# Patient Record
Sex: FEMALE | Race: WHITE | NOT HISPANIC OR LATINO | Employment: FULL TIME | URBAN - METROPOLITAN AREA
[De-identification: names, ages, dates, MRNs, and addresses within clinical notes are randomized per-mention and may not be internally consistent; named-entity substitution may affect disease eponyms.]

---

## 2017-01-02 ENCOUNTER — PATIENT MESSAGE (OUTPATIENT)
Dept: OBSTETRICS AND GYNECOLOGY | Facility: CLINIC | Age: 22
End: 2017-01-02

## 2017-01-06 ENCOUNTER — PATIENT MESSAGE (OUTPATIENT)
Dept: OBSTETRICS AND GYNECOLOGY | Facility: CLINIC | Age: 22
End: 2017-01-06

## 2017-01-07 ENCOUNTER — PATIENT MESSAGE (OUTPATIENT)
Dept: OBSTETRICS AND GYNECOLOGY | Facility: CLINIC | Age: 22
End: 2017-01-07

## 2017-01-13 ENCOUNTER — LAB VISIT (OUTPATIENT)
Dept: LAB | Facility: HOSPITAL | Age: 22
End: 2017-01-13
Payer: COMMERCIAL

## 2017-01-13 ENCOUNTER — OFFICE VISIT (OUTPATIENT)
Dept: OBSTETRICS AND GYNECOLOGY | Facility: CLINIC | Age: 22
End: 2017-01-13
Payer: COMMERCIAL

## 2017-01-13 VITALS
BODY MASS INDEX: 36.4 KG/M2 | SYSTOLIC BLOOD PRESSURE: 114 MMHG | WEIGHT: 240.19 LBS | DIASTOLIC BLOOD PRESSURE: 78 MMHG | HEIGHT: 68 IN

## 2017-01-13 DIAGNOSIS — Z11.3 SCREENING FOR STD (SEXUALLY TRANSMITTED DISEASE): ICD-10-CM

## 2017-01-13 DIAGNOSIS — B35.4 TINEA CORPORIS: ICD-10-CM

## 2017-01-13 DIAGNOSIS — Z11.3 SCREENING FOR STD (SEXUALLY TRANSMITTED DISEASE): Primary | ICD-10-CM

## 2017-01-13 DIAGNOSIS — L85.3 DRY SKIN: ICD-10-CM

## 2017-01-13 PROCEDURE — 87340 HEPATITIS B SURFACE AG IA: CPT

## 2017-01-13 PROCEDURE — 99214 OFFICE O/P EST MOD 30 MIN: CPT | Mod: S$GLB,,, | Performed by: NURSE PRACTITIONER

## 2017-01-13 PROCEDURE — 1159F MED LIST DOCD IN RCRD: CPT | Mod: S$GLB,,, | Performed by: NURSE PRACTITIONER

## 2017-01-13 PROCEDURE — 86703 HIV-1/HIV-2 1 RESULT ANTBDY: CPT

## 2017-01-13 PROCEDURE — 87480 CANDIDA DNA DIR PROBE: CPT

## 2017-01-13 PROCEDURE — 99999 PR PBB SHADOW E&M-EST. PATIENT-LVL III: CPT | Mod: PBBFAC,,, | Performed by: NURSE PRACTITIONER

## 2017-01-13 PROCEDURE — 87591 N.GONORRHOEAE DNA AMP PROB: CPT

## 2017-01-13 PROCEDURE — 86592 SYPHILIS TEST NON-TREP QUAL: CPT

## 2017-01-13 RX ORDER — PRENATAL VIT 91/IRON/FOLIC/DHA 28-975-200
COMBINATION PACKAGE (EA) ORAL 2 TIMES DAILY
Qty: 15 G | COMMUNITY
Start: 2017-01-13 | End: 2017-06-15

## 2017-01-13 NOTE — PROGRESS NOTES
"Chief Complaint: STD testing      HPI:   Pt is a 21 y.o. here today desiring STD testing. She has had recent changes in partners and protection not used 100% of the time. Has concerns of possible STDs and reports +for chlamydia and took treatment with partner about 3 weeks ago and treated for syphillis since he was positive. She is concerned bc she feels they didn't wait the 7 days before intercourse and are having intercourse without condoms. She hasn't started her COCs and LMP within 7 days, concerns about needing plan B from last night but reports this was anal intercourse. Denies vaginal discharge/pain/f/c/n/v. Partner with no reported symptoms.  Right circular itching on right upper arm, dry itchy legs bilaterally, no rashes  ROS   Systemic: Not feeling tired (fatigue).  No fever chills   Gastrointestinal: No nausea, vomiting, no abdominal pain.  No diarrhea.  Genitourinary: No dysuria. No Pelvic Pain  Skin: No rash.  Visit Vitals    /78    Ht 5' 8" (1.727 m)    Wt 109 kg (240 lb 3.1 oz)    LMP 01/04/2017 (Exact Date)    BMI 36.52 kg/m2       Physical Exam   Vital Signs:° Normal.  General Appearance:° Well developed. ° Well nourished.  Right upper extremity circular erythematous edge with scaly lesion and central clearing.  Lymph Nodes: no Inguinal LAD  Urinary System:° Normal. Urethra: ° Meatus showed no abnormalities. ° No mass on the urethra.  Genitalia: External: ° Vulva was normal. ° Genitalia exhibited no lesion.                    Vagina: ° Mucosa was normal. ° A vaginal discharge was not observed  Pelvic: Cervix: ° No cervical discharge. ° Showed no lesion. ° Not tender, no CMT         Uterus: ° Position was normal. ° Not tender.  Neurological:° No disorientation was observed.  Psychiatric:Affect: ° Normal.  Skin:° No skin lesions. Or rashes on palms/soles of feet, BLE with dry skin and excoriation from itching  Perineum:° Normal. ° Did not have a mass.° No perineal lesions/rashes/erythema "     Plan:  1. STD screening--ordered STD Panel (HIV/RPR/GC/Hep B/affirm). Will call with results. Recommended Condom use 100% to prevent STDs    2.Tinea Corporis--terbinafine cream as ordered    3. Dry skin-- Lubriderm cream    4. Contraception--discussed starting COCs and Plan B    RTC prn and for annual

## 2017-01-13 NOTE — PATIENT INSTRUCTIONS
Things to Remember about Feminine Hygiene and Safety   1. Wipe from front to back after using the bathroom   2. If possible, urinate before and definitely after sexual intercourse or masturbation   3. I recommend bathing with liquid soap vs a bar soap. Non-scented antibacterial soap: Dial or Neutrogena soap   4. Shower or rinse off before sitting in a bathtub full of dirty water   5. Do not douche of any kind   6. It is recommended that you take in plenty of fluids. Eight glasses of 8 ounces of water is recommended daily (UNLESS MEDICAL PROBLEMS INDICATE OTHERWISE)   7. It is recommended to change tampons and pads every 2-3 hours or as saturated   8. Use condoms to protect yourself against Sexually Transmitted Infections   9. Wear your seat belt

## 2017-01-14 LAB
CANDIDA RRNA VAG QL PROBE: NEGATIVE
G VAGINALIS RRNA GENITAL QL PROBE: NEGATIVE
RPR SER QL: NORMAL
T VAGINALIS RRNA GENITAL QL PROBE: NEGATIVE

## 2017-01-16 LAB
HBV SURFACE AG SERPL QL IA: NEGATIVE
HIV 1+2 AB+HIV1 P24 AG SERPL QL IA: NEGATIVE

## 2017-01-17 ENCOUNTER — TELEPHONE (OUTPATIENT)
Dept: OBSTETRICS AND GYNECOLOGY | Facility: CLINIC | Age: 22
End: 2017-01-17

## 2017-01-17 ENCOUNTER — PATIENT MESSAGE (OUTPATIENT)
Dept: OBSTETRICS AND GYNECOLOGY | Facility: CLINIC | Age: 22
End: 2017-01-17

## 2017-01-17 LAB
C TRACH DNA SPEC QL NAA+PROBE: NEGATIVE
N GONORRHOEA DNA SPEC QL NAA+PROBE: NEGATIVE

## 2017-01-19 ENCOUNTER — PATIENT MESSAGE (OUTPATIENT)
Dept: OBSTETRICS AND GYNECOLOGY | Facility: CLINIC | Age: 22
End: 2017-01-19

## 2017-01-19 NOTE — TELEPHONE ENCOUNTER
Spoke with pt and informed her that her lab work was negative. Pt expressed verbal understanding. MAE

## 2017-01-20 ENCOUNTER — TELEPHONE (OUTPATIENT)
Dept: OBSTETRICS AND GYNECOLOGY | Facility: CLINIC | Age: 22
End: 2017-01-20

## 2017-01-20 DIAGNOSIS — B37.31 VAGINAL YEAST INFECTION: Primary | ICD-10-CM

## 2017-01-20 RX ORDER — FLUCONAZOLE 150 MG/1
TABLET ORAL
Qty: 2 TABLET | Refills: 0 | Status: SHIPPED | OUTPATIENT
Start: 2017-01-20 | End: 2017-02-22

## 2017-01-20 NOTE — TELEPHONE ENCOUNTER
Discussed symptoms with pt of thick clumpy white discharge with vaginal itching and irritation.   Ordered diflucan and external monistat cream. Most likely r/t recent antibiotics  If not better will need apt

## 2017-01-24 ENCOUNTER — TELEPHONE (OUTPATIENT)
Dept: OBSTETRICS AND GYNECOLOGY | Facility: CLINIC | Age: 22
End: 2017-01-24

## 2017-01-24 NOTE — TELEPHONE ENCOUNTER
Pt called and explained to me that she took the diflucan,  but she is still having severe external and internal itching and irritation. Pt is also experiencing painful intercourse and having abdominal pain. Pt wants to be seen by you but wants to call back to schedule Monday or Tuesday. She would like to know if a vaginal cream can be called in to help with the external itching. MAE

## 2017-02-03 ENCOUNTER — TELEPHONE (OUTPATIENT)
Dept: OBSTETRICS AND GYNECOLOGY | Facility: CLINIC | Age: 22
End: 2017-02-03

## 2017-02-03 NOTE — TELEPHONE ENCOUNTER
Pt states that her PCP said that she needs a lower dose of birth control because the current BC she is taking may not work well with the Lamotrigine. The pt's PCP told her that she needed to follow up with obgyn and explained to her that her mood may be effected with the current combination of meds. She would like to speak with you. [phone]

## 2017-02-06 ENCOUNTER — TELEPHONE (OUTPATIENT)
Dept: OBSTETRICS AND GYNECOLOGY | Facility: CLINIC | Age: 22
End: 2017-02-06

## 2017-02-06 NOTE — TELEPHONE ENCOUNTER
----- Message from Linda Coon MD sent at 2/6/2017  2:27 PM CST -----  Bhaskar Mckeon,   This patient has apparently been told she might need to switch birth controls, but that's way too long of a discussion to have over the phone.   Can you schedule an appointment for her to come in to talk about her options?  Thank you!    ----- Message -----     From: Brigida Escobar MA     Sent: 2/6/2017  10:31 AM       To: Linda Coon MD    Pt states that her PCP said that she needs a lower dose of birth control because the current BC she is taking may not work well with the Lamotrigine. The pt's PCP told her that she needed to follow up with obgyn and explained to her that her mood may be effected with the current combination of meds. She would like to speak with you. [phone]

## 2017-02-22 ENCOUNTER — OFFICE VISIT (OUTPATIENT)
Dept: OBSTETRICS AND GYNECOLOGY | Facility: CLINIC | Age: 22
End: 2017-02-22
Payer: COMMERCIAL

## 2017-02-22 VITALS
SYSTOLIC BLOOD PRESSURE: 118 MMHG | BODY MASS INDEX: 37.1 KG/M2 | DIASTOLIC BLOOD PRESSURE: 74 MMHG | WEIGHT: 244.81 LBS | HEIGHT: 68 IN

## 2017-02-22 DIAGNOSIS — N94.10 DYSPAREUNIA IN FEMALE: Primary | ICD-10-CM

## 2017-02-22 PROCEDURE — 87480 CANDIDA DNA DIR PROBE: CPT

## 2017-02-22 PROCEDURE — 1160F RVW MEDS BY RX/DR IN RCRD: CPT | Mod: S$GLB,,, | Performed by: OBSTETRICS & GYNECOLOGY

## 2017-02-22 PROCEDURE — 99213 OFFICE O/P EST LOW 20 MIN: CPT | Mod: S$GLB,,, | Performed by: OBSTETRICS & GYNECOLOGY

## 2017-02-22 PROCEDURE — 99999 PR PBB SHADOW E&M-EST. PATIENT-LVL II: CPT | Mod: PBBFAC,,, | Performed by: OBSTETRICS & GYNECOLOGY

## 2017-02-22 PROCEDURE — 87591 N.GONORRHOEAE DNA AMP PROB: CPT
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
## 2017-02-23 LAB
CANDIDA RRNA VAG QL PROBE: NEGATIVE
G VAGINALIS RRNA GENITAL QL PROBE: NEGATIVE
T VAGINALIS RRNA GENITAL QL PROBE: NEGATIVE

## 2017-02-24 ENCOUNTER — PATIENT MESSAGE (OUTPATIENT)
Dept: OBSTETRICS AND GYNECOLOGY | Facility: CLINIC | Age: 22
End: 2017-02-24

## 2017-02-24 LAB
C TRACH DNA SPEC QL NAA+PROBE: NEGATIVE
N GONORRHOEA DNA SPEC QL NAA+PROBE: NEGATIVE

## 2017-03-07 ENCOUNTER — PATIENT MESSAGE (OUTPATIENT)
Dept: OBSTETRICS AND GYNECOLOGY | Facility: CLINIC | Age: 22
End: 2017-03-07

## 2017-03-07 RX ORDER — FLUCONAZOLE 150 MG/1
150 TABLET ORAL ONCE
Qty: 2 TABLET | Refills: 1 | Status: SHIPPED | OUTPATIENT
Start: 2017-03-07 | End: 2017-03-07

## 2017-04-20 ENCOUNTER — TELEPHONE (OUTPATIENT)
Dept: OBSTETRICS AND GYNECOLOGY | Facility: CLINIC | Age: 22
End: 2017-04-20

## 2017-04-20 RX ORDER — FLUCONAZOLE 150 MG/1
150 TABLET ORAL ONCE
Qty: 2 TABLET | Refills: 1 | Status: SHIPPED | OUTPATIENT
Start: 2017-04-20 | End: 2017-04-20

## 2017-04-20 NOTE — TELEPHONE ENCOUNTER
----- Message from Brigida Escobar MA sent at 4/20/2017  8:51 AM CDT -----  Pt would like to know if she could get an Rx for a yeast infection called in. She states that she is experiencing itching and discharge.

## 2017-05-19 ENCOUNTER — TELEPHONE (OUTPATIENT)
Dept: OBSTETRICS AND GYNECOLOGY | Facility: CLINIC | Age: 22
End: 2017-05-19

## 2017-05-19 NOTE — TELEPHONE ENCOUNTER
Pt called because she is still having a yeast infection. I explained to the pt that she had a refill of Diflucan left so she could take that over the weekend and be seen as early as Monday if she would like. The pt would like to know what she could do to prevent them in the future since she is now getting them every month. Please advise. Thank You.

## 2017-05-22 ENCOUNTER — PATIENT MESSAGE (OUTPATIENT)
Dept: OBSTETRICS AND GYNECOLOGY | Facility: CLINIC | Age: 22
End: 2017-05-22

## 2017-06-15 ENCOUNTER — OFFICE VISIT (OUTPATIENT)
Dept: OBSTETRICS AND GYNECOLOGY | Facility: CLINIC | Age: 22
End: 2017-06-15
Payer: COMMERCIAL

## 2017-06-15 VITALS
HEIGHT: 68 IN | BODY MASS INDEX: 38.52 KG/M2 | SYSTOLIC BLOOD PRESSURE: 110 MMHG | WEIGHT: 254.19 LBS | DIASTOLIC BLOOD PRESSURE: 70 MMHG

## 2017-06-15 DIAGNOSIS — N94.10 DYSPAREUNIA, FEMALE: Primary | ICD-10-CM

## 2017-06-15 DIAGNOSIS — N89.8 VAGINAL DISCHARGE: ICD-10-CM

## 2017-06-15 PROCEDURE — 99213 OFFICE O/P EST LOW 20 MIN: CPT | Mod: S$GLB,,, | Performed by: NURSE PRACTITIONER

## 2017-06-15 PROCEDURE — 87480 CANDIDA DNA DIR PROBE: CPT

## 2017-06-15 PROCEDURE — 99999 PR PBB SHADOW E&M-EST. PATIENT-LVL III: CPT | Mod: PBBFAC,,, | Performed by: NURSE PRACTITIONER

## 2017-06-15 RX ORDER — AMOXICILLIN AND CLAVULANATE POTASSIUM 875; 125 MG/1; MG/1
TABLET, FILM COATED ORAL
COMMUNITY
Start: 2017-06-12 | End: 2017-10-11

## 2017-06-15 RX ORDER — LITHIUM CARBONATE 300 MG/1
CAPSULE ORAL
COMMUNITY
Start: 2017-05-10 | End: 2017-10-11

## 2017-06-15 RX ORDER — LAMOTRIGINE 200 MG/1
TABLET ORAL
COMMUNITY
Start: 2017-06-05 | End: 2017-10-11

## 2017-06-15 NOTE — PROGRESS NOTES
"Chief Complaint   Patient presents with    Vaginal Pain      during sex      (Shaggy pt)    Misael Garcia is a 22 y.o. female  presents with complaint of burning sensation with intercourse for 1 week.  She denies discharge or itching.  Denies odor.   Patient's last menstrual period was 2017.  Feels like she gets yeast infection symptoms after intercourse.  Declines GC/CT screening.    Past Medical History:   Diagnosis Date    Chlamydia 2016     Past Surgical History:   Procedure Laterality Date    NO PAST SURGERIES      as of 19     Social History   Substance Use Topics    Smoking status: Never Smoker    Smokeless tobacco: Not on file    Alcohol use No     Family History   Problem Relation Age of Onset    Breast cancer Paternal Grandmother     Cancer Paternal Grandmother     Colon cancer Neg Hx     Ovarian cancer Neg Hx      OB History    Para Term  AB Living   0 0 0 0 0 0   SAB TAB Ectopic Multiple Live Births   0 0 0 0                ROS:  GENERAL: No fever, chills, fatigue or weight loss.  VULVAR: No pain, no lesions and no itching.  VAGINAL: Mild burning during intercourse; no itching, no abnormal bleeding and no lesions.  ABDOMEN: No abdominal pain. Denies nausea. Denies vomiting. No diarrhea. No constipation  BREAST: Denies pain. No lumps. No discharge.  URINARY: No incontinence, no nocturia, no frequency and no dysuria.  CARDIOVASCULAR: No chest pain. No shortness of breath. No leg cramps.  NEUROLOGICAL: No headaches. No vision changes.    Vitals:    06/15/17 0908   BP: 110/70   Weight: 115.3 kg (254 lb 3.1 oz)   Height: 5' 8" (1.727 m)   PainSc: 0-No pain     Body mass index is 38.65 kg/m².    PHYSICAL EXAM:   External genitalia and urethra within normal limits.   Vagina without lesions; small amount creamy pink-tinged discharge.    Cervix without lesions, discharge, or cervical motion tenderness.   Uterus normal in size and nontender. No adnexal " masses or tenderness palpated.      ASSESSMENT and PLAN:  Dyspareunia, female  -     Vaginosis Screen by DNA Probe    Vaginal discharge  -     Vaginosis Screen by DNA Probe    * Will call with results when finalized.    Patient was counseled today on vaginitis prevention, including to:  1.  Avoid feminine products such as deodorant soaps, body wash, bubble bath, douches, scented toilet paper, deodorant tampons or pads, feminine wipes, chronic pad use, etc.  2.  Avoid other vulvovaginal irritants such as long hot baths, humidity, tight, synthetic clothing, chlorine and sitting around in wet bathing suits  3.  Wear cotton underwear.  4.  Shower immediately after exercise and change clothes  5.  Use polyurethane condoms without spermicide if sexually active and symptoms are triggered by intercourse    To help maintain a healthy vaginal pH:  For vaginal discomfort or feminine odor, she may use OTC Rephresh gel.  It is a vaginal gel used to neutralize and maintain a healthy vaginal pH.  Patients who get Yeast or BV often use this to help reduce risk of vaginal issues.  Maintaining a healthy pH helps beneficial bacteria to thrive and inhibits overgrowths of yeast and pathogenic bacteria.  It can be found at any drugstore on the feminine product aisle, and can be used as frequently as every 3 days.          FOLLOW UP: PRN lack of improvement.

## 2017-06-16 ENCOUNTER — TELEPHONE (OUTPATIENT)
Dept: OBSTETRICS AND GYNECOLOGY | Facility: CLINIC | Age: 22
End: 2017-06-16

## 2017-06-16 NOTE — PROGRESS NOTES
"Call patient and tell them.....    "Your vaginal swab from the office came back negative.  This was a test for a yeast infection or a bacterial infection.  Your swab was normal.  Please call the office if you have any other questions.    (I recommend she use OTC Rephresh gel.  It is a vaginal gel used to neutralize and maintain a healthy vaginal pH.  Patients who get Yeast or BV often use this to help reduce risk of vaginal issues.  It can be found at any drugstore on the feminine product ais[employer]percast, and can be used as frequently as every 3 days.)"

## 2017-06-16 NOTE — TELEPHONE ENCOUNTER
"----- Message from Karla Zabala NP sent at 6/16/2017 10:09 AM CDT -----  Call patient and tell them.....    "Your vaginal swab from the office came back negative.  This was a test for a yeast infection or a bacterial infection.  Your swab was normal.  Please call the office if you have any other questions.    (I recommend she use OTC Rephresh gel.  It is a vaginal gel used to neutralize and maintain a healthy vaginal pH.  Patients who get Yeast or BV often use this to help reduce risk of vaginal issues.  It can be found at any drugstore on the feminine product aisStribe, and can be used as frequently as every 3 days.)  "

## 2017-06-16 NOTE — TELEPHONE ENCOUNTER
Left message for pt explaining to her that she can review her results via my chart and call the office if she has any questions. MAE

## 2017-09-19 DIAGNOSIS — Z30.011 ENCOUNTER FOR INITIAL PRESCRIPTION OF CONTRACEPTIVE PILLS: ICD-10-CM

## 2017-09-19 RX ORDER — NORGESTIMATE AND ETHINYL ESTRADIOL 7DAYSX3 LO
1 KIT ORAL DAILY
Qty: 28 TABLET | Refills: 3 | Status: SHIPPED | OUTPATIENT
Start: 2017-09-19 | End: 2017-10-09 | Stop reason: SDUPTHER

## 2017-10-09 ENCOUNTER — PATIENT MESSAGE (OUTPATIENT)
Dept: OBSTETRICS AND GYNECOLOGY | Facility: CLINIC | Age: 22
End: 2017-10-09

## 2017-10-09 DIAGNOSIS — Z30.011 ENCOUNTER FOR INITIAL PRESCRIPTION OF CONTRACEPTIVE PILLS: ICD-10-CM

## 2017-10-09 RX ORDER — NORGESTIMATE AND ETHINYL ESTRADIOL 7DAYSX3 LO
1 KIT ORAL DAILY
Qty: 28 TABLET | Refills: 3 | Status: SHIPPED | OUTPATIENT
Start: 2017-10-09 | End: 2018-02-14 | Stop reason: SDUPTHER

## 2017-10-09 RX ORDER — FLUCONAZOLE 150 MG/1
150 TABLET ORAL ONCE
Qty: 2 TABLET | Refills: 1 | Status: SHIPPED | OUTPATIENT
Start: 2017-10-09 | End: 2017-10-09

## 2017-10-11 ENCOUNTER — OFFICE VISIT (OUTPATIENT)
Dept: OBSTETRICS AND GYNECOLOGY | Facility: CLINIC | Age: 22
End: 2017-10-11
Payer: COMMERCIAL

## 2017-10-11 VITALS
SYSTOLIC BLOOD PRESSURE: 110 MMHG | DIASTOLIC BLOOD PRESSURE: 80 MMHG | BODY MASS INDEX: 40.21 KG/M2 | HEIGHT: 68 IN | WEIGHT: 265.31 LBS

## 2017-10-11 DIAGNOSIS — R30.0 DYSURIA: ICD-10-CM

## 2017-10-11 DIAGNOSIS — Z11.3 SCREEN FOR STD (SEXUALLY TRANSMITTED DISEASE): Primary | ICD-10-CM

## 2017-10-11 DIAGNOSIS — B37.31 VAGINAL YEAST INFECTION: ICD-10-CM

## 2017-10-11 LAB
BILIRUB SERPL-MCNC: NORMAL MG/DL
BLOOD URINE, POC: NORMAL
C TRACH DNA SPEC QL NAA+PROBE: NOT DETECTED
COLOR, POC UA: YELLOW
GLUCOSE UR QL STRIP: NORMAL
KETONES UR QL STRIP: NORMAL
LEUKOCYTE ESTERASE URINE, POC: NORMAL
N GONORRHOEA DNA SPEC QL NAA+PROBE: NOT DETECTED
NITRITE, POC UA: NORMAL
PH, POC UA: 5
PROTEIN, POC: NORMAL
SPECIFIC GRAVITY, POC UA: 1.01
UROBILINOGEN, POC UA: NORMAL

## 2017-10-11 PROCEDURE — 99999 PR PBB SHADOW E&M-EST. PATIENT-LVL III: CPT | Mod: PBBFAC,,, | Performed by: NURSE PRACTITIONER

## 2017-10-11 PROCEDURE — 81002 URINALYSIS NONAUTO W/O SCOPE: CPT | Mod: S$GLB,,, | Performed by: NURSE PRACTITIONER

## 2017-10-11 PROCEDURE — 87660 TRICHOMONAS VAGIN DIR PROBE: CPT

## 2017-10-11 PROCEDURE — 99214 OFFICE O/P EST MOD 30 MIN: CPT | Mod: 25,S$GLB,, | Performed by: NURSE PRACTITIONER

## 2017-10-11 PROCEDURE — 87480 CANDIDA DNA DIR PROBE: CPT

## 2017-10-11 PROCEDURE — 87591 N.GONORRHOEAE DNA AMP PROB: CPT

## 2017-10-11 RX ORDER — RISPERIDONE 1 MG/1
1 TABLET ORAL NIGHTLY
Refills: 2 | COMMUNITY
Start: 2017-09-17 | End: 2018-03-02

## 2017-10-11 RX ORDER — FLUCONAZOLE 150 MG/1
TABLET ORAL
Qty: 2 TABLET | Refills: 0 | Status: SHIPPED | OUTPATIENT
Start: 2017-10-11 | End: 2017-12-06 | Stop reason: SDUPTHER

## 2017-10-11 NOTE — PATIENT INSTRUCTIONS
Vaginal Infection: Yeast (Candidiasis)  Yeast infection occurs when yeast in the vagina increase and attacks the vaginal tissues. Yeast is a type of fungus. These infections are often caused by a type of yeast called Candida albicans. Factors that may make infection more likely include recent antibiotic use, douching, or increased sex. Yeast infections are more common in women who have diabetes, or are obese or pregnant, or have a weak immune system.    Symptoms of yeast infection  · Clumpy or thin, white discharge, which may look like cottage cheese  · No odor or minimal odor  · Severe vaginal itching or burning  · Burning with urination  · Swelling, redness of vulva  · Pain during sex    Treating yeast infection  Yeast infection is treated with a vaginal antifungal cream. In some cases, antifungal pills are prescribed instead.

## 2017-10-11 NOTE — LETTER
October 11, 2017      [facility]  [address], [address]  [facility] [address]  Phone: [phone]  Fax: [phone]       Patient: Misael Garcia   YOB: 1995  Date of Visit: 10/11/2017    To Whom It May Concern:    Dora Garcia  was at [facility] on 10/11/2017.  restrictions. If you have any questions or concerns, or if I can be of further assistance, please do not hesitate to contact me.    Sincerely,        ARACELI Dowell

## 2017-10-11 NOTE — PROGRESS NOTES
"Chief complaint: Vaginal Irritation/Itching/burning     HPI: Vaginal Irritation/itching/burning for the last 4-5 days. She has felt fatigued and tired for the last few days with thick white small odorous discharge. She has intermittent dysuria but not with every urination and not every day. She did take a Diflucan Sunday without full resolution of symptoms. She reports no changes in sexual partners and partner did take medication previously for Chlamydia and pts KIRBY in Feb was neg. Denies changes in soaps, detergents, douching. No recent use of antibiotics. She has not used anything OTC and denies pelvic pain, fever, chills, n/v/lesions or cuts.      ROS   Systemic: Not feeling tired (fatigue).  No fever chills   Gastrointestinal: No nausea, vomiting, no abdominal pain.  No diarrhea.  Genitourinary: No dysuria. No Pelvic Pain  Skin: No rash.    /80   Ht 5' 8" (1.727 m)   Wt 120.4 kg (265 lb 5.2 oz)   LMP 09/30/2017 (Approximate)   BMI 40.34 kg/m²     Physical Exam   Vital Signs:° Normal.  General Appearance:° Well developed. ° Well nourished.  Lymph Nodes: no Inguinal LAD  Urinary System:° Normal. Urethra: ° Meatus showed no abnormalities. ° No mass on the urethra.  Genitalia: External: ° Vulva was normal. ° Genitalia exhibited no lesion.                    Vagina: ° Mucosa was mildly erythematous. ° A vaginal discharge was observed thick white/yellow  Pelvic: Cervix: ° No cervical discharge. ° Showed no lesion. ° Not tender, no CMT         Uterus: ° Position was normal. ° Not tender.  Neurological:° No disorientation was observed.  Psychiatric:Affect: ° Normal.  Skin:° No skin lesions.   Perineum:° Normal. ° Did not have a mass.° No perineal lesions/rashes/erythema     Assessment/Plan:  1. Yeast infection- affirm ordered and will do add diflucan since some help and only had one pill Prevention measures discussed/educated. However will also repeat GC screening. UA neg    2. STD screening--GC    RTC prn and " as scheduled for annual exam. Work letter given

## 2017-10-12 ENCOUNTER — TELEPHONE (OUTPATIENT)
Dept: OBSTETRICS AND GYNECOLOGY | Facility: CLINIC | Age: 22
End: 2017-10-12

## 2017-10-12 NOTE — TELEPHONE ENCOUNTER
Talked with patient today and informed of neg results although she reports feeling better with diflucan. Will call with urine cx results

## 2017-10-12 NOTE — TELEPHONE ENCOUNTER
Pt would like to discuss her results with Sofia. Also pt said she started taking the rx and still isn't feeling well today. She would like to know if she can get a drs note for missing work yesterday and today.

## 2017-12-06 ENCOUNTER — PATIENT MESSAGE (OUTPATIENT)
Dept: OBSTETRICS AND GYNECOLOGY | Facility: CLINIC | Age: 22
End: 2017-12-06

## 2017-12-06 DIAGNOSIS — B37.31 VAGINAL YEAST INFECTION: ICD-10-CM

## 2017-12-06 DIAGNOSIS — Z11.3 SCREEN FOR STD (SEXUALLY TRANSMITTED DISEASE): ICD-10-CM

## 2017-12-06 RX ORDER — FLUCONAZOLE 150 MG/1
TABLET ORAL
Qty: 2 TABLET | Refills: 0 | Status: SHIPPED | OUTPATIENT
Start: 2017-12-06 | End: 2018-03-02

## 2018-02-05 DIAGNOSIS — Z11.3 SCREEN FOR STD (SEXUALLY TRANSMITTED DISEASE): ICD-10-CM

## 2018-02-05 DIAGNOSIS — Z30.011 ENCOUNTER FOR INITIAL PRESCRIPTION OF CONTRACEPTIVE PILLS: ICD-10-CM

## 2018-02-05 DIAGNOSIS — B37.31 VAGINAL YEAST INFECTION: ICD-10-CM

## 2018-02-05 RX ORDER — NORGESTIMATE AND ETHINYL ESTRADIOL 7DAYSX3 LO
1 KIT ORAL DAILY
Qty: 28 TABLET | Refills: 3 | Status: CANCELLED | OUTPATIENT
Start: 2018-02-05 | End: 2019-02-05

## 2018-02-05 RX ORDER — FLUCONAZOLE 150 MG/1
TABLET ORAL
Qty: 2 TABLET | Refills: 0 | Status: CANCELLED | OUTPATIENT
Start: 2018-02-05

## 2018-02-06 ENCOUNTER — OFFICE VISIT (OUTPATIENT)
Dept: URGENT CARE | Facility: CLINIC | Age: 23
End: 2018-02-06
Payer: COMMERCIAL

## 2018-02-06 VITALS
BODY MASS INDEX: 37.89 KG/M2 | TEMPERATURE: 97 F | SYSTOLIC BLOOD PRESSURE: 112 MMHG | WEIGHT: 250 LBS | HEART RATE: 68 BPM | HEIGHT: 68 IN | OXYGEN SATURATION: 98 % | DIASTOLIC BLOOD PRESSURE: 80 MMHG

## 2018-02-06 DIAGNOSIS — B37.31 VAGINAL YEAST INFECTION: ICD-10-CM

## 2018-02-06 DIAGNOSIS — B37.31 YEAST VAGINITIS: Primary | ICD-10-CM

## 2018-02-06 DIAGNOSIS — Z11.3 SCREEN FOR STD (SEXUALLY TRANSMITTED DISEASE): ICD-10-CM

## 2018-02-06 DIAGNOSIS — Z30.011 ENCOUNTER FOR INITIAL PRESCRIPTION OF CONTRACEPTIVE PILLS: ICD-10-CM

## 2018-02-06 PROCEDURE — 3008F BODY MASS INDEX DOCD: CPT | Mod: S$GLB,,, | Performed by: PHYSICIAN ASSISTANT

## 2018-02-06 PROCEDURE — 99214 OFFICE O/P EST MOD 30 MIN: CPT | Mod: SA,S$GLB,, | Performed by: PHYSICIAN ASSISTANT

## 2018-02-06 RX ORDER — FLUCONAZOLE 150 MG/1
TABLET ORAL
Qty: 2 TABLET | Refills: 0 | OUTPATIENT
Start: 2018-02-06

## 2018-02-06 RX ORDER — NORGESTIMATE AND ETHINYL ESTRADIOL 7DAYSX3 LO
1 KIT ORAL DAILY
Qty: 28 TABLET | Refills: 3 | OUTPATIENT
Start: 2018-02-06 | End: 2019-02-06

## 2018-02-06 RX ORDER — FLUOXETINE 10 MG/1
10 CAPSULE ORAL DAILY
COMMUNITY
End: 2018-03-02

## 2018-02-06 RX ORDER — FLUCONAZOLE 200 MG/1
200 TABLET ORAL ONCE
Qty: 2 TABLET | Refills: 0 | Status: SHIPPED | OUTPATIENT
Start: 2018-02-06 | End: 2018-02-06

## 2018-02-06 NOTE — LETTER
February 6, 2018      [facility]  [address]  [facility] [address]  Phone: [phone]  Fax: [phone]       Patient: Misael Garcia   YOB: 1995  Date of Visit: 02/06/2018    To Whom It May Concern:    Dora Garcia  was at [facility] on 02/06/2018. She may return to work/school on 02/08/2018 with no restrictions. If you have any questions or concerns, or if I can be of further assistance, please do not hesitate to contact me.    Sincerely,        Marsha Vaughn PA-C

## 2018-02-07 ENCOUNTER — TELEPHONE (OUTPATIENT)
Dept: OBSTETRICS AND GYNECOLOGY | Facility: CLINIC | Age: 23
End: 2018-02-07

## 2018-02-07 NOTE — TELEPHONE ENCOUNTER
Pt states she has been having yeast infections every couple months for the past 2 years.  She went to urgent care because her Diflucan request was denied.  No testing was done but gave her Diflucan.  Still having symptoms.  Reassured her that since she just took the pill last night it may take up to 3 days to start feeling relief.  Offered appt but recommended she wait a day or so to see if medication will work.  Scheduled with Sofia tomorrow.

## 2018-02-07 NOTE — PATIENT INSTRUCTIONS
- Rest.    - Drink plenty of fluids.    - Tylenol or Ibuprofen as directed as needed for fever/pain.    - Follow up with your PCP or specialty clinic as directed in the next 1-2 weeks if not improved or as needed.  You can call [phone] to schedule an appointment with the appropriate provider.    - Go to the ED if your symptoms worsen.     Candida Vaginal Infection    You have a Candida vaginal infection. This is also known as a yeast infection. It is most often caused by a type of yeast (fungus) called Candida. Candida are normally found in the vagina. But if they increase in number, this can lead to infection and cause symptoms.  Symptoms of a yeast infection can include:  · Clumpy or thin, white discharge, which may look like cottage cheese  · Itching or burning  · Burning with urination  Certain factors can make a yeast infection more likely. These can include:  · Taking certain medicines, such as antibiotics or birth control pills  · Pregnancy  · Diabetes  · Weakened immune system  A yeast infection is most often treated with antifungal medicine. This may be given as a vaginal cream or pills you take by mouth. Treatment may last for about 1 to 7 days. Women with severe or recurrent infections may need longer courses of treatment.  Home care  · If youre prescribed medicine, be sure to use it as directed. Finish all of the medicine, even if your symptoms go away. Note: Dont try to treat yourself using over-the-counter products without talking to your provider first. He or she will let you know if this is a good option for you.  · Ask your provider what steps you can take to help reduce your risk of having a yeast infection in the future.  Follow-up care  Follow up with your healthcare provider, or as directed.  When to seek medical advice  Call your healthcare provider right away if:  · You have a fever of 100.4ºF (38ºC) or higher, or as directed by your provider.  · Your symptoms worsen, or they dont go  away within a few days of starting treatment.  · You have new pain in the lower belly or pelvic region.  · You have side effects that bother you or a reaction to the cream or pills youre prescribed.  · You or any partners you have sex with have new symptoms, such as a rash, joint pain, or sores.  Date Last Reviewed: 7/30/2015  © 3225-5868 [employer]. [address] [place] [address], [place] [address]. All rights reserved. This information is not intended as a substitute for professional medical care. Always follow your healthcare professional's instructions.

## 2018-02-07 NOTE — PROGRESS NOTES
"Subjective:       Patient ID: Misael Garcia is a 22 y.o. female.    Vitals:  height is 5' 8" (1.727 m) and weight is 113.4 kg (250 lb). Her temperature is 97.2 °F (36.2 °C). Her blood pressure is 112/80 and her pulse is 68. Her oxygen saturation is 98%.     Chief Complaint: Vaginitis (Pt. has had recurrent yeast infections after being treated for chlamydia)    Pt. Has had recurrent yeast infections since she was treated for chlamydia. Pt. Called OB/GYN and they said she would need to be seen to receive more diflucan.  Symptoms are itching, burning, and slight vaginal discharge.        Vaginal Itching   The patient's primary symptoms include genital itching. The patient's pertinent negatives include no genital odor, missed menses or vaginal discharge. This is a recurrent problem. The current episode started in the past 7 days. The problem occurs constantly. The problem has been gradually worsening. The pain is severe. The problem affects both sides. She is not pregnant. Pertinent negatives include no abdominal pain, back pain, chills, fever, hematuria, nausea, urgency or vomiting. The symptoms are aggravated by urinating. She has tried nothing for the symptoms. The treatment provided no relief. She is sexually active. It is unknown whether or not her partner has an STD. She uses oral contraceptives for contraception. Her menstrual history has been regular. Her past medical history is significant for vaginosis.     Review of Systems   Constitution: Negative for chills and fever.   Skin: Positive for itching.   Musculoskeletal: Negative for back pain.   Gastrointestinal: Negative for abdominal pain, nausea and vomiting.   Genitourinary: Negative for genital sores, hematuria, missed menses, non-menstrual bleeding, urgency and vaginal discharge.        Positive for vaginal itching and irritation       Objective:      Physical Exam   Constitutional: She is oriented to person, place, and time. She appears " well-developed and well-nourished.   HENT:   Head: Normocephalic and atraumatic.   Eyes: Conjunctivae are normal.   Neck: Normal range of motion. Neck supple. No thyromegaly present.   Cardiovascular: Normal rate and regular rhythm.  Exam reveals no gallop and no friction rub.    No murmur heard.  Pulmonary/Chest: Effort normal and breath sounds normal. She has no wheezes. She has no rales.   Genitourinary:   Genitourinary Comments: Deferred exam.   Musculoskeletal: Normal range of motion.   Lymphadenopathy:     She has no cervical adenopathy.   Neurological: She is alert and oriented to person, place, and time.   Skin: Skin is warm and dry. No rash noted. No erythema.   Psychiatric: She has a normal mood and affect. Her behavior is normal. Judgment and thought content normal.   Nursing note and vitals reviewed.      Assessment:       1. Yeast vaginitis        Plan:         Yeast vaginitis  -     fluconazole (DIFLUCAN) 200 MG Tab; Take 1 tablet (200 mg total) by mouth once. May repeat one time in 3 days in not improved.  Dispense: 2 tablet; Refill: 0      Misael was seen today for vaginitis.    Diagnoses and all orders for this visit:    Yeast vaginitis  -     fluconazole (DIFLUCAN) 200 MG Tab; Take 1 tablet (200 mg total) by mouth once. May repeat one time in 3 days in not improved.      Patient Instructions   - Rest.    - Drink plenty of fluids.    - Tylenol or Ibuprofen as directed as needed for fever/pain.    - Follow up with your PCP or specialty clinic as directed in the next 1-2 weeks if not improved or as needed.  You can call [phone] to schedule an appointment with the appropriate provider.    - Go to the ED if your symptoms worsen.     Candida Vaginal Infection    You have a Candida vaginal infection. This is also known as a yeast infection. It is most often caused by a type of yeast (fungus) called Candida. Candida are normally found in the vagina. But if they increase in number, this can lead to  infection and cause symptoms.  Symptoms of a yeast infection can include:  · Clumpy or thin, white discharge, which may look like cottage cheese  · Itching or burning  · Burning with urination  Certain factors can make a yeast infection more likely. These can include:  · Taking certain medicines, such as antibiotics or birth control pills  · Pregnancy  · Diabetes  · Weakened immune system  A yeast infection is most often treated with antifungal medicine. This may be given as a vaginal cream or pills you take by mouth. Treatment may last for about 1 to 7 days. Women with severe or recurrent infections may need longer courses of treatment.  Home care  · If youre prescribed medicine, be sure to use it as directed. Finish all of the medicine, even if your symptoms go away. Note: Dont try to treat yourself using over-the-counter products without talking to your provider first. He or she will let you know if this is a good option for you.  · Ask your provider what steps you can take to help reduce your risk of having a yeast infection in the future.  Follow-up care  Follow up with your healthcare provider, or as directed.  When to seek medical advice  Call your healthcare provider right away if:  · You have a fever of 100.4ºF (38ºC) or higher, or as directed by your provider.  · Your symptoms worsen, or they dont go away within a few days of starting treatment.  · You have new pain in the lower belly or pelvic region.  · You have side effects that bother you or a reaction to the cream or pills youre prescribed.  · You or any partners you have sex with have new symptoms, such as a rash, joint pain, or sores.  Date Last Reviewed: 7/30/2015 © 2000-2017 [employer]. [address] [place] [id number]. All rights reserved. This information is not intended as a substitute for professional medical care. Always follow your healthcare professional's instructions.

## 2018-02-07 NOTE — TELEPHONE ENCOUNTER
Shaggy ariza has been dealing with yeast infections for the past 2 years back to back. Pt isn't sure what is going on. Pt went to urgent care and has taken the medication but isn't feeling any better.    [phone]

## 2018-02-08 ENCOUNTER — OFFICE VISIT (OUTPATIENT)
Dept: OBSTETRICS AND GYNECOLOGY | Facility: CLINIC | Age: 23
End: 2018-02-08
Payer: COMMERCIAL

## 2018-02-08 VITALS
WEIGHT: 278 LBS | BODY MASS INDEX: 42.13 KG/M2 | DIASTOLIC BLOOD PRESSURE: 70 MMHG | SYSTOLIC BLOOD PRESSURE: 100 MMHG | HEIGHT: 68 IN

## 2018-02-08 DIAGNOSIS — N76.0 ACUTE VAGINITIS: ICD-10-CM

## 2018-02-08 DIAGNOSIS — N76.0 CONTACT VAGINITIS: Primary | ICD-10-CM

## 2018-02-08 DIAGNOSIS — N89.8 VAGINAL IRRITATION: ICD-10-CM

## 2018-02-08 DIAGNOSIS — N89.8 VAGINAL DISCHARGE: ICD-10-CM

## 2018-02-08 DIAGNOSIS — B35.6 TINEA CRURIS: ICD-10-CM

## 2018-02-08 DIAGNOSIS — L29.9 ITCHING: ICD-10-CM

## 2018-02-08 LAB
BILIRUB SERPL-MCNC: NORMAL MG/DL
BLOOD URINE, POC: NORMAL
COLOR, POC UA: YELLOW
GLUCOSE UR QL STRIP: NORMAL
KETONES UR QL STRIP: NORMAL
LEUKOCYTE ESTERASE URINE, POC: NORMAL
NITRITE, POC UA: NORMAL
PH, POC UA: NORMAL
PROTEIN, POC: NORMAL
SPECIFIC GRAVITY, POC UA: NORMAL
UROBILINOGEN, POC UA: NORMAL

## 2018-02-08 PROCEDURE — 81002 URINALYSIS NONAUTO W/O SCOPE: CPT | Mod: S$GLB,,, | Performed by: NURSE PRACTITIONER

## 2018-02-08 PROCEDURE — 99213 OFFICE O/P EST LOW 20 MIN: CPT | Mod: SA | Performed by: NURSE PRACTITIONER

## 2018-02-08 PROCEDURE — 99999 PR PBB SHADOW E&M-EST. PATIENT-LVL III: CPT | Mod: PBBFAC,,, | Performed by: NURSE PRACTITIONER

## 2018-02-08 PROCEDURE — 87798 DETECT AGENT NOS DNA AMP: CPT | Mod: 91

## 2018-02-08 PROCEDURE — 87480 CANDIDA DNA DIR PROBE: CPT

## 2018-02-08 PROCEDURE — 87798 DETECT AGENT NOS DNA AMP: CPT

## 2018-02-08 PROCEDURE — 99214 OFFICE O/P EST MOD 30 MIN: CPT | Mod: SA,25,S$GLB, | Performed by: NURSE PRACTITIONER

## 2018-02-08 PROCEDURE — 3008F BODY MASS INDEX DOCD: CPT | Mod: S$GLB,,, | Performed by: NURSE PRACTITIONER

## 2018-02-08 RX ORDER — RISPERIDONE 2 MG/1
TABLET ORAL
COMMUNITY
Start: 2018-02-01 | End: 2018-04-29

## 2018-02-08 RX ORDER — FLUOXETINE HYDROCHLORIDE 40 MG/1
CAPSULE ORAL
COMMUNITY
Start: 2018-02-01 | End: 2018-04-29

## 2018-02-08 RX ORDER — FLUOXETINE 20 MG/1
20 TABLET ORAL EVERY MORNING
Refills: 2 | COMMUNITY
Start: 2017-11-04 | End: 2018-03-02

## 2018-02-08 RX ORDER — LORAZEPAM 0.5 MG/1
0.5 TABLET ORAL 2 TIMES DAILY
Refills: 2 | COMMUNITY
Start: 2018-01-26 | End: 2018-03-02

## 2018-02-08 RX ORDER — BUTENAFINE HYDROCHLORIDE 10 MG/G
CREAM TOPICAL 2 TIMES DAILY
Qty: 12 G | Refills: 0 | Status: SHIPPED | OUTPATIENT
Start: 2018-02-08 | End: 2018-02-18

## 2018-02-08 NOTE — PATIENT INSTRUCTIONS
Self-Help Tips for Vulvar Skin Care     Prevention of recurrent vulvar and vaginal irritation often begins with the use of hypoallergenic products, plus avoidance of exposures that irritate sensitive skin.     Clothing and Laundry  · Wear all-white cotton underwear.   · Do not wear pantyhose (wear thigh high or knee high hose instead).   · Wear loose-fitting pants or skirts.   · Remove wet bathing suits and exercise clothing promptly.   · Use hypoallergenic, dermatologically approved detergent such as Tide Free, or All Free and Clear.  · Double-rinse underwear and any other clothing that comes into contact with the vulva.   · Do not use fabric softener on undergarments.  Hygiene  · Use soft, white, unscented toilet paper.   · Use lukewarm or cool sitz baths to relieve burning and irritation.   · Avoid getting shampoo on the vulvar area.   · Do not use bubble bath, feminine hygiene products, or any perfumed creams or soaps.   · Wash the vulva with cool to lukewarm water only.  · Rinse the vulva with water after urination.  · Urinate before the bladder is full.   · Prevent constipation by adding fiber to your diet (if necessary, use a psyllium product such as Metamucil) and drinking at least 8 glasses of water daily.  · Use unscented menstrual pads and tampons. Do not wear panty liners daily.  Sexual intercourse  · Use a lubricant that is water soluble, e.g., Astroglide or KY and unscented.  · Ask your physician for a prescription for a topical anesthestic, e.g., Lidocaine gel 5%. (This may sting for the first 3-5 minutes after application.)  · Apply ice or a frozen blue gel pack (lunch box size) wrapped in one layer of a hand towel to relieve burning after intercourse.   · Urinate (to prevent infection) and rinse vulva with cool water after sexual intercourse.  · Do not use contraceptive creams or spermicides.  Physical Activities  · Avoid exercises that put direct pressure on the vulva such as bicycle riding and  horseback riding.  · Limit intense exercises that create a lot of friction in the vulvar area (try lower intensity exercises such as walking).  · Use a frozen gel pack wrapped in a towel to relieve symptoms after exercise.   · Enroll in an exercise class such as yoga to learn stretching and relaxation exercises.  · Don't swim in highly chlorinated pools.   · Avoid the use of hot tubs.

## 2018-02-08 NOTE — PROGRESS NOTES
"Chief complaint: Vaginal Irritation/Itching    HPI: Vaginal Irritation/itching for the last 4 days. She reports no changes in sexual partners, soaps, detergents, douching. No recent use of antibiotics. She has not used anything OTC and denies pelvic pain, fever, chills, n/v/lesions or cuts.      From review of chart the patient has had these recurrent symptoms over the last 1.5 years. She has more outer skin irritation/discomfort but has had consistent negative affirms. She will intermittently have discharge as well but not always. After long discussion she reports putting different creams by her vagina and groin during the week. Such as coca butter/vasaline/other OTC lotions and lubricants    ROS   Systemic: Not feeling tired (fatigue).  No fever chills   Gastrointestinal: No nausea, vomiting, no abdominal pain.  No diarrhea.  Genitourinary: No dysuria. No Pelvic Pain  Skin: No rash.    /70   Ht 5' 8" (1.727 m)   Wt 126.1 kg (278 lb)   LMP  (LMP Unknown)   BMI 42.27 kg/m²     Physical Exam   Vital Signs:° Normal.  General Appearance:° Well developed. ° Well nourished.  Lymph Nodes: No Inguinal LAD  Urinary System:° Normal. Urethra: ° Meatus showed no abnormalities. ° No mass on the urethra.  Genitalia: External: ° Vulva was normal. ° Genitalia exhibited no lesion.                    Vagina: ° Mucosa was normal. ° A vaginal discharge was observed blood consistent with menses  Pelvic: Cervix: ° + bloody cervical discharge. ° Showed no lesion. ° Not tender, no CMT         Uterus: ° Position was normal. ° Not tender.  Inguinal: hyperpigmented area and within bilateral groin small areas of dry scaly skin, ? tinea  Neurological:° No disorientation was observed.  Psychiatric:Affect: ° Normal.  Skin:° No skin lesions.   Perineum:° Normal. ° Did not have a mass.° No perineal lesions/rashes/erythema     Assessment/Plan:  1. Vaginal irritation-- updated affirm and ordered mycoplasma/ureaplasm. However from " discussion that pt didn't inform prior she has been putting multiple creams/lotions/vasaline by the vagina/groin. We re-discussed dryness and possibly negative swabs and having more contract irritation. Will call with swab results. Discussed medication for possible tinea cruris on groin.     RTC prn and as scheduled for annual exam.

## 2018-02-09 DIAGNOSIS — Z30.011 ENCOUNTER FOR INITIAL PRESCRIPTION OF CONTRACEPTIVE PILLS: ICD-10-CM

## 2018-02-09 LAB
CANDIDA RRNA VAG QL PROBE: NEGATIVE
G VAGINALIS RRNA GENITAL QL PROBE: POSITIVE
T VAGINALIS RRNA GENITAL QL PROBE: NEGATIVE

## 2018-02-09 RX ORDER — METRONIDAZOLE 500 MG/1
500 TABLET ORAL 2 TIMES DAILY
Qty: 14 TABLET | Refills: 0 | Status: SHIPPED | OUTPATIENT
Start: 2018-02-09 | End: 2018-02-16

## 2018-02-09 RX ORDER — NORGESTIMATE AND ETHINYL ESTRADIOL 7DAYSX3 LO
1 KIT ORAL DAILY
Qty: 28 TABLET | Refills: 3 | OUTPATIENT
Start: 2018-02-09 | End: 2019-02-09

## 2018-02-11 DIAGNOSIS — Z30.011 ENCOUNTER FOR INITIAL PRESCRIPTION OF CONTRACEPTIVE PILLS: ICD-10-CM

## 2018-02-12 DIAGNOSIS — Z30.011 ENCOUNTER FOR INITIAL PRESCRIPTION OF CONTRACEPTIVE PILLS: ICD-10-CM

## 2018-02-12 LAB
MYCOPLASMA GENITALIUM RESULT: NEGATIVE
SPECIMEN SOURCE: NORMAL
SPECIMEN SOURCE: NORMAL
U PARVUM DNA SPEC QL NAA+PROBE: NEGATIVE
U UREALYTICUM DNA SPEC QL NAA+PROBE: NEGATIVE

## 2018-02-12 RX ORDER — NORGESTIMATE AND ETHINYL ESTRADIOL 7DAYSX3 LO
1 KIT ORAL DAILY
Qty: 28 TABLET | Refills: 3 | OUTPATIENT
Start: 2018-02-12 | End: 2019-02-12

## 2018-02-13 DIAGNOSIS — Z30.011 ENCOUNTER FOR INITIAL PRESCRIPTION OF CONTRACEPTIVE PILLS: ICD-10-CM

## 2018-02-13 RX ORDER — NORGESTIMATE AND ETHINYL ESTRADIOL 7DAYSX3 LO
1 KIT ORAL DAILY
Qty: 28 TABLET | Refills: 3 | Status: CANCELLED | OUTPATIENT
Start: 2018-02-13

## 2018-02-14 ENCOUNTER — PATIENT MESSAGE (OUTPATIENT)
Dept: OBSTETRICS AND GYNECOLOGY | Facility: CLINIC | Age: 23
End: 2018-02-14

## 2018-02-14 DIAGNOSIS — Z30.011 ENCOUNTER FOR INITIAL PRESCRIPTION OF CONTRACEPTIVE PILLS: ICD-10-CM

## 2018-02-14 RX ORDER — NORGESTIMATE AND ETHINYL ESTRADIOL 7DAYSX3 LO
1 KIT ORAL DAILY
Qty: 28 TABLET | Refills: 0 | Status: SHIPPED | OUTPATIENT
Start: 2018-02-14 | End: 2018-03-02 | Stop reason: SDUPTHER

## 2018-03-01 ENCOUNTER — TELEPHONE (OUTPATIENT)
Dept: OBSTETRICS AND GYNECOLOGY | Facility: CLINIC | Age: 23
End: 2018-03-01

## 2018-03-01 NOTE — TELEPHONE ENCOUNTER
Pt states she has a rash under her breasts.  States its discolored and itches.  Scheduled with Dr. sharma tomorrow.

## 2018-03-02 ENCOUNTER — OFFICE VISIT (OUTPATIENT)
Dept: OBSTETRICS AND GYNECOLOGY | Facility: CLINIC | Age: 23
End: 2018-03-02
Payer: COMMERCIAL

## 2018-03-02 VITALS
SYSTOLIC BLOOD PRESSURE: 128 MMHG | BODY MASS INDEX: 42.57 KG/M2 | DIASTOLIC BLOOD PRESSURE: 76 MMHG | WEIGHT: 280.88 LBS | HEIGHT: 68 IN

## 2018-03-02 DIAGNOSIS — R21 RASH: Primary | ICD-10-CM

## 2018-03-02 DIAGNOSIS — Z30.41 ENCOUNTER FOR SURVEILLANCE OF CONTRACEPTIVE PILLS: ICD-10-CM

## 2018-03-02 PROCEDURE — 99213 OFFICE O/P EST LOW 20 MIN: CPT | Mod: S$GLB,,, | Performed by: OBSTETRICS & GYNECOLOGY

## 2018-03-02 PROCEDURE — 99999 PR PBB SHADOW E&M-EST. PATIENT-LVL III: CPT | Mod: PBBFAC,,, | Performed by: OBSTETRICS & GYNECOLOGY

## 2018-03-02 RX ORDER — NYSTATIN 100000 U/G
CREAM TOPICAL 2 TIMES DAILY
Qty: 30 G | Refills: 2 | Status: SHIPPED | OUTPATIENT
Start: 2018-03-02 | End: 2018-03-15 | Stop reason: SDUPTHER

## 2018-03-02 RX ORDER — NORGESTIMATE AND ETHINYL ESTRADIOL 7DAYSX3 LO
1 KIT ORAL DAILY
Qty: 28 TABLET | Refills: 0 | Status: SHIPPED | OUTPATIENT
Start: 2018-03-02 | End: 2018-03-15 | Stop reason: SDUPTHER

## 2018-03-02 RX ORDER — METHYLPHENIDATE HYDROCHLORIDE 20 MG/1
TABLET ORAL
Refills: 0 | COMMUNITY
Start: 2018-01-06 | End: 2018-03-02

## 2018-03-02 RX ORDER — BUTENAFINE HYDROCHLORIDE 10 MG/G
CREAM TOPICAL
Refills: 0 | COMMUNITY
Start: 2018-02-08 | End: 2018-07-30

## 2018-03-02 NOTE — PROGRESS NOTES
"  Chief Complaint: Rash Underneath Breasts, Galactorrhea, Weight Gain     HPI:      Misael Garcia is a 23 y.o.  who presents complaining of rash underneath her breasts for approximately the past year. Reports that the rash is pruritic but has not seemed to spread since she first noticed it. She has been treating it with vasoline. She is also concerned about milky white nipple discharge which occurred a few months ago, was bilateral, and happened with mild nipple stimulation. She also reports 50 lb weight gain over the past year which she believes is due to her OCPs. States that she has been exercising regularly but eats constantly. Patient has regular monthly menses. Patient's last menstrual period was 2018 (approximate).       ROS:     GENERAL: Denies unintentional weight gain or weight loss. Feeling well overall.   ABDOMEN: Denies abdominal pain, constipation, diarrhea, nausea, change in appetite.   BREAST: See HPI  GYN: Denies abnormal bleeding or discharge.  MUSCULOSKEL: Denies pain in back or extremities.    Physical Exam:      PHYSICAL EXAM:  Visit Vitals    /82    Ht 5' 5" (1.651 m)    Wt 87.9 kg (193 lb 12.6 oz)    BMI 32.25 kg/m2     Body mass index is 32.25 kg/(m^2).     APPEARANCE: Well nourished, well developed, in no acute distress.  BREASTS: normal in size and symmetry, normal contour with no evidence of flattening or dimpling, skin normal, nipples everted without rashes or discharge, palpation negative for masses or nodules, no palpable axillary lymphadenopathy, rash underneath bilateral breasts c/w yeast.   ABDOMEN: Soft.  No tenderness or masses.      Assessment/Plan:     Rash  -     nystatin (MYCOSTATIN) cream; Apply topically 2 (two) times daily.  Dispense: 30 g; Refill: 2    Encounter for surveillance of contraceptive pills  -     norgestimate-ethinyl estradiol (TRI-LO-SPRINTEC) 0.18/0.215/0.25 mg-25 mcg tablet; Take 1 tablet by mouth once daily. Pt needs apt " with Dr. Coon  Dispense: 28 tablet; Refill: 0        -      Patient instructed that she needs an annual exam done before more refills on OCPs can be sent in.    Weight gain and Galactorrhea likely from Risperdal. Patient counseled to make an appointment with her psychiatrist to discuss.

## 2018-03-15 DIAGNOSIS — R21 RASH: ICD-10-CM

## 2018-03-15 DIAGNOSIS — Z30.41 ENCOUNTER FOR SURVEILLANCE OF CONTRACEPTIVE PILLS: ICD-10-CM

## 2018-03-16 RX ORDER — NYSTATIN 100000 U/G
CREAM TOPICAL 2 TIMES DAILY
Qty: 30 G | Refills: 2 | Status: SHIPPED | OUTPATIENT
Start: 2018-03-16 | End: 2018-04-29

## 2018-03-16 RX ORDER — NORGESTIMATE AND ETHINYL ESTRADIOL 7DAYSX3 LO
1 KIT ORAL DAILY
Qty: 28 TABLET | Refills: 0 | Status: SHIPPED | OUTPATIENT
Start: 2018-03-16 | End: 2018-04-06 | Stop reason: SDUPTHER

## 2018-04-06 ENCOUNTER — OFFICE VISIT (OUTPATIENT)
Dept: OBSTETRICS AND GYNECOLOGY | Facility: CLINIC | Age: 23
End: 2018-04-06
Payer: COMMERCIAL

## 2018-04-06 VITALS — WEIGHT: 280.13 LBS | HEIGHT: 68 IN | BODY MASS INDEX: 42.45 KG/M2

## 2018-04-06 DIAGNOSIS — Z30.41 ENCOUNTER FOR SURVEILLANCE OF CONTRACEPTIVE PILLS: ICD-10-CM

## 2018-04-06 DIAGNOSIS — Z30.09 ENCOUNTER FOR OTHER GENERAL COUNSELING AND ADVICE ON CONTRACEPTION: ICD-10-CM

## 2018-04-06 DIAGNOSIS — N89.8 VAGINAL ODOR: ICD-10-CM

## 2018-04-06 DIAGNOSIS — Z12.4 ENCOUNTER FOR SCREENING FOR CERVICAL CANCER: Primary | ICD-10-CM

## 2018-04-06 DIAGNOSIS — R21 RASH: ICD-10-CM

## 2018-04-06 LAB
CANDIDA RRNA VAG QL PROBE: POSITIVE
G VAGINALIS RRNA GENITAL QL PROBE: NEGATIVE
T VAGINALIS RRNA GENITAL QL PROBE: NEGATIVE

## 2018-04-06 PROCEDURE — 99395 PREV VISIT EST AGE 18-39: CPT | Mod: S$GLB,,, | Performed by: OBSTETRICS & GYNECOLOGY

## 2018-04-06 PROCEDURE — 87510 GARDNER VAG DNA DIR PROBE: CPT

## 2018-04-06 PROCEDURE — 87480 CANDIDA DNA DIR PROBE: CPT

## 2018-04-06 PROCEDURE — 99999 PR PBB SHADOW E&M-EST. PATIENT-LVL III: CPT | Mod: PBBFAC,,, | Performed by: OBSTETRICS & GYNECOLOGY

## 2018-04-06 PROCEDURE — 88175 CYTOPATH C/V AUTO FLUID REDO: CPT

## 2018-04-06 RX ORDER — NORGESTIMATE AND ETHINYL ESTRADIOL 7DAYSX3 LO
1 KIT ORAL DAILY
Qty: 84 TABLET | Refills: 3 | Status: SHIPPED | OUTPATIENT
Start: 2018-04-06 | End: 2018-12-13

## 2018-04-06 RX ORDER — CARIPRAZINE 4.5 MG/1
CAPSULE, GELATIN COATED ORAL
COMMUNITY
Start: 2018-04-05 | End: 2018-04-29

## 2018-04-06 RX ORDER — CARIPRAZINE 3 MG/1
CAPSULE, GELATIN COATED ORAL
COMMUNITY
Start: 2018-03-16 | End: 2018-07-30

## 2018-04-06 RX ORDER — CLONAZEPAM 0.5 MG/1
TABLET ORAL
COMMUNITY
Start: 2018-04-05 | End: 2018-08-01

## 2018-04-06 NOTE — PROGRESS NOTES
"  Chief Complaint: Well Woman Exam, Abdominal Rash, Vaginal Odor     HPI:      Misael Garcia is a 23 y.o.  who presents for annual exam. She is currently complaining of abdominal rash present for the past few days. She also reports vaginal odor around the time of menses. Denies abnormal discharge.    Patient addressed weight gain concerns with her psychiatrist who switched her antipsychotics. Has not had any further weight gain since the switch. Under breast rash resolved with nystatin cream.    Ms. Garcia is currently sexually active with a single male partner. She declines STD screening today. Patient has regular monthly menses. Patient's last menstrual period was 2018. She is currently using oral contraceptives (estrogen/progesterone) for contraception.    Ms. Garcia confirms that she wears her seatbelt when riding in the car and does text while driving.     OB History      Para Term  AB Living    0 0 0 0 0 0    SAB TAB Ectopic Multiple Live Births    0 0 0 0          Obstetric Comments    Menarche ~ 18          ROS:     GENERAL: Denies unintentional weight gain or weight loss. Feeling well overall.   SKIN: Reports pruritic rash on abdomen and inner thighs.  HEENT: Denies headaches, or vision changes.   CARDIOVASCULAR: Denies palpitations or chest pain.   RESPIRATORY: Denies shortness of breath or dyspnea on exertion.  BREASTS: Denies pain, lumps, or nipple discharge.   ABDOMEN: Denies abdominal pain, constipation, diarrhea, nausea, vomiting, or change in appetite.   URINARY: Denies frequency, dysuria, hematuria.  NEUROLOGIC: Denies syncope or weakness.   PSYCHIATRIC: Denies depression, anxiety or mood swings.      Physical Exam:      PHYSICAL EXAM:  Ht 5' 8" (1.727 m)   Wt 127 kg (280 lb 1.5 oz)   LMP 2018   BMI 42.59 kg/m²   Body mass index is 42.59 kg/m².     APPEARANCE: Well nourished, well developed, in no acute distress.  PSYCH: Appropriate mood and " affect.  SKIN: No acne or hirsutism, excoriations on abdomen and friction bumps on inner thighs  NECK: Neck symmetric without masses or thyromegaly  NODES: No inguinal, axillary, or supraclavicular lymph node enlargement  CHEST: Normal respiratory effort.  ABDOMEN: Soft.  No tenderness or masses.   BREASTS: Symmetrical, no skin changes or visible lesions.  No palpable masses or nipple discharge bilaterally.  PELVIC: Normal external genitalia without lesions.  Normal hair distribution.  Adequate perineal body, normal urethral meatus.  Vagina moist and well rugated without lesions or discharge.  Cervix pink, without lesions, discharge or tenderness.  No significant cystocele or rectocele.  Bimanual exam shows uterus to be normal size, regular, mobile and nontender.  Adnexa without masses or tenderness.    EXTREMITIES: No edema.    Assessment/Plan:     Encounter for screening for cervical cancer   -     Liquid-based pap smear, screening    Vaginal odor  -     Vaginosis Screen by DNA Probe    Rash         -     Neosporin recommended         -     Recommended corn starch to inner thighs prior to exercise to decrease friction.    Encounter for surveillance of contraceptive pills  -     norgestimate-ethinyl estradiol (TRI-LO-SPRINTEC) 0.18/0.215/0.25 mg-25 mcg tablet; Take 1 tablet by mouth once daily. Pt needs apt with Dr. Coon  Dispense: 84 tablet; Refill: 3      Counseling:     Patient was counseled today on current ASCCP pap guidelines, the recommendation for yearly pelvic exams, healthy diet and exercise routines, breast self awareness. Safe driving habits discussed. She is to see her PCP for other health maintenance.       Use of the [employer] Patient Portal discussed and encouraged during today's visit.

## 2018-04-09 ENCOUNTER — PATIENT MESSAGE (OUTPATIENT)
Dept: OBSTETRICS AND GYNECOLOGY | Facility: CLINIC | Age: 23
End: 2018-04-09

## 2018-04-09 RX ORDER — FLUCONAZOLE 150 MG/1
150 TABLET ORAL ONCE
Qty: 2 TABLET | Refills: 1 | Status: SHIPPED | OUTPATIENT
Start: 2018-04-09 | End: 2018-04-09

## 2018-04-29 ENCOUNTER — HOSPITAL ENCOUNTER (EMERGENCY)
Facility: HOSPITAL | Age: 23
Discharge: HOME | End: 2018-04-29
Attending: EMERGENCY MEDICINE
Payer: COMMERCIAL

## 2018-04-29 VITALS
SYSTOLIC BLOOD PRESSURE: 133 MMHG | HEART RATE: 82 BPM | RESPIRATION RATE: 18 BRPM | WEIGHT: 280 LBS | HEIGHT: 68 IN | BODY MASS INDEX: 42.44 KG/M2 | OXYGEN SATURATION: 98 % | TEMPERATURE: 99 F | DIASTOLIC BLOOD PRESSURE: 84 MMHG

## 2018-04-29 DIAGNOSIS — R21 RASH: ICD-10-CM

## 2018-04-29 DIAGNOSIS — N12 PYELONEPHRITIS: Primary | ICD-10-CM

## 2018-04-29 PROBLEM — L84 CORNS AND CALLUS: Status: ACTIVE | Noted: 2018-04-29

## 2018-04-29 PROBLEM — M77.40 METATARSALGIA: Status: ACTIVE | Noted: 2018-04-29

## 2018-04-29 PROBLEM — M20.10 HALLUX VALGUS WITH BUNIONS: Status: ACTIVE | Noted: 2018-04-29

## 2018-04-29 PROBLEM — M21.629 TAILOR'S BUNION: Status: ACTIVE | Noted: 2018-04-29

## 2018-04-29 PROBLEM — M79.609 PAIN IN LIMB: Status: ACTIVE | Noted: 2018-04-29

## 2018-04-29 PROBLEM — M21.619 HALLUX VALGUS WITH BUNIONS: Status: ACTIVE | Noted: 2018-04-29

## 2018-04-29 PROBLEM — M20.40 HAMMER TOE: Status: ACTIVE | Noted: 2018-04-29

## 2018-04-29 LAB
ALBUMIN SERPL BCP-MCNC: 3.3 G/DL
ALP SERPL-CCNC: 111 U/L
ALT SERPL W/O P-5'-P-CCNC: 12 U/L
ANION GAP SERPL CALC-SCNC: 9 MMOL/L
AST SERPL-CCNC: 11 U/L
B-HCG UR QL: NEGATIVE
BACTERIA #/AREA URNS HPF: ABNORMAL /HPF
BASOPHILS # BLD AUTO: 0.03 K/UL
BASOPHILS NFR BLD: 0.2 %
BILIRUB SERPL-MCNC: 0.2 MG/DL
BILIRUB UR QL STRIP: NEGATIVE
BUN SERPL-MCNC: 8 MG/DL
CALCIUM SERPL-MCNC: 9.4 MG/DL
CHLORIDE SERPL-SCNC: 103 MMOL/L
CLARITY UR: ABNORMAL
CO2 SERPL-SCNC: 26 MMOL/L
COLOR UR: YELLOW
CREAT SERPL-MCNC: 0.8 MG/DL
CTP QC/QA: YES
DIFFERENTIAL METHOD: ABNORMAL
EOSINOPHIL # BLD AUTO: 0.2 K/UL
EOSINOPHIL NFR BLD: 1.1 %
ERYTHROCYTE [DISTWIDTH] IN BLOOD BY AUTOMATED COUNT: 16.5 %
EST. GFR  (AFRICAN AMERICAN): >60 ML/MIN/1.73 M^2
EST. GFR  (NON AFRICAN AMERICAN): >60 ML/MIN/1.73 M^2
GLUCOSE SERPL-MCNC: 90 MG/DL
GLUCOSE UR QL STRIP: NEGATIVE
HCT VFR BLD AUTO: 36.9 %
HGB BLD-MCNC: 12 G/DL
HGB UR QL STRIP: NEGATIVE
HYALINE CASTS #/AREA URNS LPF: 0 /LPF
KETONES UR QL STRIP: NEGATIVE
LEUKOCYTE ESTERASE UR QL STRIP: ABNORMAL
LIPASE SERPL-CCNC: 8 U/L
LYMPHOCYTES # BLD AUTO: 2.9 K/UL
LYMPHOCYTES NFR BLD: 18.6 %
MCH RBC QN AUTO: 24.7 PG
MCHC RBC AUTO-ENTMCNC: 32.5 G/DL
MCV RBC AUTO: 76 FL
MICROSCOPIC COMMENT: ABNORMAL
MONOCYTES # BLD AUTO: 0.9 K/UL
MONOCYTES NFR BLD: 5.9 %
NEUTROPHILS # BLD AUTO: 11.3 K/UL
NEUTROPHILS NFR BLD: 73.9 %
NITRITE UR QL STRIP: NEGATIVE
PH UR STRIP: 6 [PH] (ref 5–8)
PLATELET # BLD AUTO: 555 K/UL
PMV BLD AUTO: 9.1 FL
POTASSIUM SERPL-SCNC: 3.9 MMOL/L
PROT SERPL-MCNC: 7.8 G/DL
PROT UR QL STRIP: ABNORMAL
RBC # BLD AUTO: 4.85 M/UL
RBC #/AREA URNS HPF: 1 /HPF (ref 0–4)
SODIUM SERPL-SCNC: 138 MMOL/L
SP GR UR STRIP: 1.01 (ref 1–1.03)
SQUAMOUS #/AREA URNS HPF: 3 /HPF
URN SPEC COLLECT METH UR: ABNORMAL
UROBILINOGEN UR STRIP-ACNC: NEGATIVE EU/DL
WBC # BLD AUTO: 15.34 K/UL
WBC #/AREA URNS HPF: 8 /HPF (ref 0–5)

## 2018-04-29 PROCEDURE — 96361 HYDRATE IV INFUSION ADD-ON: CPT

## 2018-04-29 PROCEDURE — 87040 BLOOD CULTURE FOR BACTERIA: CPT

## 2018-04-29 PROCEDURE — 96375 TX/PRO/DX INJ NEW DRUG ADDON: CPT

## 2018-04-29 PROCEDURE — 96365 THER/PROPH/DIAG IV INF INIT: CPT

## 2018-04-29 PROCEDURE — 85025 COMPLETE CBC W/AUTO DIFF WBC: CPT

## 2018-04-29 PROCEDURE — 25000003 PHARM REV CODE 250: Performed by: NURSE PRACTITIONER

## 2018-04-29 PROCEDURE — 63600175 PHARM REV CODE 636 W HCPCS: Performed by: NURSE PRACTITIONER

## 2018-04-29 PROCEDURE — 99284 EMERGENCY DEPT VISIT MOD MDM: CPT | Mod: 25

## 2018-04-29 PROCEDURE — 83690 ASSAY OF LIPASE: CPT

## 2018-04-29 PROCEDURE — 87086 URINE CULTURE/COLONY COUNT: CPT

## 2018-04-29 PROCEDURE — 81025 URINE PREGNANCY TEST: CPT | Performed by: NURSE PRACTITIONER

## 2018-04-29 PROCEDURE — 81000 URINALYSIS NONAUTO W/SCOPE: CPT

## 2018-04-29 PROCEDURE — 80053 COMPREHEN METABOLIC PANEL: CPT

## 2018-04-29 RX ORDER — CLOTRIMAZOLE 1 %
CREAM (GRAM) TOPICAL 2 TIMES DAILY
Qty: 15 G | Refills: 0 | Status: SHIPPED | OUTPATIENT
Start: 2018-04-29 | End: 2018-07-30

## 2018-04-29 RX ORDER — KETOROLAC TROMETHAMINE 30 MG/ML
15 INJECTION, SOLUTION INTRAMUSCULAR; INTRAVENOUS
Status: COMPLETED | OUTPATIENT
Start: 2018-04-29 | End: 2018-04-29

## 2018-04-29 RX ORDER — SULFAMETHOXAZOLE AND TRIMETHOPRIM 800; 160 MG/1; MG/1
1 TABLET ORAL 2 TIMES DAILY
Qty: 28 TABLET | Refills: 0 | Status: SHIPPED | OUTPATIENT
Start: 2018-04-29 | End: 2018-05-13

## 2018-04-29 RX ORDER — IBUPROFEN 400 MG/1
400 TABLET ORAL EVERY 6 HOURS PRN
Qty: 20 TABLET | Refills: 0 | Status: SHIPPED | OUTPATIENT
Start: 2018-04-29 | End: 2018-07-30

## 2018-04-29 RX ORDER — HYDROCORTISONE 1 %
CREAM (GRAM) TOPICAL 2 TIMES DAILY
Qty: 30 G | Refills: 0 | Status: SHIPPED | OUTPATIENT
Start: 2018-04-29 | End: 2020-05-05

## 2018-04-29 RX ADMIN — KETOROLAC TROMETHAMINE 15 MG: 30 INJECTION, SOLUTION INTRAMUSCULAR at 06:04

## 2018-04-29 RX ADMIN — CEFTRIAXONE 1 G: 1 INJECTION, SOLUTION INTRAVENOUS at 08:04

## 2018-04-29 RX ADMIN — SODIUM CHLORIDE 1000 ML: 0.9 INJECTION, SOLUTION INTRAVENOUS at 06:04

## 2018-04-29 NOTE — ED TRIAGE NOTES
Pt has had LUQ abdominal pain that's radiating to her left flank area.  Pt denies any N/V/D or any other associated factors.

## 2018-04-29 NOTE — ED PROVIDER NOTES
"Encounter Date: 4/29/2018    SCRIBE #1 NOTE: I, Justine Simon, am scribing for, and in the presence of,  Arlene Strange NP. I have scribed the following portions of the note - Other sections scribed: HPI and ROS.       History     Chief Complaint   Patient presents with    Flank Pain     "My left side has been hurting me really bad". Denies trouble urinating, injury or fall     CC: Flank Pain    HPI: The pt is a 23 y.o. F who presents to the ED c/o acute onset constant L sided flank pain radiating to L back that started this AM. Pt states that there is associated nausea. Pt rates current pain as 8-9/10. LMP was 3 wks ago and LBM was this AM and normal. Pt reports taking 2x advil this AM without relief. She mentions drinking 3 L of lemonade yesterday. No alleviating factors reported. She otherwise denies recent hx of trauma or physical exertion as well as dysuria, frequency, and other associated symptoms.    Pmhx includes chlamydia and bipolar affective disorder.      The history is provided by the patient. No  was used.     Review of patient's allergies indicates:  No Known Allergies  Past Medical History:   Diagnosis Date    Bipolar affective     History of chlamydia 12/2016    Sleep disorder      Past Surgical History:   Procedure Laterality Date    NO PAST SURGERIES      as of 1-13-17     Family History   Problem Relation Age of Onset    Breast cancer Paternal Grandmother     No Known Problems Father     No Known Problems Mother     No Known Problems Sister     Diabetes Maternal Grandmother     No Known Problems Maternal Grandfather     No Known Problems Paternal Grandfather     Colon cancer Neg Hx     Ovarian cancer Neg Hx      Social History   Substance Use Topics    Smoking status: Never Smoker    Smokeless tobacco: Never Used    Alcohol use No     Review of Systems   Constitutional: Negative for chills, diaphoresis and fever.   HENT: Negative for ear pain and sore throat.  "   Eyes: Negative for visual disturbance.   Respiratory: Negative for cough and shortness of breath.    Cardiovascular: Negative for chest pain.   Gastrointestinal: Positive for nausea. Negative for abdominal pain, diarrhea and vomiting.   Genitourinary: Positive for flank pain (L). Negative for difficulty urinating, dysuria and frequency.   Musculoskeletal: Positive for back pain (L).   Skin: Negative for rash.   Neurological: Negative for headaches.       Physical Exam     Initial Vitals [04/29/18 1703]   BP Pulse Resp Temp SpO2   (!) 143/93 93 18 98.7 °F (37.1 °C) 98 %      MAP       109.67         Physical Exam    Constitutional: She appears well-developed and well-nourished. She is not diaphoretic. She is Obese . No distress.   HENT:   Head: Normocephalic and atraumatic.   Right Ear: External ear normal.   Left Ear: External ear normal.   Eyes: EOM are normal. Pupils are equal, round, and reactive to light.   Neck: Normal range of motion.   Cardiovascular: Normal rate, regular rhythm and normal heart sounds.   Pulmonary/Chest: Breath sounds normal. No respiratory distress.   Abdominal: Soft. Bowel sounds are normal. She exhibits no distension and no mass. There is no hepatosplenomegaly. There is tenderness in the left upper quadrant. There is CVA tenderness (left). There is no rigidity, no rebound, no guarding, no tenderness at McBurney's point and negative Babcock's sign.   Musculoskeletal: Normal range of motion.   Neurological: She is alert and oriented to person, place, and time.   Skin: Skin is warm and dry.   Psychiatric: She has a normal mood and affect. Her behavior is normal.         ED Course   Procedures  Labs Reviewed   CBC W/ AUTO DIFFERENTIAL - Abnormal; Notable for the following:        Result Value    WBC 15.34 (*)     Hematocrit 36.9 (*)     MCV 76 (*)     MCH 24.7 (*)     RDW 16.5 (*)     Platelets 555 (*)     MPV 9.1 (*)     Gran # (ANC) 11.3 (*)     Gran% 73.9 (*)     All other components  within normal limits   COMPREHENSIVE METABOLIC PANEL - Abnormal; Notable for the following:     Albumin 3.3 (*)     All other components within normal limits   URINALYSIS - Abnormal; Notable for the following:     Appearance, UA Hazy (*)     Protein, UA 2+ (*)     Leukocytes, UA Trace (*)     All other components within normal limits   URINALYSIS MICROSCOPIC - Abnormal; Notable for the following:     WBC, UA 8 (*)     All other components within normal limits   CULTURE, URINE   CULTURE, BLOOD   CULTURE, BLOOD   LIPASE   POCT URINE PREGNANCY             Medical Decision Making:   ED Management:  This is an evaluation of a 23 y.o. female that presents to the Emergency Department for Abdominal Pain. Physical Exam shows a non-toxic, afebrile, and well appearing female. On examination the abdomen is flat without distention.  There is no surface trauma, scars, incisions.  Bowel sounds are present in all 4 quadrants.  There is tenderness with palpation of the left upper quadrant.  There is left-sided CVA tenderness. No guarding or rigidity to palpation.  No tenderness, masses, pulsation in epigastric area.  No organomegaly.  Negative Babcock sign.  No periumbilical tenderness.  No rebound in the lower quadrants.  Nontender over McBurney's point.  No suprapubic tenderness or distention.  Good femoral pulses bilaterally.      Vital Signs Are Reassuring.  RESULTS:   UPT negative.  WBC 15.34.  The chemistry was negative for hypo-or hyper natremia, kalemia, chloridemia, or other electrolyte abnormalities; BUN and creatinine were within normal limits indicating normal kidney function, ALT and AST were within normal limits indicating normal liver function, there was no transaminitis.  Lipase within normal limits.  Urinalysis hazy appearance, 2+ protein, trace leukocytes.  Microscopic UA with 8 white blood cells, occasional bacteria.  CT abdomen pelvis with mild left-sided hydroureter nephrosis.  Hepatomegaly.  Left adnexal cystic  lesion.    My overall impression is pyeloureteronephritis. I considered, but at this time, do not suspect urosepsis, nephrolithiasis, appendicitis, pancreatitis, mesenteric ischemia, obstruction, cholecystitis, peptic ulcer disease, diverticulitis, colitis, volvulus, hernia.    The patient was given IV fluids, Toradol, and 1 g IV Rocephin.  She is well appearing.  No episodes of emesis in the ED.  She is tolerating oral intake.  Vital signs stable. She is afebrile.  Blood and urine cultures are pending.  I believe she is stable for discharge. Will discharge home on Bactrim for pyelonephritis.  ED return precautions given.    ED Course: IVFs, rocephin, toradol. D/C Meds: bactrim, ibuprofen. Additional D/C Information: Abdominal Pain Precautions, increase oral fluid intake. The diagnosis, treatment plan, instructions for follow-up and reevaluation with her PCP as well as ED return precautions were discussed and understanding was verbalized. All questions or concerns have been addressed.     This case was discussed with and the patient has been examined by Dr. Parish who is in agreement with my assessment and plan.             Scribe Attestation:   Scribe #1: I performed the above scribed service and the documentation accurately describes the services I performed. I attest to the accuracy of the note.    Attending Attestation:     Physician Attestation Statement for NP/PA:   I have conducted a face to face encounter with this patient in addition to the NP/PA, due to NP/PA Request    Other NP/PA Attestation Additions:    History of Present Illness: 24 yo with flank pain onset after becoming thirsty and drinking many liters of lemonade yesterday. Also reports some nausea without vomiting.    Physical Exam: Aaox3, calm, nad, no resp distress. Non toxic. Well kempt. Overweight. Tolerating po. Mild nonspecific rash (chronic many weeks) to L medial thigh c/w nonspecific contact dermatitis.    Medical Decision Making: Labs, CT  performed. Ivf, iv abx. I spoke w pt extensively. Pyelonephritis. Pt tolerating po. We discussed home care and worrisome signs that should prompt need to return to the er should they occur.        Physician Attestation for Scribe:  Physician Attestation Statement for Scribe #1: I, Arlene Strange NP, reviewed documentation, as scribed by Justine Simon in my presence, and it is both accurate and complete.                    Clinical Impression:   The primary encounter diagnosis was Pyelonephritis. A diagnosis of Rash was also pertinent to this visit.    Disposition:   Disposition: Discharged  Condition: Stable                        Arlene Strange NP  04/29/18 6722       Lesly Parish MD  05/11/18 2339

## 2018-04-30 NOTE — DISCHARGE INSTRUCTIONS
You have been prescribed Bactrim, an antibiotic.  Take this medication twice a day for the full 14 days until you run out of this medication.  Take all of this medication, even if you begin to feel better.    You have been prescribed ibuprofen for pain. This is an Non-Steroidal Anti-Inflammatory (NSAID) Medication. Please do not take any additional NSAIDs while you are taking this medication including (Advil, Aleve, Motrin, Ibuprofen, Mobic\meloxicam, Naprosyn, etc.). Please stop taking this medication if you experience: weakness, itching, yellow skin or eyes, joint pains, vomiting blood, blood or black stools, unusual weight gain, or swelling in your arms, legs, hands, or feet.     Please return to the Emergency Department for any new or worsening symptoms including: abdominal pain, difficulty urinating, fever, chest pain, shortness of breath, loss of consciousness, dizziness, weakness, or any other concerns.     Please follow up with your Primary Care Provider within in the week. If you do not have one, you may contact the one listed on your discharge paperwork or you may also call the [facility] Appointment Desk at [phone] to schedule an appointment with one.     Please take all medication as prescribed.

## 2018-05-01 LAB — BACTERIA UR CULT: NORMAL

## 2018-05-03 ENCOUNTER — OFFICE VISIT (OUTPATIENT)
Dept: INTERNAL MEDICINE | Facility: CLINIC | Age: 23
End: 2018-05-03
Payer: COMMERCIAL

## 2018-05-03 VITALS
BODY MASS INDEX: 43.4 KG/M2 | HEART RATE: 87 BPM | SYSTOLIC BLOOD PRESSURE: 128 MMHG | TEMPERATURE: 99 F | HEIGHT: 68 IN | RESPIRATION RATE: 18 BRPM | DIASTOLIC BLOOD PRESSURE: 84 MMHG | WEIGHT: 286.38 LBS

## 2018-05-03 DIAGNOSIS — F31.9 BIPOLAR 1 DISORDER: ICD-10-CM

## 2018-05-03 DIAGNOSIS — E01.0 THYROMEGALY: ICD-10-CM

## 2018-05-03 DIAGNOSIS — Z00.00 ANNUAL PHYSICAL EXAM: Primary | ICD-10-CM

## 2018-05-03 DIAGNOSIS — E66.01 OBESITY, CLASS III, BMI 40-49.9 (MORBID OBESITY): ICD-10-CM

## 2018-05-03 PROBLEM — E66.813 OBESITY, CLASS III, BMI 40-49.9 (MORBID OBESITY): Status: ACTIVE | Noted: 2018-05-03

## 2018-05-03 PROCEDURE — 99999 PR PBB SHADOW E&M-EST. PATIENT-LVL III: CPT | Mod: PBBFAC,,, | Performed by: INTERNAL MEDICINE

## 2018-05-03 PROCEDURE — 99385 PREV VISIT NEW AGE 18-39: CPT | Mod: S$GLB,,, | Performed by: INTERNAL MEDICINE

## 2018-05-03 NOTE — PROGRESS NOTES
Subjective:       Patient ID: Misael Garcia is a 23 y.o. female.    Chief Complaint: Establish Care; Annual Exam (requesting weight loss medication); and Urinary Tract Infection (on antibiotics on Sunday)    HPI   23 y.o. Female here for annual exam.     Cholesterol: (needs)  Vaccines: Influenza (declined); Tetanus (2015)   Gyn exam: 4/18    Exercise: no  Diet: regular  LMP: 4/7/18    Past Medical History:  No date: Anxiety  No date: Bipolar affective  12/2016: History of chlamydia  No date: Insomnia  No date: Morbid obesity with BMI of 40.0-44.9, adult  No date: Sleep disorder  Past Surgical History:  No date: NO PAST SURGERIES      Comment: as of 1-13-17  Social History    Marital status: Single              Spouse name:                       Years of education:                 Number of children:               Occupational History  Occupation          Employer            Comment                                                    Social History Main Topics    Smoking status: Never Smoker                                                                Smokeless tobacco: Never Used                        Alcohol use: No              Drug use: Yes                Types: Marijuana    Sexual activity: Yes               Partners with: Male       Birth control/protection: Condom, OCP       Comment: Single:      Review of patient's allergies indicates:  No Known Allergies  Ms. Garcia had no medications administered during this visit.'  Review of Systems   Constitutional: Negative for activity change, appetite change, chills, diaphoresis, fatigue, fever and unexpected weight change.   HENT: Negative for congestion, mouth sores, postnasal drip, rhinorrhea, sinus pressure, sneezing, sore throat, trouble swallowing and voice change.    Eyes: Negative for pain, discharge and visual disturbance.   Respiratory: Negative for cough, shortness of breath and wheezing.    Cardiovascular: Negative for chest pain,  palpitations and leg swelling.   Gastrointestinal: Negative for abdominal pain, blood in stool, constipation, diarrhea, nausea and vomiting.   Endocrine: Negative for cold intolerance and heat intolerance.   Genitourinary: Negative for difficulty urinating, dysuria, frequency, hematuria and urgency.   Musculoskeletal: Negative for arthralgias and myalgias.   Skin: Negative for rash and wound.   Allergic/Immunologic: Negative for environmental allergies and food allergies.   Neurological: Negative for dizziness, tremors, seizures, syncope, weakness, light-headedness and headaches.   Hematological: Negative for adenopathy. Does not bruise/bleed easily.   Psychiatric/Behavioral: Positive for sleep disturbance. Negative for confusion, self-injury and suicidal ideas. The patient is nervous/anxious.        Objective:      Physical Exam   Constitutional: She is oriented to person, place, and time. She appears well-developed and well-nourished. No distress.   HENT:   Head: Normocephalic and atraumatic.   Right Ear: External ear normal.   Left Ear: External ear normal.   Nose: Nose normal.   Mouth/Throat: Oropharynx is clear and moist. No oropharyngeal exudate.   Eyes: Conjunctivae and EOM are normal. Pupils are equal, round, and reactive to light. Right eye exhibits no discharge. Left eye exhibits no discharge. No scleral icterus.   Neck: Neck supple. No JVD present.   Cardiovascular: Normal rate, regular rhythm, normal heart sounds and intact distal pulses.    Pulmonary/Chest: Effort normal and breath sounds normal. No respiratory distress. She has no wheezes. She has no rales.   Abdominal: Soft. Bowel sounds are normal. There is no tenderness.   Musculoskeletal: She exhibits no edema.   Lymphadenopathy:     She has no cervical adenopathy.   Neurological: She is alert and oriented to person, place, and time. No cranial nerve deficit. Coordination normal.   Skin: Skin is warm and dry. No rash noted. She is not diaphoretic.  No pallor.       Assessment:       1. Annual physical exam    2. Obesity, Class III, BMI 40-49.9 (morbid obesity)    3. Bipolar 1 disorder        Plan:    1. Complete blood work, UA       Vaccines: Influenza (declined); Tetanus (2015)        Gyn exam: 4/18   2. Morbid Obesity- pt advised on proper diet/exercise for weight loss       Referral to bariatric medicine    3. Bipolar d/o- stable, CPT       Followed by Psyc    4. F/u in 1 yr

## 2018-05-04 ENCOUNTER — LAB VISIT (OUTPATIENT)
Dept: LAB | Facility: HOSPITAL | Age: 23
End: 2018-05-04
Attending: INTERNAL MEDICINE
Payer: COMMERCIAL

## 2018-05-04 DIAGNOSIS — E66.01 OBESITY, CLASS III, BMI 40-49.9 (MORBID OBESITY): ICD-10-CM

## 2018-05-04 DIAGNOSIS — Z00.00 ANNUAL PHYSICAL EXAM: ICD-10-CM

## 2018-05-04 LAB
BACTERIA BLD CULT: NORMAL
BACTERIA BLD CULT: NORMAL
CHOLEST SERPL-MCNC: 190 MG/DL
CHOLEST/HDLC SERPL: 2.7 {RATIO}
ESTIMATED AVG GLUCOSE: 117 MG/DL
HBA1C MFR BLD HPLC: 5.7 %
HDLC SERPL-MCNC: 70 MG/DL
HDLC SERPL: 36.8 %
LDLC SERPL CALC-MCNC: 103.6 MG/DL
NONHDLC SERPL-MCNC: 120 MG/DL
TRIGL SERPL-MCNC: 82 MG/DL
TSH SERPL DL<=0.005 MIU/L-ACNC: 0.59 UIU/ML

## 2018-05-04 PROCEDURE — 36415 COLL VENOUS BLD VENIPUNCTURE: CPT | Mod: PO

## 2018-05-04 PROCEDURE — 84443 ASSAY THYROID STIM HORMONE: CPT

## 2018-05-04 PROCEDURE — 80061 LIPID PANEL: CPT

## 2018-05-04 PROCEDURE — 83036 HEMOGLOBIN GLYCOSYLATED A1C: CPT

## 2018-05-07 ENCOUNTER — HOSPITAL ENCOUNTER (OUTPATIENT)
Dept: RADIOLOGY | Facility: HOSPITAL | Age: 23
Discharge: HOME | End: 2018-05-07
Attending: INTERNAL MEDICINE
Payer: COMMERCIAL

## 2018-05-07 DIAGNOSIS — E01.0 THYROMEGALY: ICD-10-CM

## 2018-05-07 PROCEDURE — 76536 US EXAM OF HEAD AND NECK: CPT | Mod: TC

## 2018-05-07 PROCEDURE — 76536 US EXAM OF HEAD AND NECK: CPT | Mod: 26,,, | Performed by: RADIOLOGY

## 2018-05-14 ENCOUNTER — OFFICE VISIT (OUTPATIENT)
Dept: URGENT CARE | Facility: CLINIC | Age: 23
End: 2018-05-14
Payer: COMMERCIAL

## 2018-05-14 VITALS
SYSTOLIC BLOOD PRESSURE: 132 MMHG | WEIGHT: 285 LBS | TEMPERATURE: 100 F | BODY MASS INDEX: 43.19 KG/M2 | OXYGEN SATURATION: 98 % | DIASTOLIC BLOOD PRESSURE: 96 MMHG | HEIGHT: 68 IN | RESPIRATION RATE: 18 BRPM | HEART RATE: 100 BPM

## 2018-05-14 DIAGNOSIS — J02.9 SORE THROAT: ICD-10-CM

## 2018-05-14 DIAGNOSIS — J32.9 RHINOSINUSITIS: Primary | ICD-10-CM

## 2018-05-14 LAB
CTP QC/QA: YES
S PYO RRNA THROAT QL PROBE: NEGATIVE

## 2018-05-14 PROCEDURE — 87880 STREP A ASSAY W/OPTIC: CPT | Mod: QW,S$GLB,, | Performed by: PHYSICIAN ASSISTANT

## 2018-05-14 PROCEDURE — 3008F BODY MASS INDEX DOCD: CPT | Mod: CPTII,S$GLB,, | Performed by: PHYSICIAN ASSISTANT

## 2018-05-14 PROCEDURE — 99214 OFFICE O/P EST MOD 30 MIN: CPT | Mod: SA,S$GLB,, | Performed by: PHYSICIAN ASSISTANT

## 2018-05-14 RX ORDER — DEXMETHYLPHENIDATE HYDROCHLORIDE 30 MG/1
CAPSULE, EXTENDED RELEASE ORAL
COMMUNITY
End: 2018-07-30

## 2018-05-14 RX ORDER — DEXMETHYLPHENIDATE HYDROCHLORIDE 25 MG/1
CAPSULE, EXTENDED RELEASE ORAL
COMMUNITY
End: 2018-07-30

## 2018-05-14 RX ORDER — DEXMETHYLPHENIDATE HYDROCHLORIDE 20 MG/1
CAPSULE, EXTENDED RELEASE ORAL
COMMUNITY
End: 2018-07-30

## 2018-05-14 RX ORDER — FLUOXETINE 10 MG/1
CAPSULE ORAL
COMMUNITY
End: 2018-07-30

## 2018-05-14 RX ORDER — FLUTICASONE PROPIONATE 50 MCG
1 SPRAY, SUSPENSION (ML) NASAL DAILY
Qty: 15 G | Refills: 0 | Status: SHIPPED | OUTPATIENT
Start: 2018-05-14 | End: 2018-05-21

## 2018-05-14 RX ORDER — TRAZODONE HYDROCHLORIDE 100 MG/1
TABLET ORAL
COMMUNITY
Start: 2018-04-22 | End: 2018-07-30

## 2018-05-14 RX ORDER — UREA 200 MG/G
CREAM TOPICAL
COMMUNITY
End: 2018-07-30

## 2018-05-14 RX ORDER — FLUOXETINE HYDROCHLORIDE 20 MG/1
CAPSULE ORAL
COMMUNITY
End: 2018-07-30

## 2018-05-14 RX ORDER — DEXMETHYLPHENIDATE HYDROCHLORIDE 15 MG/1
CAPSULE, EXTENDED RELEASE ORAL
COMMUNITY
End: 2018-07-30

## 2018-05-14 RX ORDER — CETIRIZINE HYDROCHLORIDE 10 MG/1
10 TABLET ORAL DAILY
Qty: 30 TABLET | Refills: 2 | Status: SHIPPED | OUTPATIENT
Start: 2018-05-14 | End: 2018-07-30

## 2018-05-14 RX ORDER — DEXMETHYLPHENIDATE HYDROCHLORIDE 10 MG/1
TABLET ORAL
COMMUNITY
End: 2018-07-30

## 2018-05-14 NOTE — PROGRESS NOTES
"Subjective:       Patient ID: Misael Garcia is a 23 y.o. female.    Vitals:  height is 5' 8" (1.727 m) and weight is 129.3 kg (285 lb). Her oral temperature is 99.5 °F (37.5 °C). Her blood pressure is 132/96 (abnormal) and her pulse is 100. Her respiration is 18 and oxygen saturation is 98%.     Chief Complaint: Sore Throat    This is a 23 y.o. female with Past Medical History:  No date: ADHD (attention deficit hyperactivity disorder)  No date: Anxiety  No date: Bipolar affective  12/2016: History of chlamydia  No date: Insomnia  No date: Morbid obesity with BMI of 40.0-44.9, adult  No date: Sleep disorder   Past Surgical History:  No date: NO PAST SURGERIES      Comment: as of 1-13-17  who presents today with a chief complaint of sore throat.  Patient states her mother was diagnosed with a strep throat last week and is now in the hospital.  Patient states her sore throat started 2-3 days ago.  She has been taking her mothers Amoxicillin 875mg since yesterday.        Sore Throat    This is a new problem. The current episode started in the past 7 days (Friday). The problem has been gradually worsening. Neither (All Over) side of throat is experiencing more pain than the other. The pain is at a severity of 6/10. The pain is moderate. Associated symptoms include congestion, ear pain and headaches. Pertinent negatives include no abdominal pain, coughing, hoarse voice or shortness of breath. She has had exposure to strep. Treatments tried: Amoxicillin, Tylenol. The treatment provided mild relief.     Review of Systems   Constitution: Negative for chills, fever and malaise/fatigue.   HENT: Positive for congestion, ear pain and sore throat. Negative for hoarse voice.    Eyes: Negative for discharge and redness.   Cardiovascular: Negative for chest pain, dyspnea on exertion and leg swelling.   Respiratory: Negative for cough, shortness of breath, sputum production and wheezing.    Musculoskeletal: Negative for " myalgias.   Gastrointestinal: Negative for abdominal pain and nausea.   Neurological: Positive for headaches.       Objective:      Physical Exam   Constitutional: She is oriented to person, place, and time. She appears well-developed and well-nourished.   HENT:   Head: Normocephalic and atraumatic.   Eyes: Conjunctivae are normal.   Neck: Normal range of motion. Neck supple. No thyromegaly present.   Cardiovascular: Normal rate and regular rhythm.  Exam reveals no gallop and no friction rub.    No murmur heard.  Pulmonary/Chest: Effort normal and breath sounds normal. She has no wheezes. She has no rales.   Musculoskeletal: Normal range of motion.   Lymphadenopathy:     She has no cervical adenopathy.   Neurological: She is alert and oriented to person, place, and time.   Skin: Skin is warm and dry. No rash noted. No erythema.   Psychiatric: She has a normal mood and affect. Her behavior is normal. Judgment and thought content normal.   Nursing note and vitals reviewed.      Assessment:       1. Rhinosinusitis    2. Sore throat        Plan:         Rhinosinusitis  -     cetirizine (ZYRTEC) 10 MG tablet; Take 1 tablet (10 mg total) by mouth once daily.  Dispense: 30 tablet; Refill: 2  -     fluticasone (FLONASE) 50 mcg/actuation nasal spray; 1 spray (50 mcg total) by Each Nare route once daily.  Dispense: 15 g; Refill: 0    Sore throat  -     POCT rapid strep A  -     CULTURE, STREP A,  THROAT      Misael was seen today for sore throat.    Diagnoses and all orders for this visit:    Rhinosinusitis  -     cetirizine (ZYRTEC) 10 MG tablet; Take 1 tablet (10 mg total) by mouth once daily.  -     fluticasone (FLONASE) 50 mcg/actuation nasal spray; 1 spray (50 mcg total) by Each Nare route once daily.    Sore throat  -     POCT rapid strep A  -     CULTURE, STREP A,  THROAT      Patient Instructions     - Rest.    - Drink plenty of fluids.    - Tylenol or Ibuprofen as directed as needed for fever/pain.    - Take  over-the-counter claritin, zyrtec, allegra, or xyzal as directed.  - Use over the counter Flonase as directed for sinus congestion and postnasal drip.  - use nasal saline prior to Flonase.  - Use Ocean Spray Nasal Saline 1-3 puffs each nostril every 2-3 hours then blow out onto tissue. This is to irrigate the nasal passage way to clear the sinus openings. Use until sinus problem resolved.   - Antibiotics are not needed at this time.  - Usual course of cold symptom is 10-14 days, but longer if patient is a smoker.   - Use salt water gargle for sore throat.   - Follow up with your PCP or specialty clinic as directed in the next 1-2 weeks if not improved or as needed.  You can call [phone] to schedule an appointment with the appropriate provider.    - Go to the ED if your symptoms worsen.    Sinusitis (No Antibiotics)    The sinuses are air-filled spaces within the bones of the face. They connect to the inside of the nose. Sinusitis is an inflammation of the tissue lining the sinus cavity. Sinus inflammation can occur during a cold. It can also be due to allergies to pollens and other particles in the air. It can cause symptoms such as sinus congestion, headache, sore throat, facial swelling and fullness. It may also cause a low-grade fever. No infection is present, and no antibiotic treatment is needed.  Home care  · Drink plenty of water, hot tea, and other liquids. This may help thin mucus. It also may promote sinus drainage.  · Heat may help soothe painful areas of the face. Use a towel soaked in hot water. Or,  the shower and direct the hot spray onto your face. Using a vaporizer along with a menthol rub at night may also help.   · An expectorant containing guaifenesin may help thin the mucus and promote drainage from the sinuses.  · Over-the-counter decongestants may be used unless a similar medicine was prescribed. Nasal sprays work the fastest. Use one that contains phenylephrine or  oxymetazoline. First blow the nose gently. Then use the spray. Do not use these medicines more often than directed on the label or symptoms may get worse. You may also use tablets containing pseudoephedrine. Avoid products that combine ingredients, because side effects may be increased. Read labels. You can also ask the pharmacist for help. (NOTE: Persons with high blood pressure should not use decongestants. They can raise blood pressure.)  · Over-the-counter antihistamines may help if allergies contributed to your sinusitis.    · Use acetaminophen or ibuprofen to control pain, unless another pain medicine was prescribed. (If you have chronic liver or kidney disease or ever had a stomach ulcer, talk with your doctor before using these medicines. Aspirin should never be used in anyone under 18 years of age who is ill with a fever. It may cause severe liver damage.)  · Use nasal rinses or irrigation as instructed by your health care provider.  · Don't smoke. This can worsen symptoms.  Follow-up care  Follow up with your healthcare provider or our staff if you are not improving within the next week.  When to seek medical advice  Call your healthcare provider if any of these occur:  · Green or yellow discharge from the nose or into the throat  · Facial pain or headache becoming more severe  · Stiff neck  · Unusual drowsiness or confusion  · Swelling of the forehead or eyelids  · Vision problems, including blurred or double vision  · Fever of 100.4ºF (38ºC) or higher, or as directed by your healthcare provider  · Seizure  · Breathing problems  · Symptoms not resolving within 10 days  Date Last Reviewed: 4/13/2015  © 2164-2592 [employer]. [address] [place] [address], [place] [address]. All rights reserved. This information is not intended as a substitute for professional medical care. Always follow your healthcare professional's instructions.        Self-Care for Sore Throats    Sore throats happen for many  reasons, such as colds, allergies, and infections caused by viruses or bacteria. In any case, your throat becomes red and sore. Your goal for self-care is to reduce your discomfort while giving your throat a chance to heal.  Moisten and soothe your throat  Tips include the following:  · Try a sip of water first thing after waking up.  · Keep your throat moist by drinking 6 or more glasses of clear liquids every day.  · Run a cool-air humidifier in your room overnight.  · Avoid cigarette smoke.   · Suck on throat lozenges, cough drops, hard candy, ice chips, or frozen fruit-juice bars. Use the sugar-free versions if your diet or medical condition requires them.  Gargle to ease irritation  Gargling every hour or 2 can ease irritation. Try gargling with 1 of these solutions:  · 1/4 teaspoon of salt in 1/2 cup of warm water  · An over-the-counter anesthetic gargle  Use medicine for more relief  Over-the-counter medicine can reduce sore throat symptoms. Ask your pharmacist if you have questions about which medicine to use:  · Ease pain with anesthetic sprays. Aspirin or an aspirin substitute also helps. Remember, never give aspirin to anyone 18 or younger, or if you are already taking blood thinners.   · For sore throats caused by allergies, try antihistamines to block the allergic reaction.  · Remember: unless a sore throat is caused by a bacterial infection, antibiotics wont help you.  Prevent future sore throats  Prevention tips include the following:  · Stop smoking or reduce contact with secondhand smoke. Smoke irritates the tender throat lining.  · Limit contact with pets and with allergy-causing substances, such as pollen and mold.  · When youre around someone with a sore throat or cold, wash your hands often to keep viruses or bacteria from spreading.  · Dont strain your vocal cords.  Call your healthcare provider  Contact your healthcare provider if you have:  · A temperature over 101°F (38.3°C)  · White  spots on the throat  · Great difficulty swallowing  · Trouble breathing  · A skin rash  · Recent exposure to someone else with strep bacteria  · Severe hoarseness and swollen glands in the neck or jaw   Date Last Reviewed: 8/1/2016  © 6666-4804 [employer]. [address] [place] [address], [place] [address]. All rights reserved. This information is not intended as a substitute for professional medical care. Always follow your healthcare professional's instructions.

## 2018-05-14 NOTE — PATIENT INSTRUCTIONS
- Rest.    - Drink plenty of fluids.    - Tylenol or Ibuprofen as directed as needed for fever/pain.    - Take over-the-counter claritin, zyrtec, allegra, or xyzal as directed.  - Use over the counter Flonase as directed for sinus congestion and postnasal drip.  - use nasal saline prior to Flonase.  - Use Ocean Spray Nasal Saline 1-3 puffs each nostril every 2-3 hours then blow out onto tissue. This is to irrigate the nasal passage way to clear the sinus openings. Use until sinus problem resolved.   - Antibiotics are not needed at this time.  - Usual course of cold symptom is 10-14 days, but longer if patient is a smoker.   - Use salt water gargle for sore throat.   - Follow up with your PCP or specialty clinic as directed in the next 1-2 weeks if not improved or as needed.  You can call [phone] to schedule an appointment with the appropriate provider.    - Go to the ED if your symptoms worsen.    Sinusitis (No Antibiotics)    The sinuses are air-filled spaces within the bones of the face. They connect to the inside of the nose. Sinusitis is an inflammation of the tissue lining the sinus cavity. Sinus inflammation can occur during a cold. It can also be due to allergies to pollens and other particles in the air. It can cause symptoms such as sinus congestion, headache, sore throat, facial swelling and fullness. It may also cause a low-grade fever. No infection is present, and no antibiotic treatment is needed.  Home care  · Drink plenty of water, hot tea, and other liquids. This may help thin mucus. It also may promote sinus drainage.  · Heat may help soothe painful areas of the face. Use a towel soaked in hot water. Or,  the shower and direct the hot spray onto your face. Using a vaporizer along with a menthol rub at night may also help.   · An expectorant containing guaifenesin may help thin the mucus and promote drainage from the sinuses.  · Over-the-counter decongestants may be used unless a  similar medicine was prescribed. Nasal sprays work the fastest. Use one that contains phenylephrine or oxymetazoline. First blow the nose gently. Then use the spray. Do not use these medicines more often than directed on the label or symptoms may get worse. You may also use tablets containing pseudoephedrine. Avoid products that combine ingredients, because side effects may be increased. Read labels. You can also ask the pharmacist for help. (NOTE: Persons with high blood pressure should not use decongestants. They can raise blood pressure.)  · Over-the-counter antihistamines may help if allergies contributed to your sinusitis.    · Use acetaminophen or ibuprofen to control pain, unless another pain medicine was prescribed. (If you have chronic liver or kidney disease or ever had a stomach ulcer, talk with your doctor before using these medicines. Aspirin should never be used in anyone under 18 years of age who is ill with a fever. It may cause severe liver damage.)  · Use nasal rinses or irrigation as instructed by your health care provider.  · Don't smoke. This can worsen symptoms.  Follow-up care  Follow up with your healthcare provider or our staff if you are not improving within the next week.  When to seek medical advice  Call your healthcare provider if any of these occur:  · Green or yellow discharge from the nose or into the throat  · Facial pain or headache becoming more severe  · Stiff neck  · Unusual drowsiness or confusion  · Swelling of the forehead or eyelids  · Vision problems, including blurred or double vision  · Fever of 100.4ºF (38ºC) or higher, or as directed by your healthcare provider  · Seizure  · Breathing problems  · Symptoms not resolving within 10 days  Date Last Reviewed: 4/13/2015  © 1990-5161 [employer]. [address] [place] [address]. All rights reserved. This information is not intended as a substitute for professional medical care. Always follow your  healthcare professional's instructions.        Self-Care for Sore Throats    Sore throats happen for many reasons, such as colds, allergies, and infections caused by viruses or bacteria. In any case, your throat becomes red and sore. Your goal for self-care is to reduce your discomfort while giving your throat a chance to heal.  Moisten and soothe your throat  Tips include the following:  · Try a sip of water first thing after waking up.  · Keep your throat moist by drinking 6 or more glasses of clear liquids every day.  · Run a cool-air humidifier in your room overnight.  · Avoid cigarette smoke.   · Suck on throat lozenges, cough drops, hard candy, ice chips, or frozen fruit-juice bars. Use the sugar-free versions if your diet or medical condition requires them.  Gargle to ease irritation  Gargling every hour or 2 can ease irritation. Try gargling with 1 of these solutions:  · 1/4 teaspoon of salt in 1/2 cup of warm water  · An over-the-counter anesthetic gargle  Use medicine for more relief  Over-the-counter medicine can reduce sore throat symptoms. Ask your pharmacist if you have questions about which medicine to use:  · Ease pain with anesthetic sprays. Aspirin or an aspirin substitute also helps. Remember, never give aspirin to anyone 18 or younger, or if you are already taking blood thinners.   · For sore throats caused by allergies, try antihistamines to block the allergic reaction.  · Remember: unless a sore throat is caused by a bacterial infection, antibiotics wont help you.  Prevent future sore throats  Prevention tips include the following:  · Stop smoking or reduce contact with secondhand smoke. Smoke irritates the tender throat lining.  · Limit contact with pets and with allergy-causing substances, such as pollen and mold.  · When youre around someone with a sore throat or cold, wash your hands often to keep viruses or bacteria from spreading.  · Dont strain your vocal cords.  Call your healthcare  provider  Contact your healthcare provider if you have:  · A temperature over 101°F (38.3°C)  · White spots on the throat  · Great difficulty swallowing  · Trouble breathing  · A skin rash  · Recent exposure to someone else with strep bacteria  · Severe hoarseness and swollen glands in the neck or jaw   Date Last Reviewed: 8/1/2016  © 5361-0432 [employer]. [address] [place] [address]. All rights reserved. This information is not intended as a substitute for professional medical care. Always follow your healthcare professional's instructions.

## 2018-05-17 LAB — S PYO THROAT QL CULT: NEGATIVE

## 2018-05-21 ENCOUNTER — TELEPHONE (OUTPATIENT)
Dept: URGENT CARE | Facility: CLINIC | Age: 23
End: 2018-05-21

## 2018-05-21 NOTE — TELEPHONE ENCOUNTER
----- Message from Luther Preciado MD sent at 5/17/2018  9:34 PM CDT -----  These results are normal. Please notify the patient.

## 2018-05-24 ENCOUNTER — TELEPHONE (OUTPATIENT)
Dept: URGENT CARE | Facility: CLINIC | Age: 23
End: 2018-05-24

## 2018-07-30 ENCOUNTER — TELEPHONE (OUTPATIENT)
Dept: OBSTETRICS AND GYNECOLOGY | Facility: CLINIC | Age: 23
End: 2018-07-30

## 2018-07-30 ENCOUNTER — LAB VISIT (OUTPATIENT)
Dept: LAB | Facility: OTHER | Age: 23
End: 2018-07-30
Payer: COMMERCIAL

## 2018-07-30 ENCOUNTER — OFFICE VISIT (OUTPATIENT)
Dept: OBSTETRICS AND GYNECOLOGY | Facility: CLINIC | Age: 23
End: 2018-07-30
Payer: COMMERCIAL

## 2018-07-30 VITALS
SYSTOLIC BLOOD PRESSURE: 118 MMHG | HEIGHT: 68 IN | BODY MASS INDEX: 44.25 KG/M2 | WEIGHT: 292 LBS | DIASTOLIC BLOOD PRESSURE: 72 MMHG

## 2018-07-30 DIAGNOSIS — R63.2 EXCESSIVE HUNGER: ICD-10-CM

## 2018-07-30 DIAGNOSIS — R63.1 EXCESSIVE THIRST: ICD-10-CM

## 2018-07-30 DIAGNOSIS — R35.89 EXCESSIVE URINE PRODUCTION: ICD-10-CM

## 2018-07-30 DIAGNOSIS — E66.01 OBESITY, CLASS III, BMI 40-49.9 (MORBID OBESITY): ICD-10-CM

## 2018-07-30 DIAGNOSIS — N76.0 VAGINITIS AND VULVOVAGINITIS: Primary | ICD-10-CM

## 2018-07-30 LAB
ESTIMATED AVG GLUCOSE: 114 MG/DL
HBA1C MFR BLD HPLC: 5.6 %
TSH SERPL DL<=0.005 MIU/L-ACNC: 0.67 UIU/ML

## 2018-07-30 PROCEDURE — 99214 OFFICE O/P EST MOD 30 MIN: CPT | Mod: S$GLB,,, | Performed by: NURSE PRACTITIONER

## 2018-07-30 PROCEDURE — 36415 COLL VENOUS BLD VENIPUNCTURE: CPT

## 2018-07-30 PROCEDURE — 84443 ASSAY THYROID STIM HORMONE: CPT

## 2018-07-30 PROCEDURE — 87660 TRICHOMONAS VAGIN DIR PROBE: CPT

## 2018-07-30 PROCEDURE — 99999 PR PBB SHADOW E&M-EST. PATIENT-LVL III: CPT | Mod: PBBFAC,,, | Performed by: NURSE PRACTITIONER

## 2018-07-30 PROCEDURE — 3008F BODY MASS INDEX DOCD: CPT | Mod: CPTII,S$GLB,, | Performed by: NURSE PRACTITIONER

## 2018-07-30 PROCEDURE — 83036 HEMOGLOBIN GLYCOSYLATED A1C: CPT

## 2018-07-30 RX ORDER — NYSTATIN 100000 U/G
CREAM TOPICAL 2 TIMES DAILY
Qty: 30 G | Refills: 0 | Status: SHIPPED | OUTPATIENT
Start: 2018-07-30 | End: 2018-08-29 | Stop reason: SDUPTHER

## 2018-07-30 RX ORDER — CLOTRIMAZOLE AND BETAMETHASONE DIPROPIONATE 10; .64 MG/G; MG/G
CREAM TOPICAL 2 TIMES DAILY
Qty: 15 G | Refills: 0 | Status: SHIPPED | OUTPATIENT
Start: 2018-07-30 | End: 2018-08-06

## 2018-07-30 NOTE — TELEPHONE ENCOUNTER
Shaggy pt - pt thinks she has a yeast infection and would like to see if she can come in for an appt today.

## 2018-07-30 NOTE — LETTER
July 30, 2018               [facility]  Obstetrics and Gynecology  [address], [address]  [facility] [address]  Phone: [phone]  Fax: [phone]   July 30, 2018     Patient: Misael Garcia   YOB: 1995   Date of Visit: 7/30/2018       To Whom it May Concern:    Misael Garcia was seen in my clinic on 7/30/2018. Please excuse her from missing work due to this appointment.  If you have any questions or concerns, please don't hesitate to call.    Sincerely,         PIA Beltre

## 2018-07-31 ENCOUNTER — TELEPHONE (OUTPATIENT)
Dept: OBSTETRICS AND GYNECOLOGY | Facility: CLINIC | Age: 23
End: 2018-07-31

## 2018-07-31 NOTE — PROGRESS NOTES
Chief Complaint: Problem:     Chief Complaint   Patient presents with    Vulvar Itch     itching and irritation/ odor        (Dr. Coon patient)    Last Pap:  2018 Normal,  HPV not done      HPI:      Misael Garcia is a 23 y.o.  who presents today for c/o vulvovaginal irritation.  Also c/o rash under her breasts and wanted to have It looked at.  She was prescribed a cream in past few months which helped resolved the rash beneath breast.  She feels like she constantly has yeast infections as well.  States she always feels hungry and not satisfied even after she eats a meal; reports excessive thirst, and increased frequency of urination (mostly during day).  LMP: No LMP recorded (lmp unknown).    Ms. Garcia is currently sexually active with a single male partner.   She declines STD screening today with her examination.      Past Medical History:   Diagnosis Date    ADHD (attention deficit hyperactivity disorder)     Anxiety     Bipolar affective     History of chlamydia 2016    Insomnia     Morbid obesity with BMI of 40.0-44.9, adult     Sleep disorder      Past Surgical History:   Procedure Laterality Date    NO PAST SURGERIES      as of 19     Social History     Social History    Marital status: Single     Spouse name: N/A    Number of children: N/A    Years of education: N/A     Occupational History           Social History Main Topics    Smoking status: Never Smoker    Smokeless tobacco: Never Used    Alcohol use No    Drug use: Yes     Types: Marijuana    Sexual activity: Yes     Partners: Male     Birth control/ protection: Condom, OCP      Comment: Single:       Other Topics Concern    Not on file     Social History Narrative    No narrative on file     Family History   Problem Relation Age of Onset    Breast cancer Paternal Grandmother     No Known Problems Father     No Known Problems Mother     No Known Problems Sister     Diabetes Maternal  "Grandmother     No Known Problems Maternal Grandfather     No Known Problems Paternal Grandfather     Colon cancer Neg Hx     Ovarian cancer Neg Hx      OB History      Para Term  AB Living    0 0 0 0 0 0    SAB TAB Ectopic Multiple Live Births    0 0 0 0          Obstetric Comments    Menarche ~ 18          ROS:     GENERAL: Denies unintentional weight gain or weight loss. Feeling well overall. Reports excessive hunger & thirst.  SKIN: REPOTS rash beneath breasts ; denies lesions.   HEENT: Denies headaches, or vision changes.   CARDIOVASCULAR: Denies palpitations or chest pain.   RESPIRATORY: Denies shortness of breath or dyspnea on exertion.  BREASTS: Denies pain, lumps, or nipple discharge.   ABDOMEN: Denies abdominal pain, constipation, diarrhea, nausea, vomiting, change in appetite.  URINARY: Denies dysuria, hematuria. Reports increased frequency of urination.  NEUROLOGIC: Denies syncope or weakness.   PSYCHIATRIC: Denies depression, anxiety or mood swings.  VULVAR: No pain, no lesions and REPORTS itching.  VAGINAL: REPORTS itching, no abnormal bleeding and no lesions.    Physical Exam:      PHYSICAL EXAM:  /72   Ht 5' 8" (1.727 m)   Wt 132.5 kg (292 lb)   LMP  (LMP Unknown) Comment: this month  BMI 44.40 kg/m²   Body mass index is 44.4 kg/m².     APPEARANCE: Well nourished, well developed, in no acute distress.   PSYCH: Appropriate mood and affect.  SKIN: No acne or hirsutism.  NECK: Neck symmetric without masses or thyromegaly  NODES: No inguinal, axillary, or supraclavicular lymph node enlargement  CHEST: Normal respiratory effort.  ABDOMEN: Soft.  No tenderness or masses.    BREASTS: Symmetrical, no skin changes; mildly erythematous rash noted beneath both breasts, c/w Candida.  No palpable masses or nipple discharge bilaterally.  PELVIC: Normal external genitalia without lesions.  Normal hair distribution.  Adequate perineal body, normal urethral meatus.  Vagina moist and well " rugated without lesions; small amount light red bloody discharge (c/w menses starting today).  Cervix pink, without lesions, discharge or tenderness.  No significant cystocele or rectocele.  Bimanual exam shows uterus to be normal size, regular, mobile and nontender.  Adnexa without masses or tenderness.    EXTREMITIES: No edema.    Assessment/Plan:     Vaginitis and vulvovaginitis  -     Vaginosis Screen by DNA Probe    Excessive hunger  -     Hemoglobin A1c; Future  -     TSH; Future; Expected date: 07/30/2018    Excessive urine production  -     Hemoglobin A1c; Future  -     TSH; Future; Expected date: 07/30/2018    Excessive thirst  -     Hemoglobin A1c; Future  -     TSH; Future; Expected date: 07/30/2018    Obesity, Class III, BMI 40-49.9 (morbid obesity)  -     Hemoglobin A1c; Future  -     TSH; Future; Expected date: 07/30/2018    Other orders  -     nystatin (MYCOSTATIN) cream; Apply topically 2 (two) times daily. (beneath breasts)  Dispense: 30 g; Refill: 0  -     clotrimazole-betamethasone 1-0.05% (LOTRISONE) cream; Apply topically 2 (two) times daily. (to external genitalia as needed) for 7 days  Dispense: 15 g; Refill: 0      Counseling:     Patient was counseled today on current ASCCP pap guidelines, the recommendation for yearly pelvic exams, healthy diet and exercise routines, annual mammograms and breast self awareness. She is to see her PCP for other health maintenance.     Follow-up if symptoms worsen or fail to improve.

## 2018-07-31 NOTE — PROGRESS NOTES
"Subjective:       Patient ID: Misael Garcia is a 23 y.o. female.    Chief Complaint: Consult    CC: Weight      Pt seen today with Grandmother present.     Current attempts at weight loss: New pt to me, referred by Michael Goldman,   [address] [facility]  [place] [address] , with Patient Active Problem List:     Corns and callus     Hallux valgus with bunions     Hammer toe     Metatarsalgia     Pain in limb     Tailor's bunion     Obesity, Class III, BMI 40-49.9 (morbid obesity)     Bipolar 1 disorder     States she over eats says she "cannot stop eating"". She does eat fast food, sugary drinks. Does not exercise. Stopped her bipolar meds- trazodone, Viibryd and clonazepam. Also stopped OCPs. She has gone back on the OCPs 2-3 weeks ago. Has not been to psychiatrist in past year. Has gained 50 lbs in the past 1.5 years.     Previous diet attempts: Denies.      History of medication for loss: Denies.   checked today. She was on ADD meds in past. Say adderall and ritalin caused her to stop eating and she felt bad because she concentrated.     Heaviest weight:291#    Lightest weight: 220#    Goal weight: 240#      Last eye exam:    4 months. No glaucoma per pt.                                   Provider:    Typical eating patterns:  Works at [employer]. Going to school in January. Lives with her mom. Not much cooking at home. Her grandma does cook. Does not eat vegetables.   Breakfast: Smoothie aida with double fruit. Sausage biscuit and coke. Eggs and grits and biscuits.     Lunch: Double stack burger and coke and fries. Lunchable. Turkey and ham sandwich with ayers and chips. .     Dinner: Mac and cheese, chicken nuggets, spaghetti. Potatoes and corn.     Snacks: brownies, snack cakes, cookies, chips.     Beverages: Coke- 3-4 a day, OJ, lemonade, chocolate milk,  Water. Denies ETOH    Willingness to change: 8/10  EKG:    BMR: 2086      Review of Systems   Constitutional: Negative for chills " "and fever.   Respiratory: Positive for shortness of breath.         + snores   Cardiovascular: Negative for chest pain and leg swelling.   Gastrointestinal: Positive for constipation. Negative for diarrhea.        Denies GERD   Genitourinary: Positive for frequency. Negative for difficulty urinating and menstrual problem.        On Ocps.    Musculoskeletal: Positive for back pain. Negative for arthralgias.   Neurological: Negative for dizziness and light-headedness.   Psychiatric/Behavioral: Negative for decreased concentration. The patient is nervous/anxious.         Staes anxiety is "moderate"       Objective:     /80   Pulse 84   Ht 5' 8" (1.727 m)   Wt 132.4 kg (291 lb 14.2 oz)   LMP 07/30/2018 Comment: this month  BMI 44.38 kg/m²     Physical Exam   Constitutional: She is oriented to person, place, and time. She appears well-developed. No distress.   Morbidly obese     HENT:   Head: Normocephalic and atraumatic.   Eyes: EOM are normal. Pupils are equal, round, and reactive to light. No scleral icterus.   Neck: Normal range of motion. Neck supple. No thyromegaly present.   Cardiovascular: Normal rate and normal heart sounds.  Exam reveals no gallop and no friction rub.    No murmur heard.  Pulmonary/Chest: Effort normal and breath sounds normal. No respiratory distress. She has no wheezes.   Abdominal: Soft. Bowel sounds are normal. She exhibits no distension. There is no tenderness.   Musculoskeletal: Normal range of motion. She exhibits no edema.   Neurological: She is alert and oriented to person, place, and time. No cranial nerve deficit.   Skin: Skin is warm and dry. No erythema.   Psychiatric: She has a normal mood and affect. Her behavior is normal. Judgment normal.   Vitals reviewed.      Assessment:       1. Class 3 severe obesity due to excess calories with serious comorbidity and body mass index (BMI) of 40.0 to 44.9 in adult    2. Bipolar 1 disorder    3. Prediabetes        Plan:         " 1. Class 3 severe obesity due to excess calories with serious comorbidity and body mass index (BMI) of 40.0 to 44.9 in adult  Exercise 30 min 3 days a week. Gradually increase.     Patient counseled in strategies for long term weight loss and maintenance: Keeping a food diary, exercise for 1 hour a day and eating breakfast everyday.        Protein and fiber in every meals.     No soda, sweet tea, juices or lemonade. All drinks should be 5 calories or less.         Nutrition materials provided today.  7565-9883 myrtle menu and meal ideas given.     2. Bipolar 1 disorder  Contact your Psychiatrist or PCP about getting back on your psych meds.   Avoid stimulant anorectics      3. Prediabetes  Discussed decreasing the risks of diabetes with changes in diet, routine exercise and losing weight.      Start Trulicity once a week. [email or web address].[email or web address] for coupon.     Decrease portions and fried foods as soon as you start Trulicity. Some nausea in the first 2 weeks is not unusual.     If you get pain across the upper abdomen and around to your back, please call the office.

## 2018-07-31 NOTE — TELEPHONE ENCOUNTER
"----- Message from Karla Zabala NP sent at 7/31/2018 10:30 AM CDT -----  Call patient and tell her.....    "Your vaginal swab from the office came back negative.  This was a test for a yeast infection or a bacterial infection.  Your swab was normal.  Please call the office if you have any other questions.    (If needed for vaginal discharge and/or feminine odor, she may use OTC Rephresh gel.  It is a vaginal gel used to neutralize and maintain a healthy vaginal pH.  Patients who get Yeast or BV often use this to help reduce risk of vaginal issues.  It can be found at any drugstore on the feminine product aisle, and can be used as frequently as every 3 days.)  "

## 2018-07-31 NOTE — PROGRESS NOTES
Konrad Douglas,   Your Hemoglobin A1c came back almost the same as what it was 2 months ago.  Your thyroid is within normal range.  I see that you have an appointment with Dr. Nickerson , so she will be able to view these labs.  If you have any other questions, send us a message.  ~ PIA Beltre

## 2018-08-01 ENCOUNTER — PATIENT MESSAGE (OUTPATIENT)
Dept: OBSTETRICS AND GYNECOLOGY | Facility: CLINIC | Age: 23
End: 2018-08-01

## 2018-08-01 ENCOUNTER — INITIAL CONSULT (OUTPATIENT)
Dept: BARIATRICS | Facility: CLINIC | Age: 23
End: 2018-08-01
Payer: COMMERCIAL

## 2018-08-01 VITALS
SYSTOLIC BLOOD PRESSURE: 110 MMHG | BODY MASS INDEX: 44.23 KG/M2 | DIASTOLIC BLOOD PRESSURE: 80 MMHG | WEIGHT: 291.88 LBS | HEIGHT: 68 IN | HEART RATE: 84 BPM

## 2018-08-01 DIAGNOSIS — E66.01 CLASS 3 SEVERE OBESITY DUE TO EXCESS CALORIES WITH SERIOUS COMORBIDITY AND BODY MASS INDEX (BMI) OF 40.0 TO 44.9 IN ADULT: Primary | ICD-10-CM

## 2018-08-01 DIAGNOSIS — R73.03 PREDIABETES: ICD-10-CM

## 2018-08-01 DIAGNOSIS — F31.9 BIPOLAR 1 DISORDER: ICD-10-CM

## 2018-08-01 PROCEDURE — 99244 OFF/OP CNSLTJ NEW/EST MOD 40: CPT | Mod: S$GLB,,, | Performed by: INTERNAL MEDICINE

## 2018-08-01 PROCEDURE — 99999 PR PBB SHADOW E&M-EST. PATIENT-LVL III: CPT | Mod: PBBFAC,,, | Performed by: INTERNAL MEDICINE

## 2018-08-01 NOTE — LETTER
August 1, 2018      Michael Goldman, DO  [address] [facility] [place] [address]           [facility] - Bariatric Surgery  [address]  [facility] [address]  Phone: [phone]  Fax: [phone]          Patient: Misael Garcia   MR Number: [id number]   YOB: 1995   Date of Visit: 8/1/2018       Dear Dr. Michael Goldman:    Thank you for referring Misael Garcia to me for evaluation. Attached you will find relevant portions of my assessment and plan of care.    If you have questions, please do not hesitate to call me. I look forward to following Misael Garcia along with you.    Sincerely,    Blanca Pineda MD    Enclosure  CC:  No Recipients    If you would like to receive this communication electronically, please contact [email or web address] or [phone] to request more information on [facility] [employer] access.    For providers and/or their staff who would like to refer a patient to [facility], please contact us through our one-stop-shop provider referral line, [facility], at [phone].    If you feel you have received this communication in error or would no longer like to receive these types of communications, please e-mail [email or web address]

## 2018-08-01 NOTE — PATIENT INSTRUCTIONS
Start Trulicity once a week. Www.[email or web address] for coupon.     Decrease portions and fried foods as soon as you start Trulicity. Some nausea in the first 2 weeks is not unusual.     If you get pain across the upper abdomen and around to your back, please call the office.       Contact your Psychiatrist or PCP about getting back on your psych meds.     Exercise 30 min 3 days a week. Gradually increase.     Patient counseled in strategies for long term weight loss and maintenance: Keeping a food diary, exercise for 1 hour a day and eating breakfast everyday.        Protein and fiber in every meals.     No soda, sweet tea, juices or lemonade. All drinks should be 5 calories or less.           Fruits and Vegetables       Include 1-2 servings of fruit daily.      1 serving of fruit includes ½ cup unsweetened applesauce, ½ medium banana, tennis ball size piece of fruit, 17 grapes, 1 cup melon, 1 cup strawberries, ¼ cup dried fruit     Include 2-3 servings of vegetables daily. 1 serving is 1 cup raw or ½ cup cooked.     Non-starchy vegetables include artichoke, asparagus, baby corn, bamboo shoots, beans: green/Italian/wax, bean sprouts, beets, broccoli, [place] sprouts, cabbage, carrots, cauliflower, celery, cucumber, eggplant, green onions or scallions, greens, jicama, leeks, mushrooms, okra, onions, pea pods, peppers, radishes, spinach, summer squash, tomatoes and salsa, turnips, vegetable juice cocktail, water chestnuts, zucchini        1200- 1500 calorie Sample meal plan   80-120g protein per day.   Protein drinks and bars: 0-4 grams sugar   Drink lots of water throughout the day and exercise!   MENU # 1   Breakfast: 2 eggs, 1 turkey sausage brie, 1 apple   Snack: P3 protein pack  Lunch: 2 roll-ups (sliced turkey, low-fat sliced cheese, mustard), 12 baby carrots dipped in 1 Tbsp natural peanut butter   Mid-Day Snack: ¼ cup unsalted almonds, ½ cup fruit   Dinner: 1 chicken thigh simmered in tomato sauce + 2 Tbsp  mozzarella cheese, ½ cup black beans, 1/2 cup steamed carrots   Evening Snack: 1/2 cup grapes with 4 cubes low-fat cheddar cheese   ___________________________________________________   MENU # 2   Breakfast: 200 calorie protein drink   Mid-morning snack : ¼ cup unsalted nuts, medium banana   Lunch: 3oz tuna or chicken salad made with 2 tbsp light ayers, over salad with tomatoes and cucumbers.   Snack: low-fat cheese stick   Dinner: 3oz hamburger brie, slice of low-fat cheese, 1 cup yellow squash and zucchini sauteed in nonstick cookspray  Snack: 6oz light yogurt   _______________________________________________________   Menu #3   Breakfast: 6oz plain Greek yogurt + fruit (½ banana OR ½ cup fruit - pineapple, blueberries, strawberries, peach), may add Splenda to mike.   Lunch: Grilled chicken breast w/ slice low-fat pepper gus cheese, 1/2 cup grilled/baked asparagus and small salad with Salad Spritzer.   Mid-Day snack: 200 calorie protein drink   Dinner: 4oz Grilled fish, ½ cup white beans, ½ cup cooked spinach   Evening Snack: fudgsicle no-sugar-added   Menu # 4   Breakfast: 1 scoop vanilla protein powder + 4oz skim milk + 4oz coffee   Snack: Pure protein bar   Lunch: 2 Lettuce tacos: 3oz seasoned ground turkey wrapped in a Nic lettuce leaves with 1 Tbsp shredded cheese and dollop low-fat sour cream   Snack: ½ cup cottage cheese, ½ cup pineapple chunks   Dinner: Shrimp omelet: 2 eggs, ½ cup shrimp, green onions, and shredded cheese   ______________________________________________________   Menu #5   Breakfast: 1 cup low-fat cottage cheese, ½ cup peaches (no sugar added)   Snack: 1 apple, 4 cubes cheese   Lunch: 3oz baked pork chop, 1 cup okra and tomato stew   Snack: 1/2 cup black beans + salsa + dollop light sour cream   Dinner: Caprese chicken salad: 3 oz chicken breast, 1oz fresh mozzarella, sliced tomato, 1 Tbsp olive oil, basil   Snack: sugar-free popsicle    _______________________________________________________  Menu #6   Breakfast: ½ cup part-skim ricotta cheese, 2 Tbsp sugar-free strawberry preserves, sprinkle of slivered almonds   Snack: 1 orange + string cheese  Lunch: 3 oz canned chicken, 1oz shredded cheddar cheese, ½ cup black beans, 2 Tbsp salsa   Snack: 200 calorie Protein drink   Dinner: 4 oz grilled salmon steak, over mixed green salad with 2 tbsp light dressing   _______________________________________________________  Menu #7  Breakfast: crust-less quiche (bake 1 egg mixed w/ low fat cheese, broccoli and low sodium ham in a muffin cup until cooked inside)  Snack: 1 light yogurt  Lunch: protein shake (1 scoop powder + 8 oz skim milk, blended w/ ice)  Snack: small apple w/ 2 tbsp PB2 (powdered peanut butter)  Dinner: 2 Turkey meatballs w/ low sugar marinara sauce, 1/2 cup spiralized zucchini (sauteed on stove top w/ nonstick cookspray)      Meal Ideas for Regular Bariatric Diet  *Recipes and products available at www.bariatriceating.com      Breakfast: (15-20g protein)    - Egg white omelet: 2 egg whites or ½ cup Egg Beaters. (Optional proteins: cheese, shrimp, black beans, chicken, sliced turkey) (Optional veggies: tomatoes, salsa, spinach, mushrooms, onions, green peppers, or small slice avocado)     - Egg and sausage: 1 egg or ¼ cup Egg Beaters (any variety), with 1 brie or 2 links of Turkey sausage or Veggie breakfast sausage (Stephen Farms or Boca)    - Crust-less breakfast quiche: To make a glass pie dish, mix 4oz part skim Ricotta, 1 cup skim milk, and 2 eggs as your base. Add protein: shredded cheese, sliced lean ham or turkey, turkey lala/sausage. Add veggies: tomato, onion, green onion, mushroom, green pepper, spinach, etc.    - Yogurt parfait: Mix 1 - 6oz container Dannon Light N Fit vanilla yogurt, with ¼ cup Kashi Go Lean cereal    - Cottage cheese and fruit: ½ cup part-skim cottage cheese or ricotta cheese topped with fresh fruit or  sugar free preserves     - Eloina Magdaleno's Vanilla Egg custard* (add 2 Tbsp instant coffee granules to make Cappuccino Custard*)    - Hi-Protein café latte (skim milk, decaf coffee, 1 scoop protein powder). Optional to add Sugar free syrup or extract flavoring.    Lunch: (20-30g protein)    - ½ cup Black bean soup (Homemade or Progresso), with ¼ cup shredded low-fat cheese. Top with chopped tomato or fresh salsa.     - Lean deli turkey breast and low-fat sliced cheese, mustard or light ayers to moisten, rolled up together, or wrapped in a Nic lettuce leaf    - Chicken salad made from dinner leftovers, moisten with low-fat salad dressing or light ayers. Also try leftover salmon, shrimp, tuna or boiled eggs. Serve ½ cup over dark green salad    - Fat-free canned refried beans, topped with ¼ cup shredded low-fat cheese. Top with chopped tomato or fresh salsa.     - Greek salad: Top mixed greens with 1-2oz grilled chicken, tomatoes, red onions, 2-3 kalamata olives, and sprinkle lightly with feta cheese. Spritz with Balsamic vinegar to taste.     - Crust-less lunch quiche: To make a glass pie dish, mix 4oz part skim Ricotta, 1 cup skim milk, and 2 eggs as your base. Add protein: shredded cheese, sliced lean ham or turkey, shrimp, chicken. Add veggies: tomato, onion, green onion, mushroom, green pepper, spinach, artichoke, broccoli, etc.    - Pizza bake: tomato sauce, low-fat shredded mozzarella and turkey pepperoni or Kenyan lala. Add any veggies.    - Cucumber crab bites: Spread ¼ cup crab dip (lump crabmeat + light cream cheese and green onions) over sliced cucumber.     - Chicken with light spinach and artichoke dip*: Puree in : 6oz cooked and drained spinach, 2 cloves garlic, 1 can cannelloni beans, ½ cup chopped green onions, 1 can drained artichoke hearts (not marinated in oil), lemon juice and basil. Mix in 2oz chopped up chicken.    Supper: (20-30g protein)    - Serve grilled fish over dark green  salad tossed with low-fat dressing, served with grilled asparagus morgan     - Rotisserie chicken salad: served with sliced strawberries, walnuts, fat-free feta cheese crumbles and 1 tbsp Diallos Own Light Raspberry [place] Vinaigrette    - Shrimp cocktail: Dip cold boiled shrimp in homemade low-sugar cocktail sauce (1/2 cup Demetria One Carb ketchup, 2 tbsp horseradish, 1/4 tsp hot sauce, 1 tsp Worcestershire sauce, 1 tbsp freshly-squeezed lemon juice). Serve with dark green salad, walnuts, and crumbled blue cheese drizzled with olive oil and Balsamic vinegar    - Tuna Melt: Spread tuna salad onto 2 thick slices of tomato. Top with low-fat cheese and broil until cheese is melted. May also be made with chicken salad of shrimp salad. [place] with different types of cheeses.    - Homemade low-fat Chili using extra lean ground beef or ground turkey. Top with shredded cheese and salsa as desired. May add dollop fat-free sour cream if desired    - Dinner Omelet with shrimp or chicken and onion, green peppers and chives.    - No noodle lasagna: Use sliced zucchini or eggplant in place of noodles.  Layer with part skim ricotta cheese and low sugar meat sauce (use very lean ground beef or ground turkey).    - Mexican chicken bake: Bake chunks of chicken breast or thigh with taco seasoning, Pace brand enchilada sauce, green onions and low-fat cheese. Serve with ¼ cup black beans or fat free refried beans topped with chopped tomatoes or salsa.    - Keke frozen meatballs, simmered in Classico Marinara sauce. Different flavors of salsa or spaghetti sauce create different dishes! Sprinkle with parmesan cheese. Serve with grilled or steamed veggies, or a dark green salad.    - Simmer boneless skinless chicken thigh chunks in Classico Marinara sauce or roasted salsa until tender with chopped onion, bell pepper, garlic, mushrooms, spinach, etc.     - Hamburger, without the bun, dressed the way you like. Served with grilled or  steamed veggies.    - Eggplant parmesan: Bake slices of eggplant at 350 degrees for 15 minutes. Layer tomato sauce, sliced eggplant and low-fat mozzarella cheese in a baking dish and cover with foil. Bake 30-40 more minutes or until bubbly. Uncover and bake at 400 degrees for about 15 more minutes, or until top is slightly crisp.    - Fish tacos: grilled/baked white fish, wrapped in Nic lettuce leaf, topped with salsa, shredded low-fat cheese, and light coleslaw.    Snacks: (100-200 calories; >5g protein)    - 1 low-fat cheese stick with 8 cherry tomatoes or 1 serving fresh fruit  - 4 thin slices fat-free turkey breast and 1 slice low-fat cheese  - 4 thin slices fat-free honey ham with wedge of melon  - 1/4 cup unsalted nuts with ½ cup fruit  - 6-oz container Dannon Light n Fit vanilla yogurt, topped with 1oz unsalted nuts         - apple, celery or baby carrots spread with 2 Tbsp natural peanut butter or almond butter   - apple slices with 1 oz slice low-fat cheese  - celery, cucumber, bell pepper or baby carrots dipped in ¼ cup hummus bean spread or light spinach and artichoke dip (*recipe in lunch section)  - 100 calorie bag microwave light popcorn with 3 tbsp grated parmesan cheese  - Eren Links Beef Steak - 14g protein! (similar to beef jerky)  - 2 wedges Laughing Cow - Light Herb & Garlic Cheese with sliced cucumber or green bell pepper  - 1/2 cup low-fat cottage cheese with ¼ cup fruit or ¼ cup salsa  - RTD Protein drinks: Atkins, Low Carb Slim Fast, EAS light, Muscle Milk Light, etc.  - Homemade Protein drinks: GNC Soy95, Isopure, Nectar, UNJURY, Whey Gourmet, etc. Mix 1 scoop powder with 8oz skim/1% milk or light soymilk.  - Protein bars: Atkins, EAS, Pure Protein, Think Thin, Detour, etc. Must have 0-4 grams sugar - Read the label.    Takeout Options: No more than twice/week  Deli - Salads (no pasta or rice), meats, cheeses. Roasted chicken. Lox (salmon)    Mexican - Platters which don't include  tortillas, chips, or rice. Go easy on the beans. Example: Fajitas without the tortillas. Ask the  not to bring chips to the table if they are too tempting.    Greek - Meat or fish and vegetable, but no bread or rice. Including hummus, baba ganoush, etc, is OK. Most sit-down Greek restaurants can provide you with cucumber slices for dipping instead of jesica bread.    Fast Food (Avoid as much as possible) - Salads (no croutons and limit salad dressing to 2 tbsp), grilled chicken sandwich without the bun and ask for no ayers. Gabys low fat chili or Taco Bell pintos and cheese.    BBQ - The meats are fine if you ask for sauces on the side, but most of the traditional side dishes are loaded with carbs. Davide slaw, baked beans and BBQ sauce are typically made with sugar.    Chinese - Nothing deep-fried, no rice or noodles. Many Chinese sauces have starch and sugar in them, so you'll have to use your judgement. If you find that these sauces trigger cravings, or cause Dumping, you can ask for the sauce to be made without sugar or just use soy sauce.

## 2018-08-09 ENCOUNTER — TELEPHONE (OUTPATIENT)
Dept: OBSTETRICS AND GYNECOLOGY | Facility: CLINIC | Age: 23
End: 2018-08-09

## 2018-08-09 NOTE — TELEPHONE ENCOUNTER
Shaggy pt, has been on her period for 11 days. Pt says the bleeding is now light but it is not stopping.

## 2018-08-09 NOTE — TELEPHONE ENCOUNTER
LMP 7/30.  She started taking Trulicity on 8/3.  She is still on her period.  She is on OCP but doesn't take it correctly.  She takes it every couple days because she forgets to take them daily.  She took a UPT before her period and it was negative.  Recommended she take it at the same time daily and come in to discuss other birth control options.  She is not interested in Depo or an IUD.  Advised there is an arm implant, patch and vaginal ring she could use for contraception.  Scheduled 8/20 with Dr. Coon.

## 2018-08-22 ENCOUNTER — PATIENT MESSAGE (OUTPATIENT)
Dept: OBSTETRICS AND GYNECOLOGY | Facility: CLINIC | Age: 23
End: 2018-08-22

## 2018-08-22 RX ORDER — FLUCONAZOLE 100 MG/1
150 TABLET ORAL DAILY
Qty: 3 TABLET | Refills: 0 | Status: SHIPPED | OUTPATIENT
Start: 2018-08-22 | End: 2018-08-25

## 2018-08-29 RX ORDER — NYSTATIN 100000 U/G
CREAM TOPICAL 2 TIMES DAILY
Qty: 30 G | Refills: 0 | Status: SHIPPED | OUTPATIENT
Start: 2018-08-29 | End: 2018-12-13

## 2018-09-28 ENCOUNTER — TELEPHONE (OUTPATIENT)
Dept: OBSTETRICS AND GYNECOLOGY | Facility: CLINIC | Age: 23
End: 2018-09-28

## 2018-09-28 RX ORDER — FLUCONAZOLE 150 MG/1
150 TABLET ORAL ONCE
Qty: 2 TABLET | Refills: 0 | Status: SHIPPED | OUTPATIENT
Start: 2018-09-28 | End: 2018-09-28

## 2018-09-28 NOTE — TELEPHONE ENCOUNTER
Dr Coon pt calling, pt has a yeast infection and wants meds sent in. Pt #[phone] Pharm [facility] # [phone]

## 2018-09-28 NOTE — TELEPHONE ENCOUNTER
Pt thinks she has a yeast infection but isn't sure.  C/o irritation and white discharge.  Intermittent itching and burning.  She would like medication to get her through the weekend but scheduled with Dr. Coon Monday.

## 2018-10-01 ENCOUNTER — HOSPITAL ENCOUNTER (EMERGENCY)
Facility: HOSPITAL | Age: 23
Discharge: HOME | End: 2018-10-01
Attending: EMERGENCY MEDICINE
Payer: COMMERCIAL

## 2018-10-01 VITALS
WEIGHT: 280 LBS | HEART RATE: 94 BPM | SYSTOLIC BLOOD PRESSURE: 134 MMHG | DIASTOLIC BLOOD PRESSURE: 86 MMHG | BODY MASS INDEX: 42.44 KG/M2 | OXYGEN SATURATION: 99 % | RESPIRATION RATE: 18 BRPM | HEIGHT: 68 IN | TEMPERATURE: 99 F

## 2018-10-01 DIAGNOSIS — J32.9 SINUSITIS, UNSPECIFIED CHRONICITY, UNSPECIFIED LOCATION: Primary | ICD-10-CM

## 2018-10-01 PROCEDURE — 99283 EMERGENCY DEPT VISIT LOW MDM: CPT

## 2018-10-01 PROCEDURE — 63600175 PHARM REV CODE 636 W HCPCS: Performed by: EMERGENCY MEDICINE

## 2018-10-01 RX ORDER — PREDNISONE 10 MG/1
10 TABLET ORAL DAILY
Qty: 4 TABLET | Refills: 0 | Status: SHIPPED | OUTPATIENT
Start: 2018-10-02 | End: 2018-10-06

## 2018-10-01 RX ORDER — PREDNISONE 20 MG/1
20 TABLET ORAL
Status: COMPLETED | OUTPATIENT
Start: 2018-10-01 | End: 2018-10-01

## 2018-10-01 RX ADMIN — PREDNISONE 20 MG: 20 TABLET ORAL at 11:10

## 2018-10-02 NOTE — ED TRIAGE NOTES
Pt states she was diagnosed with a sinus infection yesterday at Urgent Care and feels worse today. C/O sore throat and sinus congestion.

## 2018-10-02 NOTE — ED PROVIDER NOTES
Encounter Date: 10/1/2018  This is a SORT/MSE of a 23 y.o. female presenting to the ED with c/o nasal congestion, bilateral ear pain, and head ache x 3 days. Patient has been taking zyrtec, promethazine, and nasal spray with no relief in symptoms. She went to urgent care yesterday for her symptoms. Care will be transferred to an alternate provider when patient is roomed for a full evaluation and final disposition. Patient is aware that he/she is awaiting a room in the emergency department, where another provider will review results, evaluate and treat as needed. JANET Strange DNP  SCRIBE #1 NOTE: I, Pee Chinchilla, am scribing for, and in the presence of,  Naman Hong MD. I have scribed the following portions of the note - Other sections scribed: HPI, ROS, and PE.       History     Chief Complaint   Patient presents with    Nasal Congestion     recently dx at  with sinus infection yesterday; reports symptoms have not been relieved; has been taking promethazine and an unknown nasal spray for symptom control     CC: Congestion    HPI: This is a 23 y.o. female no pertinent PMHx who presents with congestion, productive cough, sore throat and chills since Sat 2 days ago. Patient presented to urgent care Sun and was dx'ed with sinus infection. She received promethazine and nasal spray for treatment. She is also taking Zyrtec. Patient presents today for new onset headache and otalgia. Denies sick contact. No urinary symptoms.      The history is provided by the patient. No  was used.     Review of patient's allergies indicates:  No Known Allergies  Past Medical History:   Diagnosis Date    ADHD (attention deficit hyperactivity disorder)     Anxiety     Bipolar affective     History of chlamydia 12/2016    Insomnia     Morbid obesity with BMI of 40.0-44.9, adult     Sleep disorder      Past Surgical History:   Procedure Laterality Date    NO PAST SURGERIES      as of 1-13-17     Family History    Problem Relation Age of Onset    Breast cancer Paternal Grandmother     No Known Problems Father     No Known Problems Mother     No Known Problems Sister     Diabetes Maternal Grandmother     No Known Problems Maternal Grandfather     No Known Problems Paternal Grandfather     Colon cancer Neg Hx     Ovarian cancer Neg Hx      Social History     Tobacco Use    Smoking status: Never Smoker    Smokeless tobacco: Never Used   Substance Use Topics    Alcohol use: No    Drug use: Yes     Types: Marijuana     Review of Systems   Constitutional: Positive for chills. Negative for diaphoresis and fever.   HENT: Positive for congestion, ear pain and sore throat. Negative for rhinorrhea.    Eyes: Negative for visual disturbance.   Respiratory: Positive for cough (productive). Negative for shortness of breath.    Cardiovascular: Negative for chest pain.   Gastrointestinal: Negative for abdominal pain, constipation, diarrhea, nausea and vomiting.   Genitourinary: Negative for dysuria.   Neurological: Positive for headaches.   All other systems reviewed and are negative.      Physical Exam     Initial Vitals [10/01/18 2217]   BP Pulse Resp Temp SpO2   131/83 (!) 114 18 98.7 °F (37.1 °C) 98 %      MAP       --         Physical Exam    Nursing note and vitals reviewed.  Constitutional: She is not diaphoretic. No distress.   HENT:   Head: Normocephalic and atraumatic.   Mouth/Throat: Oropharynx is clear and moist.   Eyes: EOM are normal. Pupils are equal, round, and reactive to light. No scleral icterus.   Neck: Normal range of motion. Neck supple. No JVD present.   Cardiovascular: Normal rate and regular rhythm.   Pulmonary/Chest: Breath sounds normal. No stridor. No respiratory distress.   Abdominal: Soft. Bowel sounds are normal. She exhibits no distension. There is no tenderness.   Musculoskeletal: Normal range of motion. She exhibits no edema or tenderness.   Neurological: She is alert and oriented to person,  place, and time. She has normal strength. No cranial nerve deficit or sensory deficit.   Skin: Skin is warm and dry.   Psychiatric: She has a normal mood and affect.         ED Course   Procedures  Labs Reviewed   POCT URINE PREGNANCY          Imaging Results    None                     Scribe Attestation:   Scribe #1: I performed the above scribed service and the documentation accurately describes the services I performed. I attest to the accuracy of the note.    Attending Attestation:           Physician Attestation for Scribe:  Physician Attestation Statement for Scribe #1: I, Naman Hong MD, reviewed documentation, as scribed by Pee Chinchilla in my presence, and it is both accurate and complete.                 ED Course as of Oct 01 2308   Mon Oct 01, 2018   2307 Mild sinusitis, pressure, rx prednisone, f/u with PCP, no abx needed  [PP]      ED Course User Index  [PP] Naman Hong MD     Clinical Impression:   The encounter diagnosis was Sinusitis, unspecified chronicity, unspecified location.                             Naman Hong MD  10/01/18 [id number]

## 2018-12-13 ENCOUNTER — OFFICE VISIT (OUTPATIENT)
Dept: OBSTETRICS AND GYNECOLOGY | Facility: CLINIC | Age: 23
End: 2018-12-13
Payer: COMMERCIAL

## 2018-12-13 ENCOUNTER — PATIENT MESSAGE (OUTPATIENT)
Dept: OBSTETRICS AND GYNECOLOGY | Facility: CLINIC | Age: 23
End: 2018-12-13

## 2018-12-13 ENCOUNTER — TELEPHONE (OUTPATIENT)
Dept: OBSTETRICS AND GYNECOLOGY | Facility: CLINIC | Age: 23
End: 2018-12-13

## 2018-12-13 VITALS
HEIGHT: 68 IN | DIASTOLIC BLOOD PRESSURE: 70 MMHG | WEIGHT: 293 LBS | SYSTOLIC BLOOD PRESSURE: 120 MMHG | BODY MASS INDEX: 44.41 KG/M2

## 2018-12-13 DIAGNOSIS — N76.0 VAGINITIS AND VULVOVAGINITIS: ICD-10-CM

## 2018-12-13 DIAGNOSIS — N89.8 VAGINAL DISCHARGE: Primary | ICD-10-CM

## 2018-12-13 LAB
CANDIDA RRNA VAG QL PROBE: POSITIVE
G VAGINALIS RRNA GENITAL QL PROBE: POSITIVE
T VAGINALIS RRNA GENITAL QL PROBE: NEGATIVE

## 2018-12-13 PROCEDURE — 99214 OFFICE O/P EST MOD 30 MIN: CPT | Mod: S$GLB,,, | Performed by: NURSE PRACTITIONER

## 2018-12-13 PROCEDURE — 3008F BODY MASS INDEX DOCD: CPT | Mod: CPTII,S$GLB,, | Performed by: NURSE PRACTITIONER

## 2018-12-13 PROCEDURE — 99999 PR PBB SHADOW E&M-EST. PATIENT-LVL III: CPT | Mod: PBBFAC,,, | Performed by: NURSE PRACTITIONER

## 2018-12-13 PROCEDURE — 87660 TRICHOMONAS VAGIN DIR PROBE: CPT

## 2018-12-13 RX ORDER — FLUCONAZOLE 150 MG/1
TABLET ORAL
Qty: 3 TABLET | Refills: 0 | Status: SHIPPED | OUTPATIENT
Start: 2018-12-13 | End: 2020-05-05 | Stop reason: ALTCHOICE

## 2018-12-13 RX ORDER — NYSTATIN AND TRIAMCINOLONE ACETONIDE 100000; 1 [USP'U]/G; MG/G
OINTMENT TOPICAL 2 TIMES DAILY
Qty: 15 G | Refills: 0 | Status: SHIPPED | OUTPATIENT
Start: 2018-12-13 | End: 2020-05-05

## 2018-12-14 RX ORDER — METRONIDAZOLE 500 MG/1
500 TABLET ORAL 2 TIMES DAILY
Qty: 14 TABLET | Refills: 0 | Status: SHIPPED | OUTPATIENT
Start: 2018-12-14 | End: 2018-12-21

## 2018-12-14 NOTE — PROGRESS NOTES
Konrad Douglas,  Your vaginal swab was (+) for Bacterial Vaginosis, which is similar to a yeast infection but instead of yeast, it's an overgrowth of vaginal bacteria.   It was also (+) for Candida (Yeast Infection).    I sent in a prescription for an antibiotic to treat the BV.  You cannot drink alcohol while taking medication or for 3 days after completion of medication.    Continue the prescriptions I sent in the day of your visit as well.    If your symptoms persist 1 week after completion of medication, or for any other questions or concerns, please call our office.    Sincerely,   PIA Beltre

## 2018-12-20 NOTE — PROGRESS NOTES
"Chief Complaint   Patient presents with    Vaginal Discharge      Dr Veliz last pap  neg        (Dr. Veliznaicaren patient)    Last PAP:  2018 Normal,  HPV n/a      Misael Garcia is a 23 y.o. female  presents with complaint of vulvovaginal itching for past 2 days.  Reports that she had some light spotting for a day or so, which stopped.  She is still having itching & irritation.  Reports seeing discharge that appears c/w Candida.      Alleviating factors: none.     No new sexual partners.  She declines STD screening today.    Patient's last menstrual period was 2018..    Past Medical History:   Diagnosis Date    ADHD (attention deficit hyperactivity disorder)     Anxiety     Bipolar affective     History of chlamydia 2016    Insomnia     Morbid obesity with BMI of 40.0-44.9, adult     Sleep disorder      Past Surgical History:   Procedure Laterality Date    NO PAST SURGERIES      as of 17     OB History    Para Term  AB Living   0 0 0 0 0 0   SAB TAB Ectopic Multiple Live Births   0 0 0 0           Obstetric Comments   Menarche ~ 18         ROS:  GENERAL:  No fever, chills, fatigability or weight loss.  REPRODUCTIVE:  See HPI.  ABDOMEN:  No abdominal pain. Denies nausea. Denies vomiting. No diarrhea. No     constipation.  BREAST:  Denies pain. No lumps. No discharge.  URINARY:  No incontinence, no nocturia, no frequency and no dysuria.  CARDIOVASCULAR:  No chest pain. No shortness of breath. No leg cramps.  NEUROLOGICAL:  No headaches. No vision changes.      Vitals:    18 1136   BP: 120/70   Weight: 135.2 kg (297 lb 15.2 oz)   Height: 5' 8" (1.727 m)   PainSc: 0-No pain     Body mass index is 45.3 kg/m².      PHYSICAL EXAM:   VULVA: normal appearing vulva with no masses, tenderness or lesions.   VAGINA: normal appearing vagina with normal color; + small/mod amount clumpy white thick discharge - Affirm collected.; no lesions.   CERVIX: normal " appearing cervix without discharge or lesions; no CMT.   UTERUS: uterus is normal size, shape, consistency and non-tender; mobile.   ADNEXA: normal adnexa in size, non-tender and no masses.      ASSESSMENT and PLAN:  Vaginal discharge  -     Vaginosis Screen by DNA Probe  -     fluconazole (DIFLUCAN) 150 MG Tab; Take one pill my mouth today (Day 1), one on Day 4, and one on Day 7.  Dispense: 3 tablet; Refill: 0    Vaginitis and vulvovaginitis  -     nystatin-triamcinolone (MYCOLOG) ointment; Apply topically 2 (two) times daily. X 7-10 days  Dispense: 15 g; Refill: 0  -     fluconazole (DIFLUCAN) 150 MG Tab; Take one pill my mouth today (Day 1), one on Day 4, and one on Day 7.  Dispense: 3 tablet; Refill: 0        * Patient aware that she will be notified once her finalized results have been reviewed by her Provider, either via her [facility] patient portal, or via telephone call.    * Use of the [facility] Patient Portal discussed and encouraged during today's visit.       FOLLOW UP:  PRN lack of improvement

## 2019-01-09 ENCOUNTER — PATIENT MESSAGE (OUTPATIENT)
Dept: INTERNAL MEDICINE | Facility: CLINIC | Age: 24
End: 2019-01-09

## 2019-01-16 ENCOUNTER — PATIENT MESSAGE (OUTPATIENT)
Dept: BARIATRICS | Facility: CLINIC | Age: 24
End: 2019-01-16

## 2019-01-18 ENCOUNTER — OFFICE VISIT (OUTPATIENT)
Dept: BARIATRICS | Facility: CLINIC | Age: 24
End: 2019-01-18
Payer: COMMERCIAL

## 2019-01-18 VITALS
HEART RATE: 94 BPM | BODY MASS INDEX: 44.41 KG/M2 | WEIGHT: 293 LBS | DIASTOLIC BLOOD PRESSURE: 66 MMHG | SYSTOLIC BLOOD PRESSURE: 112 MMHG | HEIGHT: 68 IN

## 2019-01-18 DIAGNOSIS — E66.01 CLASS 3 SEVERE OBESITY DUE TO EXCESS CALORIES WITHOUT SERIOUS COMORBIDITY WITH BODY MASS INDEX (BMI) OF 45.0 TO 49.9 IN ADULT: Primary | ICD-10-CM

## 2019-01-18 PROCEDURE — 99999 PR PBB SHADOW E&M-EST. PATIENT-LVL III: ICD-10-PCS | Mod: PBBFAC,,, | Performed by: INTERNAL MEDICINE

## 2019-01-18 PROCEDURE — 99214 PR OFFICE/OUTPT VISIT, EST, LEVL IV, 30-39 MIN: ICD-10-PCS | Mod: S$GLB,,, | Performed by: INTERNAL MEDICINE

## 2019-01-18 PROCEDURE — 3008F BODY MASS INDEX DOCD: CPT | Mod: CPTII,S$GLB,, | Performed by: INTERNAL MEDICINE

## 2019-01-18 PROCEDURE — 99999 PR PBB SHADOW E&M-EST. PATIENT-LVL III: CPT | Mod: PBBFAC,,, | Performed by: INTERNAL MEDICINE

## 2019-01-18 PROCEDURE — 99214 OFFICE O/P EST MOD 30 MIN: CPT | Mod: S$GLB,,, | Performed by: INTERNAL MEDICINE

## 2019-01-18 PROCEDURE — 3008F PR BODY MASS INDEX (BMI) DOCUMENTED: ICD-10-PCS | Mod: CPTII,S$GLB,, | Performed by: INTERNAL MEDICINE

## 2019-01-18 RX ORDER — METHYLPHENIDATE HYDROCHLORIDE 27 MG/1
27 TABLET ORAL EVERY MORNING
COMMUNITY
End: 2020-10-19

## 2019-01-18 RX ORDER — TRAZODONE HYDROCHLORIDE 50 MG/1
50 TABLET ORAL
COMMUNITY
End: 2020-10-19

## 2019-01-18 NOTE — PATIENT INSTRUCTIONS
Resume Trulicity once a week.     Decrease portions as soon as you start Trulicity. Some nausea in the first 2 weeks is not unusual.     If you get pain across the upper abdomen and around to your back, please call the office.      Exercise 30 min 3 days a week. Gradually increase.     Patient counseled in strategies for long term weight loss and maintenance: Keeping a food diary, exercise for 1 hour a day and eating breakfast everyday.      [facility] (code [id number])      Protein and fiber in every meals.     No soda, sweet tea, juices or lemonade. All drinks should be 5 calories or less.     3 meals a day made up of the following:  Unlimited green vegetables, tomatoes, mushrooms, spaghetti squash, cauliflower, meat, poultry, seafood, eggs and hard cheeses.   Milk and plain yogurt  Dressings, seasonings, condiments, etc should have less than 2 g sugars.   Beans (1-1.5 cups) or nuts (1/4 cup) can have 1 x a day.   1-2 servings of citrus fruits, berries, pineapple or melon a day (1/2 cup)  Avoid fried foods    No grains, rice, pasta, potatoes, bread, corn, peas, oatmeal, grits, tortillas, crackers, chips    No soda, sweet tea, juices or lemonade    [email or web address] for recipes. Moderate carb intake      1200- 1500 calorie Sample meal plan   80-120g protein per day.   Protein drinks and bars: 0-4 grams sugar   Drink lots of water throughout the day and exercise!   MENU # 1   Breakfast: 2 eggs, 1 turkey sausage brie, 1 apple   Snack: P3 protein pack  Lunch: 2 roll-ups (sliced turkey, low-fat sliced cheese, mustard), 12 baby carrots dipped in 1 Tbsp natural peanut butter   Mid-Day Snack: ¼ cup unsalted almonds, ½ cup fruit   Dinner: 1 chicken thigh simmered in tomato sauce + 2 Tbsp mozzarella cheese, ½ cup black beans, 1/2 cup steamed carrots   Evening Snack: 1/2 cup grapes with 4 cubes low-fat cheddar cheese   ___________________________________________________   MENU # 2   Breakfast: 200 calorie protein drink    Mid-morning snack : ¼ cup unsalted nuts, medium banana   Lunch: 3oz tuna or chicken salad made with 2 tbsp light ayers, over salad with tomatoes and cucumbers.   Snack: low-fat cheese stick   Dinner: 3oz hamburger brie, slice of low-fat cheese, 1 cup yellow squash and zucchini sauteed in nonstick cookspray  Snack: 6oz light yogurt   _______________________________________________________   Menu #3   Breakfast: 6oz plain Greek yogurt + fruit (½ banana OR ½ cup fruit - pineapple, blueberries, strawberries, peach), may add Splenda to mike.   Lunch: Grilled chicken breast w/ slice low-fat pepper gus cheese, 1/2 cup grilled/baked asparagus and small salad with Salad Spritzer.   Mid-Day snack: 200 calorie protein drink   Dinner: 4oz Grilled fish, ½ cup white beans, ½ cup cooked spinach   Evening Snack: fudgsicle no-sugar-added   Menu # 4   Breakfast: 1 scoop vanilla protein powder + 4oz skim milk + 4oz coffee   Snack: Pure protein bar   Lunch: 2 Lettuce tacos: 3oz seasoned ground turkey wrapped in a Nic lettuce leaves with 1 Tbsp shredded cheese and dollop low-fat sour cream   Snack: ½ cup cottage cheese, ½ cup pineapple chunks   Dinner: Shrimp omelet: 2 eggs, ½ cup shrimp, green onions, and shredded cheese   ______________________________________________________   Menu #5   Breakfast: 1 cup low-fat cottage cheese, ½ cup peaches (no sugar added)   Snack: 1 apple, 4 cubes cheese   Lunch: 3oz baked pork chop, 1 cup okra and tomato stew   Snack: 1/2 cup black beans + salsa + dollop light sour cream   Dinner: Caprese chicken salad: 3 oz chicken breast, 1oz fresh mozzarella, sliced tomato, 1 Tbsp olive oil, basil   Snack: sugar-free popsicle   _______________________________________________________  Menu #6   Breakfast: ½ cup part-skim ricotta cheese, 2 Tbsp sugar-free strawberry preserves, sprinkle of slivered almonds   Snack: 1 orange + string cheese  Lunch: 3 oz canned chicken, 1oz shredded cheddar cheese, ½ cup  black beans, 2 Tbsp salsa   Snack: 200 calorie Protein drink   Dinner: 4 oz grilled salmon steak, over mixed green salad with 2 tbsp light dressing   _______________________________________________________  Menu #7  Breakfast: crust-less quiche (bake 1 egg mixed w/ low fat cheese, broccoli and low sodium ham in a muffin cup until cooked inside)  Snack: 1 light yogurt  Lunch: protein shake (1 scoop powder + 8 oz skim milk, blended w/ ice)  Snack: small apple w/ 2 tbsp PB2 (powdered peanut butter)  Dinner: 2 Turkey meatballs w/ low sugar marinara sauce, 1/2 cup spiralized zucchini (sauteed on stove top w/ nonstick cookspray)        Fruits and Vegetables       Include 1-2 servings of fruit daily.      1 serving of fruit includes ½ cup unsweetened applesauce, ½ medium banana, tennis ball size piece of fruit, 17 grapes, 1 cup melon, 1 cup strawberries, ¼ cup dried fruit     Include 2-3 servings of vegetables daily. 1 serving is 1 cup raw or ½ cup cooked.     Non-starchy vegetables include artichoke, asparagus, baby corn, bamboo shoots, beans: green/Italian/wax, bean sprouts, beets, broccoli, Sperry sprouts, cabbage, carrots, cauliflower, celery, cucumber, eggplant, green onions or scallions, greens, jicama, leeks, mushrooms, okra, onions, pea pods, peppers, radishes, spinach, summer squash, tomatoes and salsa, turnips, vegetable juice cocktail, water chestnuts, zucchini        Bariatric Diet Grocery List      High in Protein:   ? Canned tuna or chicken (packed in water)  ? Lean ground turkey breast or ground round  ? Turkey or chicken (no skin)  ? Lean pork or beef   ? Scrambled, poached, or boiled eggs  ? Baked or broiled fish and seafood (not fried!)  ? Beans and lentils  ? Low fat deli meats: turkey, chicken, ham, roast beef  ? 1% or Skim Milk, Lactaid, or Soymilk  ? Low-fat cheese, cottage cheese, mozzarella string cheese, ricotta  ? Light yogurt, FF/SF frozen yogurt, custard, SF pudding  ? Protein drinks and  protein bars with 0-4 grams sugar     Fruits, Vegetables and Snacks   - Green beans, broccoli, cauliflower, spinach, asparagus, carrots, lima beans, yellow squash, zucchini, etc.  - Apple, pineapple, peach, grapes, banana, watermelon, oranges, etc.  - Fruit canned in its own juice or in water (not in syrup)  - Raw veggies  - Lettuce: dark greens like spinach and Nic  - Unsalted Nuts  - Light or Air-popped Popcorn  - Eren Links beef jerky     Fluids:   Skim/1% milk, Lactaid, Soymilk  Sugar-free beverages  (decaf and non-carbonated)  Decaf coffee & decaf tea   Water         Meal Ideas for Regular Bariatric Diet  *Recipes and products available at [email or web address].com      Breakfast: (15-20g protein)    - Egg white omelet: 2 egg whites or ½ cup Egg Beaters. (Optional proteins: cheese, shrimp, black beans, chicken, sliced turkey) (Optional veggies: tomatoes, salsa, spinach, mushrooms, onions, green peppers, or small slice avocado)     - Egg and sausage: 1 egg or ¼ cup Egg Beaters (any variety), with 1 brie or 2 links of Turkey sausage or Veggie breakfast sausage (C[employer]mos or [employer])    - Crust-less breakfast quiche: To make a glass pie dish, mix 4oz part skim Ricotta, 1 cup skim milk, and 2 eggs as your base. Add protein: shredded cheese, sliced lean ham or turkey, turkey lala/sausage. Add veggies: tomato, onion, green onion, mushroom, green pepper, spinach, etc.    - Yogurt parfait: Mix 1 - 6oz container Dannon Light N Fit vanilla yogurt, with ¼ cup Kashi Go Lean cereal    - Cottage cheese and fruit: ½ cup part-skim cottage cheese or ricotta cheese topped with fresh fruit or sugar free preserves     - Eloina Magdaleno's Vanilla Egg custard* (add 2 Tbsp instant coffee granules to make Cappuccino Custard*)    - Hi-Protein café latte (skim milk, decaf coffee, 1 scoop protein powder). Optional to add Sugar free syrup or extract flavoring.    Lunch: (20-30g protein)    - ½ cup Black bean soup (Homemade or  Progresso), with ¼ cup shredded low-fat cheese. Top with chopped tomato or fresh salsa.     - Lean deli turkey breast and low-fat sliced cheese, mustard or light ayers to moisten, rolled up together, or wrapped in a Nic lettuce leaf    - Chicken salad made from dinner leftovers, moisten with low-fat salad dressing or light ayers. Also try leftover salmon, shrimp, tuna or boiled eggs. Serve ½ cup over dark green salad    - Fat-free canned refried beans, topped with ¼ cup shredded low-fat cheese. Top with chopped tomato or fresh salsa.     - Greek salad: Top mixed greens with 1-2oz grilled chicken, tomatoes, red onions, 2-3 kalamata olives, and sprinkle lightly with feta cheese. Spritz with Balsamic vinegar to taste.     - Crust-less lunch quiche: To make a glass pie dish, mix 4oz part skim Ricotta, 1 cup skim milk, and 2 eggs as your base. Add protein: shredded cheese, sliced lean ham or turkey, shrimp, chicken. Add veggies: tomato, onion, green onion, mushroom, green pepper, spinach, artichoke, broccoli, etc.    - Pizza bake: tomato sauce, low-fat shredded mozzarella and turkey pepperoni or Buffalo lala. Add any veggies.    - Cucumber crab bites: Spread ¼ cup crab dip (lump crabmeat + light cream cheese and green onions) over sliced cucumber.     - Chicken with light spinach and artichoke dip*: Puree in : 6oz cooked and drained spinach, 2 cloves garlic, 1 can cannelloni beans, ½ cup chopped green onions, 1 can drained artichoke hearts (not marinated in oil), lemon juice and basil. Mix in 2oz chopped up chicken.    Supper: (20-30g protein)    - Serve grilled fish over dark green salad tossed with low-fat dressing, served with grilled asparagus morgan     - Rotisserie chicken salad: served with sliced strawberries, walnuts, fat-free feta cheese crumbles and 1 tbsp Diallos Own Light Raspberry Lamar Vinaigrette    - Shrimp cocktail: Dip cold boiled shrimp in homemade low-sugar cocktail sauce (1/2 cup  Demetria One Carb ketchup, 2 tbsp horseradish, 1/4 tsp hot sauce, 1 tsp Worcestershire sauce, 1 tbsp freshly-squeezed lemon juice). Serve with dark green salad, walnuts, and crumbled blue cheese drizzled with olive oil and Balsamic vinegar    - Tuna Melt: Spread tuna salad onto 2 thick slices of tomato. Top with low-fat cheese and broil until cheese is melted. May also be made with chicken salad of shrimp salad. Ernest with different types of cheeses.    - Homemade low-fat Chili using extra lean ground beef or ground turkey. Top with shredded cheese and salsa as desired. May add dollop fat-free sour cream if desired    - Dinner Omelet with shrimp or chicken and onion, green peppers and chives.    - No noodle lasagna: Use sliced zucchini or eggplant in place of noodles.  Layer with part skim ricotta cheese and low sugar meat sauce (use very lean ground beef or ground turkey).    - Mexican chicken bake: Bake chunks of chicken breast or thigh with taco seasoning, Pace brand enchilada sauce, green onions and low-fat cheese. Serve with ¼ cup black beans or fat free refried beans topped with chopped tomatoes or salsa.    - Keke frozen meatballs, simmered in Classico Marinara sauce. Different flavors of salsa or spaghetti sauce create different dishes! Sprinkle with parmesan cheese. Serve with grilled or steamed veggies, or a dark green salad.    - Simmer boneless skinless chicken thigh chunks in Classico Marinara sauce or roasted salsa until tender with chopped onion, bell pepper, garlic, mushrooms, spinach, etc.     - Hamburger, without the bun, dressed the way you like. Served with grilled or steamed veggies.    - Eggplant parmesan: Bake slices of eggplant at 350 degrees for 15 minutes. Layer tomato sauce, sliced eggplant and low-fat mozzarella cheese in a baking dish and cover with foil. Bake 30-40 more minutes or until bubbly. Uncover and bake at 400 degrees for about 15 more minutes, or until top is slightly  crisp.    - Fish tacos: grilled/baked white fish, wrapped in Nic lettuce leaf, topped with salsa, shredded low-fat cheese, and light coleslaw.    Snacks: (100-200 calories; >5g protein)    - 1 low-fat cheese stick with 8 cherry tomatoes or 1 serving fresh fruit  - 4 thin slices fat-free turkey breast and 1 slice low-fat cheese  - 4 thin slices fat-free honey ham with wedge of melon  - 1/4 cup unsalted nuts with ½ cup fruit  - 6-oz container Dannon Light n Fit vanilla yogurt, topped with 1oz unsalted nuts         - apple, celery or baby carrots spread with 2 Tbsp natural peanut butter or almond butter   - apple slices with 1 oz slice low-fat cheese  - celery, cucumber, bell pepper or baby carrots dipped in ¼ cup hummus bean spread or light spinach and artichoke dip (*recipe in lunch section)  - 100 calorie bag microwave light popcorn with 3 tbsp grated parmesan cheese  - Eren Links Beef Steak - 14g protein! (similar to beef jerky)  - 2 wedges Laughing Cow - Light Herb & Garlic Cheese with sliced cucumber or green bell pepper  - 1/2 cup low-fat cottage cheese with ¼ cup fruit or ¼ cup salsa  - RTD Protein drinks: Atkins, Low Carb Slim Fast, EAS light, Muscle Milk Light, etc.  - Homemade Protein drinks: GNC Soy95, Isopure, Nectar, UNJURY, Whey Gourmet, etc. Mix 1 scoop powder with 8oz skim/1% milk or light soymilk.  - Protein bars: Atkins, EAS, Pure Protein, Think Thin, Detour, etc. Must have 0-4 grams sugar - Read the label.    Takeout Options: No more than twice/week  Deli - Salads (no pasta or rice), meats, cheeses. Roasted chicken. Lox (salmon)    Mexican - Platters which don't include tortillas, chips, or rice. Go easy on the beans. Example: Fajitas without the tortillas. Ask the  not to bring chips to the table if they are too tempting.    Greek - Meat or fish and vegetable, but no bread or rice. Including hummus, baba ganoush, etc, is OK. Most sit-down Greek restaurants can provide you with cucumber  slices for dipping instead of jesica bread.    Fast Food (Avoid as much as possible) - Salads (no croutons and limit salad dressing to 2 tbsp), grilled chicken sandwich without the bun and ask for no ayers. Gabys low fat chili or Taco Bell pintos and cheese.    BBQ - The meats are fine if you ask for sauces on the side, but most of the traditional side dishes are loaded with carbs. Davide slaw, baked beans and BBQ sauce are typically made with sugar.    Chinese - Nothing deep-fried, no rice or noodles. Many Chinese sauces have starch and sugar in them, so you'll have to use your judgement. If you find that these sauces trigger cravings, or cause Dumping, you can ask for the sauce to be made without sugar or just use soy sauce.

## 2019-01-18 NOTE — PROGRESS NOTES
"Subjective:       Patient ID: Misael Garcia is a 23 y.o. female.    Chief Complaint: Follow-up    Pt here today for follow-up. Has gained 8 lbs since she was seen in August. She says she did not like taking an injection, so she stopped taking it. She did not make diet changes. Says she tried for 2 days. She did feel she was eating less.Says she does not like vegetables. She loves fast foods. Eats it almost daily. Pt has h/o non-compliance with medications in other [place] as well. Cannot afford $10 a month for gym membership. So will take a walk a couple of times a week.       Pt sent following message 2 days ago: "I tried taking the Trulicity shots around July-August. Somewhere in between that. I stopped taking it because it didn't work out for me. I have a really slow metabolism and it's getting really hard doing day-to-day things. My back hurts and I'm constantly eating. It's like I don't know when to stop. I went back to my doctor and she prescribed me TRAZODONE  50 MG because I really don't sleep well.... also for my ADD/ADHD CONCERTA 27 MG  & VRAYLAR 4.5 MG... today is my second day taking it and it probably hasn't hit my system yet but I'm still finding myself eating and it's hard not to stop. I went to the Obgyn and they weighed me at 309 pounds. I feel so embarrassed and ashamed. I've never been this big and it's just really taking a toll on me."              Review of Systems   Constitutional: Negative for chills and fever.   Respiratory: Positive for shortness of breath.         + snores   Cardiovascular: Negative for chest pain and leg swelling.   Gastrointestinal: Positive for constipation. Negative for diarrhea.        Denies GERD   Genitourinary: Positive for frequency. Negative for difficulty urinating and menstrual problem.        On Ocps.    Musculoskeletal: Positive for back pain. Negative for arthralgias.   Neurological: Negative for dizziness and light-headedness. " "  Psychiatric/Behavioral: Negative for decreased concentration. The patient is nervous/anxious.         Staes anxiety is "moderate"       Objective:     /66   Pulse 94   Ht 5' 8" (1.727 m)   Wt 136 kg (299 lb 13.2 oz)   BMI 45.59 kg/m²     Physical Exam   Constitutional: She is oriented to person, place, and time. She appears well-developed. No distress.   Morbidly obese     HENT:   Head: Normocephalic and atraumatic.   Eyes: EOM are normal. Pupils are equal, round, and reactive to light. No scleral icterus.   Neck: Normal range of motion. Neck supple.   Cardiovascular: Normal rate.   Pulmonary/Chest: Effort normal.   Musculoskeletal: Normal range of motion. She exhibits no edema.   Neurological: She is alert and oriented to person, place, and time. No cranial nerve deficit.   Skin: Skin is warm and dry. No erythema.   Psychiatric: She has a normal mood and affect. Her behavior is normal. Judgment normal.   Vitals reviewed.      Assessment:       1. Class 3 severe obesity due to excess calories without serious comorbidity with body mass index (BMI) of 45.0 to 49.9 in adult        Plan:         1. Class 3 severe obesity due to excess calories with serious comorbidity and body mass index (BMI) of 40.0 to 44.9 in adult  Exercise 30 min 3 days a week. Gradually increase.     Patient counseled in strategies for long term weight loss and maintenance: Keeping a food diary, exercise for 1 hour a day and eating breakfast everyday.        Protein and fiber in every meals.     No soda, sweet tea, juices or lemonade. All drinks should be 5 calories or less.         Nutrition materials provided today.  [id number] myrtle menu and meal ideas, top box info and CalAmp Celest[employer] (code [id number]) info given.     Lengthy discussion today about the risks associated with her current habits and long term health risks and even early death. Discussed pt that she will have to make some changes if she expects any type of sustainedweight loss " and improvement in health.     25 min face to face time.

## 2019-01-21 ENCOUNTER — TELEPHONE (OUTPATIENT)
Dept: BARIATRICS | Facility: CLINIC | Age: 24
End: 2019-01-21

## 2019-01-21 NOTE — TELEPHONE ENCOUNTER
Please let pt know that insurance is not covering the  trulicity anymore. I do not have any other meds to offer her. She should continue to work on diet changes and with her psych dr. If she she can get to where she is really working with the eating habits and her psych meds and mood are stable for at least several months, she could consider surgery.   Does not need follow up with me at this time.

## 2019-01-21 NOTE — TELEPHONE ENCOUNTER
Called to let patient know I am currently working on her PA for her trulicity told patient I will call her soon as I hear from her insurance

## 2019-01-21 NOTE — TELEPHONE ENCOUNTER
Spoke with Ms Garcia and told her how her insurance does not want to cover her Trulicity unless she was DM type II. Ms Garcia stated she understand and she is working hard to maintain her weight. I told her great she will not need a follow up because Dr Murray stated she paige snot have anything else to offer. She understands.

## 2019-02-06 ENCOUNTER — OFFICE VISIT (OUTPATIENT)
Dept: URGENT CARE | Facility: CLINIC | Age: 24
End: 2019-02-06
Payer: COMMERCIAL

## 2019-02-06 VITALS
BODY MASS INDEX: 43.95 KG/M2 | RESPIRATION RATE: 18 BRPM | WEIGHT: 290 LBS | TEMPERATURE: 99 F | HEART RATE: 110 BPM | DIASTOLIC BLOOD PRESSURE: 81 MMHG | HEIGHT: 68 IN | OXYGEN SATURATION: 100 % | SYSTOLIC BLOOD PRESSURE: 147 MMHG

## 2019-02-06 DIAGNOSIS — R21 RASH: ICD-10-CM

## 2019-02-06 DIAGNOSIS — J06.9 UPPER RESPIRATORY TRACT INFECTION, UNSPECIFIED TYPE: Primary | ICD-10-CM

## 2019-02-06 LAB
CTP QC/QA: YES
CTP QC/QA: YES
FLUAV AG NPH QL: NEGATIVE
FLUBV AG NPH QL: NEGATIVE
S PYO RRNA THROAT QL PROBE: NEGATIVE

## 2019-02-06 PROCEDURE — 87880 POCT RAPID STREP A: ICD-10-PCS | Mod: QW,S$GLB,, | Performed by: FAMILY MEDICINE

## 2019-02-06 PROCEDURE — 87880 STREP A ASSAY W/OPTIC: CPT | Mod: QW,S$GLB,, | Performed by: FAMILY MEDICINE

## 2019-02-06 PROCEDURE — 99214 OFFICE O/P EST MOD 30 MIN: CPT | Mod: S$GLB,,, | Performed by: FAMILY MEDICINE

## 2019-02-06 PROCEDURE — 99214 PR OFFICE/OUTPT VISIT, EST, LEVL IV, 30-39 MIN: ICD-10-PCS | Mod: S$GLB,,, | Performed by: FAMILY MEDICINE

## 2019-02-06 PROCEDURE — 3008F BODY MASS INDEX DOCD: CPT | Mod: CPTII,S$GLB,, | Performed by: FAMILY MEDICINE

## 2019-02-06 PROCEDURE — 87804 POCT INFLUENZA A/B: ICD-10-PCS | Mod: 59,QW,S$GLB, | Performed by: FAMILY MEDICINE

## 2019-02-06 PROCEDURE — 87804 INFLUENZA ASSAY W/OPTIC: CPT | Mod: QW,S$GLB,, | Performed by: FAMILY MEDICINE

## 2019-02-06 PROCEDURE — 3008F PR BODY MASS INDEX (BMI) DOCUMENTED: ICD-10-PCS | Mod: CPTII,S$GLB,, | Performed by: FAMILY MEDICINE

## 2019-02-07 NOTE — PATIENT INSTRUCTIONS
Viral Upper Respiratory Illness (Adult)  You have a viral upper respiratory illness (URI), which is another term for the common cold. This illness is contagious during the first few days. It is spread through the air by coughing and sneezing. It may also be spread by direct contact (touching the sick person and then touching your own eyes, nose, or mouth). Frequent handwashing will decrease risk of spread. Most viral illnesses go away within 7 to 10 days with rest and simple home remedies. Sometimes the illness may last for several weeks. Antibiotics will not kill a virus, and they are generally not prescribed for this condition.    Home care  · If symptoms are severe, rest at home for the first 2 to 3 days. When you resume activity, don't let yourself get too tired.  · Avoid being exposed to cigarette smoke (yours or others).  · You may use acetaminophen or ibuprofen to control pain and fever, unless another medicine was prescribed. (Note: If you have chronic liver or kidney disease, have ever had a stomach ulcer or gastrointestinal bleeding, or are taking blood-thinning medicines, talk with your healthcare provider before using these medicines.) Aspirin should never be given to anyone under 18 years of age who is ill with a viral infection or fever. It may cause severe liver or brain damage.  · Your appetite may be poor, so a light diet is fine. Avoid dehydration by drinking 6 to 8 glasses of fluids per day (water, soft drinks, juices, tea, or soup). Extra fluids will help loosen secretions in the nose and lungs.  · Over-the-counter cold medicines will not shorten the length of time youre sick, but they may be helpful for the following symptoms: cough, sore throat, and nasal and sinus congestion. (Note: Do not use decongestants if you have high blood pressure.)  Follow-up care  Follow up with your healthcare provider, or as advised.  When to seek medical advice  Call your healthcare provider right away if any  of these occur:  · Cough with lots of colored sputum (mucus)  · Severe headache; face, neck, or ear pain  · Difficulty swallowing due to throat pain  · Fever of 100.4°F (38°C)  Call 911, or get immediate medical care  Call emergency services right away if any of these occur:  · Chest pain, shortness of breath, wheezing, or difficulty breathing  · Coughing up blood  · Inability to swallow due to throat pain  Date Last Reviewed: 9/13/2015  © 8340-3539 [employer]. [address] [place] [address]. All rights reserved. This information is not intended as a substitute for professional medical care. Always follow your healthcare professional's instructions.

## 2019-02-07 NOTE — PROGRESS NOTES
"Subjective:       Patient ID: Misael Garcia is a 23 y.o. female.    Vitals:  height is 5' 8" (1.727 m) and weight is 131.5 kg (290 lb). Her oral temperature is 99 °F (37.2 °C). Her blood pressure is 147/81 (abnormal) and her pulse is 110. Her respiration is 18 and oxygen saturation is 100%.     Chief Complaint: URI and Rash    This is a 23 y.o. female who presents today with a chief complaint of URI and rash under breast. She went to [facility] yesterday. Flu test was negative. Dx. URI. Rx. Promethazine-DM, Tessalon Perles and Xyzal. She took these at 5:00pm and again this morning and 3:00pm. She does not believe they are helping. Appointment with PCP 2/13/19. Back pain across shoulders for 4-5 months.      URI    This is a recurrent problem. The current episode started yesterday. The problem has been unchanged. There has been no fever. Associated symptoms include congestion, coughing, ear pain, neck pain, a plugged ear sensation, a rash, rhinorrhea, sinus pain and sneezing. Pertinent negatives include no chest pain, nausea, sore throat or vomiting. She has tried antihistamine, increased fluids, sleep and acetaminophen for the symptoms. The treatment provided mild relief.   Rash   This is a recurrent problem. The current episode started 1 to 4 weeks ago. The problem has been gradually worsening since onset. The affected locations include the chest. The rash is characterized by itchiness and scaling. She was exposed to nothing. Associated symptoms include congestion, coughing and rhinorrhea. Pertinent negatives include no fatigue, sore throat or vomiting. Past treatments include anti-itch cream. The treatment provided no relief. There is no history of eczema.   Back Pain   This is a new problem. The current episode started more than 1 month ago. The problem occurs intermittently. The problem has been gradually worsening since onset. The quality of the pain is described as aching. The pain does not " radiate. The pain is at a severity of 6/10. The pain is moderate. The pain is worse during the day. The symptoms are aggravated by sitting and coughing. Stiffness is present in the morning. Pertinent negatives include no chest pain. She has tried NSAIDs for the symptoms. The treatment provided no relief.       Constitution: Positive for sweating. Negative for chills, fatigue and generalized weakness.   HENT: Positive for ear pain, congestion and sinus pain. Negative for sore throat.    Neck: Positive for neck pain.   Cardiovascular: Negative for chest pain.   Respiratory: Positive for cough and sputum production.    Gastrointestinal: Negative for nausea and vomiting.   Musculoskeletal: Positive for back pain.   Skin: Positive for rash.   Allergic/Immunologic: Positive for sneezing. Negative for flu shot.   Neurological: Negative for altered mental status.   Psychiatric/Behavioral: Negative for altered mental status.       Objective:      Physical Exam   Constitutional: She appears well-developed and well-nourished.   HENT:   Head: Normocephalic and atraumatic.   Nose: Mucosal edema and rhinorrhea present.   Mouth/Throat: Posterior oropharyngeal erythema present.   Eyes: EOM are normal. Pupils are equal, round, and reactive to light.   Neck: Normal range of motion. Neck supple.   Cardiovascular: Normal rate, regular rhythm and normal heart sounds.   Pulmonary/Chest: Effort normal and breath sounds normal.   Abdominal: Soft.   Lymphadenopathy:     She has no cervical adenopathy.   Nursing note and vitals reviewed.      Results for orders placed or performed in visit on 02/06/19   POCT Influenza A/B   Result Value Ref Range    Rapid Influenza A Ag Negative Negative    Rapid Influenza B Ag Negative Negative     Acceptable Yes    POCT rapid strep A   Result Value Ref Range    Rapid Strep A Screen Negative Negative     Acceptable Yes      Assessment:       1. Upper respiratory tract  infection, unspecified type        Plan:         Upper respiratory tract infection, unspecified type  -     POCT Influenza A/B  -     POCT rapid strep A    advised to continue with medications as previously prescribed. To see her PCP next week where her other issues will be addressed as per her wishes.

## 2019-02-10 PROCEDURE — 99284 EMERGENCY DEPT VISIT MOD MDM: CPT

## 2019-02-11 ENCOUNTER — HOSPITAL ENCOUNTER (EMERGENCY)
Facility: HOSPITAL | Age: 24
Discharge: HOME | End: 2019-02-11
Attending: EMERGENCY MEDICINE
Payer: COMMERCIAL

## 2019-02-11 VITALS
OXYGEN SATURATION: 100 % | HEART RATE: 86 BPM | TEMPERATURE: 98 F | HEIGHT: 68 IN | RESPIRATION RATE: 16 BRPM | SYSTOLIC BLOOD PRESSURE: 128 MMHG | DIASTOLIC BLOOD PRESSURE: 74 MMHG | WEIGHT: 290 LBS | BODY MASS INDEX: 43.95 KG/M2

## 2019-02-11 DIAGNOSIS — R05.9 COUGH: Primary | ICD-10-CM

## 2019-02-11 DIAGNOSIS — B34.9 VIRAL SYNDROME: ICD-10-CM

## 2019-02-11 LAB
B-HCG UR QL: NEGATIVE
CTP QC/QA: YES

## 2019-02-11 PROCEDURE — 25000242 PHARM REV CODE 250 ALT 637 W/ HCPCS: Performed by: PHYSICIAN ASSISTANT

## 2019-02-11 PROCEDURE — 94640 AIRWAY INHALATION TREATMENT: CPT

## 2019-02-11 PROCEDURE — 25000003 PHARM REV CODE 250: Performed by: PHYSICIAN ASSISTANT

## 2019-02-11 PROCEDURE — 81025 URINE PREGNANCY TEST: CPT | Performed by: PHYSICIAN ASSISTANT

## 2019-02-11 RX ORDER — PROMETHAZINE HYDROCHLORIDE AND CODEINE PHOSPHATE 6.25; 1 MG/5ML; MG/5ML
5 SOLUTION ORAL NIGHTLY PRN
Qty: 60 ML | Refills: 0 | Status: SHIPPED | OUTPATIENT
Start: 2019-02-11 | End: 2019-02-21

## 2019-02-11 RX ORDER — BENZONATATE 100 MG/1
200 CAPSULE ORAL ONCE
Status: COMPLETED | OUTPATIENT
Start: 2019-02-11 | End: 2019-02-11

## 2019-02-11 RX ORDER — IPRATROPIUM BROMIDE AND ALBUTEROL SULFATE 2.5; .5 MG/3ML; MG/3ML
3 SOLUTION RESPIRATORY (INHALATION)
Status: COMPLETED | OUTPATIENT
Start: 2019-02-11 | End: 2019-02-11

## 2019-02-11 RX ORDER — BENZONATATE 100 MG/1
200 CAPSULE ORAL 3 TIMES DAILY PRN
Qty: 30 CAPSULE | Refills: 0 | Status: SHIPPED | OUTPATIENT
Start: 2019-02-11 | End: 2019-02-21

## 2019-02-11 RX ADMIN — IPRATROPIUM BROMIDE AND ALBUTEROL SULFATE 3 ML: .5; 3 SOLUTION RESPIRATORY (INHALATION) at 01:02

## 2019-02-11 RX ADMIN — BENZONATATE 200 MG: 100 CAPSULE ORAL at 01:02

## 2019-02-11 NOTE — DISCHARGE INSTRUCTIONS
Drink lots of fluids, stay well hydrated. Tylenol/Ibuprofen as needed for discomfort; go back and forth between these two medications as needed for temp greater than 100.4F. Tessalon for cough. Promethazine for cough in the evenings. Be aware, promethazine in sedating. Follow-up with primary care provider for reevaluation, further recommendations. Return to this ED if unable to treat fever, if symptoms persist or worsen despite treatment, if you begin with shortness of breath or difficulty breathing, if any other problems occur.

## 2019-02-11 NOTE — ED TRIAGE NOTES
Patient arrived to ED with c/o nonproductive cough, congestion, and headache x 1 week.  Reports that she was seen by 2 urgent cares and dx with upper respiratory infection and reports that symptoms have not gotten better.

## 2019-02-11 NOTE — ED PROVIDER NOTES
Encounter Date: 2/10/2019       History     Chief Complaint   Patient presents with    Cough     cough with other URI symptoms; reports she was evaluated at two different [facility] and discharged with dx of URI last Tuesday; reports symptoms have not been relieved     23-year-old female with past medical history prediabetes, with 6-7 day history of nonproductive cough, nasal congestion, myalgia.  Seen and evaluated by 2 separate urgent cares.  She was given Tessalon and promethazine codeine.  She admits to mild relief.  No fever chills. No shortness of breath or dyspnea on exertion.  No chest pain. No rash. No neck pain or stiffness. Patient presents complaining of persistent cough.  No alleviating or exacerbating factors.  No radiation of symptoms.  Symptoms are acute, constant, severity 5/10.          Review of patient's allergies indicates:  No Known Allergies  Past Medical History:   Diagnosis Date    ADHD (attention deficit hyperactivity disorder)     Anxiety     Bipolar affective     History of chlamydia 12/2016    Insomnia     Morbid obesity with BMI of 40.0-44.9, adult     Sleep disorder      Past Surgical History:   Procedure Laterality Date    NO PAST SURGERIES      as of 1-13-17     Family History   Problem Relation Age of Onset    Breast cancer Paternal Grandmother     No Known Problems Father     No Known Problems Mother     No Known Problems Sister     Diabetes Maternal Grandmother     No Known Problems Maternal Grandfather     No Known Problems Paternal Grandfather     Colon cancer Neg Hx     Ovarian cancer Neg Hx      Social History     Tobacco Use    Smoking status: Never Smoker    Smokeless tobacco: Never Used   Substance Use Topics    Alcohol use: No    Drug use: Yes     Types: Marijuana     Comment: stopped 3 wks ago     Review of Systems   Constitutional: Negative for chills and fever.   HENT: Positive for congestion. Negative for ear discharge, ear pain, rhinorrhea, sore throat  and trouble swallowing.    Eyes: Negative.  Negative for discharge and redness.   Respiratory: Positive for cough (Nonproductive). Negative for shortness of breath and wheezing.    Cardiovascular: Negative for chest pain, palpitations and leg swelling.   Gastrointestinal: Negative for abdominal pain, constipation, diarrhea, nausea and vomiting.   Endocrine: Negative.    Genitourinary: Negative for dysuria.   Musculoskeletal: Negative for back pain, neck pain and neck stiffness.   Skin: Negative for rash.   Neurological: Negative for weakness and headaches.   Hematological: Does not bruise/bleed easily.   Psychiatric/Behavioral: Negative.    All other systems reviewed and are negative.      Physical Exam     Initial Vitals [02/10/19 2248]   BP Pulse Resp Temp SpO2   133/83 107 16 98 °F (36.7 °C) 98 %      MAP       --         Physical Exam    Nursing note and vitals reviewed.  Constitutional: She appears well-developed and well-nourished. She is not diaphoretic. No distress.   Overall well-appearing, nontoxic. Resting comfortably on exam table. Speaking in full sentences without pause or difficulty.   HENT:   Head: Normocephalic and atraumatic.   Eyes: Conjunctivae and EOM are normal. Pupils are equal, round, and reactive to light.   Neck: Normal range of motion. Neck supple. No tracheal deviation present.   Cardiovascular: Intact distal pulses.   Pulmonary/Chest: Breath sounds normal. No stridor. No respiratory distress. She has no wheezes. She has no rhonchi. She exhibits no tenderness.   No hypoxia on room air.  No tachypnea.  No accessory muscle use.   Abdominal: Soft. Bowel sounds are normal. She exhibits no distension. There is no tenderness.   Musculoskeletal: Normal range of motion. She exhibits no tenderness.   Lymphadenopathy:     She has no cervical adenopathy.   Neurological: She is alert and oriented to person, place, and time.   Skin: Skin is warm and dry. Capillary refill takes less than 2 seconds.    Psychiatric: She has a normal mood and affect. Her behavior is normal. Judgment and thought content normal.         ED Course   Procedures  Labs Reviewed   POCT URINE PREGNANCY          Imaging Results          X-Ray Chest PA And Lateral (Final result)  Result time 02/11/19 01:36:27    Final result by Tiffanie Montez MD (02/11/19 01:36:27)                 Impression:      No acute abnormality.      Electronically signed by: Tiffanie Montez  Date:    02/11/2019  Time:    01:36             Narrative:    EXAMINATION:  XR CHEST PA AND LATERAL    CLINICAL HISTORY:  Cough    TECHNIQUE:  PA and lateral views of the chest were performed.    COMPARISON:  None    FINDINGS:  The lungs are clear, with normal appearance of pulmonary vasculature and no pleural effusion or pneumothorax.    The cardiac silhouette is normal in size. The hilar and mediastinal contours are unremarkable.    Bones are intact.                                 Medical Decision Making:   Differential Diagnosis:   Viral illness, pneumonia, bronchitis, pharyngitis  ED Management:  Likely viral illness.  Symptomatic care.  Vitals reassuring.  No shortness of breath. PERC negative. Symptomatic care.                      Clinical Impression:   The primary encounter diagnosis was Cough. A diagnosis of Viral syndrome was also pertinent to this visit.      Disposition:   Disposition: Discharged  Condition: Stable                        Peter Fritz PA-C  02/11/19 0622

## 2019-02-13 ENCOUNTER — LAB VISIT (OUTPATIENT)
Dept: LAB | Facility: HOSPITAL | Age: 24
End: 2019-02-13
Attending: INTERNAL MEDICINE
Payer: COMMERCIAL

## 2019-02-13 ENCOUNTER — OFFICE VISIT (OUTPATIENT)
Dept: INTERNAL MEDICINE | Facility: CLINIC | Age: 24
End: 2019-02-13
Payer: COMMERCIAL

## 2019-02-13 VITALS
HEIGHT: 69 IN | TEMPERATURE: 99 F | WEIGHT: 293 LBS | BODY MASS INDEX: 43.4 KG/M2 | RESPIRATION RATE: 18 BRPM | HEART RATE: 81 BPM | DIASTOLIC BLOOD PRESSURE: 80 MMHG | SYSTOLIC BLOOD PRESSURE: 118 MMHG

## 2019-02-13 DIAGNOSIS — B97.89 VIRAL SINUSITIS: Primary | ICD-10-CM

## 2019-02-13 DIAGNOSIS — R73.03 PREDIABETES: ICD-10-CM

## 2019-02-13 DIAGNOSIS — J32.9 VIRAL SINUSITIS: Primary | ICD-10-CM

## 2019-02-13 DIAGNOSIS — J20.9 ACUTE BRONCHITIS, UNSPECIFIED ORGANISM: ICD-10-CM

## 2019-02-13 DIAGNOSIS — R51.9 SINUS HEADACHE: ICD-10-CM

## 2019-02-13 LAB
ESTIMATED AVG GLUCOSE: 114 MG/DL
HBA1C MFR BLD HPLC: 5.6 %

## 2019-02-13 PROCEDURE — 36415 COLL VENOUS BLD VENIPUNCTURE: CPT | Mod: PO

## 2019-02-13 PROCEDURE — 96372 THER/PROPH/DIAG INJ SC/IM: CPT | Mod: S$GLB,,, | Performed by: INTERNAL MEDICINE

## 2019-02-13 PROCEDURE — 99999 PR PBB SHADOW E&M-EST. PATIENT-LVL III: ICD-10-PCS | Mod: PBBFAC,,, | Performed by: INTERNAL MEDICINE

## 2019-02-13 PROCEDURE — 99214 PR OFFICE/OUTPT VISIT, EST, LEVL IV, 30-39 MIN: ICD-10-PCS | Mod: 25,S$GLB,, | Performed by: INTERNAL MEDICINE

## 2019-02-13 PROCEDURE — 99214 OFFICE O/P EST MOD 30 MIN: CPT | Mod: 25,S$GLB,, | Performed by: INTERNAL MEDICINE

## 2019-02-13 PROCEDURE — 96372 PR INJECTION,THERAP/PROPH/DIAG2ST, IM OR SUBCUT: ICD-10-PCS | Mod: S$GLB,,, | Performed by: INTERNAL MEDICINE

## 2019-02-13 PROCEDURE — 3008F PR BODY MASS INDEX (BMI) DOCUMENTED: ICD-10-PCS | Mod: CPTII,S$GLB,, | Performed by: INTERNAL MEDICINE

## 2019-02-13 PROCEDURE — 3008F BODY MASS INDEX DOCD: CPT | Mod: CPTII,S$GLB,, | Performed by: INTERNAL MEDICINE

## 2019-02-13 PROCEDURE — 99999 PR PBB SHADOW E&M-EST. PATIENT-LVL III: CPT | Mod: PBBFAC,,, | Performed by: INTERNAL MEDICINE

## 2019-02-13 PROCEDURE — 83036 HEMOGLOBIN GLYCOSYLATED A1C: CPT

## 2019-02-13 RX ORDER — LEVOCETIRIZINE DIHYDROCHLORIDE 5 MG/1
5 TABLET, FILM COATED ORAL NIGHTLY
Qty: 30 TABLET | Refills: 11 | Status: SHIPPED | OUTPATIENT
Start: 2019-02-13 | End: 2020-05-05

## 2019-02-13 RX ORDER — FLUTICASONE PROPIONATE 50 MCG
2 SPRAY, SUSPENSION (ML) NASAL DAILY
Qty: 16 G | Refills: 12 | Status: SHIPPED | OUTPATIENT
Start: 2019-02-13 | End: 2019-03-15

## 2019-02-13 RX ORDER — TRIAMCINOLONE ACETONIDE 40 MG/ML
40 INJECTION, SUSPENSION INTRA-ARTICULAR; INTRAMUSCULAR
Status: COMPLETED | OUTPATIENT
Start: 2019-02-13 | End: 2019-02-13

## 2019-02-13 RX ADMIN — TRIAMCINOLONE ACETONIDE 40 MG: 40 INJECTION, SUSPENSION INTRA-ARTICULAR; INTRAMUSCULAR at 08:02

## 2019-02-13 NOTE — PROGRESS NOTES
Subjective:       Patient ID: Misael Garcia is a 23 y.o. female.    Chief Complaint: Follow-up ([facility] ER UC visit x 2); Cough (creamy/bloody mucus); and Sore Throat    HPI   Pt c/o 9 days of slowly improving sinus/chest congestion, mostly dry cough, post nasal drip, fevers, fatigue, sinus headaches. Pt has already been seen at  where she had a negative screen for Influenza. Pt also seen in the ER and had a normal CXR.   Review of Systems   Constitutional: Positive for fatigue and fever. Negative for activity change, appetite change, chills, diaphoresis and unexpected weight change.   HENT: Positive for congestion, postnasal drip, rhinorrhea, sinus pressure and sinus pain. Negative for sneezing, sore throat, trouble swallowing and voice change.    Respiratory: Positive for cough. Negative for shortness of breath and wheezing.    Cardiovascular: Negative for chest pain, palpitations and leg swelling.   Gastrointestinal: Negative for abdominal pain, blood in stool, constipation, diarrhea, nausea and vomiting.   Genitourinary: Negative for dysuria.   Musculoskeletal: Negative for arthralgias and myalgias.   Skin: Negative for rash and wound.   Allergic/Immunologic: Negative for environmental allergies and food allergies.   Neurological: Positive for headaches.   Hematological: Negative for adenopathy. Does not bruise/bleed easily.       Objective:      Physical Exam   Constitutional: She is oriented to person, place, and time. She appears well-developed and well-nourished. No distress.   HENT:   Head: Normocephalic and atraumatic.   Right Ear: External ear normal.   Left Ear: External ear normal.   Nose: Mucosal edema and rhinorrhea present.   Mouth/Throat: Oropharynx is clear and moist. No oropharyngeal exudate.   Eyes: Conjunctivae and EOM are normal. Pupils are equal, round, and reactive to light. Right eye exhibits no discharge. Left eye exhibits no discharge. No scleral icterus.   Neck: Neck supple. No  JVD present.   Cardiovascular: Normal rate, regular rhythm, normal heart sounds and intact distal pulses.   Pulmonary/Chest: Effort normal and breath sounds normal. No respiratory distress. She has no wheezes. She has no rales.   Musculoskeletal: She exhibits no edema.   Lymphadenopathy:     She has no cervical adenopathy.   Neurological: She is alert and oriented to person, place, and time.   Skin: Skin is warm and dry. No rash noted. She is not diaphoretic. No pallor.       Assessment:       1. Viral sinusitis    2. Acute bronchitis, unspecified organism    3. Sinus headache        Plan:    1. Rx Xyzal/Flonase, Kenalog 40 mg IM   2. May use tessalon perles or promethazine with codeine   3. NSAIDs PRN

## 2019-02-18 ENCOUNTER — PATIENT MESSAGE (OUTPATIENT)
Dept: INTERNAL MEDICINE | Facility: CLINIC | Age: 24
End: 2019-02-18

## 2019-02-19 RX ORDER — AZITHROMYCIN 250 MG/1
TABLET, FILM COATED ORAL
Qty: 6 TABLET | Refills: 0 | Status: SHIPPED | OUTPATIENT
Start: 2019-02-19 | End: 2019-02-24

## 2019-02-19 RX ORDER — METHYLPREDNISOLONE 4 MG/1
TABLET ORAL
Qty: 1 PACKAGE | Refills: 0 | Status: SHIPPED | OUTPATIENT
Start: 2019-02-19 | End: 2020-05-05 | Stop reason: ALTCHOICE

## 2019-03-04 ENCOUNTER — NURSE TRIAGE (OUTPATIENT)
Dept: ADMINISTRATIVE | Facility: CLINIC | Age: 24
End: 2019-03-04

## 2019-03-05 NOTE — TELEPHONE ENCOUNTER
Patient called to report the following:     -vaginal bleeding for 2 weeks  -brown discharge, itching, spotting, adb pain   -denies fever, abd pain, back pain   -advised to f/u within 24 hours and when to call back     Reason for Disposition   MODERATE-SEVERE itching (i.e., interferes with school, work, or sleep)    Protocols used: ST VAGINAL SYMPTOMS-A-AH

## 2019-03-06 ENCOUNTER — TELEPHONE (OUTPATIENT)
Dept: INTERNAL MEDICINE | Facility: CLINIC | Age: 24
End: 2019-03-06

## 2019-03-06 NOTE — TELEPHONE ENCOUNTER
Spoke to patient vaginal bleeding has completely stopped. Instructed to go to OBGYN if this starts again or ER. Refused to schedule to be seen today because of work. Will schedule if bleeding occurs again.

## 2019-04-12 ENCOUNTER — PATIENT MESSAGE (OUTPATIENT)
Dept: OBSTETRICS AND GYNECOLOGY | Facility: CLINIC | Age: 24
End: 2019-04-12

## 2019-04-12 RX ORDER — FLUCONAZOLE 150 MG/1
150 TABLET ORAL DAILY
Qty: 2 TABLET | Refills: 0 | Status: SHIPPED | OUTPATIENT
Start: 2019-04-12 | End: 2019-04-14

## 2019-04-12 RX ORDER — NYSTATIN AND TRIAMCINOLONE ACETONIDE 100000; 1 [USP'U]/G; MG/G
CREAM TOPICAL
Qty: 30 G | Refills: 0 | Status: SHIPPED | OUTPATIENT
Start: 2019-04-12 | End: 2020-04-11

## 2019-04-12 NOTE — TELEPHONE ENCOUNTER
Misael Garcia Brooke C., MD 1 hour ago (10:27 AM)         Good morning Dr. Coon today I cancelled my appt because I have work today. I forgot to take off by my area down there is very itchy and I was wondering if you could please send in some itchy cream or dyfucant... I wanted to make an appointment when I'm off. But I haven't gotten the chance to do so.      Last annual 4/2018. Seen in December 2018 for vaginitis. Also states in an additional message that she does not have money for an appt. Had appt today, but cancelled.     Mycolog and Diflucan pended

## 2019-04-30 ENCOUNTER — PATIENT MESSAGE (OUTPATIENT)
Dept: INTERNAL MEDICINE | Facility: CLINIC | Age: 24
End: 2019-04-30

## 2019-05-03 ENCOUNTER — PATIENT MESSAGE (OUTPATIENT)
Dept: INTERNAL MEDICINE | Facility: CLINIC | Age: 24
End: 2019-05-03

## 2019-05-03 NOTE — TELEPHONE ENCOUNTER
You may try the Kayden pill as it can help you lose weight. It has to be used with proper diet(low sugars/calories) and cardiac exercising(5-6x/wk for 45 minutes daily)

## 2019-05-16 ENCOUNTER — TELEPHONE (OUTPATIENT)
Dept: INTERNAL MEDICINE | Facility: CLINIC | Age: 24
End: 2019-05-16

## 2019-05-16 DIAGNOSIS — Z00.00 ANNUAL PHYSICAL EXAM: Primary | ICD-10-CM

## 2019-06-11 ENCOUNTER — TELEPHONE (OUTPATIENT)
Dept: OBSTETRICS AND GYNECOLOGY | Facility: CLINIC | Age: 24
End: 2019-06-11

## 2019-06-11 RX ORDER — FLUCONAZOLE 150 MG/1
150 TABLET ORAL ONCE
Qty: 2 TABLET | Refills: 0 | Status: SHIPPED | OUTPATIENT
Start: 2019-06-11 | End: 2019-06-11

## 2019-06-11 NOTE — TELEPHONE ENCOUNTER
Pt thinks she has a yeast infection. C/o itching and discharge.  Requesting a rx.  Recommended an appt if no improvement after taking medication.     Diflucan pended

## 2019-06-11 NOTE — TELEPHONE ENCOUNTER
Shaggy pt - pt is experiencing vaginal itching and would like to see if she can come in for an appt.

## 2019-11-05 ENCOUNTER — HOSPITAL ENCOUNTER (EMERGENCY)
Facility: HOSPITAL | Age: 24
Discharge: HOME | End: 2019-11-05
Attending: EMERGENCY MEDICINE
Payer: COMMERCIAL

## 2019-11-05 VITALS
RESPIRATION RATE: 16 BRPM | WEIGHT: 293 LBS | HEART RATE: 83 BPM | DIASTOLIC BLOOD PRESSURE: 76 MMHG | BODY MASS INDEX: 44.41 KG/M2 | TEMPERATURE: 99 F | OXYGEN SATURATION: 97 % | HEIGHT: 68 IN | SYSTOLIC BLOOD PRESSURE: 133 MMHG

## 2019-11-05 DIAGNOSIS — R11.2 NON-INTRACTABLE VOMITING WITH NAUSEA, UNSPECIFIED VOMITING TYPE: Primary | ICD-10-CM

## 2019-11-05 LAB
B-HCG UR QL: NEGATIVE
BACTERIA #/AREA URNS HPF: ABNORMAL /HPF
BILIRUB UR QL STRIP: NEGATIVE
CLARITY UR: CLEAR
COLOR UR: ABNORMAL
CTP QC/QA: YES
GLUCOSE UR QL STRIP: NEGATIVE
HGB UR QL STRIP: ABNORMAL
KETONES UR QL STRIP: NEGATIVE
LEUKOCYTE ESTERASE UR QL STRIP: NEGATIVE
MICROSCOPIC COMMENT: ABNORMAL
NITRITE UR QL STRIP: NEGATIVE
OTHER ELEMENTS URNS MICRO: ABNORMAL
PH UR STRIP: 6 [PH] (ref 5–8)
PROT UR QL STRIP: NEGATIVE
RBC #/AREA URNS HPF: 1 /HPF (ref 0–4)
SP GR UR STRIP: 1 (ref 1–1.03)
SQUAMOUS #/AREA URNS HPF: 1 /HPF
URN SPEC COLLECT METH UR: ABNORMAL
UROBILINOGEN UR STRIP-ACNC: NEGATIVE EU/DL
WBC #/AREA URNS HPF: 1 /HPF (ref 0–5)

## 2019-11-05 PROCEDURE — 81000 URINALYSIS NONAUTO W/SCOPE: CPT

## 2019-11-05 PROCEDURE — 25000003 PHARM REV CODE 250: Performed by: PHYSICIAN ASSISTANT

## 2019-11-05 PROCEDURE — 99284 EMERGENCY DEPT VISIT MOD MDM: CPT | Mod: 25

## 2019-11-05 PROCEDURE — 81025 URINE PREGNANCY TEST: CPT | Performed by: PHYSICIAN ASSISTANT

## 2019-11-05 RX ORDER — ONDANSETRON 4 MG/1
4 TABLET, ORALLY DISINTEGRATING ORAL
Status: COMPLETED | OUTPATIENT
Start: 2019-11-05 | End: 2019-11-05

## 2019-11-05 RX ORDER — FAMOTIDINE 20 MG/1
20 TABLET, FILM COATED ORAL 2 TIMES DAILY
Qty: 20 TABLET | Refills: 0 | Status: SHIPPED | OUTPATIENT
Start: 2019-11-05 | End: 2020-05-05

## 2019-11-05 RX ORDER — ONDANSETRON 4 MG/1
4 TABLET, ORALLY DISINTEGRATING ORAL EVERY 6 HOURS PRN
Qty: 20 TABLET | Refills: 0 | Status: SHIPPED | OUTPATIENT
Start: 2019-11-05 | End: 2020-05-05

## 2019-11-05 RX ADMIN — ONDANSETRON 4 MG: 4 TABLET, ORALLY DISINTEGRATING ORAL at 04:11

## 2019-11-05 NOTE — DISCHARGE INSTRUCTIONS
Drink lots of fluids, stay well hydrated. Pepcid twice daily. Zofran as needed for nausea. BRAT diet; progress as tolerated. If you begin with excessive diarrhea, you can use over the counter Imodium--use as written on packaging.    Follow-up with your primary care provider for reevaluation, further recommendations. Please return to this ED if you begin with fever unresponsive to Tylenol or ibuprofen, if unable to tolerate food or liquids, if any other problems occur.

## 2019-11-05 NOTE — ED PROVIDER NOTES
Encounter Date: 11/5/2019       History     Chief Complaint   Patient presents with    Vomiting     Patient reports one episode of vomiting an hr prior to arrivial. Patient reports her boyfriend was sick yesterday. patient also reports being around a sick infant on sunday.  Paitent denies abdominal pain.      23yo F with pmh ADHD, anxiety, bipolar affective disorder, insomnia, morbid obesity, with chief complaint nausea with vomiting, epigastric pain which began acutely 1hr pta. Pt states woke from sleep with nausea. No urinary complaints. No flank pain. Currently menstruating. No fever. No trauma. No recent illness.    Pt cared for an infant 2 days ago; infant with vomiting and diarrhea. Pt's BF with n/v/d yesterday. Symptoms acute, constant, severity 5/10. Pain exacerbated when lying recumbent. No CP, SOB, or RODRIGUEZ. Nonsmoker. No DM. No HTN or HLD. No exertional character to epigastric pain.         Review of patient's allergies indicates:  No Known Allergies  Past Medical History:   Diagnosis Date    ADHD (attention deficit hyperactivity disorder)     Anxiety     Bipolar affective     History of chlamydia 12/2016    Insomnia     Morbid obesity with BMI of 40.0-44.9, adult     Sleep disorder      Past Surgical History:   Procedure Laterality Date    NO PAST SURGERIES      as of 1-13-17     Family History   Problem Relation Age of Onset    Breast cancer Paternal Grandmother     No Known Problems Father     No Known Problems Mother     No Known Problems Sister     Diabetes Maternal Grandmother     No Known Problems Maternal Grandfather     No Known Problems Paternal Grandfather     Colon cancer Neg Hx     Ovarian cancer Neg Hx      Social History     Tobacco Use    Smoking status: Never Smoker    Smokeless tobacco: Never Used   Substance Use Topics    Alcohol use: No    Drug use: Yes     Types: Marijuana     Comment: stopped 3 wks ago     Review of Systems   Constitutional: Negative for appetite  change, chills and fever.   HENT: Negative for congestion, rhinorrhea and sore throat.    Eyes: Negative.    Respiratory: Negative for cough, chest tightness and shortness of breath.    Cardiovascular: Negative for chest pain, palpitations and leg swelling.   Gastrointestinal: Positive for abdominal pain, nausea and vomiting. Negative for constipation and diarrhea.   Endocrine: Negative.    Genitourinary: Positive for vaginal bleeding. Negative for difficulty urinating, dysuria, flank pain, frequency, hematuria and menstrual problem.   Musculoskeletal: Negative for back pain, neck pain and neck stiffness.   Skin: Negative for rash.   Neurological: Negative for dizziness, weakness, light-headedness and headaches.   Hematological: Does not bruise/bleed easily.   Psychiatric/Behavioral: Negative.    All other systems reviewed and are negative.      Physical Exam     Initial Vitals [11/05/19 0347]   BP Pulse Resp Temp SpO2   133/76 83 16 98.5 °F (36.9 °C) 97 %      MAP       --         Physical Exam    Nursing note and vitals reviewed.  Constitutional: She appears well-developed and well-nourished. She is not diaphoretic. No distress.   Well-appearing and nontoxic.  Resting comfortably on exam table.   HENT:   Head: Normocephalic and atraumatic.   Eyes: Conjunctivae and EOM are normal. Pupils are equal, round, and reactive to light.   Neck: Normal range of motion. Neck supple. No tracheal deviation present.   Cardiovascular: Intact distal pulses.   Pulmonary/Chest: No stridor. No respiratory distress.   Abdominal:   Abdomen overall soft, normal bowel sounds ×4.  Mild ttp epigastric region.  No rebound or guarding.  No palpable mass or distention.  No flank or CVA tenderness.  Negative Babcock sign.  No pain over McBurney's point.   Lymphadenopathy:     She has no cervical adenopathy.   Neurological: She is alert and oriented to person, place, and time.   Skin: Skin is warm and dry. Capillary refill takes less than 2  seconds.   Psychiatric: She has a normal mood and affect. Her behavior is normal. Judgment and thought content normal.         ED Course   Procedures  Labs Reviewed   URINALYSIS, REFLEX TO URINE CULTURE - Abnormal; Notable for the following components:       Result Value    Occult Blood UA 3+ (*)     All other components within normal limits    Narrative:     Preferred Collection Type->Urine, Clean Catch   URINALYSIS MICROSCOPIC - Abnormal; Notable for the following components:    Other (U/A) Occasional (*)     All other components within normal limits    Narrative:     Preferred Collection Type->Urine, Clean Catch   POCT URINE PREGNANCY          Imaging Results    None          Medical Decision Making:   Differential Diagnosis:   Anxiety, GERD, gastritis, viral gastroenteritis, pancreatitis  ED Management:  No cardiac hx. No CP, SOB, or RODRIGUEZ. Pain worse when lying recumbent. TTP epigastric region. Sick contacts. Likely viral in origin. Tolerating PO without antiemetic.     PERC negative. No history of thrombophilia.  No recent surgery.  No major trauma. No recent immobility.  No active cancer.  No exogenous estrogen.  Patient is not pregnant.  This is not following the immediate postpartum interval if patient has been pregnant.  Patient is less than 50 years old.  No history of percutaneous indwelling catheter. No previous DVT/PE.     Given hx, suspect viral gastroenteritis.                       Clinical Impression:       ICD-10-CM ICD-9-CM   1. Non-intractable vomiting with nausea, unspecified vomiting type R11.2 787.01         Disposition:   Disposition: Discharged  Condition: Stable                        Peter Fritz PA-C  11/05/19 0618

## 2020-03-12 ENCOUNTER — PATIENT OUTREACH (OUTPATIENT)
Dept: ADMINISTRATIVE | Facility: OTHER | Age: 25
End: 2020-03-12

## 2020-04-08 ENCOUNTER — TELEPHONE (OUTPATIENT)
Dept: INTERNAL MEDICINE | Facility: CLINIC | Age: 25
End: 2020-04-08

## 2020-04-08 NOTE — TELEPHONE ENCOUNTER
----- Message from Sailaja Hook sent at 4/8/2020 11:58 AM CDT -----  Contact: self  Type:  Needs Medical Advice    Who Called: patient need to speak with nurse   Patient states  Feel fatigue and diabetes run in family would like to be tested.  Symptoms (please be specific):    How long has patient had these symptoms:   Pharmacy name and phone  Would the patient rather a call back or a response via [facility]?call  Best Call Back Number: [phone]  Additional Information:

## 2020-04-08 NOTE — TELEPHONE ENCOUNTER
Lmvm: does not meet the CDC criteria for Covid 19 testing, Can go to the [facility]    Shoe she have cough, fever . 100.4 and  SOB , please call back to schedule a virtual visit

## 2020-04-26 ENCOUNTER — PATIENT MESSAGE (OUTPATIENT)
Dept: OBSTETRICS AND GYNECOLOGY | Facility: CLINIC | Age: 25
End: 2020-04-26

## 2020-04-28 RX ORDER — FLUCONAZOLE 150 MG/1
150 TABLET ORAL DAILY
Qty: 1 TABLET | Refills: 0 | Status: SHIPPED | OUTPATIENT
Start: 2020-04-28 | End: 2020-04-29

## 2020-05-05 ENCOUNTER — OFFICE VISIT (OUTPATIENT)
Dept: INTERNAL MEDICINE | Facility: CLINIC | Age: 25
End: 2020-05-05
Payer: COMMERCIAL

## 2020-05-05 VITALS
DIASTOLIC BLOOD PRESSURE: 84 MMHG | TEMPERATURE: 98 F | SYSTOLIC BLOOD PRESSURE: 120 MMHG | BODY MASS INDEX: 44.41 KG/M2 | WEIGHT: 293 LBS | HEIGHT: 68 IN | HEART RATE: 90 BPM | RESPIRATION RATE: 16 BRPM

## 2020-05-05 DIAGNOSIS — L30.9 DERMATITIS: Primary | ICD-10-CM

## 2020-05-05 DIAGNOSIS — E66.01 OBESITY, CLASS III, BMI 40-49.9 (MORBID OBESITY): ICD-10-CM

## 2020-05-05 DIAGNOSIS — F31.9 BIPOLAR 1 DISORDER: ICD-10-CM

## 2020-05-05 PROCEDURE — 3008F BODY MASS INDEX DOCD: CPT | Mod: CPTII,S$GLB,, | Performed by: INTERNAL MEDICINE

## 2020-05-05 PROCEDURE — 3008F PR BODY MASS INDEX (BMI) DOCUMENTED: ICD-10-PCS | Mod: CPTII,S$GLB,, | Performed by: INTERNAL MEDICINE

## 2020-05-05 PROCEDURE — 99214 OFFICE O/P EST MOD 30 MIN: CPT | Mod: S$GLB,,, | Performed by: INTERNAL MEDICINE

## 2020-05-05 PROCEDURE — 99214 PR OFFICE/OUTPT VISIT, EST, LEVL IV, 30-39 MIN: ICD-10-PCS | Mod: S$GLB,,, | Performed by: INTERNAL MEDICINE

## 2020-05-05 PROCEDURE — 99999 PR PBB SHADOW E&M-EST. PATIENT-LVL III: CPT | Mod: PBBFAC,,, | Performed by: INTERNAL MEDICINE

## 2020-05-05 PROCEDURE — 99999 PR PBB SHADOW E&M-EST. PATIENT-LVL III: ICD-10-PCS | Mod: PBBFAC,,, | Performed by: INTERNAL MEDICINE

## 2020-05-05 RX ORDER — TRIAMCINOLONE ACETONIDE 1 MG/G
CREAM TOPICAL 2 TIMES DAILY
Qty: 45 G | Refills: 0 | Status: SHIPPED | OUTPATIENT
Start: 2020-05-05 | End: 2020-07-02

## 2020-05-05 NOTE — PROGRESS NOTES
Subjective:       Patient ID: Misael Garcia is a 25 y.o. female.    Chief Complaint: Rash (hands) and Weight Gain    HPI   Pt with Morbid Obesity,Bipolar d/o is here for evaluation of 1 month of intermittent rashes on her dorsal hands described as itching in nature. She does admit to moderate hand washing daily. No relief with OTC meds.   Review of Systems   Constitutional: Negative for activity change and unexpected weight change.   HENT: Negative for hearing loss, rhinorrhea and trouble swallowing.    Eyes: Negative for discharge and visual disturbance.   Respiratory: Negative for chest tightness and wheezing.    Cardiovascular: Negative for chest pain and palpitations.   Gastrointestinal: Negative for blood in stool, constipation, diarrhea and vomiting.   Endocrine: Negative for polydipsia and polyuria.   Genitourinary: Negative for difficulty urinating, dysuria, hematuria and menstrual problem.   Musculoskeletal: Negative for arthralgias, joint swelling and neck pain.   Skin: Positive for rash.   Neurological: Negative for weakness and headaches.   Psychiatric/Behavioral: Negative for confusion and dysphoric mood.       Objective:      Physical Exam   Constitutional: She is oriented to person, place, and time. She appears well-developed and well-nourished. No distress.   HENT:   Head: Normocephalic and atraumatic.   Nose: Nose normal.   Eyes: Pupils are equal, round, and reactive to light. Conjunctivae and EOM are normal. Right eye exhibits no discharge. Left eye exhibits no discharge. No scleral icterus.   Neck: Neck supple. No JVD present.   Cardiovascular: Normal rate, regular rhythm, normal heart sounds and intact distal pulses.   Pulmonary/Chest: Effort normal and breath sounds normal. No respiratory distress. She has no wheezes. She has no rales.   Musculoskeletal: She exhibits no edema.   Lymphadenopathy:     She has no cervical adenopathy.   Neurological: She is alert and oriented to person,  place, and time.   Skin: Skin is warm and dry. Rash noted. She is not diaphoretic. No pallor.            Assessment:       1. Dermatitis    2. Obesity, Class III, BMI 40-49.9 (morbid obesity)    3. Bipolar 1 disorder        Plan:    1. Rx Triamcinolone cream 0.1 % BID PRN    2. Referral back to bariatric medicine       Pt was advised on proper diet/exercise for weight loss   3. Stable

## 2020-05-13 ENCOUNTER — PATIENT MESSAGE (OUTPATIENT)
Dept: INTERNAL MEDICINE | Facility: CLINIC | Age: 25
End: 2020-05-13

## 2020-05-13 ENCOUNTER — LAB VISIT (OUTPATIENT)
Dept: LAB | Facility: HOSPITAL | Age: 25
End: 2020-05-13
Attending: INTERNAL MEDICINE
Payer: COMMERCIAL

## 2020-05-13 DIAGNOSIS — Z00.00 ANNUAL PHYSICAL EXAM: ICD-10-CM

## 2020-05-13 LAB
ALBUMIN SERPL BCP-MCNC: 3.8 G/DL (ref 3.5–5.2)
ALP SERPL-CCNC: 110 U/L (ref 55–135)
ALT SERPL W/O P-5'-P-CCNC: 23 U/L (ref 10–44)
ANION GAP SERPL CALC-SCNC: 8 MMOL/L (ref 8–16)
AST SERPL-CCNC: 21 U/L (ref 10–40)
BASOPHILS # BLD AUTO: 0.03 K/UL (ref 0–0.2)
BASOPHILS NFR BLD: 0.3 % (ref 0–1.9)
BILIRUB SERPL-MCNC: 0.3 MG/DL (ref 0.1–1)
BUN SERPL-MCNC: 11 MG/DL (ref 6–20)
CALCIUM SERPL-MCNC: 9.3 MG/DL (ref 8.7–10.5)
CHLORIDE SERPL-SCNC: 103 MMOL/L (ref 95–110)
CHOLEST SERPL-MCNC: 145 MG/DL (ref 120–199)
CHOLEST/HDLC SERPL: 3 {RATIO} (ref 2–5)
CO2 SERPL-SCNC: 27 MMOL/L (ref 23–29)
CREAT SERPL-MCNC: 0.7 MG/DL (ref 0.5–1.4)
DIFFERENTIAL METHOD: ABNORMAL
EOSINOPHIL # BLD AUTO: 0.2 K/UL (ref 0–0.5)
EOSINOPHIL NFR BLD: 2.3 % (ref 0–8)
ERYTHROCYTE [DISTWIDTH] IN BLOOD BY AUTOMATED COUNT: 15.1 % (ref 11.5–14.5)
EST. GFR  (AFRICAN AMERICAN): >60 ML/MIN/1.73 M^2
EST. GFR  (NON AFRICAN AMERICAN): >60 ML/MIN/1.73 M^2
GLUCOSE SERPL-MCNC: 82 MG/DL (ref 70–110)
HCT VFR BLD AUTO: 39.8 % (ref 37–48.5)
HDLC SERPL-MCNC: 49 MG/DL (ref 40–75)
HDLC SERPL: 33.8 % (ref 20–50)
HGB BLD-MCNC: 11.8 G/DL (ref 12–16)
IMM GRANULOCYTES # BLD AUTO: 0.02 K/UL (ref 0–0.04)
IMM GRANULOCYTES NFR BLD AUTO: 0.2 % (ref 0–0.5)
LDLC SERPL CALC-MCNC: 85.6 MG/DL (ref 63–159)
LYMPHOCYTES # BLD AUTO: 3 K/UL (ref 1–4.8)
LYMPHOCYTES NFR BLD: 29.5 % (ref 18–48)
MCH RBC QN AUTO: 25.5 PG (ref 27–31)
MCHC RBC AUTO-ENTMCNC: 29.6 G/DL (ref 32–36)
MCV RBC AUTO: 86 FL (ref 82–98)
MONOCYTES # BLD AUTO: 0.7 K/UL (ref 0.3–1)
MONOCYTES NFR BLD: 7.1 % (ref 4–15)
NEUTROPHILS # BLD AUTO: 6.2 K/UL (ref 1.8–7.7)
NEUTROPHILS NFR BLD: 60.6 % (ref 38–73)
NONHDLC SERPL-MCNC: 96 MG/DL
NRBC BLD-RTO: 0 /100 WBC
PLATELET # BLD AUTO: 530 K/UL (ref 150–350)
PMV BLD AUTO: 9.8 FL (ref 9.2–12.9)
POTASSIUM SERPL-SCNC: 3.9 MMOL/L (ref 3.5–5.1)
PROT SERPL-MCNC: 7.2 G/DL (ref 6–8.4)
RBC # BLD AUTO: 4.62 M/UL (ref 4–5.4)
SODIUM SERPL-SCNC: 138 MMOL/L (ref 136–145)
TRIGL SERPL-MCNC: 52 MG/DL (ref 30–150)
TSH SERPL DL<=0.005 MIU/L-ACNC: 1.09 UIU/ML (ref 0.4–4)
WBC # BLD AUTO: 10.26 K/UL (ref 3.9–12.7)

## 2020-05-13 PROCEDURE — 85025 COMPLETE CBC W/AUTO DIFF WBC: CPT

## 2020-05-13 PROCEDURE — 84443 ASSAY THYROID STIM HORMONE: CPT

## 2020-05-13 PROCEDURE — 80061 LIPID PANEL: CPT

## 2020-05-13 PROCEDURE — 36415 COLL VENOUS BLD VENIPUNCTURE: CPT | Mod: PO

## 2020-05-13 PROCEDURE — 80053 COMPREHEN METABOLIC PANEL: CPT

## 2020-06-01 ENCOUNTER — PATIENT OUTREACH (OUTPATIENT)
Dept: ADMINISTRATIVE | Facility: OTHER | Age: 25
End: 2020-06-01

## 2020-06-02 ENCOUNTER — TELEPHONE (OUTPATIENT)
Dept: BARIATRICS | Facility: CLINIC | Age: 25
End: 2020-06-02

## 2020-06-02 NOTE — TELEPHONE ENCOUNTER
Called pt. In regards to appointment, scheduled Tanya 3 for 1 pm. . Will not be in clinic. Lvm. Requested a called back to reschedule on a Tuesday or Thursday, maybe with a different provider? If interested.

## 2020-07-15 ENCOUNTER — PATIENT MESSAGE (OUTPATIENT)
Dept: INTERNAL MEDICINE | Facility: CLINIC | Age: 25
End: 2020-07-15

## 2020-07-15 DIAGNOSIS — L91.8 SKIN TAG: Primary | ICD-10-CM

## 2020-10-19 ENCOUNTER — OFFICE VISIT (OUTPATIENT)
Dept: INTERNAL MEDICINE | Facility: CLINIC | Age: 25
End: 2020-10-19
Payer: COMMERCIAL

## 2020-10-19 VITALS
WEIGHT: 293 LBS | RESPIRATION RATE: 16 BRPM | TEMPERATURE: 97 F | OXYGEN SATURATION: 97 % | HEART RATE: 93 BPM | HEIGHT: 68 IN | DIASTOLIC BLOOD PRESSURE: 60 MMHG | BODY MASS INDEX: 44.41 KG/M2 | SYSTOLIC BLOOD PRESSURE: 124 MMHG

## 2020-10-19 DIAGNOSIS — K62.9 ANAL LESION: Primary | ICD-10-CM

## 2020-10-19 PROCEDURE — 99999 PR PBB SHADOW E&M-EST. PATIENT-LVL III: ICD-10-PCS | Mod: PBBFAC,,, | Performed by: INTERNAL MEDICINE

## 2020-10-19 PROCEDURE — 3008F PR BODY MASS INDEX (BMI) DOCUMENTED: ICD-10-PCS | Mod: CPTII,S$GLB,, | Performed by: INTERNAL MEDICINE

## 2020-10-19 PROCEDURE — 99213 OFFICE O/P EST LOW 20 MIN: CPT | Mod: S$GLB,,, | Performed by: INTERNAL MEDICINE

## 2020-10-19 PROCEDURE — 99213 PR OFFICE/OUTPT VISIT, EST, LEVL III, 20-29 MIN: ICD-10-PCS | Mod: S$GLB,,, | Performed by: INTERNAL MEDICINE

## 2020-10-19 PROCEDURE — 3008F BODY MASS INDEX DOCD: CPT | Mod: CPTII,S$GLB,, | Performed by: INTERNAL MEDICINE

## 2020-10-19 PROCEDURE — 99999 PR PBB SHADOW E&M-EST. PATIENT-LVL III: CPT | Mod: PBBFAC,,, | Performed by: INTERNAL MEDICINE

## 2020-10-19 RX ORDER — PAROXETINE 30 MG/1
TABLET, FILM COATED ORAL
COMMUNITY
Start: 2020-09-03 | End: 2021-03-16

## 2020-10-19 RX ORDER — CARIPRAZINE 3 MG/1
1 CAPSULE, GELATIN COATED ORAL NIGHTLY
COMMUNITY
Start: 2020-09-10 | End: 2021-10-14

## 2020-10-19 NOTE — PROGRESS NOTES
Subjective:       Patient ID: Misael Garcia is a 25 y.o. female.    Chief Complaint: Blister (buttocks)    HPI   Pt here for evaluation of an anal sore which has been present for the last year. It can bleed at time and can be painful with BM's. Pt was seen by Derm last Friday and had a Cx done to r/o herpes which is still pending. No pain currently, fevers/chills.   Review of Systems   Constitutional: Negative for activity change, appetite change, chills, diaphoresis, fatigue, fever and unexpected weight change.   HENT: Negative for postnasal drip, rhinorrhea, sinus pressure/congestion, sneezing, sore throat, trouble swallowing and voice change.    Respiratory: Negative for cough, shortness of breath and wheezing.    Cardiovascular: Negative for chest pain, palpitations and leg swelling.   Gastrointestinal: Positive for anal bleeding. Negative for abdominal pain, blood in stool, constipation, diarrhea, nausea and vomiting.   Genitourinary: Negative for dysuria.   Musculoskeletal: Negative for arthralgias and myalgias.   Integumentary:  Negative for rash and wound.   Allergic/Immunologic: Negative for environmental allergies and food allergies.   Hematological: Negative for adenopathy. Does not bruise/bleed easily.         Objective:      Physical Exam  Constitutional:       General: She is not in acute distress.     Appearance: She is well-developed. She is not diaphoretic.   HENT:      Head: Normocephalic and atraumatic.      Right Ear: External ear normal.      Left Ear: External ear normal.      Nose: Nose normal.      Mouth/Throat:      Pharynx: No oropharyngeal exudate.   Eyes:      General: No scleral icterus.        Right eye: No discharge.         Left eye: No discharge.      Conjunctiva/sclera: Conjunctivae normal.      Pupils: Pupils are equal, round, and reactive to light.   Neck:      Musculoskeletal: Neck supple.      Vascular: No JVD.   Cardiovascular:      Rate and Rhythm: Normal rate and  regular rhythm.      Heart sounds: Normal heart sounds.   Pulmonary:      Effort: Pulmonary effort is normal. No respiratory distress.      Breath sounds: Normal breath sounds. No wheezing or rales.   Genitourinary:      Lymphadenopathy:      Cervical: No cervical adenopathy.   Skin:     General: Skin is warm and dry.      Coloration: Skin is not pale.      Findings: No rash.   Neurological:      Mental Status: She is alert and oriented to person, place, and time.         Assessment:       1. Anal lesion        Plan:    1. Looks like an anal sore, referral to Colorectal surgery for further evaluation as it has been present for the last year and will not heal.

## 2020-10-20 ENCOUNTER — PATIENT OUTREACH (OUTPATIENT)
Dept: ADMINISTRATIVE | Facility: OTHER | Age: 25
End: 2020-10-20

## 2020-10-21 ENCOUNTER — OFFICE VISIT (OUTPATIENT)
Dept: SURGERY | Facility: CLINIC | Age: 25
End: 2020-10-21
Payer: COMMERCIAL

## 2020-10-21 VITALS
HEIGHT: 68 IN | BODY MASS INDEX: 44.41 KG/M2 | SYSTOLIC BLOOD PRESSURE: 137 MMHG | HEART RATE: 88 BPM | WEIGHT: 293 LBS | DIASTOLIC BLOOD PRESSURE: 86 MMHG

## 2020-10-21 DIAGNOSIS — K62.9 ANAL LESION: ICD-10-CM

## 2020-10-21 PROCEDURE — 46600 PR DIAG2STIC A2SCOPY: ICD-10-PCS | Mod: S$GLB,,, | Performed by: NURSE PRACTITIONER

## 2020-10-21 PROCEDURE — 46600 DIAGNOSTIC ANOSCOPY SPX: CPT | Mod: S$GLB,,, | Performed by: NURSE PRACTITIONER

## 2020-10-21 PROCEDURE — 3008F PR BODY MASS INDEX (BMI) DOCUMENTED: ICD-10-PCS | Mod: CPTII,S$GLB,, | Performed by: NURSE PRACTITIONER

## 2020-10-21 PROCEDURE — 99999 PR PBB SHADOW E&M-EST. PATIENT-LVL III: CPT | Mod: PBBFAC,,, | Performed by: NURSE PRACTITIONER

## 2020-10-21 PROCEDURE — 99999 PR PBB SHADOW E&M-EST. PATIENT-LVL III: ICD-10-PCS | Mod: PBBFAC,,, | Performed by: NURSE PRACTITIONER

## 2020-10-21 PROCEDURE — 3008F BODY MASS INDEX DOCD: CPT | Mod: CPTII,S$GLB,, | Performed by: NURSE PRACTITIONER

## 2020-10-21 PROCEDURE — 99204 OFFICE O/P NEW MOD 45 MIN: CPT | Mod: 25,S$GLB,, | Performed by: NURSE PRACTITIONER

## 2020-10-21 PROCEDURE — 99204 PR OFFICE/OUTPT VISIT, NEW, LEVL IV, 45-59 MIN: ICD-10-PCS | Mod: 25,S$GLB,, | Performed by: NURSE PRACTITIONER

## 2020-10-21 RX ORDER — HYDROCORTISONE 25 MG/G
CREAM TOPICAL 2 TIMES DAILY
Qty: 28 G | Refills: 2 | Status: SHIPPED | OUTPATIENT
Start: 2020-10-21 | End: 2021-10-14

## 2020-10-21 NOTE — LETTER
October 21, 2020      Michael Goldman, DO  [address]  [place] [address]           [facility] [address]  [address]  [facility] [address]  Phone: [phone]          Patient: Misael Garcia   MR Number: [id number]   YOB: 1995   Date of Visit: 10/21/2020       Dear Dr. Michael Goldman:    Thank you for referring Misael Garcia to me for evaluation. Attached you will find relevant portions of my assessment and plan of care.    If you have questions, please do not hesitate to call me. I look forward to following iMsael Garcia along with you.    Sincerely,    Suki Menendez, NP    Enclosure  CC:  No Recipients    If you would like to receive this communication electronically, please contact [email or web address] or [phone] to request more information on [facility] access.    For providers and/or their staff who would like to refer a patient to [facility], please contact us through our one-stop-shop provider referral line, [facility] , at [phone].    If you feel you have received this communication in error or would no longer like to receive these types of communications, please e-mail [email or web address]

## 2020-10-21 NOTE — PROGRESS NOTES
CRS Office Visit History and Physical    Referring Md:   Michael Goldman,   [address]  [place] [address]    SUBJECTIVE:     Chief Complaint: sore on buttocks    History of Present Illness:  The patient is new patient to this practice.   Course is as follows:  Patient is a 25 y.o. female presents with anal sore. Seen by Derm cx for herpes, no results as of yet. Seen by pcp, referred to CRS.   Symptoms have been present for 1 year. Just started bleeding in past 4 months.   Has tried hydrocortisone cream for past 5 days with moderate improvement.  Associated bleeding: yes with wiping or cleaning  Previous anorectal procedures: no  denies straining/prolonged time on toilet with bowel movements.  is not currently taking fiber supplement or stool softener  Blood thinners: No    Last Colonoscopy: none  Family history of colorectal cancer or IBD: none.    Review of patient's allergies indicates:  No Known Allergies    Past Medical History:   Diagnosis Date    ADHD (attention deficit hyperactivity disorder)     Anxiety     Bipolar affective     History of chlamydia 12/2016    Insomnia     Morbid obesity with BMI of 40.0-44.9, adult     Sleep disorder      Past Surgical History:   Procedure Laterality Date    NO PAST SURGERIES      as of 1-13-17     Family History   Problem Relation Age of Onset    Breast cancer Paternal Grandmother     No Known Problems Father     No Known Problems Mother     No Known Problems Sister     Diabetes Maternal Grandmother     No Known Problems Maternal Grandfather     No Known Problems Paternal Grandfather     Colon cancer Neg Hx     Ovarian cancer Neg Hx      Social History     Tobacco Use    Smoking status: Never Smoker    Smokeless tobacco: Never Used   Substance Use Topics    Alcohol use: No     Frequency: Never     Drinks per session: Patient refused     Binge frequency: Never    Drug use: Yes     Types: Marijuana     Comment: stopped 3 wks ago  "       Review of Systems:  Review of Systems   Constitutional: Negative for chills, fever and weight loss.   Respiratory: Negative for cough, shortness of breath and wheezing.    Cardiovascular: Negative for chest pain, palpitations and leg swelling.   Gastrointestinal: Negative for abdominal pain, blood in stool, constipation, diarrhea and melena.   Genitourinary: Negative for dysuria, flank pain and hematuria.   Musculoskeletal: Negative for falls, joint pain and myalgias.   Skin: Negative for itching and rash.        Sore near anus   Psychiatric/Behavioral: The patient is not nervous/anxious.    All other systems reviewed and are negative.      OBJECTIVE:     Vital Signs (Most Recent)  Blood Pressure 137/86 (BP Location: Left arm, Patient Position: Sitting, BP Method: X-Large (Automatic))   Pulse 88   Height 5' 8" (1.727 m)   Weight (Abnormal) 138 kg (304 lb 3.8 oz)   Body Mass Index 46.26 kg/m²     Physical Exam:  General:    Black or   White female in no distress   Neuro: Alert and oriented to person, place, and time.  Moves all extremities.     HEENT: No icterus.  Trachea midline  Respiratory: Respirations are even and unlabored, no cough or audible wheezing  Abdomen: soft, round  Skin: Warm dry and intact, No visible rashes, no jaundice    Labs reviewed today:  Lab Results   Component Value Date    WBC 10.26 05/13/2020    HGB 11.8 (L) 05/13/2020    HCT 39.8 05/13/2020     (H) 05/13/2020    CHOL 145 05/13/2020    TRIG 52 05/13/2020    HDL 49 05/13/2020    ALT 23 05/13/2020    AST 21 05/13/2020     05/13/2020    K 3.9 05/13/2020     05/13/2020    CREATININE 0.7 05/13/2020    BUN 11 05/13/2020    CO2 27 05/13/2020    TSH 1.086 05/13/2020    HGBA1C 5.6 02/13/2019       Imaging reviewed today:  4/29/18 CT abdomen pelvis  - Mild left-sided hydroureteronephrosis to the level of the mid left ureter without radiodense ureteral calculus or extrinsically mechanical cause seen.  " This could be related to a non radiodense ureteral calculus, with left-sided pyeloureteronephritis not excluded.     - Hepatomegaly.  - Left adnexal 4.2 cm cystic lesion which could represent an ovarian cyst.  Further evaluation versus short-term follow-up with elective pelvic ultrasound can be obtained as warranted.    Endoscopy reviewed today:  none    Anorectal Exam:    Anal Skin: small ulceration just above anus. clean base, mascerated edges, minimally tender to palpation.     Digital Rectal Exam:  Resting Tone normal  Squeeze normal  Relaxation with bear down present  Mass none  Rectocele  absent  Tenderness  absent    Anoscopy:  Verbal consent was obtained.   Clear plastic anoscope inserted.    Hemorrhoids  present  Stigmata of bleeding  absent  Stigmata of prolapsed  absent  Distal rectal mucosa normal    ASSESSMENT/PLAN:     Misael was seen today for rectal pain.    Diagnoses and all orders for this visit:    Anal lesion  -     Ambulatory referral/consult to Colorectal Surgery  -     hydrocortisone 2.5 % cream; Apply topically 2 (two) times daily.        The patient was instructed to:  Continue cream 2x/day for 14 days  Do not wear thongs, keep clean and dry  Follow-up in clinic in 4-6 weeks if no improvement.      Suki Menendez, MICHP-C  Colon and Rectal Surgery

## 2020-10-27 ENCOUNTER — PATIENT MESSAGE (OUTPATIENT)
Dept: INTERNAL MEDICINE | Facility: CLINIC | Age: 25
End: 2020-10-27

## 2020-10-27 ENCOUNTER — PATIENT MESSAGE (OUTPATIENT)
Dept: SURGERY | Facility: CLINIC | Age: 25
End: 2020-10-27

## 2020-10-28 ENCOUNTER — PATIENT MESSAGE (OUTPATIENT)
Dept: INTERNAL MEDICINE | Facility: CLINIC | Age: 25
End: 2020-10-28

## 2020-10-29 ENCOUNTER — PATIENT MESSAGE (OUTPATIENT)
Dept: INTERNAL MEDICINE | Facility: CLINIC | Age: 25
End: 2020-10-29

## 2020-11-02 ENCOUNTER — PATIENT MESSAGE (OUTPATIENT)
Dept: INTERNAL MEDICINE | Facility: CLINIC | Age: 25
End: 2020-11-02

## 2020-11-02 ENCOUNTER — TELEPHONE (OUTPATIENT)
Dept: INTERNAL MEDICINE | Facility: CLINIC | Age: 25
End: 2020-11-02

## 2020-11-02 ENCOUNTER — OFFICE VISIT (OUTPATIENT)
Dept: INTERNAL MEDICINE | Facility: CLINIC | Age: 25
End: 2020-11-02
Payer: COMMERCIAL

## 2020-11-02 VITALS
HEART RATE: 88 BPM | HEIGHT: 68 IN | DIASTOLIC BLOOD PRESSURE: 80 MMHG | WEIGHT: 293 LBS | SYSTOLIC BLOOD PRESSURE: 122 MMHG | TEMPERATURE: 97 F | OXYGEN SATURATION: 99 % | RESPIRATION RATE: 15 BRPM | BODY MASS INDEX: 44.41 KG/M2

## 2020-11-02 DIAGNOSIS — J20.9 ACUTE BRONCHITIS, UNSPECIFIED ORGANISM: ICD-10-CM

## 2020-11-02 DIAGNOSIS — J32.9 VIRAL SINUSITIS: Primary | ICD-10-CM

## 2020-11-02 DIAGNOSIS — B97.89 VIRAL SINUSITIS: Primary | ICD-10-CM

## 2020-11-02 PROCEDURE — 3008F BODY MASS INDEX DOCD: CPT | Mod: CPTII,S$GLB,, | Performed by: INTERNAL MEDICINE

## 2020-11-02 PROCEDURE — 3008F PR BODY MASS INDEX (BMI) DOCUMENTED: ICD-10-PCS | Mod: CPTII,S$GLB,, | Performed by: INTERNAL MEDICINE

## 2020-11-02 PROCEDURE — 99214 PR OFFICE/OUTPT VISIT, EST, LEVL IV, 30-39 MIN: ICD-10-PCS | Mod: S$GLB,,, | Performed by: INTERNAL MEDICINE

## 2020-11-02 PROCEDURE — 99999 PR PBB SHADOW E&M-EST. PATIENT-LVL III: ICD-10-PCS | Mod: PBBFAC,,, | Performed by: INTERNAL MEDICINE

## 2020-11-02 PROCEDURE — 99214 OFFICE O/P EST MOD 30 MIN: CPT | Mod: S$GLB,,, | Performed by: INTERNAL MEDICINE

## 2020-11-02 PROCEDURE — 99999 PR PBB SHADOW E&M-EST. PATIENT-LVL III: CPT | Mod: PBBFAC,,, | Performed by: INTERNAL MEDICINE

## 2020-11-02 RX ORDER — FLUTICASONE PROPIONATE 50 MCG
2 SPRAY, SUSPENSION (ML) NASAL DAILY
Qty: 16 G | Refills: 12 | Status: SHIPPED | OUTPATIENT
Start: 2020-11-02 | End: 2020-12-02

## 2020-11-02 RX ORDER — LEVOCETIRIZINE DIHYDROCHLORIDE 5 MG/1
5 TABLET, FILM COATED ORAL NIGHTLY
Qty: 30 TABLET | Refills: 11 | Status: SHIPPED | OUTPATIENT
Start: 2020-11-02 | End: 2021-03-16

## 2020-11-02 RX ORDER — HYDROCORTISONE ACETATE AND PRAMOXINE HYDROCHLORIDE 25; 10 MG/ML; MG/ML
LOTION TOPICAL
COMMUNITY
Start: 2020-10-19 | End: 2021-03-16

## 2020-11-02 NOTE — PROGRESS NOTES
Subjective:       Patient ID: Misael Garcia is a 25 y.o. female.    Chief Complaint: Cough and Nasal Congestion    HPI   Pt c/o 10 days of slowly improving sinus/chest congestion, dry cough, sore throat initially and post nasal drip. No fevers/chills, wheezing/SOB, body aches. Some relief with Dayquil.   Review of Systems   Constitutional: Negative for activity change, appetite change, chills, diaphoresis, fatigue, fever and unexpected weight change.   HENT: Positive for postnasal drip, rhinorrhea, sinus pressure/congestion and sore throat. Negative for sneezing, trouble swallowing and voice change.    Respiratory: Positive for cough. Negative for shortness of breath and wheezing.    Cardiovascular: Negative for chest pain, palpitations and leg swelling.   Gastrointestinal: Negative for abdominal pain, blood in stool, constipation, diarrhea, nausea and vomiting.   Genitourinary: Negative for dysuria.   Musculoskeletal: Negative for arthralgias and myalgias.   Integumentary:  Negative for rash and wound.   Allergic/Immunologic: Negative for environmental allergies and food allergies.   Neurological: Negative for headaches.   Hematological: Negative for adenopathy. Does not bruise/bleed easily.         Objective:      Physical Exam  Constitutional:       General: She is not in acute distress.     Appearance: She is well-developed. She is not diaphoretic.   HENT:      Head: Normocephalic and atraumatic.      Right Ear: External ear normal.      Left Ear: External ear normal.      Nose: Nose normal.      Mouth/Throat:      Pharynx: No oropharyngeal exudate.   Eyes:      General: No scleral icterus.        Right eye: No discharge.         Left eye: No discharge.      Conjunctiva/sclera: Conjunctivae normal.      Pupils: Pupils are equal, round, and reactive to light.   Neck:      Musculoskeletal: Neck supple.      Vascular: No JVD.   Cardiovascular:      Rate and Rhythm: Normal rate and regular rhythm.       Heart sounds: Normal heart sounds.   Pulmonary:      Effort: Pulmonary effort is normal. No respiratory distress.      Breath sounds: Normal breath sounds. No wheezing or rales.   Abdominal:      General: Bowel sounds are normal.      Palpations: Abdomen is soft.   Lymphadenopathy:      Cervical: No cervical adenopathy.   Skin:     General: Skin is warm and dry.      Coloration: Skin is not pale.      Findings: No rash.   Neurological:      Mental Status: She is alert and oriented to person, place, and time.         Assessment:       1. Viral sinusitis    2. Acute bronchitis, unspecified organism        Plan:    1. Rx Xyzal/Flonase qHS   2. Start Mucinex DM PRN

## 2020-11-02 NOTE — TELEPHONE ENCOUNTER
----- Message from Destiney Kearney sent at 11/2/2020 10:34 AM CST -----  Contact: [phone]  Patient states she forgot her doctor's excuse for work. Please advise

## 2021-02-17 ENCOUNTER — PATIENT MESSAGE (OUTPATIENT)
Dept: SURGERY | Facility: CLINIC | Age: 26
End: 2021-02-17

## 2021-03-05 ENCOUNTER — TELEPHONE (OUTPATIENT)
Dept: SURGERY | Facility: CLINIC | Age: 26
End: 2021-03-05

## 2021-03-13 ENCOUNTER — PATIENT OUTREACH (OUTPATIENT)
Dept: ADMINISTRATIVE | Facility: OTHER | Age: 26
End: 2021-03-13

## 2021-03-16 ENCOUNTER — OFFICE VISIT (OUTPATIENT)
Dept: BARIATRICS | Facility: CLINIC | Age: 26
End: 2021-03-16
Payer: COMMERCIAL

## 2021-03-16 VITALS
HEART RATE: 75 BPM | HEIGHT: 68 IN | OXYGEN SATURATION: 98 % | BODY MASS INDEX: 44.41 KG/M2 | WEIGHT: 293 LBS | SYSTOLIC BLOOD PRESSURE: 132 MMHG | DIASTOLIC BLOOD PRESSURE: 85 MMHG

## 2021-03-16 DIAGNOSIS — F31.9 BIPOLAR 1 DISORDER: ICD-10-CM

## 2021-03-16 DIAGNOSIS — R73.03 PREDIABETES: ICD-10-CM

## 2021-03-16 DIAGNOSIS — E66.01 CLASS 3 SEVERE OBESITY DUE TO EXCESS CALORIES WITH SERIOUS COMORBIDITY AND BODY MASS INDEX (BMI) OF 45.0 TO 49.9 IN ADULT: Primary | ICD-10-CM

## 2021-03-16 PROCEDURE — 1126F PR PAIN SEVERITY QUANTIFIED, NO PAIN PRESENT: ICD-10-PCS | Mod: S$GLB,,, | Performed by: INTERNAL MEDICINE

## 2021-03-16 PROCEDURE — 99214 PR OFFICE/OUTPT VISIT, EST, LEVL IV, 30-39 MIN: ICD-10-PCS | Mod: S$GLB,,, | Performed by: INTERNAL MEDICINE

## 2021-03-16 PROCEDURE — 3008F BODY MASS INDEX DOCD: CPT | Mod: CPTII,S$GLB,, | Performed by: INTERNAL MEDICINE

## 2021-03-16 PROCEDURE — 99214 OFFICE O/P EST MOD 30 MIN: CPT | Mod: S$GLB,,, | Performed by: INTERNAL MEDICINE

## 2021-03-16 PROCEDURE — 1126F AMNT PAIN NOTED NONE PRSNT: CPT | Mod: S$GLB,,, | Performed by: INTERNAL MEDICINE

## 2021-03-16 PROCEDURE — 99999 PR PBB SHADOW E&M-EST. PATIENT-LVL IV: CPT | Mod: PBBFAC,,, | Performed by: INTERNAL MEDICINE

## 2021-03-16 PROCEDURE — 3008F PR BODY MASS INDEX (BMI) DOCUMENTED: ICD-10-PCS | Mod: CPTII,S$GLB,, | Performed by: INTERNAL MEDICINE

## 2021-03-16 PROCEDURE — 99999 PR PBB SHADOW E&M-EST. PATIENT-LVL IV: ICD-10-PCS | Mod: PBBFAC,,, | Performed by: INTERNAL MEDICINE

## 2021-03-16 RX ORDER — FLUOXETINE HYDROCHLORIDE 20 MG/1
20 CAPSULE ORAL DAILY
COMMUNITY
Start: 2021-03-09 | End: 2022-05-03

## 2021-03-16 RX ORDER — METFORMIN HYDROCHLORIDE 500 MG/1
500 TABLET ORAL 2 TIMES DAILY WITH MEALS
Qty: 180 TABLET | Refills: 0 | Status: SHIPPED | OUTPATIENT
Start: 2021-03-16 | End: 2021-06-07

## 2021-03-16 RX ORDER — ADAPALENE AND BENZOYL PEROXIDE GEL, 0.1%/2.5% 1; 25 MG/G; MG/G
1 GEL TOPICAL NIGHTLY
COMMUNITY
Start: 2021-03-05 | End: 2021-10-14

## 2021-03-19 ENCOUNTER — TELEPHONE (OUTPATIENT)
Dept: BARIATRICS | Facility: CLINIC | Age: 26
End: 2021-03-19

## 2021-04-09 ENCOUNTER — LAB VISIT (OUTPATIENT)
Dept: LAB | Facility: HOSPITAL | Age: 26
End: 2021-04-09
Attending: COLON & RECTAL SURGERY
Payer: COMMERCIAL

## 2021-04-09 ENCOUNTER — OFFICE VISIT (OUTPATIENT)
Dept: SURGERY | Facility: CLINIC | Age: 26
End: 2021-04-09
Attending: COLON & RECTAL SURGERY
Payer: COMMERCIAL

## 2021-04-09 ENCOUNTER — PATIENT MESSAGE (OUTPATIENT)
Dept: SURGERY | Facility: CLINIC | Age: 26
End: 2021-04-09

## 2021-04-09 ENCOUNTER — TELEPHONE (OUTPATIENT)
Dept: SURGERY | Facility: CLINIC | Age: 26
End: 2021-04-09

## 2021-04-09 VITALS
HEART RATE: 75 BPM | BODY MASS INDEX: 44.41 KG/M2 | WEIGHT: 293 LBS | HEIGHT: 68 IN | SYSTOLIC BLOOD PRESSURE: 133 MMHG | DIASTOLIC BLOOD PRESSURE: 90 MMHG

## 2021-04-09 DIAGNOSIS — K62.9 ANAL LESION: ICD-10-CM

## 2021-04-09 DIAGNOSIS — K62.9 ANAL LESION: Primary | ICD-10-CM

## 2021-04-09 LAB — HIV 1+2 AB+HIV1 P24 AG SERPL QL IA: NEGATIVE

## 2021-04-09 PROCEDURE — 88305 TISSUE EXAM BY PATHOLOGIST: ICD-10-PCS | Mod: 26,,, | Performed by: STUDENT IN AN ORGANIZED HEALTH CARE EDUCATION/TRAINING PROGRAM

## 2021-04-09 PROCEDURE — 11104 PUNCH BX SKIN SINGLE LESION: CPT | Mod: S$GLB,,, | Performed by: COLON & RECTAL SURGERY

## 2021-04-09 PROCEDURE — 87591 N.GONORRHOEAE DNA AMP PROB: CPT | Performed by: COLON & RECTAL SURGERY

## 2021-04-09 PROCEDURE — 86694 HERPES SIMPLEX NES ANTBDY: CPT | Performed by: COLON & RECTAL SURGERY

## 2021-04-09 PROCEDURE — 88305 TISSUE EXAM BY PATHOLOGIST: CPT | Mod: 26,,, | Performed by: STUDENT IN AN ORGANIZED HEALTH CARE EDUCATION/TRAINING PROGRAM

## 2021-04-09 PROCEDURE — 88305 TISSUE EXAM BY PATHOLOGIST: CPT | Performed by: STUDENT IN AN ORGANIZED HEALTH CARE EDUCATION/TRAINING PROGRAM

## 2021-04-09 PROCEDURE — 99999 PR PBB SHADOW E&M-EST. PATIENT-LVL III: CPT | Mod: PBBFAC,,, | Performed by: COLON & RECTAL SURGERY

## 2021-04-09 PROCEDURE — 36415 COLL VENOUS BLD VENIPUNCTURE: CPT | Performed by: COLON & RECTAL SURGERY

## 2021-04-09 PROCEDURE — 1126F PR PAIN SEVERITY QUANTIFIED, NO PAIN PRESENT: ICD-10-PCS | Mod: S$GLB,,, | Performed by: COLON & RECTAL SURGERY

## 2021-04-09 PROCEDURE — 86592 SYPHILIS TEST NON-TREP QUAL: CPT | Performed by: COLON & RECTAL SURGERY

## 2021-04-09 PROCEDURE — 3008F PR BODY MASS INDEX (BMI) DOCUMENTED: ICD-10-PCS | Mod: CPTII,S$GLB,, | Performed by: COLON & RECTAL SURGERY

## 2021-04-09 PROCEDURE — 1126F AMNT PAIN NOTED NONE PRSNT: CPT | Mod: S$GLB,,, | Performed by: COLON & RECTAL SURGERY

## 2021-04-09 PROCEDURE — 99213 OFFICE O/P EST LOW 20 MIN: CPT | Mod: 25,S$GLB,, | Performed by: COLON & RECTAL SURGERY

## 2021-04-09 PROCEDURE — 3008F BODY MASS INDEX DOCD: CPT | Mod: CPTII,S$GLB,, | Performed by: COLON & RECTAL SURGERY

## 2021-04-09 PROCEDURE — 86703 HIV-1/HIV-2 1 RESULT ANTBDY: CPT | Performed by: COLON & RECTAL SURGERY

## 2021-04-09 PROCEDURE — 99213 PR OFFICE/OUTPT VISIT, EST, LEVL III, 20-29 MIN: ICD-10-PCS | Mod: 25,S$GLB,, | Performed by: COLON & RECTAL SURGERY

## 2021-04-09 PROCEDURE — 11104 PR PUNCH BIOPSY, SKIN, SINGLE LESION: ICD-10-PCS | Mod: S$GLB,,, | Performed by: COLON & RECTAL SURGERY

## 2021-04-09 PROCEDURE — 99999 PR PBB SHADOW E&M-EST. PATIENT-LVL III: ICD-10-PCS | Mod: PBBFAC,,, | Performed by: COLON & RECTAL SURGERY

## 2021-04-10 LAB — RPR SER QL: NORMAL

## 2021-04-12 LAB — HSV AB, IGM BY EIA: 0.42 INDEX

## 2021-04-14 LAB — MAYO MISCELLANEOUS RESULT (REF): NORMAL

## 2021-04-15 ENCOUNTER — PATIENT MESSAGE (OUTPATIENT)
Dept: SURGERY | Facility: CLINIC | Age: 26
End: 2021-04-15

## 2021-04-15 ENCOUNTER — PATIENT MESSAGE (OUTPATIENT)
Dept: RESEARCH | Facility: HOSPITAL | Age: 26
End: 2021-04-15

## 2021-04-20 ENCOUNTER — TELEPHONE (OUTPATIENT)
Dept: SURGERY | Facility: CLINIC | Age: 26
End: 2021-04-20

## 2021-04-20 ENCOUNTER — PATIENT MESSAGE (OUTPATIENT)
Dept: SURGERY | Facility: CLINIC | Age: 26
End: 2021-04-20

## 2021-04-20 ENCOUNTER — OCCUPATIONAL HEALTH (OUTPATIENT)
Dept: URGENT CARE | Facility: CLINIC | Age: 26
End: 2021-04-20

## 2021-04-20 DIAGNOSIS — Z02.1 PRE-EMPLOYMENT EXAMINATION: Primary | ICD-10-CM

## 2021-04-20 PROCEDURE — 86480 TB TEST CELL IMMUN MEASURE: CPT | Mod: S$GLB,,, | Performed by: NURSE PRACTITIONER

## 2021-04-20 PROCEDURE — 86706 HEPATITIS B SURFACE ANTIBODY, QUANTITATIVE: ICD-10-PCS | Mod: S$GLB,,, | Performed by: NURSE PRACTITIONER

## 2021-04-20 PROCEDURE — 90715 TDAP VACCINE 7 YRS/> IM: CPT | Mod: S$GLB,,, | Performed by: NURSE PRACTITIONER

## 2021-04-20 PROCEDURE — 90686 IIV4 VACC NO PRSV 0.5 ML IM: CPT | Mod: S$GLB,,, | Performed by: NURSE PRACTITIONER

## 2021-04-20 PROCEDURE — 86799 MEASLES ANTIBODIES (IGG, IGM): CPT | Mod: S$GLB,,, | Performed by: NURSE PRACTITIONER

## 2021-04-20 PROCEDURE — 86480 QUANTIFERON GOLD TB: ICD-10-PCS | Mod: S$GLB,,, | Performed by: NURSE PRACTITIONER

## 2021-04-20 PROCEDURE — 86787 VARICELLA ZOSTER ANTIBODY, IGG: ICD-10-PCS | Mod: S$GLB,,, | Performed by: NURSE PRACTITIONER

## 2021-04-20 PROCEDURE — 99080 SPECIAL REPORTS OR FORMS: CPT | Mod: S$GLB,,, | Performed by: NURSE PRACTITIONER

## 2021-04-20 PROCEDURE — 90715 TDAP VACCINE GREATER THAN OR EQUAL TO 7YO IM: ICD-10-PCS | Mod: S$GLB,,, | Performed by: NURSE PRACTITIONER

## 2021-04-20 PROCEDURE — 90686 FLU VACCINE (QUAD) GREATER THAN OR EQUAL TO 3YO PRESERVATIVE FREE IM: ICD-10-PCS | Mod: S$GLB,,, | Performed by: NURSE PRACTITIONER

## 2021-04-20 PROCEDURE — 99002 DEVICE HANDLING PHYS/QHP: CPT | Mod: S$GLB,,, | Performed by: NURSE PRACTITIONER

## 2021-04-20 PROCEDURE — 99002 MASK FIT-QUALITATIVE: ICD-10-PCS | Mod: S$GLB,,, | Performed by: NURSE PRACTITIONER

## 2021-04-20 PROCEDURE — 99080 OSHA QUESTIONNAIRE: ICD-10-PCS | Mod: S$GLB,,, | Performed by: NURSE PRACTITIONER

## 2021-04-20 PROCEDURE — 86706 HEP B SURFACE ANTIBODY: CPT | Mod: S$GLB,,, | Performed by: NURSE PRACTITIONER

## 2021-04-20 PROCEDURE — 86799 MEASLES ANTIBODIES (IGG, IGM): ICD-10-PCS | Mod: S$GLB,,, | Performed by: NURSE PRACTITIONER

## 2021-04-20 PROCEDURE — 86787 VARICELLA-ZOSTER ANTIBODY: CPT | Mod: S$GLB,,, | Performed by: NURSE PRACTITIONER

## 2021-04-20 RX ORDER — OLANZAPINE 5 MG/1
5 TABLET ORAL NIGHTLY
COMMUNITY
Start: 2021-04-08 | End: 2021-10-14

## 2021-04-20 RX ORDER — FLUOXETINE HYDROCHLORIDE 40 MG/1
40 CAPSULE ORAL DAILY
COMMUNITY
Start: 2021-04-19 | End: 2021-10-14

## 2021-04-20 RX ORDER — BUSPIRONE HYDROCHLORIDE 10 MG/1
10 TABLET ORAL 2 TIMES DAILY
COMMUNITY
Start: 2021-04-08 | End: 2022-04-04

## 2021-04-22 ENCOUNTER — PATIENT MESSAGE (OUTPATIENT)
Dept: SURGERY | Facility: CLINIC | Age: 26
End: 2021-04-22

## 2021-04-22 LAB
GAMMA INTERFERON BACKGROUND BLD IA-ACNC: 0.05 IU/ML
HBV SURFACE AB SER-ACNC: 365.9 MIU/ML
M TB IFN-G BLD-IMP: NEGATIVE
M TB IFN-G CD4+ BCKGRND COR BLD-ACNC: 0.04 IU/ML
MEV IGG SER IA-ACNC: >300 AU/ML
MITOGEN IGNF BLD-ACNC: 4.39 IU/ML
MUV IGG SER IA-ACNC: <9 AU/ML
QUANTIFERON TB GOLD (INCUBATED): NORMAL
QUANTIFERON TB2 AG VALUE: 0.04 IU/ML
RUBV IGG SERPL IA-ACNC: 1.04 INDEX
SERVICE CMNT-IMP: NORMAL
VZV IGG SER IA-ACNC: <135 INDEX

## 2021-04-23 LAB
FINAL PATHOLOGIC DIAGNOSIS: NORMAL
GROSS: NORMAL
Lab: NORMAL
MICROSCOPIC EXAM: NORMAL

## 2021-05-17 ENCOUNTER — HOSPITAL ENCOUNTER (EMERGENCY)
Facility: HOSPITAL | Age: 26
Discharge: HOME | End: 2021-05-17
Attending: EMERGENCY MEDICINE
Payer: COMMERCIAL

## 2021-05-17 VITALS
DIASTOLIC BLOOD PRESSURE: 99 MMHG | HEART RATE: 67 BPM | SYSTOLIC BLOOD PRESSURE: 152 MMHG | OXYGEN SATURATION: 98 % | HEIGHT: 68 IN | RESPIRATION RATE: 18 BRPM | WEIGHT: 293 LBS | BODY MASS INDEX: 44.41 KG/M2 | TEMPERATURE: 98 F

## 2021-05-17 DIAGNOSIS — F41.9 ANXIETY: Primary | ICD-10-CM

## 2021-05-17 LAB
B-HCG UR QL: NEGATIVE
CTP QC/QA: YES

## 2021-05-17 PROCEDURE — 99282 EMERGENCY DEPT VISIT SF MDM: CPT

## 2021-05-17 PROCEDURE — 81025 URINE PREGNANCY TEST: CPT | Performed by: PHYSICIAN ASSISTANT

## 2021-07-06 ENCOUNTER — PATIENT MESSAGE (OUTPATIENT)
Dept: ADMINISTRATIVE | Facility: HOSPITAL | Age: 26
End: 2021-07-06

## 2021-07-09 ENCOUNTER — PATIENT MESSAGE (OUTPATIENT)
Dept: OBSTETRICS AND GYNECOLOGY | Facility: CLINIC | Age: 26
End: 2021-07-09

## 2021-07-09 ENCOUNTER — PATIENT MESSAGE (OUTPATIENT)
Dept: BARIATRICS | Facility: CLINIC | Age: 26
End: 2021-07-09

## 2021-07-09 DIAGNOSIS — E88.819 INSULIN RESISTANCE: ICD-10-CM

## 2021-07-09 DIAGNOSIS — R73.03 PREDIABETES: Primary | ICD-10-CM

## 2021-07-13 ENCOUNTER — PATIENT MESSAGE (OUTPATIENT)
Dept: BARIATRICS | Facility: CLINIC | Age: 26
End: 2021-07-13

## 2021-07-13 RX ORDER — SEMAGLUTIDE 1.34 MG/ML
0.5 INJECTION, SOLUTION SUBCUTANEOUS
Qty: 1 PEN | Refills: 0 | Status: SHIPPED | OUTPATIENT
Start: 2021-07-13 | End: 2021-10-14 | Stop reason: SDUPTHER

## 2021-07-16 ENCOUNTER — PATIENT MESSAGE (OUTPATIENT)
Dept: BARIATRICS | Facility: CLINIC | Age: 26
End: 2021-07-16

## 2021-07-16 ENCOUNTER — TELEPHONE (OUTPATIENT)
Dept: BARIATRICS | Facility: CLINIC | Age: 26
End: 2021-07-16

## 2021-07-19 ENCOUNTER — HOSPITAL ENCOUNTER (EMERGENCY)
Facility: HOSPITAL | Age: 26
Discharge: HOME | End: 2021-07-19
Attending: EMERGENCY MEDICINE
Payer: COMMERCIAL

## 2021-07-19 VITALS
RESPIRATION RATE: 18 BRPM | TEMPERATURE: 99 F | OXYGEN SATURATION: 99 % | WEIGHT: 293 LBS | DIASTOLIC BLOOD PRESSURE: 88 MMHG | BODY MASS INDEX: 44.41 KG/M2 | SYSTOLIC BLOOD PRESSURE: 129 MMHG | HEART RATE: 75 BPM | HEIGHT: 68 IN
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
## 2021-07-19 DIAGNOSIS — S43.402A SPRAIN OF LEFT SHOULDER, UNSPECIFIED SHOULDER SPRAIN TYPE, INITIAL ENCOUNTER: Primary | ICD-10-CM

## 2021-07-19 LAB
B-HCG UR QL: NEGATIVE
CTP QC/QA: YES

## 2021-07-19 PROCEDURE — 63600175 PHARM REV CODE 636 W HCPCS: Performed by: PHYSICIAN ASSISTANT

## 2021-07-19 PROCEDURE — 99284 EMERGENCY DEPT VISIT MOD MDM: CPT | Mod: 25

## 2021-07-19 PROCEDURE — 96372 THER/PROPH/DIAG INJ SC/IM: CPT

## 2021-07-19 PROCEDURE — 81025 URINE PREGNANCY TEST: CPT | Performed by: PHYSICIAN ASSISTANT

## 2021-07-19 RX ORDER — IBUPROFEN 600 MG/1
600 TABLET ORAL EVERY 6 HOURS PRN
Qty: 20 TABLET | Refills: 0 | Status: SHIPPED | OUTPATIENT
Start: 2021-07-19 | End: 2021-10-14

## 2021-07-19 RX ORDER — CLONAZEPAM 2 MG/1
2 TABLET ORAL 2 TIMES DAILY
COMMUNITY
End: 2022-04-04

## 2021-07-19 RX ORDER — KETOROLAC TROMETHAMINE 30 MG/ML
15 INJECTION, SOLUTION INTRAMUSCULAR; INTRAVENOUS
Status: COMPLETED | OUTPATIENT
Start: 2021-07-19 | End: 2021-07-19

## 2021-07-19 RX ADMIN — KETOROLAC TROMETHAMINE 15 MG: 30 INJECTION, SOLUTION INTRAMUSCULAR; INTRAVENOUS at 07:07

## 2021-07-28 ENCOUNTER — TELEPHONE (OUTPATIENT)
Dept: SPORTS MEDICINE | Facility: CLINIC | Age: 26
End: 2021-07-28

## 2021-10-04 ENCOUNTER — PATIENT MESSAGE (OUTPATIENT)
Dept: ADMINISTRATIVE | Facility: HOSPITAL | Age: 26
End: 2021-10-04

## 2021-10-11 ENCOUNTER — TELEPHONE (OUTPATIENT)
Dept: BARIATRICS | Facility: CLINIC | Age: 26
End: 2021-10-11

## 2021-10-11 ENCOUNTER — OFFICE VISIT (OUTPATIENT)
Dept: OBSTETRICS AND GYNECOLOGY | Facility: CLINIC | Age: 26
End: 2021-10-11
Payer: COMMERCIAL

## 2021-10-11 VITALS — WEIGHT: 293 LBS | HEIGHT: 68 IN | BODY MASS INDEX: 44.41 KG/M2

## 2021-10-11 DIAGNOSIS — Z01.419 WELL WOMAN EXAM WITH ROUTINE GYNECOLOGICAL EXAM: Primary | ICD-10-CM

## 2021-10-11 DIAGNOSIS — Z12.4 ENCOUNTER FOR PAPANICOLAOU SMEAR FOR CERVICAL CANCER SCREENING: ICD-10-CM

## 2021-10-11 DIAGNOSIS — Z11.3 SCREEN FOR STD (SEXUALLY TRANSMITTED DISEASE): ICD-10-CM

## 2021-10-11 PROCEDURE — 1159F MED LIST DOCD IN RCRD: CPT | Mod: CPTII,S$GLB,, | Performed by: OBSTETRICS & GYNECOLOGY

## 2021-10-11 PROCEDURE — 3008F PR BODY MASS INDEX (BMI) DOCUMENTED: ICD-10-PCS | Mod: CPTII,S$GLB,, | Performed by: OBSTETRICS & GYNECOLOGY

## 2021-10-11 PROCEDURE — 3008F BODY MASS INDEX DOCD: CPT | Mod: CPTII,S$GLB,, | Performed by: OBSTETRICS & GYNECOLOGY

## 2021-10-11 PROCEDURE — 99999 PR PBB SHADOW E&M-EST. PATIENT-LVL III: ICD-10-PCS | Mod: PBBFAC,,, | Performed by: OBSTETRICS & GYNECOLOGY

## 2021-10-11 PROCEDURE — 99395 PR PREVENTIVE VISIT,EST,18-39: ICD-10-PCS | Mod: S$GLB,,, | Performed by: OBSTETRICS & GYNECOLOGY

## 2021-10-11 PROCEDURE — 1159F PR MEDICATION LIST DOCUMENTED IN MEDICAL RECORD: ICD-10-PCS | Mod: CPTII,S$GLB,, | Performed by: OBSTETRICS & GYNECOLOGY

## 2021-10-11 PROCEDURE — 87491 CHLMYD TRACH DNA AMP PROBE: CPT | Performed by: OBSTETRICS & GYNECOLOGY

## 2021-10-11 PROCEDURE — 99999 PR PBB SHADOW E&M-EST. PATIENT-LVL III: CPT | Mod: PBBFAC,,, | Performed by: OBSTETRICS & GYNECOLOGY

## 2021-10-11 PROCEDURE — 99395 PREV VISIT EST AGE 18-39: CPT | Mod: S$GLB,,, | Performed by: OBSTETRICS & GYNECOLOGY

## 2021-10-11 PROCEDURE — 87481 CANDIDA DNA AMP PROBE: CPT | Mod: 59 | Performed by: OBSTETRICS & GYNECOLOGY

## 2021-10-11 PROCEDURE — 88175 CYTOPATH C/V AUTO FLUID REDO: CPT | Performed by: OBSTETRICS & GYNECOLOGY

## 2021-10-11 PROCEDURE — 87591 N.GONORRHOEAE DNA AMP PROB: CPT | Performed by: OBSTETRICS & GYNECOLOGY

## 2021-10-12 LAB
C TRACH DNA SPEC QL NAA+PROBE: NOT DETECTED
N GONORRHOEA DNA SPEC QL NAA+PROBE: NOT DETECTED

## 2021-10-13 ENCOUNTER — PATIENT MESSAGE (OUTPATIENT)
Dept: BARIATRICS | Facility: CLINIC | Age: 26
End: 2021-10-13
Payer: COMMERCIAL

## 2021-10-14 ENCOUNTER — OFFICE VISIT (OUTPATIENT)
Dept: BARIATRICS | Facility: CLINIC | Age: 26
End: 2021-10-14
Payer: COMMERCIAL

## 2021-10-14 ENCOUNTER — PATIENT OUTREACH (OUTPATIENT)
Dept: ADMINISTRATIVE | Facility: OTHER | Age: 26
End: 2021-10-14

## 2021-10-14 ENCOUNTER — PATIENT MESSAGE (OUTPATIENT)
Dept: OBSTETRICS AND GYNECOLOGY | Facility: CLINIC | Age: 26
End: 2021-10-14

## 2021-10-14 VITALS
HEART RATE: 84 BPM | WEIGHT: 293 LBS | BODY MASS INDEX: 44.41 KG/M2 | DIASTOLIC BLOOD PRESSURE: 77 MMHG | SYSTOLIC BLOOD PRESSURE: 133 MMHG | HEIGHT: 68 IN | OXYGEN SATURATION: 98 %

## 2021-10-14 DIAGNOSIS — N76.0 BACTERIAL VAGINOSIS: Primary | ICD-10-CM

## 2021-10-14 DIAGNOSIS — B37.31 YEAST VAGINITIS: ICD-10-CM

## 2021-10-14 DIAGNOSIS — E88.819 INSULIN RESISTANCE: ICD-10-CM

## 2021-10-14 DIAGNOSIS — E66.01 CLASS 3 SEVERE OBESITY DUE TO EXCESS CALORIES WITH SERIOUS COMORBIDITY AND BODY MASS INDEX (BMI) OF 45.0 TO 49.9 IN ADULT: Primary | ICD-10-CM

## 2021-10-14 DIAGNOSIS — R73.03 PREDIABETES: ICD-10-CM

## 2021-10-14 DIAGNOSIS — B96.89 BACTERIAL VAGINOSIS: Primary | ICD-10-CM

## 2021-10-14 LAB
BACTERIAL VAGINOSIS DNA: POSITIVE
CANDIDA GLABRATA DNA: NEGATIVE
CANDIDA KRUSEI DNA: NEGATIVE
CANDIDA RRNA VAG QL PROBE: POSITIVE
T VAGINALIS RRNA GENITAL QL PROBE: NEGATIVE

## 2021-10-14 PROCEDURE — 99999 PR PBB SHADOW E&M-EST. PATIENT-LVL IV: CPT | Mod: PBBFAC,,, | Performed by: INTERNAL MEDICINE

## 2021-10-14 PROCEDURE — 1159F MED LIST DOCD IN RCRD: CPT | Mod: CPTII,S$GLB,, | Performed by: INTERNAL MEDICINE

## 2021-10-14 PROCEDURE — 3078F DIAST BP <80 MM HG: CPT | Mod: CPTII,S$GLB,, | Performed by: INTERNAL MEDICINE

## 2021-10-14 PROCEDURE — 99215 OFFICE O/P EST HI 40 MIN: CPT | Mod: S$GLB,,, | Performed by: INTERNAL MEDICINE

## 2021-10-14 PROCEDURE — 1160F RVW MEDS BY RX/DR IN RCRD: CPT | Mod: CPTII,S$GLB,, | Performed by: INTERNAL MEDICINE

## 2021-10-14 PROCEDURE — 1159F PR MEDICATION LIST DOCUMENTED IN MEDICAL RECORD: ICD-10-PCS | Mod: CPTII,S$GLB,, | Performed by: INTERNAL MEDICINE

## 2021-10-14 PROCEDURE — 3075F SYST BP GE 130 - 139MM HG: CPT | Mod: CPTII,S$GLB,, | Performed by: INTERNAL MEDICINE

## 2021-10-14 PROCEDURE — 3078F PR MOST RECENT DIASTOLIC BLOOD PRESSURE < 80 MM HG: ICD-10-PCS | Mod: CPTII,S$GLB,, | Performed by: INTERNAL MEDICINE

## 2021-10-14 PROCEDURE — 3075F PR MOST RECENT SYSTOLIC BLOOD PRESS GE 130-139MM HG: ICD-10-PCS | Mod: CPTII,S$GLB,, | Performed by: INTERNAL MEDICINE

## 2021-10-14 PROCEDURE — 3008F BODY MASS INDEX DOCD: CPT | Mod: CPTII,S$GLB,, | Performed by: INTERNAL MEDICINE

## 2021-10-14 PROCEDURE — 99999 PR PBB SHADOW E&M-EST. PATIENT-LVL IV: ICD-10-PCS | Mod: PBBFAC,,, | Performed by: INTERNAL MEDICINE

## 2021-10-14 PROCEDURE — 99215 PR OFFICE/OUTPT VISIT, EST, LEVL V, 40-54 MIN: ICD-10-PCS | Mod: S$GLB,,, | Performed by: INTERNAL MEDICINE

## 2021-10-14 PROCEDURE — 1160F PR REVIEW ALL MEDS BY PRESCRIBER/CLIN PHARMACIST DOCUMENTED: ICD-10-PCS | Mod: CPTII,S$GLB,, | Performed by: INTERNAL MEDICINE

## 2021-10-14 PROCEDURE — 3008F PR BODY MASS INDEX (BMI) DOCUMENTED: ICD-10-PCS | Mod: CPTII,S$GLB,, | Performed by: INTERNAL MEDICINE

## 2021-10-14 RX ORDER — SEMAGLUTIDE 1.34 MG/ML
0.5 INJECTION, SOLUTION SUBCUTANEOUS
Qty: 3 PEN | Refills: 0 | Status: SHIPPED | OUTPATIENT
Start: 2021-10-14 | End: 2021-12-10 | Stop reason: CLARIF

## 2021-10-14 RX ORDER — SEMAGLUTIDE 1.34 MG/ML
0.5 INJECTION, SOLUTION SUBCUTANEOUS
Qty: 3 PEN | Refills: 0 | Status: SHIPPED | OUTPATIENT
Start: 2021-10-14 | End: 2021-10-14

## 2021-10-15 ENCOUNTER — PATIENT MESSAGE (OUTPATIENT)
Dept: OBSTETRICS AND GYNECOLOGY | Facility: CLINIC | Age: 26
End: 2021-10-15
Payer: COMMERCIAL

## 2021-10-15 RX ORDER — METRONIDAZOLE 500 MG/1
500 TABLET ORAL 2 TIMES DAILY
Qty: 14 TABLET | Refills: 0 | Status: SHIPPED | OUTPATIENT
Start: 2021-10-15 | End: 2021-10-22

## 2021-10-15 RX ORDER — FLUCONAZOLE 150 MG/1
150 TABLET ORAL ONCE
Qty: 2 TABLET | Refills: 0 | Status: SHIPPED | OUTPATIENT
Start: 2021-10-15 | End: 2021-10-15

## 2021-10-18 LAB
FINAL PATHOLOGIC DIAGNOSIS: NORMAL
Lab: NORMAL

## 2021-10-18 RX ORDER — FLUCONAZOLE 150 MG/1
150 TABLET ORAL ONCE
Qty: 1 TABLET | Refills: 0 | Status: SHIPPED | OUTPATIENT
Start: 2021-10-18 | End: 2021-10-18

## 2021-10-19 ENCOUNTER — TELEPHONE (OUTPATIENT)
Dept: BARIATRICS | Facility: CLINIC | Age: 26
End: 2021-10-19

## 2021-10-19 ENCOUNTER — PATIENT MESSAGE (OUTPATIENT)
Dept: OBSTETRICS AND GYNECOLOGY | Facility: CLINIC | Age: 26
End: 2021-10-19
Payer: COMMERCIAL

## 2021-10-20 ENCOUNTER — PATIENT MESSAGE (OUTPATIENT)
Dept: BARIATRICS | Facility: CLINIC | Age: 26
End: 2021-10-20
Payer: COMMERCIAL

## 2021-12-02 ENCOUNTER — OCCUPATIONAL HEALTH (OUTPATIENT)
Dept: URGENT CARE | Facility: CLINIC | Age: 26
End: 2021-12-02

## 2021-12-02 DIAGNOSIS — Z02.1 PRE-EMPLOYMENT EXAMINATION: Primary | ICD-10-CM

## 2021-12-02 PROCEDURE — 80305 OOH NON-DOT DRUG SCREEN: ICD-10-PCS | Mod: S$GLB,,, | Performed by: EMERGENCY MEDICINE

## 2021-12-02 PROCEDURE — 86706 HEPATITIS B SURFACE ANTIBODY, QUANTITATIVE: ICD-10-PCS | Mod: S$GLB,,, | Performed by: EMERGENCY MEDICINE

## 2021-12-02 PROCEDURE — 86706 HEP B SURFACE ANTIBODY: CPT | Mod: S$GLB,,, | Performed by: EMERGENCY MEDICINE

## 2021-12-02 PROCEDURE — 99499 PHYSICAL, BASIC COMPLEXITY: ICD-10-PCS | Mod: S$GLB,,, | Performed by: EMERGENCY MEDICINE

## 2021-12-02 PROCEDURE — 80305 DRUG TEST PRSMV DIR OPT OBS: CPT | Mod: S$GLB,,, | Performed by: EMERGENCY MEDICINE

## 2021-12-02 PROCEDURE — 99499 UNLISTED E&M SERVICE: CPT | Mod: S$GLB,,, | Performed by: EMERGENCY MEDICINE

## 2021-12-02 RX ORDER — BACLOFEN 20 MG/1
TABLET ORAL
COMMUNITY
Start: 2021-07-30 | End: 2021-12-10 | Stop reason: CLARIF

## 2021-12-10 ENCOUNTER — HOSPITAL ENCOUNTER (EMERGENCY)
Facility: HOSPITAL | Age: 26
Discharge: HOME | End: 2021-12-10
Attending: EMERGENCY MEDICINE
Payer: COMMERCIAL

## 2021-12-10 ENCOUNTER — PATIENT MESSAGE (OUTPATIENT)
Dept: OBSTETRICS AND GYNECOLOGY | Facility: CLINIC | Age: 26
End: 2021-12-10
Payer: COMMERCIAL

## 2021-12-10 VITALS
RESPIRATION RATE: 18 BRPM | DIASTOLIC BLOOD PRESSURE: 80 MMHG | SYSTOLIC BLOOD PRESSURE: 134 MMHG | BODY MASS INDEX: 44.41 KG/M2 | TEMPERATURE: 99 F | HEART RATE: 80 BPM | HEIGHT: 68 IN | WEIGHT: 293 LBS | OXYGEN SATURATION: 98 %

## 2021-12-10 DIAGNOSIS — R30.0 DYSURIA: Primary | ICD-10-CM

## 2021-12-10 DIAGNOSIS — N89.8 VAGINAL DISCHARGE: ICD-10-CM

## 2021-12-10 DIAGNOSIS — M25.562 ACUTE PAIN OF LEFT KNEE: ICD-10-CM

## 2021-12-10 LAB
B-HCG UR QL: NEGATIVE
BACTERIA #/AREA URNS HPF: NORMAL /HPF
BACTERIA GENITAL QL WET PREP: ABNORMAL
BILIRUB UR QL STRIP: NEGATIVE
CLARITY UR: CLEAR
CLUE CELLS VAG QL WET PREP: ABNORMAL
COLOR UR: COLORLESS
CTP QC/QA: YES
FILAMENT FUNGI VAG WET PREP-#/AREA: ABNORMAL
GLUCOSE UR QL STRIP: NEGATIVE
HGB UR QL STRIP: NEGATIVE
KETONES UR QL STRIP: NEGATIVE
LEUKOCYTE ESTERASE UR QL STRIP: ABNORMAL
MICROSCOPIC COMMENT: NORMAL
NITRITE UR QL STRIP: NEGATIVE
PH UR STRIP: 6 [PH] (ref 5–8)
PROT UR QL STRIP: NEGATIVE
RBC #/AREA URNS HPF: 2 /HPF (ref 0–4)
SP GR UR STRIP: 1 (ref 1–1.03)
SPECIMEN SOURCE: ABNORMAL
SQUAMOUS #/AREA URNS HPF: 6 /HPF
T VAGINALIS GENITAL QL WET PREP: ABNORMAL
URN SPEC COLLECT METH UR: ABNORMAL
UROBILINOGEN UR STRIP-ACNC: NEGATIVE EU/DL
WBC #/AREA URNS HPF: 3 /HPF (ref 0–5)
WBC #/AREA VAG WET PREP: ABNORMAL
YEAST GENITAL QL WET PREP: ABNORMAL

## 2021-12-10 PROCEDURE — 81025 URINE PREGNANCY TEST: CPT | Performed by: PHYSICIAN ASSISTANT

## 2021-12-10 PROCEDURE — 87210 SMEAR WET MOUNT SALINE/INK: CPT | Performed by: PHYSICIAN ASSISTANT

## 2021-12-10 PROCEDURE — 87086 URINE CULTURE/COLONY COUNT: CPT | Performed by: PHYSICIAN ASSISTANT

## 2021-12-10 PROCEDURE — 25000003 PHARM REV CODE 250: Performed by: PHYSICIAN ASSISTANT

## 2021-12-10 PROCEDURE — 99283 EMERGENCY DEPT VISIT LOW MDM: CPT | Mod: 25

## 2021-12-10 PROCEDURE — 81000 URINALYSIS NONAUTO W/SCOPE: CPT | Performed by: PHYSICIAN ASSISTANT

## 2021-12-10 RX ORDER — FLUCONAZOLE 150 MG/1
150 TABLET ORAL ONCE
Qty: 2 TABLET | Refills: 1 | Status: SHIPPED | OUTPATIENT
Start: 2021-12-10 | End: 2021-12-10

## 2021-12-10 RX ORDER — ACETAMINOPHEN 500 MG
1000 TABLET ORAL
Status: COMPLETED | OUTPATIENT
Start: 2021-12-10 | End: 2021-12-10

## 2021-12-10 RX ADMIN — ACETAMINOPHEN 1000 MG: 500 TABLET ORAL at 10:12

## 2021-12-13 LAB — BACTERIA UR CULT: NORMAL

## 2021-12-28 ENCOUNTER — OFFICE VISIT (OUTPATIENT)
Dept: INTERNAL MEDICINE | Facility: CLINIC | Age: 26
End: 2021-12-28
Payer: COMMERCIAL

## 2021-12-28 VITALS
TEMPERATURE: 98 F | DIASTOLIC BLOOD PRESSURE: 80 MMHG | BODY MASS INDEX: 44.41 KG/M2 | HEIGHT: 68 IN | HEART RATE: 76 BPM | SYSTOLIC BLOOD PRESSURE: 110 MMHG | WEIGHT: 293 LBS | OXYGEN SATURATION: 97 %

## 2021-12-28 DIAGNOSIS — R35.89 POLYURIA: Primary | ICD-10-CM

## 2021-12-28 PROCEDURE — 99999 PR PBB SHADOW E&M-EST. PATIENT-LVL IV: CPT | Mod: PBBFAC,,, | Performed by: STUDENT IN AN ORGANIZED HEALTH CARE EDUCATION/TRAINING PROGRAM

## 2021-12-28 PROCEDURE — 99999 PR PBB SHADOW E&M-EST. PATIENT-LVL IV: ICD-10-PCS | Mod: PBBFAC,,, | Performed by: STUDENT IN AN ORGANIZED HEALTH CARE EDUCATION/TRAINING PROGRAM

## 2021-12-28 PROCEDURE — 99214 PR OFFICE/OUTPT VISIT, EST, LEVL IV, 30-39 MIN: ICD-10-PCS | Mod: S$GLB,,, | Performed by: STUDENT IN AN ORGANIZED HEALTH CARE EDUCATION/TRAINING PROGRAM

## 2021-12-28 PROCEDURE — 99214 OFFICE O/P EST MOD 30 MIN: CPT | Mod: S$GLB,,, | Performed by: STUDENT IN AN ORGANIZED HEALTH CARE EDUCATION/TRAINING PROGRAM

## 2021-12-28 RX ORDER — FLUCONAZOLE 150 MG/1
150 TABLET ORAL ONCE
COMMUNITY
Start: 2021-12-10 | End: 2022-04-04

## 2021-12-28 RX ORDER — TRAZODONE HYDROCHLORIDE 50 MG/1
TABLET ORAL
COMMUNITY
Start: 2021-09-23 | End: 2022-04-04

## 2021-12-28 RX ORDER — IBUPROFEN 800 MG/1
TABLET ORAL
COMMUNITY
Start: 2021-07-30 | End: 2022-04-04

## 2021-12-28 RX ORDER — LURASIDONE HYDROCHLORIDE 40 MG/1
40 TABLET, FILM COATED ORAL NIGHTLY
COMMUNITY
Start: 2021-12-27 | End: 2023-01-04

## 2021-12-28 RX ORDER — CLONAZEPAM 1 MG/1
1 TABLET ORAL 2 TIMES DAILY PRN
COMMUNITY
Start: 2021-12-23 | End: 2023-10-04

## 2021-12-28 RX ORDER — BUSPIRONE HYDROCHLORIDE 15 MG/1
15 TABLET ORAL 2 TIMES DAILY
COMMUNITY
Start: 2021-12-07 | End: 2024-01-30

## 2021-12-29 ENCOUNTER — LAB VISIT (OUTPATIENT)
Dept: LAB | Facility: HOSPITAL | Age: 26
End: 2021-12-29
Attending: STUDENT IN AN ORGANIZED HEALTH CARE EDUCATION/TRAINING PROGRAM
Payer: COMMERCIAL

## 2021-12-29 ENCOUNTER — PATIENT MESSAGE (OUTPATIENT)
Dept: INTERNAL MEDICINE | Facility: CLINIC | Age: 26
End: 2021-12-29
Payer: COMMERCIAL

## 2021-12-29 ENCOUNTER — TELEPHONE (OUTPATIENT)
Dept: INTERNAL MEDICINE | Facility: CLINIC | Age: 26
End: 2021-12-29
Payer: COMMERCIAL

## 2021-12-29 DIAGNOSIS — R35.89 POLYURIA: ICD-10-CM

## 2021-12-29 LAB
ANION GAP SERPL CALC-SCNC: 15 MMOL/L (ref 8–16)
BUN SERPL-MCNC: 12 MG/DL (ref 6–20)
CALCIUM SERPL-MCNC: 9.1 MG/DL (ref 8.7–10.5)
CHLORIDE SERPL-SCNC: 104 MMOL/L (ref 95–110)
CO2 SERPL-SCNC: 20 MMOL/L (ref 23–29)
CREAT SERPL-MCNC: 0.8 MG/DL (ref 0.5–1.4)
EST. GFR  (AFRICAN AMERICAN): >60 ML/MIN/1.73 M^2
EST. GFR  (NON AFRICAN AMERICAN): >60 ML/MIN/1.73 M^2
ESTIMATED AVG GLUCOSE: 114 MG/DL (ref 68–131)
GLUCOSE SERPL-MCNC: 90 MG/DL (ref 70–110)
HBA1C MFR BLD: 5.6 % (ref 4–5.6)
POTASSIUM SERPL-SCNC: 4.4 MMOL/L (ref 3.5–5.1)
SODIUM SERPL-SCNC: 139 MMOL/L (ref 136–145)
T4 SERPL-MCNC: 7.7 UG/DL (ref 4.5–11.5)
TSH SERPL DL<=0.005 MIU/L-ACNC: 0.76 UIU/ML (ref 0.4–4)

## 2021-12-29 PROCEDURE — 36415 COLL VENOUS BLD VENIPUNCTURE: CPT | Performed by: STUDENT IN AN ORGANIZED HEALTH CARE EDUCATION/TRAINING PROGRAM

## 2021-12-29 PROCEDURE — 84443 ASSAY THYROID STIM HORMONE: CPT | Performed by: STUDENT IN AN ORGANIZED HEALTH CARE EDUCATION/TRAINING PROGRAM

## 2021-12-29 PROCEDURE — 80048 BASIC METABOLIC PNL TOTAL CA: CPT | Performed by: STUDENT IN AN ORGANIZED HEALTH CARE EDUCATION/TRAINING PROGRAM

## 2021-12-29 PROCEDURE — 83036 HEMOGLOBIN GLYCOSYLATED A1C: CPT | Performed by: STUDENT IN AN ORGANIZED HEALTH CARE EDUCATION/TRAINING PROGRAM

## 2021-12-29 PROCEDURE — 84436 ASSAY OF TOTAL THYROXINE: CPT | Performed by: STUDENT IN AN ORGANIZED HEALTH CARE EDUCATION/TRAINING PROGRAM

## 2022-01-10 ENCOUNTER — PATIENT MESSAGE (OUTPATIENT)
Dept: BARIATRICS | Facility: CLINIC | Age: 27
End: 2022-01-10
Payer: COMMERCIAL

## 2022-01-10 ENCOUNTER — TELEPHONE (OUTPATIENT)
Dept: BARIATRICS | Facility: CLINIC | Age: 27
End: 2022-01-10
Payer: COMMERCIAL

## 2022-01-10 NOTE — TELEPHONE ENCOUNTER
----- Message from Sarah Muro, Patient Care Assistant sent at 1/10/2022  1:39 PM CST -----  Regarding: call back  Contact: Pt  Pt is requesting a call back in regards to weight loss diet information.        Pt @ [phone]

## 2022-01-24 ENCOUNTER — OFFICE VISIT (OUTPATIENT)
Dept: INTERNAL MEDICINE | Facility: CLINIC | Age: 27
End: 2022-01-24
Payer: COMMERCIAL

## 2022-01-24 VITALS
DIASTOLIC BLOOD PRESSURE: 82 MMHG | HEART RATE: 79 BPM | HEIGHT: 68 IN | OXYGEN SATURATION: 98 % | WEIGHT: 293 LBS | SYSTOLIC BLOOD PRESSURE: 118 MMHG | BODY MASS INDEX: 44.41 KG/M2

## 2022-01-24 DIAGNOSIS — Z87.898 HX OF DIARRHEA: Primary | ICD-10-CM

## 2022-01-24 PROCEDURE — 99214 OFFICE O/P EST MOD 30 MIN: CPT | Mod: PBBFAC | Performed by: INTERNAL MEDICINE

## 2022-01-24 PROCEDURE — 99213 PR OFFICE/OUTPT VISIT, EST, LEVL III, 20-29 MIN: ICD-10-PCS | Mod: S$GLB,,, | Performed by: INTERNAL MEDICINE

## 2022-01-24 PROCEDURE — 99213 OFFICE O/P EST LOW 20 MIN: CPT | Mod: S$GLB,,, | Performed by: INTERNAL MEDICINE

## 2022-01-24 PROCEDURE — 99999 PR PBB SHADOW E&M-EST. PATIENT-LVL IV: CPT | Mod: PBBFAC,,, | Performed by: INTERNAL MEDICINE

## 2022-01-24 PROCEDURE — 99999 PR PBB SHADOW E&M-EST. PATIENT-LVL IV: ICD-10-PCS | Mod: PBBFAC,,, | Performed by: INTERNAL MEDICINE

## 2022-01-24 NOTE — PROGRESS NOTES
"Subjective:       Patient ID: Misael Garcia is a 26 y.o. female.    Chief Complaint: Diarrhea    HPI   27 yo F here for urgent evaluation of diarrhea.   During her menses she gets diarrhea two or three times per day. Lasts the length of her period.   Her periods are heavy initially than light and last 7 days and once per month.   Just started in the last year. Some stomach pains when she first starts her period, cramping. She is on period currently, first day 1/20/22. Continues on same bipolar and anxiety meds.     Review of Systems   Constitutional: Negative for fever.   Respiratory: Negative for shortness of breath.    Cardiovascular: Negative for chest pain.   Musculoskeletal: Negative.    Skin: Negative.        Objective:   /82   Pulse 79   Ht 5' 8" (1.727 m)   Wt (!) 148.1 kg (326 lb 9.8 oz)   LMP 12/21/2021 (Exact Date)   SpO2 98%   BMI 49.66 kg/m²      Physical Exam  Constitutional:       General: She is not in acute distress.     Appearance: She is well-developed and well-nourished. She is not diaphoretic.   HENT:      Head: Normocephalic and atraumatic.   Cardiovascular:      Rate and Rhythm: Normal rate and regular rhythm.   Pulmonary:      Effort: Pulmonary effort is normal. No respiratory distress.      Breath sounds: No wheezing or rales.   Abdominal:      General: Bowel sounds are normal. There is no distension.      Palpations: Abdomen is soft. There is no mass.      Tenderness: There is no abdominal tenderness. There is no guarding.   Skin:     General: Skin is warm and dry.   Neurological:      Mental Status: She is alert and oriented to person, place, and time.   Psychiatric:         Mood and Affect: Mood and affect normal.         Behavior: Behavior normal.         Assessment:       1. Hx of diarrhea        Plan:       Misael was seen today for diarrhea.    Diagnoses and all orders for this visit:    Hx of diarrhea  No red flag symptoms. Appears to be related to menses. Will " monitor and follow up with gynecology.

## 2022-01-24 NOTE — LETTER
January 24, 2022    Misael Peterson Mae Jose  [address]  [address]  [place] [address]             [facility]  [address]  [facility] [address]  Phone: [phone]  Fax: [phone] To Whom It May Concern:    Misael Garcia was seen by me today. Please excuse her from work today due to this visit.       Sincerely,        Kayla Carrasco MD

## 2022-02-21 ENCOUNTER — TELEPHONE (OUTPATIENT)
Dept: BARIATRICS | Facility: CLINIC | Age: 27
End: 2022-02-21
Payer: COMMERCIAL

## 2022-02-21 NOTE — TELEPHONE ENCOUNTER
Attempted to contact the patient. Patient did not answer. Left detailed voicemail and requested a callback.   Patient was advised that the doctor has no other options for her and to return call to be scheduled for surgery consult.

## 2022-03-25 ENCOUNTER — LAB VISIT (OUTPATIENT)
Dept: LAB | Facility: HOSPITAL | Age: 27
End: 2022-03-25
Attending: NURSE PRACTITIONER
Payer: COMMERCIAL

## 2022-03-25 ENCOUNTER — PATIENT MESSAGE (OUTPATIENT)
Dept: INTERNAL MEDICINE | Facility: CLINIC | Age: 27
End: 2022-03-25
Payer: COMMERCIAL

## 2022-03-25 ENCOUNTER — OFFICE VISIT (OUTPATIENT)
Dept: INTERNAL MEDICINE | Facility: CLINIC | Age: 27
End: 2022-03-25
Payer: COMMERCIAL

## 2022-03-25 VITALS
RESPIRATION RATE: 16 BRPM | BODY MASS INDEX: 44.41 KG/M2 | HEART RATE: 65 BPM | HEIGHT: 68 IN | WEIGHT: 293 LBS | TEMPERATURE: 99 F | SYSTOLIC BLOOD PRESSURE: 128 MMHG | OXYGEN SATURATION: 97 % | DIASTOLIC BLOOD PRESSURE: 90 MMHG

## 2022-03-25 DIAGNOSIS — F41.9 ANXIETY AND DEPRESSION: ICD-10-CM

## 2022-03-25 DIAGNOSIS — E66.01 OBESITY, CLASS III, BMI 40-49.9 (MORBID OBESITY): ICD-10-CM

## 2022-03-25 DIAGNOSIS — R73.03 PRE-DIABETES: ICD-10-CM

## 2022-03-25 DIAGNOSIS — R19.7 DIARRHEA, UNSPECIFIED TYPE: ICD-10-CM

## 2022-03-25 DIAGNOSIS — R06.02 SOB (SHORTNESS OF BREATH): ICD-10-CM

## 2022-03-25 DIAGNOSIS — F32.A ANXIETY AND DEPRESSION: ICD-10-CM

## 2022-03-25 DIAGNOSIS — R63.5 WEIGHT GAIN: Primary | ICD-10-CM

## 2022-03-25 DIAGNOSIS — M54.9 UPPER BACK PAIN: ICD-10-CM

## 2022-03-25 DIAGNOSIS — R63.5 WEIGHT GAIN: ICD-10-CM

## 2022-03-25 LAB
ALBUMIN SERPL BCP-MCNC: 3.7 G/DL (ref 3.5–5.2)
ALP SERPL-CCNC: 120 U/L (ref 55–135)
ALT SERPL W/O P-5'-P-CCNC: 27 U/L (ref 10–44)
ANION GAP SERPL CALC-SCNC: 9 MMOL/L (ref 8–16)
AST SERPL-CCNC: 21 U/L (ref 10–40)
BASOPHILS # BLD AUTO: 0.03 K/UL (ref 0–0.2)
BASOPHILS NFR BLD: 0.3 % (ref 0–1.9)
BILIRUB SERPL-MCNC: 0.3 MG/DL (ref 0.1–1)
BUN SERPL-MCNC: 9 MG/DL (ref 6–20)
CALCIUM SERPL-MCNC: 9.6 MG/DL (ref 8.7–10.5)
CHLORIDE SERPL-SCNC: 102 MMOL/L (ref 95–110)
CO2 SERPL-SCNC: 25 MMOL/L (ref 23–29)
CREAT SERPL-MCNC: 0.7 MG/DL (ref 0.5–1.4)
DIFFERENTIAL METHOD: ABNORMAL
EOSINOPHIL # BLD AUTO: 0.3 K/UL (ref 0–0.5)
EOSINOPHIL NFR BLD: 2.2 % (ref 0–8)
ERYTHROCYTE [DISTWIDTH] IN BLOOD BY AUTOMATED COUNT: 14.7 % (ref 11.5–14.5)
EST. GFR  (AFRICAN AMERICAN): >60 ML/MIN/1.73 M^2
EST. GFR  (NON AFRICAN AMERICAN): >60 ML/MIN/1.73 M^2
ESTIMATED AVG GLUCOSE: 117 MG/DL (ref 68–131)
GLUCOSE SERPL-MCNC: 78 MG/DL (ref 70–110)
HBA1C MFR BLD: 5.7 % (ref 4–5.6)
HCT VFR BLD AUTO: 41.3 % (ref 37–48.5)
HGB BLD-MCNC: 12.5 G/DL (ref 12–16)
IMM GRANULOCYTES # BLD AUTO: 0.04 K/UL (ref 0–0.04)
IMM GRANULOCYTES NFR BLD AUTO: 0.4 % (ref 0–0.5)
LYMPHOCYTES # BLD AUTO: 3.4 K/UL (ref 1–4.8)
LYMPHOCYTES NFR BLD: 30.5 % (ref 18–48)
MCH RBC QN AUTO: 25.6 PG (ref 27–31)
MCHC RBC AUTO-ENTMCNC: 30.3 G/DL (ref 32–36)
MCV RBC AUTO: 85 FL (ref 82–98)
MONOCYTES # BLD AUTO: 0.7 K/UL (ref 0.3–1)
MONOCYTES NFR BLD: 6 % (ref 4–15)
NEUTROPHILS # BLD AUTO: 6.8 K/UL (ref 1.8–7.7)
NEUTROPHILS NFR BLD: 60.6 % (ref 38–73)
NRBC BLD-RTO: 0 /100 WBC
PLATELET # BLD AUTO: 565 K/UL (ref 150–450)
PMV BLD AUTO: 9.5 FL (ref 9.2–12.9)
POTASSIUM SERPL-SCNC: 4 MMOL/L (ref 3.5–5.1)
PROT SERPL-MCNC: 7.7 G/DL (ref 6–8.4)
RBC # BLD AUTO: 4.88 M/UL (ref 4–5.4)
SODIUM SERPL-SCNC: 136 MMOL/L (ref 136–145)
T4 FREE SERPL-MCNC: 0.89 NG/DL (ref 0.71–1.51)
TSH SERPL DL<=0.005 MIU/L-ACNC: 0.61 UIU/ML (ref 0.4–4)
WBC # BLD AUTO: 11.28 K/UL (ref 3.9–12.7)

## 2022-03-25 PROCEDURE — 99999 PR PBB SHADOW E&M-EST. PATIENT-LVL V: ICD-10-PCS | Mod: PBBFAC,,, | Performed by: NURSE PRACTITIONER

## 2022-03-25 PROCEDURE — 3074F SYST BP LT 130 MM HG: CPT | Mod: CPTII,S$GLB,, | Performed by: NURSE PRACTITIONER

## 2022-03-25 PROCEDURE — 84439 ASSAY OF FREE THYROXINE: CPT | Performed by: NURSE PRACTITIONER

## 2022-03-25 PROCEDURE — 85025 COMPLETE CBC W/AUTO DIFF WBC: CPT | Performed by: NURSE PRACTITIONER

## 2022-03-25 PROCEDURE — 3008F BODY MASS INDEX DOCD: CPT | Mod: CPTII,S$GLB,, | Performed by: NURSE PRACTITIONER

## 2022-03-25 PROCEDURE — 36415 COLL VENOUS BLD VENIPUNCTURE: CPT | Performed by: NURSE PRACTITIONER

## 2022-03-25 PROCEDURE — 3044F HG A1C LEVEL LT 7.0%: CPT | Mod: CPTII,S$GLB,, | Performed by: NURSE PRACTITIONER

## 2022-03-25 PROCEDURE — 80053 COMPREHEN METABOLIC PANEL: CPT | Performed by: NURSE PRACTITIONER

## 2022-03-25 PROCEDURE — 99214 OFFICE O/P EST MOD 30 MIN: CPT | Mod: S$GLB,,, | Performed by: NURSE PRACTITIONER

## 2022-03-25 PROCEDURE — 1159F PR MEDICATION LIST DOCUMENTED IN MEDICAL RECORD: ICD-10-PCS | Mod: CPTII,S$GLB,, | Performed by: NURSE PRACTITIONER

## 2022-03-25 PROCEDURE — 83036 HEMOGLOBIN GLYCOSYLATED A1C: CPT | Performed by: NURSE PRACTITIONER

## 2022-03-25 PROCEDURE — 3074F PR MOST RECENT SYSTOLIC BLOOD PRESSURE < 130 MM HG: ICD-10-PCS | Mod: CPTII,S$GLB,, | Performed by: NURSE PRACTITIONER

## 2022-03-25 PROCEDURE — 3008F PR BODY MASS INDEX (BMI) DOCUMENTED: ICD-10-PCS | Mod: CPTII,S$GLB,, | Performed by: NURSE PRACTITIONER

## 2022-03-25 PROCEDURE — 1160F PR REVIEW ALL MEDS BY PRESCRIBER/CLIN PHARMACIST DOCUMENTED: ICD-10-PCS | Mod: CPTII,S$GLB,, | Performed by: NURSE PRACTITIONER

## 2022-03-25 PROCEDURE — 1159F MED LIST DOCD IN RCRD: CPT | Mod: CPTII,S$GLB,, | Performed by: NURSE PRACTITIONER

## 2022-03-25 PROCEDURE — 3080F DIAST BP >= 90 MM HG: CPT | Mod: CPTII,S$GLB,, | Performed by: NURSE PRACTITIONER

## 2022-03-25 PROCEDURE — 1160F RVW MEDS BY RX/DR IN RCRD: CPT | Mod: CPTII,S$GLB,, | Performed by: NURSE PRACTITIONER

## 2022-03-25 PROCEDURE — 3080F PR MOST RECENT DIASTOLIC BLOOD PRESSURE >= 90 MM HG: ICD-10-PCS | Mod: CPTII,S$GLB,, | Performed by: NURSE PRACTITIONER

## 2022-03-25 PROCEDURE — 99999 PR PBB SHADOW E&M-EST. PATIENT-LVL V: CPT | Mod: PBBFAC,,, | Performed by: NURSE PRACTITIONER

## 2022-03-25 PROCEDURE — 99214 PR OFFICE/OUTPT VISIT, EST, LEVL IV, 30-39 MIN: ICD-10-PCS | Mod: S$GLB,,, | Performed by: NURSE PRACTITIONER

## 2022-03-25 PROCEDURE — 84443 ASSAY THYROID STIM HORMONE: CPT | Performed by: NURSE PRACTITIONER

## 2022-03-25 PROCEDURE — 3044F PR MOST RECENT HEMOGLOBIN A1C LEVEL <7.0%: ICD-10-PCS | Mod: CPTII,S$GLB,, | Performed by: NURSE PRACTITIONER

## 2022-03-25 RX ORDER — METHYLPHENIDATE HYDROCHLORIDE 18 MG/1
18 TABLET ORAL EVERY MORNING
COMMUNITY
Start: 2022-02-01 | End: 2023-01-04

## 2022-03-25 NOTE — PROGRESS NOTES
[facility] Note    Chief Complaint      Chief Complaint   Patient presents with    Diarrhea    Nausea    Back Pain     History of Present Illness      Misael Garcia is a 27 y.o. female patient of Dr. martinez who is new to me and presents today for concerns of weight gain since 2016. Admits to not eating a healthy diet, very busy, works long hours, eats fast foods.   Currently feeling nauseated, has been having diarrhea, c/o low back pain  Concerns of anxiety/depression-current meds not helping much, thinks this is the cause of her weight gain since 2016. Reports this is when she started taking these meds, would like a referral to an [facility]    Health Maintenance   Topic Date Due    Hepatitis C Screening  Never done    Pap Smear  10/11/2024    TETANUS VACCINE  04/20/2031    Lipid Panel  Completed    HPV Vaccines  Completed       Past Medical History:   Diagnosis Date    ADHD (attention deficit hyperactivity disorder)     Anxiety     Bipolar affective     History of chlamydia 12/2016    Insomnia     Morbid obesity with BMI of 40.0-44.9, adult     Sleep disorder        Past Surgical History:   Procedure Laterality Date    NO PAST SURGERIES      as of 1-13-17       family history includes Breast cancer in her paternal grandmother; Diabetes in her maternal grandmother; No Known Problems in her father, maternal grandfather, mother, paternal grandfather, and sister.    Social History     Tobacco Use    Smoking status: Current Every Day Smoker     Types: Vaping with nicotine    Smokeless tobacco: Never Used    Tobacco comment: boyfriend smoke cigarettes   Substance Use Topics    Alcohol use: No    Drug use: Yes     Types: Marijuana       Review of Systems   Constitutional: Positive for malaise/fatigue. Negative for chills and fever.   Respiratory: Negative for cough and shortness of breath.    Cardiovascular: Negative for chest pain.   Gastrointestinal: Positive  "for abdominal pain, diarrhea and nausea. Negative for vomiting.   Musculoskeletal: Positive for back pain.   Neurological: Negative for dizziness and headaches.        Outpatient Encounter Medications as of 3/25/2022   Medication Sig Note Dispense Refill    busPIRone (BUSPAR) 15 MG tablet Take 15 mg by mouth 2 (two) times daily.       clonazePAM (KLONOPIN) 1 MG tablet Take 1 mg by mouth 2 (two) times daily as needed.       FLUoxetine 20 MG capsule Take 20 mg by mouth once daily. 7/19/2021: Pt not taking      LATUDA 40 mg Tab tablet Take 40 mg by mouth nightly.       methylphenidate HCl 18 MG CR tablet Take 18 mg by mouth every morning.       [DISCONTINUED] ibuprofen (ADVIL,MOTRIN) 800 MG tablet        [DISCONTINUED] lurasidone HCl (LATUDA ORAL) Take by mouth.       sars-cov-2, covid-19, (MODERNA COVID-19) 100 mcg/0.5 ml injection        [DISCONTINUED] busPIRone (BUSPAR) 10 MG tablet Take 10 mg by mouth 2 (two) times daily.       [DISCONTINUED] clonazePAM (KLONOPIN) 2 MG Tab Take 2 mg by mouth 2 (two) times daily.       [DISCONTINUED] fluconazole (DIFLUCAN) 150 MG Tab Take 150 mg by mouth once.       [DISCONTINUED] traZODone (DESYREL) 50 MG tablet         No facility-administered encounter medications on file as of 3/25/2022.        Review of patient's allergies indicates:  No Known Allergies    Physical Exam      Vital Signs  Temp: 98.9 °F (37.2 °C)  Temp src: Oral  Pulse: 65  Resp: 16  SpO2: 97 %  BP: (!) 128/90  BP Location: Left arm  Patient Position: Sitting  Height and Weight  Height: 5' 8" (172.7 cm)  Weight: (!) 150.4 kg (331 lb 10.9 oz)  BSA (Calculated - sq m): 2.69 sq meters  BMI (Calculated): 50.4  Weight in (lb) to have BMI = 25: 164.1    Physical Exam  Vitals and nursing note reviewed.   Constitutional:       General: She is not in acute distress.     Appearance: Normal appearance. She is not ill-appearing.   HENT:      Head: Normocephalic and atraumatic.   Cardiovascular:      Rate and " Rhythm: Normal rate and regular rhythm.      Heart sounds: Normal heart sounds.   Pulmonary:      Effort: Pulmonary effort is normal. No respiratory distress.   Skin:     General: Skin is warm and dry.   Neurological:      Mental Status: She is alert and oriented to person, place, and time.   Psychiatric:         Mood and Affect: Mood normal.         Behavior: Behavior normal.         Thought Content: Thought content normal.         Judgment: Judgment normal.          Laboratory:  CBC:  Lab Results   Component Value Date    WBC 11.28 03/25/2022    RBC 4.88 03/25/2022    HGB 12.5 03/25/2022    HCT 41.3 03/25/2022     (H) 03/25/2022    MCV 85 03/25/2022    MCH 25.6 (L) 03/25/2022    MCHC 30.3 (L) 03/25/2022    MCHC 29.6 (L) 05/13/2020    MCHC 32.5 04/29/2018     CMP:  Lab Results   Component Value Date    GLU 78 03/25/2022    CALCIUM 9.6 03/25/2022    ALBUMIN 3.7 03/25/2022    PROT 7.7 03/25/2022     03/25/2022    K 4.0 03/25/2022    CO2 25 03/25/2022     03/25/2022    BUN 9 03/25/2022    ALKPHOS 120 03/25/2022    ALT 27 03/25/2022    AST 21 03/25/2022    BILITOT 0.3 03/25/2022    BILITOT 0.3 05/13/2020    BILITOT 0.2 04/29/2018     URINALYSIS:  Lab Results   Component Value Date    COLORU Colorless (A) 12/10/2021    SPECGRAV 1.005 12/10/2021    PHUR 6.0 12/10/2021    PROTEINUA Negative 12/10/2021    BACTERIA Rare 12/10/2021    NITRITE Negative 12/10/2021    LEUKOCYTESUR 3+ (A) 12/10/2021    UROBILINOGEN Negative 12/10/2021    HYALINECASTS 0 04/29/2018      LIPIDS:  Lab Results   Component Value Date    TSH 0.612 03/25/2022    TSH 0.755 12/29/2021    TSH 1.086 05/13/2020    HDL 49 05/13/2020    HDL 70 05/04/2018    CHOL 145 05/13/2020    CHOL 190 05/04/2018    TRIG 52 05/13/2020    TRIG 82 05/04/2018    LDLCALC 85.6 05/13/2020    LDLCALC 103.6 05/04/2018    CHOLHDL 33.8 05/13/2020    CHOLHDL 36.8 05/04/2018    NONHDLCHOL 96 05/13/2020    NONHDLCHOL 120 05/04/2018    TOTALCHOLEST 3.0 05/13/2020     TOTALCHOLEST 2.7 05/04/2018     TSH:  Lab Results   Component Value Date    TSH 0.612 03/25/2022    TSH 0.755 12/29/2021    TSH 1.086 05/13/2020     A1C:  Lab Results   Component Value Date    HGBA1C 5.7 (H) 03/25/2022    HGBA1C 5.6 12/29/2021    HGBA1C 5.6 02/13/2019    HGBA1C 5.6 07/30/2018    HGBA1C 5.7 (H) 05/04/2018         Assessment/Plan     Misael Garcia is a 27 y.o.female with:    Weight gain  -     Ambulatory referral/consult to Psychiatry; Future; Expected date: 03/25/2022  -     Ambulatory referral/consult to Bariatric Medicine; Future; Expected date: 04/01/2022  -     Ambulatory referral/consult to Nutrition Services; Future; Expected date: 03/25/2022  -     CBC Auto Differential; Future; Expected date: 03/25/2022  -     Comprehensive Metabolic Panel; Future; Expected date: 03/25/2022  -     Hemoglobin A1C; Future; Expected date: 03/25/2022  -     T4, Free; Future; Expected date: 03/25/2022  -     TSH; Future; Expected date: 03/25/2022    BMI 50.0-59.9, adult  -     Ambulatory referral/consult to Bariatric Medicine; Future; Expected date: 04/01/2022  -     Ambulatory referral/consult to Nutrition Services; Future; Expected date: 03/25/2022  -     CBC Auto Differential; Future; Expected date: 03/25/2022  -     Comprehensive Metabolic Panel; Future; Expected date: 03/25/2022  -     Hemoglobin A1C; Future; Expected date: 03/25/2022  -     T4, Free; Future; Expected date: 03/25/2022  -     TSH; Future; Expected date: 03/25/2022    Obesity, Class III, BMI 40-49.9 (morbid obesity)  -     CBC Auto Differential; Future; Expected date: 03/25/2022  -     Comprehensive Metabolic Panel; Future; Expected date: 03/25/2022  -     Hemoglobin A1C; Future; Expected date: 03/25/2022  -     T4, Free; Future; Expected date: 03/25/2022  -     TSH; Future; Expected date: 03/25/2022    Diarrhea, unspecified type  -     CBC Auto Differential; Future; Expected date: 03/25/2022  -     Comprehensive Metabolic Panel;  Future; Expected date: 03/25/2022  -     Hemoglobin A1C; Future; Expected date: 03/25/2022  -     T4, Free; Future; Expected date: 03/25/2022  -     TSH; Future; Expected date: 03/25/2022    Anxiety and depression  -     Ambulatory referral/consult to Psychiatry; Future; Expected date: 03/25/2022  -     CBC Auto Differential; Future; Expected date: 03/25/2022  -     Comprehensive Metabolic Panel; Future; Expected date: 03/25/2022  -     Hemoglobin A1C; Future; Expected date: 03/25/2022  -     T4, Free; Future; Expected date: 03/25/2022  -     TSH; Future; Expected date: 03/25/2022    SOB (shortness of breath)  -     CBC Auto Differential; Future; Expected date: 03/25/2022  -     Comprehensive Metabolic Panel; Future; Expected date: 03/25/2022  -     Hemoglobin A1C; Future; Expected date: 03/25/2022  -     T4, Free; Future; Expected date: 03/25/2022  -     TSH; Future; Expected date: 03/25/2022    Upper back pain  -     CBC Auto Differential; Future; Expected date: 03/25/2022  -     Comprehensive Metabolic Panel; Future; Expected date: 03/25/2022  -     Hemoglobin A1C; Future; Expected date: 03/25/2022  -     T4, Free; Future; Expected date: 03/25/2022  -     TSH; Future; Expected date: 03/25/2022    Pre-diabetes  -     Ambulatory referral/consult to Psychiatry; Future; Expected date: 03/25/2022  -     Ambulatory referral/consult to Bariatric Medicine; Future; Expected date: 04/01/2022  -     Ambulatory referral/consult to Nutrition Services; Future; Expected date: 03/25/2022  -     CBC Auto Differential; Future; Expected date: 03/25/2022  -     Comprehensive Metabolic Panel; Future; Expected date: 03/25/2022  -     Hemoglobin A1C; Future; Expected date: 03/25/2022  -     T4, Free; Future; Expected date: 03/25/2022  -     TSH; Future; Expected date: 03/25/2022          I spent 30 minutes on the day of this encounter for preparing for, evaluating, treating, and managing this patient.      -Continue current medications  and maintain follow up with specialists.  Return to clinic as needed for any concerns   No follow-ups on file.       SPRING PrettyC  [facility]

## 2022-03-25 NOTE — LETTER
March 25, 2022      [facility]  [address]  [place] [address]  Phone: [phone]  Fax: [phone]       Patient: Misael Garcia   YOB: 1995  Date of Visit: 03/25/2022    To Whom It May Concern:    Dora Garcia  was at [facility] on 03/25/2022. The patient may return to work/school on 3/28/22 or next work day with no restrictions. If you have any questions or concerns, or if I can be of further assistance, please do not hesitate to contact me.    Sincerely,          Maria Victoria Salas, NP

## 2022-03-27 ENCOUNTER — PATIENT MESSAGE (OUTPATIENT)
Dept: INTERNAL MEDICINE | Facility: CLINIC | Age: 27
End: 2022-03-27
Payer: COMMERCIAL

## 2022-03-27 DIAGNOSIS — E66.01 MORBID OBESITY WITH BMI OF 50.0-59.9, ADULT: Primary | ICD-10-CM

## 2022-03-28 NOTE — TELEPHONE ENCOUNTER
Urgent referral to bariatric medicine, is Dr. Pineda the only dr that specializes in weight loss ? Looks like she want's to see someone else

## 2022-04-04 ENCOUNTER — TELEPHONE (OUTPATIENT)
Dept: BARIATRICS | Facility: CLINIC | Age: 27
End: 2022-04-04
Payer: COMMERCIAL

## 2022-04-04 ENCOUNTER — PATIENT MESSAGE (OUTPATIENT)
Dept: BARIATRICS | Facility: CLINIC | Age: 27
End: 2022-04-04
Payer: COMMERCIAL

## 2022-04-04 NOTE — TELEPHONE ENCOUNTER
Called and spoke to pt. Told pt PINA Morley NP, stated she had spoken to pt who requested appointment with Dr. Choudhary, not a surgical appt. Pt agreed. Told pt appt was able to be made with Dr. Choudhary for today at 3:00pm and canceled appt with PINA Morley NP. Pt agreed. Pt given directions to clinic location. Pt verbalized understanding.

## 2022-04-04 NOTE — TELEPHONE ENCOUNTER
Attempted to reach pt' regarding appointment scheduled today with . Left detailed Msg for called back in regards to questions or concerns she may have, on why appointment is canceled.

## 2022-04-04 NOTE — TELEPHONE ENCOUNTER
Contacted the patient. Patient was advised that on 2/21/22 she was informed that there are no additional options for her in regards for medication. Patient stated that she spoke with  and was still interested in possibly taking Metformin. I spoke with  in regards to patient possibly taking the medication. I was informed by  that was not an option.  Patient verbalized understanding.

## 2022-04-05 ENCOUNTER — TELEPHONE (OUTPATIENT)
Dept: BARIATRICS | Facility: CLINIC | Age: 27
End: 2022-04-05
Payer: COMMERCIAL

## 2022-04-05 NOTE — TELEPHONE ENCOUNTER
Pt returned call.  spoke with Ms. Garcia and explained that over the past few years that Dr Pineda had exhausted all the medications that she could prescribe for wt loss and that they were ineffective or that they were not covered per insurance.   Discussed that due to her medical history that she did not have any other wt loss medications to prescribe.  Informed patient that the only other wt loss option within the bariatric clinic would be surgical wt loss.  Patient voiced that she could not afford surgery nor had time through work to pursue surgical options.  I expressed understanding and discussed other options for wt loss outside of the bariatric clinic and the importance of following up with her PCP to treat her prediabetes.  The patient stated that she had scheduled an appointment with a nutritionist and would follow up with her PCP for her prediabetes.  She stated that she felt better after speaking with me and that her concerns were addressed. Follow up message sent to Patient Relations

## 2022-04-05 NOTE — TELEPHONE ENCOUNTER
"Received message from patient advocate that Ms. Garcia had presented with the following concerns: "I am pre-diabetic and Dr. Pineda said I have to have surgery and will not give me an alternative. I cannot afford surgery or the time off. Ms. Garcia said the clinic canceled her appointment today for no reason other then she does not want surgery. Ms. Garcia requests someone from [facility] to discuss her concerns for her future care. Ms. Garcia can be reached at [phone].   I attempted to reach pt.  No answer.  Left  with direct callback number to discuss her concerns.      "

## 2022-05-03 ENCOUNTER — OFFICE VISIT (OUTPATIENT)
Dept: INTERNAL MEDICINE | Facility: CLINIC | Age: 27
End: 2022-05-03
Payer: COMMERCIAL

## 2022-05-03 VITALS
OXYGEN SATURATION: 99 % | WEIGHT: 293 LBS | HEIGHT: 67 IN | TEMPERATURE: 98 F | BODY MASS INDEX: 45.99 KG/M2 | SYSTOLIC BLOOD PRESSURE: 127 MMHG | HEART RATE: 89 BPM | DIASTOLIC BLOOD PRESSURE: 90 MMHG

## 2022-05-03 DIAGNOSIS — R10.11 RIGHT UPPER QUADRANT ABDOMINAL PAIN: Primary | ICD-10-CM

## 2022-05-03 DIAGNOSIS — R10.31 RIGHT LOWER QUADRANT ABDOMINAL PAIN: ICD-10-CM

## 2022-05-03 PROCEDURE — 3074F PR MOST RECENT SYSTOLIC BLOOD PRESSURE < 130 MM HG: ICD-10-PCS | Mod: CPTII,S$GLB,, | Performed by: INTERNAL MEDICINE

## 2022-05-03 PROCEDURE — 3080F DIAST BP >= 90 MM HG: CPT | Mod: CPTII,S$GLB,, | Performed by: INTERNAL MEDICINE

## 2022-05-03 PROCEDURE — 99213 PR OFFICE/OUTPT VISIT, EST, LEVL III, 20-29 MIN: ICD-10-PCS | Mod: S$GLB,,, | Performed by: INTERNAL MEDICINE

## 2022-05-03 PROCEDURE — 3044F HG A1C LEVEL LT 7.0%: CPT | Mod: CPTII,S$GLB,, | Performed by: INTERNAL MEDICINE

## 2022-05-03 PROCEDURE — 99999 PR PBB SHADOW E&M-EST. PATIENT-LVL IV: ICD-10-PCS | Mod: PBBFAC,,, | Performed by: INTERNAL MEDICINE

## 2022-05-03 PROCEDURE — 1160F PR REVIEW ALL MEDS BY PRESCRIBER/CLIN PHARMACIST DOCUMENTED: ICD-10-PCS | Mod: CPTII,S$GLB,, | Performed by: INTERNAL MEDICINE

## 2022-05-03 PROCEDURE — 1159F MED LIST DOCD IN RCRD: CPT | Mod: CPTII,S$GLB,, | Performed by: INTERNAL MEDICINE

## 2022-05-03 PROCEDURE — 3008F PR BODY MASS INDEX (BMI) DOCUMENTED: ICD-10-PCS | Mod: CPTII,S$GLB,, | Performed by: INTERNAL MEDICINE

## 2022-05-03 PROCEDURE — 99999 PR PBB SHADOW E&M-EST. PATIENT-LVL IV: CPT | Mod: PBBFAC,,, | Performed by: INTERNAL MEDICINE

## 2022-05-03 PROCEDURE — 1160F RVW MEDS BY RX/DR IN RCRD: CPT | Mod: CPTII,S$GLB,, | Performed by: INTERNAL MEDICINE

## 2022-05-03 PROCEDURE — 1159F PR MEDICATION LIST DOCUMENTED IN MEDICAL RECORD: ICD-10-PCS | Mod: CPTII,S$GLB,, | Performed by: INTERNAL MEDICINE

## 2022-05-03 PROCEDURE — 3074F SYST BP LT 130 MM HG: CPT | Mod: CPTII,S$GLB,, | Performed by: INTERNAL MEDICINE

## 2022-05-03 PROCEDURE — 3080F PR MOST RECENT DIASTOLIC BLOOD PRESSURE >= 90 MM HG: ICD-10-PCS | Mod: CPTII,S$GLB,, | Performed by: INTERNAL MEDICINE

## 2022-05-03 PROCEDURE — 99213 OFFICE O/P EST LOW 20 MIN: CPT | Mod: S$GLB,,, | Performed by: INTERNAL MEDICINE

## 2022-05-03 PROCEDURE — 3044F PR MOST RECENT HEMOGLOBIN A1C LEVEL <7.0%: ICD-10-PCS | Mod: CPTII,S$GLB,, | Performed by: INTERNAL MEDICINE

## 2022-05-03 PROCEDURE — 3008F BODY MASS INDEX DOCD: CPT | Mod: CPTII,S$GLB,, | Performed by: INTERNAL MEDICINE

## 2022-05-03 NOTE — PROGRESS NOTES
"Subjective:       Patient ID: Misael Garcia is a 27 y.o. female.    Chief Complaint: Flank Pain (Right sided flank pain "on and off" )      Pt and problem are new to me.    Misael Garcia is 27 y.o. female who presents for RUQ pain 7/10 without radiation lasting 30 minutes occurring 3-4x/wk X 2 years.  Increase in frequency of pain to 3-4x/wk over last 3-4 month.  Possibly related to eating fatty meal.  Pt had ? Gallbladder issues as a preteen.  Pt also has 6-7 lb weight gain over last month.  Pt eats fast food 4-5x/wk. Pt drinks 1-2 soda per day.  Pt drinks lots of water.        Review of Systems   Constitutional: Positive for unexpected weight change (6-7 lbs unexpected weight gain over last month). Negative for chills and fever.   Respiratory: Positive for shortness of breath (X 5 months, stable).    Gastrointestinal: Positive for abdominal pain and diarrhea (with menses, chronic, stable). Negative for change in bowel habit, constipation, nausea, vomiting and change in bowel habit.         Objective:      Physical Exam  Vitals reviewed.   Constitutional:       General: She is awake.      Appearance: Normal appearance.   HENT:      Head: Normocephalic and atraumatic.      Mouth/Throat:      Mouth: Mucous membranes are moist.      Pharynx: Oropharynx is clear.   Eyes:      Extraocular Movements: Extraocular movements intact.      Conjunctiva/sclera: Conjunctivae normal.      Pupils: Pupils are equal, round, and reactive to light.   Cardiovascular:      Rate and Rhythm: Normal rate and regular rhythm.      Heart sounds: No murmur heard.    No friction rub. No gallop.   Pulmonary:      Effort: Pulmonary effort is normal.      Breath sounds: Normal breath sounds.   Abdominal:      General: Bowel sounds are normal. There is no distension.      Tenderness: There is abdominal tenderness in the right upper quadrant and right lower quadrant. There is no right CVA tenderness, left CVA tenderness, " guarding or rebound. Negative signs include Babcock's sign.   Musculoskeletal:      Right lower leg: No edema.      Left lower leg: No edema.   Lymphadenopathy:      Cervical: No cervical adenopathy.   Neurological:      Mental Status: She is alert and oriented to person, place, and time.   Psychiatric:         Mood and Affect: Mood normal.         Behavior: Behavior is cooperative.         Assessment:       1. Right upper quadrant abdominal pain    2. Right lower quadrant abdominal pain        Plan:     Pt c/o intermittent RUQ X 2 years increasing in frequency over last 3-4 months.  On exam pt has TTP of RUQ and RLQ without rebound.  Negative Babcock's sign.  Unclear etiology of pt's abdominal pain.  Given relationship to eating, concern for gallstones.  However, RLQ pain not c/w gall stones.  Will get US of abd for further evaluation.  Pt scheduled for f/u with PCP for review of US results and further evaluation.    Misael was seen today for flank pain.    Diagnoses and all orders for this visit:    Right upper quadrant abdominal pain  -     US Abdomen Complete; Future    Right lower quadrant abdominal pain

## 2022-05-04 ENCOUNTER — TELEPHONE (OUTPATIENT)
Dept: BARIATRICS | Facility: CLINIC | Age: 27
End: 2022-05-04
Payer: COMMERCIAL

## 2022-05-04 NOTE — TELEPHONE ENCOUNTER
----- Message from Yaritza Good sent at 5/3/2022  5:49 PM CDT -----  Contact: Patient  Type:  Sooner Appointment Request    Name of Caller:Patient   Would the patient rather a call back or a response via [employer]? Call back  Best Call Back Number:[phone]  Additional Information: Please assist

## 2022-05-04 NOTE — TELEPHONE ENCOUNTER
Informed patient that her current appointment is the soonest appointment with financial. Patient was also informed to keep a look out through the patient portal for a sooner appt.

## 2022-05-05 ENCOUNTER — PATIENT MESSAGE (OUTPATIENT)
Dept: INTERNAL MEDICINE | Facility: CLINIC | Age: 27
End: 2022-05-05
Payer: COMMERCIAL

## 2022-05-05 ENCOUNTER — HOSPITAL ENCOUNTER (OUTPATIENT)
Dept: RADIOLOGY | Facility: HOSPITAL | Age: 27
Discharge: HOME | End: 2022-05-05
Attending: INTERNAL MEDICINE
Payer: COMMERCIAL

## 2022-05-05 DIAGNOSIS — R10.11 RIGHT UPPER QUADRANT ABDOMINAL PAIN: ICD-10-CM

## 2022-05-05 PROCEDURE — 76700 US ABDOMEN COMPLETE: ICD-10-PCS | Mod: 26,,, | Performed by: RADIOLOGY

## 2022-05-05 PROCEDURE — 76700 US EXAM ABDOM COMPLETE: CPT | Mod: TC

## 2022-05-05 PROCEDURE — 76700 US EXAM ABDOM COMPLETE: CPT | Mod: 26,,, | Performed by: RADIOLOGY

## 2022-05-06 ENCOUNTER — TELEPHONE (OUTPATIENT)
Dept: INTERNAL MEDICINE | Facility: CLINIC | Age: 27
End: 2022-05-06
Payer: COMMERCIAL

## 2022-05-06 ENCOUNTER — PATIENT MESSAGE (OUTPATIENT)
Dept: INTERNAL MEDICINE | Facility: CLINIC | Age: 27
End: 2022-05-06
Payer: COMMERCIAL

## 2022-05-06 NOTE — TELEPHONE ENCOUNTER
----- Message from Ricardo Young sent at 5/6/2022 11:39 AM CDT -----  Type:  Patient Requesting Call Back    Who Called:Misael Garcia  Would the patient rather a call back or a response via [employer]? Call back  Best Call Back Number:[phone]  Additional Information:  Patient Requesting Call Back for ultrasound test results.

## 2022-05-06 NOTE — TELEPHONE ENCOUNTER
----- Message from Ricardo Young sent at 5/6/2022  4:01 PM CDT -----  Type:  Patient Requesting Call Back    Who Called:Misael Garcia  Would the patient rather a call back or a response via [facility]? Call back  Best Call Back Number [phone]  Additional Information:   Patient Requesting Call Back regarding ultrasound test results and states this is the third attempt an d has been unsuccessful.

## 2022-05-06 NOTE — TELEPHONE ENCOUNTER
Fatty liver disease seen per ultrasound- work on weight loss and decreasing intake of fatty/fried foods/carbohydrates to improve this  Normal gallbladder  These findings would not be causing your nausea/abdominal pain

## 2022-05-10 ENCOUNTER — OFFICE VISIT (OUTPATIENT)
Dept: INTERNAL MEDICINE | Facility: CLINIC | Age: 27
End: 2022-05-10
Payer: COMMERCIAL

## 2022-05-10 ENCOUNTER — LAB VISIT (OUTPATIENT)
Dept: LAB | Facility: HOSPITAL | Age: 27
End: 2022-05-10
Attending: NURSE PRACTITIONER
Payer: COMMERCIAL

## 2022-05-10 VITALS
OXYGEN SATURATION: 98 % | RESPIRATION RATE: 18 BRPM | HEART RATE: 92 BPM | SYSTOLIC BLOOD PRESSURE: 104 MMHG | WEIGHT: 293 LBS | BODY MASS INDEX: 45.99 KG/M2 | DIASTOLIC BLOOD PRESSURE: 82 MMHG | TEMPERATURE: 98 F | HEIGHT: 67 IN

## 2022-05-10 DIAGNOSIS — R16.0 HEPATOMEGALY: Primary | ICD-10-CM

## 2022-05-10 DIAGNOSIS — E66.01 CLASS 3 SEVERE OBESITY DUE TO EXCESS CALORIES WITH SERIOUS COMORBIDITY AND BODY MASS INDEX (BMI) OF 50.0 TO 59.9 IN ADULT: ICD-10-CM

## 2022-05-10 DIAGNOSIS — K76.0 HEPATIC STEATOSIS: ICD-10-CM

## 2022-05-10 DIAGNOSIS — R16.0 HEPATOMEGALY: ICD-10-CM

## 2022-05-10 DIAGNOSIS — R73.01 IFG (IMPAIRED FASTING GLUCOSE): ICD-10-CM

## 2022-05-10 PROBLEM — E66.813 CLASS 3 SEVERE OBESITY DUE TO EXCESS CALORIES WITH SERIOUS COMORBIDITY AND BODY MASS INDEX (BMI) OF 50.0 TO 59.9 IN ADULT: Status: ACTIVE | Noted: 2018-05-03

## 2022-05-10 LAB
ALBUMIN SERPL BCP-MCNC: 3.7 G/DL (ref 3.5–5.2)
ALP SERPL-CCNC: 112 U/L (ref 55–135)
ALP SERPL-CCNC: 112 U/L (ref 55–135)
ALT SERPL W/O P-5'-P-CCNC: 36 U/L (ref 10–44)
AST SERPL-CCNC: 22 U/L (ref 10–40)
BILIRUB DIRECT SERPL-MCNC: 0.1 MG/DL (ref 0.1–0.3)
BILIRUB SERPL-MCNC: 0.2 MG/DL (ref 0.1–1)
INR PPP: 1 (ref 0.8–1.2)
PROT SERPL-MCNC: 7.6 G/DL (ref 6–8.4)
PROTHROMBIN TIME: 10 SEC (ref 9–12.5)

## 2022-05-10 PROCEDURE — 85610 PROTHROMBIN TIME: CPT | Performed by: NURSE PRACTITIONER

## 2022-05-10 PROCEDURE — 36415 COLL VENOUS BLD VENIPUNCTURE: CPT | Performed by: NURSE PRACTITIONER

## 2022-05-10 PROCEDURE — 3008F PR BODY MASS INDEX (BMI) DOCUMENTED: ICD-10-PCS | Mod: CPTII,S$GLB,, | Performed by: NURSE PRACTITIONER

## 2022-05-10 PROCEDURE — 80074 ACUTE HEPATITIS PANEL: CPT | Performed by: NURSE PRACTITIONER

## 2022-05-10 PROCEDURE — 99999 PR PBB SHADOW E&M-EST. PATIENT-LVL V: ICD-10-PCS | Mod: PBBFAC,,, | Performed by: NURSE PRACTITIONER

## 2022-05-10 PROCEDURE — 3074F SYST BP LT 130 MM HG: CPT | Mod: CPTII,S$GLB,, | Performed by: NURSE PRACTITIONER

## 2022-05-10 PROCEDURE — 1160F PR REVIEW ALL MEDS BY PRESCRIBER/CLIN PHARMACIST DOCUMENTED: ICD-10-PCS | Mod: CPTII,S$GLB,, | Performed by: NURSE PRACTITIONER

## 2022-05-10 PROCEDURE — 3079F PR MOST RECENT DIASTOLIC BLOOD PRESSURE 80-89 MM HG: ICD-10-PCS | Mod: CPTII,S$GLB,, | Performed by: NURSE PRACTITIONER

## 2022-05-10 PROCEDURE — 99213 OFFICE O/P EST LOW 20 MIN: CPT | Mod: S$GLB,,, | Performed by: NURSE PRACTITIONER

## 2022-05-10 PROCEDURE — 3074F PR MOST RECENT SYSTOLIC BLOOD PRESSURE < 130 MM HG: ICD-10-PCS | Mod: CPTII,S$GLB,, | Performed by: NURSE PRACTITIONER

## 2022-05-10 PROCEDURE — 3008F BODY MASS INDEX DOCD: CPT | Mod: CPTII,S$GLB,, | Performed by: NURSE PRACTITIONER

## 2022-05-10 PROCEDURE — 3044F PR MOST RECENT HEMOGLOBIN A1C LEVEL <7.0%: ICD-10-PCS | Mod: CPTII,S$GLB,, | Performed by: NURSE PRACTITIONER

## 2022-05-10 PROCEDURE — 1159F MED LIST DOCD IN RCRD: CPT | Mod: CPTII,S$GLB,, | Performed by: NURSE PRACTITIONER

## 2022-05-10 PROCEDURE — 3079F DIAST BP 80-89 MM HG: CPT | Mod: CPTII,S$GLB,, | Performed by: NURSE PRACTITIONER

## 2022-05-10 PROCEDURE — 1159F PR MEDICATION LIST DOCUMENTED IN MEDICAL RECORD: ICD-10-PCS | Mod: CPTII,S$GLB,, | Performed by: NURSE PRACTITIONER

## 2022-05-10 PROCEDURE — 3044F HG A1C LEVEL LT 7.0%: CPT | Mod: CPTII,S$GLB,, | Performed by: NURSE PRACTITIONER

## 2022-05-10 PROCEDURE — 99999 PR PBB SHADOW E&M-EST. PATIENT-LVL V: CPT | Mod: PBBFAC,,, | Performed by: NURSE PRACTITIONER

## 2022-05-10 PROCEDURE — 1160F RVW MEDS BY RX/DR IN RCRD: CPT | Mod: CPTII,S$GLB,, | Performed by: NURSE PRACTITIONER

## 2022-05-10 PROCEDURE — 99213 PR OFFICE/OUTPT VISIT, EST, LEVL III, 20-29 MIN: ICD-10-PCS | Mod: S$GLB,,, | Performed by: NURSE PRACTITIONER

## 2022-05-10 PROCEDURE — 80076 HEPATIC FUNCTION PANEL: CPT | Performed by: NURSE PRACTITIONER

## 2022-05-10 NOTE — PROGRESS NOTES
"Subjective:       Patient ID: Misael Garcia is a 27 y.o. female.    Chief Complaint: Follow-up (US dx Liver disease; pt state had right side lateral pain earlier today)      Patient is a 27 y.o. female who traditionally follows with Michael Goldman DO presenting today to discuss US results.     Had US done for RUQ pain described as sharp for many years.   Pain comes and goes. Only feels it when she is eating. Denies GERD like symptoms and dysphagia.   Occasionally has diarrhea but only with menses.       No aspirin, aleve, advil, naproxen or tylenol   No herbal supplements   Does not drink alcohol     Review of patient's allergies indicates:  No Known Allergies    Medication List with Changes/Refills   Current Medications    BUSPIRONE (BUSPAR) 15 MG TABLET    Take 15 mg by mouth 2 (two) times daily.    CLONAZEPAM (KLONOPIN) 1 MG TABLET    Take 1 mg by mouth 2 (two) times daily as needed.    LATUDA 40 MG TAB TABLET    Take 40 mg by mouth nightly.    METHYLPHENIDATE HCL 18 MG CR TABLET    Take 18 mg by mouth every morning.    SARS-COV-2, COVID-19, (MODERNA COVID-19) 100 MCG/0.5 ML INJECTION         Medical, social and surgical history has been reviewed with the patient.      Review of Systems   Gastrointestinal: Positive for abdominal pain and diarrhea (only with menses). Negative for constipation, heartburn, nausea and vomiting.       Objective:   /82 (BP Location: Right arm, Patient Position: Sitting, BP Method: Large (Manual))   Pulse 92   Temp 97.7 °F (36.5 °C) (Temporal)   Resp 18   Ht 5' 7" (1.702 m)   Wt (!) 153.8 kg (339 lb 1.1 oz)   LMP 04/20/2022 (Exact Date)   SpO2 98%   BMI 53.11 kg/m²       Physical Exam  Vitals reviewed.   Constitutional:       Appearance: She is obese.   HENT:      Head: Normocephalic and atraumatic.   Eyes:      General: No scleral icterus.  Pulmonary:      Effort: Pulmonary effort is normal.   Abdominal:      General: Abdomen is flat. Bowel sounds are " normal.      Palpations: Abdomen is soft.      Tenderness: There is abdominal tenderness in the right upper quadrant.   Skin:     Coloration: Skin is not jaundiced.   Neurological:      Mental Status: She is alert and oriented to person, place, and time.       Last Labs:  Glucose   Date Value Ref Range Status   03/25/2022 78 70 - 110 mg/dL Final   12/29/2021 90 70 - 110 mg/dL Final     BUN   Date Value Ref Range Status   03/25/2022 9 6 - 20 mg/dL Final   12/29/2021 12 6 - 20 mg/dL Final     Creatinine   Date Value Ref Range Status   03/25/2022 0.7 0.5 - 1.4 mg/dL Final   12/29/2021 0.8 0.5 - 1.4 mg/dL Final     Potassium   Date Value Ref Range Status   03/25/2022 4.0 3.5 - 5.1 mmol/L Final   12/29/2021 4.4 3.5 - 5.1 mmol/L Final     Cholesterol   Date Value Ref Range Status   05/13/2020 145 120 - 199 mg/dL Final     Comment:     The National Cholesterol Education Program (NCEP) has set the  following guidelines (reference ranges) for Cholesterol:  Optimal.....................<200 mg/dL  Borderline High.............200-239 mg/dL  High........................> or = 240 mg/dL     05/04/2018 190 120 - 199 mg/dL Final     Comment:     The National Cholesterol Education Program (NCEP) has set the  following guidelines (reference ranges) for Cholesterol:  Optimal.....................<200 mg/dL  Borderline High.............200-239 mg/dL  High........................> or = 240 mg/dL       Hemoglobin A1C   Date Value Ref Range Status   03/25/2022 5.7 (H) 4.0 - 5.6 % Final     Comment:     ADA Screening Guidelines:  5.7-6.4%  Consistent with prediabetes  >or=6.5%  Consistent with diabetes    High levels of fetal hemoglobin interfere with the HbA1C  assay. Heterozygous hemoglobin variants (HbS, HgC, etc)do  not significantly interfere with this assay.   However, presence of multiple variants may affect accuracy.     12/29/2021 5.6 4.0 - 5.6 % Final     Comment:     ADA Screening Guidelines:  5.7-6.4%  Consistent with  prediabetes  >or=6.5%  Consistent with diabetes    High levels of fetal hemoglobin interfere with the HbA1C  assay. Heterozygous hemoglobin variants (HbS, HgC, etc)do  not significantly interfere with this assay.   However, presence of multiple variants may affect accuracy.       Hemoglobin   Date Value Ref Range Status   03/25/2022 12.5 12.0 - 16.0 g/dL Final   05/13/2020 11.8 (L) 12.0 - 16.0 g/dL Final     Hematocrit   Date Value Ref Range Status   03/25/2022 41.3 37.0 - 48.5 % Final   05/13/2020 39.8 37.0 - 48.5 % Final     I have reviewed the following:     Details / Date    [x]   Labs     []   Micro     []   Pathology     []   Imaging     []   Cardiology Procedures     []   Other      4/29/2018   CT Abdomen Pelvis  Without Contrast     Included lung bases are clear.  Base of the heart is within normal limits.     Liver is enlarged without focal process seen.  Gallbladder, pancreas, spleen, stomach, duodenum and bilateral adrenal glands are within normal limits.  No biliary ductal dilatation.     Kidneys are normal in size, shape and location.  There is mild left-sided hydroureteronephrosis to the level of the mid ureter without radiodense ureteral calculus or definite extrinsic mechanical cause seen.  There is subtle stranding about the left renal hilum and also proximal left ureter.  No radiodense calculus seen within the right collecting system, left kidney or urinary bladder.  No right hydronephrosis or significant perinephric stranding.  Right ureter is within normal limits.  Urinary bladder is suboptimally distended.  Uterus and right adnexal region are within normal limits.  There is a 4.2 cm oval low-attenuation mass at the left adnexal region suggestive of a cyst.  Trace volume nonspecific free fluid within the pelvis, commonly physiologic in this age group.  A few scattered punctate pelvic phleboliths noted.     Small fat containing umbilical hernia.  Appendix is within normal limits.  Terminal ileum  is nondilated.  No evidence of bowel obstruction or inflammation.     No ascites, free air or lymphadenopathy.  Aorta is within normal limits.     Included osseous structures appear intact.    5/6/2022   US Abdomen Complete    Pancreas: The visualized portions of the pancreas appear normal.  Aorta: No aneurysm.     Liver: Slightly enlarged in size measuring 20 cm,   Diffusely increased parenchymal echogenicity consistent with steatosis. No focal lesions.     Gallbladder: No calculi, wall thickening, or pericholecystic fluid. Negative sonographic Babcock's sign.     Biliary system: 2 mm common bile duct.  No intrahepatic ductal dilatation.     Inferior vena cava: Normal in appearance.     Right kidney: 11.5 cm. No hydronephrosis.     Left kidney: 11.8 cm. No hydronephrosis.     Spleen: 10.4 x 3.9 cm.  Normal in size with homogeneous echotexture.     Miscellaneous: No ascites.    Assessment and Plan:     1. Hepatomegaly    - Alkaline Phosphatase; Future  - HEPATITIS PANEL, ACUTE; Future  - Hepatic Function Panel; Future  - PROTIME-INR; Future  - Ambulatory referral/consult to Hepatology; Future    2. Hepatic steatosis    - Hepatic Function Panel; Future  - Ambulatory referral/consult to Hepatology; Future    3. IFG (impaired fasting glucose)  4. Class 3 severe obesity due to excess calories with serious comorbidity and body mass index (BMI) of 50.0 to 59.9 in adult    - Ambulatory referral/consult to Nutrition Services; Future    Discussed with patient strategies to help with dietary changes. Encouraged to speak with counselor/pscy regarding relationship with food.

## 2022-05-11 ENCOUNTER — PATIENT MESSAGE (OUTPATIENT)
Dept: INTERNAL MEDICINE | Facility: CLINIC | Age: 27
End: 2022-05-11
Payer: COMMERCIAL

## 2022-05-13 ENCOUNTER — TELEPHONE (OUTPATIENT)
Dept: INTERNAL MEDICINE | Facility: CLINIC | Age: 27
End: 2022-05-13
Payer: COMMERCIAL

## 2022-05-13 ENCOUNTER — PATIENT MESSAGE (OUTPATIENT)
Dept: INTERNAL MEDICINE | Facility: CLINIC | Age: 27
End: 2022-05-13
Payer: COMMERCIAL

## 2022-05-13 LAB
HAV IGM SERPL QL IA: NEGATIVE
HBV CORE IGM SERPL QL IA: NEGATIVE
HBV SURFACE AG SERPL QL IA: NEGATIVE
HCV AB SERPL QL IA: NEGATIVE

## 2022-06-10 ENCOUNTER — TELEPHONE (OUTPATIENT)
Dept: BARIATRICS | Facility: CLINIC | Age: 27
End: 2022-06-10
Payer: COMMERCIAL

## 2022-06-21 ENCOUNTER — TELEPHONE (OUTPATIENT)
Dept: BARIATRICS | Facility: CLINIC | Age: 27
End: 2022-06-21
Payer: COMMERCIAL

## 2022-06-21 NOTE — TELEPHONE ENCOUNTER
----- Message from Jessenia Skinner sent at 6/21/2022  8:43 AM CDT -----  Regarding: appt  Contact: pt @ [phone]  Pt is calling to schedule her surgery, asking for a call back

## 2022-06-21 NOTE — TELEPHONE ENCOUNTER
Phoned patient and introduced myself as her RN coordinator to help get her through the weight loss pathway and get scheduled for her surgery. Explained the process of her seeing the [facility], getting diet clearance, psych clearance, testing, labs, CXR, EKG, etc.  Reviewed insurance requirements with her.  Scheduled CARLYN tomorrow in person and RD virtual on Thursday.  She verbalized understanding.  She will reach out through the portal or phone with any additional questions.

## 2022-07-03 ENCOUNTER — TELEPHONE (OUTPATIENT)
Dept: HEPATOLOGY | Facility: CLINIC | Age: 27
End: 2022-07-03
Payer: COMMERCIAL

## 2022-07-14 ENCOUNTER — TELEPHONE (OUTPATIENT)
Dept: HEPATOLOGY | Facility: CLINIC | Age: 27
End: 2022-07-14
Payer: COMMERCIAL

## 2022-08-03 ENCOUNTER — PATIENT MESSAGE (OUTPATIENT)
Dept: BARIATRICS | Facility: CLINIC | Age: 27
End: 2022-08-03
Payer: COMMERCIAL

## 2022-08-08 ENCOUNTER — OFFICE VISIT (OUTPATIENT)
Dept: OBSTETRICS AND GYNECOLOGY | Facility: CLINIC | Age: 27
End: 2022-08-08
Payer: COMMERCIAL

## 2022-08-08 VITALS
DIASTOLIC BLOOD PRESSURE: 80 MMHG | WEIGHT: 293 LBS | SYSTOLIC BLOOD PRESSURE: 130 MMHG | BODY MASS INDEX: 45.99 KG/M2 | HEIGHT: 67 IN

## 2022-08-08 DIAGNOSIS — N92.6 MISSED PERIOD: Primary | ICD-10-CM

## 2022-08-08 PROCEDURE — 3044F PR MOST RECENT HEMOGLOBIN A1C LEVEL <7.0%: ICD-10-PCS | Mod: CPTII,S$GLB,, | Performed by: STUDENT IN AN ORGANIZED HEALTH CARE EDUCATION/TRAINING PROGRAM

## 2022-08-08 PROCEDURE — 99213 PR OFFICE/OUTPT VISIT, EST, LEVL III, 20-29 MIN: ICD-10-PCS | Mod: S$GLB,,, | Performed by: STUDENT IN AN ORGANIZED HEALTH CARE EDUCATION/TRAINING PROGRAM

## 2022-08-08 PROCEDURE — 3075F PR MOST RECENT SYSTOLIC BLOOD PRESS GE 130-139MM HG: ICD-10-PCS | Mod: CPTII,S$GLB,, | Performed by: STUDENT IN AN ORGANIZED HEALTH CARE EDUCATION/TRAINING PROGRAM

## 2022-08-08 PROCEDURE — 99213 OFFICE O/P EST LOW 20 MIN: CPT | Mod: S$GLB,,, | Performed by: STUDENT IN AN ORGANIZED HEALTH CARE EDUCATION/TRAINING PROGRAM

## 2022-08-08 PROCEDURE — 3008F PR BODY MASS INDEX (BMI) DOCUMENTED: ICD-10-PCS | Mod: CPTII,S$GLB,, | Performed by: STUDENT IN AN ORGANIZED HEALTH CARE EDUCATION/TRAINING PROGRAM

## 2022-08-08 PROCEDURE — 1160F PR REVIEW ALL MEDS BY PRESCRIBER/CLIN PHARMACIST DOCUMENTED: ICD-10-PCS | Mod: CPTII,S$GLB,, | Performed by: STUDENT IN AN ORGANIZED HEALTH CARE EDUCATION/TRAINING PROGRAM

## 2022-08-08 PROCEDURE — 99999 PR PBB SHADOW E&M-EST. PATIENT-LVL III: ICD-10-PCS | Mod: PBBFAC,,, | Performed by: STUDENT IN AN ORGANIZED HEALTH CARE EDUCATION/TRAINING PROGRAM

## 2022-08-08 PROCEDURE — 1160F RVW MEDS BY RX/DR IN RCRD: CPT | Mod: CPTII,S$GLB,, | Performed by: STUDENT IN AN ORGANIZED HEALTH CARE EDUCATION/TRAINING PROGRAM

## 2022-08-08 PROCEDURE — 1159F PR MEDICATION LIST DOCUMENTED IN MEDICAL RECORD: ICD-10-PCS | Mod: CPTII,S$GLB,, | Performed by: STUDENT IN AN ORGANIZED HEALTH CARE EDUCATION/TRAINING PROGRAM

## 2022-08-08 PROCEDURE — 99999 PR PBB SHADOW E&M-EST. PATIENT-LVL III: CPT | Mod: PBBFAC,,, | Performed by: STUDENT IN AN ORGANIZED HEALTH CARE EDUCATION/TRAINING PROGRAM

## 2022-08-08 PROCEDURE — 3008F BODY MASS INDEX DOCD: CPT | Mod: CPTII,S$GLB,, | Performed by: STUDENT IN AN ORGANIZED HEALTH CARE EDUCATION/TRAINING PROGRAM

## 2022-08-08 PROCEDURE — 3079F DIAST BP 80-89 MM HG: CPT | Mod: CPTII,S$GLB,, | Performed by: STUDENT IN AN ORGANIZED HEALTH CARE EDUCATION/TRAINING PROGRAM

## 2022-08-08 PROCEDURE — 1159F MED LIST DOCD IN RCRD: CPT | Mod: CPTII,S$GLB,, | Performed by: STUDENT IN AN ORGANIZED HEALTH CARE EDUCATION/TRAINING PROGRAM

## 2022-08-08 PROCEDURE — 3044F HG A1C LEVEL LT 7.0%: CPT | Mod: CPTII,S$GLB,, | Performed by: STUDENT IN AN ORGANIZED HEALTH CARE EDUCATION/TRAINING PROGRAM

## 2022-08-08 PROCEDURE — 3079F PR MOST RECENT DIASTOLIC BLOOD PRESSURE 80-89 MM HG: ICD-10-PCS | Mod: CPTII,S$GLB,, | Performed by: STUDENT IN AN ORGANIZED HEALTH CARE EDUCATION/TRAINING PROGRAM

## 2022-08-08 PROCEDURE — 3075F SYST BP GE 130 - 139MM HG: CPT | Mod: CPTII,S$GLB,, | Performed by: STUDENT IN AN ORGANIZED HEALTH CARE EDUCATION/TRAINING PROGRAM

## 2022-08-08 RX ORDER — DROSPIRENONE AND ETHINYL ESTRADIOL 0.02-3(28)
1 KIT ORAL DAILY
Qty: 30 TABLET | Refills: 11 | Status: SHIPPED | OUTPATIENT
Start: 2022-08-08 | End: 2023-02-21 | Stop reason: SDUPTHER

## 2022-08-08 NOTE — PROGRESS NOTES
History & Physical  Gynecology      SUBJECTIVE:     Chief Complaint: Amenorrhea       History of Present Illness:  This is a 27 yr old G0 who presents to clinic for a missed period. She is sexually active with one male partner and does not currently use any form of contraception apart from withdrawal. She states that she does not desires pregnancy at this time. She states that she has taken 2 home pregnancy tests that were both negative. She states that her periods are generally regular occurring every month and lasting 6-7 days. She also complains of facial hair growth and acne. [place] and she last had an annual in the  of .     Review of patient's allergies indicates:  No Known Allergies    Past Medical History:   Diagnosis Date    ADHD (attention deficit hyperactivity disorder)     Anxiety     Bipolar affective     History of chlamydia 2016    Insomnia     Morbid obesity with BMI of 40.0-44.9, adult     Sleep disorder      Past Surgical History:   Procedure Laterality Date    NO PAST SURGERIES      as of 19     OB History        0    Para   0    Term   0       0    AB   0    Living   0       SAB   0    IAB   0    Ectopic   0    Multiple   0    Live Births               Obstetric Comments   Menarche ~ 18/reg  H/o chlamydia   Denies abnl pap           Family History   Problem Relation Age of Onset    Breast cancer Paternal Grandmother     No Known Problems Father     No Known Problems Mother     No Known Problems Sister     Diabetes Maternal Grandmother     No Known Problems Maternal Grandfather     No Known Problems Paternal Grandfather     Colon cancer Neg Hx     Ovarian cancer Neg Hx      Social History     Tobacco Use    Smoking status: Current Every Day Smoker     Types: Vaping with nicotine     Start date:     Smokeless tobacco: Never Used    Tobacco comment: boyfriend smoke cigarettes   Substance Use Topics    Alcohol  use: No    Drug use: Yes     Types: Marijuana       Current Outpatient Medications   Medication Sig    busPIRone (BUSPAR) 15 MG tablet Take 15 mg by mouth 2 (two) times daily.    clonazePAM (KLONOPIN) 1 MG tablet Take 1 mg by mouth 2 (two) times daily as needed.    LATUDA 40 mg Tab tablet Take 40 mg by mouth nightly.    methylphenidate HCl 18 MG CR tablet Take 18 mg by mouth every morning.    drospirenone-ethinyl estradioL (JUAN MANUEL) 3-0.02 mg per tablet Take 1 tablet by mouth once daily.    sars-cov-2, covid-19, (MODERNA COVID-19) 100 mcg/0.5 ml injection      No current facility-administered medications for this visit.       Review of Systems:  Review of Systems   Constitutional: Negative for chills, fatigue and fever.   HENT: Negative for congestion.    Eyes: Negative for visual disturbance.   Respiratory: Negative for cough and shortness of breath.    Cardiovascular: Negative for chest pain and palpitations.   Gastrointestinal: Negative for abdominal distention, abdominal pain, constipation, diarrhea, nausea and vomiting.   Genitourinary: Positive for menstrual problem (Missed period). Negative for difficulty urinating, dysuria, hematuria, vaginal bleeding and vaginal discharge.   Skin: Negative for rash.   Neurological: Negative for dizziness, seizures, light-headedness and headaches.   Hematological: Does not bruise/bleed easily.   Psychiatric/Behavioral: Negative for dysphoric mood. The patient is not nervous/anxious.         OBJECTIVE:     Physical Exam:  Vitals:    08/08/22 1128   BP: 130/80      Physical Exam  Vitals and nursing note reviewed.   Constitutional:       General: She is not in acute distress.     Appearance: She is well-developed. She is obese.   HENT:      Head: Normocephalic and atraumatic.   Eyes:      Pupils: Pupils are equal, round, and reactive to light.   Cardiovascular:      Rate and Rhythm: Normal rate and regular rhythm.   Pulmonary:      Effort: Pulmonary effort is normal. No  respiratory distress.   Abdominal:      General: There is no distension.      Palpations: Abdomen is soft. There is no mass.      Tenderness: There is no abdominal tenderness. There is no guarding.   Musculoskeletal:         General: Normal range of motion.      Cervical back: Normal range of motion and neck supple.   Skin:     General: Skin is warm and dry.      Comments: Skin hair noted   Neurological:      Mental Status: She is alert and oriented to person, place, and time.   Psychiatric:         Behavior: Behavior normal.         Thought Content: Thought content normal.         Judgment: Judgment normal.         ASSESSMENT/PLAN:     1. Missed period  - UPT negative in clinic  - Discussed common etiologies of missed periods  - Pre-diabetic  - Normal TSH  - Patient presentation is somewhat suspicious for PCOS, although she doesn't meet criteria for oligomenorrhea  - Discussed OCPs in the context unprotected intercourse and undesired fertility at this time  - Will start OCPs for contraception and cycle regularity      Fiorella Peña M.D.  OB/GYN  [facility]

## 2022-08-15 ENCOUNTER — TELEPHONE (OUTPATIENT)
Dept: ORTHOPEDICS | Facility: CLINIC | Age: 27
End: 2022-08-15
Payer: COMMERCIAL

## 2022-08-15 DIAGNOSIS — M50.30 DDD (DEGENERATIVE DISC DISEASE), CERVICAL: Primary | ICD-10-CM

## 2022-08-15 DIAGNOSIS — M51.36 DDD (DEGENERATIVE DISC DISEASE), LUMBAR: ICD-10-CM

## 2022-08-17 ENCOUNTER — OFFICE VISIT (OUTPATIENT)
Dept: URGENT CARE | Facility: CLINIC | Age: 27
End: 2022-08-17
Payer: COMMERCIAL

## 2022-08-17 VITALS
HEART RATE: 97 BPM | OXYGEN SATURATION: 97 % | TEMPERATURE: 98 F | WEIGHT: 293 LBS | SYSTOLIC BLOOD PRESSURE: 113 MMHG | DIASTOLIC BLOOD PRESSURE: 73 MMHG | BODY MASS INDEX: 45.99 KG/M2 | HEIGHT: 67 IN | RESPIRATION RATE: 16 BRPM

## 2022-08-17 DIAGNOSIS — R10.13 EPIGASTRIC PAIN: Primary | ICD-10-CM

## 2022-08-17 DIAGNOSIS — R11.11 NON-INTRACTABLE VOMITING WITHOUT NAUSEA, UNSPECIFIED VOMITING TYPE: ICD-10-CM

## 2022-08-17 DIAGNOSIS — K21.9 GASTROESOPHAGEAL REFLUX DISEASE WITHOUT ESOPHAGITIS: ICD-10-CM

## 2022-08-17 LAB
B-HCG UR QL: NEGATIVE
CTP QC/QA: YES

## 2022-08-17 PROCEDURE — 1159F PR MEDICATION LIST DOCUMENTED IN MEDICAL RECORD: ICD-10-PCS | Mod: CPTII,S$GLB,, | Performed by: FAMILY MEDICINE

## 2022-08-17 PROCEDURE — 1160F PR REVIEW ALL MEDS BY PRESCRIBER/CLIN PHARMACIST DOCUMENTED: ICD-10-PCS | Mod: CPTII,S$GLB,, | Performed by: FAMILY MEDICINE

## 2022-08-17 PROCEDURE — 81025 URINE PREGNANCY TEST: CPT | Mod: S$GLB,,, | Performed by: FAMILY MEDICINE

## 2022-08-17 PROCEDURE — 81025 POCT URINE PREGNANCY: ICD-10-PCS | Mod: S$GLB,,, | Performed by: FAMILY MEDICINE

## 2022-08-17 PROCEDURE — 3008F BODY MASS INDEX DOCD: CPT | Mod: CPTII,S$GLB,, | Performed by: FAMILY MEDICINE

## 2022-08-17 PROCEDURE — 99213 OFFICE O/P EST LOW 20 MIN: CPT | Mod: S$GLB,,, | Performed by: FAMILY MEDICINE

## 2022-08-17 PROCEDURE — 1160F RVW MEDS BY RX/DR IN RCRD: CPT | Mod: CPTII,S$GLB,, | Performed by: FAMILY MEDICINE

## 2022-08-17 PROCEDURE — 3044F PR MOST RECENT HEMOGLOBIN A1C LEVEL <7.0%: ICD-10-PCS | Mod: CPTII,S$GLB,, | Performed by: FAMILY MEDICINE

## 2022-08-17 PROCEDURE — 3078F DIAST BP <80 MM HG: CPT | Mod: CPTII,S$GLB,, | Performed by: FAMILY MEDICINE

## 2022-08-17 PROCEDURE — 99213 PR OFFICE/OUTPT VISIT, EST, LEVL III, 20-29 MIN: ICD-10-PCS | Mod: S$GLB,,, | Performed by: FAMILY MEDICINE

## 2022-08-17 PROCEDURE — 3074F PR MOST RECENT SYSTOLIC BLOOD PRESSURE < 130 MM HG: ICD-10-PCS | Mod: CPTII,S$GLB,, | Performed by: FAMILY MEDICINE

## 2022-08-17 PROCEDURE — 3078F PR MOST RECENT DIASTOLIC BLOOD PRESSURE < 80 MM HG: ICD-10-PCS | Mod: CPTII,S$GLB,, | Performed by: FAMILY MEDICINE

## 2022-08-17 PROCEDURE — 1159F MED LIST DOCD IN RCRD: CPT | Mod: CPTII,S$GLB,, | Performed by: FAMILY MEDICINE

## 2022-08-17 PROCEDURE — 3074F SYST BP LT 130 MM HG: CPT | Mod: CPTII,S$GLB,, | Performed by: FAMILY MEDICINE

## 2022-08-17 PROCEDURE — 3008F PR BODY MASS INDEX (BMI) DOCUMENTED: ICD-10-PCS | Mod: CPTII,S$GLB,, | Performed by: FAMILY MEDICINE

## 2022-08-17 PROCEDURE — 3044F HG A1C LEVEL LT 7.0%: CPT | Mod: CPTII,S$GLB,, | Performed by: FAMILY MEDICINE

## 2022-08-17 RX ORDER — OMEPRAZOLE 40 MG/1
40 CAPSULE, DELAYED RELEASE ORAL DAILY
Qty: 30 CAPSULE | Refills: 0 | Status: SHIPPED | OUTPATIENT
Start: 2022-08-17 | End: 2023-01-04

## 2022-08-17 RX ORDER — ONDANSETRON 4 MG/1
4 TABLET, ORALLY DISINTEGRATING ORAL EVERY 8 HOURS PRN
Qty: 30 TABLET | Refills: 0 | Status: SHIPPED | OUTPATIENT
Start: 2022-08-17 | End: 2023-01-04

## 2022-08-17 NOTE — PATIENT INSTRUCTIONS
Your pregnancy test is negative.    Follow up with GI, gastroenterology  Call to schedule an appointment: [phone]      Seek immediate care in the emergency room in the event of severe abdominal pain, chest pain, respiratory distress, fever unresponsive to antipyretic, dehydration, loss of consciousness.

## 2022-08-17 NOTE — PROGRESS NOTES
"Subjective:       Patient ID: Misael Garcia is a 27 y.o. female.    Vitals:  height is 5' 7" (1.702 m) and weight is 159.7 kg (352 lb) (abnormal). Her temperature is 98 °F (36.7 °C). Her blood pressure is 113/73 and her pulse is 97. Her respiration is 16 and oxygen saturation is 97%.     Chief Complaint: Abdominal Pain    Pt presemts abdominal pain and emesis for three days    Abdominal Pain  This is a new problem. The current episode started in the past 7 days. The onset quality is sudden. The problem occurs constantly. The problem has been unchanged. The pain is located in the generalized abdominal region. The quality of the pain is aching, burning and a sensation of fullness. The abdominal pain does not radiate. Associated symptoms include vomiting. Pertinent negatives include no dysuria, fever or nausea. The pain is aggravated by eating. The pain is relieved by movement and vomiting. Treatments tried: Pepcid. The treatment provided mild relief.       Constitution: Negative for activity change, appetite change, chills, fatigue, fever and generalized weakness.   HENT: Negative for congestion, postnasal drip and sinus pressure.    Neck: Negative for neck swelling.   Cardiovascular: Negative for chest pain, leg swelling, palpitations, sob on exertion and passing out.   Eyes: Negative for vision loss.   Respiratory: Negative for chest tightness, cough and shortness of breath.    Gastrointestinal: Positive for abdominal pain and vomiting. Negative for nausea.   Genitourinary: Negative for dysuria.   Skin: Negative for rash.   Neurological: Negative for passing out, altered mental status and numbness.   Psychiatric/Behavioral: Negative for altered mental status, confusion and agitation.       Objective:       Vitals:    08/17/22 1712   BP: 113/73   Pulse: 97   Resp: 16   Temp: 98 °F (36.7 °C)   SpO2: 97%   Weight: (!) 159.7 kg (352 lb)   Height: 5' 7" (1.702 m)     Physical Exam   Constitutional: She is " oriented to person, place, and time. She appears well-developed.   HENT:   Head: Normocephalic and atraumatic.   Ears:   Right Ear: External ear normal.   Left Ear: External ear normal.   Nose: Nose normal.   Mouth/Throat: Mucous membranes are normal.   Eyes: Conjunctivae and lids are normal.   Neck: Trachea normal. Neck supple.   Cardiovascular: Normal rate, regular rhythm and normal heart sounds.   Pulmonary/Chest: Effort normal and breath sounds normal. No respiratory distress.   Abdominal: Normal appearance and bowel sounds are normal. She exhibits no distension, no abdominal bruit, no pulsatile midline mass and no mass. Soft. There is abdominal tenderness (epigastric).   Musculoskeletal: Normal range of motion.         General: Normal range of motion.   Neurological: She is alert and oriented to person, place, and time. She has normal strength.   Skin: Skin is warm, dry, intact, not diaphoretic and not pale.   Psychiatric: Her speech is normal and behavior is normal. Judgment and thought content normal.   Nursing note and vitals reviewed.        Results for orders placed or performed in visit on 08/17/22   POCT urine pregnancy   Result Value Ref Range    POC Preg Test, Ur Negative Negative     Acceptable Yes      Assessment:       1. Epigastric pain    2. Non-intractable vomiting without nausea, unspecified vomiting type    3. Gastroesophageal reflux disease without esophagitis          Plan:         1. Epigastric pain  -     Ambulatory referral/consult to Gastroenterology    2. Non-intractable vomiting without nausea, unspecified vomiting type  -     POCT urine pregnancy  -     ondansetron (ZOFRAN-ODT) 4 MG TbDL; Take 1 tablet (4 mg total) by mouth every 8 (eight) hours as needed (nausea/ vomiting).  Dispense: 30 tablet; Refill: 0    3. Gastroesophageal reflux disease without esophagitis  -     omeprazole (PRILOSEC) 40 MG capsule; Take 1 capsule (40 mg total) by mouth once daily.  Dispense: 30  capsule; Refill: 0  -     Ambulatory referral/consult to Gastroenterology    Patient Instructions   Your pregnancy test is negative.    Follow up with GI, gastroenterology  Call to schedule an appointment: [phone]      Seek immediate care in the emergency room in the event of severe abdominal pain, chest pain, respiratory distress, fever unresponsive to antipyretic, dehydration, loss of consciousness.

## 2022-08-25 ENCOUNTER — TELEPHONE (OUTPATIENT)
Dept: OBSTETRICS AND GYNECOLOGY | Facility: CLINIC | Age: 27
End: 2022-08-25

## 2022-08-25 NOTE — TELEPHONE ENCOUNTER
----- Message from Oxana Gould sent at 8/23/2022 10:18 AM CDT -----  Regarding: Questions/Concerns  Contact: [phone]  Questions/Concerns    Who Called: Misael  Medication: drospirenone-ethinyl estradioL (JUAN MANUEL) 3-0.02 mg per tablet  Pharmacy: [facility]Y #[id number] - [place] - [address]  Questions/Concerns: Heart Burn   Call Back Number:[phone]  Additional Information:  The patient would like to speak to the nurse, regarding new birth control.

## 2022-09-27 ENCOUNTER — PATIENT MESSAGE (OUTPATIENT)
Dept: OBSTETRICS AND GYNECOLOGY | Facility: CLINIC | Age: 27
End: 2022-09-27
Payer: COMMERCIAL

## 2022-09-29 ENCOUNTER — PATIENT MESSAGE (OUTPATIENT)
Dept: BARIATRICS | Facility: CLINIC | Age: 27
End: 2022-09-29
Payer: COMMERCIAL

## 2022-09-30 ENCOUNTER — TELEPHONE (OUTPATIENT)
Dept: BARIATRICS | Facility: CLINIC | Age: 27
End: 2022-09-30

## 2022-09-30 NOTE — TELEPHONE ENCOUNTER
----- Message from Mary Egan sent at 9/30/2022  2:30 PM CDT -----  Contact: [phone]  Patient is returning a missed call from Sole in regards to appt please call and advise @[phone]

## 2022-09-30 NOTE — TELEPHONE ENCOUNTER
----- Message from Tova Arora MA sent at 9/30/2022 10:08 AM CDT -----  Contact: [phone]  Patient is returning a call. Please call and and advise the patient.

## 2022-09-30 NOTE — TELEPHONE ENCOUNTER
Attempted to reach patient in regards to scheduling consult appointment. No answer. Left message.

## 2022-09-30 NOTE — TELEPHONE ENCOUNTER
Returned pt's call in regard to scheduling surg consult. NO ANSWER; left voicemail for pt to call back or reach out via portal msg.

## 2022-10-05 ENCOUNTER — PATIENT MESSAGE (OUTPATIENT)
Dept: BARIATRICS | Facility: CLINIC | Age: 27
End: 2022-10-05

## 2022-10-05 ENCOUNTER — TELEPHONE (OUTPATIENT)
Dept: BARIATRICS | Facility: CLINIC | Age: 27
End: 2022-10-05

## 2022-10-05 ENCOUNTER — TELEPHONE (OUTPATIENT)
Dept: INTERNAL MEDICINE | Facility: CLINIC | Age: 27
End: 2022-10-05

## 2022-10-05 NOTE — TELEPHONE ENCOUNTER
----- Message from Lyudmila Mcclain sent at 10/5/2022  1:17 PM CDT -----  Contact: [phone]  pt wants to speak with the staff about weight loss medication.  the patient can be reached at. [phone]

## 2022-10-05 NOTE — TELEPHONE ENCOUNTER
----- Message from Arline Palmer sent at 10/5/2022  4:08 PM CDT -----  Contact: [phone]  Patient is calling to speak with the doctor about starting weight loss medication. Please contact pt

## 2022-10-06 ENCOUNTER — PATIENT MESSAGE (OUTPATIENT)
Dept: BARIATRICS | Facility: CLINIC | Age: 27
End: 2022-10-06

## 2022-10-07 ENCOUNTER — TELEPHONE (OUTPATIENT)
Dept: INTERNAL MEDICINE | Facility: CLINIC | Age: 27
End: 2022-10-07

## 2022-10-07 NOTE — TELEPHONE ENCOUNTER
----- Message from Shelly Conway sent at 10/7/2022  1:33 PM CDT -----  Contact: [phone] Patient  Patient is returning a phone call.  Who left a message for the patient:Jennifer Stephens MA      Does patient know what this is regarding:  yes about starting ozempic  Would you like a call back, or a response through your [facility] portal?:   Call back  Comments:

## 2022-10-10 ENCOUNTER — PATIENT MESSAGE (OUTPATIENT)
Dept: INTERNAL MEDICINE | Facility: CLINIC | Age: 27
End: 2022-10-10

## 2022-10-10 ENCOUNTER — TELEPHONE (OUTPATIENT)
Dept: INTERNAL MEDICINE | Facility: CLINIC | Age: 27
End: 2022-10-10

## 2022-10-10 ENCOUNTER — PATIENT MESSAGE (OUTPATIENT)
Dept: OBSTETRICS AND GYNECOLOGY | Facility: CLINIC | Age: 27
End: 2022-10-10

## 2022-10-10 NOTE — TELEPHONE ENCOUNTER
----- Message from Kasandra Antonio sent at 10/10/2022 11:35 AM CDT -----  Contact: [phone]  Pt would like a call back pt said she has some questions and needs to talk to someone in the office. Pt said she needs to talk to someone about weight lost and wants to get on Ozempic. Please call pt back. Pt said she put in a message in the portal as well.

## 2022-10-10 NOTE — TELEPHONE ENCOUNTER
----- Message from Kasandra Antonio sent at 10/10/2022 11:35 AM CDT -----  Contact: [phone]  Pt would like a call back pt said she has some questions and needs to talk to someone in the office. Pt said she needs to talk to someone about weight lost and wants to get on Ozempic. Please call pt back. Pt said she put in a message in the portal as well.

## 2022-10-11 ENCOUNTER — TELEPHONE (OUTPATIENT)
Dept: BARIATRICS | Facility: CLINIC | Age: 27
End: 2022-10-11

## 2022-10-11 NOTE — TELEPHONE ENCOUNTER
Patient was informed that due to her side effects from these medication that Dr. Pineda, does not have any additional options for patient.

## 2022-10-11 NOTE — TELEPHONE ENCOUNTER
Returned pt call - she stated that her insurance informed her Ozempic, Metformin and Trulicity was covered and that she did not need to be diabetic to get it.  I informed patient that per our specific policy and [facility] administration that our physicians can not fill those medications for non-diabetic patients and that it was not longer covered as of 9/30/2022.  I told her that perhaps it was covered under general [facility] policies; however, not our specific plans.  She asked her other alternatives.  I asked her if she had followed up as previously discussed with her PCP and nutritionist and she said she was seeing a nutritionist but wanted medication and again asked about Metformin and Trulicity- we discussed her previous use and ineffectiveness of the medication.  She stated that she really did not give it a chance and was not eating right or exercising.  She asked if there was any other alternatives.  I informed pt that there are some sites on the internet that are offering specific cash pay pricing for Ozempic.  She received a call and said that she had to go and hung up the phone.

## 2022-10-11 NOTE — TELEPHONE ENCOUNTER
----- Message from Tete Andrade MA sent at 10/11/2022 10:56 AM CDT -----  Regarding: FW: advise-asking to speak to Tete  Contact: PT @ [phone]    ----- Message -----  From: Edwina Wilson  Sent: 10/11/2022  10:05 AM CDT  To: [facility] Bariatric Surgery Clinical Support  Subject: advise-asking to speak to Tete                 Pt is calling to speak to Tete in the office to discuss rxs that they were speaking about. Pt states that she has reach out to her insurance and has been advised that rxs: ozempic 0.5mg, trulicity 0.5mg, and metformin 500mg are covered by her insurance and she does not have to be diabetic to get the rxs. Please call pt. Thanks.

## 2022-10-11 NOTE — TELEPHONE ENCOUNTER
----- Message from Seanroberto Taylor sent at 10/10/2022  5:40 PM CDT -----  Contact: pt  Type: Requesting to speak with nurse        Who Called: PT  Regarding: pt would like for  to know insurance covers metformin 500mg, trulicity 0.5 mg, and ozempic 0.5 mg  Would the patient rather a call back or a response via [facility]? Call back  Best Call Back Number: [phone]  Additional Information:REFERENCE: [id number]

## 2022-10-11 NOTE — TELEPHONE ENCOUNTER
Phone room sent a team's message that the patient was trying to reach me.  Prior to returning call, spoke with Dr. Pineda and confirmed that all diabetic medications could not be prescribed due to her insurance and that there were not any other alternative medications that she would be able to prescribe for wt loss.  Returned call- pt stated that she already was taken care of- no further assistance was needed

## 2022-10-12 ENCOUNTER — PATIENT MESSAGE (OUTPATIENT)
Dept: OBSTETRICS AND GYNECOLOGY | Facility: CLINIC | Age: 27
End: 2022-10-12

## 2022-10-31 ENCOUNTER — PATIENT MESSAGE (OUTPATIENT)
Dept: OBSTETRICS AND GYNECOLOGY | Facility: CLINIC | Age: 27
End: 2022-10-31

## 2022-11-01 ENCOUNTER — OFFICE VISIT (OUTPATIENT)
Dept: OBSTETRICS AND GYNECOLOGY | Facility: CLINIC | Age: 27
End: 2022-11-01
Payer: COMMERCIAL

## 2022-11-01 VITALS — DIASTOLIC BLOOD PRESSURE: 82 MMHG | BODY MASS INDEX: 54.68 KG/M2 | WEIGHT: 293 LBS | SYSTOLIC BLOOD PRESSURE: 120 MMHG

## 2022-11-01 DIAGNOSIS — N89.8 VAGINAL IRRITATION: Primary | ICD-10-CM

## 2022-11-01 PROCEDURE — 99213 PR OFFICE/OUTPT VISIT, EST, LEVL III, 20-29 MIN: ICD-10-PCS | Mod: S$GLB,,, | Performed by: STUDENT IN AN ORGANIZED HEALTH CARE EDUCATION/TRAINING PROGRAM

## 2022-11-01 PROCEDURE — 3044F HG A1C LEVEL LT 7.0%: CPT | Mod: CPTII,S$GLB,, | Performed by: STUDENT IN AN ORGANIZED HEALTH CARE EDUCATION/TRAINING PROGRAM

## 2022-11-01 PROCEDURE — 99999 PR PBB SHADOW E&M-EST. PATIENT-LVL III: ICD-10-PCS | Mod: PBBFAC,,, | Performed by: STUDENT IN AN ORGANIZED HEALTH CARE EDUCATION/TRAINING PROGRAM

## 2022-11-01 PROCEDURE — 3079F DIAST BP 80-89 MM HG: CPT | Mod: CPTII,S$GLB,, | Performed by: STUDENT IN AN ORGANIZED HEALTH CARE EDUCATION/TRAINING PROGRAM

## 2022-11-01 PROCEDURE — 1159F MED LIST DOCD IN RCRD: CPT | Mod: CPTII,S$GLB,, | Performed by: STUDENT IN AN ORGANIZED HEALTH CARE EDUCATION/TRAINING PROGRAM

## 2022-11-01 PROCEDURE — 3074F PR MOST RECENT SYSTOLIC BLOOD PRESSURE < 130 MM HG: ICD-10-PCS | Mod: CPTII,S$GLB,, | Performed by: STUDENT IN AN ORGANIZED HEALTH CARE EDUCATION/TRAINING PROGRAM

## 2022-11-01 PROCEDURE — 3079F PR MOST RECENT DIASTOLIC BLOOD PRESSURE 80-89 MM HG: ICD-10-PCS | Mod: CPTII,S$GLB,, | Performed by: STUDENT IN AN ORGANIZED HEALTH CARE EDUCATION/TRAINING PROGRAM

## 2022-11-01 PROCEDURE — 87591 N.GONORRHOEAE DNA AMP PROB: CPT | Performed by: STUDENT IN AN ORGANIZED HEALTH CARE EDUCATION/TRAINING PROGRAM

## 2022-11-01 PROCEDURE — 1159F PR MEDICATION LIST DOCUMENTED IN MEDICAL RECORD: ICD-10-PCS | Mod: CPTII,S$GLB,, | Performed by: STUDENT IN AN ORGANIZED HEALTH CARE EDUCATION/TRAINING PROGRAM

## 2022-11-01 PROCEDURE — 3044F PR MOST RECENT HEMOGLOBIN A1C LEVEL <7.0%: ICD-10-PCS | Mod: CPTII,S$GLB,, | Performed by: STUDENT IN AN ORGANIZED HEALTH CARE EDUCATION/TRAINING PROGRAM

## 2022-11-01 PROCEDURE — 87491 CHLMYD TRACH DNA AMP PROBE: CPT | Performed by: STUDENT IN AN ORGANIZED HEALTH CARE EDUCATION/TRAINING PROGRAM

## 2022-11-01 PROCEDURE — 99999 PR PBB SHADOW E&M-EST. PATIENT-LVL III: CPT | Mod: PBBFAC,,, | Performed by: STUDENT IN AN ORGANIZED HEALTH CARE EDUCATION/TRAINING PROGRAM

## 2022-11-01 PROCEDURE — 99213 OFFICE O/P EST LOW 20 MIN: CPT | Mod: S$GLB,,, | Performed by: STUDENT IN AN ORGANIZED HEALTH CARE EDUCATION/TRAINING PROGRAM

## 2022-11-01 PROCEDURE — 81514 NFCT DS BV&VAGINITIS DNA ALG: CPT | Performed by: STUDENT IN AN ORGANIZED HEALTH CARE EDUCATION/TRAINING PROGRAM

## 2022-11-01 PROCEDURE — 1160F PR REVIEW ALL MEDS BY PRESCRIBER/CLIN PHARMACIST DOCUMENTED: ICD-10-PCS | Mod: CPTII,S$GLB,, | Performed by: STUDENT IN AN ORGANIZED HEALTH CARE EDUCATION/TRAINING PROGRAM

## 2022-11-01 PROCEDURE — 1160F RVW MEDS BY RX/DR IN RCRD: CPT | Mod: CPTII,S$GLB,, | Performed by: STUDENT IN AN ORGANIZED HEALTH CARE EDUCATION/TRAINING PROGRAM

## 2022-11-01 PROCEDURE — 3074F SYST BP LT 130 MM HG: CPT | Mod: CPTII,S$GLB,, | Performed by: STUDENT IN AN ORGANIZED HEALTH CARE EDUCATION/TRAINING PROGRAM

## 2022-11-01 RX ORDER — SERTRALINE HYDROCHLORIDE 100 MG/1
100 TABLET, FILM COATED ORAL DAILY
COMMUNITY
End: 2023-04-21 | Stop reason: ALTCHOICE

## 2022-11-01 RX ORDER — METRONIDAZOLE 500 MG/1
500 TABLET ORAL 2 TIMES DAILY
Qty: 14 TABLET | Refills: 0 | Status: SHIPPED | OUTPATIENT
Start: 2022-11-01 | End: 2022-11-08

## 2022-11-01 NOTE — PROGRESS NOTES
History & Physical  Gynecology      SUBJECTIVE:     Chief Complaint: Vaginal Irritation       History of Present Illness:  Misael is a 27 yr old female who presents with complaints of vaginal irritation since the weekend. She states that she and her partner had sex 4 times over the most recent weekend and she she has experienced vaginal irritation since that time. She denies the use of lubricants but states that her partner will have saliva for lubrication. She has a remote history of STI and desires STD testing.     Review of patient's allergies indicates:  No Known Allergies    Past Medical History:   Diagnosis Date    ADHD (attention deficit hyperactivity disorder)     Anxiety     Bipolar affective     History of chlamydia 2016    Insomnia     Morbid obesity with BMI of 40.0-44.9, adult     Sleep disorder      Past Surgical History:   Procedure Laterality Date    NO PAST SURGERIES      as of 19     OB History          0    Para   0    Term   0       0    AB   0    Living   0         SAB   0    IAB   0    Ectopic   0    Multiple   0    Live Births               Obstetric Comments   Menarche ~ 18/reg  H/o chlamydia   Denies abnl pap             Family History   Problem Relation Age of Onset    Breast cancer Paternal Grandmother     No Known Problems Father     No Known Problems Mother     No Known Problems Sister     Diabetes Maternal Grandmother     No Known Problems Maternal Grandfather     No Known Problems Paternal Grandfather     Colon cancer Neg Hx     Ovarian cancer Neg Hx      Social History     Tobacco Use    Smoking status: Every Day     Types: Vaping with nicotine     Start date:     Smokeless tobacco: Never    Tobacco comments:     boyfriend smoke cigarettes   Substance Use Topics    Alcohol use: No    Drug use: Yes     Types: Marijuana       Current Outpatient Medications   Medication Sig    sertraline (ZOLOFT) 100 MG tablet Take 100 mg by mouth once daily.    busPIRone  (BUSPAR) 15 MG tablet Take 15 mg by mouth 2 (two) times daily.    clonazePAM (KLONOPIN) 1 MG tablet Take 1 mg by mouth 2 (two) times daily as needed.    drospirenone-ethinyl estradioL (JUAN MANUEL) 3-0.02 mg per tablet Take 1 tablet by mouth once daily. (Patient not taking: Reported on 11/1/2022)    flu vacc [id number] 6mos up,PF, (FLUARIX QUAD [id number], PF,) 60 mcg (15 mcg x 4)/0.5 mL Syrg Use as directed (Patient not taking: Reported on 11/1/2022)    LATUDA 40 mg Tab tablet Take 40 mg by mouth nightly.    methylphenidate HCl 18 MG CR tablet Take 18 mg by mouth every morning.    metroNIDAZOLE (FLAGYL) 500 MG tablet Take 1 tablet (500 mg total) by mouth 2 (two) times daily. for 7 days    omeprazole (PRILOSEC) 40 MG capsule Take 1 capsule (40 mg total) by mouth once daily.    ondansetron (ZOFRAN-ODT) 4 MG TbDL Take 1 tablet (4 mg total) by mouth every 8 (eight) hours as needed (nausea/ vomiting).    sars-cov-2, covid-19, (MODERNA COVID-19) 100 mcg/0.5 ml injection      No current facility-administered medications for this visit.       Review of Systems:  Review of Systems   Constitutional:  Negative for chills, fatigue and fever.   HENT:  Negative for congestion.    Eyes:  Negative for visual disturbance.   Respiratory:  Negative for cough and shortness of breath.    Cardiovascular:  Negative for chest pain and palpitations.   Gastrointestinal:  Negative for abdominal distention, abdominal pain, constipation, diarrhea, nausea and vomiting.   Genitourinary:  Negative for difficulty urinating, dysuria, hematuria, vaginal bleeding and vaginal discharge.        Vaginal irritation   Skin:  Negative for rash.   Neurological:  Negative for dizziness, seizures, light-headedness and headaches.   Hematological:  Does not bruise/bleed easily.   Psychiatric/Behavioral:  Negative for dysphoric mood. The patient is not nervous/anxious.       OBJECTIVE:     Physical Exam:  Vitals:    11/01/22 1152   BP: 120/82      Physical Exam  Vitals  and nursing note reviewed. Exam conducted with a chaperone present.   Constitutional:       General: She is not in acute distress.     Appearance: She is well-developed.   HENT:      Head: Normocephalic and atraumatic.   Eyes:      Pupils: Pupils are equal, round, and reactive to light.   Cardiovascular:      Rate and Rhythm: Normal rate and regular rhythm.   Pulmonary:      Effort: Pulmonary effort is normal. No respiratory distress.   Abdominal:      General: There is no distension.      Palpations: Abdomen is soft. There is no mass.      Tenderness: There is no abdominal tenderness. There is no guarding.   Genitourinary:     Comments: SSE: Normal external female genitalia, normal urethral meatus, normal vaginal rugae, normal vaginal mucosa, no vaginal blood in canal, amine odor discharge, no adnexal masses palpated on bimanual exam.     Musculoskeletal:         General: Normal range of motion.      Cervical back: Normal range of motion and neck supple.   Skin:     General: Skin is warm and dry.   Neurological:      Mental Status: She is alert and oriented to person, place, and time.   Psychiatric:         Behavior: Behavior normal.         Thought Content: Thought content normal.         Judgment: Judgment normal.     ASSESSMENT/PLAN:     1. Vaginal irritation  - Reassuring clinical exam apart from likely bacterial vaginosis. Rx for Flagyl sent  - Discussed normalcy of vaginal irritation following increased frequency of penetrative intercourse  - Encouraged use of lubricants  - Vaginosis Screen by DNA Probe  - C. trachomatis/N. gonorrhoeae by AMP DNA      Fiorella Peña M.D.  OB/GYN  [facility]

## 2022-11-02 LAB
C TRACH DNA SPEC QL NAA+PROBE: NOT DETECTED
N GONORRHOEA DNA SPEC QL NAA+PROBE: NOT DETECTED

## 2022-11-03 ENCOUNTER — PATIENT MESSAGE (OUTPATIENT)
Dept: OBSTETRICS AND GYNECOLOGY | Facility: CLINIC | Age: 27
End: 2022-11-03

## 2022-11-03 LAB
BACTERIAL VAGINOSIS DNA: POSITIVE
CANDIDA GLABRATA DNA: NEGATIVE
CANDIDA KRUSEI DNA: NEGATIVE
CANDIDA RRNA VAG QL PROBE: NEGATIVE
T VAGINALIS RRNA GENITAL QL PROBE: NEGATIVE

## 2022-11-21 ENCOUNTER — HOSPITAL ENCOUNTER (EMERGENCY)
Facility: HOSPITAL | Age: 27
Discharge: HOME | End: 2022-11-21
Attending: EMERGENCY MEDICINE
Payer: COMMERCIAL

## 2022-11-21 VITALS
BODY MASS INDEX: 54.97 KG/M2 | TEMPERATURE: 98 F | RESPIRATION RATE: 18 BRPM | WEIGHT: 293 LBS | HEART RATE: 89 BPM | DIASTOLIC BLOOD PRESSURE: 88 MMHG | OXYGEN SATURATION: 98 % | SYSTOLIC BLOOD PRESSURE: 147 MMHG

## 2022-11-21 DIAGNOSIS — N92.6 IRREGULAR MENSTRUAL BLEEDING: ICD-10-CM

## 2022-11-21 DIAGNOSIS — N93.8 DUB (DYSFUNCTIONAL UTERINE BLEEDING): Primary | ICD-10-CM

## 2022-11-21 LAB
B-HCG UR QL: NEGATIVE
BASOPHILS # BLD AUTO: 0.05 K/UL (ref 0–0.2)
BASOPHILS NFR BLD: 0.4 % (ref 0–1.9)
CTP QC/QA: YES
DIFFERENTIAL METHOD: ABNORMAL
EOSINOPHIL # BLD AUTO: 0.3 K/UL (ref 0–0.5)
EOSINOPHIL NFR BLD: 2.3 % (ref 0–8)
ERYTHROCYTE [DISTWIDTH] IN BLOOD BY AUTOMATED COUNT: 14.6 % (ref 11.5–14.5)
HCT VFR BLD AUTO: 38.4 % (ref 37–48.5)
HGB BLD-MCNC: 12.1 G/DL (ref 12–16)
IMM GRANULOCYTES # BLD AUTO: 0.04 K/UL (ref 0–0.04)
IMM GRANULOCYTES NFR BLD AUTO: 0.3 % (ref 0–0.5)
LYMPHOCYTES # BLD AUTO: 3.3 K/UL (ref 1–4.8)
LYMPHOCYTES NFR BLD: 26.6 % (ref 18–48)
MCH RBC QN AUTO: 25.7 PG (ref 27–31)
MCHC RBC AUTO-ENTMCNC: 31.5 G/DL (ref 32–36)
MCV RBC AUTO: 82 FL (ref 82–98)
MONOCYTES # BLD AUTO: 0.6 K/UL (ref 0.3–1)
MONOCYTES NFR BLD: 5 % (ref 4–15)
NEUTROPHILS # BLD AUTO: 8.1 K/UL (ref 1.8–7.7)
NEUTROPHILS NFR BLD: 65.4 % (ref 38–73)
NRBC BLD-RTO: 0 /100 WBC
PLATELET # BLD AUTO: 529 K/UL (ref 150–450)
PMV BLD AUTO: 9.1 FL (ref 9.2–12.9)
RBC # BLD AUTO: 4.7 M/UL (ref 4–5.4)
WBC # BLD AUTO: 12.4 K/UL (ref 3.9–12.7)

## 2022-11-21 PROCEDURE — 81025 URINE PREGNANCY TEST: CPT | Performed by: PHYSICIAN ASSISTANT

## 2022-11-21 PROCEDURE — 85025 COMPLETE CBC W/AUTO DIFF WBC: CPT | Performed by: PHYSICIAN ASSISTANT

## 2022-11-21 PROCEDURE — 25000003 PHARM REV CODE 250: Performed by: PHYSICIAN ASSISTANT

## 2022-11-21 PROCEDURE — 99283 EMERGENCY DEPT VISIT LOW MDM: CPT

## 2022-11-21 RX ORDER — NAPROXEN 500 MG/1
500 TABLET ORAL
Status: COMPLETED | OUTPATIENT
Start: 2022-11-21 | End: 2022-11-21

## 2022-11-21 RX ADMIN — NAPROXEN 500 MG: 500 TABLET ORAL at 02:11

## 2022-11-21 NOTE — DISCHARGE INSTRUCTIONS
Please take your birth control daily as you are prescribed.  You are bleeding currently likely due to missing your medication dose.  Follow-up with your OBGYN

## 2022-11-21 NOTE — ED PROVIDER NOTES
"Encounter Date: 11/21/2022    SCRIBE #1 NOTE: I, Debra Rodriguez, am scribing for, and in the presence of,  JOCE Sanford. I have scribed the following portions of the note - Other sections scribed: HPI, ROS, PE.     History     Chief Complaint   Patient presents with    Vaginal Bleeding     Irregular vaginal bleeding, began after intercourse  2 days ago, last menstrual cycle- 11/6, + abd cramping, estimated pad count of 3/day, pt currently on birth control and states non compliant with birth control " takes every now and then"      27 year female presents to the ED for evaluation of irregular menstrual bleeding onset 2 days ago. She has associated symptoms of abdominal cramping. Patient reports her last menstrual cycle was on 11/06. She was recently placed on Michelle birth control but reports non compliance with it. She reports she is using 2-3 pads daily. She denies history of similar symptoms.     The history is provided by the patient.   Review of patient's allergies indicates:  No Known Allergies  Past Medical History:   Diagnosis Date    ADHD (attention deficit hyperactivity disorder)     Anxiety     Bipolar affective     History of chlamydia 12/2016    Insomnia     Morbid obesity with BMI of 40.0-44.9, adult     Sleep disorder      Past Surgical History:   Procedure Laterality Date    NO PAST SURGERIES      as of 1-13-17     Family History   Problem Relation Age of Onset    Breast cancer Paternal Grandmother     No Known Problems Father     No Known Problems Mother     No Known Problems Sister     Diabetes Maternal Grandmother     No Known Problems Maternal Grandfather     No Known Problems Paternal Grandfather     Colon cancer Neg Hx     Ovarian cancer Neg Hx      Social History     Tobacco Use    Smoking status: Every Day     Types: Vaping with nicotine     Start date: 2022    Smokeless tobacco: Never    Tobacco comments:     boyfriend smoke cigarettes   Substance Use Topics    Alcohol use: No    Drug use: " Yes     Types: Marijuana     Review of Systems   Constitutional:  Negative for chills and fever.   Eyes:  Negative for visual disturbance.   Respiratory:  Negative for shortness of breath.    Cardiovascular:  Negative for chest pain.   Gastrointestinal:  Negative for abdominal pain, nausea and vomiting.   Genitourinary:  Positive for vaginal bleeding. Negative for dysuria and flank pain.   Musculoskeletal:  Negative for myalgias.   Skin:  Negative for rash.   Allergic/Immunologic: Negative for immunocompromised state.   Neurological:  Negative for weakness and numbness.   Hematological:  Does not bruise/bleed easily.   Psychiatric/Behavioral:  Negative for confusion.      Physical Exam     Initial Vitals [11/21/22 1222]   BP Pulse Resp Temp SpO2   (!) 147/88 89 18 98 °F (36.7 °C) 98 %      MAP       --         Physical Exam    Vitals reviewed.  Constitutional: She appears well-developed and well-nourished. She is not diaphoretic. No distress.   HENT:   Head: Normocephalic and atraumatic.   Eyes: Conjunctivae and EOM are normal.   Neck: Neck supple.   Pulmonary/Chest: No respiratory distress.   Abdominal: There is abdominal tenderness (mild) in the suprapubic area.   Musculoskeletal:         General: Normal range of motion.      Cervical back: Neck supple.     Neurological: She is alert and oriented to person, place, and time.   Skin: Skin is warm.       ED Course   Procedures  Labs Reviewed   CBC W/ AUTO DIFFERENTIAL - Abnormal; Notable for the following components:       Result Value    MCH 25.7 (*)     MCHC 31.5 (*)     RDW 14.6 (*)     Platelets 529 (*)     MPV 9.1 (*)     Gran # (ANC) 8.1 (*)     All other components within normal limits   POCT URINE PREGNANCY          Imaging Results    None          Medications   naproxen tablet 500 mg (500 mg Oral Given 11/21/22 1408)           APC / Resident Notes:   Patient in the ER promptly upon arrival.  She is afebrile, no acute distress.  Mild tenderness on palpation  to the lower suprapubic region.  No guarding.  No CVA tenderness.    Normal white count of 12.4.  Hemoglobin stable 12.1.    Suspect that the dysfunctional uterine bleeding is due to inappropriate use of her OCP.  Will advise patient to stay compliant and consistent with her OCP.  Advised to follow-up with her OBGYN regarding this irregular bleeding.  Advised to return to the emergency room if she is saturating 1 pad per hour.  Given strict return precautions ED.  Stable for discharge.    Disclaimer: This note has been generated using voice-recognition software. There may be typographical errors that have been missed during proof-reading.       Scribe Attestation:   Scribe #1: I performed the above scribed service and the documentation accurately describes the services I performed. I attest to the accuracy of the note.                   Clinical Impression:   Final diagnoses:  [N93.8] DUB (dysfunctional uterine bleeding) (Primary)  [N92.6] Irregular menstrual bleeding     I, Bonnie Howard personally performed the services described in this documentation. All medical record entries made by the scribe were at my direction and in my presence.  I have reviewed the chart and agree that the record reflects my personal performance and is accurate and complete. Bonnie Howard PA-C  7:18 PM 11/21/2022     ED Disposition Condition    Discharge Stable          ED Prescriptions    None       Follow-up Information    None          Bonnie Howard PA-C  11/21/22 1920

## 2022-11-21 NOTE — FIRST PROVIDER EVALUATION
" Emergency Department TeleTriage Encounter Note      CHIEF COMPLAINT    Chief Complaint   Patient presents with    Vaginal Bleeding     Irregular vaginal bleeding, began after intercourse  2 days ago, last menstrual cycle- 11/6, + abd cramping, estimated pad count of 3/day, pt currently on birth control and states non compliant with birth control " takes every now and then"        VITAL SIGNS   Initial Vitals [11/21/22 1222]   BP Pulse Resp Temp SpO2   (!) 147/88 89 18 98 °F (36.7 °C) 98 %      MAP       --            ALLERGIES    Review of patient's allergies indicates:  No Known Allergies    PROVIDER TRIAGE NOTE  This is a teletriage evaluation of a 27 y.o. female presenting to the ED with c/o inter-cycle vaginal bleeding (LMP nov 6) 3 pads per day. Does not take OCP regularly. Mild pelvic cramping. No fever or dizziness.     PE:. Non-toxic/well-appearing. No respiratory distress, speaks in full sentences without issue. No active emesis nor cough. Normal eye contact and mentation.     Plan: UPT, CBC. Further/augmented workup at discretion of examining provider.     All ED beds are full at present; patient notified of this status.  Patient seen and medically screened by CARLYN via teletriage. Orders initiated at triage to expedite care.  Patient is stable and will be placed in an ED bed when available.  Care will be transferred to an alternate provider when patient has been placed in an Exam Room further exam, additional orders, and disposition.         ORDERS  Labs Reviewed   CBC W/ AUTO DIFFERENTIAL   POCT URINE PREGNANCY       ED Orders (720h ago, onward)      Start Ordered     Status Ordering Provider    11/21/22 1400 11/21/22 1345  naproxen tablet 500 mg  ED 1 Time         Ordered DEVAN MOREAU    11/21/22 1345 11/21/22 1344  CBC Auto Differential  STAT         Ordered DEVAN MOREAU    11/21/22 1345 11/21/22 1344  POCT urine pregnancy  Once         Ordered DEVAN MOREAU              Virtual Visit " Note: The provider triage portion of this emergency department evaluation and documentation was performed via [employer], a HIPAA-compliant telemedicine application, in concert with a tele-presenter in the room. A face to face patient evaluation with one of my colleagues will occur once the patient is placed in an emergency department room.      DISCLAIMER: This note was prepared with [employer] voice recognition transcription software. Garbled syntax, mangled pronouns, and other bizarre constructions may be attributed to that software system.

## 2022-12-08 ENCOUNTER — PATIENT MESSAGE (OUTPATIENT)
Dept: GASTROENTEROLOGY | Facility: CLINIC | Age: 27
End: 2022-12-08

## 2022-12-09 ENCOUNTER — TELEPHONE (OUTPATIENT)
Dept: OBSTETRICS AND GYNECOLOGY | Facility: CLINIC | Age: 27
End: 2022-12-09

## 2022-12-09 ENCOUNTER — PATIENT MESSAGE (OUTPATIENT)
Dept: OBSTETRICS AND GYNECOLOGY | Facility: CLINIC | Age: 27
End: 2022-12-09

## 2022-12-09 ENCOUNTER — HOSPITAL ENCOUNTER (EMERGENCY)
Facility: HOSPITAL | Age: 27
Discharge: HOME | End: 2022-12-09
Attending: EMERGENCY MEDICINE
Payer: COMMERCIAL

## 2022-12-09 VITALS
WEIGHT: 293 LBS | RESPIRATION RATE: 20 BRPM | DIASTOLIC BLOOD PRESSURE: 93 MMHG | HEART RATE: 85 BPM | OXYGEN SATURATION: 99 % | BODY MASS INDEX: 54.03 KG/M2 | SYSTOLIC BLOOD PRESSURE: 144 MMHG | TEMPERATURE: 99 F

## 2022-12-09 DIAGNOSIS — N83.209 OVARIAN CYST: ICD-10-CM

## 2022-12-09 LAB
ALBUMIN SERPL BCP-MCNC: 3.4 G/DL (ref 3.5–5.2)
ALP SERPL-CCNC: 118 U/L (ref 55–135)
ALT SERPL W/O P-5'-P-CCNC: 15 U/L (ref 10–44)
ANION GAP SERPL CALC-SCNC: 11 MMOL/L (ref 8–16)
AST SERPL-CCNC: 12 U/L (ref 10–40)
B-HCG UR QL: NEGATIVE
BASOPHILS # BLD AUTO: 0.03 K/UL (ref 0–0.2)
BASOPHILS NFR BLD: 0.2 % (ref 0–1.9)
BILIRUB SERPL-MCNC: 0.3 MG/DL (ref 0.1–1)
BILIRUB UR QL STRIP: NEGATIVE
BUN SERPL-MCNC: 10 MG/DL (ref 6–20)
CALCIUM SERPL-MCNC: 9.1 MG/DL (ref 8.7–10.5)
CHLORIDE SERPL-SCNC: 103 MMOL/L (ref 95–110)
CLARITY UR: ABNORMAL
CO2 SERPL-SCNC: 22 MMOL/L (ref 23–29)
COLOR UR: YELLOW
CREAT SERPL-MCNC: 0.7 MG/DL (ref 0.5–1.4)
CTP QC/QA: YES
DIFFERENTIAL METHOD: ABNORMAL
EOSINOPHIL # BLD AUTO: 0.2 K/UL (ref 0–0.5)
EOSINOPHIL NFR BLD: 1.8 % (ref 0–8)
ERYTHROCYTE [DISTWIDTH] IN BLOOD BY AUTOMATED COUNT: 14.4 % (ref 11.5–14.5)
EST. GFR  (NO RACE VARIABLE): >60 ML/MIN/1.73 M^2
GLUCOSE SERPL-MCNC: 96 MG/DL (ref 70–110)
GLUCOSE UR QL STRIP: NEGATIVE
HCT VFR BLD AUTO: 36.5 % (ref 37–48.5)
HGB BLD-MCNC: 11.7 G/DL (ref 12–16)
HGB UR QL STRIP: NEGATIVE
IMM GRANULOCYTES # BLD AUTO: 0.03 K/UL (ref 0–0.04)
IMM GRANULOCYTES NFR BLD AUTO: 0.2 % (ref 0–0.5)
KETONES UR QL STRIP: NEGATIVE
LEUKOCYTE ESTERASE UR QL STRIP: NEGATIVE
LIPASE SERPL-CCNC: 9 U/L (ref 4–60)
LYMPHOCYTES # BLD AUTO: 2.6 K/UL (ref 1–4.8)
LYMPHOCYTES NFR BLD: 21.4 % (ref 18–48)
MCH RBC QN AUTO: 25.6 PG (ref 27–31)
MCHC RBC AUTO-ENTMCNC: 32.1 G/DL (ref 32–36)
MCV RBC AUTO: 80 FL (ref 82–98)
MONOCYTES # BLD AUTO: 0.7 K/UL (ref 0.3–1)
MONOCYTES NFR BLD: 5.6 % (ref 4–15)
NEUTROPHILS # BLD AUTO: 8.6 K/UL (ref 1.8–7.7)
NEUTROPHILS NFR BLD: 70.8 % (ref 38–73)
NITRITE UR QL STRIP: NEGATIVE
NRBC BLD-RTO: 0 /100 WBC
PH UR STRIP: 6 [PH] (ref 5–8)
PLATELET # BLD AUTO: 465 K/UL (ref 150–450)
PMV BLD AUTO: 9.1 FL (ref 9.2–12.9)
POTASSIUM SERPL-SCNC: 4.4 MMOL/L (ref 3.5–5.1)
PROT SERPL-MCNC: 6.7 G/DL (ref 6–8.4)
PROT UR QL STRIP: NEGATIVE
RBC # BLD AUTO: 4.57 M/UL (ref 4–5.4)
SODIUM SERPL-SCNC: 136 MMOL/L (ref 136–145)
SP GR UR STRIP: 1.02 (ref 1–1.03)
URN SPEC COLLECT METH UR: ABNORMAL
UROBILINOGEN UR STRIP-ACNC: NEGATIVE EU/DL
WBC # BLD AUTO: 12.18 K/UL (ref 3.9–12.7)

## 2022-12-09 PROCEDURE — 81003 URINALYSIS AUTO W/O SCOPE: CPT | Performed by: PHYSICIAN ASSISTANT

## 2022-12-09 PROCEDURE — 99285 EMERGENCY DEPT VISIT HI MDM: CPT | Mod: 25

## 2022-12-09 PROCEDURE — 63600175 PHARM REV CODE 636 W HCPCS: Performed by: EMERGENCY MEDICINE

## 2022-12-09 PROCEDURE — 96374 THER/PROPH/DIAG INJ IV PUSH: CPT

## 2022-12-09 PROCEDURE — 81025 URINE PREGNANCY TEST: CPT | Performed by: PHYSICIAN ASSISTANT

## 2022-12-09 PROCEDURE — 85025 COMPLETE CBC W/AUTO DIFF WBC: CPT | Performed by: EMERGENCY MEDICINE

## 2022-12-09 PROCEDURE — 83690 ASSAY OF LIPASE: CPT | Performed by: EMERGENCY MEDICINE

## 2022-12-09 PROCEDURE — 80053 COMPREHEN METABOLIC PANEL: CPT | Performed by: EMERGENCY MEDICINE

## 2022-12-09 RX ORDER — KETOROLAC TROMETHAMINE 30 MG/ML
15 INJECTION, SOLUTION INTRAMUSCULAR; INTRAVENOUS
Status: COMPLETED | OUTPATIENT
Start: 2022-12-09 | End: 2022-12-09

## 2022-12-09 RX ADMIN — KETOROLAC TROMETHAMINE 15 MG: 30 INJECTION, SOLUTION INTRAMUSCULAR; INTRAVENOUS at 01:12

## 2022-12-09 NOTE — TELEPHONE ENCOUNTER
----- Message from Rosalie Zamarripa sent at 12/9/2022  1:23 PM CST -----  Regarding: Appt  Contact: [phone]  Patient Misael is calling. Patient would like to see doctor next week. Patient was seen in the ER today and would like to speak with the doctor in ref to her Ovarian cyst. Please call patient at [phone]    Thank You

## 2022-12-09 NOTE — Clinical Note
"Misael"Jose Garcia was seen and treated in our emergency department on 12/9/2022.  She may return to work on 12/10/2022.       If you have any questions or concerns, please don't hesitate to call.      Michele Lee LPN    "

## 2022-12-09 NOTE — DISCHARGE INSTRUCTIONS
You have an ovarian cyst on the right side that is probably causing your pain. You can take Tylenol 1 gram every 8 hours, and ibuprofen 800 mg every 8 hours; this means you can take pain medicine once every 4 hours.  Please follow up with Gynecology for further evaluation if this continues to bother you.

## 2022-12-09 NOTE — ED PROVIDER NOTES
Encounter Date: 12/9/2022       History     Chief Complaint   Patient presents with    Flank Pain     Abd pain to RUQ and RLQ that radiates to right flank x3 days; constant. Denies n/v but reports diarrhea+. 5/10 pain not responding to ibuprofen. Denies dysuria but c/o urinary frequency. No distress.      HPI  27-year-old female presenting with abdominal pain x 3 days in the right side, right lower quadrant  associated with diarrhea  worse with:  Nothing noted  improved with:  Nothing noted  tried ibuprofen at home without improvement  Denies sick contacts  Denies eating any new or spoiled foods  Denies history of abdominal surgeries  Denies f/c/n/v/CP/SOB    Review of patient's allergies indicates:  No Known Allergies  Past Medical History:   Diagnosis Date    ADHD (attention deficit hyperactivity disorder)     Anxiety     Bipolar affective     History of chlamydia 12/2016    Insomnia     Morbid obesity with BMI of 40.0-44.9, adult     Sleep disorder      Past Surgical History:   Procedure Laterality Date    NO PAST SURGERIES      as of 1-13-17     Family History   Problem Relation Age of Onset    Breast cancer Paternal Grandmother     No Known Problems Father     No Known Problems Mother     No Known Problems Sister     Diabetes Maternal Grandmother     No Known Problems Maternal Grandfather     No Known Problems Paternal Grandfather     Colon cancer Neg Hx     Ovarian cancer Neg Hx      Social History     Tobacco Use    Smoking status: Every Day     Types: Vaping with nicotine     Start date: 2022    Smokeless tobacco: Never    Tobacco comments:     boyfriend smoke cigarettes   Substance Use Topics    Alcohol use: No    Drug use: Yes     Types: Marijuana     Review of Systems   Constitutional:  Negative for fever.   HENT:  Negative for sore throat.    Respiratory:  Negative for shortness of breath.    Cardiovascular:  Negative for chest pain.   Gastrointestinal:  Positive for abdominal pain and diarrhea. Negative  for nausea.   Genitourinary:  Positive for dysuria.   Musculoskeletal:  Negative for back pain.   Skin:  Negative for rash.   Neurological:  Negative for weakness.   Hematological:  Does not bruise/bleed easily.     Physical Exam     Initial Vitals [12/09/22 0840]   BP Pulse Resp Temp SpO2   (!) 144/93 85 20 98.7 °F (37.1 °C) 99 %      MAP       --         Physical Exam    Nursing note and vitals reviewed.  Constitutional:   EXAM  General: Awake, alert and oriented. No acute distress.     Head: normocephalic and atraumatic     Eyes: Conjunctivae are clear without exudates or hemorrhage. Sclera is non-icteric. EOM are intact. Eyelids are normal in appearance without swelling or lesions.     Ears: The external ear and ear canal are non-tender and without swelling. The canal is clear without discharge. Hearing intact.     Nose: Nares are patent bilaterally.     Neck: The neck is supple. Trachea is midline. Full ROM.     Cardiac: Regular rate.     Respiratory: No signs of respiratory distress. No audible wheezes.     Abdominal: Non-distended.  Right flank/right lower quadrant pain with palpation.  Non peritoneal.     Extremities: Upper and lower extremities are atraumatic in appearance without tenderness or deformity. No swelling or erythema. Full range of motion.     Skin: Appropriate color for ethnicity.     Neurological: The patient is awake, alert and oriented to person, place, and time with normal speech.     Psychiatric: Appropriate mood and affect.     In light of current/ongoing global covid-19 pandemic, all my encounters w pt were with full ppe including but not limited to gown, gloves, n95, eye protection OR from >6 ft away.           ED Course   Procedures  Labs Reviewed   URINALYSIS, REFLEX TO URINE CULTURE - Abnormal; Notable for the following components:       Result Value    Appearance, UA Hazy (*)     All other components within normal limits    Narrative:     Specimen Source->Urine   CBC W/ AUTO  DIFFERENTIAL - Abnormal; Notable for the following components:    Hemoglobin 11.7 (*)     Hematocrit 36.5 (*)     MCV 80 (*)     MCH 25.6 (*)     Platelets 465 (*)     MPV 9.1 (*)     Gran # (ANC) 8.6 (*)     All other components within normal limits   COMPREHENSIVE METABOLIC PANEL - Abnormal; Notable for the following components:    CO2 22 (*)     Albumin 3.4 (*)     All other components within normal limits   LIPASE   POCT URINE PREGNANCY          Imaging Results              US Transvaginal Non OB (Final result)  Result time 12/09/22 12:42:02      Final result by Babak Finnegan MD (12/09/22 12:42:02)                   Impression:      Simple appearing right ovarian cyst, correlates with finding on recent CT.  No surrounding edema or findings to suggest hemorrhage.  If pain persists in the region, follow-up could be performed in 6-8 weeks to ensure resolution.      Electronically signed by: Babak Finnegan MD  Date:    12/09/2022  Time:    12:42               Narrative:    EXAMINATION:  US TRANSVAGINAL NON OB    CLINICAL HISTORY:  Unspecified ovarian cyst, unspecified side    TECHNIQUE:  Real-time sonography was performed of the pelvis using transvaginal technique.    COMPARISON:  CT 12/09/2022    FINDINGS:  The uterus measures approximately 8.4 x 4.8 x 5.1 cm.  There are a few cervical nabothian cysts.  The endometrial stripe is not thickened measuring 0.6 cm.    The right ovary measures approximately 5.8 x 4.6 x 4.1 cm and contains a simple appearing cyst measuring approximately 4.0 x 3.6 x 4.6 cm.  The left ovary measures approximately 2.6 x 3.6 x 3.5 cm noting several scattered peripheral follicles.  Arterial and venous flow is documented to the ovaries bilaterally.  No significant free fluid in the pelvis.                                       CT Abdomen Pelvis  Without Contrast (Final result)  Result time 12/09/22 11:01:52      Final result by Pelon Moreno MD (12/09/22 11:01:52)                    Impression:      1. No acute intra-abdominal/pelvic CT abnormalities to account for the reported history of right lower quadrant abdominal pain.  2. 4.2 cm low-attenuation right ovarian lesion, likely a cyst.  Given patient's reported history of right lower quadrant abdominal pain, consider further characterization with a dedicated pelvic ultrasound.      Electronically signed by: Pelon Moreno MD  Date:    12/09/2022  Time:    11:01               Narrative:    EXAMINATION:  CT ABDOMEN PELVIS WITHOUT CONTRAST    CLINICAL HISTORY:  RLQ abdominal pain (Age >= 14y);    TECHNIQUE:  5 mm axial images were obtained through the abdomen and pelvis without IV contrast.  Coronal and sagittal reformats were performed.    COMPARISON:  CT 04/29/2018.    FINDINGS:  Visualized heart is normal.  No pericardial effusion.    Visualized lungs are clear.  No pleural effusions.    The liver is normal in size.  Hepatic parenchyma demonstrates homogeneous attenuation without focal lesions.  No intrahepatic bile duct dilatation.    Gallbladder is unremarkable.  Common bile duct is normal in caliber.    Stomach, duodenum, spleen, pancreas and adrenal glands are normal.    Kidneys are normal in size and location.  No contour deforming renal masses.  No nephrolithiasis.  No hydronephrosis or hydroureter.  Bladder is fluid filled and unremarkable.    Uterus appears within normal limits.  There is a 4.2 cm low-attenuation lesion within the expected location of the right ovary.  No pelvic free fluid.    Small bowel is normal in caliber.  Colon is unremarkable.  The appendix is normal.  No obstruction or inflammatory changes.    The abdominal aorta tapers normally without atherosclerotic calcification.    No ascites or intra-abdominal free air.  No abdominal or pelvic lymph node enlargement.  No focal mesenteric masses.    Subcutaneous soft tissues are within normal limits.    No acute fractures or osseous destruction.                                        Medications   ketorolac injection 15 mg (has no administration in time range)     Medical Decision Making:   ED Management:  Abdominal pain x3 days.  UA, U preg negative.  Will obtain labs, CT abdomen pelvis to evaluate for any intra-abdominal pathology.  Very well-appearing.           ED Course as of 12/09/22 1302   Fri Dec 09, 2022   1117 Patient's CT does not reveal evidence of appendicitis.  Does have a 4.2 cm right-sided ovarian finding that is possibly assist.  Given patient's persistent pain, will obtain ultrasound to rule out torsion. [CS]   1301 Patient has an ovarian cyst on the right side, no evidence of torsion.  Will discharge the patient to follow-up with gynecology. [CS]      ED Course User Index  [CS] Carmen Mitchell MD                 Clinical Impression:   Final diagnoses:  [N83.209] Ovarian cyst      ED Disposition Condition    Discharge Stable          ED Prescriptions    None       Follow-up Information       Follow up With Specialties Details Why Contact Info Additional Information    [facility] - OB GYN Obstetrics and Gynecology Schedule an appointment as soon as possible for a visit in 1 week  [address] [facility] [address]  [facility] [address] [phone] Please park in [address] or [address] and use Yocasta walker. Take Medical Office Bldg. elevators.             Carmen Mitchell MD  12/09/22 1302

## 2022-12-09 NOTE — ED NOTES
Pt presents to ed from home for right side flank pain and urinary frequency. Pain is described as cramping and soreness. Pain is 8/10. Pt denies any additional s/s..

## 2022-12-10 ENCOUNTER — HOSPITAL ENCOUNTER (EMERGENCY)
Facility: HOSPITAL | Age: 27
Discharge: HOME | End: 2022-12-10
Attending: EMERGENCY MEDICINE
Payer: COMMERCIAL

## 2022-12-10 VITALS
RESPIRATION RATE: 20 BRPM | WEIGHT: 293 LBS | HEIGHT: 67 IN | SYSTOLIC BLOOD PRESSURE: 165 MMHG | TEMPERATURE: 99 F | DIASTOLIC BLOOD PRESSURE: 94 MMHG | BODY MASS INDEX: 45.99 KG/M2 | HEART RATE: 93 BPM | OXYGEN SATURATION: 98 %

## 2022-12-10 DIAGNOSIS — R10.9 ABDOMINAL PAIN: ICD-10-CM

## 2022-12-10 DIAGNOSIS — N76.0 BV (BACTERIAL VAGINOSIS): ICD-10-CM

## 2022-12-10 DIAGNOSIS — B37.9 YEAST INFECTION: ICD-10-CM

## 2022-12-10 DIAGNOSIS — N83.201 CYST OF RIGHT OVARY: Primary | ICD-10-CM

## 2022-12-10 DIAGNOSIS — B96.89 BV (BACTERIAL VAGINOSIS): ICD-10-CM

## 2022-12-10 LAB
ALBUMIN SERPL BCP-MCNC: 3.2 G/DL (ref 3.5–5.2)
ALP SERPL-CCNC: 117 U/L (ref 55–135)
ALT SERPL W/O P-5'-P-CCNC: 16 U/L (ref 10–44)
ANION GAP SERPL CALC-SCNC: 12 MMOL/L (ref 8–16)
AST SERPL-CCNC: 20 U/L (ref 10–40)
BACTERIA GENITAL QL WET PREP: ABNORMAL
BASOPHILS # BLD AUTO: 0.03 K/UL (ref 0–0.2)
BASOPHILS NFR BLD: 0.3 % (ref 0–1.9)
BILIRUB SERPL-MCNC: 0.3 MG/DL (ref 0.1–1)
BILIRUB UR QL STRIP: NEGATIVE
BUN SERPL-MCNC: 10 MG/DL (ref 6–20)
CALCIUM SERPL-MCNC: 8.9 MG/DL (ref 8.7–10.5)
CHLORIDE SERPL-SCNC: 102 MMOL/L (ref 95–110)
CLARITY UR: CLEAR
CLUE CELLS VAG QL WET PREP: ABNORMAL
CO2 SERPL-SCNC: 23 MMOL/L (ref 23–29)
COLOR UR: YELLOW
CREAT SERPL-MCNC: 0.8 MG/DL (ref 0.5–1.4)
DIFFERENTIAL METHOD: ABNORMAL
EOSINOPHIL # BLD AUTO: 0.2 K/UL (ref 0–0.5)
EOSINOPHIL NFR BLD: 1.9 % (ref 0–8)
ERYTHROCYTE [DISTWIDTH] IN BLOOD BY AUTOMATED COUNT: 14.2 % (ref 11.5–14.5)
EST. GFR  (NO RACE VARIABLE): >60 ML/MIN/1.73 M^2
FILAMENT FUNGI VAG WET PREP-#/AREA: ABNORMAL
GLUCOSE SERPL-MCNC: 123 MG/DL (ref 70–110)
GLUCOSE UR QL STRIP: NEGATIVE
HCT VFR BLD AUTO: 37 % (ref 37–48.5)
HGB BLD-MCNC: 12.2 G/DL (ref 12–16)
HGB UR QL STRIP: NEGATIVE
IMM GRANULOCYTES # BLD AUTO: 0.04 K/UL (ref 0–0.04)
IMM GRANULOCYTES NFR BLD AUTO: 0.4 % (ref 0–0.5)
KETONES UR QL STRIP: NEGATIVE
LEUKOCYTE ESTERASE UR QL STRIP: NEGATIVE
LIPASE SERPL-CCNC: 9 U/L (ref 4–60)
LYMPHOCYTES # BLD AUTO: 2.7 K/UL (ref 1–4.8)
LYMPHOCYTES NFR BLD: 24.8 % (ref 18–48)
MCH RBC QN AUTO: 26.2 PG (ref 27–31)
MCHC RBC AUTO-ENTMCNC: 33 G/DL (ref 32–36)
MCV RBC AUTO: 80 FL (ref 82–98)
MONOCYTES # BLD AUTO: 0.5 K/UL (ref 0.3–1)
MONOCYTES NFR BLD: 4.4 % (ref 4–15)
NEUTROPHILS # BLD AUTO: 7.5 K/UL (ref 1.8–7.7)
NEUTROPHILS NFR BLD: 68.2 % (ref 38–73)
NITRITE UR QL STRIP: NEGATIVE
NRBC BLD-RTO: 0 /100 WBC
PH UR STRIP: 7 [PH] (ref 5–8)
PLATELET # BLD AUTO: 459 K/UL (ref 150–450)
PMV BLD AUTO: 8.9 FL (ref 9.2–12.9)
POTASSIUM SERPL-SCNC: 4.6 MMOL/L (ref 3.5–5.1)
PROT SERPL-MCNC: 7.6 G/DL (ref 6–8.4)
PROT UR QL STRIP: NEGATIVE
RBC # BLD AUTO: 4.65 M/UL (ref 4–5.4)
SODIUM SERPL-SCNC: 137 MMOL/L (ref 136–145)
SP GR UR STRIP: 1.02 (ref 1–1.03)
SPECIMEN SOURCE: ABNORMAL
T VAGINALIS GENITAL QL WET PREP: ABNORMAL
URN SPEC COLLECT METH UR: NORMAL
UROBILINOGEN UR STRIP-ACNC: NEGATIVE EU/DL
WBC # BLD AUTO: 11.02 K/UL (ref 3.9–12.7)
WBC #/AREA VAG WET PREP: ABNORMAL
YEAST GENITAL QL WET PREP: ABNORMAL

## 2022-12-10 PROCEDURE — 87210 SMEAR WET MOUNT SALINE/INK: CPT

## 2022-12-10 PROCEDURE — 83690 ASSAY OF LIPASE: CPT

## 2022-12-10 PROCEDURE — 96361 HYDRATE IV INFUSION ADD-ON: CPT

## 2022-12-10 PROCEDURE — 87491 CHLMYD TRACH DNA AMP PROBE: CPT

## 2022-12-10 PROCEDURE — 99285 EMERGENCY DEPT VISIT HI MDM: CPT | Mod: 25

## 2022-12-10 PROCEDURE — 81003 URINALYSIS AUTO W/O SCOPE: CPT | Performed by: EMERGENCY MEDICINE

## 2022-12-10 PROCEDURE — 80053 COMPREHEN METABOLIC PANEL: CPT

## 2022-12-10 PROCEDURE — 85025 COMPLETE CBC W/AUTO DIFF WBC: CPT

## 2022-12-10 PROCEDURE — 63600175 PHARM REV CODE 636 W HCPCS

## 2022-12-10 PROCEDURE — 87591 N.GONORRHOEAE DNA AMP PROB: CPT

## 2022-12-10 PROCEDURE — 25000003 PHARM REV CODE 250

## 2022-12-10 PROCEDURE — 96374 THER/PROPH/DIAG INJ IV PUSH: CPT

## 2022-12-10 RX ORDER — FLUCONAZOLE 150 MG/1
150 TABLET ORAL
Status: COMPLETED | OUTPATIENT
Start: 2022-12-10 | End: 2022-12-10

## 2022-12-10 RX ORDER — METRONIDAZOLE 500 MG/1
500 TABLET ORAL 2 TIMES DAILY
Qty: 14 TABLET | Refills: 0 | Status: SHIPPED | OUTPATIENT
Start: 2022-12-10 | End: 2022-12-17

## 2022-12-10 RX ORDER — NAPROXEN 500 MG/1
500 TABLET ORAL 2 TIMES DAILY
Qty: 20 TABLET | Refills: 0 | Status: SHIPPED | OUTPATIENT
Start: 2022-12-10 | End: 2023-01-04

## 2022-12-10 RX ORDER — KETOROLAC TROMETHAMINE 30 MG/ML
15 INJECTION, SOLUTION INTRAMUSCULAR; INTRAVENOUS
Status: COMPLETED | OUTPATIENT
Start: 2022-12-10 | End: 2022-12-10

## 2022-12-10 RX ADMIN — KETOROLAC TROMETHAMINE 15 MG: 30 INJECTION, SOLUTION INTRAMUSCULAR; INTRAVENOUS at 11:12

## 2022-12-10 RX ADMIN — FLUCONAZOLE 150 MG: 150 TABLET ORAL at 02:12

## 2022-12-10 RX ADMIN — SODIUM CHLORIDE 1000 ML: 0.9 INJECTION, SOLUTION INTRAVENOUS at 11:12

## 2022-12-10 NOTE — Clinical Note
"Misael"Jose Garcia was seen and treated in our emergency department on 12/10/2022.  She may return to work on 12/11/2022.       If you have any questions or concerns, please don't hesitate to call.      Allie Batista PA-C"

## 2022-12-10 NOTE — DISCHARGE INSTRUCTIONS

## 2022-12-10 NOTE — ED PROVIDER NOTES
"Encounter Date: 12/10/2022    SCRIBE #1 NOTE: I, Verónica Cruz, am scribing for, and in the presence of,  Rylee Batista PA-C. I have scribed the following portions of the note - Other sections scribed: HPI & ROS.     History     Chief Complaint   Patient presents with    Pelvic Pain     Pt reports being seen at ER yesterday and diagnosed with R ovarian cyst, states having increased pain. Denies taking any meds for pain today.      This is a 27 y.o. female who presents to the ED with c/o worsening right pelvic pain since being seen in ED yesterday and Dx with right ovarian cyst. Patient given Tramadol at [facility] yesterday but reports "excruciating" pain has worsened since. Patient reports intermittent, sharp right pelvic pain  described as 8/10 in severity and "like a knife is stabbing into my uterus". Patient reports taking Tylenol and Advil for pain yesterday but denies taking any medicine for Sx today. Reports LMP 12/2/22. Denies current menstrual bleeding. Patient endorses some vaginal discharge and is unsure whether pelvic exam was conducted yesterday. Patient is obese but otherwise denies any PMHx or PSHx to abdomen. No other exacerbating or alleviating factors. Patient denies vaginal bleeding, chills, N/V/D, or other associated symptoms. NKDA.    The history is provided by the patient. No  was used.   Review of patient's allergies indicates:  No Known Allergies  Past Medical History:   Diagnosis Date    ADHD (attention deficit hyperactivity disorder)     Anxiety     Bipolar affective     History of chlamydia 12/2016    Insomnia     Morbid obesity with BMI of 40.0-44.9, adult     Sleep disorder      Past Surgical History:   Procedure Laterality Date    NO PAST SURGERIES      as of 1-13-17     Family History   Problem Relation Age of Onset    Breast cancer Paternal Grandmother     No Known Problems Father     No Known Problems Mother     No Known Problems Sister     Diabetes Maternal " Grandmother     No Known Problems Maternal Grandfather     No Known Problems Paternal Grandfather     Colon cancer Neg Hx     Ovarian cancer Neg Hx      Social History     Tobacco Use    Smoking status: Every Day     Types: Vaping with nicotine     Start date: 2022    Smokeless tobacco: Never    Tobacco comments:     boyfriend smoke cigarettes   Substance Use Topics    Alcohol use: No    Drug use: Yes     Types: Marijuana     Review of Systems   Constitutional:  Negative for chills and fever.   HENT:  Negative for sore throat.    Eyes:  Negative for visual disturbance.   Respiratory:  Negative for cough and shortness of breath.    Cardiovascular:  Negative for chest pain.   Gastrointestinal:  Negative for abdominal pain, diarrhea, nausea and vomiting.   Genitourinary:  Positive for pelvic pain (right) and vaginal discharge. Negative for dysuria and vaginal bleeding.   Musculoskeletal:  Negative for back pain.   Skin:  Negative for rash.   Neurological:  Negative for headaches.     Physical Exam     Initial Vitals   BP Pulse Resp Temp SpO2   12/10/22 1055 12/10/22 1054 12/10/22 1054 12/10/22 1054 12/10/22 1054   (!) 165/94 93 20 99.2 °F (37.3 °C) 98 %      MAP       --                Physical Exam    Nursing note and vitals reviewed.  Constitutional: She appears well-developed and well-nourished.  Non-toxic appearance. She does not appear ill.   HENT:   Head: Normocephalic and atraumatic.   Mouth/Throat: Mucous membranes are normal.   Eyes: Conjunctivae and EOM are normal.   Neck: Neck supple.   Normal range of motion.   Full passive range of motion without pain.     Cardiovascular:  Normal rate and regular rhythm.           Pulses:       Radial pulses are 2+ on the right side and 2+ on the left side.   Pulmonary/Chest: Effort normal and breath sounds normal. No respiratory distress.   Abdominal: Abdomen is soft. Bowel sounds are normal. She exhibits no distension. There is no abdominal tenderness. There is no  rebound and no guarding.   Genitourinary: Cervix exhibits no motion tenderness and no friability. Right adnexum displays no mass and no tenderness. Left adnexum displays no mass and no tenderness.    Vaginal discharge (mild white) present.      No vaginal erythema or bleeding.   No erythema or bleeding in the vagina.    No foreign body in the vagina.     Musculoskeletal:         General: Normal range of motion.      Cervical back: Full passive range of motion without pain, normal range of motion and neck supple. No rigidity.     Neurological: She is alert.   Skin: Skin is warm and dry.   Psychiatric: She has a normal mood and affect.       ED Course   Procedures  Labs Reviewed   VAGINAL SCREEN - Abnormal; Notable for the following components:       Result Value    Clue Cells Rare (*)     Budding Yeast Rare (*)     WBC - Vaginal Screen Rare (*)     Bacteria - Vaginal Screen Occasional (*)     All other components within normal limits    Narrative:     Release to patient->Immediate   CBC W/ AUTO DIFFERENTIAL - Abnormal; Notable for the following components:    MCV 80 (*)     MCH 26.2 (*)     Platelets 459 (*)     MPV 8.9 (*)     All other components within normal limits   COMPREHENSIVE METABOLIC PANEL - Abnormal; Notable for the following components:    Glucose 123 (*)     Albumin 3.2 (*)     All other components within normal limits   C. TRACHOMATIS/N. GONORRHOEAE BY AMP DNA   URINALYSIS, REFLEX TO URINE CULTURE    Narrative:     Specimen Source->Urine   LIPASE   POCT URINE PREGNANCY          Imaging Results              US Transvaginal Non OB (Final result)  Result time 12/10/22 13:33:28      Final result by Kayce Vargas MD (12/10/22 13:33:28)                   Impression:      1. No ultrasound evidence of ovarian torsion.  2. Simple right ovarian cyst that measures up to 3.9 cm.      Electronically signed by: Kayce Vargas  Date:    12/10/2022  Time:    13:33               Narrative:    EXAMINATION:  US  "TRANSVAGINAL NON OB    CLINICAL HISTORY:  Unspecified abdominal pain    TECHNIQUE:  Transabdominal sonography of the pelvis was performed, followed by transvaginal sonography to better evaluate the uterus and ovaries.    COMPARISON:  12/09/2022    FINDINGS:  Uterus:    Size: 7.8 x 4.4 x 5.6-cm    Masses: None    Endometrium: Normal in this pre-menopausal patient, measuring 2-mm.    Right ovary:    Size: 4.1 x 4.8 x 4.4-cm    Appearance: A simple cyst is present that measures up to 3.9 cm, as before.    Vascular flow: Normal.    Left ovary:    Size: 3.3 x 2.0 x 3.2-cm    Appearance: Normal    Vascular Flow: Normal.    Free Fluid:    None.                                       Medications   sodium chloride 0.9% bolus 1,000 mL (0 mLs Intravenous Stopped 12/10/22 1351)   ketorolac injection 15 mg (15 mg Intravenous Given 12/10/22 1129)   fluconazole tablet 150 mg (150 mg Oral Given 12/10/22 1400)     Medical Decision Making:   History:   Old Medical Records: I decided to obtain old medical records.  Clinical Tests:   Lab Tests: Ordered and Reviewed  Radiological Study: Ordered and Reviewed  ED Management:  This is a 27 y.o. female who presents to the ED with c/o worsening right pelvic pain since being seen in ED yesterday and Dx with right ovarian cyst. Patient given Tramadol at [facility] ED yesterday but reports "excruciating" pain has worsened since.On physical exam, patient is well-appearing and in no acute distress.  Nontoxic appearing.  Lungs are clear to auscultation bilaterally.  Abdomen is soft and nontender.  No guarding, rigidity, rebound.  2+ radial pulses bilaterally.  Posterior oropharynx is not erythematous.  No edema or exudate.  Uvula midline.  Bilateral tympanic membrane is normal.  No erythema, bulging, or perforations.   exam chaperoned by nurseMarnie.  No vaginal bleeding.  Mild white vaginal discharge.  No vaginal erythema.  No adnexal tenderness or masses.  No cervical motion tenderness.  Cervix is " not friable.  Vaginal exam revealed rare clue cells, rare budding yeast, occasional bacteria, and rare WBC.  CBC unremarkable.  CMP unremarkable.  Lipase unremarkable.  Doubt pancreatitis.  UA unremarkable.  Doubt cystitis.  Ultrasound revealed no evidence of any torsion.  Simple right ovarian cyst that measures up to 3.9 cm.  Diflucan ordered for yeast infection.  Will discharge patient on naproxen and Flagyl for BV.  Urged prompt follow-up with OBGYN and PCP for further evaluation.    Strict return precautions given. I discussed with the patient/family the diagnosis, treatment plan, indications for return to the emergency department, and for expected follow-up. The patient/family verbalized an understanding. The patient/family is asked if there are any questions or concerns. We discuss the case, until all issues are addressed to the patient/family's satisfaction. Patient/family understands and is agreeable to the plan. Patient is stable and ready for discharge.          Scribe Attestation:   Scribe #1: I performed the above scribed service and the documentation accurately describes the services I performed. I attest to the accuracy of the note.            I, Allie Batista, personally performed the services described in this documentation. All medical record entries made by the scribe were at my direction and in my presence. I have reviewed the chart and agree that the record reflects my personal performance and is accurate and complete.         Clinical Impression:   Final diagnoses:  [R10.9] Abdominal pain  [R10.9] Abdominal pain - ovarian torsion rule out  [N76.0, B96.89] BV (bacterial vaginosis)  [B37.9] Yeast infection  [N83.201] Cyst of right ovary (Primary)      ED Disposition Condition    Discharge Stable          ED Prescriptions       Medication Sig Dispense Start Date End Date Auth. Provider    naproxen (NAPROSYN) 500 MG tablet Take 1 tablet (500 mg total) by mouth 2 (two) times daily. 20 tablet  12/10/2022 -- Allie Batista PA-C    metroNIDAZOLE (FLAGYL) 500 MG tablet Take 1 tablet (500 mg total) by mouth 2 (two) times a day. for 7 days 14 tablet 12/10/2022 12/17/2022 Allie Batista PA-C          Follow-up Information       Follow up With Specialties Details Why Contact Info    Michael Goldman,  Internal Medicine In 2 days for further evaluation [address] [facility] [address]  [phone]      [facility] [place] - Emergency Dept Emergency Medicine In 2 days If symptoms worsen [address] [place], [place] [address]Erick daiana  [facility] [address] [phone]             Allie Batista PA-C  12/10/22 1743

## 2022-12-11 LAB
C TRACH DNA SPEC QL NAA+PROBE: NOT DETECTED
N GONORRHOEA DNA SPEC QL NAA+PROBE: NOT DETECTED

## 2022-12-12 ENCOUNTER — PATIENT MESSAGE (OUTPATIENT)
Dept: OBSTETRICS AND GYNECOLOGY | Facility: CLINIC | Age: 27
End: 2022-12-12

## 2022-12-16 ENCOUNTER — OFFICE VISIT (OUTPATIENT)
Dept: OBSTETRICS AND GYNECOLOGY | Facility: CLINIC | Age: 27
End: 2022-12-16
Payer: COMMERCIAL

## 2022-12-16 VITALS — WEIGHT: 293 LBS | DIASTOLIC BLOOD PRESSURE: 84 MMHG | BODY MASS INDEX: 55.85 KG/M2 | SYSTOLIC BLOOD PRESSURE: 135 MMHG

## 2022-12-16 DIAGNOSIS — N83.202 LEFT OVARIAN CYST: Primary | ICD-10-CM

## 2022-12-16 PROCEDURE — 3008F BODY MASS INDEX DOCD: CPT | Mod: CPTII,S$GLB,, | Performed by: STUDENT IN AN ORGANIZED HEALTH CARE EDUCATION/TRAINING PROGRAM

## 2022-12-16 PROCEDURE — 3044F HG A1C LEVEL LT 7.0%: CPT | Mod: CPTII,S$GLB,, | Performed by: STUDENT IN AN ORGANIZED HEALTH CARE EDUCATION/TRAINING PROGRAM

## 2022-12-16 PROCEDURE — 3075F PR MOST RECENT SYSTOLIC BLOOD PRESS GE 130-139MM HG: ICD-10-PCS | Mod: CPTII,S$GLB,, | Performed by: STUDENT IN AN ORGANIZED HEALTH CARE EDUCATION/TRAINING PROGRAM

## 2022-12-16 PROCEDURE — 3079F DIAST BP 80-89 MM HG: CPT | Mod: CPTII,S$GLB,, | Performed by: STUDENT IN AN ORGANIZED HEALTH CARE EDUCATION/TRAINING PROGRAM

## 2022-12-16 PROCEDURE — 99999 PR PBB SHADOW E&M-EST. PATIENT-LVL III: CPT | Mod: PBBFAC,,, | Performed by: STUDENT IN AN ORGANIZED HEALTH CARE EDUCATION/TRAINING PROGRAM

## 2022-12-16 PROCEDURE — 3044F PR MOST RECENT HEMOGLOBIN A1C LEVEL <7.0%: ICD-10-PCS | Mod: CPTII,S$GLB,, | Performed by: STUDENT IN AN ORGANIZED HEALTH CARE EDUCATION/TRAINING PROGRAM

## 2022-12-16 PROCEDURE — 1160F PR REVIEW ALL MEDS BY PRESCRIBER/CLIN PHARMACIST DOCUMENTED: ICD-10-PCS | Mod: CPTII,S$GLB,, | Performed by: STUDENT IN AN ORGANIZED HEALTH CARE EDUCATION/TRAINING PROGRAM

## 2022-12-16 PROCEDURE — 99214 PR OFFICE/OUTPT VISIT, EST, LEVL IV, 30-39 MIN: ICD-10-PCS | Mod: S$GLB,,, | Performed by: STUDENT IN AN ORGANIZED HEALTH CARE EDUCATION/TRAINING PROGRAM

## 2022-12-16 PROCEDURE — 3075F SYST BP GE 130 - 139MM HG: CPT | Mod: CPTII,S$GLB,, | Performed by: STUDENT IN AN ORGANIZED HEALTH CARE EDUCATION/TRAINING PROGRAM

## 2022-12-16 PROCEDURE — 99999 PR PBB SHADOW E&M-EST. PATIENT-LVL III: ICD-10-PCS | Mod: PBBFAC,,, | Performed by: STUDENT IN AN ORGANIZED HEALTH CARE EDUCATION/TRAINING PROGRAM

## 2022-12-16 PROCEDURE — 1159F PR MEDICATION LIST DOCUMENTED IN MEDICAL RECORD: ICD-10-PCS | Mod: CPTII,S$GLB,, | Performed by: STUDENT IN AN ORGANIZED HEALTH CARE EDUCATION/TRAINING PROGRAM

## 2022-12-16 PROCEDURE — 3008F PR BODY MASS INDEX (BMI) DOCUMENTED: ICD-10-PCS | Mod: CPTII,S$GLB,, | Performed by: STUDENT IN AN ORGANIZED HEALTH CARE EDUCATION/TRAINING PROGRAM

## 2022-12-16 PROCEDURE — 3079F PR MOST RECENT DIASTOLIC BLOOD PRESSURE 80-89 MM HG: ICD-10-PCS | Mod: CPTII,S$GLB,, | Performed by: STUDENT IN AN ORGANIZED HEALTH CARE EDUCATION/TRAINING PROGRAM

## 2022-12-16 PROCEDURE — 99214 OFFICE O/P EST MOD 30 MIN: CPT | Mod: S$GLB,,, | Performed by: STUDENT IN AN ORGANIZED HEALTH CARE EDUCATION/TRAINING PROGRAM

## 2022-12-16 PROCEDURE — 1160F RVW MEDS BY RX/DR IN RCRD: CPT | Mod: CPTII,S$GLB,, | Performed by: STUDENT IN AN ORGANIZED HEALTH CARE EDUCATION/TRAINING PROGRAM

## 2022-12-16 PROCEDURE — 1159F MED LIST DOCD IN RCRD: CPT | Mod: CPTII,S$GLB,, | Performed by: STUDENT IN AN ORGANIZED HEALTH CARE EDUCATION/TRAINING PROGRAM

## 2022-12-16 RX ORDER — CARIPRAZINE 1.5 MG/1
1.5 CAPSULE, GELATIN COATED ORAL NIGHTLY
COMMUNITY
Start: 2022-11-18 | End: 2024-01-30

## 2022-12-16 NOTE — PROGRESS NOTES
History & Physical  Gynecology      SUBJECTIVE:     Chief Complaint: Ovarian Cyst       History of Present Illness:  Misael is a 27 yr old G0 who presents to clinic for follow-up  from a recent ED visit during which she presented with abdominal pain. During her assessment she underwent imaging that demonstrated a small right ovarian cyst and thus she was instructed to follow-up with me. Today she presents with no complaints. She denies abdominal pain and endorses normal level of activity.  Of note, she is not taking the birth control pills prescribed during our last visit.      FINDINGS:  Uterus:     Size: 7.8 x 4.4 x 5.6-cm     Masses: None     Endometrium: Normal in this pre-menopausal patient, measuring 2-mm.     Right ovary:     Size: 4.1 x 4.8 x 4.4-cm     Appearance: A simple cyst is present that measures up to 3.9 cm, as before.     Vascular flow: Normal.     Left ovary:     Size: 3.3 x 2.0 x 3.2-cm     Appearance: Normal     Vascular Flow: Normal.     Free Fluid:     None.     Impression:     1. No ultrasound evidence of ovarian torsion.  2. Simple right ovarian cyst that measures up to 3.9 cm.      Review of patient's allergies indicates:  No Known Allergies    Past Medical History:   Diagnosis Date    ADHD (attention deficit hyperactivity disorder)     Anxiety     Bipolar affective     History of chlamydia 2016    Insomnia     Morbid obesity with BMI of 40.0-44.9, adult     Sleep disorder      Past Surgical History:   Procedure Laterality Date    NO PAST SURGERIES      as of 19     OB History          0    Para   0    Term   0       0    AB   0    Living   0         SAB   0    IAB   0    Ectopic   0    Multiple   0    Live Births               Obstetric Comments   Menarche ~ 18/reg  H/o chlamydia   Denies abnl pap             Family History   Problem Relation Age of Onset    Breast cancer Paternal Grandmother     No Known Problems Father     No Known Problems Mother     No Known  Problems Sister     Diabetes Maternal Grandmother     No Known Problems Maternal Grandfather     No Known Problems Paternal Grandfather     Colon cancer Neg Hx     Ovarian cancer Neg Hx      Social History     Tobacco Use    Smoking status: Every Day     Types: Vaping with nicotine     Start date: 2022    Smokeless tobacco: Never    Tobacco comments:     boyfriend smoke cigarettes   Substance Use Topics    Alcohol use: No    Drug use: Yes     Types: Marijuana       Current Outpatient Medications   Medication Sig    sertraline (ZOLOFT) 100 MG tablet Take 100 mg by mouth once daily.    VRAYLAR 1.5 mg Cap Take 1.5 mg by mouth every evening.    busPIRone (BUSPAR) 15 MG tablet Take 15 mg by mouth 2 (two) times daily.    clonazePAM (KLONOPIN) 1 MG tablet Take 1 mg by mouth 2 (two) times daily as needed.    drospirenone-ethinyl estradioL (JUAN MANUEL) 3-0.02 mg per tablet Take 1 tablet by mouth once daily. (Patient not taking: Reported on 11/1/2022)    flu vacc [id number] 6mos up,PF, (FLUARIX QUAD [id number], PF,) 60 mcg (15 mcg x 4)/0.5 mL Syrg Use as directed (Patient not taking: Reported on 11/1/2022)    LATUDA 40 mg Tab tablet Take 40 mg by mouth nightly.    methylphenidate HCl 18 MG CR tablet Take 18 mg by mouth every morning.    metroNIDAZOLE (FLAGYL) 500 MG tablet Take 1 tablet (500 mg total) by mouth 2 (two) times a day. for 7 days (Patient not taking: Reported on 12/16/2022)    naproxen (NAPROSYN) 500 MG tablet Take 1 tablet (500 mg total) by mouth 2 (two) times daily. (Patient not taking: Reported on 12/16/2022)    omeprazole (PRILOSEC) 40 MG capsule Take 1 capsule (40 mg total) by mouth once daily.    ondansetron (ZOFRAN-ODT) 4 MG TbDL Take 1 tablet (4 mg total) by mouth every 8 (eight) hours as needed (nausea/ vomiting).    sars-cov-2, covid-19, (MODERNA COVID-19) 100 mcg/0.5 ml injection      No current facility-administered medications for this visit.       Review of Systems:  Review of Systems   Constitutional:   Negative for chills, fatigue and fever.   HENT:  Negative for congestion.    Eyes:  Negative for visual disturbance.   Respiratory:  Negative for cough and shortness of breath.    Cardiovascular:  Negative for chest pain and palpitations.   Gastrointestinal:  Negative for abdominal distention, abdominal pain, constipation, diarrhea, nausea and vomiting.   Genitourinary:  Negative for difficulty urinating, dysuria, hematuria, vaginal bleeding and vaginal discharge.   Skin:  Negative for rash.   Neurological:  Negative for dizziness, seizures, light-headedness and headaches.   Hematological:  Does not bruise/bleed easily.   Psychiatric/Behavioral:  Negative for dysphoric mood. The patient is not nervous/anxious.       OBJECTIVE:     Physical Exam:  Vitals:    12/16/22 1314   BP: 135/84      Physical Exam  Vitals and nursing note reviewed. Exam conducted with a chaperone present.   Constitutional:       General: She is not in acute distress.     Appearance: Normal appearance. She is well-developed. She is obese. She is not ill-appearing or toxic-appearing.   HENT:      Head: Normocephalic and atraumatic.   Eyes:      Pupils: Pupils are equal, round, and reactive to light.   Cardiovascular:      Rate and Rhythm: Normal rate and regular rhythm.   Pulmonary:      Effort: Pulmonary effort is normal. No respiratory distress.   Abdominal:      General: There is no distension.      Palpations: Abdomen is soft. There is no mass.      Tenderness: There is no abdominal tenderness. There is no guarding or rebound.   Musculoskeletal:         General: Normal range of motion.      Cervical back: Normal range of motion and neck supple.   Skin:     General: Skin is warm and dry.   Neurological:      Mental Status: She is alert and oriented to person, place, and time.   Psychiatric:         Behavior: Behavior normal.         Thought Content: Thought content normal.         Judgment: Judgment normal.       ASSESSMENT/PLAN:     1. Left  ovarian cyst  - All available imaging reviewed  - Small simple cyst of the right ovary without imaging or clinical symptoms concerning for ovarian torsion  - Discussed ovarian cysts and their management  - No urgent or surgical intervention indicated at this time  - Will repeat imaging in 2-3 months to ensure resolution  - Torsion and other precautions given      Fiorella Peña M.D.  OB/GYN  [facility]

## 2023-01-04 ENCOUNTER — PATIENT MESSAGE (OUTPATIENT)
Dept: FAMILY MEDICINE | Facility: CLINIC | Age: 28
End: 2023-01-04

## 2023-01-04 ENCOUNTER — LAB VISIT (OUTPATIENT)
Dept: LAB | Facility: HOSPITAL | Age: 28
End: 2023-01-04
Attending: FAMILY MEDICINE
Payer: COMMERCIAL

## 2023-01-04 ENCOUNTER — NURSE TRIAGE (OUTPATIENT)
Dept: ADMINISTRATIVE | Facility: CLINIC | Age: 28
End: 2023-01-04

## 2023-01-04 ENCOUNTER — OFFICE VISIT (OUTPATIENT)
Dept: FAMILY MEDICINE | Facility: CLINIC | Age: 28
End: 2023-01-04
Payer: COMMERCIAL

## 2023-01-04 VITALS
SYSTOLIC BLOOD PRESSURE: 128 MMHG | WEIGHT: 293 LBS | BODY MASS INDEX: 45.99 KG/M2 | HEIGHT: 67 IN | HEART RATE: 96 BPM | OXYGEN SATURATION: 98 % | DIASTOLIC BLOOD PRESSURE: 94 MMHG

## 2023-01-04 DIAGNOSIS — R03.0 ELEVATED BLOOD PRESSURE READING IN OFFICE WITHOUT DIAGNOSIS OF HYPERTENSION: ICD-10-CM

## 2023-01-04 DIAGNOSIS — F31.9 BIPOLAR 1 DISORDER: ICD-10-CM

## 2023-01-04 DIAGNOSIS — R63.8 DIFFICULTY MAINTAINING WEIGHT: ICD-10-CM

## 2023-01-04 DIAGNOSIS — Z00.01 ENCOUNTER FOR GENERAL ADULT MEDICAL EXAMINATION WITH ABNORMAL FINDINGS: Primary | ICD-10-CM

## 2023-01-04 DIAGNOSIS — Z00.01 ENCOUNTER FOR GENERAL ADULT MEDICAL EXAMINATION WITH ABNORMAL FINDINGS: ICD-10-CM

## 2023-01-04 DIAGNOSIS — Z87.891 RECENTLY QUIT USING TOBACCO: ICD-10-CM

## 2023-01-04 DIAGNOSIS — E66.01 CLASS 3 SEVERE OBESITY DUE TO EXCESS CALORIES WITH SERIOUS COMORBIDITY AND BODY MASS INDEX (BMI) OF 50.0 TO 59.9 IN ADULT: ICD-10-CM

## 2023-01-04 DIAGNOSIS — R73.03 PREDIABETES: ICD-10-CM

## 2023-01-04 DIAGNOSIS — Z30.41 ORAL CONTRACEPTIVE PILL SURVEILLANCE: ICD-10-CM

## 2023-01-04 DIAGNOSIS — G93.31 POST VIRAL SYNDROME: ICD-10-CM

## 2023-01-04 DIAGNOSIS — Z72.51 UNPROTECTED SEXUAL INTERCOURSE: ICD-10-CM

## 2023-01-04 DIAGNOSIS — E11.69 TYPE 2 DIABETES MELLITUS WITH OTHER SPECIFIED COMPLICATION, WITHOUT LONG-TERM CURRENT USE OF INSULIN: ICD-10-CM

## 2023-01-04 PROBLEM — M21.619 HALLUX VALGUS WITH BUNIONS: Status: RESOLVED | Noted: 2018-04-29 | Resolved: 2023-01-04

## 2023-01-04 PROBLEM — M79.609 PAIN IN LIMB: Status: RESOLVED | Noted: 2018-04-29 | Resolved: 2023-01-04

## 2023-01-04 PROBLEM — M20.40 HAMMER TOE: Status: RESOLVED | Noted: 2018-04-29 | Resolved: 2023-01-04

## 2023-01-04 PROBLEM — M21.629 TAILOR'S BUNION: Status: RESOLVED | Noted: 2018-04-29 | Resolved: 2023-01-04

## 2023-01-04 PROBLEM — M20.10 HALLUX VALGUS WITH BUNIONS: Status: RESOLVED | Noted: 2018-04-29 | Resolved: 2023-01-04

## 2023-01-04 PROBLEM — M77.40 METATARSALGIA: Status: RESOLVED | Noted: 2018-04-29 | Resolved: 2023-01-04

## 2023-01-04 LAB
ALBUMIN SERPL BCP-MCNC: 3.7 G/DL (ref 3.5–5.2)
ALP SERPL-CCNC: 118 U/L (ref 55–135)
ALT SERPL W/O P-5'-P-CCNC: 21 U/L (ref 10–44)
ANION GAP SERPL CALC-SCNC: 12 MMOL/L (ref 8–16)
AST SERPL-CCNC: 17 U/L (ref 10–40)
B-HCG UR QL: NEGATIVE
BASOPHILS # BLD AUTO: 0.06 K/UL (ref 0–0.2)
BASOPHILS NFR BLD: 0.5 % (ref 0–1.9)
BILIRUB SERPL-MCNC: 0.4 MG/DL (ref 0.1–1)
BUN SERPL-MCNC: 13 MG/DL (ref 6–20)
CALCIUM SERPL-MCNC: 9.9 MG/DL (ref 8.7–10.5)
CHLORIDE SERPL-SCNC: 102 MMOL/L (ref 95–110)
CHOLEST SERPL-MCNC: 174 MG/DL (ref 120–199)
CHOLEST/HDLC SERPL: 3.2 {RATIO} (ref 2–5)
CO2 SERPL-SCNC: 25 MMOL/L (ref 23–29)
CREAT SERPL-MCNC: 0.7 MG/DL (ref 0.5–1.4)
CTP QC/QA: YES
DIFFERENTIAL METHOD: ABNORMAL
EOSINOPHIL # BLD AUTO: 0.4 K/UL (ref 0–0.5)
EOSINOPHIL NFR BLD: 3.1 % (ref 0–8)
ERYTHROCYTE [DISTWIDTH] IN BLOOD BY AUTOMATED COUNT: 14.9 % (ref 11.5–14.5)
EST. GFR  (NO RACE VARIABLE): >60 ML/MIN/1.73 M^2
ESTIMATED AVG GLUCOSE: 140 MG/DL (ref 68–131)
GLUCOSE SERPL-MCNC: 99 MG/DL (ref 70–110)
HBA1C MFR BLD: 6.5 % (ref 4–5.6)
HCT VFR BLD AUTO: 42.2 % (ref 37–48.5)
HDLC SERPL-MCNC: 55 MG/DL (ref 40–75)
HDLC SERPL: 31.6 % (ref 20–50)
HGB BLD-MCNC: 12.9 G/DL (ref 12–16)
IMM GRANULOCYTES # BLD AUTO: 0.04 K/UL (ref 0–0.04)
IMM GRANULOCYTES NFR BLD AUTO: 0.3 % (ref 0–0.5)
LDLC SERPL CALC-MCNC: 106.6 MG/DL (ref 63–159)
LYMPHOCYTES # BLD AUTO: 3 K/UL (ref 1–4.8)
LYMPHOCYTES NFR BLD: 25.2 % (ref 18–48)
MCH RBC QN AUTO: 25.7 PG (ref 27–31)
MCHC RBC AUTO-ENTMCNC: 30.6 G/DL (ref 32–36)
MCV RBC AUTO: 84 FL (ref 82–98)
MONOCYTES # BLD AUTO: 0.8 K/UL (ref 0.3–1)
MONOCYTES NFR BLD: 6.2 % (ref 4–15)
NEUTROPHILS # BLD AUTO: 7.8 K/UL (ref 1.8–7.7)
NEUTROPHILS NFR BLD: 64.7 % (ref 38–73)
NONHDLC SERPL-MCNC: 119 MG/DL
NRBC BLD-RTO: 0 /100 WBC
PLATELET # BLD AUTO: 575 K/UL (ref 150–450)
PMV BLD AUTO: 9.5 FL (ref 9.2–12.9)
POTASSIUM SERPL-SCNC: 4.7 MMOL/L (ref 3.5–5.1)
PROT SERPL-MCNC: 8 G/DL (ref 6–8.4)
RBC # BLD AUTO: 5.02 M/UL (ref 4–5.4)
SODIUM SERPL-SCNC: 139 MMOL/L (ref 136–145)
TRIGL SERPL-MCNC: 62 MG/DL (ref 30–150)
TSH SERPL DL<=0.005 MIU/L-ACNC: 0.57 UIU/ML (ref 0.4–4)
WBC # BLD AUTO: 12.05 K/UL (ref 3.9–12.7)

## 2023-01-04 PROCEDURE — 3080F DIAST BP >= 90 MM HG: CPT | Mod: CPTII,S$GLB,, | Performed by: FAMILY MEDICINE

## 2023-01-04 PROCEDURE — 85025 COMPLETE CBC W/AUTO DIFF WBC: CPT | Performed by: FAMILY MEDICINE

## 2023-01-04 PROCEDURE — 36415 COLL VENOUS BLD VENIPUNCTURE: CPT | Mod: PO | Performed by: FAMILY MEDICINE

## 2023-01-04 PROCEDURE — 99999 PR PBB SHADOW E&M-EST. PATIENT-LVL V: ICD-10-PCS | Mod: PBBFAC,,, | Performed by: FAMILY MEDICINE

## 2023-01-04 PROCEDURE — 3080F PR MOST RECENT DIASTOLIC BLOOD PRESSURE >= 90 MM HG: ICD-10-PCS | Mod: CPTII,S$GLB,, | Performed by: FAMILY MEDICINE

## 2023-01-04 PROCEDURE — 83036 HEMOGLOBIN GLYCOSYLATED A1C: CPT | Performed by: FAMILY MEDICINE

## 2023-01-04 PROCEDURE — 1160F PR REVIEW ALL MEDS BY PRESCRIBER/CLIN PHARMACIST DOCUMENTED: ICD-10-PCS | Mod: CPTII,S$GLB,, | Performed by: FAMILY MEDICINE

## 2023-01-04 PROCEDURE — 99395 PREV VISIT EST AGE 18-39: CPT | Mod: S$GLB,,, | Performed by: FAMILY MEDICINE

## 2023-01-04 PROCEDURE — 1160F RVW MEDS BY RX/DR IN RCRD: CPT | Mod: CPTII,S$GLB,, | Performed by: FAMILY MEDICINE

## 2023-01-04 PROCEDURE — 3008F PR BODY MASS INDEX (BMI) DOCUMENTED: ICD-10-PCS | Mod: CPTII,S$GLB,, | Performed by: FAMILY MEDICINE

## 2023-01-04 PROCEDURE — 3074F PR MOST RECENT SYSTOLIC BLOOD PRESSURE < 130 MM HG: ICD-10-PCS | Mod: CPTII,S$GLB,, | Performed by: FAMILY MEDICINE

## 2023-01-04 PROCEDURE — 1159F PR MEDICATION LIST DOCUMENTED IN MEDICAL RECORD: ICD-10-PCS | Mod: CPTII,S$GLB,, | Performed by: FAMILY MEDICINE

## 2023-01-04 PROCEDURE — 99999 PR PBB SHADOW E&M-EST. PATIENT-LVL V: CPT | Mod: PBBFAC,,, | Performed by: FAMILY MEDICINE

## 2023-01-04 PROCEDURE — 3074F SYST BP LT 130 MM HG: CPT | Mod: CPTII,S$GLB,, | Performed by: FAMILY MEDICINE

## 2023-01-04 PROCEDURE — 81025 POCT URINE PREGNANCY: ICD-10-PCS | Mod: S$GLB,,, | Performed by: FAMILY MEDICINE

## 2023-01-04 PROCEDURE — 80053 COMPREHEN METABOLIC PANEL: CPT | Performed by: FAMILY MEDICINE

## 2023-01-04 PROCEDURE — 81025 URINE PREGNANCY TEST: CPT | Mod: S$GLB,,, | Performed by: FAMILY MEDICINE

## 2023-01-04 PROCEDURE — 1159F MED LIST DOCD IN RCRD: CPT | Mod: CPTII,S$GLB,, | Performed by: FAMILY MEDICINE

## 2023-01-04 PROCEDURE — 84443 ASSAY THYROID STIM HORMONE: CPT | Performed by: FAMILY MEDICINE

## 2023-01-04 PROCEDURE — 3008F BODY MASS INDEX DOCD: CPT | Mod: CPTII,S$GLB,, | Performed by: FAMILY MEDICINE

## 2023-01-04 PROCEDURE — 80061 LIPID PANEL: CPT | Performed by: FAMILY MEDICINE

## 2023-01-04 PROCEDURE — 99395 PR PREVENTIVE VISIT,EST,18-39: ICD-10-PCS | Mod: S$GLB,,, | Performed by: FAMILY MEDICINE

## 2023-01-04 RX ORDER — FLUTICASONE PROPIONATE 50 MCG
1 SPRAY, SUSPENSION (ML) NASAL 2 TIMES DAILY PRN
Qty: 16 G | Refills: 0 | Status: SHIPPED | OUTPATIENT
Start: 2023-01-04 | End: 2023-02-06

## 2023-01-04 RX ORDER — SEMAGLUTIDE 1.34 MG/ML
INJECTION, SOLUTION SUBCUTANEOUS
Qty: 1 PEN | Refills: 5 | Status: SHIPPED | OUTPATIENT
Start: 2023-01-04 | End: 2023-01-05 | Stop reason: SDUPTHER

## 2023-01-04 NOTE — PATIENT INSTRUCTIONS
[facility] - please call to schedule initial intake appointment and determine if your insurance will cover services  Watch video on [email or web address]  Call Office [phone]

## 2023-01-04 NOTE — PROGRESS NOTES
Subjective:       Patient ID: Misael Garcia is a 27 y.o. female.    Chief Complaint: Follow-up    Patient Active Problem List   Diagnosis    Corns and callus    Class 3 severe obesity due to excess calories with serious comorbidity and body mass index (BMI) of 50.0 to 59.9 in adult    Bipolar 1 disorder    Prediabetes    Recently quit using tobacco      HPI    New patient today to establish care with multiple concerns.     -Primary concern in interested in weight loss med. Tried Ozempic with good result over 1 year ago, but would like to restart. Prior consult with bariatric surgery and medical weight loss clinic about coverage.    -Wants UPT  12/8 LMP. Last intercourse 1/31/2022.  Taking Michelle regularly in November, but has been irregular with med. Has seen GYN. Has appt in Feb. Not good with daily med and considering IUD. Encouraged LARC discussion at upcoming visit.     - Wants COVID test  Patient reports started with congestion on 12/20/22. Cough, sore throat. Tested on 12/20 and positive home COVID test. Jan 2 with neg test. Patient reports that it feels like lingering symptoms that are overall improving.  Congestion, scratchy throat are current symptoms. No dyspnea.     -Increased anxiety recently with work.   +Weight gain. Diastolic elevated today. No prior history or HTN meds. Treated for anxiety for 9-10 years. Sees psychiatry with Dr. Meg Fowler for med management outside of [facility].    Review of Systems   All other systems reviewed and are negative.       Results for orders placed or performed during the hospital encounter of 12/10/22   Vaginal Screen    Specimen: Vagina   Result Value Ref Range    Trichomonas None None    Clue Cells Rare (A) None    Budding Yeast Rare (A) None    Fungal Hyphae None None    WBC - Vaginal Screen Rare (A) None    Bacteria - Vaginal Screen Occasional (A) None    Wet Prep Source Vagina    C. trachomatis/N. gonorrhoeae by AMP DNA [facility] [place]; Vagina    Specimen:  Genital   Result Value Ref Range    Chlamydia, Amplified DNA Not Detected Not Detected    N gonorrhoeae, amplified DNA Not Detected Not Detected   Urinalysis, Reflex to Urine Culture Urine, Clean Catch    Specimen: Urine   Result Value Ref Range    Specimen UA Urine, Clean Catch     Color, UA Yellow Yellow, Straw, Sofia    Appearance, UA Clear Clear    pH, UA 7.0 5.0 - 8.0    Specific Gravity, UA 1.020 1.005 - 1.030    Protein, UA Negative Negative    Glucose, UA Negative Negative    Ketones, UA Negative Negative    Bilirubin (UA) Negative Negative    Occult Blood UA Negative Negative    Nitrite, UA Negative Negative    Urobilinogen, UA Negative <2.0 EU/dL    Leukocytes, UA Negative Negative   CBC auto differential   Result Value Ref Range    WBC 11.02 3.90 - 12.70 K/uL    RBC 4.65 4.00 - 5.40 M/uL    Hemoglobin 12.2 12.0 - 16.0 g/dL    Hematocrit 37.0 37.0 - 48.5 %    MCV 80 (L) 82 - 98 fL    MCH 26.2 (L) 27.0 - 31.0 pg    MCHC 33.0 32.0 - 36.0 g/dL    RDW 14.2 11.5 - 14.5 %    Platelets 459 (H) 150 - 450 K/uL    MPV 8.9 (L) 9.2 - 12.9 fL    Immature Granulocytes 0.4 0.0 - 0.5 %    Gran # (ANC) 7.5 1.8 - 7.7 K/uL    Immature Grans (Abs) 0.04 0.00 - 0.04 K/uL    Lymph # 2.7 1.0 - 4.8 K/uL    Mono # 0.5 0.3 - 1.0 K/uL    Eos # 0.2 0.0 - 0.5 K/uL    Baso # 0.03 0.00 - 0.20 K/uL    nRBC 0 0 /100 WBC    Gran % 68.2 38.0 - 73.0 %    Lymph % 24.8 18.0 - 48.0 %    Mono % 4.4 4.0 - 15.0 %    Eosinophil % 1.9 0.0 - 8.0 %    Basophil % 0.3 0.0 - 1.9 %    Differential Method Automated    Comprehensive metabolic panel   Result Value Ref Range    Sodium 137 136 - 145 mmol/L    Potassium 4.6 3.5 - 5.1 mmol/L    Chloride 102 95 - 110 mmol/L    CO2 23 23 - 29 mmol/L    Glucose 123 (H) 70 - 110 mg/dL    BUN 10 6 - 20 mg/dL    Creatinine 0.8 0.5 - 1.4 mg/dL    Calcium 8.9 8.7 - 10.5 mg/dL    Total Protein 7.6 6.0 - 8.4 g/dL    Albumin 3.2 (L) 3.5 - 5.2 g/dL    Total Bilirubin 0.3 0.1 - 1.0 mg/dL    Alkaline Phosphatase 117 55 - 135  U/L    AST 20 10 - 40 U/L    ALT 16 10 - 44 U/L    Anion Gap 12 8 - 16 mmol/L    eGFR >60 >60 mL/min/1.73 m^2   Lipase   Result Value Ref Range    Lipase 9 4 - 60 U/L     Objective:     Vitals:    01/04/23 0725   BP: (!) 128/94   Pulse: 96        Physical Exam  Vitals and nursing note reviewed.   Constitutional:       General: She is not in acute distress.     Appearance: Normal appearance. She is obese. She is not ill-appearing, toxic-appearing or diaphoretic.   HENT:      Head: Normocephalic and atraumatic.      Comments: Posterior pharynx normal. No LAD  Eyes:      General: No scleral icterus.     Conjunctiva/sclera: Conjunctivae normal.   Cardiovascular:      Rate and Rhythm: Normal rate.      Heart sounds: Normal heart sounds.   Pulmonary:      Effort: Pulmonary effort is normal. No respiratory distress.   Skin:     Coloration: Skin is not pale.   Neurological:      Mental Status: She is alert. Mental status is at baseline.   Psychiatric:         Attention and Perception: Attention and perception normal.         Mood and Affect: Mood and affect normal.         Speech: Speech normal.         Behavior: Behavior normal.         Cognition and Memory: Cognition and memory normal.         Judgment: Judgment normal.       Assessment:       1. Encounter for general adult medical examination with abnormal findings    2. Prediabetes    3. Class 3 severe obesity due to excess calories with serious comorbidity and body mass index (BMI) of 50.0 to 59.9 in adult    4. Difficulty maintaining weight    5. Elevated blood pressure reading in office without diagnosis of hypertension    6. Oral contraceptive pill surveillance    7. Unprotected sexual intercourse    8. Post viral syndrome    9. Bipolar 1 disorder    10. Recently quit using tobacco        Plan:         Encounter for general adult medical examination with abnormal findings  -     Hemoglobin A1C; Future; Expected date: 01/04/2023  -     Lipid Panel; Future; Expected  date: 01/04/2023  -     TSH; Future; Expected date: 01/04/2023  -     CBC Auto Differential; Future; Expected date: 01/04/2023  -     Comprehensive Metabolic Panel; Future; Expected date: 01/04/2023  - Risk and age appropriate anticipatory guidance.  reviewed and updated. Recommendations discussed with patient as appropriate.     - Overall health goal will be centered on weight mgmt. Nutrition referral. Start Ozempic. Number for bariatric consultation given. Check in in 6 weeks to review results and titration of Ozempic if needed if not established with medical weight loss clinic.     Prediabetes  -     semaglutide (OZEMPIC) 0.25 mg or 0.5 mg(2 mg/1.5 mL) pen injector; Inject 0.25 mg SC weekly x 4 weeks then inject 0.5 mg SC weekly x 4 weeks  Dispense: 1 pen; Refill: 5  -     Ambulatory referral/consult to Bariatric Medicine; Future; Expected date: 01/11/2023  -     Hemoglobin A1C; Future; Expected date: 01/04/2023  -     Lipid Panel; Future; Expected date: 01/04/2023  -     TSH; Future; Expected date: 01/04/2023  -     CBC Auto Differential; Future; Expected date: 01/04/2023  -     Comprehensive Metabolic Panel; Future; Expected date: 01/04/2023  -     Ambulatory referral/consult to Diabetes Education; Future; Expected date: 01/11/2023  -     Ambulatory referral/consult to Nutrition Services; Future; Expected date: 01/11/2023    Class 3 severe obesity due to excess calories with serious comorbidity and body mass index (BMI) of 50.0 to 59.9 in adult  -     semaglutide (OZEMPIC) 0.25 mg or 0.5 mg(2 mg/1.5 mL) pen injector; Inject 0.25 mg SC weekly x 4 weeks then inject 0.5 mg SC weekly x 4 weeks  Dispense: 1 pen; Refill: 5  -     Ambulatory referral/consult to Bariatric Medicine; Future; Expected date: 01/11/2023  -     Lipid Panel; Future; Expected date: 01/04/2023  -     TSH; Future; Expected date: 01/04/2023  -     Ambulatory referral/consult to Diabetes Education; Future; Expected date: 01/11/2023  -      Ambulatory referral/consult to Nutrition Services; Future; Expected date: 01/11/2023    Difficulty maintaining weight  -     semaglutide (OZEMPIC) 0.25 mg or 0.5 mg(2 mg/1.5 mL) pen injector; Inject 0.25 mg SC weekly x 4 weeks then inject 0.5 mg SC weekly x 4 weeks  Dispense: 1 pen; Refill: 5  -     Ambulatory referral/consult to Bariatric Medicine; Future; Expected date: 01/11/2023    Elevated blood pressure reading in office without diagnosis of hypertension  -     CBC Auto Differential; Future; Expected date: 01/04/2023  -     Comprehensive Metabolic Panel; Future; Expected date: 01/04/2023  - Monitor over visits. Will work on weight loss    Oral contraceptive pill surveillance  Unprotected sexual intercourse  -     POCT URINE PREGNANCY  - Reports has refills. Discussed restarting today after neg UPT and retake home UPT in 5 days in case of early neg. Outside window for emergency contraception.   - Discuss with GYN Larc option in upcoming visit.     Post viral syndrome  -     fluticasone propionate (FLONASE) 50 mcg/actuation nasal spray; 1 spray (50 mcg total) by Each Nostril route 2 (two) times daily as needed for Rhinitis.  Dispense: 16 g; Refill: 0  - Reassurance. Monitor clinically. Expect improvement. No utility to retest at this time. Clear to return to work. S/p 10 days from symptoms with improvement and s/p neg COVID test.    Bipolar 1 disorder  - Continue med mgmt with psychiatry    Recently quit using tobacco  - Encourage wonderful progress and effort. Offered resources, decline for now. Emphasized importance of continued abstinence with OCP and weight risk for coagulation.    Patient's questions answered. Plan reviewed with patient at the end of visit. Relevant precautions to chief complaint and reasons to seek medical care or contact the office sooner reviewed with patient.     Follow up in about 6 weeks (around 2/15/2023) for f/u s/p new medication/ titration, Results Review.

## 2023-01-05 ENCOUNTER — LAB VISIT (OUTPATIENT)
Dept: LAB | Facility: HOSPITAL | Age: 28
End: 2023-01-05
Attending: FAMILY MEDICINE
Payer: COMMERCIAL

## 2023-01-05 ENCOUNTER — PATIENT MESSAGE (OUTPATIENT)
Dept: FAMILY MEDICINE | Facility: CLINIC | Age: 28
End: 2023-01-05

## 2023-01-05 DIAGNOSIS — D75.839 THROMBOCYTOSIS: Primary | ICD-10-CM

## 2023-01-05 DIAGNOSIS — E11.69 TYPE 2 DIABETES MELLITUS WITH OTHER SPECIFIED COMPLICATION, WITHOUT LONG-TERM CURRENT USE OF INSULIN: ICD-10-CM

## 2023-01-05 PROBLEM — E11.65 TYPE 2 DIABETES MELLITUS WITH HYPERGLYCEMIA, WITHOUT LONG-TERM CURRENT USE OF INSULIN: Status: ACTIVE | Noted: 2018-08-01

## 2023-01-05 PROCEDURE — 83036 HEMOGLOBIN GLYCOSYLATED A1C: CPT | Performed by: FAMILY MEDICINE

## 2023-01-05 PROCEDURE — 36415 COLL VENOUS BLD VENIPUNCTURE: CPT | Mod: PO | Performed by: FAMILY MEDICINE

## 2023-01-05 PROCEDURE — 82947 ASSAY GLUCOSE BLOOD QUANT: CPT | Performed by: FAMILY MEDICINE

## 2023-01-05 RX ORDER — SEMAGLUTIDE 1.34 MG/ML
INJECTION, SOLUTION SUBCUTANEOUS
Qty: 1 PEN | Refills: 5 | Status: SHIPPED | OUTPATIENT
Start: 2023-01-05 | End: 2023-02-06 | Stop reason: SDUPTHER

## 2023-01-05 NOTE — TELEPHONE ENCOUNTER
Pt called and she said that she saw that her A1C is  abnormal it looks like it was elevated and she would like her PCP to reach out and go over the results. Pt said that she is just getting over covid and will reach out if any other questions or concerns Pt declines any triage at this time.          Reason for Disposition   [1] Caller requesting NON-URGENT health information AND [2] PCP's office is the best resource    Protocols used: Information Only Call - No Triage-A-

## 2023-01-06 ENCOUNTER — TELEPHONE (OUTPATIENT)
Dept: PHARMACY | Facility: CLINIC | Age: 28
End: 2023-01-06

## 2023-01-06 ENCOUNTER — TELEPHONE (OUTPATIENT)
Dept: ADMINISTRATIVE | Facility: OTHER | Age: 28
End: 2023-01-06

## 2023-01-06 ENCOUNTER — PATIENT MESSAGE (OUTPATIENT)
Dept: FAMILY MEDICINE | Facility: CLINIC | Age: 28
End: 2023-01-06

## 2023-01-06 ENCOUNTER — OFFICE VISIT (OUTPATIENT)
Dept: URGENT CARE | Facility: CLINIC | Age: 28
End: 2023-01-06
Payer: COMMERCIAL

## 2023-01-06 VITALS
BODY MASS INDEX: 45.99 KG/M2 | HEIGHT: 67 IN | SYSTOLIC BLOOD PRESSURE: 142 MMHG | TEMPERATURE: 98 F | RESPIRATION RATE: 20 BRPM | WEIGHT: 293 LBS | HEART RATE: 86 BPM | DIASTOLIC BLOOD PRESSURE: 93 MMHG | OXYGEN SATURATION: 96 %

## 2023-01-06 DIAGNOSIS — R05.9 COUGH, UNSPECIFIED TYPE: ICD-10-CM

## 2023-01-06 DIAGNOSIS — J06.9 URI WITH COUGH AND CONGESTION: ICD-10-CM

## 2023-01-06 DIAGNOSIS — Z20.822 CLOSE EXPOSURE TO COVID-19 VIRUS: Primary | ICD-10-CM

## 2023-01-06 DIAGNOSIS — J02.9 SORE THROAT: ICD-10-CM

## 2023-01-06 LAB
CTP QC/QA: YES
ESTIMATED AVG GLUCOSE: 134 MG/DL (ref 68–131)
GLUCOSE SERPL-MCNC: 77 MG/DL (ref 70–110)
HBA1C MFR BLD: 6.3 % (ref 4–5.6)
SARS-COV-2 AG RESP QL IA.RAPID: NEGATIVE

## 2023-01-06 PROCEDURE — 87811 SARS-COV-2 COVID19 W/OPTIC: CPT | Mod: QW,S$GLB,, | Performed by: PHYSICIAN ASSISTANT

## 2023-01-06 PROCEDURE — 99213 PR OFFICE/OUTPT VISIT, EST, LEVL III, 20-29 MIN: ICD-10-PCS | Mod: S$GLB,,, | Performed by: PHYSICIAN ASSISTANT

## 2023-01-06 PROCEDURE — 3008F BODY MASS INDEX DOCD: CPT | Mod: CPTII,S$GLB,, | Performed by: PHYSICIAN ASSISTANT

## 2023-01-06 PROCEDURE — 3008F PR BODY MASS INDEX (BMI) DOCUMENTED: ICD-10-PCS | Mod: CPTII,S$GLB,, | Performed by: PHYSICIAN ASSISTANT

## 2023-01-06 PROCEDURE — 1160F PR REVIEW ALL MEDS BY PRESCRIBER/CLIN PHARMACIST DOCUMENTED: ICD-10-PCS | Mod: CPTII,S$GLB,, | Performed by: PHYSICIAN ASSISTANT

## 2023-01-06 PROCEDURE — 3077F PR MOST RECENT SYSTOLIC BLOOD PRESSURE >= 140 MM HG: ICD-10-PCS | Mod: CPTII,S$GLB,, | Performed by: PHYSICIAN ASSISTANT

## 2023-01-06 PROCEDURE — 87811 SARS CORONAVIRUS 2 ANTIGEN POCT, MANUAL READ: ICD-10-PCS | Mod: QW,S$GLB,, | Performed by: PHYSICIAN ASSISTANT

## 2023-01-06 PROCEDURE — 3044F HG A1C LEVEL LT 7.0%: CPT | Mod: CPTII,S$GLB,, | Performed by: PHYSICIAN ASSISTANT

## 2023-01-06 PROCEDURE — 1159F PR MEDICATION LIST DOCUMENTED IN MEDICAL RECORD: ICD-10-PCS | Mod: CPTII,S$GLB,, | Performed by: PHYSICIAN ASSISTANT

## 2023-01-06 PROCEDURE — 1159F MED LIST DOCD IN RCRD: CPT | Mod: CPTII,S$GLB,, | Performed by: PHYSICIAN ASSISTANT

## 2023-01-06 PROCEDURE — 3077F SYST BP >= 140 MM HG: CPT | Mod: CPTII,S$GLB,, | Performed by: PHYSICIAN ASSISTANT

## 2023-01-06 PROCEDURE — 99213 OFFICE O/P EST LOW 20 MIN: CPT | Mod: S$GLB,,, | Performed by: PHYSICIAN ASSISTANT

## 2023-01-06 PROCEDURE — 3044F PR MOST RECENT HEMOGLOBIN A1C LEVEL <7.0%: ICD-10-PCS | Mod: CPTII,S$GLB,, | Performed by: PHYSICIAN ASSISTANT

## 2023-01-06 PROCEDURE — 1160F RVW MEDS BY RX/DR IN RCRD: CPT | Mod: CPTII,S$GLB,, | Performed by: PHYSICIAN ASSISTANT

## 2023-01-06 PROCEDURE — 3080F PR MOST RECENT DIASTOLIC BLOOD PRESSURE >= 90 MM HG: ICD-10-PCS | Mod: CPTII,S$GLB,, | Performed by: PHYSICIAN ASSISTANT

## 2023-01-06 PROCEDURE — 3080F DIAST BP >= 90 MM HG: CPT | Mod: CPTII,S$GLB,, | Performed by: PHYSICIAN ASSISTANT

## 2023-01-06 RX ORDER — ALBUTEROL SULFATE 90 UG/1
2 AEROSOL, METERED RESPIRATORY (INHALATION) EVERY 6 HOURS PRN
Qty: 6.7 G | Refills: 0 | Status: SHIPPED | OUTPATIENT
Start: 2023-01-06 | End: 2023-02-06

## 2023-01-06 RX ORDER — BENZONATATE 100 MG/1
100 CAPSULE ORAL 3 TIMES DAILY PRN
Qty: 20 CAPSULE | Refills: 0 | Status: SHIPPED | OUTPATIENT
Start: 2023-01-06 | End: 2023-01-16

## 2023-01-06 NOTE — PROGRESS NOTES
"Subjective:       Patient ID: Misael Garcia is a 27 y.o. female.    Vitals:  height is 5' 7" (1.702 m) and weight is 163 kg (359 lb 5.6 oz) (abnormal). Her temperature is 98.3 °F (36.8 °C). Her blood pressure is 142/93 (abnormal) and her pulse is 86. Her respiration is 20 and oxygen saturation is 96%.     Chief Complaint: Sore Throat    Pt complains of cough, sore throat, nasal congestion, and body aches, began yesterday. Pt claims she tested positive for covid at home and would like to get tested.     Patient provider note starts here:  Patient presents with complaints of URI like symptoms since yesterday.  Endorses dry cough, sore throat, nasal congestion, body aches and generalized malaise.  Reports that her mother has been ill with COVID-19 and she took a home COVID test which was positive.  She presents here for confirmation of the positive COVID test.  She does work for [employer].  Denies associated chest pain but does endorse mild shortness of breath with exertion.  Denies fevers.    Sore Throat   This is a new problem. The current episode started yesterday. The problem has been unchanged. There has been no fever. Associated symptoms include congestion, coughing, ear pain and headaches. Pertinent negatives include no abdominal pain, diarrhea, ear discharge, neck pain, shortness of breath or vomiting. She has tried NSAIDs for the symptoms.     Constitution: Positive for fatigue. Negative for fever.   HENT:  Positive for ear pain, congestion and sore throat. Negative for ear discharge.    Neck: Negative for neck pain.   Cardiovascular:  Negative for chest pain, palpitations and sob on exertion.   Respiratory:  Positive for cough. Negative for chest tightness, sputum production, shortness of breath and wheezing.    Gastrointestinal:  Negative for abdominal pain, vomiting and diarrhea.   Musculoskeletal:  Positive for muscle ache.   Skin:  Negative for color change and wound.   Neurological:  Positive " for headaches. Negative for numbness and tingling.     Objective:      Physical Exam   Constitutional: She is oriented to person, place, and time. She appears well-developed. She is cooperative.  Non-toxic appearance. She does not appear ill. No distress. obesity  HENT:   Head: Normocephalic and atraumatic.   Ears:   Right Ear: Hearing, tympanic membrane, external ear and ear canal normal.   Left Ear: Hearing, tympanic membrane, external ear and ear canal normal.   Nose: Congestion present. No mucosal edema, rhinorrhea or nasal deformity. No epistaxis. Right sinus exhibits no maxillary sinus tenderness and no frontal sinus tenderness. Left sinus exhibits no maxillary sinus tenderness and no frontal sinus tenderness.   Mouth/Throat: Uvula is midline, oropharynx is clear and moist and mucous membranes are normal. No trismus in the jaw. Normal dentition. No uvula swelling. No oropharyngeal exudate, posterior oropharyngeal edema or posterior oropharyngeal erythema.   Eyes: Conjunctivae and lids are normal. No scleral icterus.   Neck: Trachea normal and phonation normal. Neck supple. No edema present. No erythema present. No neck rigidity present.   Cardiovascular: Normal rate, regular rhythm, normal heart sounds and normal pulses.   Pulmonary/Chest: Effort normal and breath sounds normal. No respiratory distress. She has no decreased breath sounds. She has no wheezes. She has no rhonchi.   Abdominal: Normal appearance.   Musculoskeletal: Normal range of motion.         General: No deformity. Normal range of motion.   Neurological: She is alert and oriented to person, place, and time. She exhibits normal muscle tone. Coordination normal.   Skin: Skin is warm, dry, intact, not diaphoretic and not pale.   Psychiatric: Her speech is normal and behavior is normal. Judgment and thought content normal.   Nursing note and vitals reviewed.      Assessment:       1. Close exposure to COVID-19 virus    2. Cough, unspecified type     3. Sore throat    4. URI with cough and congestion          Results for orders placed or performed in visit on 01/06/23   SARS Coronavirus 2 Antigen, POCT Manual Read   Result Value Ref Range    SARS Coronavirus 2 Antigen Negative Negative     Acceptable Yes        Plan:         Close exposure to COVID-19 virus    Cough, unspecified type  -     SARS Coronavirus 2 Antigen, POCT Manual Read  -     benzonatate (TESSALON) 100 MG capsule; Take 1 capsule (100 mg total) by mouth 3 (three) times daily as needed for Cough.  Dispense: 20 capsule; Refill: 0  -     albuterol (PROVENTIL HFA) 90 mcg/actuation inhaler; Inhale 2 puffs into the lungs every 6 (six) hours as needed for Shortness of Breath. Rescue  Dispense: 6.7 g; Refill: 0    Sore throat  -     Cancel: POCT Influenza A/B MOLECULAR    URI with cough and congestion           Medical Decision Making:   History:   Old Medical Records: I decided to obtain old medical records.  Differential Diagnosis:   Differential diagnosis includes but is not limited to: viral vs bacterial URI, pharyngitis, otitis, COVID 19, influenza, pneumonia.    Clinical Tests:   Lab Tests: Ordered and Reviewed       <> Summary of Lab: COVID negative  Urgent Care Management:  Patient presents with complaints of URI like symptoms since yesterday.  On exam, she is afebrile and nontoxic-appearing.  Lungs are clear to auscultation bilaterally and vital signs are stable.  Reports home COVID test was positive.  COVID test here is negative.  I encouraged the patient to monitor her symptoms and contact Employee Health for further instructions.  Symptomatic treatment prescribed.  ED precautions discussed.  She verbalized understanding and agreed with plan.       Patient Instructions   Please follow up with your Primary care provider within 2-5 days if your signs and symptoms have not resolved or worsen.  The usual course of cold symptoms are 10-14 days.      If your condition worsens or  "fails to improve we recommend that you receive another evaluation at the emergency room immediately or contact your primary medical clinic to discuss your concerns.      You must understand that you have received an Urgent Care treatment only and that you may be released before all of your medical problems are known or treated.   You, the patient, will arrange for follow up care as instructed.      Tylenol or Ibuprofen can also be used as directed for pain/fever unless you have an allergy to them or medical condition such as stomach ulcers, kidney or liver disease or blood thinners etc for which you should not be taking these type of medications.      Take over the counter cough medication as directed as needed for cough.  You should avoid medications with pseudoephedrine or phenylephrine (any medication with "D") if you have high blood pressure as this can cause an elevation in your blood pressure. Instead consider Corcidin HBP as needed to prevent an elevated blood pressure.      Natural remedies of symptoms (as needed) include humidification, saline nasal sprays, and/or steamy showers.  Increase fluids, warm tea with honey, cough drops as needed.  You may also use salt water gargles for sore throat.     IF you received a steroid shot today - As discussed, this can elevate your blood pressure, elevate your blood sugar, water weight gain, nervous energy, redness to the face and dimpling of the skin at the injection site.      OVER THE COUNTER RECOMMENDATIONS/SUGGESTIONS.     Make sure to stay well hydrated.     Use Nasal Saline to mechanically move any post nasal drip from your eustachian tube or from the back of your throat.     Use warm salt water gargles to ease your throat pain. Warm salt water gargles as needed for sore throat-  1/2 tsp salt to 1 cup warm water, gargle as desired.     Use an antihistamine such as Claritin, Zyrtec or Allegra to dry you out.      Use pseudoephedrine (behind the counter) to " decongest. Pseudoephedrine  30 mg up to 240 mg /day. It can raise your blood pressure and give you palpitations.     Use mucinex (guaifenisin) to break up mucous up to 2400mg/day to loosen any mucous.   The mucinex DM pill has a cough suppressant that can be sedating. It can be used at night to stop the tickle at the back of your throat.  You can use Mucinex D (it has guaifenesin and a high dose of pseudoephedrine) in the mornings to help decongest.        Use Afrin in each nare for no longer than 3 days, as it is addictive. It can also dry out your mucous membranes and cause elevated blood pressure. This is especially useful if you are flying.     Use Flonase 1-2 sprays/nostril per day. It is a local acting steroid nasal spray, if you develop a bloody nose, stop using the medication immediately.     Sometimes Nyquil at night is beneficial to help you get some rest, however it is sedating and it does have an antihistamine, and tylenol.     Honey is a natural cough suppressant that can be used.     Tylenol up to 4,000 mg a day is safe for short periods and can be used for body aches, pain, and fever. However in high doses and prolonged use it can cause liver irritation.     Ibuprofen is a non-steroidal anti-inflammatory that can be used for body aches, pain, and fever.However it can also cause stomach irritation if over used.

## 2023-01-06 NOTE — LETTER
January 6, 2023      [facility]  [address] [address]  Phone: [phone]  Fax: [phone]       Patient: Misael Garcia   YOB: 1995  Date of Visit: 01/06/2023    To Whom It May Concern:    Dora Garcia  was at [facility] on 01/06/2023. She may return to work/school on 1/7/2023 with no restrictions. If symptoms persist, it is advised that you get restested for COVID in 1-2 days. If you have any questions or concerns, or if I can be of further assistance, please do not hesitate to contact me.    Sincerely,    Blanca Moon PA-C

## 2023-01-06 NOTE — PATIENT INSTRUCTIONS
"Please follow up with your Primary care provider within 2-5 days if your signs and symptoms have not resolved or worsen.  The usual course of cold symptoms are 10-14 days.      If your condition worsens or fails to improve we recommend that you receive another evaluation at the emergency room immediately or contact your primary medical clinic to discuss your concerns.      You must understand that you have received an Urgent Care treatment only and that you may be released before all of your medical problems are known or treated.   You, the patient, will arrange for follow up care as instructed.      Tylenol or Ibuprofen can also be used as directed for pain/fever unless you have an allergy to them or medical condition such as stomach ulcers, kidney or liver disease or blood thinners etc for which you should not be taking these type of medications.      Take over the counter cough medication as directed as needed for cough.  You should avoid medications with pseudoephedrine or phenylephrine (any medication with "D") if you have high blood pressure as this can cause an elevation in your blood pressure. Instead consider Corcidin HBP as needed to prevent an elevated blood pressure.      Natural remedies of symptoms (as needed) include humidification, saline nasal sprays, and/or steamy showers.  Increase fluids, warm tea with honey, cough drops as needed.  You may also use salt water gargles for sore throat.     IF you received a steroid shot today - As discussed, this can elevate your blood pressure, elevate your blood sugar, water weight gain, nervous energy, redness to the face and dimpling of the skin at the injection site.      OVER THE COUNTER RECOMMENDATIONS/SUGGESTIONS.     Make sure to stay well hydrated.     Use Nasal Saline to mechanically move any post nasal drip from your eustachian tube or from the back of your throat.     Use warm salt water gargles to ease your throat pain. Warm salt water gargles as " needed for sore throat-  1/2 tsp salt to 1 cup warm water, gargle as desired.     Use an antihistamine such as Claritin, Zyrtec or Allegra to dry you out.      Use pseudoephedrine (behind the counter) to decongest. Pseudoephedrine  30 mg up to 240 mg /day. It can raise your blood pressure and give you palpitations.     Use mucinex (guaifenisin) to break up mucous up to 2400mg/day to loosen any mucous.   The mucinex DM pill has a cough suppressant that can be sedating. It can be used at night to stop the tickle at the back of your throat.  You can use Mucinex D (it has guaifenesin and a high dose of pseudoephedrine) in the mornings to help decongest.        Use Afrin in each nare for no longer than 3 days, as it is addictive. It can also dry out your mucous membranes and cause elevated blood pressure. This is especially useful if you are flying.     Use Flonase 1-2 sprays/nostril per day. It is a local acting steroid nasal spray, if you develop a bloody nose, stop using the medication immediately.     Sometimes Nyquil at night is beneficial to help you get some rest, however it is sedating and it does have an antihistamine, and tylenol.     Honey is a natural cough suppressant that can be used.     Tylenol up to 4,000 mg a day is safe for short periods and can be used for body aches, pain, and fever. However in high doses and prolonged use it can cause liver irritation.     Ibuprofen is a non-steroidal anti-inflammatory that can be used for body aches, pain, and fever.However it can also cause stomach irritation if over used.

## 2023-01-09 ENCOUNTER — PATIENT MESSAGE (OUTPATIENT)
Dept: FAMILY MEDICINE | Facility: CLINIC | Age: 28
End: 2023-01-09

## 2023-01-11 ENCOUNTER — PATIENT MESSAGE (OUTPATIENT)
Dept: FAMILY MEDICINE | Facility: CLINIC | Age: 28
End: 2023-01-11

## 2023-01-16 ENCOUNTER — HOSPITAL ENCOUNTER (EMERGENCY)
Facility: HOSPITAL | Age: 28
Discharge: HOME | End: 2023-01-16
Attending: EMERGENCY MEDICINE
Payer: COMMERCIAL

## 2023-01-16 VITALS
SYSTOLIC BLOOD PRESSURE: 130 MMHG | HEIGHT: 68 IN | TEMPERATURE: 98 F | RESPIRATION RATE: 18 BRPM | BODY MASS INDEX: 44.41 KG/M2 | DIASTOLIC BLOOD PRESSURE: 90 MMHG | WEIGHT: 293 LBS | HEART RATE: 80 BPM | OXYGEN SATURATION: 97 %

## 2023-01-16 DIAGNOSIS — S16.1XXA CERVICAL MYOFASCIAL STRAIN, INITIAL ENCOUNTER: ICD-10-CM

## 2023-01-16 DIAGNOSIS — M54.6 THORACIC BACK PAIN: ICD-10-CM

## 2023-01-16 DIAGNOSIS — M51.36 DDD (DEGENERATIVE DISC DISEASE), LUMBAR: ICD-10-CM

## 2023-01-16 DIAGNOSIS — S29.019A THORACIC MYOFASCIAL STRAIN, INITIAL ENCOUNTER: ICD-10-CM

## 2023-01-16 DIAGNOSIS — M50.30 DDD (DEGENERATIVE DISC DISEASE), CERVICAL: ICD-10-CM
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
## 2023-01-16 LAB
B-HCG UR QL: NEGATIVE
CTP QC/QA: YES

## 2023-01-16 PROCEDURE — 81025 URINE PREGNANCY TEST: CPT | Performed by: EMERGENCY MEDICINE

## 2023-01-16 PROCEDURE — 96372 THER/PROPH/DIAG INJ SC/IM: CPT | Performed by: NURSE PRACTITIONER

## 2023-01-16 PROCEDURE — 63600175 PHARM REV CODE 636 W HCPCS: Performed by: NURSE PRACTITIONER

## 2023-01-16 PROCEDURE — 99284 EMERGENCY DEPT VISIT MOD MDM: CPT | Mod: 25

## 2023-01-16 RX ORDER — TIZANIDINE 2 MG/1
2 TABLET ORAL EVERY 8 HOURS PRN
Qty: 15 TABLET | Refills: 0 | Status: SHIPPED | OUTPATIENT
Start: 2023-01-16 | End: 2023-02-06

## 2023-01-16 RX ORDER — KETOROLAC TROMETHAMINE 30 MG/ML
30 INJECTION, SOLUTION INTRAMUSCULAR; INTRAVENOUS
Status: COMPLETED | OUTPATIENT
Start: 2023-01-16 | End: 2023-01-16

## 2023-01-16 RX ORDER — NAPROXEN 500 MG/1
500 TABLET ORAL EVERY 12 HOURS PRN
Qty: 20 TABLET | Refills: 0 | Status: SHIPPED | OUTPATIENT
Start: 2023-01-16 | End: 2023-03-02 | Stop reason: SDUPTHER

## 2023-01-16 RX ADMIN — KETOROLAC TROMETHAMINE 30 MG: 30 INJECTION, SOLUTION INTRAMUSCULAR; INTRAVENOUS at 01:01

## 2023-01-16 NOTE — Clinical Note
"Misael"Jose Garcia was seen and treated in our emergency department on 1/16/2023.  She may return to work on 01/19/2023.       If you have any questions or concerns, please don't hesitate to call.      Rafat Baron NP"
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
## 2023-01-16 NOTE — DISCHARGE INSTRUCTIONS
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
## 2023-02-01 ENCOUNTER — OFFICE VISIT (OUTPATIENT)
Dept: OBSTETRICS AND GYNECOLOGY | Facility: CLINIC | Age: 28
End: 2023-02-01
Payer: COMMERCIAL

## 2023-02-01 ENCOUNTER — TELEPHONE (OUTPATIENT)
Dept: ADMINISTRATIVE | Facility: OTHER | Age: 28
End: 2023-02-01

## 2023-02-01 VITALS — SYSTOLIC BLOOD PRESSURE: 134 MMHG | DIASTOLIC BLOOD PRESSURE: 86 MMHG | WEIGHT: 293 LBS | BODY MASS INDEX: 54.37 KG/M2

## 2023-02-01 DIAGNOSIS — N83.209 CYST OF OVARY, UNSPECIFIED LATERALITY: Primary | ICD-10-CM

## 2023-02-01 PROCEDURE — 3075F SYST BP GE 130 - 139MM HG: CPT | Mod: CPTII,S$GLB,, | Performed by: STUDENT IN AN ORGANIZED HEALTH CARE EDUCATION/TRAINING PROGRAM

## 2023-02-01 PROCEDURE — 3044F PR MOST RECENT HEMOGLOBIN A1C LEVEL <7.0%: ICD-10-PCS | Mod: CPTII,S$GLB,, | Performed by: STUDENT IN AN ORGANIZED HEALTH CARE EDUCATION/TRAINING PROGRAM

## 2023-02-01 PROCEDURE — 3075F PR MOST RECENT SYSTOLIC BLOOD PRESS GE 130-139MM HG: ICD-10-PCS | Mod: CPTII,S$GLB,, | Performed by: STUDENT IN AN ORGANIZED HEALTH CARE EDUCATION/TRAINING PROGRAM

## 2023-02-01 PROCEDURE — 1160F RVW MEDS BY RX/DR IN RCRD: CPT | Mod: CPTII,S$GLB,, | Performed by: STUDENT IN AN ORGANIZED HEALTH CARE EDUCATION/TRAINING PROGRAM

## 2023-02-01 PROCEDURE — 1160F PR REVIEW ALL MEDS BY PRESCRIBER/CLIN PHARMACIST DOCUMENTED: ICD-10-PCS | Mod: CPTII,S$GLB,, | Performed by: STUDENT IN AN ORGANIZED HEALTH CARE EDUCATION/TRAINING PROGRAM

## 2023-02-01 PROCEDURE — 3079F PR MOST RECENT DIASTOLIC BLOOD PRESSURE 80-89 MM HG: ICD-10-PCS | Mod: CPTII,S$GLB,, | Performed by: STUDENT IN AN ORGANIZED HEALTH CARE EDUCATION/TRAINING PROGRAM

## 2023-02-01 PROCEDURE — 99999 PR PBB SHADOW E&M-EST. PATIENT-LVL III: ICD-10-PCS | Mod: PBBFAC,,, | Performed by: STUDENT IN AN ORGANIZED HEALTH CARE EDUCATION/TRAINING PROGRAM

## 2023-02-01 PROCEDURE — 99999 PR PBB SHADOW E&M-EST. PATIENT-LVL III: CPT | Mod: PBBFAC,,, | Performed by: STUDENT IN AN ORGANIZED HEALTH CARE EDUCATION/TRAINING PROGRAM

## 2023-02-01 PROCEDURE — 3008F BODY MASS INDEX DOCD: CPT | Mod: CPTII,S$GLB,, | Performed by: STUDENT IN AN ORGANIZED HEALTH CARE EDUCATION/TRAINING PROGRAM

## 2023-02-01 PROCEDURE — 1159F PR MEDICATION LIST DOCUMENTED IN MEDICAL RECORD: ICD-10-PCS | Mod: CPTII,S$GLB,, | Performed by: STUDENT IN AN ORGANIZED HEALTH CARE EDUCATION/TRAINING PROGRAM

## 2023-02-01 PROCEDURE — 3044F HG A1C LEVEL LT 7.0%: CPT | Mod: CPTII,S$GLB,, | Performed by: STUDENT IN AN ORGANIZED HEALTH CARE EDUCATION/TRAINING PROGRAM

## 2023-02-01 PROCEDURE — 99213 PR OFFICE/OUTPT VISIT, EST, LEVL III, 20-29 MIN: ICD-10-PCS | Mod: S$GLB,,, | Performed by: STUDENT IN AN ORGANIZED HEALTH CARE EDUCATION/TRAINING PROGRAM

## 2023-02-01 PROCEDURE — 3079F DIAST BP 80-89 MM HG: CPT | Mod: CPTII,S$GLB,, | Performed by: STUDENT IN AN ORGANIZED HEALTH CARE EDUCATION/TRAINING PROGRAM

## 2023-02-01 PROCEDURE — 99213 OFFICE O/P EST LOW 20 MIN: CPT | Mod: S$GLB,,, | Performed by: STUDENT IN AN ORGANIZED HEALTH CARE EDUCATION/TRAINING PROGRAM

## 2023-02-01 PROCEDURE — 3008F PR BODY MASS INDEX (BMI) DOCUMENTED: ICD-10-PCS | Mod: CPTII,S$GLB,, | Performed by: STUDENT IN AN ORGANIZED HEALTH CARE EDUCATION/TRAINING PROGRAM

## 2023-02-01 PROCEDURE — 1159F MED LIST DOCD IN RCRD: CPT | Mod: CPTII,S$GLB,, | Performed by: STUDENT IN AN ORGANIZED HEALTH CARE EDUCATION/TRAINING PROGRAM

## 2023-02-01 RX ORDER — VENLAFAXINE HYDROCHLORIDE 75 MG/1
75 CAPSULE, EXTENDED RELEASE ORAL EVERY MORNING
COMMUNITY
Start: 2023-01-20 | End: 2023-04-21 | Stop reason: ALTCHOICE

## 2023-02-01 RX ORDER — TRIAMCINOLONE ACETONIDE 1 MG/G
OINTMENT TOPICAL 2 TIMES DAILY
Qty: 30 G | Refills: 1 | Status: SHIPPED | OUTPATIENT
Start: 2023-02-01 | End: 2023-10-04

## 2023-02-01 NOTE — PROGRESS NOTES
History & Physical  Gynecology      SUBJECTIVE:     Chief Complaint: F/U Adnexal Cyst       History of Present Illness:  Misael is a 27 yr old G0 who presents to clinic for follow-up on an ovarian cyst that was identified on U/S  on 12/10/22 when she presented to the ED with complaints of abdominal pain. Since that time she denies any additional pain and states that she is asymptomatic. She also complains of a rash of her chest and inner thigh.       FINDINGS:  Uterus:     Size: 7.8 x 4.4 x 5.6-cm     Masses: None     Endometrium: Normal in this pre-menopausal patient, measuring 2-mm.     Right ovary:     Size: 4.1 x 4.8 x 4.4-cm     Appearance: A simple cyst is present that measures up to 3.9 cm, as before.     Vascular flow: Normal.     Left ovary:     Size: 3.3 x 2.0 x 3.2-cm     Appearance: Normal     Vascular Flow: Normal.     Free Fluid:     None.     Impression:     1. No ultrasound evidence of ovarian torsion.  2. Simple right ovarian cyst that measures up to 3.9 cm.      Review of patient's allergies indicates:  No Known Allergies    Past Medical History:   Diagnosis Date    ADHD (attention deficit hyperactivity disorder)     Anxiety     Bipolar affective     History of chlamydia 2016    Insomnia     Morbid obesity with BMI of 40.0-44.9, adult     Sleep disorder     Type 2 diabetes mellitus without complications      Past Surgical History:   Procedure Laterality Date    NO PAST SURGERIES      as of 19     OB History          0    Para   0    Term   0       0    AB   0    Living   0         SAB   0    IAB   0    Ectopic   0    Multiple   0    Live Births               Obstetric Comments   Menarche ~ 18/reg  H/o chlamydia   Denies abnl pap             Family History   Problem Relation Age of Onset    Breast cancer Paternal Grandmother     No Known Problems Father     No Known Problems Mother     No Known Problems Sister     Diabetes Maternal Grandmother     No Known Problems Maternal  Grandfather     No Known Problems Paternal Grandfather     Colon cancer Neg Hx     Ovarian cancer Neg Hx      Social History     Tobacco Use    Smoking status: Former     Types: Vaping with nicotine     Start date:      Quit date: 2022     Years since quittin.0    Smokeless tobacco: Never    Tobacco comments:     boyfriend smoke cigarettes   Substance Use Topics    Alcohol use: No    Drug use: Yes     Types: Marijuana       Current Outpatient Medications   Medication Sig    busPIRone (BUSPAR) 15 MG tablet Take 15 mg by mouth 2 (two) times daily.    clonazePAM (KLONOPIN) 1 MG tablet Take 1 mg by mouth 2 (two) times daily as needed.    drospirenone-ethinyl estradioL (JUAN MANUEL) 3-0.02 mg per tablet Take 1 tablet by mouth once daily.    naproxen (NAPROSYN) 500 MG tablet Take 1 tablet (500 mg total) by mouth every 12 (twelve) hours as needed (Pain).    semaglutide (OZEMPIC) 0.25 mg or 0.5 mg(2 mg/1.5 mL) pen injector Inject 0.25 mg into the skin weekly x 4 weeks then inject 0.5 mg into the skin  weekly x 4 weeks    sertraline (ZOLOFT) 100 MG tablet Take 100 mg by mouth once daily.    venlafaxine (EFFEXOR-XR) 75 MG 24 hr capsule Take 75 mg by mouth every morning.    VRAYLAR 1.5 mg Cap Take 1.5 mg by mouth every evening.    albuterol (PROVENTIL HFA) 90 mcg/actuation inhaler Inhale 2 puffs into the lungs every 6 (six) hours as needed for Shortness of Breath. Rescue (Patient not taking: Reported on 2023)    fluticasone propionate (FLONASE) 50 mcg/actuation nasal spray 1 spray (50 mcg total) by Each Nostril route 2 (two) times daily as needed for Rhinitis. (Patient not taking: Reported on 2023)    tiZANidine (ZANAFLEX) 2 MG tablet Take 1 tablet (2 mg total) by mouth every 8 (eight) hours as needed (Muscle Spasms). (Patient not taking: Reported on 2023)     No current facility-administered medications for this visit.       Review of Systems:  Review of Systems   Constitutional:  Negative for chills,  fatigue and fever.   HENT:  Negative for congestion.    Eyes:  Negative for visual disturbance.   Respiratory:  Negative for cough and shortness of breath.    Cardiovascular:  Negative for chest pain and palpitations.   Gastrointestinal:  Negative for abdominal distention, abdominal pain, constipation, diarrhea, nausea and vomiting.   Genitourinary:  Negative for difficulty urinating, dysuria, hematuria, vaginal bleeding and vaginal discharge.   Skin:  Positive for rash.   Neurological:  Negative for dizziness, seizures, light-headedness and headaches.   Hematological:  Does not bruise/bleed easily.   Psychiatric/Behavioral:  Negative for dysphoric mood. The patient is not nervous/anxious.       OBJECTIVE:     Physical Exam:  Vitals:    02/01/23 1336   BP: 134/86      Physical Exam  Vitals and nursing note reviewed. Exam conducted with a chaperone present.   Constitutional:       General: She is not in acute distress.     Appearance: She is well-developed.   HENT:      Head: Normocephalic and atraumatic.   Eyes:      Pupils: Pupils are equal, round, and reactive to light.   Cardiovascular:      Rate and Rhythm: Normal rate and regular rhythm.   Pulmonary:      Effort: Pulmonary effort is normal. No respiratory distress.   Abdominal:      General: There is no distension.      Palpations: Abdomen is soft. There is no mass.      Tenderness: There is no abdominal tenderness. There is no guarding.   Musculoskeletal:         General: Normal range of motion.      Cervical back: Normal range of motion and neck supple.   Skin:     General: Skin is warm and dry.          Neurological:      Mental Status: She is alert and oriented to person, place, and time.   Psychiatric:         Behavior: Behavior normal.         Thought Content: Thought content normal.         Judgment: Judgment normal.       ASSESSMENT/PLAN:     1. Cyst of ovary, unspecified laterality  - Again, discussed etiologies of ovarian cysts, as well the common  incidence of resolution without intervention  - Patient remains asymptomatic and does not want to pursue additional imaging/surveillance at this time  - Precautions given    2. Rash  - Triamcinolone ointment provided    Fiorella Peña M.D.  OB/GYN  [facility]

## 2023-02-06 ENCOUNTER — LAB VISIT (OUTPATIENT)
Dept: LAB | Facility: HOSPITAL | Age: 28
End: 2023-02-06
Attending: FAMILY MEDICINE
Payer: COMMERCIAL

## 2023-02-06 ENCOUNTER — PATIENT MESSAGE (OUTPATIENT)
Dept: ADMINISTRATIVE | Facility: OTHER | Age: 28
End: 2023-02-06

## 2023-02-06 ENCOUNTER — PATIENT MESSAGE (OUTPATIENT)
Dept: FAMILY MEDICINE | Facility: CLINIC | Age: 28
End: 2023-02-06

## 2023-02-06 ENCOUNTER — OFFICE VISIT (OUTPATIENT)
Dept: DERMATOLOGY | Facility: CLINIC | Age: 28
End: 2023-02-06
Payer: COMMERCIAL

## 2023-02-06 ENCOUNTER — OFFICE VISIT (OUTPATIENT)
Dept: FAMILY MEDICINE | Facility: CLINIC | Age: 28
End: 2023-02-06
Payer: COMMERCIAL

## 2023-02-06 ENCOUNTER — OFFICE VISIT (OUTPATIENT)
Dept: SLEEP MEDICINE | Facility: CLINIC | Age: 28
End: 2023-02-06
Payer: COMMERCIAL

## 2023-02-06 VITALS
SYSTOLIC BLOOD PRESSURE: 128 MMHG | OXYGEN SATURATION: 98 % | DIASTOLIC BLOOD PRESSURE: 90 MMHG | WEIGHT: 293 LBS | HEIGHT: 68 IN | BODY MASS INDEX: 44.41 KG/M2 | HEART RATE: 85 BPM

## 2023-02-06 VITALS
HEART RATE: 103 BPM | HEIGHT: 68 IN | DIASTOLIC BLOOD PRESSURE: 87 MMHG | BODY MASS INDEX: 44.41 KG/M2 | WEIGHT: 293 LBS | SYSTOLIC BLOOD PRESSURE: 134 MMHG

## 2023-02-06 DIAGNOSIS — E11.59 HYPERTENSION ASSOCIATED WITH DIABETES: Primary | ICD-10-CM

## 2023-02-06 DIAGNOSIS — Z87.891 RECENTLY QUIT USING TOBACCO: ICD-10-CM

## 2023-02-06 DIAGNOSIS — L30.9 ECZEMA, UNSPECIFIED TYPE: Primary | ICD-10-CM

## 2023-02-06 DIAGNOSIS — F51.09 OTHER INSOMNIA NOT DUE TO A SUBSTANCE OR KNOWN PHYSIOLOGICAL CONDITION: ICD-10-CM

## 2023-02-06 DIAGNOSIS — D75.839 THROMBOCYTOSIS: ICD-10-CM

## 2023-02-06 DIAGNOSIS — Z30.41 ORAL CONTRACEPTIVE PILL SURVEILLANCE: ICD-10-CM

## 2023-02-06 DIAGNOSIS — I15.2 HYPERTENSION ASSOCIATED WITH DIABETES: Primary | ICD-10-CM

## 2023-02-06 DIAGNOSIS — Z76.89 ENCOUNTER FOR SKIN CARE: ICD-10-CM

## 2023-02-06 DIAGNOSIS — R63.8 DIFFICULTY MAINTAINING WEIGHT: ICD-10-CM

## 2023-02-06 DIAGNOSIS — L70.0 ACNE VULGARIS: ICD-10-CM

## 2023-02-06 DIAGNOSIS — R35.1 NOCTURIA: ICD-10-CM

## 2023-02-06 DIAGNOSIS — L29.9 ITCH: ICD-10-CM

## 2023-02-06 DIAGNOSIS — Z23 IMMUNIZATION DUE: ICD-10-CM

## 2023-02-06 DIAGNOSIS — R06.83 SNORING: Primary | ICD-10-CM

## 2023-02-06 DIAGNOSIS — E66.01 CLASS 3 SEVERE OBESITY DUE TO EXCESS CALORIES WITH SERIOUS COMORBIDITY AND BODY MASS INDEX (BMI) OF 50.0 TO 59.9 IN ADULT: ICD-10-CM

## 2023-02-06 DIAGNOSIS — E11.69 TYPE 2 DIABETES MELLITUS WITH OTHER SPECIFIED COMPLICATION, WITHOUT LONG-TERM CURRENT USE OF INSULIN: ICD-10-CM

## 2023-02-06 PROBLEM — I10 PRIMARY HYPERTENSION: Status: ACTIVE | Noted: 2023-01-04

## 2023-02-06 LAB
BASOPHILS # BLD AUTO: 0.05 K/UL (ref 0–0.2)
BASOPHILS NFR BLD: 0.4 % (ref 0–1.9)
DIFFERENTIAL METHOD: ABNORMAL
EOSINOPHIL # BLD AUTO: 0.3 K/UL (ref 0–0.5)
EOSINOPHIL NFR BLD: 2 % (ref 0–8)
ERYTHROCYTE [DISTWIDTH] IN BLOOD BY AUTOMATED COUNT: 14.7 % (ref 11.5–14.5)
FERRITIN SERPL-MCNC: 78 NG/ML (ref 20–300)
HCT VFR BLD AUTO: 43 % (ref 37–48.5)
HGB BLD-MCNC: 12.8 G/DL (ref 12–16)
IMM GRANULOCYTES # BLD AUTO: 0.05 K/UL (ref 0–0.04)
IMM GRANULOCYTES NFR BLD AUTO: 0.4 % (ref 0–0.5)
IRON SERPL-MCNC: 46 UG/DL (ref 30–160)
LYMPHOCYTES # BLD AUTO: 3.2 K/UL (ref 1–4.8)
LYMPHOCYTES NFR BLD: 25.7 % (ref 18–48)
MCH RBC QN AUTO: 25.6 PG (ref 27–31)
MCHC RBC AUTO-ENTMCNC: 29.8 G/DL (ref 32–36)
MCV RBC AUTO: 86 FL (ref 82–98)
MONOCYTES # BLD AUTO: 0.8 K/UL (ref 0.3–1)
MONOCYTES NFR BLD: 6.1 % (ref 4–15)
NEUTROPHILS # BLD AUTO: 8.2 K/UL (ref 1.8–7.7)
NEUTROPHILS NFR BLD: 65.4 % (ref 38–73)
NRBC BLD-RTO: 0 /100 WBC
PATH REV BLD -IMP: NORMAL
PLATELET # BLD AUTO: 576 K/UL (ref 150–450)
PMV BLD AUTO: 9.5 FL (ref 9.2–12.9)
RBC # BLD AUTO: 5 M/UL (ref 4–5.4)
SATURATED IRON: 12 % (ref 20–50)
TOTAL IRON BINDING CAPACITY: 389 UG/DL (ref 250–450)
TRANSFERRIN SERPL-MCNC: 263 MG/DL (ref 200–375)
WBC # BLD AUTO: 12.53 K/UL (ref 3.9–12.7)

## 2023-02-06 PROCEDURE — 3079F PR MOST RECENT DIASTOLIC BLOOD PRESSURE 80-89 MM HG: ICD-10-PCS | Mod: CPTII,S$GLB,, | Performed by: INTERNAL MEDICINE

## 2023-02-06 PROCEDURE — 3044F HG A1C LEVEL LT 7.0%: CPT | Mod: CPTII,S$GLB,, | Performed by: DERMATOLOGY

## 2023-02-06 PROCEDURE — 3075F SYST BP GE 130 - 139MM HG: CPT | Mod: CPTII,S$GLB,, | Performed by: INTERNAL MEDICINE

## 2023-02-06 PROCEDURE — 99204 OFFICE O/P NEW MOD 45 MIN: CPT | Mod: S$GLB,,, | Performed by: DERMATOLOGY

## 2023-02-06 PROCEDURE — 1159F PR MEDICATION LIST DOCUMENTED IN MEDICAL RECORD: ICD-10-PCS | Mod: CPTII,S$GLB,, | Performed by: INTERNAL MEDICINE

## 2023-02-06 PROCEDURE — 3074F PR MOST RECENT SYSTOLIC BLOOD PRESSURE < 130 MM HG: ICD-10-PCS | Mod: CPTII,S$GLB,, | Performed by: FAMILY MEDICINE

## 2023-02-06 PROCEDURE — 90677 PCV20 VACCINE IM: CPT | Mod: S$GLB,,, | Performed by: FAMILY MEDICINE

## 2023-02-06 PROCEDURE — 99999 PR PBB SHADOW E&M-EST. PATIENT-LVL IV: CPT | Mod: PBBFAC,,, | Performed by: DERMATOLOGY

## 2023-02-06 PROCEDURE — 4010F PR ACE/ARB THEARPY RXD/TAKEN: ICD-10-PCS | Mod: CPTII,S$GLB,, | Performed by: INTERNAL MEDICINE

## 2023-02-06 PROCEDURE — 3044F PR MOST RECENT HEMOGLOBIN A1C LEVEL <7.0%: ICD-10-PCS | Mod: CPTII,S$GLB,, | Performed by: INTERNAL MEDICINE

## 2023-02-06 PROCEDURE — 3008F PR BODY MASS INDEX (BMI) DOCUMENTED: ICD-10-PCS | Mod: CPTII,S$GLB,, | Performed by: INTERNAL MEDICINE

## 2023-02-06 PROCEDURE — 1160F PR REVIEW ALL MEDS BY PRESCRIBER/CLIN PHARMACIST DOCUMENTED: ICD-10-PCS | Mod: CPTII,S$GLB,, | Performed by: FAMILY MEDICINE

## 2023-02-06 PROCEDURE — 90471 PNEUMOCOCCAL CONJUGATE VACCINE 20-VALENT: ICD-10-PCS | Mod: S$GLB,,, | Performed by: FAMILY MEDICINE

## 2023-02-06 PROCEDURE — 36415 COLL VENOUS BLD VENIPUNCTURE: CPT | Mod: PO | Performed by: FAMILY MEDICINE

## 2023-02-06 PROCEDURE — 1159F MED LIST DOCD IN RCRD: CPT | Mod: CPTII,S$GLB,, | Performed by: DERMATOLOGY

## 2023-02-06 PROCEDURE — 99999 PR PBB SHADOW E&M-EST. PATIENT-LVL III: ICD-10-PCS | Mod: PBBFAC,,, | Performed by: INTERNAL MEDICINE

## 2023-02-06 PROCEDURE — 99999 PR PBB SHADOW E&M-EST. PATIENT-LVL IV: CPT | Mod: PBBFAC,,, | Performed by: FAMILY MEDICINE

## 2023-02-06 PROCEDURE — 3008F BODY MASS INDEX DOCD: CPT | Mod: CPTII,S$GLB,, | Performed by: FAMILY MEDICINE

## 2023-02-06 PROCEDURE — 4010F PR ACE/ARB THEARPY RXD/TAKEN: ICD-10-PCS | Mod: CPTII,S$GLB,, | Performed by: DERMATOLOGY

## 2023-02-06 PROCEDURE — 3044F PR MOST RECENT HEMOGLOBIN A1C LEVEL <7.0%: ICD-10-PCS | Mod: CPTII,S$GLB,, | Performed by: FAMILY MEDICINE

## 2023-02-06 PROCEDURE — 99214 PR OFFICE/OUTPT VISIT, EST, LEVL IV, 30-39 MIN: ICD-10-PCS | Mod: 25,S$GLB,, | Performed by: FAMILY MEDICINE

## 2023-02-06 PROCEDURE — 1159F MED LIST DOCD IN RCRD: CPT | Mod: CPTII,S$GLB,, | Performed by: FAMILY MEDICINE

## 2023-02-06 PROCEDURE — 3044F HG A1C LEVEL LT 7.0%: CPT | Mod: CPTII,S$GLB,, | Performed by: INTERNAL MEDICINE

## 2023-02-06 PROCEDURE — 3008F BODY MASS INDEX DOCD: CPT | Mod: CPTII,S$GLB,, | Performed by: INTERNAL MEDICINE

## 2023-02-06 PROCEDURE — 82728 ASSAY OF FERRITIN: CPT | Performed by: FAMILY MEDICINE

## 2023-02-06 PROCEDURE — 3080F PR MOST RECENT DIASTOLIC BLOOD PRESSURE >= 90 MM HG: ICD-10-PCS | Mod: CPTII,S$GLB,, | Performed by: FAMILY MEDICINE

## 2023-02-06 PROCEDURE — 4010F ACE/ARB THERAPY RXD/TAKEN: CPT | Mod: CPTII,S$GLB,, | Performed by: INTERNAL MEDICINE

## 2023-02-06 PROCEDURE — 90471 IMMUNIZATION ADMIN: CPT | Mod: S$GLB,,, | Performed by: FAMILY MEDICINE

## 2023-02-06 PROCEDURE — 4010F ACE/ARB THERAPY RXD/TAKEN: CPT | Mod: CPTII,S$GLB,, | Performed by: DERMATOLOGY

## 2023-02-06 PROCEDURE — 85060 PATHOLOGIST REVIEW: ICD-10-PCS | Mod: ,,, | Performed by: PATHOLOGY

## 2023-02-06 PROCEDURE — 1159F MED LIST DOCD IN RCRD: CPT | Mod: CPTII,S$GLB,, | Performed by: INTERNAL MEDICINE

## 2023-02-06 PROCEDURE — 4010F ACE/ARB THERAPY RXD/TAKEN: CPT | Mod: CPTII,S$GLB,, | Performed by: FAMILY MEDICINE

## 2023-02-06 PROCEDURE — 85060 BLOOD SMEAR INTERPRETATION: CPT | Mod: ,,, | Performed by: PATHOLOGY

## 2023-02-06 PROCEDURE — 99999 PR PBB SHADOW E&M-EST. PATIENT-LVL IV: ICD-10-PCS | Mod: PBBFAC,,, | Performed by: FAMILY MEDICINE

## 2023-02-06 PROCEDURE — 84466 ASSAY OF TRANSFERRIN: CPT | Performed by: FAMILY MEDICINE

## 2023-02-06 PROCEDURE — 3008F PR BODY MASS INDEX (BMI) DOCUMENTED: ICD-10-PCS | Mod: CPTII,S$GLB,, | Performed by: FAMILY MEDICINE

## 2023-02-06 PROCEDURE — 3074F SYST BP LT 130 MM HG: CPT | Mod: CPTII,S$GLB,, | Performed by: FAMILY MEDICINE

## 2023-02-06 PROCEDURE — 1160F RVW MEDS BY RX/DR IN RCRD: CPT | Mod: CPTII,S$GLB,, | Performed by: FAMILY MEDICINE

## 2023-02-06 PROCEDURE — 85025 COMPLETE CBC W/AUTO DIFF WBC: CPT | Performed by: FAMILY MEDICINE

## 2023-02-06 PROCEDURE — 99204 PR OFFICE/OUTPT VISIT, NEW, LEVL IV, 45-59 MIN: ICD-10-PCS | Mod: S$GLB,,, | Performed by: INTERNAL MEDICINE

## 2023-02-06 PROCEDURE — 90677 PNEUMOCOCCAL CONJUGATE VACCINE 20-VALENT: ICD-10-PCS | Mod: S$GLB,,, | Performed by: FAMILY MEDICINE

## 2023-02-06 PROCEDURE — 1159F PR MEDICATION LIST DOCUMENTED IN MEDICAL RECORD: ICD-10-PCS | Mod: CPTII,S$GLB,, | Performed by: FAMILY MEDICINE

## 2023-02-06 PROCEDURE — 99214 OFFICE O/P EST MOD 30 MIN: CPT | Mod: 25,S$GLB,, | Performed by: FAMILY MEDICINE

## 2023-02-06 PROCEDURE — 4010F PR ACE/ARB THEARPY RXD/TAKEN: ICD-10-PCS | Mod: CPTII,S$GLB,, | Performed by: FAMILY MEDICINE

## 2023-02-06 PROCEDURE — 3080F DIAST BP >= 90 MM HG: CPT | Mod: CPTII,S$GLB,, | Performed by: FAMILY MEDICINE

## 2023-02-06 PROCEDURE — 99999 PR PBB SHADOW E&M-EST. PATIENT-LVL IV: ICD-10-PCS | Mod: PBBFAC,,, | Performed by: DERMATOLOGY

## 2023-02-06 PROCEDURE — 3079F DIAST BP 80-89 MM HG: CPT | Mod: CPTII,S$GLB,, | Performed by: INTERNAL MEDICINE

## 2023-02-06 PROCEDURE — 1159F PR MEDICATION LIST DOCUMENTED IN MEDICAL RECORD: ICD-10-PCS | Mod: CPTII,S$GLB,, | Performed by: DERMATOLOGY

## 2023-02-06 PROCEDURE — 3044F HG A1C LEVEL LT 7.0%: CPT | Mod: CPTII,S$GLB,, | Performed by: FAMILY MEDICINE

## 2023-02-06 PROCEDURE — 3075F PR MOST RECENT SYSTOLIC BLOOD PRESS GE 130-139MM HG: ICD-10-PCS | Mod: CPTII,S$GLB,, | Performed by: INTERNAL MEDICINE

## 2023-02-06 PROCEDURE — 99204 PR OFFICE/OUTPT VISIT, NEW, LEVL IV, 45-59 MIN: ICD-10-PCS | Mod: S$GLB,,, | Performed by: DERMATOLOGY

## 2023-02-06 PROCEDURE — 3044F PR MOST RECENT HEMOGLOBIN A1C LEVEL <7.0%: ICD-10-PCS | Mod: CPTII,S$GLB,, | Performed by: DERMATOLOGY

## 2023-02-06 PROCEDURE — 99204 OFFICE O/P NEW MOD 45 MIN: CPT | Mod: S$GLB,,, | Performed by: INTERNAL MEDICINE

## 2023-02-06 PROCEDURE — 99999 PR PBB SHADOW E&M-EST. PATIENT-LVL III: CPT | Mod: PBBFAC,,, | Performed by: INTERNAL MEDICINE

## 2023-02-06 RX ORDER — SEMAGLUTIDE 1.34 MG/ML
INJECTION, SOLUTION SUBCUTANEOUS
Qty: 1 PEN | Refills: 0 | Status: SHIPPED | OUTPATIENT
Start: 2023-02-06 | End: 2023-04-11 | Stop reason: SDUPTHER

## 2023-02-06 RX ORDER — FLUOCINONIDE 0.5 MG/G
CREAM TOPICAL 2 TIMES DAILY
Qty: 60 G | Refills: 0 | Status: SHIPPED | OUTPATIENT
Start: 2023-02-06 | End: 2023-10-04

## 2023-02-06 RX ORDER — CARIPRAZINE 3 MG/1
3 CAPSULE, GELATIN COATED ORAL
COMMUNITY
Start: 2023-02-04 | End: 2024-01-30

## 2023-02-06 RX ORDER — TRETINOIN 0.5 MG/G
CREAM TOPICAL NIGHTLY
Qty: 20 G | Refills: 1 | Status: SHIPPED | OUTPATIENT
Start: 2023-02-06 | End: 2023-10-04

## 2023-02-06 RX ORDER — LISINOPRIL 10 MG/1
10 TABLET ORAL DAILY
Qty: 90 TABLET | Refills: 1 | Status: SHIPPED | OUTPATIENT
Start: 2023-02-06 | End: 2023-03-24 | Stop reason: SDUPTHER

## 2023-02-06 RX ORDER — CLINDAMYCIN PHOSPHATE 10 UG/ML
LOTION TOPICAL EVERY MORNING
Qty: 60 ML | Refills: 1 | Status: SHIPPED | OUTPATIENT
Start: 2023-02-06 | End: 2023-10-04

## 2023-02-06 NOTE — PATIENT INSTRUCTIONS
Long term and slow treatment treatment required for acne discussed today.  Gentle skin care with avoidance of hot water and usage of oil free products discussed.  Avoidance of lotions and make up discussed in addition to all other products.  Oil free sun block if needed.  Try to use clothing and hats to avoid extra products occluding the pores.  Compliance with prescribed regimen discussed.  No picking or squeezing of acne lesions discussed.  Focal coconut oil or jojoba oil as needed for dry areas.  Hold acne topicals if skin is getting too dry.  Resume when ready.    Patient to start using sulfur bar soap for the face or affected area 1-2 x day.  For scalp usage lather from the soap to be applied to the roots of the scalp and allow to sit for 3-5 minutes regularly.  Same instructions for other affected areas.    Good skin care regimen discussed including limiting to one bath or shower per day, using lukewarm water with mild soap and moisturization to skin once to twice daily.  Consider glycerin bar soap or Dove.  Consider organic coconut oil.

## 2023-02-06 NOTE — PROGRESS NOTES
Referred by Self, Aaareferral     NEW PATIENT VISIT    Misael Prattjose guadalupe Garcia  is a pleasant 27 y.o. female  with PMH significant for bipolar type I, HTN, DM, BMI >50, ADHD who presents for trouble falling asleep, staying asleep and snoring.    SLEEP SCHEDULE   Environment    Bed Time 8P-11P   Sleep Latency Usually not long   Arousals 4-5   Nocturia 2-   Back to sleep Not long usually   Wake time 7A-9AM   Naps    Work      .  Past Medical History:   Diagnosis Date    ADHD (attention deficit hyperactivity disorder)     Anxiety     Bipolar affective     History of chlamydia 12/2016    Insomnia     Morbid obesity with BMI of 40.0-44.9, adult     Sleep disorder     Type 2 diabetes mellitus without complications      Patient Active Problem List   Diagnosis    Corns and callus    Class 3 severe obesity due to excess calories with serious comorbidity and body mass index (BMI) of 50.0 to 59.9 in adult    Bipolar 1 disorder    Type 2 diabetes mellitus with hyperglycemia, without long-term current use of insulin    Recently quit using tobacco    Hypertension associated with diabetes    Oral contraceptive pill surveillance    Thrombocytosis       Current Outpatient Medications:     busPIRone (BUSPAR) 15 MG tablet, Take 15 mg by mouth 2 (two) times daily., Disp: , Rfl:     clonazePAM (KLONOPIN) 1 MG tablet, Take 1 mg by mouth 2 (two) times daily as needed., Disp: , Rfl:     drospirenone-ethinyl estradioL (JUAN MANUEL) 3-0.02 mg per tablet, Take 1 tablet by mouth once daily., Disp: 30 tablet, Rfl: 11    lisinopriL 10 MG tablet, Take 1 tablet (10 mg total) by mouth once daily., Disp: 90 tablet, Rfl: 1    naproxen (NAPROSYN) 500 MG tablet, Take 1 tablet (500 mg total) by mouth every 12 (twelve) hours as needed (Pain)., Disp: 20 tablet, Rfl: 0    semaglutide (OZEMPIC) 0.25 mg or 0.5 mg(2 mg/1.5 mL) pen injector, Inject  0.5 mg into the skin  weekly x 4 weeks, for weeks 4-8, Disp: 1 pen, Rfl: 0    semaglutide (OZEMPIC) 1 mg/dose (4 mg/3  "mL), Inject 1 mg into the skin every 7 days. For weeks 9-12, Disp: 1 pen, Rfl: 0    semaglutide (OZEMPIC) 2 mg/dose (8 mg/3 mL) PnIj, Inject 2 mg into the skin every 7 days. For weeks 13-16 and onward, Disp: 1 pen, Rfl: 2    sertraline (ZOLOFT) 100 MG tablet, Take 100 mg by mouth once daily., Disp: , Rfl:     triamcinolone acetonide 0.1% (KENALOG) 0.1 % ointment, Apply topically 2 (two) times daily., Disp: 30 g, Rfl: 1    venlafaxine (EFFEXOR-XR) 75 MG 24 hr capsule, Take 75 mg by mouth every morning., Disp: , Rfl:     VRAYLAR 1.5 mg Cap, Take 1.5 mg by mouth every evening., Disp: , Rfl:     VRAYLAR 3 mg Cap, Take 3 mg by mouth., Disp: , Rfl:        Vitals:    02/06/23 1013   BP: 134/87   BP Location: Right arm   Patient Position: Sitting   BP Method: Medium (Automatic)   Pulse: 103   Weight: (!) 161 kg (354 lb 15.1 oz)   Height: 5' 8" (1.727 m)     Physical Exam:    GEN:   Well-appearing  Psych:  Appropriate affect, demonstrates insight  SKIN:  No rash on the face or bridge of the nose      LABS:   Lab Results   Component Value Date    HGB 12.9 01/04/2023    CO2 25 01/04/2023       RECORDS REVIEWED PREVIOUSLY:    No prior sleep testing.    ASSESSMENT    PROBLEM DESCRIPTION/ Sx on Presentation  STATUS   possible SID   + snoring, + one snoring arousal, + sudden arousals at times, no witnessed apneas    New   Daytime Sx   + some  sleepiness when inactive   ESS 0/24 on intake  New   Insomnia   Trouble falling asleep: trouble falling asleep (feels quite anxious)  Maintenance:         waking frequently  Prior hypnotics:        Current hypnotics:     New   Nocturia   x 2-3 per sleep period, also daytime frequency  New   Other issues:     PLAN     -recommend sleep testing for possible SID  -discussed trial therapy if SID present and the patient is  open to a trial of CPAP therapy    RTC          The patient was given open opportunity to ask questions and/or express concerns about treatment plan.   All questions/concerns " were discussed.     Two patient identifiers used prior to evaluation.

## 2023-02-06 NOTE — PROGRESS NOTES
Subjective:       Patient ID: Misael Garcia is a 27 y.o. female.    Chief Complaint: Follow-up    Patient Active Problem List   Diagnosis    Corns and callus    Class 3 severe obesity due to excess calories with serious comorbidity and body mass index (BMI) of 50.0 to 59.9 in adult    Bipolar 1 disorder    Type 2 diabetes mellitus with hyperglycemia, without long-term current use of insulin    Recently quit using tobacco    Hypertension associated with diabetes    Oral contraceptive pill surveillance    Thrombocytosis      Hypertension  This is a recurrent problem. The problem has been gradually worsening since onset. The problem is resistant. Associated symptoms include anxiety, malaise/fatigue and neck pain. Pertinent negatives include no blurred vision, chest pain, headaches, orthopnea, palpitations, peripheral edema, PND, shortness of breath or sweats. Agents associated with hypertension include anorectics and oral contraceptives. Risk factors for coronary artery disease include diabetes mellitus, dyslipidemia, family history, obesity, sedentary lifestyle and stress. Past treatments include nothing. The current treatment provides mild improvement. Compliance problems include diet, exercise, medication cost, medication side effects and psychosocial issues.      26 yo female with BMI 54 here to follow up lab results and discuss elevated Blood pressures. BP at home with diastolic 90s-100. Asymptomatic.     Medical leave since Jan 20 for mood disorder, she is hoping to return next Monday 02/13/2023. 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 Reports that she doesn't do well with patients yelling and cursing at her. In counseling, appears to have support of supervisors. Plugged in with psychiatry.     Review of Systems   Constitutional:  Positive for malaise/fatigue.   Eyes:  Negative for blurred vision.   Respiratory:  Negative for shortness of breath.    Cardiovascular:  Negative for chest  pain, palpitations, orthopnea and PND.   Musculoskeletal:  Positive for neck pain.   Neurological:  Negative for headaches.        Objective:     Vitals:    02/06/23 0709   BP: (!) 128/90   Pulse:         Physical Exam  Vitals and nursing note reviewed.   Constitutional:       General: She is not in acute distress.     Appearance: Normal appearance. She is obese. She is not ill-appearing, toxic-appearing or diaphoretic.   HENT:      Head: Normocephalic and atraumatic.   Eyes:      General: No scleral icterus.     Conjunctiva/sclera: Conjunctivae normal.   Cardiovascular:      Rate and Rhythm: Normal rate.      Heart sounds: Normal heart sounds.   Pulmonary:      Effort: Pulmonary effort is normal. No respiratory distress.      Breath sounds: Normal breath sounds.   Skin:     Coloration: Skin is not pale.   Neurological:      Mental Status: She is alert. Mental status is at baseline.   Psychiatric:         Attention and Perception: Attention and perception normal.         Mood and Affect: Mood and affect normal.         Speech: Speech normal.         Behavior: Behavior normal.         Cognition and Memory: Cognition and memory normal.         Judgment: Judgment normal.       Assessment:       1. Hypertension associated with diabetes    2. Type 2 diabetes mellitus with other specified complication, without long-term current use of insulin    3. Oral contraceptive pill surveillance    4. Thrombocytosis    5. Recently quit using tobacco    6. Class 3 severe obesity due to excess calories with serious comorbidity and body mass index (BMI) of 50.0 to 59.9 in adult    7. Difficulty maintaining weight    8. Immunization due          Plan:         Primary hypertension  -     Hypertension Digital Medicine (HDMP) Enrollment Order  -     Hypertension Digital Medicine (HDMP): Assign Onboarding Questionnaires  -     lisinopriL 10 MG tablet; Take 1 tablet (10 mg total) by mouth once daily.  Dispense: 90 tablet; Refill: 1  - New Dx  of HTN. Start on Lisinopril. Sign up for digital med.     Type 2 diabetes mellitus with other specified complication, without long-term current use of insulin  -     semaglutide (OZEMPIC) 0.25 mg or 0.5 mg(2 mg/1.5 mL) pen injector; Inject  0.5 mg into the skin  weekly x 4 weeks, for weeks 4-8  Dispense: 1 pen; Refill: 0  - Titrate up to 2mg weekly    Oral contraceptive pill surveillance  - Continue for now. May consider non estrogen contraceptive if unable to control HTN with weight loss and medication    Thrombocytosis  -     Ferritin; Future; Expected date: 02/06/2023  -     IRON AND TIBC; Future; Expected date: 02/06/2023  -     Pathologist Interpretation Differential; Future; Expected date: 02/06/2023  -     CBC Auto Differential; Future; Expected date: 02/06/2023  - Longstanding history, Hematology referral pending.     Recently quit using tobacco  - Continue cessation    Class 3 severe obesity due to excess calories with serious comorbidity and body mass index (BMI) of 50.0 to 59.9 in adult  Difficulty maintaining weight  - Upward titration on Ozempic. Tolerating well. Reports some soft stools.     Immunization due  -     (In Office Administered) Pneumococcal Conjugate Vaccine (20 Valent) (IM)  - Schedule COVID booster at [facility]    Patient's questions answered. Plan reviewed with patient at the end of visit. Relevant precautions to chief complaint and reasons to seek medical care or contact the office sooner reviewed with patient.     Follow up in about 6 weeks (around 3/20/2023) for Hypertension Follow-up, Weight Loss Follow-up.

## 2023-02-06 NOTE — PROGRESS NOTES
Subjective:       Patient ID:  Misael Garcia is a 27 y.o. female who presents for   Chief Complaint   Patient presents with    Acne       Acne      Review of Systems   Constitutional:  Negative for fever.   HENT:  Negative for sore throat.    Respiratory:  Negative for cough.    Skin:  Positive for itching, rash and dry skin.      Objective:    Physical Exam   Constitutional: She appears well-developed and well-nourished.   Eyes: No conjunctival no injection.   Neurological: She is alert and oriented to person, place, and time.   Psychiatric: She has a normal mood and affect.   Skin:                    Diagram Legend     Erythematous scaling macule/papule c/w actinic keratosis       Vascular papule c/w angioma      Pigmented verrucoid papule/plaque c/w seborrheic keratosis      Yellow umbilicated papule c/w sebaceous hyperplasia      Irregularly shaped tan macule c/w lentigo     1-2 mm smooth white papules consistent with Milia      Movable subcutaneous cyst with punctum c/w epidermal inclusion cyst      Subcutaneous movable cyst c/w pilar cyst      Firm pink to brown papule c/w dermatofibroma      Pedunculated fleshy papule(s) c/w skin tag(s)      Evenly pigmented macule c/w junctional nevus     Mildly variegated pigmented, slightly irregular-bordered macule c/w mildly atypical nevus      Flesh colored to evenly pigmented papule c/w intradermal nevus       Pink pearly papule/plaque c/w basal cell carcinoma      Erythematous hyperkeratotic cursted plaque c/w SCC      Surgical scar with no sign of skin cancer recurrence      Open and closed comedones      Inflammatory papules and pustules      Verrucoid papule consistent consistent with wart     Erythematous eczematous patches and plaques     Dystrophic onycholytic nail with subungual debris c/w onychomycosis     Umbilicated papule    Erythematous-base heme-crusted tan verrucoid plaque consistent with inflamed seborrheic keratosis     Erythematous  Silvery Scaling Plaque c/w Psoriasis     See annotation            Assessment / Plan:        Eczema, unspecified type  -     fluocinonide 0.05% (LIDEX) 0.05 % cream; Apply topically 2 (two) times daily. Prn itchy rash.Stop using steroid topical when skin is smooth and non itchy.  Do not treat dark or red coloring.  Dispense: 60 g; Refill: 0  Discussed with patient the etiology and pathogenesis of the disease or skin lesion(s) and possible treatments and aggravators.    Reviewed with patient different treatment options and associated risks.  Proper application of medications and or care for affected area(s) and condition(s) reviewed.  Discussed with patient the risks of topical and injectable steroids, including, but not limited, to atrophy, rosacea, acne, glaucoma, cataracts, adrenal suppression, striae.  Do not touch eyes with medication.    Acne vulgaris  -     tretinoin (RETIN-A) 0.05 % cream; Apply topically every evening. Start with every other night and move up to nightly after 2 weeks if not too dry.  Dispense: 20 g; Refill: 1  -     clindamycin (CLEOCIN T) 1 % lotion; Apply topically every morning.  Dispense: 60 mL; Refill: 1  Long term and slow treatment treatment required for acne discussed today.  Gentle skin care with avoidance of hot water and usage of oil free products discussed.  Avoidance of lotions and make up discussed in addition to all other products.  Oil free sun block if needed.  Try to use clothing and hats to avoid extra products occluding the pores.  Compliance with prescribed regimen discussed.  No picking or squeezing of acne lesions discussed.  Focal coconut oil or jojoba oil as needed for dry areas.  Hold acne topicals if skin is getting too dry.  Resume when ready.  Patient to start using sulfur bar soap for the face or affected area 1-2 x day.  For scalp usage lather from the soap to be applied to the roots of the scalp and allow to sit for 3-5 minutes regularly.  Same instructions  for other affected areas.  Previous [facility] labs and or records and notes reviewed and considered for their impact on our clinical decision making today.  Reviewed with patient different treatment options and associated risks.  Patient to start using sulfur bar soap for the face or affected area 1-2 x day.  For scalp usage lather from the soap to be applied to the roots of the scalp and allow to sit for 3-5 minutes regularly.  Same instructions for other affected areas.    Encounter for skin care  Good skin care regimen discussed including limiting to one bath or shower per day, using lukewarm water with mild soap and moisturization to skin once to twice daily.  Consider glycerin bar soap or Dove.  Consider organic coconut oil.  Discussed with patient the need for lighter make up.  Recommended trying mineral make up, and if not, oil free make up.    Itch  -     fluocinonide 0.05% (LIDEX) 0.05 % cream; Apply topically 2 (two) times daily. Prn itchy rash.Stop using steroid topical when skin is smooth and non itchy.  Do not treat dark or red coloring.  Dispense: 60 g; Refill: 0  Reviewed with patient different treatment options and associated risks.  Proper application of medications and or care for affected area(s) and condition(s) reviewed.        Hyperpigmentation  Discussed with the patient the risk of color scars, erythema, or hyperpigmentation that could take months to resolve.                 Follow up in about 3 months (around 5/6/2023).

## 2023-02-07 ENCOUNTER — PATIENT MESSAGE (OUTPATIENT)
Dept: FAMILY MEDICINE | Facility: CLINIC | Age: 28
End: 2023-02-07

## 2023-02-07 LAB — PATH REV BLD -IMP: NORMAL

## 2023-02-09 ENCOUNTER — TELEPHONE (OUTPATIENT)
Dept: SLEEP MEDICINE | Facility: OTHER | Age: 28
End: 2023-02-09

## 2023-02-09 ENCOUNTER — PATIENT MESSAGE (OUTPATIENT)
Dept: FAMILY MEDICINE | Facility: CLINIC | Age: 28
End: 2023-02-09

## 2023-02-09 ENCOUNTER — IMMUNIZATION (OUTPATIENT)
Dept: INTERNAL MEDICINE | Facility: CLINIC | Age: 28
End: 2023-02-09
Payer: COMMERCIAL

## 2023-02-09 ENCOUNTER — TELEPHONE (OUTPATIENT)
Dept: HEMATOLOGY/ONCOLOGY | Facility: CLINIC | Age: 28
End: 2023-02-09

## 2023-02-09 DIAGNOSIS — Z23 NEED FOR VACCINATION: Primary | ICD-10-CM

## 2023-02-09 PROCEDURE — 91312 COVID-19, MRNA, LNP-S, BIVALENT BOOSTER, PF, 30 MCG/0.3 ML DOSE: CPT | Mod: S$GLB,,, | Performed by: FAMILY MEDICINE

## 2023-02-09 PROCEDURE — 0124A COVID-19, MRNA, LNP-S, BIVALENT BOOSTER, PF, 30 MCG/0.3 ML DOSE: CPT | Mod: PBBFAC | Performed by: FAMILY MEDICINE

## 2023-02-09 PROCEDURE — 91312 COVID-19, MRNA, LNP-S, BIVALENT BOOSTER, PF, 30 MCG/0.3 ML DOSE: ICD-10-PCS | Mod: S$GLB,,, | Performed by: FAMILY MEDICINE

## 2023-02-09 NOTE — TELEPHONE ENCOUNTER
----- Message from Michelle Taylor sent at 2/9/2023  6:03 AM CST -----  Regarding: Pt called to request a work in appt today or this week due to feeling fatigue and lightheaded and pt would like a call back this morning asap  Same Day Appointment Access Request:    Pt called to request a work in appt today or this week due to feeling fatigue and lightheaded and pt would like a call back this morning asap    Pt can be reached at [phone]

## 2023-02-14 ENCOUNTER — LAB VISIT (OUTPATIENT)
Dept: LAB | Facility: HOSPITAL | Age: 28
End: 2023-02-14
Attending: INTERNAL MEDICINE
Payer: COMMERCIAL

## 2023-02-14 ENCOUNTER — PATIENT MESSAGE (OUTPATIENT)
Dept: FAMILY MEDICINE | Facility: CLINIC | Age: 28
End: 2023-02-14

## 2023-02-14 ENCOUNTER — OFFICE VISIT (OUTPATIENT)
Dept: HEMATOLOGY/ONCOLOGY | Facility: CLINIC | Age: 28
End: 2023-02-14
Payer: COMMERCIAL

## 2023-02-14 VITALS
HEART RATE: 105 BPM | BODY MASS INDEX: 44.41 KG/M2 | DIASTOLIC BLOOD PRESSURE: 93 MMHG | RESPIRATION RATE: 18 BRPM | OXYGEN SATURATION: 97 % | SYSTOLIC BLOOD PRESSURE: 137 MMHG | WEIGHT: 293 LBS | HEIGHT: 68 IN

## 2023-02-14 DIAGNOSIS — D75.839 THROMBOCYTOSIS: Primary | ICD-10-CM

## 2023-02-14 DIAGNOSIS — D75.839 THROMBOCYTOSIS: ICD-10-CM

## 2023-02-14 DIAGNOSIS — R79.0 LOW IRON STORES: ICD-10-CM

## 2023-02-14 LAB — CRP SERPL-MCNC: 24.4 MG/L (ref 0–8.2)

## 2023-02-14 PROCEDURE — 85652 RBC SED RATE AUTOMATED: CPT | Performed by: INTERNAL MEDICINE

## 2023-02-14 PROCEDURE — 3008F PR BODY MASS INDEX (BMI) DOCUMENTED: ICD-10-PCS | Mod: CPTII,S$GLB,, | Performed by: INTERNAL MEDICINE

## 2023-02-14 PROCEDURE — 99203 PR OFFICE/OUTPT VISIT, NEW, LEVL III, 30-44 MIN: ICD-10-PCS | Mod: S$GLB,,, | Performed by: INTERNAL MEDICINE

## 2023-02-14 PROCEDURE — 3044F PR MOST RECENT HEMOGLOBIN A1C LEVEL <7.0%: ICD-10-PCS | Mod: CPTII,S$GLB,, | Performed by: INTERNAL MEDICINE

## 2023-02-14 PROCEDURE — 1159F MED LIST DOCD IN RCRD: CPT | Mod: CPTII,S$GLB,, | Performed by: INTERNAL MEDICINE

## 2023-02-14 PROCEDURE — 4010F ACE/ARB THERAPY RXD/TAKEN: CPT | Mod: CPTII,S$GLB,, | Performed by: INTERNAL MEDICINE

## 2023-02-14 PROCEDURE — 86140 C-REACTIVE PROTEIN: CPT | Performed by: INTERNAL MEDICINE

## 2023-02-14 PROCEDURE — 3075F SYST BP GE 130 - 139MM HG: CPT | Mod: CPTII,S$GLB,, | Performed by: INTERNAL MEDICINE

## 2023-02-14 PROCEDURE — 4010F PR ACE/ARB THEARPY RXD/TAKEN: ICD-10-PCS | Mod: CPTII,S$GLB,, | Performed by: INTERNAL MEDICINE

## 2023-02-14 PROCEDURE — 3080F DIAST BP >= 90 MM HG: CPT | Mod: CPTII,S$GLB,, | Performed by: INTERNAL MEDICINE

## 2023-02-14 PROCEDURE — 99999 PR PBB SHADOW E&M-EST. PATIENT-LVL IV: CPT | Mod: PBBFAC,,, | Performed by: INTERNAL MEDICINE

## 2023-02-14 PROCEDURE — 3008F BODY MASS INDEX DOCD: CPT | Mod: CPTII,S$GLB,, | Performed by: INTERNAL MEDICINE

## 2023-02-14 PROCEDURE — 84238 ASSAY NONENDOCRINE RECEPTOR: CPT | Performed by: INTERNAL MEDICINE

## 2023-02-14 PROCEDURE — 1159F PR MEDICATION LIST DOCUMENTED IN MEDICAL RECORD: ICD-10-PCS | Mod: CPTII,S$GLB,, | Performed by: INTERNAL MEDICINE

## 2023-02-14 PROCEDURE — 3075F PR MOST RECENT SYSTOLIC BLOOD PRESS GE 130-139MM HG: ICD-10-PCS | Mod: CPTII,S$GLB,, | Performed by: INTERNAL MEDICINE

## 2023-02-14 PROCEDURE — 3080F PR MOST RECENT DIASTOLIC BLOOD PRESSURE >= 90 MM HG: ICD-10-PCS | Mod: CPTII,S$GLB,, | Performed by: INTERNAL MEDICINE

## 2023-02-14 PROCEDURE — 99999 PR PBB SHADOW E&M-EST. PATIENT-LVL IV: ICD-10-PCS | Mod: PBBFAC,,, | Performed by: INTERNAL MEDICINE

## 2023-02-14 PROCEDURE — 99203 OFFICE O/P NEW LOW 30 MIN: CPT | Mod: S$GLB,,, | Performed by: INTERNAL MEDICINE

## 2023-02-14 PROCEDURE — 3044F HG A1C LEVEL LT 7.0%: CPT | Mod: CPTII,S$GLB,, | Performed by: INTERNAL MEDICINE

## 2023-02-14 PROCEDURE — 81270 JAK2 GENE: CPT | Performed by: INTERNAL MEDICINE

## 2023-02-14 NOTE — PROGRESS NOTES
PATIENT: Misael Garcia  MRN: [id number]  DATE: 2/14/2023    Diagnosis:   1. Low iron stores    2. Thrombocytosis        Chief Complaint: No chief complaint on file.       Subjective:    History of Present Illness: Ms. Garcia is a 27 y.o. female who presents for evaluation and management of thrombocytosis. Review of records shows patient has had thrombocytosis, with platelet counts persistently >500 dating back 2018; lab from 2009 showed platelets high a 387. She also recently had iron labs which showed low saturation but normal ferritin; she has normal hemoglobin. She has not had any history of thrombotic complications. She does report some fatigue and also notes that last weekend she had an episode of nausea/vomiting and has had a few episodes of diarrhea in the past few weeks, otherwise she denies health complaints.       Past medical, surgical, family, and social histories have been reviewed and updated below.    Past Medical History:   Past Medical History:   Diagnosis Date    ADHD (attention deficit hyperactivity disorder)     Anxiety     Bipolar affective     History of chlamydia 12/2016    Insomnia     Morbid obesity with BMI of 40.0-44.9, adult     Sleep disorder     Type 2 diabetes mellitus without complications        Past Surgical History:   Past Surgical History:   Procedure Laterality Date    NO PAST SURGERIES      as of 1-13-17       Family History:   Family History   Problem Relation Age of Onset    Breast cancer Paternal Grandmother     No Known Problems Father     No Known Problems Mother     No Known Problems Sister     Diabetes Maternal Grandmother     No Known Problems Maternal Grandfather     No Known Problems Paternal Grandfather     Colon cancer Neg Hx     Ovarian cancer Neg Hx        Social History:  reports that she quit smoking about 6 weeks ago. Her smoking use included vaping with nicotine. She started smoking about 13 months ago. She has never used smokeless tobacco. She reports  current drug use. Drug: Marijuana. She reports that she does not drink alcohol.    Allergies:  Review of patient's allergies indicates:  No Known Allergies    Medications:  Current Outpatient Medications   Medication Sig Dispense Refill    busPIRone (BUSPAR) 15 MG tablet Take 15 mg by mouth 2 (two) times daily.      clindamycin (CLEOCIN T) 1 % lotion Apply topically every morning. 60 mL 1    clonazePAM (KLONOPIN) 1 MG tablet Take 1 mg by mouth 2 (two) times daily as needed.      drospirenone-ethinyl estradioL (JUAN MANUEL) 3-0.02 mg per tablet Take 1 tablet by mouth once daily. 30 tablet 11    fluocinonide 0.05% (LIDEX) 0.05 % cream Apply topically 2 (two) times daily. As needed itchy rash.Stop using steroid topical when skin is smooth and non itchy.  Do not treat dark or red coloring. 60 g 0    lisinopriL 10 MG tablet Take 1 tablet (10 mg total) by mouth once daily. 90 tablet 1    naproxen (NAPROSYN) 500 MG tablet Take 1 tablet (500 mg total) by mouth every 12 (twelve) hours as needed (Pain). 20 tablet 0    semaglutide (OZEMPIC) 0.25 mg or 0.5 mg(2 mg/1.5 mL) pen injector Inject  0.5 mg into the skin  weekly x 4 weeks, for weeks 4-8 1 pen 0    semaglutide (OZEMPIC) 1 mg/dose (4 mg/3 mL) Inject 1 mg into the skin every 7 days. For weeks 9-12 1 pen 0    semaglutide (OZEMPIC) 2 mg/dose (8 mg/3 mL) PnIj Inject 2 mg into the skin every 7 days. For weeks 13-16 and onward 1 pen 2    sertraline (ZOLOFT) 100 MG tablet Take 100 mg by mouth once daily.      tretinoin (RETIN-A) 0.05 % cream Apply topically every evening. Start with every other night and move up to nightly after 2 weeks if not too dry. 20 g 1    triamcinolone acetonide 0.1% (KENALOG) 0.1 % ointment Apply topically 2 (two) times daily. 30 g 1    venlafaxine (EFFEXOR-XR) 75 MG 24 hr capsule Take 75 mg by mouth every morning.      VRAYLAR 1.5 mg Cap Take 1.5 mg by mouth every evening.      VRAYLAR 3 mg Cap Take 3 mg by mouth.       No current facility-administered  "medications for this visit.       Review of Systems   Gastrointestinal:  Positive for constipation, diarrhea and vomiting.   All other systems reviewed and are negative.    ECOG Performance Status:   ECOG SCORE    0 - Fully active-able to carry on all pre-disease performance without restriction         Objective:      Vitals:   Vitals:    02/14/23 1528   BP: (!) 137/93   BP Location: Right arm   Patient Position: Sitting   BP Method: Medium (Automatic)   Pulse: 105   Resp: 18   SpO2: 97%   Weight: (!) 163.3 kg (360 lb 0.2 oz)   Height: 5' 8" (1.727 m)     BMI: Body mass index is 54.74 kg/m².    Physical Exam  Vitals and nursing note reviewed.   Constitutional:       General: She is not in acute distress.     Appearance: Normal appearance. She is not toxic-appearing.   HENT:      Head: Normocephalic and atraumatic.   Eyes:      General: No scleral icterus.     Conjunctiva/sclera: Conjunctivae normal.   Cardiovascular:      Rate and Rhythm: Normal rate.   Pulmonary:      Effort: Pulmonary effort is normal. No respiratory distress.   Musculoskeletal:         General: No swelling or deformity.   Skin:     Coloration: Skin is not jaundiced.      Findings: No erythema.   Neurological:      General: No focal deficit present.      Mental Status: She is alert and oriented to person, place, and time.      Motor: No weakness.   Psychiatric:         Mood and Affect: Mood normal.         Behavior: Behavior normal.       Laboratory Data:  Labs have been reviewed.    Results    Collected Updated Procedure    02/06/2023 0816 02/06/2023 1814 CBC Auto Differential [[id number]]   (Abnormal)   Blood    Component Value Units   WBC 12.53 K/uL   RBC 5.00 M/uL   Hemoglobin 12.8 g/dL   Hematocrit 43.0 %   MCV 86 fL   MCH 25.6 Low  pg   MCHC 29.8 Low  g/dL   RDW 14.7 High  %   Platelets 576 High  K/uL   MPV 9.5 fL   Immature Granulocytes 0.4 %   Gran # (ANC) 8.2 High  K/uL   Immature Grans (Abs) 0.05 High   K/uL   Lymph # 3.2 K/uL   Mono # " 0.8 K/uL   Eos # 0.3 K/uL   Baso # 0.05 K/uL   nRBC 0 /100 WBC   Gran % 65.4 %   Lymph % 25.7 %   Mono % 6.1 %   Eosinophil % 2.0 %   Basophil % 0.4 %   Differential Method Automated           02/06/2023 0816 02/07/2023 0901 Pathologist Interpretation Differential [[id number]]   Blood    Component Value   Pathologist Review Review completed          02/06/2023 0816 02/06/2023 1955 IRON AND TIBC [[id number]]   (Abnormal)   Blood    Component Value Units   Iron 46 ug/dL   Transferrin 263 mg/dL   TIBC 389 ug/dL   Saturated Iron 12 Low  %          02/06/2023 0816 02/06/2023 2011 Ferritin [[id number]]   Blood    Component Value Units   Ferritin 78 ng/mL        Assessment:       1. Thrombocytosis    2. Low iron stores      Chronic thrombocytosis; asymptomatic.  Mild iron deficiency based on low iron saturation with normal ferritin     Plan:     Check JAK2  Check soluble transferrin receptor  Check inflammatory markers.       Valente Hall MD, FACP  Hematology/Oncology  [facility] - [address], [address]  [place] [address]  Phone: [phone]  Fax: [phone]

## 2023-02-15 LAB — ERYTHROCYTE [SEDIMENTATION RATE] IN BLOOD BY PHOTOMETRIC METHOD: 30 MM/HR (ref 0–36)

## 2023-02-16 ENCOUNTER — TELEPHONE (OUTPATIENT)
Dept: SLEEP MEDICINE | Facility: OTHER | Age: 28
End: 2023-02-16

## 2023-02-16 ENCOUNTER — PATIENT MESSAGE (OUTPATIENT)
Dept: SLEEP MEDICINE | Facility: CLINIC | Age: 28
End: 2023-02-16

## 2023-02-17 ENCOUNTER — PATIENT MESSAGE (OUTPATIENT)
Dept: SLEEP MEDICINE | Facility: OTHER | Age: 28
End: 2023-02-17

## 2023-02-17 ENCOUNTER — PATIENT MESSAGE (OUTPATIENT)
Dept: DERMATOLOGY | Facility: CLINIC | Age: 28
End: 2023-02-17

## 2023-02-17 ENCOUNTER — PATIENT MESSAGE (OUTPATIENT)
Dept: SLEEP MEDICINE | Facility: CLINIC | Age: 28
End: 2023-02-17

## 2023-02-17 ENCOUNTER — TELEPHONE (OUTPATIENT)
Dept: SLEEP MEDICINE | Facility: OTHER | Age: 28
End: 2023-02-17

## 2023-02-17 LAB — STFR SERPL-MCNC: 2.9 MG/L (ref 1.8–4.6)

## 2023-02-20 ENCOUNTER — PATIENT MESSAGE (OUTPATIENT)
Dept: FAMILY MEDICINE | Facility: CLINIC | Age: 28
End: 2023-02-20

## 2023-02-20 ENCOUNTER — LAB VISIT (OUTPATIENT)
Dept: LAB | Facility: HOSPITAL | Age: 28
End: 2023-02-20
Attending: FAMILY MEDICINE
Payer: COMMERCIAL

## 2023-02-20 ENCOUNTER — OFFICE VISIT (OUTPATIENT)
Dept: PRIMARY CARE CLINIC | Facility: CLINIC | Age: 28
End: 2023-02-20
Payer: COMMERCIAL

## 2023-02-20 ENCOUNTER — TELEPHONE (OUTPATIENT)
Dept: FAMILY MEDICINE | Facility: CLINIC | Age: 28
End: 2023-02-20

## 2023-02-20 ENCOUNTER — HOSPITAL ENCOUNTER (OUTPATIENT)
Dept: RADIOLOGY | Facility: HOSPITAL | Age: 28
Discharge: HOME | End: 2023-02-20
Attending: STUDENT IN AN ORGANIZED HEALTH CARE EDUCATION/TRAINING PROGRAM
Payer: COMMERCIAL

## 2023-02-20 ENCOUNTER — PATIENT MESSAGE (OUTPATIENT)
Dept: PRIMARY CARE CLINIC | Facility: CLINIC | Age: 28
End: 2023-02-20

## 2023-02-20 VITALS
TEMPERATURE: 98 F | HEART RATE: 90 BPM | BODY MASS INDEX: 44.41 KG/M2 | OXYGEN SATURATION: 97 % | HEIGHT: 68 IN | WEIGHT: 293 LBS | SYSTOLIC BLOOD PRESSURE: 128 MMHG | DIASTOLIC BLOOD PRESSURE: 84 MMHG

## 2023-02-20 DIAGNOSIS — R10.13 EPIGASTRIC PAIN: ICD-10-CM

## 2023-02-20 DIAGNOSIS — R19.7 DIARRHEA, UNSPECIFIED TYPE: ICD-10-CM

## 2023-02-20 DIAGNOSIS — E11.65 TYPE 2 DIABETES MELLITUS WITH HYPERGLYCEMIA, WITHOUT LONG-TERM CURRENT USE OF INSULIN: ICD-10-CM

## 2023-02-20 DIAGNOSIS — R11.10 VOMITING, UNSPECIFIED VOMITING TYPE, UNSPECIFIED WHETHER NAUSEA PRESENT: ICD-10-CM

## 2023-02-20 DIAGNOSIS — R10.13 EPIGASTRIC PAIN: Primary | ICD-10-CM

## 2023-02-20 LAB
ALBUMIN SERPL BCP-MCNC: 3.6 G/DL (ref 3.5–5.2)
ALP SERPL-CCNC: 122 U/L (ref 55–135)
ALT SERPL W/O P-5'-P-CCNC: 25 U/L (ref 10–44)
ANION GAP SERPL CALC-SCNC: 12 MMOL/L (ref 8–16)
ANION GAP SERPL CALC-SCNC: 12 MMOL/L (ref 8–16)
AST SERPL-CCNC: 17 U/L (ref 10–40)
B-HCG UR QL: NEGATIVE
BILIRUB SERPL-MCNC: 0.2 MG/DL (ref 0.1–1)
BUN SERPL-MCNC: 9 MG/DL (ref 6–20)
BUN SERPL-MCNC: 9 MG/DL (ref 6–20)
CALCIUM SERPL-MCNC: 10 MG/DL (ref 8.7–10.5)
CALCIUM SERPL-MCNC: 10 MG/DL (ref 8.7–10.5)
CHLORIDE SERPL-SCNC: 99 MMOL/L (ref 95–110)
CHLORIDE SERPL-SCNC: 99 MMOL/L (ref 95–110)
CO2 SERPL-SCNC: 26 MMOL/L (ref 23–29)
CO2 SERPL-SCNC: 26 MMOL/L (ref 23–29)
CREAT SERPL-MCNC: 0.7 MG/DL (ref 0.5–1.4)
CREAT SERPL-MCNC: 0.7 MG/DL (ref 0.5–1.4)
CTP QC/QA: YES
EST. GFR  (NO RACE VARIABLE): >60 ML/MIN/1.73 M^2
EST. GFR  (NO RACE VARIABLE): >60 ML/MIN/1.73 M^2
GLUCOSE SERPL-MCNC: 76 MG/DL (ref 70–110)
GLUCOSE SERPL-MCNC: 80 MG/DL (ref 70–110)
GLUCOSE SERPL-MCNC: 80 MG/DL (ref 70–110)
HAV IGM SERPL QL IA: NORMAL
HBV CORE IGM SERPL QL IA: NORMAL
HBV SURFACE AG SERPL QL IA: NORMAL
HCV AB SERPL QL IA: NORMAL
JAK2 P.V617F BLD/T QL: NORMAL
JAK2 V617F MUTATION DETECTION BLD RESULT: NORMAL
LIPASE SERPL-CCNC: 8 U/L (ref 4–60)
POC MOLECULAR INFLUENZA A AGN: NEGATIVE
POC MOLECULAR INFLUENZA B AGN: NEGATIVE
POTASSIUM SERPL-SCNC: 4.2 MMOL/L (ref 3.5–5.1)
POTASSIUM SERPL-SCNC: 4.2 MMOL/L (ref 3.5–5.1)
PROT SERPL-MCNC: 8 G/DL (ref 6–8.4)
SARS-COV-2 RDRP RESP QL NAA+PROBE: NEGATIVE
SODIUM SERPL-SCNC: 137 MMOL/L (ref 136–145)
SODIUM SERPL-SCNC: 137 MMOL/L (ref 136–145)

## 2023-02-20 PROCEDURE — 99999 PR PBB SHADOW E&M-EST. PATIENT-LVL IV: ICD-10-PCS | Mod: PBBFAC,,, | Performed by: STUDENT IN AN ORGANIZED HEALTH CARE EDUCATION/TRAINING PROGRAM

## 2023-02-20 PROCEDURE — 3079F DIAST BP 80-89 MM HG: CPT | Mod: CPTII,S$GLB,, | Performed by: STUDENT IN AN ORGANIZED HEALTH CARE EDUCATION/TRAINING PROGRAM

## 2023-02-20 PROCEDURE — 81025 URINE PREGNANCY TEST: CPT | Mod: S$GLB,,, | Performed by: STUDENT IN AN ORGANIZED HEALTH CARE EDUCATION/TRAINING PROGRAM

## 2023-02-20 PROCEDURE — 81025 POCT URINE PREGNANCY: ICD-10-PCS | Mod: S$GLB,,, | Performed by: STUDENT IN AN ORGANIZED HEALTH CARE EDUCATION/TRAINING PROGRAM

## 2023-02-20 PROCEDURE — 4010F ACE/ARB THERAPY RXD/TAKEN: CPT | Mod: CPTII,S$GLB,, | Performed by: STUDENT IN AN ORGANIZED HEALTH CARE EDUCATION/TRAINING PROGRAM

## 2023-02-20 PROCEDURE — 87635 SARS-COV-2 COVID-19 AMP PRB: CPT | Mod: QW,S$GLB,, | Performed by: STUDENT IN AN ORGANIZED HEALTH CARE EDUCATION/TRAINING PROGRAM

## 2023-02-20 PROCEDURE — 4010F PR ACE/ARB THEARPY RXD/TAKEN: ICD-10-PCS | Mod: CPTII,S$GLB,, | Performed by: STUDENT IN AN ORGANIZED HEALTH CARE EDUCATION/TRAINING PROGRAM

## 2023-02-20 PROCEDURE — 87635: ICD-10-PCS | Mod: QW,S$GLB,, | Performed by: STUDENT IN AN ORGANIZED HEALTH CARE EDUCATION/TRAINING PROGRAM

## 2023-02-20 PROCEDURE — 80074 ACUTE HEPATITIS PANEL: CPT | Performed by: STUDENT IN AN ORGANIZED HEALTH CARE EDUCATION/TRAINING PROGRAM

## 2023-02-20 PROCEDURE — 83690 ASSAY OF LIPASE: CPT | Performed by: STUDENT IN AN ORGANIZED HEALTH CARE EDUCATION/TRAINING PROGRAM

## 2023-02-20 PROCEDURE — 87502 INFLUENZA DNA AMP PROBE: CPT | Mod: QW,S$GLB,, | Performed by: STUDENT IN AN ORGANIZED HEALTH CARE EDUCATION/TRAINING PROGRAM

## 2023-02-20 PROCEDURE — 3074F SYST BP LT 130 MM HG: CPT | Mod: CPTII,S$GLB,, | Performed by: STUDENT IN AN ORGANIZED HEALTH CARE EDUCATION/TRAINING PROGRAM

## 2023-02-20 PROCEDURE — 3008F BODY MASS INDEX DOCD: CPT | Mod: CPTII,S$GLB,, | Performed by: STUDENT IN AN ORGANIZED HEALTH CARE EDUCATION/TRAINING PROGRAM

## 2023-02-20 PROCEDURE — 80053 COMPREHEN METABOLIC PANEL: CPT | Performed by: STUDENT IN AN ORGANIZED HEALTH CARE EDUCATION/TRAINING PROGRAM

## 2023-02-20 PROCEDURE — 99214 PR OFFICE/OUTPT VISIT, EST, LEVL IV, 30-39 MIN: ICD-10-PCS | Mod: S$GLB,,, | Performed by: STUDENT IN AN ORGANIZED HEALTH CARE EDUCATION/TRAINING PROGRAM

## 2023-02-20 PROCEDURE — 74018 XR ABDOMEN AP 1 VIEW: ICD-10-PCS | Mod: 26,,, | Performed by: RADIOLOGY

## 2023-02-20 PROCEDURE — 82962 GLUCOSE BLOOD TEST: CPT | Mod: S$GLB,,, | Performed by: STUDENT IN AN ORGANIZED HEALTH CARE EDUCATION/TRAINING PROGRAM

## 2023-02-20 PROCEDURE — 3044F HG A1C LEVEL LT 7.0%: CPT | Mod: CPTII,S$GLB,, | Performed by: STUDENT IN AN ORGANIZED HEALTH CARE EDUCATION/TRAINING PROGRAM

## 2023-02-20 PROCEDURE — 3044F PR MOST RECENT HEMOGLOBIN A1C LEVEL <7.0%: ICD-10-PCS | Mod: CPTII,S$GLB,, | Performed by: STUDENT IN AN ORGANIZED HEALTH CARE EDUCATION/TRAINING PROGRAM

## 2023-02-20 PROCEDURE — 3008F PR BODY MASS INDEX (BMI) DOCUMENTED: ICD-10-PCS | Mod: CPTII,S$GLB,, | Performed by: STUDENT IN AN ORGANIZED HEALTH CARE EDUCATION/TRAINING PROGRAM

## 2023-02-20 PROCEDURE — 3079F PR MOST RECENT DIASTOLIC BLOOD PRESSURE 80-89 MM HG: ICD-10-PCS | Mod: CPTII,S$GLB,, | Performed by: STUDENT IN AN ORGANIZED HEALTH CARE EDUCATION/TRAINING PROGRAM

## 2023-02-20 PROCEDURE — 74018 RADEX ABDOMEN 1 VIEW: CPT | Mod: TC

## 2023-02-20 PROCEDURE — 36415 COLL VENOUS BLD VENIPUNCTURE: CPT | Mod: PN | Performed by: STUDENT IN AN ORGANIZED HEALTH CARE EDUCATION/TRAINING PROGRAM

## 2023-02-20 PROCEDURE — 87502 POCT INFLUENZA A/B MOLECULAR: ICD-10-PCS | Mod: QW,S$GLB,, | Performed by: STUDENT IN AN ORGANIZED HEALTH CARE EDUCATION/TRAINING PROGRAM

## 2023-02-20 PROCEDURE — 81003 URINALYSIS AUTO W/O SCOPE: CPT | Performed by: STUDENT IN AN ORGANIZED HEALTH CARE EDUCATION/TRAINING PROGRAM

## 2023-02-20 PROCEDURE — 99214 OFFICE O/P EST MOD 30 MIN: CPT | Mod: S$GLB,,, | Performed by: STUDENT IN AN ORGANIZED HEALTH CARE EDUCATION/TRAINING PROGRAM

## 2023-02-20 PROCEDURE — 74018 RADEX ABDOMEN 1 VIEW: CPT | Mod: 26,,, | Performed by: RADIOLOGY

## 2023-02-20 PROCEDURE — 82962 POCT GLUCOSE, HAND-HELD DEVICE: ICD-10-PCS | Mod: S$GLB,,, | Performed by: STUDENT IN AN ORGANIZED HEALTH CARE EDUCATION/TRAINING PROGRAM

## 2023-02-20 PROCEDURE — 99999 PR PBB SHADOW E&M-EST. PATIENT-LVL IV: CPT | Mod: PBBFAC,,, | Performed by: STUDENT IN AN ORGANIZED HEALTH CARE EDUCATION/TRAINING PROGRAM

## 2023-02-20 PROCEDURE — 3074F PR MOST RECENT SYSTOLIC BLOOD PRESSURE < 130 MM HG: ICD-10-PCS | Mod: CPTII,S$GLB,, | Performed by: STUDENT IN AN ORGANIZED HEALTH CARE EDUCATION/TRAINING PROGRAM

## 2023-02-20 RX ORDER — ADAPALENE AND BENZOYL PEROXIDE 3; 25 MG/G; MG/G
GEL TOPICAL
COMMUNITY
Start: 2023-02-07

## 2023-02-20 RX ORDER — INSULIN PUMP SYRINGE, 3 ML
EACH MISCELLANEOUS
Qty: 100 EACH | Refills: 0 | Status: SHIPPED | OUTPATIENT
Start: 2023-02-20 | End: 2023-02-20 | Stop reason: SDUPTHER

## 2023-02-20 RX ORDER — ONDANSETRON 4 MG/1
4 TABLET, FILM COATED ORAL EVERY 8 HOURS PRN
Qty: 15 TABLET | Refills: 0 | Status: SHIPPED | OUTPATIENT
Start: 2023-02-20 | End: 2023-10-04

## 2023-02-20 RX ORDER — METHOCARBAMOL 750 MG/1
1 TABLET, FILM COATED ORAL
COMMUNITY
End: 2023-02-20

## 2023-02-20 RX ORDER — INSULIN PUMP SYRINGE, 3 ML
EACH MISCELLANEOUS
Qty: 1 EACH | Refills: 0 | Status: SHIPPED | OUTPATIENT
Start: 2023-02-20 | End: 2024-02-07 | Stop reason: SDUPTHER

## 2023-02-20 RX ORDER — LANCETS 28 GAUGE
EACH MISCELLANEOUS
Qty: 100 EACH | Refills: 0 | Status: SHIPPED | OUTPATIENT
Start: 2023-02-20 | End: 2024-02-07 | Stop reason: SDUPTHER

## 2023-02-20 RX ORDER — LANCETS
EACH MISCELLANEOUS
Qty: 100 EACH | Refills: 0 | Status: SHIPPED | OUTPATIENT
Start: 2023-02-20 | End: 2023-02-20 | Stop reason: SDUPTHER

## 2023-02-20 RX ORDER — TIZANIDINE 2 MG/1
1 TABLET ORAL
COMMUNITY
End: 2023-03-02 | Stop reason: SDUPTHER

## 2023-02-20 NOTE — PROGRESS NOTES
Misael Garcia  1995        Subjective     Chief Complaint: Nausea/vomiting    History of Present Illness:  Ms. Misael Garcia is a 28 y.o. female who presents to clinic for nausea/vomiting.     Started on her birthday 2 days ago, missed meds for 2 days prior. Then re-started all meds day symptoms began.    Started when she was eating shrimp, catfish and fries. Vomited about an hour later and felt better. Also diarrhea that day. Able to eat later that Saturday night. Sunday she ate [employer] breakfast and threw that up. Then she had slice of cake and threw that up. Abdominal pain all day yesterday but no diarrhea. No fever.  +fatigue + abdominal pain. Diarrhea went away. Ate [employer] last night 9:30 pm and symptoms started again with nausea/vomiting this morning. Some urinary complaints.    No one else who ate that food was sick. Also coughing.  Trouble with p.o. intake.  Has been trying to drink Pedialyte.     On ozempic for DM2- took 5th dose of it this week, was about a week later than it should have been. Not checking sugars. Does not have testing supplies.  New diabetic last few months per patient.  Lab Results   Component Value Date    HGBA1C 6.3 (H) 01/05/2023        Review of Systems   Constitutional:  Positive for malaise/fatigue. Negative for chills and fever.   HENT:  Negative for congestion and sore throat.    Respiratory:  Positive for cough. Negative for shortness of breath and wheezing.    Gastrointestinal:  Positive for abdominal pain, nausea and vomiting.   Genitourinary:  Positive for dysuria.   Musculoskeletal:  Negative for myalgias.   Neurological:  Negative for tremors, sensory change and speech change.   Psychiatric/Behavioral:  The patient is not nervous/anxious.       PAST HISTORY:     Past Medical History:   Diagnosis Date    ADHD (attention deficit hyperactivity disorder)     Anxiety     Bipolar affective     History of chlamydia 12/2016    Insomnia     Morbid  obesity with BMI of 40.0-44.9, adult     Sleep disorder     Type 2 diabetes mellitus without complications        Past Surgical History:   Procedure Laterality Date    NO PAST SURGERIES      as of 19       Family History   Problem Relation Age of Onset    Breast cancer Paternal Grandmother     No Known Problems Father     No Known Problems Mother     No Known Problems Sister     Diabetes Maternal Grandmother     No Known Problems Maternal Grandfather     No Known Problems Paternal Grandfather     Colon cancer Neg Hx     Ovarian cancer Neg Hx        Social History     Socioeconomic History    Marital status: Single   Occupational History    Occupation:     Tobacco Use    Smoking status: Former     Types: Vaping with nicotine     Start date:      Quit date: 2022     Years since quittin.1    Smokeless tobacco: Never    Tobacco comments:     boyfriend smoke cigarettes   Substance and Sexual Activity    Alcohol use: No    Drug use: Yes     Types: Marijuana    Sexual activity: Yes     Partners: Male     Birth control/protection: Condom, OCP     Comment: Single:       Social Determinants of Health     Financial Resource Strain: High Risk    Difficulty of Paying Living Expenses: Very hard   Food Insecurity: No Food Insecurity    Worried About Running Out of Food in the Last Year: Never true    Ran Out of Food in the Last Year: Never true   Transportation Needs: No Transportation Needs    Lack of Transportation (Medical): No    Lack of Transportation (Non-Medical): No   Physical Activity: Inactive    Days of Exercise per Week: 0 days    Minutes of Exercise per Session: 0 min   Stress: Stress Concern Present    Feeling of Stress : Very much   Social Connections: Unknown    Frequency of Communication with Friends and Family: Three times a week    Frequency of Social Gatherings with Friends and Family: Twice a week    Active Member of Clubs or Organizations: No    Attends Club or Organization Meetings:  Never    Marital Status: Living with partner   Housing Stability: Unknown    Unable to Pay for Housing in the Last Year: No    Unstable Housing in the Last Year: No       MEDICATIONS & ALLERGIES:     Current Outpatient Medications on File Prior to Visit   Medication Sig    adapalene-benzoyl peroxide (EPIDUO FORTE) 0.3-2.5 % GlwP Apply topically.    busPIRone (BUSPAR) 15 MG tablet Take 15 mg by mouth 2 (two) times daily.    clindamycin (CLEOCIN T) 1 % lotion Apply topically every morning.    clonazePAM (KLONOPIN) 1 MG tablet Take 1 mg by mouth 2 (two) times daily as needed.    drospirenone-ethinyl estradioL (JUAN MANUEL) 3-0.02 mg per tablet Take 1 tablet by mouth once daily.    fluocinonide 0.05% (LIDEX) 0.05 % cream Apply topically 2 (two) times daily. As needed itchy rash.Stop using steroid topical when skin is smooth and non itchy.  Do not treat dark or red coloring.    lisinopriL 10 MG tablet Take 1 tablet (10 mg total) by mouth once daily.    methocarbamoL (ROBAXIN) 750 MG Tab 1 tablet.    naproxen (NAPROSYN) 500 MG tablet Take 1 tablet (500 mg total) by mouth every 12 (twelve) hours as needed (Pain).    semaglutide (OZEMPIC) 0.25 mg or 0.5 mg(2 mg/1.5 mL) pen injector Inject  0.5 mg into the skin  weekly x 4 weeks, for weeks 4-8    sertraline (ZOLOFT) 100 MG tablet Take 100 mg by mouth once daily.    tiZANidine (ZANAFLEX) 2 MG tablet 1 tablet as needed.    tretinoin (RETIN-A) 0.05 % cream Apply topically every evening. Start with every other night and move up to nightly after 2 weeks if not too dry.    triamcinolone acetonide 0.1% (KENALOG) 0.1 % ointment Apply topically 2 (two) times daily.    venlafaxine (EFFEXOR-XR) 75 MG 24 hr capsule Take 75 mg by mouth every morning.    VRAYLAR 3 mg Cap Take 3 mg by mouth.    semaglutide (OZEMPIC) 1 mg/dose (4 mg/3 mL) Inject 1 mg into the skin every 7 days. For weeks 9-12 (Patient not taking: Reported on 2/20/2023)    VRAYLAR 1.5 mg Cap Take 1.5 mg by mouth every evening.     "[DISCONTINUED] semaglutide (OZEMPIC) 2 mg/dose (8 mg/3 mL) PnIj Inject 2 mg into the skin every 7 days. For weeks 13-16 and onward (Patient not taking: Reported on 2/20/2023)     No current facility-administered medications on file prior to visit.       Review of patient's allergies indicates:  No Known Allergies    OBJECTIVE:     Vital Signs:  Vitals:    02/20/23 1030   BP: 128/84   BP Location: Left arm   Patient Position: Sitting   BP Method: Large (Manual)   Pulse: 90   Temp: 98 °F (36.7 °C)   TempSrc: Oral   SpO2: 97%   Weight: (!) 163 kg (359 lb 5.6 oz)   Height: 5' 8" (1.727 m)       Body mass index is 54.64 kg/m².     Physical Exam:  Physical Exam  Vitals and nursing note reviewed.   Constitutional:       General: She is not in acute distress.     Appearance: Normal appearance. She is not ill-appearing, toxic-appearing or diaphoretic.      Comments: Very well-appearing.  No acute distress.  Speaking in full sentences without difficulty.  No lethargy.  Awake and alert.   HENT:      Head: Normocephalic and atraumatic.      Nose: Nose normal. No congestion or rhinorrhea.   Eyes:      General: No scleral icterus.     Conjunctiva/sclera: Conjunctivae normal.   Cardiovascular:      Rate and Rhythm: Normal rate and regular rhythm.   Pulmonary:      Effort: Pulmonary effort is normal. No respiratory distress.   Abdominal:      General: There is no distension.      Palpations: Abdomen is soft.      Tenderness: There is generalized abdominal tenderness and tenderness in the right upper quadrant, epigastric area and left upper quadrant. There is no guarding or rebound.      Comments: No RLQ tenderness   Skin:     General: Skin is warm and dry.   Neurological:      General: No focal deficit present.      Mental Status: She is alert and oriented to person, place, and time.      Motor: No weakness.   Psychiatric:         Mood and Affect: Mood and affect normal.         Behavior: Behavior normal.          Laboratory  Lab " Results   Component Value Date    WBC 12.53 02/06/2023    HGB 12.8 02/06/2023    HCT 43.0 02/06/2023    MCV 86 02/06/2023     (H) 02/06/2023     Lab Results   Component Value Date    GLU 99 01/04/2023     01/04/2023    K 4.7 01/04/2023     01/04/2023    CO2 25 01/04/2023    BUN 13 01/04/2023    CREATININE 0.7 01/04/2023    CALCIUM 9.9 01/04/2023     Lab Results   Component Value Date    INR 1.0 05/10/2022     Lab Results   Component Value Date    HGBA1C 6.3 (H) 01/05/2023             ASSESSMENT & PLAN:   Ms. Misael Garcia is a 28 y.o. female who was seen today in clinic for nausea/vomiting.  It started 2 days ago after she ate fried seafood. No fever.  Also with intermittent diarrhea.  +diffuse epigastric pain.  Has been trying to sip on Pedialyte.  Very well-appearing on exam with mild diffuse abdominal tenderness.    DDX:  Broad differential for her symptoms.  Pancreatitis vs hepatitis vs gastroenteritis vs cholecystitis vs side effect of Ozempic vs DKA vs UTI vs COVID vs flu.    Will check POC glucose to assess for DKA. . Patient does not have testing supplies at home and is a new diabetic, will order.  Hold on GLP 1 for now and check sugars at home.  Follow-up with PCP for further options if Ozempic contributing to symptoms.    Check POC pregnancy.  Check abdominal x-ray for obstruction although low suspicion with intermittent diarrhea. Check UA. Check liver and kidney function.     Low threshold to present to ED urgently.  If unable to tolerate PO today likely will need IV fluids.  May need more urgent abdominal imaging.  Discussed abdominal ultrasound for gallstones likely will not be done for next few days, if pain persists later on today, presents to ED urgently for imaging.     Counseled on holding off on any fried or fatty foods this could worsen symptoms.  [place] diet, toast, rice, crackers.      1. Epigastric pain  -     Urinalysis, Reflex to Urine Culture Urine, Clean Catch;  Future; Expected date: 02/20/2023  -     X-Ray Abdomen Portable; Future; Expected date: 02/20/2023  -     LIPASE; Future; Expected date: 02/20/2023  -     BASIC METABOLIC PANEL; Future; Expected date: 02/20/2023  -     POCT Glucose, Hand-Held Device; Future; Expected date: 02/20/2023  -     POCT COVID-19 Rapid Screening; Future; Expected date: 02/20/2023  -     POCT Influenza A/B Molecular; Future; Expected date: 02/20/2023  -     POCT Urine Pregnancy; Future; Expected date: 02/20/2023  -     US Abdomen Complete; Future; Expected date: 02/20/2023  -     ondansetron (ZOFRAN) 4 MG tablet; Take 1 tablet (4 mg total) by mouth every 8 (eight) hours as needed for Nausea.  Dispense: 15 tablet; Refill: 0  -     HEPATITIS PANEL, ACUTE; Future; Expected date: 02/20/2023    2. Vomiting, unspecified vomiting type, unspecified whether nausea present  -     Urinalysis, Reflex to Urine Culture Urine, Clean Catch; Future; Expected date: 02/20/2023  -     X-Ray Abdomen Portable; Future; Expected date: 02/20/2023  -     LIPASE; Future; Expected date: 02/20/2023  -     BASIC METABOLIC PANEL; Future; Expected date: 02/20/2023  -     POCT Glucose, Hand-Held Device; Future; Expected date: 02/20/2023  -     POCT COVID-19 Rapid Screening; Future; Expected date: 02/20/2023  -     POCT Influenza A/B Molecular; Future; Expected date: 02/20/2023  -     POCT Urine Pregnancy; Future; Expected date: 02/20/2023  -     US Abdomen Complete; Future; Expected date: 02/20/2023  -     ondansetron (ZOFRAN) 4 MG tablet; Take 1 tablet (4 mg total) by mouth every 8 (eight) hours as needed for Nausea.  Dispense: 15 tablet; Refill: 0  -     HEPATITIS PANEL, ACUTE; Future; Expected date: 02/20/2023    3. Diarrhea, unspecified type  -     BASIC METABOLIC PANEL; Future; Expected date: 02/20/2023  -     POCT COVID-19 Rapid Screening; Future; Expected date: 02/20/2023  -     POCT Influenza A/B Molecular; Future; Expected date: 02/20/2023  -     HEPATITIS PANEL,  ACUTE; Future; Expected date: 02/20/2023    4. Type 2 diabetes mellitus with hyperglycemia, without long-term current use of insulin  -     Urinalysis, Reflex to Urine Culture Urine, Clean Catch; Future; Expected date: 02/20/2023  -     LIPASE; Future; Expected date: 02/20/2023  -     POCT Glucose, Hand-Held Device; Future; Expected date: 02/20/2023  -     POCT Urine Pregnancy; Future; Expected date: 02/20/2023  -     blood-glucose meter kit; To check BG 2-3x times daily, to use with insurance preferred meter  Dispense: 100 each; Refill: 0  -     lancets Misc; To check BG 2-3 times daily, to use with insurance preferred meter  Dispense: 100 each; Refill: 0  -     blood sugar diagnostic Strp; To check BG 2-3x times daily, to use with insurance preferred meter  Dispense: 100 each; Refill: 0       Presents to ED urgently if abdominal pain worsens, any fever develops, or inability to tolerate PO.    Hanny Gregorio MD  Internal Medicine

## 2023-02-20 NOTE — TELEPHONE ENCOUNTER
Pt. States vomiting X 2 days. States she vomits 2 X's/day. + diarrhea, ? pt to go to UC for evaluation. States she is currently in the office of another primary care physician.

## 2023-02-21 LAB
BILIRUB UR QL STRIP: NEGATIVE
CLARITY UR REFRACT.AUTO: CLEAR
COLOR UR AUTO: YELLOW
GLUCOSE UR QL STRIP: NEGATIVE
HGB UR QL STRIP: NEGATIVE
KETONES UR QL STRIP: NEGATIVE
LEUKOCYTE ESTERASE UR QL STRIP: NEGATIVE
NITRITE UR QL STRIP: NEGATIVE
PH UR STRIP: 7 [PH] (ref 5–8)
PROT UR QL STRIP: NEGATIVE
SP GR UR STRIP: 1.02 (ref 1–1.03)
URN SPEC COLLECT METH UR: NORMAL

## 2023-02-22 ENCOUNTER — PATIENT MESSAGE (OUTPATIENT)
Dept: BARIATRICS | Facility: CLINIC | Age: 28
End: 2023-02-22

## 2023-02-22 ENCOUNTER — PATIENT MESSAGE (OUTPATIENT)
Dept: PRIMARY CARE CLINIC | Facility: CLINIC | Age: 28
End: 2023-02-22

## 2023-02-22 ENCOUNTER — PATIENT MESSAGE (OUTPATIENT)
Dept: DERMATOLOGY | Facility: CLINIC | Age: 28
End: 2023-02-22

## 2023-02-24 ENCOUNTER — PATIENT MESSAGE (OUTPATIENT)
Dept: ADMINISTRATIVE | Facility: OTHER | Age: 28
End: 2023-02-24

## 2023-02-24 ENCOUNTER — PATIENT MESSAGE (OUTPATIENT)
Dept: RESEARCH | Facility: HOSPITAL | Age: 28
End: 2023-02-24

## 2023-02-24 ENCOUNTER — TELEPHONE (OUTPATIENT)
Dept: SLEEP MEDICINE | Facility: OTHER | Age: 28
End: 2023-02-24

## 2023-02-25 ENCOUNTER — HOSPITAL ENCOUNTER (OUTPATIENT)
Dept: SLEEP MEDICINE | Facility: HOSPITAL | Age: 28
Discharge: HOME | End: 2023-02-25
Attending: PSYCHIATRY & NEUROLOGY | Admitting: PSYCHIATRY & NEUROLOGY
Payer: COMMERCIAL

## 2023-02-25 DIAGNOSIS — F51.09 OTHER INSOMNIA NOT DUE TO A SUBSTANCE OR KNOWN PHYSIOLOGICAL CONDITION: ICD-10-CM

## 2023-02-25 DIAGNOSIS — R35.1 NOCTURIA: ICD-10-CM

## 2023-02-25 DIAGNOSIS — R06.83 SNORING: ICD-10-CM

## 2023-02-25 PROCEDURE — 95810 POLYSOM 6/> YRS 4/> PARAM: CPT

## 2023-02-26 PROBLEM — R06.83 SNORING: Status: ACTIVE | Noted: 2023-02-26

## 2023-02-26 NOTE — PROGRESS NOTES
Education was done via explanation of sleep study process and procedure. All questions were answered.    Pt. did not meet criteria for CPAP. ETCO2 in place per orders. Respiratory events were observed mainly during supine sleep.    Low sat of 90% was given in study. EKG revealed NSR. Soft to moderate snoring was heard. Thank you letter was given in a.m.

## 2023-02-28 ENCOUNTER — PATIENT MESSAGE (OUTPATIENT)
Dept: FAMILY MEDICINE | Facility: CLINIC | Age: 28
End: 2023-02-28

## 2023-02-28 ENCOUNTER — PATIENT MESSAGE (OUTPATIENT)
Dept: SLEEP MEDICINE | Facility: CLINIC | Age: 28
End: 2023-02-28

## 2023-02-28 ENCOUNTER — PATIENT MESSAGE (OUTPATIENT)
Dept: ADMINISTRATIVE | Facility: OTHER | Age: 28
End: 2023-02-28

## 2023-02-28 DIAGNOSIS — E11.65 TYPE 2 DIABETES MELLITUS WITH HYPERGLYCEMIA, WITHOUT LONG-TERM CURRENT USE OF INSULIN: Primary | ICD-10-CM

## 2023-03-01 ENCOUNTER — PATIENT MESSAGE (OUTPATIENT)
Dept: FAMILY MEDICINE | Facility: CLINIC | Age: 28
End: 2023-03-01

## 2023-03-02 ENCOUNTER — LAB VISIT (OUTPATIENT)
Dept: LAB | Facility: HOSPITAL | Age: 28
End: 2023-03-02
Payer: COMMERCIAL

## 2023-03-02 ENCOUNTER — OFFICE VISIT (OUTPATIENT)
Dept: ALLERGY | Facility: CLINIC | Age: 28
End: 2023-03-02
Payer: COMMERCIAL

## 2023-03-02 ENCOUNTER — HOSPITAL ENCOUNTER (OUTPATIENT)
Dept: PULMONOLOGY | Facility: CLINIC | Age: 28
Discharge: HOME | End: 2023-03-02
Payer: COMMERCIAL

## 2023-03-02 ENCOUNTER — OFFICE VISIT (OUTPATIENT)
Dept: SPINE | Facility: CLINIC | Age: 28
End: 2023-03-02
Attending: PHYSICAL MEDICINE & REHABILITATION
Payer: COMMERCIAL

## 2023-03-02 VITALS
WEIGHT: 293 LBS | OXYGEN SATURATION: 100 % | RESPIRATION RATE: 18 BRPM | DIASTOLIC BLOOD PRESSURE: 95 MMHG | BODY MASS INDEX: 54.59 KG/M2 | TEMPERATURE: 98 F | HEART RATE: 107 BPM | SYSTOLIC BLOOD PRESSURE: 147 MMHG

## 2023-03-02 VITALS — HEIGHT: 68 IN | WEIGHT: 293 LBS | BODY MASS INDEX: 44.41 KG/M2

## 2023-03-02 DIAGNOSIS — M54.2 NECK PAIN: Primary | ICD-10-CM

## 2023-03-02 DIAGNOSIS — J45.30 MILD PERSISTENT ASTHMA, UNSPECIFIED WHETHER COMPLICATED: ICD-10-CM

## 2023-03-02 DIAGNOSIS — J31.0 CHRONIC RHINITIS: ICD-10-CM

## 2023-03-02 DIAGNOSIS — R06.02 SOB (SHORTNESS OF BREATH): ICD-10-CM

## 2023-03-02 DIAGNOSIS — R06.02 SOB (SHORTNESS OF BREATH): Primary | ICD-10-CM

## 2023-03-02 DIAGNOSIS — M62.838 MUSCLE SPASM: ICD-10-CM

## 2023-03-02 PROBLEM — F43.10 POSTTRAUMATIC STRESS DISORDER: Status: ACTIVE | Noted: 2023-03-02

## 2023-03-02 PROBLEM — M47.817 LUMBOSACRAL SPONDYLOSIS WITHOUT MYELOPATHY: Status: ACTIVE | Noted: 2023-03-02

## 2023-03-02 PROBLEM — M47.894 THORACIC FACET SYNDROME: Status: ACTIVE | Noted: 2023-03-02

## 2023-03-02 PROBLEM — M47.812 CERVICAL SPONDYLOSIS WITHOUT MYELOPATHY: Status: ACTIVE | Noted: 2023-03-02

## 2023-03-02 LAB — IGE SERPL-ACNC: 91 IU/ML (ref 0–100)

## 2023-03-02 PROCEDURE — 99205 OFFICE O/P NEW HI 60 MIN: CPT | Mod: S$GLB,,, | Performed by: STUDENT IN AN ORGANIZED HEALTH CARE EDUCATION/TRAINING PROGRAM

## 2023-03-02 PROCEDURE — 1160F RVW MEDS BY RX/DR IN RCRD: CPT | Mod: CPTII,S$GLB,, | Performed by: STUDENT IN AN ORGANIZED HEALTH CARE EDUCATION/TRAINING PROGRAM

## 2023-03-02 PROCEDURE — 3080F DIAST BP >= 90 MM HG: CPT | Mod: CPTII,S$GLB,, | Performed by: PHYSICAL MEDICINE & REHABILITATION

## 2023-03-02 PROCEDURE — 99205 PR OFFICE/OUTPT VISIT, NEW, LEVL V, 60-74 MIN: ICD-10-PCS | Mod: S$GLB,,, | Performed by: STUDENT IN AN ORGANIZED HEALTH CARE EDUCATION/TRAINING PROGRAM

## 2023-03-02 PROCEDURE — 3044F HG A1C LEVEL LT 7.0%: CPT | Mod: CPTII,S$GLB,, | Performed by: PHYSICAL MEDICINE & REHABILITATION

## 2023-03-02 PROCEDURE — 4010F ACE/ARB THERAPY RXD/TAKEN: CPT | Mod: CPTII,S$GLB,, | Performed by: STUDENT IN AN ORGANIZED HEALTH CARE EDUCATION/TRAINING PROGRAM

## 2023-03-02 PROCEDURE — 86003 ALLG SPEC IGE CRUDE XTRC EA: CPT | Mod: 59 | Performed by: STUDENT IN AN ORGANIZED HEALTH CARE EDUCATION/TRAINING PROGRAM

## 2023-03-02 PROCEDURE — 94060 PR EVAL OF BRONCHOSPASM: ICD-10-PCS | Mod: S$GLB,,, | Performed by: INTERNAL MEDICINE

## 2023-03-02 PROCEDURE — 4010F PR ACE/ARB THEARPY RXD/TAKEN: ICD-10-PCS | Mod: CPTII,S$GLB,, | Performed by: STUDENT IN AN ORGANIZED HEALTH CARE EDUCATION/TRAINING PROGRAM

## 2023-03-02 PROCEDURE — 99999 PR PBB SHADOW E&M-EST. PATIENT-LVL V: CPT | Mod: PBBFAC,,, | Performed by: PHYSICAL MEDICINE & REHABILITATION

## 2023-03-02 PROCEDURE — 3077F SYST BP >= 140 MM HG: CPT | Mod: CPTII,S$GLB,, | Performed by: PHYSICAL MEDICINE & REHABILITATION

## 2023-03-02 PROCEDURE — 99999 PR PBB SHADOW E&M-EST. PATIENT-LVL V: ICD-10-PCS | Mod: PBBFAC,,, | Performed by: PHYSICAL MEDICINE & REHABILITATION

## 2023-03-02 PROCEDURE — 1159F MED LIST DOCD IN RCRD: CPT | Mod: CPTII,S$GLB,, | Performed by: PHYSICAL MEDICINE & REHABILITATION

## 2023-03-02 PROCEDURE — 82785 ASSAY OF IGE: CPT | Performed by: STUDENT IN AN ORGANIZED HEALTH CARE EDUCATION/TRAINING PROGRAM

## 2023-03-02 PROCEDURE — 3077F PR MOST RECENT SYSTOLIC BLOOD PRESSURE >= 140 MM HG: ICD-10-PCS | Mod: CPTII,S$GLB,, | Performed by: PHYSICAL MEDICINE & REHABILITATION

## 2023-03-02 PROCEDURE — 99204 OFFICE O/P NEW MOD 45 MIN: CPT | Mod: S$GLB,,, | Performed by: PHYSICAL MEDICINE & REHABILITATION

## 2023-03-02 PROCEDURE — 1160F RVW MEDS BY RX/DR IN RCRD: CPT | Mod: CPTII,S$GLB,, | Performed by: PHYSICAL MEDICINE & REHABILITATION

## 2023-03-02 PROCEDURE — 4010F PR ACE/ARB THEARPY RXD/TAKEN: ICD-10-PCS | Mod: CPTII,S$GLB,, | Performed by: PHYSICAL MEDICINE & REHABILITATION

## 2023-03-02 PROCEDURE — 99204 PR OFFICE/OUTPT VISIT, NEW, LEVL IV, 45-59 MIN: ICD-10-PCS | Mod: S$GLB,,, | Performed by: PHYSICAL MEDICINE & REHABILITATION

## 2023-03-02 PROCEDURE — 3044F PR MOST RECENT HEMOGLOBIN A1C LEVEL <7.0%: ICD-10-PCS | Mod: CPTII,S$GLB,, | Performed by: PHYSICAL MEDICINE & REHABILITATION

## 2023-03-02 PROCEDURE — 3008F BODY MASS INDEX DOCD: CPT | Mod: CPTII,S$GLB,, | Performed by: STUDENT IN AN ORGANIZED HEALTH CARE EDUCATION/TRAINING PROGRAM

## 2023-03-02 PROCEDURE — 1160F PR REVIEW ALL MEDS BY PRESCRIBER/CLIN PHARMACIST DOCUMENTED: ICD-10-PCS | Mod: CPTII,S$GLB,, | Performed by: STUDENT IN AN ORGANIZED HEALTH CARE EDUCATION/TRAINING PROGRAM

## 2023-03-02 PROCEDURE — 3008F PR BODY MASS INDEX (BMI) DOCUMENTED: ICD-10-PCS | Mod: CPTII,S$GLB,, | Performed by: STUDENT IN AN ORGANIZED HEALTH CARE EDUCATION/TRAINING PROGRAM

## 2023-03-02 PROCEDURE — 94060 EVALUATION OF WHEEZING: CPT | Mod: S$GLB,,, | Performed by: INTERNAL MEDICINE

## 2023-03-02 PROCEDURE — 99999 PR PBB SHADOW E&M-EST. PATIENT-LVL IV: ICD-10-PCS | Mod: PBBFAC,,, | Performed by: STUDENT IN AN ORGANIZED HEALTH CARE EDUCATION/TRAINING PROGRAM

## 2023-03-02 PROCEDURE — 3044F HG A1C LEVEL LT 7.0%: CPT | Mod: CPTII,S$GLB,, | Performed by: STUDENT IN AN ORGANIZED HEALTH CARE EDUCATION/TRAINING PROGRAM

## 2023-03-02 PROCEDURE — 3008F PR BODY MASS INDEX (BMI) DOCUMENTED: ICD-10-PCS | Mod: CPTII,S$GLB,, | Performed by: PHYSICAL MEDICINE & REHABILITATION

## 2023-03-02 PROCEDURE — 1160F PR REVIEW ALL MEDS BY PRESCRIBER/CLIN PHARMACIST DOCUMENTED: ICD-10-PCS | Mod: CPTII,S$GLB,, | Performed by: PHYSICAL MEDICINE & REHABILITATION

## 2023-03-02 PROCEDURE — 1159F MED LIST DOCD IN RCRD: CPT | Mod: CPTII,S$GLB,, | Performed by: STUDENT IN AN ORGANIZED HEALTH CARE EDUCATION/TRAINING PROGRAM

## 2023-03-02 PROCEDURE — 3044F PR MOST RECENT HEMOGLOBIN A1C LEVEL <7.0%: ICD-10-PCS | Mod: CPTII,S$GLB,, | Performed by: STUDENT IN AN ORGANIZED HEALTH CARE EDUCATION/TRAINING PROGRAM

## 2023-03-02 PROCEDURE — 86003 ALLG SPEC IGE CRUDE XTRC EA: CPT | Performed by: STUDENT IN AN ORGANIZED HEALTH CARE EDUCATION/TRAINING PROGRAM

## 2023-03-02 PROCEDURE — 4010F ACE/ARB THERAPY RXD/TAKEN: CPT | Mod: CPTII,S$GLB,, | Performed by: PHYSICAL MEDICINE & REHABILITATION

## 2023-03-02 PROCEDURE — 3080F PR MOST RECENT DIASTOLIC BLOOD PRESSURE >= 90 MM HG: ICD-10-PCS | Mod: CPTII,S$GLB,, | Performed by: PHYSICAL MEDICINE & REHABILITATION

## 2023-03-02 PROCEDURE — 3008F BODY MASS INDEX DOCD: CPT | Mod: CPTII,S$GLB,, | Performed by: PHYSICAL MEDICINE & REHABILITATION

## 2023-03-02 PROCEDURE — 99999 PR PBB SHADOW E&M-EST. PATIENT-LVL IV: CPT | Mod: PBBFAC,,, | Performed by: STUDENT IN AN ORGANIZED HEALTH CARE EDUCATION/TRAINING PROGRAM

## 2023-03-02 PROCEDURE — 36415 COLL VENOUS BLD VENIPUNCTURE: CPT | Performed by: STUDENT IN AN ORGANIZED HEALTH CARE EDUCATION/TRAINING PROGRAM

## 2023-03-02 PROCEDURE — 1159F PR MEDICATION LIST DOCUMENTED IN MEDICAL RECORD: ICD-10-PCS | Mod: CPTII,S$GLB,, | Performed by: STUDENT IN AN ORGANIZED HEALTH CARE EDUCATION/TRAINING PROGRAM

## 2023-03-02 PROCEDURE — 1159F PR MEDICATION LIST DOCUMENTED IN MEDICAL RECORD: ICD-10-PCS | Mod: CPTII,S$GLB,, | Performed by: PHYSICAL MEDICINE & REHABILITATION

## 2023-03-02 RX ORDER — CETIRIZINE HYDROCHLORIDE 10 MG/1
10 TABLET ORAL DAILY
Refills: 0 | COMMUNITY
Start: 2023-03-02 | End: 2023-10-04

## 2023-03-02 RX ORDER — ALBUTEROL SULFATE 90 UG/1
2 AEROSOL, METERED RESPIRATORY (INHALATION) EVERY 4 HOURS PRN
Qty: 18 G | Refills: 1 | Status: SHIPPED | OUTPATIENT
Start: 2023-03-02 | End: 2023-06-12 | Stop reason: SDUPTHER

## 2023-03-02 RX ORDER — TIZANIDINE 2 MG/1
2 TABLET ORAL EVERY 8 HOURS PRN
Qty: 90 TABLET | Refills: 1 | Status: SHIPPED | OUTPATIENT
Start: 2023-03-02 | End: 2023-05-18 | Stop reason: SDUPTHER

## 2023-03-02 RX ORDER — FLUTICASONE FUROATE AND VILANTEROL 200; 25 UG/1; UG/1
1 POWDER RESPIRATORY (INHALATION) NIGHTLY
Qty: 60 EACH | Refills: 2 | Status: SHIPPED | OUTPATIENT
Start: 2023-03-02 | End: 2023-06-06

## 2023-03-02 RX ORDER — NAPROXEN 500 MG/1
500 TABLET ORAL EVERY 12 HOURS PRN
Qty: 60 TABLET | Refills: 1 | OUTPATIENT
Start: 2023-03-02 | End: 2023-05-18 | Stop reason: SDUPTHER

## 2023-03-02 RX ORDER — FLUTICASONE PROPIONATE 50 MCG
2 SPRAY, SUSPENSION (ML) NASAL 2 TIMES DAILY
Qty: 31.6 ML | Refills: 5 | OUTPATIENT
Start: 2023-03-02 | End: 2023-06-05

## 2023-03-02 NOTE — PATIENT INSTRUCTIONS
Testing  Allergy blood testing done today       Check [facility] in one week for results or call [phone]       Contact me with questions or concerns       I will contact you if anything needs immediate attention.    Breathing test is normal    Treatment for chest    Toothbrush:  Breo 1 puff every night no matter what  Then brush your teeth    Backpack:  albuterol inhaler 2 puffs if needed for shortness of breath    Treatment for nose    Flonase (= fluticasone) nasal spray:  2 squirts each nostril twice a day   Remember to aim out toward your ear.   Needs to be used regularly 5-14 days for full effect.    Zyrtec (= cetirizine):  1 tablet daily  With extra when needed for itching    Revisit 4 weeks or sooner if needed

## 2023-03-02 NOTE — PROGRESS NOTES
Allergy Clinic Note  [facility]    This note was created by combination of typed  and M-Modal dictation. Transcription errors may be present.  If there are any questions, please contact me.    HISTORY      Patient ID: Misael Garcia is a 28 y.o. female.    Chief Complaint: Nasal Congestion      Referring Provider: Self, Aaareferral       History of Present Illness       Misael Garcia is a 28 y.o. female who presents c/o ENT and chest symptoms.  She is here alone, and the Hx is difficult.    Related medications and other interventions  Triamcinalone  (ACE)    3/2/23:  At initial visit, pt presented c/o coughing, wheezing and shortness of breath for the last 2-3 months.  She has no Dx of asthma but was given an albuterol inhaler in the past with benefit.  Triggers include smoke.  PFTS this date dominick (off meds and without Sx)  Made clinical Dx of nmild persisten asthma.  Rx Breo and albuterol.  F/U in 4 weeks.    Chief rhinitis symptom is nasal congestion.  Additional Sx include runny nose, sneezing, itchy eyes re/runny eyes and diffuse itching of body.  Other symptoms are throat clearing and post nasal drip sensation. Pt's itching is not controlled and it interferes with sleep.  She also reports scrathing in her sleep. She denies any ear symptoms.  There is no clear seasonal pattern.  Ppt include air conditioning, weather changes and extremes of temperature.  Claritin was helpful in the past.  She has never had allergy testing.  Immunocaps ordered.  Treated with Zyrtec and Flonase.        MEDICAL HISTORY      Significant past medical history: HTN, DM, recent smoking  Active Problem List reviewed  ENT surgery:  none    Significant family history:  No allergies.  No asthma.  Exposures: tobacco and mj smoke at  house (1/2 time).  Cat, occ in bedroom.  SH:  work [employer]  Smoking Hx:  Client  reports that she quit smoking about 2 months ago. Her smoking use included  vaping with nicotine. She started smoking about 13 months ago. She has never used smokeless tobacco.    Meds: MAR reviewed    Asthma: never Dx'd previously  Eczema: yes  Rhinitis: yes--See HPI  Drug allergy/intolerance: NKDA  Venom allergy: No  Latex allergy:  No    Medication List with Changes/Refills   New Medications    ALBUTEROL (PROVENTIL/VENTOLIN HFA) 90 MCG/ACTUATION INHALER    Inhale 2 puffs into the lungs every 4 (four) hours as needed (shortness of breath). Rescue       Start Date: 3/2/2023  End Date: 3/1/2024    CETIRIZINE (ZYRTEC) 10 MG TABLET    Take 1 tablet (10 mg total) by mouth once daily. May take an extra if needed.       Start Date: 3/2/2023  End Date: 3/1/2024    FLUTICASONE FUROATE-VILANTEROL (BREO ELLIPTA) 200-25 MCG/DOSE DSDV DISKUS INHALER    Inhale 1 puff into the lungs every evening. Controller       Start Date: 3/2/2023  End Date: --    FLUTICASONE PROPIONATE (FLONASE) 50 MCG/ACTUATION NASAL SPRAY    2 sprays (100 mcg total) by Each Nostril route 2 (two) times daily.       Start Date: 3/2/2023  End Date: --   Current Medications    ADAPALENE-BENZOYL PEROXIDE (EPIDUO FORTE) 0.3-2.5 % GLWP    Apply topically.       Start Date: 2/7/2023  End Date: --    BLOOD SUGAR DIAGNOSTIC STRP    To check BG 2-3x times daily, to use with insurance preferred meter       Start Date: 2/20/2023 End Date: --    BLOOD-GLUCOSE METER KIT    To check BG 2-3x times daily, to use with insurance preferred meter       Start Date: 2/20/2023 End Date: 2/20/2024    BUSPIRONE (BUSPAR) 15 MG TABLET    Take 15 mg by mouth 2 (two) times daily.       Start Date: 12/7/2021 End Date: --    CARIPRAZINE (VRAYLAR) 4.5 MG CAP    Take 1 capsule by mouth every night at bedtime       Start Date: 3/2/2023  End Date: --    CLINDAMYCIN (CLEOCIN T) 1 % LOTION    Apply topically every morning.       Start Date: 2/6/2023  End Date: --    CLONAZEPAM (KLONOPIN) 1 MG TABLET    Take 1 mg by mouth 2 (two) times daily as needed.       Start  Date: 12/23/2021End Date: --    DROSPIRENONE-ETHINYL ESTRADIOL (JUAN MANUEL) 3-0.02 MG PER TABLET    Take 1 tablet by mouth once daily.       Start Date: 2/22/2023 End Date: 2/22/2024    ESCITALOPRAM OXALATE (LEXAPRO) 20 MG TABLET    Take 1 tablet by mouth every morning.       Start Date: 3/2/2023  End Date: --    FLUOCINONIDE 0.05% (LIDEX) 0.05 % CREAM    Apply topically 2 (two) times daily. As needed itchy rash.Stop using steroid topical when skin is smooth and non itchy.  Do not treat dark or red coloring.       Start Date: 2/6/2023  End Date: --    LANCETS 28 GAUGE MISC    To check BG 2-3 times daily, to use with insurance preferred meter       Start Date: 2/20/2023 End Date: --    LISINOPRIL 10 MG TABLET    Take 1 tablet (10 mg total) by mouth once daily.       Start Date: 2/6/2023  End Date: 2/6/2024    NAPROXEN (NAPROSYN) 500 MG TABLET    Take 1 tablet (500 mg total) by mouth every 12 (twelve) hours as needed (Pain).       Start Date: 3/2/2023  End Date: --    ONDANSETRON (ZOFRAN) 4 MG TABLET    Take 1 tablet (4 mg total) by mouth every 8 (eight) hours as needed for Nausea.       Start Date: 2/20/2023 End Date: --    SEMAGLUTIDE (OZEMPIC) 0.25 MG OR 0.5 MG(2 MG/1.5 ML) PEN INJECTOR    Inject  0.5 mg into the skin  weekly x 4 weeks, for weeks 4-8       Start Date: 2/6/2023  End Date: --    SEMAGLUTIDE (OZEMPIC) 1 MG/DOSE (4 MG/3 ML)    Inject 1 mg into the skin every 7 days. For weeks 9-12       Start Date: 2/6/2023  End Date: 2/6/2024    SERTRALINE (ZOLOFT) 100 MG TABLET    Take 100 mg by mouth once daily.       Start Date: --        End Date: --    TIZANIDINE (ZANAFLEX) 2 MG TABLET    Take 1 tablet (2 mg total) by mouth every 8 (eight) hours as needed (neck pain).       Start Date: 3/2/2023  End Date: --    TRAZODONE (DESYREL) 50 MG TABLET    Take 1-2 tablets by mouth every night at bedtime       Start Date: 3/2/2023  End Date: --    TRETINOIN (RETIN-A) 0.05 % CREAM    Apply topically every evening. Start with every  "other night and move up to nightly after 2 weeks if not too dry.       Start Date: 2/6/2023  End Date: --    TRIAMCINOLONE ACETONIDE 0.1% (KENALOG) 0.1 % OINTMENT    Apply topically 2 (two) times daily.       Start Date: 2/1/2023  End Date: --    VENLAFAXINE (EFFEXOR-XR) 37.5 MG 24 HR CAPSULE    Take 1 capsule by mouth every morning for 10 days then stop       Start Date: 3/2/2023  End Date: --    VENLAFAXINE (EFFEXOR-XR) 75 MG 24 HR CAPSULE    Take 75 mg by mouth every morning.       Start Date: 1/20/2023 End Date: --    VRAYLAR 1.5 MG CAP    Take 1.5 mg by mouth every evening.       Start Date: 11/18/2022End Date: --    VRAYLAR 3 MG CAP    Take 3 mg by mouth.       Start Date: 2/4/2023  End Date: --           REVIEW OF SYSTEMS      CONST: no F/C/NS, no unintentional weight changes  NEURO:  no tremor, no weakness  EYES: no discharge, no pruritus, no erythema  EARS: no hearing loss, no sensation of fullness  NOSE: + congestion, + rhinorrhea, + sneezing  PULM:  + SOB, + wheezing, + cough  CV: no CP, no palpitations, no leg swelling  GI:  no abdominal pain, no blood in stool  :  no dysuria, no hematuria  DERM: no rashes, no skin breaks           PHYSICAL EXAM       5' 8" (1.727 m)   Wt (!) 164 kg (361 lb 8.9 oz)   LMP 02/12/2023   BMI 54.97 kg/m²   GEN: Awake and alert, no distress  DERM:  No flushing, no rashes  EYE:  No occular discharge, no redness  HEENT: No nasal discharge, no hoarseness, TMs are clear bilaterally.  Nares are pink with moderate to severe turbinate swelling.  Oropharynx is benign without exudate.  Tongue is not coated.  PULM: Normal work of breathing, no cough  NEURO:  No focal deficit, speech fluent and logical  PSYCH: appropriate affect, normal behavior            MEDICAL DECISION-MAKING           Data reviewed        Allergy Testing      ordered     Pulmonary testing     Normal baseline spirometry (off meds, Asx), 2023  Normal flow volume loop  No bronchodilator response.      Lab " results            Imaging and other diagnostics            Medical records review            Diagnoses:     Misael Garcia is a 28 y.o. female. with  1. SOB (shortness of breath)    2. Mild persistent asthma (clinical Dx)    3. Chronic rhinitis          Assessment / Plan / Orders         SOB (shortness of breath)  -     Cancel: Complete PFT with bronchodilator; Future  -     Spirometry with/without bronchodilator; Future; Expected date: 03/02/2023    Mild persistent asthma (clinical Dx)  -     albuterol (PROVENTIL/VENTOLIN HFA) 90 mcg/actuation inhaler; Inhale 2 puffs into the lungs every 4 (four) hours as needed (shortness of breath). Rescue  Dispense: 18 g; Refill: 1  -     fluticasone furoate-vilanteroL (BREO ELLIPTA) 200-25 mcg/dose DsDv diskus inhaler; Inhale 1 puff into the lungs every evening. Controller  Dispense: 60 each; Refill: 2    Chronic rhinitis  -     IgE; Future; Expected date: 03/02/2023  -     Dermatophagoides [place]; Future; Expected date: 03/02/2023  -     Dermatophagoides Pteronyssinus; Future; Expected date: 03/02/2023  -     [place]; Future; Expected date: 03/02/2023  -     Juan; Future; Expected date: 03/02/2023  -     [place]; Future; Expected date: 03/02/2023  -     English Plantain; Future; Expected date: 03/02/2023  -     [place] Pecan; Future; Expected date: 03/02/2023  -     Pecan; Future; Expected date: 03/02/2023  -     Ragweed; Future; Expected date: 03/02/2023  -     Alternaria; Future; Expected date: 03/02/2023  -     Aspergillus; Future; Expected date: 03/02/2023  -     Cat; Future; Expected date: 03/02/2023  -     Cockroach; Future; Expected date: 03/02/2023  -     Dog; Future; Expected date: 03/02/2023  -     cetirizine (ZYRTEC) 10 MG tablet; Take 1 tablet (10 mg total) by mouth once daily. May take an extra if needed.; Refill: 0  -     fluticasone propionate (FLONASE) 50 mcg/actuation nasal spray; 2 sprays (100 mcg total) by Each Nostril route 2 (two) times daily.   Dispense: 31.6 mL; Refill: 5          Patient Instructions and follow up     Patient Instructions   Testing  Allergy blood testing done today       Check [facility] in one week for results or call [phone]       Contact me with questions or concerns       I will contact you if anything needs immediate attention.    Breathing test is normal    Treatment for chest    Toothbrush:  Breo 1 puff every night no matter what  Then brush your teeth    Backpack:  albuterol inhaler 2 puffs if needed for shortness of breath    Treatment for nose    Flonase (= fluticasone) nasal spray:  2 squirts each nostril twice a day   Remember to aim out toward your ear.   Needs to be used regularly 5-14 days for full effect.    Zyrtec (= cetirizine):  1 tablet daily  With extra when needed for itching    Revisit 4 weeks or sooner if needed          No follow-ups on file.        Allison King MD  Allergy, Asthma & Immunology      I spent a total of 68 minutes on the day of the visit.This includes face to face time and non-face to face time preparing to see the patient (eg, review of tests), obtaining and/or reviewing separately obtained history, documenting clinical information in the electronic or other health record, independently interpreting results and communicating results to the patient/family/caregiver, or care coordinator.
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
## 2023-03-02 NOTE — PATIENT INSTRUCTIONS
Patients Guide to Back Pain    Low back pain effects 2/3 of adults at some point in their lives.  It is one of the top 10 reasons to go to the doctor.  Back pain is scary, sudden, and painful but does usually improve.  It is usually benign and is not caused by a serious underlying disease.  95% of back pain is mechanical, meaning something makes it worse (most commonly bending and sitting).    If bending makes it worse, extension stretches can be helpful.    If standing makes it worse flexion, stretches and z-lie can be helpful.   You can find details on these stretches and more in Treat Your Own Back by Floyd Dorsey.    Moving is the best medicine, even if it hurts. Bed rest is not recommended for back pain.  Early return to daily activities leads to less chronic disability.  Early involvement in Physical Therapy can decrease the likelihood of acute low back pain becoming chronic.1  Back pain is a complex issue to diagnose. A definite diagnosis for back pain cannot be made for nearly 85% of patients at the initial visit.   Medicine such as muscle relaxers and anti-inflammatories can be helpful. Narcotic pain medicine is not recommended for back pain.    Imaging Guidance:  Imaging such as MRIs and X-rays are not necessary in the beginning and do not influence treatment or influence the course of acute back pain. Degenerative changes such as disc bulges and arthritis are common in people as young as 18 and with no pain.  In people under 50 with no signs and symptoms of systemic disease no imaging is needed.2  Conservative care for 6 weeks is recommended for patients with or without shooting nerve pain in arms/legs prior to MRI.2  Imaging of patients with low back pain without indications of serious underlying conditions does not improve outcomes.3  RESOURCES  [facility] website:  [email or web address]://[email or web address]/back-spine-center  [facility]  website:  https://[email or web address].[email or web address]/services/pain-management  Books:  Treat Your Own Back by Floyd Dorsey  Treat Your Own Neck by Floyd Dorsey  KEY TAKEAWAYS:  Moving is recommended. Bed rest is not recommended.  Seek Physical Therapy as early as possible.  Acute flares will improve.    1WFrancisco russo et al. Nonpharmacologic options for treating acute and chronic pain. PMR journal 7 2015) [id number]  2JHayes wallace and Abdi Jones. Diagnostic evaluation of low back pain with emphasis on imaging. Annals of internal medicine 2002; 137:586-597  3CUli granger Rongwei Fu, Abdi Moreno. Imaging studies for low back pain: systemic review and meta-analysis. Lancet 2009;373 463-94

## 2023-03-05 LAB
FEF 25 75 LLN: 2.09
FEF 25 75 PRE REF: 95.6 %
FEF 25 75 REF: 3.52
FET100 CHG: 3.2 %
FEV05 LLN: 1.61
FEV05 REF: 2.46
FEV1 CHG: 0.9 %
FEV1 FVC LLN: 75
FEV1 FVC PRE REF: 95 %
FEV1 FVC REF: 86
FEV1 LLN: 2.43
FEV1 PRE REF: 101 %
FEV1 REF: 3.1
FEV1 VOL CHG: 0.03
FVC CHG: 2.9 %
FVC LLN: 2.83
FVC PRE REF: 105.7 %
FVC REF: 3.62
FVC VOL CHG: 0.11
PEF LLN: 5.21
PEF PRE REF: 81 %
PEF REF: 7.4
PHYSICIAN COMMENT: NORMAL
POST FEF 25 75: 3.57 L/S (ref 2.09–5.22)
POST FET 100: 7.14 SEC
POST FEV1 FVC: 80.36 % (ref 74.98–95.01)
POST FEV1: 3.17 L (ref 2.43–3.76)
POST FEV5: 2.67 L (ref 1.61–3.32)
POST FVC: 3.94 L (ref 2.83–4.44)
POST PEF: 6.99 L/S (ref 5.21–9.6)
PRE FEF 25 75: 3.37 L/S (ref 2.09–5.22)
PRE FET 100: 6.92 SEC
PRE FEV05 REF: 98.9 %
PRE FEV1 FVC: 81.94 % (ref 74.98–95.01)
PRE FEV1: 3.14 L (ref 2.43–3.76)
PRE FEV5: 2.44 L (ref 1.61–3.32)
PRE FVC: 3.83 L (ref 2.83–4.44)
PRE PEF: 6 L/S (ref 5.21–9.6)

## 2023-03-07 ENCOUNTER — PATIENT MESSAGE (OUTPATIENT)
Dept: ADMINISTRATIVE | Facility: HOSPITAL | Age: 28
End: 2023-03-07

## 2023-03-07 ENCOUNTER — PATIENT OUTREACH (OUTPATIENT)
Dept: ADMINISTRATIVE | Facility: HOSPITAL | Age: 28
End: 2023-03-07

## 2023-03-07 LAB
A ALTERNATA IGE QN: <0.1 KU/L
A FUMIGATUS IGE QN: <0.1 KU/L
BERMUDA GRASS IGE QN: 0.16 KU/L
CAT DANDER IGE QN: <0.1 KU/L
CEDAR IGE QN: <0.1 KU/L
D FARINAE IGE QN: <0.1 KU/L
D PTERONYSS IGE QN: <0.1 KU/L
DEPRECATED A ALTERNATA IGE RAST QL: NORMAL
DEPRECATED A FUMIGATUS IGE RAST QL: NORMAL
DEPRECATED BERMUDA GRASS IGE RAST QL: ABNORMAL
DEPRECATED CAT DANDER IGE RAST QL: NORMAL
DEPRECATED CEDAR IGE RAST QL: NORMAL
DEPRECATED D FARINAE IGE RAST QL: NORMAL
DEPRECATED D PTERONYSS IGE RAST QL: NORMAL
DEPRECATED DOG DANDER IGE RAST QL: NORMAL
DEPRECATED ENGL PLANTAIN IGE RAST QL: NORMAL
DEPRECATED PECAN/HICK TREE IGE RAST QL: NORMAL
DEPRECATED ROACH IGE RAST QL: NORMAL
DEPRECATED TIMOTHY IGE RAST QL: ABNORMAL
DEPRECATED WEST RAGWEED IGE RAST QL: ABNORMAL
DEPRECATED WHITE OAK IGE RAST QL: ABNORMAL
DOG DANDER IGE QN: <0.1 KU/L
ENGL PLANTAIN IGE QN: <0.1 KU/L
PECAN/HICK TREE IGE QN: <0.1 KU/L
ROACH IGE QN: <0.1 KU/L
TIMOTHY IGE QN: 0.56 KU/L
WEST RAGWEED IGE QN: 0.11 KU/L
WHITE OAK IGE QN: 0.12 KU/L

## 2023-03-07 NOTE — PROGRESS NOTES
2023 Care Everywhere updates requested and reviewed.  Immunizations reconciled. Media reports reviewed.  Duplicate HM overrides and  orders removed.  Overdue HM topic chart audit and/or requested.  Overdue lab testing linked to upcoming lab appointments if applies.  Lab valorie, and Xapo reviewed    DIS reviewed      Portal outreached regarding Health maintenance     Health Maintenance Due   Topic Date Due    Diabetes Urine Screening  Never done    Foot Exam  Never done    Eye Exam  Never done

## 2023-03-08 ENCOUNTER — PATIENT MESSAGE (OUTPATIENT)
Dept: SLEEP MEDICINE | Facility: CLINIC | Age: 28
End: 2023-03-08

## 2023-03-08 DIAGNOSIS — G47.33 OSA (OBSTRUCTIVE SLEEP APNEA): Primary | ICD-10-CM

## 2023-03-08 PROCEDURE — 95810 PR POLYSOMNOGRAPHY, 4 OR MORE: ICD-10-PCS | Mod: 26,,, | Performed by: INTERNAL MEDICINE

## 2023-03-08 PROCEDURE — 95810 POLYSOM 6/> YRS 4/> PARAM: CPT | Mod: 26,,, | Performed by: INTERNAL MEDICINE

## 2023-03-08 NOTE — PROCEDURES
"      [facility]    Polysomnography Interpretation Report    Patient Name:  MALIK BROWNING  MRN#:  [id number]  :  1995  Study Date:  2023  Referring Provider:  JANET RAMIREZ M.D    Indications for Polysomnography:  The patient is a 28 year old Female who is 5' 8" and weighs 359.0 lbs.  Her BMI equals 55.0.  [place] was - and Neck Circumference was -.  A full night polysomnogram was performed to evaluate for -.    Polysomnogram Data  A full night polysomnogram recorded the standard physiologic parameters including EEG, EOG, EMG, EKG, nasal and oral airflow.  Respiratory parameters of chest and abdominal movements were recorded with Peizo-Crystal motion transducers.  Oxygen saturation was recorded by pulse oximetry.    Sleep Architecture  The total recording time of the polysomnogram was 454.9 minutes.  The total sleep time was 293.5 minutes.  The patient spent 6.5% of total sleep time in Stage N1, 80.6% in Stage N2, 12.9% in Stages N3, and 0.0% in REM.  Sleep latency was 25.0 minutes.  REM latency was - minutes.  Sleep Efficiency was 64.5%.  Wake after sleep onset was 136.0 minutes.    Respiratory Events  The polysomnogram revealed a presence of - obstructive, - central, and - mixed apneas resulting in a Total Apnea index of - events per hour.  There were 35 hypopneas resulting in a Total Hypopnea index of 7.2 events per hour.  The combined Apnea/Hypopnea index was 7.2 events per hour.  There were a total of - RERA events resulting in a Respiratory Disturbance Index (RDI) of 7.2 events per hour.     Mean oxygen saturation was 93.1%.  The lowest oxygen saturation during sleep was 88.0%.  Time spent ?88% oxygen saturation was 0.1 minutes (-).    End Tidal CO2 during sleep ranged from 32.7 to 56.2 mmHg. End Tidal CO2 was greater than 50 mmHg for 1.2 minutes and greater than 55 mmHg for - minutes.    Limb Activity  There were 36 limb movements recorded.  Of this total, 33 were classified as " "PLMs.  Of the PLMs, 1 were associated with arousals.  The Limb Movement index was 7.4 per hour while the PLM index was 6.7 per hour and PLM with arousals index was 0.2 per hour.    Cardiac:  single lead EKG revealed normal sinus rhythm    Oxygenation:  Hypoxemia was seen only in relation to obstructive events.    Impression:  -obstructive sleep apnea   -No stage REM sleep was present    Recommendations:    -treatment for SID seen in this study is recommended   -the patient has follow up with Sleep Medicine      Ankush Ramirez MD    (This Sleep Study was interpreted by a Board Certified Sleep Specialist who conducted an epoch-by-epoch review of the entire raw data recording.)  (The indication for this sleep study was reviewed and deemed appropriate by [facility] Practice Parameters or other reasons by a Board Certified Sleep Specialist.)    [facility]    Diagnostic PSG Report    Patient Name: MALIK BROWNING Study Date: 2/25/2023   YOB: 1995 MRN #: [id number]   Age: 28 year GLENYS #: [id number]   Sex: Female Referring Provider: JANET RAMIREZ M.D   Height: 5' 8" Recording Tech: Bev Castro RRT RPSGT   Weight: 359.0 lbs Scoring Tech: Tavo Bender RRT RPSGT   BMI: 55.0 Interpreting Physician: -   ESS: - Neck Circumference: -     Study Overview    Lights Off: 09:35:16 PM  Count Index   Lights On: 05:10:11 AM Awakenings: 18 3.7   Time in Bed: 454.9 min. Arousals: 46 9.4   Total Sleep Time: 293.5 min. Apneas & Hypopneas: 35 7.2    Sleep Efficiency: 64.5% Limb Movements: 36 7.4   Sleep Latency: 25.0 min. Snores: - -   Wake After Sleep Onset: 136.0 min. Desaturations: 12 2.5    REM Latency from Sleep Onset: - min. Minimum SpO2 TST: 88.0%        Sleep Architecture   % of Time in Bed  Stages Time (mins) % Sleep Time   Wake 161.5    Stage N1 19.0 6.5%   Stage N2 236.5 80.6%   Stage N3 38.0 12.9%   REM 0.0 0.0%         Arousal Summary     NREM REM Sleep Index   Respiratory Arousals 23 - 23 4.7   PLM " Arousals 1 - 1 0.2   Isolated Limb Movement Arousals 2 - 2 0.4   Spontaneous Arousals 23 - 23 4.7   Total 46 - 46 9.4       Limb Movement Summary     Count Index   Isolated Limb Movements 3 0.6   Periodic Limb Movements (PLMs) 33 6.7   Total Limb Movements 36 7.4         Respiratory Summary     By Sleep Stage By Body Position Total    NREM REM Supine Non-Supine    Time (min) 293.5 0.0 102.5 191.0 293.5           Obstructive Apnea - - - - -   Mixed Apnea - - - - -   Central Apnea - - - - -   Total Apneas - - - - -   Total Apnea Index - - - - -           Hypopnea 35 - 14 21 35   Hypopnea Index 7.2 - 8.2 6.6 7.2           Apnea & Hypopnea 35 - 14 21 35   Apnea & Hypopnea Index 7.2 - 8.2 6.6 7.2           RERAs - - - - -   RERA Index - - - - -           RDI 7.2 - 8.2 6.6 7.2     Scoring Criteria: Hypopneas scored at 3% desaturation criteria.    Respiratory Event Durations     Apnea Hypopnea    NREM REM NREM REM   Average (seconds) - - 13.7 -   Maximum (seconds) - - 21.4 -       Oxygen Saturation Summary     Wake NREM REM TST TIB   Average SpO2 93.9% 92.6% - 92.6% 93.1%   Minimum SpO2 88.0% 88.0% - 88.0% 88.0%   Maximum SpO2 100.0% 97.0% - 97.0% 100.0%       Oxygen Saturation Distribution    Range (%) Time in range (min) Time in range (%)   90.0 - 100.0 446.9 99.2%   80.0 - 90.0 1.0 0.2%   70.0 - 80.0 - -   60.0 - 70.0 - -   50.0 - 60.0 - -   0.0 - 50.0 - -   Time Spent ?88% SpO2    Range (%) Time in range (min) Time in range (%)   0.0 - 88.0 0.1 0.0%          Count Index   Desaturations 12 2.5      Cardiac Summary     Wake NREM REM Sleep Total   Average Pulse Rate (BPM) 86.7 89.3 - 89.3 88.4   Minimum Pulse Rate (BPM) 56.0 73.0 - 73.0 56.0   Maximum Pulse Rate (BPM) 120.0 116.0 - 116.0 120.0     Pulse Rate Distribution    Range (bpm) Time in range (min) Time in range (%)   0.0 - 40.0 - -   40.0 - 60.0 0.1 0.0%   60.0 - 80.0 21.0 4.7%   80.0 - 100.0 422.4 93.8%   100.0 - 120.0 6.7 1.5%   120.0 - 140.0 - -   140.0 - 200.0  - -     EtCO2 Summary    Stage Min (mmHg) Average (mmHg) Max (mmHg)   Wake 20.6 44.6 53.8   NREM(1+2+3) 32.7 46.4 56.2   REM - - -     Range (mmHg) Time in range (min) Time in range (%)   20.0 - 40.0 9.1 2.0%   40.0 - 50.0 440.9 96.9%   50.0 - 55.0 1.1 0.3%   55.0 - 100.0 0.0 0.0%   Excluded data <20.0 & >65.0 3.9 0.8%     TcCO2 Summary    Stage Min (mmHg) Average (mmHg) Max (mmHg)   Wake - - -   NREM(1+2+3) - - -   REM - - -     Range (mmHg) Time in range (min) Time in range (%)   - - -   - - -   - - -   - - -   - - -     Comments    -

## 2023-03-09 ENCOUNTER — NURSE TRIAGE (OUTPATIENT)
Dept: ADMINISTRATIVE | Facility: CLINIC | Age: 28
End: 2023-03-09

## 2023-03-09 NOTE — TELEPHONE ENCOUNTER
Speaking with patient, was at work, feeling overwhelmed. States she does not want to hurt herself or others and does not want to go to the ED. She just wants to talk to someone. New to position and some callers are unkind. Logged off work due to feeling of anxiety. Reassured, validated the unkind callers as part of job. Spoke with patient at length re medication compliance. She has a non [facility] psychiatrist so I am unable to message. Triage done-  dispo see provider in 3 days. She will call and make appointment. Patient also given resources re employer, such as classes and services. Verb understanding. Instructed to call back for any further questions or concerns at any time, re nurse available to speak with her.     Reason for Disposition   Symptoms interfere with work or school    Additional Information   Negative: SEVERE difficulty breathing (e.g., struggling for each breath, speaks in single words)   Negative: Bluish (or gray) lips or face   Negative: Difficult to awaken or acting confused (e.g., disoriented, slurred speech)   Negative: Hysterical or combative behavior   Negative: Sounds like a life-threatening emergency to the triager   Negative: Difficulty breathing and persists > 10 minutes and not relieved by reassurance provided by triager   Negative: Lightheadedness or dizziness and persists > 10 minutes and not relieved by reassurance provided by triager   Negative: Substance use (drug use) or unhealthy alcohol use, known or suspected, and feeling very shaky (i.e., visible tremors of hands)   Negative: Patient sounds very sick or weak to the triager   Negative: Patient sounds very upset or troubled to the triager    Protocols used: Anxiety and Panic Attack-A-[place]

## 2023-03-10 ENCOUNTER — PATIENT MESSAGE (OUTPATIENT)
Dept: OBSTETRICS AND GYNECOLOGY | Facility: CLINIC | Age: 28
End: 2023-03-10

## 2023-03-13 ENCOUNTER — TELEPHONE (OUTPATIENT)
Dept: ADMINISTRATIVE | Facility: OTHER | Age: 28
End: 2023-03-13

## 2023-03-13 DIAGNOSIS — Z30.09 ENCOUNTER FOR COUNSELING REGARDING CONTRACEPTION: Primary | ICD-10-CM

## 2023-03-14 ENCOUNTER — PATIENT MESSAGE (OUTPATIENT)
Dept: DERMATOLOGY | Facility: CLINIC | Age: 28
End: 2023-03-14

## 2023-03-14 ENCOUNTER — OFFICE VISIT (OUTPATIENT)
Dept: PODIATRY | Facility: CLINIC | Age: 28
End: 2023-03-14
Payer: COMMERCIAL

## 2023-03-14 ENCOUNTER — TELEPHONE (OUTPATIENT)
Dept: DERMATOLOGY | Facility: CLINIC | Age: 28
End: 2023-03-14

## 2023-03-14 VITALS
HEART RATE: 74 BPM | WEIGHT: 293 LBS | HEIGHT: 68 IN | BODY MASS INDEX: 44.41 KG/M2 | DIASTOLIC BLOOD PRESSURE: 89 MMHG | SYSTOLIC BLOOD PRESSURE: 136 MMHG

## 2023-03-14 DIAGNOSIS — L57.0 KERATOSIS: Primary | ICD-10-CM

## 2023-03-14 DIAGNOSIS — E11.65 TYPE 2 DIABETES MELLITUS WITH HYPERGLYCEMIA, WITHOUT LONG-TERM CURRENT USE OF INSULIN: ICD-10-CM

## 2023-03-14 PROCEDURE — 3079F PR MOST RECENT DIASTOLIC BLOOD PRESSURE 80-89 MM HG: ICD-10-PCS | Mod: CPTII,S$GLB,, | Performed by: PODIATRIST

## 2023-03-14 PROCEDURE — 1159F PR MEDICATION LIST DOCUMENTED IN MEDICAL RECORD: ICD-10-PCS | Mod: CPTII,S$GLB,, | Performed by: PODIATRIST

## 2023-03-14 PROCEDURE — 3008F BODY MASS INDEX DOCD: CPT | Mod: CPTII,S$GLB,, | Performed by: PODIATRIST

## 2023-03-14 PROCEDURE — 1160F RVW MEDS BY RX/DR IN RCRD: CPT | Mod: CPTII,S$GLB,, | Performed by: PODIATRIST

## 2023-03-14 PROCEDURE — 99999 PR PBB SHADOW E&M-EST. PATIENT-LVL III: CPT | Mod: PBBFAC,,, | Performed by: PODIATRIST

## 2023-03-14 PROCEDURE — 3079F DIAST BP 80-89 MM HG: CPT | Mod: CPTII,S$GLB,, | Performed by: PODIATRIST

## 2023-03-14 PROCEDURE — 3008F PR BODY MASS INDEX (BMI) DOCUMENTED: ICD-10-PCS | Mod: CPTII,S$GLB,, | Performed by: PODIATRIST

## 2023-03-14 PROCEDURE — 4010F ACE/ARB THERAPY RXD/TAKEN: CPT | Mod: CPTII,S$GLB,, | Performed by: PODIATRIST

## 2023-03-14 PROCEDURE — 1160F PR REVIEW ALL MEDS BY PRESCRIBER/CLIN PHARMACIST DOCUMENTED: ICD-10-PCS | Mod: CPTII,S$GLB,, | Performed by: PODIATRIST

## 2023-03-14 PROCEDURE — 99999 PR PBB SHADOW E&M-EST. PATIENT-LVL III: ICD-10-PCS | Mod: PBBFAC,,, | Performed by: PODIATRIST

## 2023-03-14 PROCEDURE — 4010F PR ACE/ARB THEARPY RXD/TAKEN: ICD-10-PCS | Mod: CPTII,S$GLB,, | Performed by: PODIATRIST

## 2023-03-14 PROCEDURE — 1159F MED LIST DOCD IN RCRD: CPT | Mod: CPTII,S$GLB,, | Performed by: PODIATRIST

## 2023-03-14 PROCEDURE — 99204 OFFICE O/P NEW MOD 45 MIN: CPT | Mod: S$GLB,,, | Performed by: PODIATRIST

## 2023-03-14 PROCEDURE — 3044F PR MOST RECENT HEMOGLOBIN A1C LEVEL <7.0%: ICD-10-PCS | Mod: CPTII,S$GLB,, | Performed by: PODIATRIST

## 2023-03-14 PROCEDURE — 3075F SYST BP GE 130 - 139MM HG: CPT | Mod: CPTII,S$GLB,, | Performed by: PODIATRIST

## 2023-03-14 PROCEDURE — 99204 PR OFFICE/OUTPT VISIT, NEW, LEVL IV, 45-59 MIN: ICD-10-PCS | Mod: S$GLB,,, | Performed by: PODIATRIST

## 2023-03-14 PROCEDURE — 3044F HG A1C LEVEL LT 7.0%: CPT | Mod: CPTII,S$GLB,, | Performed by: PODIATRIST

## 2023-03-14 PROCEDURE — 3075F PR MOST RECENT SYSTOLIC BLOOD PRESS GE 130-139MM HG: ICD-10-PCS | Mod: CPTII,S$GLB,, | Performed by: PODIATRIST

## 2023-03-14 RX ORDER — UREA 40 %
CREAM (GRAM) TOPICAL DAILY
Qty: 185 G | Refills: 11 | Status: SHIPPED | OUTPATIENT
Start: 2023-03-14 | End: 2023-10-04

## 2023-03-14 NOTE — PROGRESS NOTES
Subjective:      Patient ID: Misael Garcia is a 28 y.o. female.    Chief Complaint: Callouses (Callous on feet.)    Thick hard skin in the bottoms of the heels bilateral which occasionally splits and causes pain.  Gradual onset, worsening over the past year.  Aggravated by increased weight-bearing prolonged standing some shoes.  No prior medical treatment.  No self-treatment.  Patient denies trauma and surgery both feet    Diabetes, increased risk amputation needing evaluation/management/optomization of foot care.    Chief Complaint   Patient presents with    Callouses     Callous on feet.     Casual shoes     Review of Systems   Constitutional: Negative for chills, diaphoresis, fever, malaise/fatigue and night sweats.   Cardiovascular:  Negative for claudication, cyanosis, leg swelling and syncope.   Skin:  Positive for suspicious lesions. Negative for color change, dry skin, nail changes, rash and unusual hair distribution.   Musculoskeletal:  Negative for falls, joint pain, joint swelling, muscle cramps, muscle weakness and stiffness.   Gastrointestinal:  Negative for constipation, diarrhea, nausea and vomiting.   Neurological:  Negative for brief paralysis, disturbances in coordination, focal weakness, numbness, paresthesias, sensory change and tremors.         Objective:      Physical Exam  Constitutional:       General: She is not in acute distress.     Appearance: She is well-developed. She is not diaphoretic.   Cardiovascular:      Pulses:           Popliteal pulses are 2+ on the right side and 2+ on the left side.        Dorsalis pedis pulses are 2+ on the right side and 2+ on the left side.        Posterior tibial pulses are 2+ on the right side and 2+ on the left side.      Comments: Capillary refill 3 seconds all toes/distal feet, all toes/both feet warm to touch.      Negative lymphadenopathy bilateral popliteal fossa and tarsal tunnel.      Negavie lower extremity edema  bilateral.    Musculoskeletal:      Right ankle: No swelling, deformity, ecchymosis or lacerations. Normal range of motion. Normal pulse.      Right Achilles Tendon: Normal. No defects. Albrecht's test negative.      Comments: Normal angle, base, station of gait. All ten toes without clubbing, cyanosis, or signs of ischemia.  No pain to palpation bilateral lower extremities.  Range of motion, stability, muscle strength, and muscle tone normal bilateral feet and legs.    Lymphadenopathy:      Lower Body: No right inguinal adenopathy. No left inguinal adenopathy.      Comments: Negative lymphadenopathy bilateral popliteal fossa and tarsal tunnel.    Negative lymphangitic streaking bilateral feet/ankles/legs.   Skin:     General: Skin is warm and dry.      Capillary Refill: Capillary refill takes 2 to 3 seconds.      Coloration: Skin is not pale.      Findings: No abrasion, bruising, burn, ecchymosis, erythema, laceration, lesion or rash.      Nails: There is no clubbing.      Comments: Generalized thick keratotic tissue in the bottoms of the heels bilateral and the medial hallux IPJ right and left without open skin drainage pus tracking fluctuance malodor signs of infection.      Otherwise, Skin is normal age and health appropriate color, turgor, texture, and temperature bilateral lower extremities without ulceration, hyperpigmentation, discoloration, masses nodules or cords palpated.  No ecchymosis, erythema, edema, or cardinal signs of infection bilateral lower extremities.      Neurological:      Mental Status: She is alert and oriented to person, place, and time.      Sensory: No sensory deficit.      Motor: No tremor, atrophy or abnormal muscle tone.      Gait: Gait normal.      Deep Tendon Reflexes:      Reflex Scores:       Patellar reflexes are 2+ on the right side and 2+ on the left side.       Achilles reflexes are 2+ on the right side and 2+ on the left side.     Comments: Negative tinel sign to percussion  sural, superficial peroneal, deep peroneal, saphenous, and posterior tibial nerves right and left ankles and feet.       Psychiatric:         Behavior: Behavior is cooperative.           Assessment:       Encounter Diagnoses   Name Primary?    Keratosis Yes    Type 2 diabetes mellitus with hyperglycemia, without long-term current use of insulin          Plan:       Misael was seen today for [facility].    Diagnoses and all orders for this visit:    Keratosis    Type 2 diabetes mellitus with hyperglycemia, without long-term current use of insulin    Other orders  -     urea (CARMOL) 40 % Crea; Apply topically once daily.      I counseled the patient on her conditions, their implications and medical management.        The patient has received literature on basic diabetic foot care.  Patient will inspect feet daily, wear protective shoe gear when ambulatory, and apply moisturizer to skin as needed to maintain elasticity and help prevent ulceration.    Urea cream bid    Discussed conservative treatment with shoes of adequate dimensions, material, and style to alleviate symptoms and delay or prevent surgical intervention.    Periodic maintenance with pumice stone or similar to remove hyperkeratotic tissue as it builds up.          No follow-ups on file.

## 2023-03-14 NOTE — TELEPHONE ENCOUNTER
Patient rescheduled.     ----- Message from Jaylin Victor sent at 3/14/2023  2:17 PM CDT -----  Contact: patient  Type:  Needs Medical Advice    Who Called:  patient     Would the patient rather a call back or a response via [facility]? Call     Best Call Back Number: [phone] (home)      Additional Information:  Patient would like to speak with the nurse in regards to her appointment today at 3. Patient wants to know If the visit can be changed to virtual.     Please call to advise

## 2023-03-15 ENCOUNTER — OFFICE VISIT (OUTPATIENT)
Dept: DERMATOLOGY | Facility: CLINIC | Age: 28
End: 2023-03-15
Payer: COMMERCIAL

## 2023-03-15 DIAGNOSIS — L70.0 ACNE VULGARIS: Primary | ICD-10-CM

## 2023-03-15 PROCEDURE — 99213 PR OFFICE/OUTPT VISIT, EST, LEVL III, 20-29 MIN: ICD-10-PCS | Mod: S$PBB,,, | Performed by: STUDENT IN AN ORGANIZED HEALTH CARE EDUCATION/TRAINING PROGRAM

## 2023-03-15 PROCEDURE — 99213 OFFICE O/P EST LOW 20 MIN: CPT | Mod: S$PBB,,, | Performed by: STUDENT IN AN ORGANIZED HEALTH CARE EDUCATION/TRAINING PROGRAM

## 2023-03-15 PROCEDURE — 99999 PR PBB SHADOW E&M-EST. PATIENT-LVL IV: CPT | Mod: PBBFAC,,, | Performed by: STUDENT IN AN ORGANIZED HEALTH CARE EDUCATION/TRAINING PROGRAM

## 2023-03-15 PROCEDURE — 4010F ACE/ARB THERAPY RXD/TAKEN: CPT | Mod: ,,, | Performed by: STUDENT IN AN ORGANIZED HEALTH CARE EDUCATION/TRAINING PROGRAM

## 2023-03-15 PROCEDURE — 99999 PR PBB SHADOW E&M-EST. PATIENT-LVL IV: ICD-10-PCS | Mod: PBBFAC,,, | Performed by: STUDENT IN AN ORGANIZED HEALTH CARE EDUCATION/TRAINING PROGRAM

## 2023-03-15 PROCEDURE — 4010F PR ACE/ARB THEARPY RXD/TAKEN: ICD-10-PCS | Mod: ,,, | Performed by: STUDENT IN AN ORGANIZED HEALTH CARE EDUCATION/TRAINING PROGRAM

## 2023-03-15 RX ORDER — TRETINOIN 0.25 MG/G
CREAM TOPICAL NIGHTLY
Qty: 45 G | Refills: 2 | Status: SHIPPED | OUTPATIENT
Start: 2023-03-15 | End: 2023-10-04

## 2023-03-15 RX ORDER — DOXYCYCLINE HYCLATE 100 MG
100 TABLET ORAL DAILY
Qty: 90 TABLET | Refills: 0 | Status: SHIPPED | OUTPATIENT
Start: 2023-03-15 | End: 2023-10-04

## 2023-03-15 RX ORDER — CLINDAMYCIN PHOSPHATE 10 MG/ML
SOLUTION TOPICAL DAILY
Qty: 60 EACH | Refills: 2 | Status: SHIPPED | OUTPATIENT
Start: 2023-03-15 | End: 2023-10-04

## 2023-03-15 NOTE — PATIENT INSTRUCTIONS

## 2023-03-15 NOTE — PROGRESS NOTES
Subjective:       Patient ID:  Misael Garcia is a 28 y.o. female who presents for   Chief Complaint   Patient presents with    Eczema    Acne     History of Present Illness: The patient presents with chief complaint of persistent and worsening acne.  Location: face, mostly on the cheeks and lower jaw areas  Duration: ongoing for year  Signs/Symptoms: cystic bumps, painful bumps, dark marks  Prior treatments: has tried several topical regimens, including epi-duo, clindamycin, adapalene and BPO with minimal improvement in symptoms.       Eczema    Acne      Review of Systems   Constitutional:  Negative for fever and chills.   Skin:  Negative for rash.      Objective:    Physical Exam   Constitutional: She appears well-developed and well-nourished. No distress.   Neurological: She is alert and oriented to person, place, and time. She is not disoriented.   Psychiatric: She has a normal mood and affect.   Skin:   Areas Examined (abnormalities noted in diagram):   Head / Face Inspection Performed  Neck Inspection Performed  Chest / Axilla Inspection Performed  RUE Inspected  LUE Inspection Performed            Diagram Legend     Erythematous scaling macule/papule c/w actinic keratosis       Vascular papule c/w angioma      Pigmented verrucoid papule/plaque c/w seborrheic keratosis      Yellow umbilicated papule c/w sebaceous hyperplasia      Irregularly shaped tan macule c/w lentigo     1-2 mm smooth white papules consistent with Milia      Movable subcutaneous cyst with punctum c/w epidermal inclusion cyst      Subcutaneous movable cyst c/w pilar cyst      Firm pink to brown papule c/w dermatofibroma      Pedunculated fleshy papule(s) c/w skin tag(s)      Evenly pigmented macule c/w junctional nevus     Mildly variegated pigmented, slightly irregular-bordered macule c/w mildly atypical nevus      Flesh colored to evenly pigmented papule c/w intradermal nevus       Pink pearly papule/plaque c/w basal cell  carcinoma      Erythematous hyperkeratotic cursted plaque c/w SCC      Surgical scar with no sign of skin cancer recurrence      Open and closed comedones      Inflammatory papules and pustules      Verrucoid papule consistent consistent with wart     Erythematous eczematous patches and plaques     Dystrophic onycholytic nail with subungual debris c/w onychomycosis     Umbilicated papule    Erythematous-base heme-crusted tan verrucoid plaque consistent with inflamed seborrheic keratosis     Erythematous Silvery Scaling Plaque c/w Psoriasis     See annotation      Assessment / Plan:        Acne vulgaris  -     doxycycline (VIBRA-TABS) 100 MG tablet; Take 1 tablet (100 mg total) by mouth once daily.  Dispense: 90 tablet; Refill: 0  -     clindamycin (CLEOCIN T) 1 % Swab; Apply topically once daily.  Dispense: 60 each; Refill: 2  -     tretinoin (RETIN-A) 0.025 % cream; Apply topically every evening.  Dispense: 45 g; Refill: 2             Follow up in about 3 months (around 6/15/2023) for virtual visit.

## 2023-03-17 ENCOUNTER — TELEPHONE (OUTPATIENT)
Dept: PHARMACY | Facility: CLINIC | Age: 28
End: 2023-03-17

## 2023-03-27 ENCOUNTER — TELEPHONE (OUTPATIENT)
Dept: GASTROENTEROLOGY | Facility: CLINIC | Age: 28
End: 2023-03-27

## 2023-03-27 NOTE — TELEPHONE ENCOUNTER
Patient called stating that she needs a colonoscopy, informed patient that she has to have an office visit scheduled first. Patient scheduled for office visit 05/03/2023.    ----- Message from Kiesha Sellers sent at 3/27/2023 11:33 AM CDT -----  Contact: self  Type:  Apoointment Request      Name of Caller:self    Would the patient rather a call back or a response via [facility]? call  Best Call Back Number:[phone] (home)     Additional Information: pt would like to see dr if she is taking new pts and she would like to schedule a colonoscopy. Please advise and thank you.

## 2023-03-29 ENCOUNTER — PATIENT MESSAGE (OUTPATIENT)
Dept: OBSTETRICS AND GYNECOLOGY | Facility: CLINIC | Age: 28
End: 2023-03-29

## 2023-03-30 ENCOUNTER — TELEPHONE (OUTPATIENT)
Dept: OBSTETRICS AND GYNECOLOGY | Facility: CLINIC | Age: 28
End: 2023-03-30

## 2023-03-30 RX ORDER — FLUCONAZOLE 150 MG/1
150 TABLET ORAL DAILY
Qty: 1 TABLET | Refills: 0 | Status: SHIPPED | OUTPATIENT
Start: 2023-03-30 | End: 2023-03-31

## 2023-03-30 RX ORDER — CLOTRIMAZOLE AND BETAMETHASONE DIPROPIONATE 10; .64 MG/G; MG/G
CREAM TOPICAL 2 TIMES DAILY
Qty: 45 G | Refills: 0 | Status: SHIPPED | OUTPATIENT
Start: 2023-03-30 | End: 2023-10-04

## 2023-03-31 ENCOUNTER — PATIENT MESSAGE (OUTPATIENT)
Dept: OBSTETRICS AND GYNECOLOGY | Facility: CLINIC | Age: 28
End: 2023-03-31

## 2023-04-05 ENCOUNTER — TELEPHONE (OUTPATIENT)
Dept: OBSTETRICS AND GYNECOLOGY | Facility: CLINIC | Age: 28
End: 2023-04-05
Payer: MEDICAID

## 2023-04-06 ENCOUNTER — TELEPHONE (OUTPATIENT)
Dept: GASTROENTEROLOGY | Facility: CLINIC | Age: 28
End: 2023-04-06
Payer: MEDICAID

## 2023-04-06 ENCOUNTER — PATIENT MESSAGE (OUTPATIENT)
Dept: FAMILY MEDICINE | Facility: CLINIC | Age: 28
End: 2023-04-06
Payer: MEDICAID

## 2023-04-06 NOTE — TELEPHONE ENCOUNTER
Left message letting patient know to call [phone] and she can schedule the appointment for whatever problem she is having. Told patient that she cannot schedule an appointment for a colonoscopy, it has to be scheduled for whatever problem she is having.

## 2023-04-06 NOTE — TELEPHONE ENCOUNTER
----- Message from Kiesha Marlo sent at 4/6/2023  8:48 AM CDT -----  Contact: self  Type:  Needs Medical Advice    Who Called: self  Would the patient rather a call back or a response via [facility]? call  Best Call Back Number: [phone] (home)     Additional Information: pt would like to reschedule an appt with  and she states she would like to anila an colonoscopy. Please advise and thank you.

## 2023-04-11 ENCOUNTER — PATIENT MESSAGE (OUTPATIENT)
Dept: SLEEP MEDICINE | Facility: CLINIC | Age: 28
End: 2023-04-11
Payer: MEDICAID

## 2023-04-11 ENCOUNTER — PATIENT MESSAGE (OUTPATIENT)
Dept: FAMILY MEDICINE | Facility: CLINIC | Age: 28
End: 2023-04-11
Payer: MEDICAID

## 2023-04-11 DIAGNOSIS — E11.69 TYPE 2 DIABETES MELLITUS WITH OTHER SPECIFIED COMPLICATION, WITHOUT LONG-TERM CURRENT USE OF INSULIN: ICD-10-CM

## 2023-04-11 DIAGNOSIS — E66.01 CLASS 3 SEVERE OBESITY DUE TO EXCESS CALORIES WITH SERIOUS COMORBIDITY AND BODY MASS INDEX (BMI) OF 50.0 TO 59.9 IN ADULT: ICD-10-CM

## 2023-04-11 DIAGNOSIS — R63.8 DIFFICULTY MAINTAINING WEIGHT: ICD-10-CM

## 2023-04-11 RX ORDER — SEMAGLUTIDE 1.34 MG/ML
INJECTION, SOLUTION SUBCUTANEOUS
Qty: 1 EACH | Refills: 0 | Status: CANCELLED | OUTPATIENT
Start: 2023-04-11

## 2023-04-11 NOTE — TELEPHONE ENCOUNTER
Refill Routing Note   Medication(s) are not appropriate for processing by [facility] for the following reason(s):      New or recently adjusted medication    [facility] action(s):  Defer            Appointments  past 12m or future 3m with PCP    Date Provider   Last Visit   2/6/2023 Katelyn Chapman MD   Next Visit   4/21/2023 Katelyn Chapman MD   ED visits in past 90 days: 1        Note composed:2:59 PM 04/11/2023            [facility] Pregnancy And Lactation Text: This medication is considered safe during pregnancy and breastfeeding.

## 2023-04-11 NOTE — TELEPHONE ENCOUNTER
Care Due:                  Date            Visit Type   Department     Provider  --------------------------------------------------------------------------------                                MYCHART                              FOLLOWUP/OF  [facility]  Last Visit: 02-      FICE VISIT   MEDICINE       Katelyn Chapman                              EP -                              PRIMARY      [facility]  Next Visit: 04-      CARE ([facility])   MEDICINE       Katelyn Chapman                                                            Last  Test          Frequency    Reason                     Performed    Due Date  --------------------------------------------------------------------------------    HBA1C.......  6 months...  semaglutide..............  01- 07-    [facility] Embedded Care Gaps. Reference number: [id number]. 4/11/2023   12:40:24 PM CDT

## 2023-04-11 NOTE — TELEPHONE ENCOUNTER
No new care gaps identified.  [facility] Embedded Care Gaps. Reference number: [id number]. 4/11/2023   2:30:12 PM CDT

## 2023-04-21 ENCOUNTER — OFFICE VISIT (OUTPATIENT)
Dept: FAMILY MEDICINE | Facility: CLINIC | Age: 28
End: 2023-04-21
Payer: MEDICAID

## 2023-04-21 ENCOUNTER — PATIENT MESSAGE (OUTPATIENT)
Dept: GASTROENTEROLOGY | Facility: CLINIC | Age: 28
End: 2023-04-21
Payer: MEDICAID

## 2023-04-21 VITALS
WEIGHT: 293 LBS | SYSTOLIC BLOOD PRESSURE: 124 MMHG | HEART RATE: 87 BPM | HEIGHT: 68 IN | BODY MASS INDEX: 44.41 KG/M2 | DIASTOLIC BLOOD PRESSURE: 80 MMHG

## 2023-04-21 DIAGNOSIS — R10.13 DYSPEPSIA: ICD-10-CM

## 2023-04-21 DIAGNOSIS — D75.839 THROMBOCYTOSIS: ICD-10-CM

## 2023-04-21 DIAGNOSIS — Z30.41 ORAL CONTRACEPTIVE PILL SURVEILLANCE: ICD-10-CM

## 2023-04-21 DIAGNOSIS — J45.30 MILD PERSISTENT ASTHMA WITHOUT COMPLICATION: ICD-10-CM

## 2023-04-21 DIAGNOSIS — R63.8 UNABLE TO LOSE WEIGHT: ICD-10-CM

## 2023-04-21 DIAGNOSIS — E11.65 TYPE 2 DIABETES MELLITUS WITH HYPERGLYCEMIA, WITHOUT LONG-TERM CURRENT USE OF INSULIN: ICD-10-CM

## 2023-04-21 DIAGNOSIS — G47.33 OSA ON CPAP: ICD-10-CM

## 2023-04-21 DIAGNOSIS — I15.2 HYPERTENSION ASSOCIATED WITH DIABETES: Primary | ICD-10-CM

## 2023-04-21 DIAGNOSIS — E11.59 HYPERTENSION ASSOCIATED WITH DIABETES: Primary | ICD-10-CM

## 2023-04-21 DIAGNOSIS — E66.01 CLASS 3 SEVERE OBESITY DUE TO EXCESS CALORIES WITH SERIOUS COMORBIDITY AND BODY MASS INDEX (BMI) OF 50.0 TO 59.9 IN ADULT: ICD-10-CM

## 2023-04-21 DIAGNOSIS — F31.9 BIPOLAR 1 DISORDER: ICD-10-CM

## 2023-04-21 PROCEDURE — 99214 OFFICE O/P EST MOD 30 MIN: CPT | Mod: S$PBB,,, | Performed by: FAMILY MEDICINE

## 2023-04-21 PROCEDURE — 3074F SYST BP LT 130 MM HG: CPT | Mod: CPTII,,, | Performed by: FAMILY MEDICINE

## 2023-04-21 PROCEDURE — 1159F MED LIST DOCD IN RCRD: CPT | Mod: CPTII,,, | Performed by: FAMILY MEDICINE

## 2023-04-21 PROCEDURE — 4010F PR ACE/ARB THEARPY RXD/TAKEN: ICD-10-PCS | Mod: CPTII,,, | Performed by: FAMILY MEDICINE

## 2023-04-21 PROCEDURE — 99999 PR PBB SHADOW E&M-EST. PATIENT-LVL V: CPT | Mod: PBBFAC,,, | Performed by: FAMILY MEDICINE

## 2023-04-21 PROCEDURE — 99999 PR PBB SHADOW E&M-EST. PATIENT-LVL V: ICD-10-PCS | Mod: PBBFAC,,, | Performed by: FAMILY MEDICINE

## 2023-04-21 PROCEDURE — 3044F PR MOST RECENT HEMOGLOBIN A1C LEVEL <7.0%: ICD-10-PCS | Mod: CPTII,,, | Performed by: FAMILY MEDICINE

## 2023-04-21 PROCEDURE — 3079F PR MOST RECENT DIASTOLIC BLOOD PRESSURE 80-89 MM HG: ICD-10-PCS | Mod: CPTII,,, | Performed by: FAMILY MEDICINE

## 2023-04-21 PROCEDURE — 99215 OFFICE O/P EST HI 40 MIN: CPT | Mod: PBBFAC,PO | Performed by: FAMILY MEDICINE

## 2023-04-21 PROCEDURE — 99214 PR OFFICE/OUTPT VISIT, EST, LEVL IV, 30-39 MIN: ICD-10-PCS | Mod: S$PBB,,, | Performed by: FAMILY MEDICINE

## 2023-04-21 PROCEDURE — 1160F PR REVIEW ALL MEDS BY PRESCRIBER/CLIN PHARMACIST DOCUMENTED: ICD-10-PCS | Mod: CPTII,,, | Performed by: FAMILY MEDICINE

## 2023-04-21 PROCEDURE — 3008F BODY MASS INDEX DOCD: CPT | Mod: CPTII,,, | Performed by: FAMILY MEDICINE

## 2023-04-21 PROCEDURE — 3008F PR BODY MASS INDEX (BMI) DOCUMENTED: ICD-10-PCS | Mod: CPTII,,, | Performed by: FAMILY MEDICINE

## 2023-04-21 PROCEDURE — 3074F PR MOST RECENT SYSTOLIC BLOOD PRESSURE < 130 MM HG: ICD-10-PCS | Mod: CPTII,,, | Performed by: FAMILY MEDICINE

## 2023-04-21 PROCEDURE — 1159F PR MEDICATION LIST DOCUMENTED IN MEDICAL RECORD: ICD-10-PCS | Mod: CPTII,,, | Performed by: FAMILY MEDICINE

## 2023-04-21 PROCEDURE — 3044F HG A1C LEVEL LT 7.0%: CPT | Mod: CPTII,,, | Performed by: FAMILY MEDICINE

## 2023-04-21 PROCEDURE — 4010F ACE/ARB THERAPY RXD/TAKEN: CPT | Mod: CPTII,,, | Performed by: FAMILY MEDICINE

## 2023-04-21 PROCEDURE — 1160F RVW MEDS BY RX/DR IN RCRD: CPT | Mod: CPTII,,, | Performed by: FAMILY MEDICINE

## 2023-04-21 PROCEDURE — 3079F DIAST BP 80-89 MM HG: CPT | Mod: CPTII,,, | Performed by: FAMILY MEDICINE

## 2023-04-21 RX ORDER — METFORMIN HYDROCHLORIDE 500 MG/1
500 TABLET ORAL 2 TIMES DAILY WITH MEALS
Qty: 180 TABLET | Refills: 3 | Status: SHIPPED | OUTPATIENT
Start: 2023-04-21 | End: 2023-10-04

## 2023-04-21 RX ORDER — LISINOPRIL 10 MG/1
10 TABLET ORAL DAILY
Qty: 90 TABLET | Refills: 3 | Status: SHIPPED | OUTPATIENT
Start: 2023-04-21 | End: 2023-10-04 | Stop reason: SDUPTHER

## 2023-04-21 RX ORDER — METFORMIN HYDROCHLORIDE 500 MG/1
500 TABLET ORAL 2 TIMES DAILY
COMMUNITY
Start: 2023-04-06 | End: 2023-04-21 | Stop reason: SDUPTHER

## 2023-04-21 NOTE — PROGRESS NOTES
Subjective:       Patient ID: Misael Garcia is a 28 y.o. female.    Chief Complaint: Follow-up    Patient Active Problem List   Diagnosis    Corns and callus    Class 3 severe obesity due to excess calories with serious comorbidity and body mass index (BMI) of 50.0 to 59.9 in adult    Bipolar 1 disorder    Type 2 diabetes mellitus with hyperglycemia, without long-term current use of insulin    Recently quit using tobacco    Hypertension associated with diabetes    Oral contraceptive pill surveillance    Thrombocytosis    SID on CPAP    Mild persistent asthma without complication    Cervical spondylosis without myelopathy    Lumbosacral spondylosis without myelopathy    Posttraumatic stress disorder    Thoracic facet syndrome      HPI  27 yo female presents today for HTN follow up. BP controlled today and on home readings - plugged into digital med.     Changes since last visit. 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     Sees Psychiatry through [employer].     Med list reviewed and updated.   Taking Lisinopril and Metformin consistently.     Titrated up to Ozempic 0.5mg weekly. She reports feeling pain when hungry and improves when she eat. No nausea, no epigastric pain when eating.     Review of Systems   All other systems reviewed and are negative.       Objective:     Vitals:    04/21/23 1119   BP: 124/80   Pulse: 87     Physical Exam  Vitals and nursing note reviewed.   Constitutional:       General: She is not in acute distress.     Appearance: Normal appearance. She is obese. She is not ill-appearing, toxic-appearing or diaphoretic.   HENT:      Head: Normocephalic and atraumatic.   Eyes:      General: No scleral icterus.     Conjunctiva/sclera: Conjunctivae normal.   Cardiovascular:      Rate and Rhythm: Normal rate.      Heart sounds: Normal heart sounds.   Pulmonary:      Effort: Pulmonary  effort is normal. No respiratory distress.   Skin:     Coloration: Skin is not pale.   Neurological:      Mental Status: She is alert. Mental status is at baseline.   Psychiatric:         Attention and Perception: Attention and perception normal.         Mood and Affect: Mood and affect normal.         Speech: Speech normal.         Behavior: Behavior normal.         Cognition and Memory: Cognition and memory normal.         Judgment: Judgment normal.     Assessment:       1. Hypertension associated with diabetes    2. Type 2 diabetes mellitus with hyperglycemia, without long-term current use of insulin    3. Thrombocytosis    4. Class 3 severe obesity due to excess calories with serious comorbidity and body mass index (BMI) of 50.0 to 59.9 in adult    5. Unable to lose weight    6. SID on CPAP    7. Oral contraceptive pill surveillance    8. Mild persistent asthma without complication    9. Bipolar 1 disorder    10. Dyspepsia        Plan:   Recent relevant labs results reviewed with patient.       1. Hypertension associated with diabetes  -     lisinopriL 10 MG tablet; Take 1 tablet (10 mg total) by mouth once daily.  Dispense: 90 tablet; Refill: 3  - Chronic health condition is stable and controlled. Continue current medication regimen and relevant lifestyle modifications. Necessary medication refills addressed. Routine ongoing surveillance monitoring.     2. Type 2 diabetes mellitus with hyperglycemia, without long-term current use of insulin  -     Ambulatory referral/consult to Nutrition Services; Future; Expected date: 04/28/2023  -     semaglutide (OZEMPIC) 1 mg/dose (4 mg/3 mL); Inject 1 mg into the skin every 7 days. For weeks 9-12  Dispense: 3 mL; Refill: 0  -     semaglutide (OZEMPIC) 2 mg/dose (8 mg/3 mL) PnIj; Inject 2 mg into the skin every 7 days. For weeks 13-16  Dispense: 3 mL; Refill: 0  -     Microalbumin/Creatinine Ratio, Urine; Future; Expected date: 04/21/2023  -     Diabetic Eye Screening  Photo; Future  -     metFORMIN (GLUCOPHAGE) 500 MG tablet; Take 1 tablet (500 mg total) by mouth 2 (two) times daily with meals.  Dispense: 180 tablet; Refill: 3  - Continue with Metformin and Ozempic. Stable    3. Thrombocytosis  - Seen hematology. Asymptomatic, chronic, neg work up. Continue to trend.    4. Class 3 severe obesity due to excess calories with serious comorbidity and body mass index (BMI) of 50.0 to 59.9 in adult  5. Unable to lose weight  -     Ambulatory referral/consult to Nutrition Services; Future; Expected date: 04/28/2023  -     semaglutide (OZEMPIC) 1 mg/dose (4 mg/3 mL); Inject 1 mg into the skin every 7 days. For weeks 9-12  Dispense: 3 mL; Refill: 0  -     semaglutide (OZEMPIC) 2 mg/dose (8 mg/3 mL) PnIj; Inject 2 mg into the skin every 7 days. For weeks 13-16  Dispense: 3 mL; Refill: 0  - Patient would like nutritionist referral. Sent. Discussed that depends on insurance coverage.     6. SID on CPAP  - s/p sleep study.   - Use CPAP as instructed    7. Oral contraceptive pill surveillance  - Consider alternative form of contraception given weight, continue to discuss.     8. Mild persistent asthma without complication  - Use inhalers as instructed. Has not been using controller inhaler.     9. Bipolar 1 disorder  - Per psychiatry    10. Dyspepsia  -     H. pylori antigen, stool; Future; Expected date: 04/21/2023  - Potential Ozempic side effect? Vs PUD    Patient's questions answered. Plan reviewed with patient at the end of visit. Relevant precautions to chief complaint and reasons to seek further medical care or to contact the office sooner reviewed with patient.     Follow up in about 4 months (around 8/21/2023) for Weight Follow-up, Hypertension Follow-up.        I spent a total of 30 minutes on the day of the visit.  This includes face to face time and non-face to face time preparing to see the patient (eg, review of tests), obtaining and/or reviewing separately obtained history,  documenting clinical information in the electronic or other health record, independently interpreting results and communicating results to the patient/family/caregiver, or care coordinator.

## 2023-04-24 ENCOUNTER — TELEPHONE (OUTPATIENT)
Dept: SURGERY | Facility: CLINIC | Age: 28
End: 2023-04-24
Payer: MEDICAID

## 2023-04-24 ENCOUNTER — PATIENT MESSAGE (OUTPATIENT)
Dept: FAMILY MEDICINE | Facility: CLINIC | Age: 28
End: 2023-04-24
Payer: MEDICAID

## 2023-04-24 ENCOUNTER — TELEPHONE (OUTPATIENT)
Dept: ADMINISTRATIVE | Facility: OTHER | Age: 28
End: 2023-04-24
Payer: MEDICAID

## 2023-04-24 ENCOUNTER — OFFICE VISIT (OUTPATIENT)
Dept: GASTROENTEROLOGY | Facility: CLINIC | Age: 28
End: 2023-04-24
Payer: MEDICAID

## 2023-04-24 VITALS
SYSTOLIC BLOOD PRESSURE: 122 MMHG | BODY MASS INDEX: 44.41 KG/M2 | DIASTOLIC BLOOD PRESSURE: 82 MMHG | WEIGHT: 293 LBS | HEIGHT: 68 IN

## 2023-04-24 DIAGNOSIS — K59.04 CHRONIC IDIOPATHIC CONSTIPATION: Primary | ICD-10-CM

## 2023-04-24 DIAGNOSIS — E66.01 CLASS 3 SEVERE OBESITY DUE TO EXCESS CALORIES WITH SERIOUS COMORBIDITY AND BODY MASS INDEX (BMI) OF 50.0 TO 59.9 IN ADULT: ICD-10-CM

## 2023-04-24 PROCEDURE — 4010F PR ACE/ARB THEARPY RXD/TAKEN: ICD-10-PCS | Mod: CPTII,,, | Performed by: NURSE PRACTITIONER

## 2023-04-24 PROCEDURE — 3066F NEPHROPATHY DOC TX: CPT | Mod: CPTII,,, | Performed by: NURSE PRACTITIONER

## 2023-04-24 PROCEDURE — 1160F PR REVIEW ALL MEDS BY PRESCRIBER/CLIN PHARMACIST DOCUMENTED: ICD-10-PCS | Mod: CPTII,,, | Performed by: NURSE PRACTITIONER

## 2023-04-24 PROCEDURE — 3061F PR NEG MICROALBUMINURIA RESULT DOCUMENTED/REVIEW: ICD-10-PCS | Mod: CPTII,,, | Performed by: NURSE PRACTITIONER

## 2023-04-24 PROCEDURE — 3074F PR MOST RECENT SYSTOLIC BLOOD PRESSURE < 130 MM HG: ICD-10-PCS | Mod: CPTII,,, | Performed by: NURSE PRACTITIONER

## 2023-04-24 PROCEDURE — 99999 PR PBB SHADOW E&M-EST. PATIENT-LVL V: CPT | Mod: PBBFAC,,, | Performed by: NURSE PRACTITIONER

## 2023-04-24 PROCEDURE — 3079F DIAST BP 80-89 MM HG: CPT | Mod: CPTII,,, | Performed by: NURSE PRACTITIONER

## 2023-04-24 PROCEDURE — 99214 PR OFFICE/OUTPT VISIT, EST, LEVL IV, 30-39 MIN: ICD-10-PCS | Mod: S$PBB,,, | Performed by: NURSE PRACTITIONER

## 2023-04-24 PROCEDURE — 99215 OFFICE O/P EST HI 40 MIN: CPT | Mod: PBBFAC,PO | Performed by: NURSE PRACTITIONER

## 2023-04-24 PROCEDURE — 1159F MED LIST DOCD IN RCRD: CPT | Mod: CPTII,,, | Performed by: NURSE PRACTITIONER

## 2023-04-24 PROCEDURE — 3061F NEG MICROALBUMINURIA REV: CPT | Mod: CPTII,,, | Performed by: NURSE PRACTITIONER

## 2023-04-24 PROCEDURE — 99999 PR PBB SHADOW E&M-EST. PATIENT-LVL V: ICD-10-PCS | Mod: PBBFAC,,, | Performed by: NURSE PRACTITIONER

## 2023-04-24 PROCEDURE — 99214 OFFICE O/P EST MOD 30 MIN: CPT | Mod: S$PBB,,, | Performed by: NURSE PRACTITIONER

## 2023-04-24 PROCEDURE — 3008F BODY MASS INDEX DOCD: CPT | Mod: CPTII,,, | Performed by: NURSE PRACTITIONER

## 2023-04-24 PROCEDURE — 3044F HG A1C LEVEL LT 7.0%: CPT | Mod: CPTII,,, | Performed by: NURSE PRACTITIONER

## 2023-04-24 PROCEDURE — 3074F SYST BP LT 130 MM HG: CPT | Mod: CPTII,,, | Performed by: NURSE PRACTITIONER

## 2023-04-24 PROCEDURE — 1160F RVW MEDS BY RX/DR IN RCRD: CPT | Mod: CPTII,,, | Performed by: NURSE PRACTITIONER

## 2023-04-24 PROCEDURE — 3079F PR MOST RECENT DIASTOLIC BLOOD PRESSURE 80-89 MM HG: ICD-10-PCS | Mod: CPTII,,, | Performed by: NURSE PRACTITIONER

## 2023-04-24 PROCEDURE — 3066F PR DOCUMENTATION OF TREATMENT FOR NEPHROPATHY: ICD-10-PCS | Mod: CPTII,,, | Performed by: NURSE PRACTITIONER

## 2023-04-24 PROCEDURE — 1159F PR MEDICATION LIST DOCUMENTED IN MEDICAL RECORD: ICD-10-PCS | Mod: CPTII,,, | Performed by: NURSE PRACTITIONER

## 2023-04-24 PROCEDURE — 4010F ACE/ARB THERAPY RXD/TAKEN: CPT | Mod: CPTII,,, | Performed by: NURSE PRACTITIONER

## 2023-04-24 PROCEDURE — 3044F PR MOST RECENT HEMOGLOBIN A1C LEVEL <7.0%: ICD-10-PCS | Mod: CPTII,,, | Performed by: NURSE PRACTITIONER

## 2023-04-24 PROCEDURE — 3008F PR BODY MASS INDEX (BMI) DOCUMENTED: ICD-10-PCS | Mod: CPTII,,, | Performed by: NURSE PRACTITIONER

## 2023-04-24 RX ORDER — POLYETHYLENE GLYCOL 3350 17 G/17G
17 POWDER, FOR SOLUTION ORAL DAILY
Qty: 510 G | Refills: 11 | Status: SHIPPED | OUTPATIENT
Start: 2023-04-24 | End: 2023-10-04

## 2023-04-24 NOTE — PROGRESS NOTES
Subjective:       Patient ID: Misael Garcia is a 28 y.o. female.    Chief Complaint: Abdominal Pain and Constipation    27 y/o female with type 2 diabetes and morbid obesity presents to clinic with c/o constipation and abdominal pain.     Constipation  This is a new problem. The current episode started more than 1 month ago. The problem has been waxing and waning since onset. Her stool frequency is 2 to 3 times per week. The stool is described as firm and pellet like. The patient is not on a high fiber diet. She Does not exercise regularly. There has Been adequate water intake. Associated symptoms include abdominal pain and bloating. Pertinent negatives include no diarrhea, fecal incontinence, hematochezia, hemorrhoids, nausea, rectal pain or vomiting. Risk factors include change in medication usage/dosage (Ozempic). She has tried nothing for the symptoms.     Past Medical History:   Diagnosis Date    ADHD (attention deficit hyperactivity disorder)     Anxiety     Bipolar affective     Eczema     History of chlamydia 2016    Insomnia     Morbid obesity with BMI of 40.0-44.9, adult     Sleep disorder     Type 2 diabetes mellitus without complications        Past Surgical History:   Procedure Laterality Date    NO PAST SURGERIES      as of 19       Family History   Problem Relation Age of Onset    Breast cancer Paternal Grandmother     No Known Problems Father     No Known Problems Mother     No Known Problems Sister     Diabetes Maternal Grandmother     No Known Problems Maternal Grandfather     No Known Problems Paternal Grandfather     Colon cancer Neg Hx     Ovarian cancer Neg Hx        Social History     Socioeconomic History    Marital status: Single   Occupational History    Occupation:     Tobacco Use    Smoking status: Former     Types: Vaping with nicotine     Start date:      Quit date: 2022     Years since quittin.3    Smokeless tobacco: Never    Tobacco comments:      boyfriend smoke cigarettes   Substance and Sexual Activity    Alcohol use: No    Drug use: Yes     Types: Marijuana    Sexual activity: Yes     Partners: Male     Birth control/protection: Condom, OCP     Comment: Single:       Social Determinants of Health     Financial Resource Strain: Low Risk     Difficulty of Paying Living Expenses: Not hard at all   Food Insecurity: No Food Insecurity    Worried About Running Out of Food in the Last Year: Never true    Ran Out of Food in the Last Year: Never true   Transportation Needs: No Transportation Needs    Lack of Transportation (Medical): No    Lack of Transportation (Non-Medical): No   Physical Activity: Inactive    Days of Exercise per Week: 0 days    Minutes of Exercise per Session: 0 min   Stress: Stress Concern Present    Feeling of Stress : Very much   Social Connections: Unknown    Frequency of Communication with Friends and Family: Three times a week    Frequency of Social Gatherings with Friends and Family: Three times a week    Active Member of Clubs or Organizations: No    Attends Club or Organization Meetings: Never    Marital Status: Living with partner   Housing Stability: Unknown    Unable to Pay for Housing in the Last Year: No    Unstable Housing in the Last Year: No       Review of Systems   Constitutional:  Negative for appetite change and unexpected weight change.   HENT:  Negative for trouble swallowing.    Respiratory:  Negative for shortness of breath.    Cardiovascular:  Negative for chest pain.   Gastrointestinal:  Positive for abdominal pain, bloating and constipation. Negative for blood in stool, diarrhea, hematochezia, hemorrhoids, nausea, rectal pain and vomiting.   Hematological:  Negative for adenopathy. Does not bruise/bleed easily.   Psychiatric/Behavioral:  Negative for dysphoric mood.        Objective:     Vitals:    04/24/23 0819   BP: 122/82   BP Location: Right arm   Patient Position: Sitting   BP Method: Large (Manual)   Weight:  "(!) 164.8 kg (363 lb 6.4 oz)   Height: 5' 8" (1.727 m)          Physical Exam  Constitutional:       General: She is not in acute distress.     Appearance: She is obese.   HENT:      Head: Normocephalic.   Eyes:      Conjunctiva/sclera: Conjunctivae normal.      Pupils: Pupils are equal, round, and reactive to light.   Pulmonary:      Effort: Pulmonary effort is normal. No respiratory distress.   Musculoskeletal:         General: Normal range of motion.      Cervical back: Normal range of motion.   Skin:     General: Skin is warm and dry.   Neurological:      Mental Status: She is alert and oriented to person, place, and time.   Psychiatric:         Mood and Affect: Mood normal.         Behavior: Behavior normal.             Assessment:         ICD-10-CM ICD-9-CM   1. Chronic idiopathic constipation  K59.04 564.00   2. Class 3 severe obesity due to excess calories with serious comorbidity and body mass index (BMI) of 50.0 to 59.9 in adult  E66.01 278.01    Z68.43 V85.43       Plan:       Chronic idiopathic constipation  -     Dul/MOM colon cleanse. Then start Miralax and fiber supplement daily  -     polyethylene glycol (GLYCOLAX) 17 gram/dose powder; Take 17 g by mouth once daily.  Dispense: 510 g; Refill: 11    Class 3 severe obesity due to excess calories with serious comorbidity and body mass index (BMI) of 50.0 to 59.9 in adult        -    Weight loss advised. Dietary and exercise         counseling done.      Follow up in about 4 weeks (around 5/22/2023) for medication management.     Patient's Medications   New Prescriptions    POLYETHYLENE GLYCOL (GLYCOLAX) 17 GRAM/DOSE POWDER    Take 17 g by mouth once daily.   Previous Medications    ADAPALENE-BENZOYL PEROXIDE (EPIDUO FORTE) 0.3-2.5 % GLWP    Apply topically.    ALBUTEROL (PROVENTIL/VENTOLIN HFA) 90 MCG/ACTUATION INHALER    Inhale 2 puffs into the lungs every 4 (four) hours as needed (shortness of breath). Rescue    BLOOD SUGAR DIAGNOSTIC STRP    To check " BG 2-3x times daily, to use with insurance preferred meter    BLOOD-GLUCOSE METER KIT    To check BG 2-3x times daily, to use with insurance preferred meter    BUSPIRONE (BUSPAR) 15 MG TABLET    Take 15 mg by mouth 2 (two) times daily.    CARIPRAZINE (VRAYLAR) 4.5 MG CAP    Take 1 capsule by mouth every night at bedtime    CETIRIZINE (ZYRTEC) 10 MG TABLET    Take 1 tablet (10 mg total) by mouth once daily. May take an extra if needed.    CLINDAMYCIN (CLEOCIN T) 1 % LOTION    Apply topically every morning.    CLINDAMYCIN (CLEOCIN T) 1 % SWAB    Apply topically once daily.    CLONAZEPAM (KLONOPIN) 1 MG TABLET    Take 1 mg by mouth 2 (two) times daily as needed.    CLOTRIMAZOLE-BETAMETHASONE 1-0.05% (LOTRISONE) CREAM    Apply topically 2 (two) times daily.    DOXYCYCLINE (VIBRA-TABS) 100 MG TABLET    Take 1 tablet (100 mg total) by mouth once daily.    DROSPIRENONE-ETHINYL ESTRADIOL (JUAN MANUEL) 3-0.02 MG PER TABLET    Take 1 tablet by mouth once daily.    ESCITALOPRAM OXALATE (LEXAPRO) 20 MG TABLET    Take 1 tablet by mouth every morning.    FLUOCINONIDE 0.05% (LIDEX) 0.05 % CREAM    Apply topically 2 (two) times daily. As needed itchy rash.Stop using steroid topical when skin is smooth and non itchy.  Do not treat dark or red coloring.    FLUTICASONE FUROATE-VILANTEROL (BREO ELLIPTA) 200-25 MCG/DOSE DSDV DISKUS INHALER    Inhale 1 puff into the lungs every evening. Controller    FLUTICASONE PROPIONATE (FLONASE) 50 MCG/ACTUATION NASAL SPRAY    2 sprays (100 mcg total) by Each Nostril route 2 (two) times daily.    LANCETS 28 GAUGE MISC    To check BG 2-3 times daily, to use with insurance preferred meter    LISINOPRIL 10 MG TABLET    Take 1 tablet (10 mg total) by mouth once daily.    METFORMIN (GLUCOPHAGE) 500 MG TABLET    Take 1 tablet (500 mg total) by mouth 2 (two) times daily with meals.    NAPROXEN (NAPROSYN) 500 MG TABLET    Take 1 tablet (500 mg total) by mouth every 12 (twelve) hours as needed (Pain).    ONDANSETRON  (ZOFRAN) 4 MG TABLET    Take 1 tablet (4 mg total) by mouth every 8 (eight) hours as needed for Nausea.    SEMAGLUTIDE (OZEMPIC) 1 MG/DOSE (4 MG/3 ML)    Inject 1 mg into the skin every 7 days. For weeks 9-12    SEMAGLUTIDE (OZEMPIC) 2 MG/DOSE (8 MG/3 ML) PNIJ    Inject 2 mg into the skin every 7 days. For weeks 13-16    TIZANIDINE (ZANAFLEX) 2 MG TABLET    Take 1 tablet (2 mg total) by mouth every 8 (eight) hours as needed (neck pain).    TRAZODONE (DESYREL) 50 MG TABLET    Take 1-2 tablets by mouth every night at bedtime    TRETINOIN (RETIN-A) 0.025 % CREAM    Apply topically every evening.    TRETINOIN (RETIN-A) 0.05 % CREAM    Apply topically every evening. Start with every other night and move up to nightly after 2 weeks if not too dry.    TRIAMCINOLONE ACETONIDE 0.1% (KENALOG) 0.1 % OINTMENT    Apply topically 2 (two) times daily.    UREA (CARMOL) 40 % CREA    Apply topically once daily.    VRAYLAR 1.5 MG CAP    Take 1.5 mg by mouth every evening.    VRAYLAR 3 MG CAP    Take 3 mg by mouth.   Modified Medications    No medications on file   Discontinued Medications    No medications on file

## 2023-04-24 NOTE — PATIENT INSTRUCTIONS
I would like for you to buy a small box of dulcolax tablets and bottle of liquid Errol's Milk of Magnesia (do not use the pills).  When you have time to stay home near the toilet take 2 Dulcolax tablets. Then in 1 hour drink 60 mL of milk of magnesia. Drink another 60 mL 2-3 hours later.     After that is complete, start Miralax 1 capful in 6-8 ounces of water or juice daily (I will send prescription to your pharmacy) and an over the counter fiber supplement (such as Citrucel or Metamucil 1-2 capsules once daily).     Follow up in 4-6 weeks if symptoms persist or fail to improve.

## 2023-04-25 ENCOUNTER — TELEPHONE (OUTPATIENT)
Dept: ADMINISTRATIVE | Facility: OTHER | Age: 28
End: 2023-04-25
Payer: MEDICAID

## 2023-05-01 ENCOUNTER — E-VISIT (OUTPATIENT)
Dept: FAMILY MEDICINE | Facility: CLINIC | Age: 28
End: 2023-05-01
Payer: MEDICAID

## 2023-05-01 ENCOUNTER — PATIENT MESSAGE (OUTPATIENT)
Dept: FAMILY MEDICINE | Facility: CLINIC | Age: 28
End: 2023-05-01

## 2023-05-01 DIAGNOSIS — J06.9 VIRAL URI WITH COUGH: Primary | ICD-10-CM

## 2023-05-01 PROCEDURE — 99421 PR E&M, ONLINE DIGIT, EST, < 7 DAYS, 5-10 MINS: ICD-10-PCS | Mod: ,,, | Performed by: FAMILY MEDICINE

## 2023-05-01 PROCEDURE — 99421 OL DIG E/M SVC 5-10 MIN: CPT | Mod: ,,, | Performed by: FAMILY MEDICINE

## 2023-05-01 NOTE — PROGRESS NOTES
Patient ID: Misael Garcia is a 28 y.o. female.    Chief Complaint: URI    The patient initiated a request through [employer] on 5/1/2023 for evaluation and management with a chief complaint of URI     I evaluated the questionnaire submission on 05/01/2023      [facility] Peq Evisit Upper Respitatory/Cough Questionnaire    5/1/2023  3:00 PM CDT - Filed by Patient   Do you agree to participate in an E-Visit? Yes   If you have any of the following symptoms, please present to your local ER or call 911:  I acknowledge   What is the main issue that you would like for your doctor to address today? Coufh   Are you able to take your vital signs? No   Are you currently pregnant, could you be pregnant, or are you breast feeding? None of the above   What symptoms do you currently have?  Cough;  Fatigue;  Headache;  Nasal Congestion;  Runny nose;  Sore throat   Have you had a fever? No   When did your symptoms first appear? 4/28/2023   In the last two weeks, have you been in close contact with someone who has COVID-19 or the Flu? No   In the last two weeks, have you worked or volunteered in a healthcare facility or as a ? Healthcare facilities include a hospital, medical or dental clinic, long-term care facility, or nursing home No   Do you live in a long-term care facility, nursing home, group home, or homeless shelter? No   List what you have done or taken to help your symptoms. Advil Sinus Cold and Flu   How severe are your symptoms? Moderate   Have you taken an at home Covid test? No   Have you taken a Flu test? No   Have you been fully vaccinated for COVID? (2 Pfizer, 2 Moderna or 1 Bhaskar & Bhaskar vaccine injections) No   Have you received your first dose of the Pfizer or Moderna COVID vaccine? Yes   Have you recieved a Flu shot? Yes   When did you recieve your Flu shot? 12/19/2022   Do you have transportation to get tested for COVID if it is indicated and ordered for you at an [facility] location? Yes    Provide any information you feel is important to your history not asked above    Please attach any relevant images or files          Recent Labs Obtained:  No visits with results within 7 Day(s) from this visit.   Latest known visit with results is:   Lab Visit on 04/21/2023   Component Date Value Ref Range Status    H. pylori Antigen Source 04/21/2023 stool   Final    H. Pylori Antigen, Stool 04/21/2023 NOT DETECTED  NOT DETECTED Final    Comment: Antimicrobials, proton pump inhibitors, and bismuth  preparations inhibit H. pylori and ingestion up to two weeks  prior to testing may cause false negative results. If  clinically indicated the test should be repeated on a new  specimen obtained two weeks after discontinuing treatment.    TEST PERFORMED AT:  [employer]  [address] [place] [address]  BATSHEVA GARVIN MD,PHD,TIMUR         Encounter Diagnosis   Name Primary?    Viral URI with cough Yes        No orders of the defined types were placed in this encounter.           Follow up if symptoms worsen or fail to improve.      E-Visit Time Tracking:    Day 1 Time (in minutes): 6     Total Time (in minutes): 6

## 2023-05-02 ENCOUNTER — PATIENT MESSAGE (OUTPATIENT)
Dept: ADMINISTRATIVE | Facility: HOSPITAL | Age: 28
End: 2023-05-02
Payer: MEDICAID

## 2023-05-03 ENCOUNTER — PATIENT OUTREACH (OUTPATIENT)
Dept: ADMINISTRATIVE | Facility: HOSPITAL | Age: 28
End: 2023-05-03
Payer: MEDICAID

## 2023-05-03 NOTE — PROGRESS NOTES
Non-compliant GAP report chart review -  05/03/2023  Chart review completed for the following HM test if overdue  (Dilated EYE EXAM)   Care Everywhere and Media reports - updates requested and reviewed.    Patient outreach regarding Health Maintenance- left VM

## 2023-05-05 ENCOUNTER — HOSPITAL ENCOUNTER (EMERGENCY)
Facility: HOSPITAL | Age: 28
Discharge: LEFT BEFORE DISCHARGE | End: 2023-05-05
Attending: EMERGENCY MEDICINE
Payer: MEDICAID

## 2023-05-05 VITALS
SYSTOLIC BLOOD PRESSURE: 140 MMHG | TEMPERATURE: 99 F | RESPIRATION RATE: 20 BRPM | HEIGHT: 68 IN | DIASTOLIC BLOOD PRESSURE: 82 MMHG | WEIGHT: 293 LBS | BODY MASS INDEX: 44.41 KG/M2 | HEART RATE: 85 BPM | OXYGEN SATURATION: 98 %

## 2023-05-05 DIAGNOSIS — H11.31 SUBCONJUNCTIVAL HEMORRHAGE OF RIGHT EYE: Primary | ICD-10-CM

## 2023-05-05 DIAGNOSIS — R11.0 NAUSEA: ICD-10-CM

## 2023-05-05 LAB
B-HCG UR QL: NEGATIVE
CTP QC/QA: YES

## 2023-05-05 PROCEDURE — 81025 URINE PREGNANCY TEST: CPT

## 2023-05-05 PROCEDURE — 99283 EMERGENCY DEPT VISIT LOW MDM: CPT

## 2023-05-05 PROCEDURE — 25000003 PHARM REV CODE 250

## 2023-05-05 RX ORDER — ONDANSETRON 4 MG/1
4 TABLET, ORALLY DISINTEGRATING ORAL
Status: COMPLETED | OUTPATIENT
Start: 2023-05-05 | End: 2023-05-05

## 2023-05-05 RX ADMIN — ONDANSETRON 4 MG: 4 TABLET, ORALLY DISINTEGRATING ORAL at 12:05

## 2023-05-05 NOTE — ED TRIAGE NOTES
"Pt to ED reporting one episode of vomiting at 0930 and nausea. Pt also reporting "popped blood vessel in eye". Minor red spot to part of right eye. Pt denies vision changes.   "

## 2023-05-05 NOTE — ED PROVIDER NOTES
"Encounter Date: 5/5/2023    SCRIBE #1 NOTE: IHailey, nelly scribing for, and in the presence of,  Allie Batista PA-C. I have scribed the following portions of the note - Other sections scribed: HPI, ROS.     History     Chief Complaint   Patient presents with    Eye Pain     Patient c/o right eye pain after having episode of vomiting     Misael Garcia is a 28 y.o. female, with a PMHx of DM (type 2) with associated HTN, who presents to the ED with vomiting (resolved), nausea, and dry right eye. Patient endorses eye discomfort due to "popping a blood vessel" but denies any pain when moving the eye. She reports she "did not feel well" and vomited once today at 9:30 AM but denies hematemesis. Patient states nausea improved after taking Pepto bismol and over the counter nausea medication. Patient wears glasses and contacts and is currently wearing them. Patient denies any recent falls, hitting her head, or LOC. Patient states she visited her PCP 4 days ago for a possible viral infection. No other exacerbating or alleviating factors. Denies eye pain, visual disturbance, eye drainage, eye itching, vaginal bleeding or discharge, hematuria, dysuria, vision changes, diarrhea, or other associated symptoms. Patient reports her last menstrual period started 4 days ago and ended 3 days ago. Patient is currently taking medication for depression and anxiety.    The history is provided by the patient. No  was used.   Review of patient's allergies indicates:  No Known Allergies  Past Medical History:   Diagnosis Date    ADHD (attention deficit hyperactivity disorder)     Anxiety     Bipolar affective     Eczema     History of chlamydia 12/2016    Insomnia     Morbid obesity with BMI of 40.0-44.9, adult     Sleep disorder     Type 2 diabetes mellitus without complications      Past Surgical History:   Procedure Laterality Date    NO PAST SURGERIES      as of 1-13-17     Family History   Problem " Relation Age of Onset    Breast cancer Paternal Grandmother     No Known Problems Father     No Known Problems Mother     No Known Problems Sister     Diabetes Maternal Grandmother     No Known Problems Maternal Grandfather     No Known Problems Paternal Grandfather     Colon cancer Neg Hx     Ovarian cancer Neg Hx      Social History     Tobacco Use    Smoking status: Former     Types: Vaping with nicotine     Start date:      Quit date: 2022     Years since quittin.3    Smokeless tobacco: Never    Tobacco comments:     boyfriend smoke cigarettes   Substance Use Topics    Alcohol use: No    Drug use: Yes     Types: Marijuana     Review of Systems   Constitutional:  Negative for diaphoresis, fatigue and fever.   HENT:  Negative for nosebleeds, sinus pain, sore throat and voice change.    Eyes:  Positive for redness. Negative for photophobia, pain, discharge, itching and visual disturbance.   Respiratory:  Negative for cough, shortness of breath and wheezing.    Cardiovascular:  Negative for chest pain and leg swelling.   Gastrointestinal:  Positive for nausea and vomiting (Resolved). Negative for abdominal distention, abdominal pain, blood in stool and diarrhea.        (-) Hematemesis.   Genitourinary:  Negative for dysuria, flank pain, hematuria, vaginal bleeding and vaginal discharge.   Musculoskeletal:  Negative for joint swelling and neck stiffness.   Skin:  Negative for rash and wound.   Neurological:  Negative for syncope, weakness and headaches.   Psychiatric/Behavioral:  Negative for confusion. The patient is not nervous/anxious.      Physical Exam     Initial Vitals [23 1124]   BP Pulse Resp Temp SpO2   (!) 140/82 85 20 99.1 °F (37.3 °C) 98 %      MAP       --         Physical Exam    Nursing note and vitals reviewed.  Constitutional: She appears well-developed and well-nourished.  Non-toxic appearance. She does not appear ill.   HENT:   Head: Normocephalic and atraumatic.   Right Ear:  Hearing, tympanic membrane, external ear and ear canal normal. Tympanic membrane is not perforated, not erythematous and not bulging.   Left Ear: Hearing, tympanic membrane, external ear and ear canal normal. Tympanic membrane is not perforated, not erythematous and not bulging.   Nose: Nose normal.   Mouth/Throat: Uvula is midline, oropharynx is clear and moist and mucous membranes are normal.   Eyes: Conjunctivae, EOM and lids are normal. Pupils are equal, round, and reactive to light. Right eye exhibits no chemosis and no discharge. Left eye exhibits no chemosis and no discharge.   Pupils are equally reactive to light bilaterally.  Extraocular eye movements intact.  No pain with extraocular eye movements.  Subconjunctival hemorrhage noted to right conjunctiva.  No periorbital edema, erythema, or cellulitis.  No chemosis bilaterally.   Neck: Neck supple.   Normal range of motion.   Full passive range of motion without pain.     Cardiovascular:  Normal rate and regular rhythm.           Pulses:       Radial pulses are 2+ on the right side and 2+ on the left side.   Pulmonary/Chest: Effort normal and breath sounds normal. No accessory muscle usage. No respiratory distress. She has no decreased breath sounds.   Abdominal: Abdomen is soft. Bowel sounds are normal. She exhibits no distension. There is no abdominal tenderness. There is no rebound and no guarding.   Musculoskeletal:         General: Normal range of motion.      Cervical back: Full passive range of motion without pain, normal range of motion and neck supple. No rigidity.     Neurological: She is alert. No cranial nerve deficit.   Neuro intact.  Strength and sensation intact bilateral upper and lower extremities.   Skin: Skin is warm and dry.   Psychiatric: She has a normal mood and affect.       ED Course   Procedures  Labs Reviewed   POCT URINE PREGNANCY          Imaging Results    None          Medications   ondansetron disintegrating tablet 4 mg (4 mg  "Oral Given 5/5/23 1245)     Medical Decision Making:   History:   Old Medical Records: I decided to obtain old medical records.  Clinical Tests:   Lab Tests: Ordered and Reviewed  ED Management:  This is a 28 y.o. female, with a PMHx of DM (type 2) with associated HTN, who presents to the ED with vomiting (resolved), nausea, and dry right eye. Patient endorses eye discomfort due to "popping a blood vessel" but denies any pain when moving the eye. On physical exam, patient is well-appearing and in no acute distress.  Nontoxic appearing.  Lungs are clear to auscultation bilaterally.  Abdomen is soft and nontender.  No guarding, rigidity, rebound.  2+ radial pulses bilaterally.  Posterior oropharynx is not erythematous.  No edema or exudate.  Uvula midline.  Bilateral tympanic membrane is normal.  No erythema, bulging, or perforations.  Neuro intact.  Strength and sensation intact bilateral upper and lower extremities.  Pupils are equally reactive to light bilaterally.  Extraocular eye movements intact.  No pain with extraocular eye movements.  Subconjunctival hemorrhage noted to right conjunctiva.  No periorbital edema, erythema, or cellulitis.  No chemosis bilaterally.  No CVA tenderness bilaterally.  UPT negative.  Zofran ordered.  Will reassess.  Upon reassessment, patient eloped.        Scribe Attestation:   Scribe #1: I performed the above scribed service and the documentation accurately describes the services I performed. I attest to the accuracy of the note.                 I, Allie Batista, personally performed the services described in this documentation. All medical record entries made by the scribe were at my direction and in my presence. I have reviewed the chart and agree that the record reflects my personal performance and is accurate and complete.   Clinical Impression:   Final diagnoses:  [H11.31] Subconjunctival hemorrhage of right eye (Primary)  [R11.0] Nausea        ED Disposition Condition    " Eloped Stable                Allie Batista PA-C  05/05/23 1935

## 2023-05-08 ENCOUNTER — PATIENT MESSAGE (OUTPATIENT)
Dept: FAMILY MEDICINE | Facility: CLINIC | Age: 28
End: 2023-05-08
Payer: MEDICAID

## 2023-05-16 ENCOUNTER — TELEPHONE (OUTPATIENT)
Dept: OBSTETRICS AND GYNECOLOGY | Facility: CLINIC | Age: 28
End: 2023-05-16
Payer: MEDICAID

## 2023-05-16 ENCOUNTER — PATIENT MESSAGE (OUTPATIENT)
Dept: OBSTETRICS AND GYNECOLOGY | Facility: CLINIC | Age: 28
End: 2023-05-16
Payer: MEDICAID

## 2023-05-16 NOTE — TELEPHONE ENCOUNTER
----- Message from Leti Hooks sent at 5/16/2023  8:30 AM CDT -----  Type:  Needs Medical Advice    Who Called: pt  Symptoms (please be specific): pt has a yeast infection and is requesting to have something sent to the pharmacy  and the pt is requesting to get a excuse for work for today 05/16/23   How long has patient had these symptoms:  2 days   Pharmacy name and phone #:  [facility]/pharmacy #[id number] - [place] - [address]  [address] [address]  Phone: [phone] Fax: [phone]      Would the patient rather a call back or a response via [facility]? call  Best Call Back Number:[phone]        Additional Information:

## 2023-05-17 ENCOUNTER — PATIENT MESSAGE (OUTPATIENT)
Dept: OBSTETRICS AND GYNECOLOGY | Facility: CLINIC | Age: 28
End: 2023-05-17
Payer: MEDICAID

## 2023-05-17 ENCOUNTER — PATIENT MESSAGE (OUTPATIENT)
Dept: SPINE | Facility: CLINIC | Age: 28
End: 2023-05-17
Payer: MEDICAID

## 2023-05-17 DIAGNOSIS — M54.2 NECK PAIN: Primary | ICD-10-CM

## 2023-05-17 DIAGNOSIS — M62.838 MUSCLE SPASM: ICD-10-CM

## 2023-05-18 ENCOUNTER — OFFICE VISIT (OUTPATIENT)
Dept: OBSTETRICS AND GYNECOLOGY | Facility: CLINIC | Age: 28
End: 2023-05-18
Payer: MEDICAID

## 2023-05-18 ENCOUNTER — PATIENT MESSAGE (OUTPATIENT)
Dept: OBSTETRICS AND GYNECOLOGY | Facility: CLINIC | Age: 28
End: 2023-05-18

## 2023-05-18 ENCOUNTER — PATIENT MESSAGE (OUTPATIENT)
Dept: SPINE | Facility: CLINIC | Age: 28
End: 2023-05-18
Payer: MEDICAID

## 2023-05-18 VITALS — DIASTOLIC BLOOD PRESSURE: 96 MMHG | SYSTOLIC BLOOD PRESSURE: 149 MMHG | WEIGHT: 293 LBS | BODY MASS INDEX: 55.75 KG/M2

## 2023-05-18 DIAGNOSIS — N76.0 ACUTE VAGINITIS: Primary | ICD-10-CM

## 2023-05-18 LAB
C TRACH DNA SPEC QL NAA+PROBE: NOT DETECTED
N GONORRHOEA DNA SPEC QL NAA+PROBE: NOT DETECTED

## 2023-05-18 PROCEDURE — 4010F ACE/ARB THERAPY RXD/TAKEN: CPT | Mod: CPTII,,, | Performed by: NURSE PRACTITIONER

## 2023-05-18 PROCEDURE — 3061F PR NEG MICROALBUMINURIA RESULT DOCUMENTED/REVIEW: ICD-10-PCS | Mod: CPTII,,, | Performed by: NURSE PRACTITIONER

## 2023-05-18 PROCEDURE — 99215 OFFICE O/P EST HI 40 MIN: CPT | Mod: PBBFAC,PO | Performed by: NURSE PRACTITIONER

## 2023-05-18 PROCEDURE — 1160F PR REVIEW ALL MEDS BY PRESCRIBER/CLIN PHARMACIST DOCUMENTED: ICD-10-PCS | Mod: CPTII,,, | Performed by: NURSE PRACTITIONER

## 2023-05-18 PROCEDURE — 3080F PR MOST RECENT DIASTOLIC BLOOD PRESSURE >= 90 MM HG: ICD-10-PCS | Mod: CPTII,,, | Performed by: NURSE PRACTITIONER

## 2023-05-18 PROCEDURE — 99999 PR PBB SHADOW E&M-EST. PATIENT-LVL V: ICD-10-PCS | Mod: PBBFAC,,, | Performed by: NURSE PRACTITIONER

## 2023-05-18 PROCEDURE — 3080F DIAST BP >= 90 MM HG: CPT | Mod: CPTII,,, | Performed by: NURSE PRACTITIONER

## 2023-05-18 PROCEDURE — 3008F PR BODY MASS INDEX (BMI) DOCUMENTED: ICD-10-PCS | Mod: CPTII,,, | Performed by: NURSE PRACTITIONER

## 2023-05-18 PROCEDURE — 3044F HG A1C LEVEL LT 7.0%: CPT | Mod: CPTII,,, | Performed by: NURSE PRACTITIONER

## 2023-05-18 PROCEDURE — 3044F PR MOST RECENT HEMOGLOBIN A1C LEVEL <7.0%: ICD-10-PCS | Mod: CPTII,,, | Performed by: NURSE PRACTITIONER

## 2023-05-18 PROCEDURE — 1159F MED LIST DOCD IN RCRD: CPT | Mod: CPTII,,, | Performed by: NURSE PRACTITIONER

## 2023-05-18 PROCEDURE — 99213 OFFICE O/P EST LOW 20 MIN: CPT | Mod: S$PBB,,, | Performed by: NURSE PRACTITIONER

## 2023-05-18 PROCEDURE — 3066F PR DOCUMENTATION OF TREATMENT FOR NEPHROPATHY: ICD-10-PCS | Mod: CPTII,,, | Performed by: NURSE PRACTITIONER

## 2023-05-18 PROCEDURE — 4010F PR ACE/ARB THEARPY RXD/TAKEN: ICD-10-PCS | Mod: CPTII,,, | Performed by: NURSE PRACTITIONER

## 2023-05-18 PROCEDURE — 1160F RVW MEDS BY RX/DR IN RCRD: CPT | Mod: CPTII,,, | Performed by: NURSE PRACTITIONER

## 2023-05-18 PROCEDURE — 99999 PR PBB SHADOW E&M-EST. PATIENT-LVL V: CPT | Mod: PBBFAC,,, | Performed by: NURSE PRACTITIONER

## 2023-05-18 PROCEDURE — 1159F PR MEDICATION LIST DOCUMENTED IN MEDICAL RECORD: ICD-10-PCS | Mod: CPTII,,, | Performed by: NURSE PRACTITIONER

## 2023-05-18 PROCEDURE — 3066F NEPHROPATHY DOC TX: CPT | Mod: CPTII,,, | Performed by: NURSE PRACTITIONER

## 2023-05-18 PROCEDURE — 3077F PR MOST RECENT SYSTOLIC BLOOD PRESSURE >= 140 MM HG: ICD-10-PCS | Mod: CPTII,,, | Performed by: NURSE PRACTITIONER

## 2023-05-18 PROCEDURE — 87591 N.GONORRHOEAE DNA AMP PROB: CPT | Performed by: NURSE PRACTITIONER

## 2023-05-18 PROCEDURE — 3077F SYST BP >= 140 MM HG: CPT | Mod: CPTII,,, | Performed by: NURSE PRACTITIONER

## 2023-05-18 PROCEDURE — 3061F NEG MICROALBUMINURIA REV: CPT | Mod: CPTII,,, | Performed by: NURSE PRACTITIONER

## 2023-05-18 PROCEDURE — 99213 PR OFFICE/OUTPT VISIT, EST, LEVL III, 20-29 MIN: ICD-10-PCS | Mod: S$PBB,,, | Performed by: NURSE PRACTITIONER

## 2023-05-18 PROCEDURE — 3008F BODY MASS INDEX DOCD: CPT | Mod: CPTII,,, | Performed by: NURSE PRACTITIONER

## 2023-05-18 PROCEDURE — 81514 NFCT DS BV&VAGINITIS DNA ALG: CPT | Performed by: NURSE PRACTITIONER

## 2023-05-18 RX ORDER — TIZANIDINE 2 MG/1
2 TABLET ORAL EVERY 8 HOURS PRN
Qty: 90 TABLET | Refills: 1 | Status: SHIPPED | OUTPATIENT
Start: 2023-05-18 | End: 2023-10-04

## 2023-05-18 RX ORDER — NAPROXEN 500 MG/1
500 TABLET ORAL EVERY 12 HOURS PRN
Qty: 60 TABLET | Refills: 1 | Status: SHIPPED | OUTPATIENT
Start: 2023-05-18 | End: 2023-06-12

## 2023-05-18 RX ORDER — FLUCONAZOLE 150 MG/1
150 TABLET ORAL DAILY
Qty: 2 TABLET | Refills: 1 | Status: SHIPPED | OUTPATIENT
Start: 2023-05-18 | End: 2023-10-04

## 2023-05-18 NOTE — TELEPHONE ENCOUNTER
PT for Cervical retraction scapula stabilization posture training and HEP    Script written for tizanidine and naproxen

## 2023-05-18 NOTE — PROGRESS NOTES
"CC: Vaginitis     HPI: Pt is a 28 y.o.  female  who presents with vaginal irritation after douching a little over a week ago. Reports a little white discharge has "a little" spotting when wiping. Denies vaginal odor, dysuria or pelvic pain. States seen at another office given diflucan and "another antibiotic" with minimal relief, no cultures collected. She denies any new sexual partners or known STI exposures.      Stopped combo OCP forgets to take them, nexplanon order placed by Dr. Peña in March.     Past Medical History:   Diagnosis Date    ADHD (attention deficit hyperactivity disorder)     Anxiety     Bipolar affective     Eczema     History of chlamydia 2016    Insomnia     Morbid obesity with BMI of 40.0-44.9, adult     Sleep disorder     Type 2 diabetes mellitus without complications        Past Surgical History:   Procedure Laterality Date    NO PAST SURGERIES      as of 19       OB History    Para Term  AB Living   0 0 0 0 0 0   SAB IAB Ectopic Multiple Live Births   0 0 0 0     Obstetric Comments   Menarche ~ 18/reg   H/o chlamydia    Denies abnl pap       Family History   Problem Relation Age of Onset    Breast cancer Paternal Grandmother     No Known Problems Father     No Known Problems Mother     No Known Problems Sister     Diabetes Maternal Grandmother     No Known Problems Maternal Grandfather     No Known Problems Paternal Grandfather     Colon cancer Neg Hx     Ovarian cancer Neg Hx        Social History     Socioeconomic History    Marital status: Single   Occupational History    Occupation:     Tobacco Use    Smoking status: Some Days     Types: Vaping with nicotine     Start date:      Last attempt to quit: 2022     Years since quittin.3    Smokeless tobacco: Never    Tobacco comments:     boyfriend smoke cigarettes   Substance and Sexual Activity    Alcohol use: No    Drug use: Yes     Types: Marijuana    Sexual activity: Yes     Partners: " Male     Birth control/protection: None     Comment: Single:       Social Determinants of Health     Financial Resource Strain: Low Risk     Difficulty of Paying Living Expenses: Not hard at all   Food Insecurity: No Food Insecurity    Worried About Running Out of Food in the Last Year: Never true    Ran Out of Food in the Last Year: Never true   Transportation Needs: No Transportation Needs    Lack of Transportation (Medical): No    Lack of Transportation (Non-Medical): No   Physical Activity: Inactive    Days of Exercise per Week: 0 days    Minutes of Exercise per Session: 0 min   Stress: Stress Concern Present    Feeling of Stress : Very much   Social Connections: Unknown    Frequency of Communication with Friends and Family: Three times a week    Frequency of Social Gatherings with Friends and Family: Three times a week    Active Member of Clubs or Organizations: No    Attends Club or Organization Meetings: Never    Marital Status: Living with partner   Housing Stability: Unknown    Unable to Pay for Housing in the Last Year: No    Unstable Housing in the Last Year: No       BP (!) 149/96   Wt (!) 166.3 kg (366 lb 10 oz)   LMP 04/26/2023 (Exact Date)   BMI 55.75 kg/m²     ROS:  GENERAL: No fever, chills, fatigability or weight loss.  VULVAR: No pain, no lesions and + itching.  VAGINAL: No relaxation, + itching, + discharge, no abnormal bleeding and no lesions.  ABDOMEN: No abdominal pain. Denies nausea. Denies vomiting. No diarrhea. No constipation  BREAST: Denies pain. No lumps. No discharge.  URINARY: No incontinence, no nocturia, no frequency and no dysuria.  CARDIOVASCULAR: No chest pain. No shortness of breath. No leg cramps.  NEUROLOGICAL: No headaches. No vision changes.    PE:   APPEARANCE: Well nourished, well developed, in no acute distress.  AFFECT: Alert and oriented x 3  SKIN: Warm, dry, & intact. No acne or hirsutism.  NECK: Neck symmetric  NODES: No inguinal or femoral lymph node  enlargement  CHEST:  Easy, even breaths.  ABDOMEN: Soft.  Nontender, nondistended.  PELVIC: Normal external genitalia without lesions.  Normal hair distribution.  Adequate perineal body, normal urethral meatus. Vagina moist and well rugated without lesions, thick white discharge.  Cervix pink, without lesions, discharge or tenderness.  No significant cystocele or rectocele. Bimanual exam limited due to body habitus shows uterus to be normal size, regular, mobile and nontender.  Adnexa without masses or tenderness.   Anus Perineum:No lesions, no relaxation, no external hemorrhoids.  EXTREMITIES: No edema.    ASSESSMENT AND PLAN  1. Acute vaginitis  Vaginosis Screen by DNA Probe    C. trachomatis/N. gonorrhoeae by AMP DNA    fluconazole (DIFLUCAN) 150 MG Tab        Discussed:  -Never douche, causes vaginal PH imbalance will cause chronic vaginitis   -Will treat as yeast today due to exam  -Went to check on nexplanon approval status, when returned pt left, message sent to call and schedule insertion, prior to leaving pt states she will use condoms  -Vaginitis prevetion  a. avoiding feminine products such as deoderant soaps, body wash, bubble bath, douches, scented toilet paper, deoderant tampons or pads, feminine wipes, chronic pad use, etc.  b. avoiding other vulvovaginal irritants such as long hot baths, humidity, tight, synthetic clothing, chlorine and sitting around in wet bathing suits  c. wearing cotton underwear, avoiding thong underwear and no underwear to bed  d. taking showers instead of baths and use a hair dryer on cool setting afterwards to dry  e. wearing cotton to exercise and shower immediately after exercise and change clothes  f. using polyurethane condoms without spermicide if sexually active and symptoms are triggered by intercourse       Follow-up: PRN

## 2023-05-18 NOTE — PATIENT INSTRUCTIONS
-Vaginitis prevetion  a. avoiding feminine products such as deoderant soaps, body wash, bubble bath, douches, scented toilet paper, deoderant tampons or pads, feminine wipes, chronic pad use, etc.  b. avoiding other vulvovaginal irritants such as long hot baths, humidity, tight, synthetic clothing, chlorine and sitting around in wet bathing suits  c. wearing cotton underwear, avoiding thong underwear and no underwear to bed  d. taking showers instead of baths and use a hair dryer on cool setting afterwards to dry  e. wearing cotton to exercise and shower immediately after exercise and change clothes  f. using polyurethane condoms without spermicide if sexually active and symptoms are triggered by intercourse

## 2023-05-19 ENCOUNTER — TELEPHONE (OUTPATIENT)
Dept: OBSTETRICS AND GYNECOLOGY | Facility: CLINIC | Age: 28
End: 2023-05-19
Payer: MEDICAID

## 2023-05-19 NOTE — TELEPHONE ENCOUNTER
----- Message from ARACELI Gambino sent at 5/18/2023  9:25 AM CDT -----  Regarding: Nexplanon  Nexplanon order placed by Dr. Peña, please call and schedule pt for insertion with him. Thanks

## 2023-05-22 ENCOUNTER — PATIENT MESSAGE (OUTPATIENT)
Dept: DERMATOLOGY | Facility: CLINIC | Age: 28
End: 2023-05-22
Payer: MEDICAID

## 2023-05-22 LAB
BACTERIAL VAGINOSIS DNA: NEGATIVE
CANDIDA GLABRATA DNA: NEGATIVE
CANDIDA KRUSEI DNA: NEGATIVE
CANDIDA RRNA VAG QL PROBE: POSITIVE
T VAGINALIS RRNA GENITAL QL PROBE: NEGATIVE

## 2023-05-31 ENCOUNTER — PATIENT MESSAGE (OUTPATIENT)
Dept: SPINE | Facility: CLINIC | Age: 28
End: 2023-05-31
Payer: MEDICAID

## 2023-05-31 ENCOUNTER — PATIENT MESSAGE (OUTPATIENT)
Dept: OBSTETRICS AND GYNECOLOGY | Facility: CLINIC | Age: 28
End: 2023-05-31
Payer: MEDICAID

## 2023-06-01 ENCOUNTER — OCCUPATIONAL HEALTH (OUTPATIENT)
Dept: URGENT CARE | Facility: CLINIC | Age: 28
End: 2023-06-01

## 2023-06-01 ENCOUNTER — PATIENT MESSAGE (OUTPATIENT)
Dept: FAMILY MEDICINE | Facility: CLINIC | Age: 28
End: 2023-06-01
Payer: MEDICAID

## 2023-06-01 DIAGNOSIS — Z02.1 PRE-EMPLOYMENT DRUG SCREENING: Primary | ICD-10-CM

## 2023-06-01 DIAGNOSIS — Z02.83 ENCOUNTER FOR DRUG SCREENING: Primary | ICD-10-CM

## 2023-06-01 LAB
CTP QC/QA: YES
POC 5 PANEL DRUG SCREEN: NEGATIVE

## 2023-06-01 PROCEDURE — 80305 POCT RAPID DRUG SCREEN 5 PANEL: ICD-10-PCS | Mod: S$GLB,,, | Performed by: EMERGENCY MEDICINE

## 2023-06-01 PROCEDURE — 80305 DRUG TEST PRSMV DIR OPT OBS: CPT | Mod: S$GLB,,, | Performed by: EMERGENCY MEDICINE

## 2023-06-04 ENCOUNTER — HOSPITAL ENCOUNTER (EMERGENCY)
Facility: HOSPITAL | Age: 28
Discharge: HOME | End: 2023-06-04
Attending: EMERGENCY MEDICINE
Payer: MEDICAID

## 2023-06-04 VITALS
HEART RATE: 109 BPM | WEIGHT: 293 LBS | OXYGEN SATURATION: 97 % | BODY MASS INDEX: 45.99 KG/M2 | TEMPERATURE: 99 F | DIASTOLIC BLOOD PRESSURE: 97 MMHG | RESPIRATION RATE: 18 BRPM | SYSTOLIC BLOOD PRESSURE: 136 MMHG | HEIGHT: 67 IN

## 2023-06-04 DIAGNOSIS — J02.9 SORE THROAT: Primary | ICD-10-CM

## 2023-06-04 LAB
CTP QC/QA: YES
MOLECULAR STREP A: NEGATIVE

## 2023-06-04 PROCEDURE — 99284 EMERGENCY DEPT VISIT MOD MDM: CPT

## 2023-06-04 PROCEDURE — 87651 STREP A DNA AMP PROBE: CPT

## 2023-06-04 RX ORDER — CETIRIZINE HYDROCHLORIDE 10 MG/1
10 TABLET ORAL DAILY
Qty: 30 TABLET | Refills: 0 | Status: SHIPPED | OUTPATIENT
Start: 2023-06-04 | End: 2024-06-03

## 2023-06-04 RX ORDER — FLUTICASONE PROPIONATE 50 MCG
1 SPRAY, SUSPENSION (ML) NASAL 2 TIMES DAILY PRN
Qty: 15 G | Refills: 0 | OUTPATIENT
Start: 2023-06-04 | End: 2023-06-05

## 2023-06-04 RX ORDER — IBUPROFEN 800 MG/1
800 TABLET ORAL 3 TIMES DAILY
Qty: 20 TABLET | Refills: 0 | Status: SHIPPED | OUTPATIENT
Start: 2023-06-04 | End: 2023-06-12 | Stop reason: SDUPTHER

## 2023-06-04 NOTE — Clinical Note
"Misael"Jose Garcia was seen and treated in our emergency department on 6/4/2023.  She may return to work on 06/05/2023.       If you have any questions or concerns, please don't hesitate to call.      Babak Gonzalez PA-C"

## 2023-06-04 NOTE — ED TRIAGE NOTES
29 yo female presents to the ED with c/o sore throat. Pt reports that her symptoms began on yesterday; rates pain at a 7 on a PRS of 0-10. Pt denies CP, SOB, N/V/D, chills/fever, H/A. Pt endorses PMH of diabetes and depression.

## 2023-06-04 NOTE — ED PROVIDER NOTES
Encounter Date: 2023       History     Chief Complaint   Patient presents with    Sore Throat     X since yesterday, with cough and sinus     28-year-old female presents with a sore throat.  Patient is obese.  Patient has a history of diabetes on oral medication and insulin.  She has been having a sore throat for 2 days.  Denies any fevers or chills.  No chest pain or shortness of breath.  No trouble swallowing or breathing.  No pain within the neck    Review of patient's allergies indicates:  No Known Allergies  Past Medical History:   Diagnosis Date    ADHD (attention deficit hyperactivity disorder)     Anxiety     Bipolar affective     Eczema     History of chlamydia 2016    Insomnia     Morbid obesity with BMI of 40.0-44.9, adult     Sleep disorder     Type 2 diabetes mellitus without complications      Past Surgical History:   Procedure Laterality Date    NO PAST SURGERIES      as of 19     Family History   Problem Relation Age of Onset    Breast cancer Paternal Grandmother     No Known Problems Father     No Known Problems Mother     No Known Problems Sister     Diabetes Maternal Grandmother     No Known Problems Maternal Grandfather     No Known Problems Paternal Grandfather     Colon cancer Neg Hx     Ovarian cancer Neg Hx      Social History     Tobacco Use    Smoking status: Some Days     Types: Vaping with nicotine     Start date:      Last attempt to quit: 2022     Years since quittin.4    Smokeless tobacco: Never    Tobacco comments:     boyfriend smoke cigarettes   Substance Use Topics    Alcohol use: No    Drug use: Yes     Types: Marijuana     Review of Systems   Constitutional:  Negative for fever.   HENT:  Positive for sore throat.    Respiratory:  Negative for shortness of breath.    Cardiovascular:  Negative for chest pain.   Gastrointestinal:  Negative for nausea.   Genitourinary:  Negative for dysuria.   Musculoskeletal:  Negative for back pain.   Skin:  Negative for  rash.   Neurological:  Negative for weakness.   Hematological:  Does not bruise/bleed easily.     Physical Exam     Initial Vitals [06/04/23 0457]   BP Pulse Resp Temp SpO2   (!) 142/93 108 18 98.7 °F (37.1 °C) 98 %      MAP       --         Physical Exam    Constitutional: Vital signs are normal. She appears well-developed and well-nourished.   HENT:   Head: Normocephalic and atraumatic.   Right Ear: Hearing normal.   Left Ear: Hearing normal.   Normal HEENT exam.    Posterior or pharyngeal edema or exudate.  No erythema.  The TMs are clear.  No palpable lymphadenopathy, normal voice    No swelling of the face or the neck   Eyes: Conjunctivae are normal.   Cardiovascular:  Normal rate and regular rhythm.           Abdominal: Abdomen is soft. Bowel sounds are normal.   Musculoskeletal:         General: Normal range of motion.     Neurological: She is alert and oriented to person, place, and time.   Skin: Skin is warm and intact.   Psychiatric: She has a normal mood and affect. Her speech is normal and behavior is normal. Cognition and memory are normal.       ED Course   Procedures  Labs Reviewed   GROUP A STREP, MOLECULAR   POCT STREP A MOLECULAR          Imaging Results    None          Medications - No data to display  Medical Decision Making:   History:   Old Medical Records: I decided to obtain old medical records.  Old Records Summarized: records from clinic visits.  Initial Assessment:    28-year-old female with a sore throat  Differential Diagnosis:   Pharyngitis, tonsillitis, allergic rhinitis, upper respiratory infection  Clinical Tests:   Lab Tests: Ordered and Reviewed  Radiological Study: Ordered and Reviewed  ED Management:  Plan:   No acute findings on physical exam.  Considered but doubt tonsillitis or pharyngitis.  Symptoms only for 1 day.  No signs of a peritonsillar abscess or retropharyngeal abscess.  I will treat with Flonase and Zyrtec.  The patient will be discharged home, advised to follow-up  with primary care.  Return precautions were given.                        Clinical Impression:   Final diagnoses:  [J02.9] Sore throat (Primary)        ED Disposition Condition    Discharge Stable          ED Prescriptions       Medication Sig Dispense Start Date End Date Auth. Provider    ibuprofen (ADVIL,MOTRIN) 800 MG tablet Take 1 tablet (800 mg total) by mouth 3 (three) times daily. 20 tablet 6/4/2023 -- Babak Gonzalez PA-C    cetirizine (ZYRTEC) 10 MG tablet Take 1 tablet (10 mg total) by mouth once daily. 30 tablet 6/4/2023 6/3/2024 Babak Gonzalez PA-C    fluticasone propionate (FLONASE) 50 mcg/actuation nasal spray 1 spray (50 mcg total) by Each Nostril route 2 (two) times daily as needed for Rhinitis. 15 g 6/4/2023 -- Babak Gonzalez PA-C          Follow-up Information       Follow up With Specialties Details Why Contact Info    Katelyn Chapman MD Family Medicine   [address] [facility] [address] [phone]               Babak Gonzalez PA-C  06/04/23 2389

## 2023-06-04 NOTE — DISCHARGE INSTRUCTIONS

## 2023-06-05 ENCOUNTER — HOSPITAL ENCOUNTER (EMERGENCY)
Facility: HOSPITAL | Age: 28
Discharge: HOME | End: 2023-06-05
Attending: INTERNAL MEDICINE
Payer: MEDICAID

## 2023-06-05 VITALS
DIASTOLIC BLOOD PRESSURE: 86 MMHG | TEMPERATURE: 98 F | BODY MASS INDEX: 54.82 KG/M2 | OXYGEN SATURATION: 98 % | RESPIRATION RATE: 18 BRPM | HEART RATE: 90 BPM | WEIGHT: 293 LBS | SYSTOLIC BLOOD PRESSURE: 170 MMHG

## 2023-06-05 DIAGNOSIS — J02.9 PHARYNGITIS, UNSPECIFIED ETIOLOGY: Primary | ICD-10-CM

## 2023-06-05 PROCEDURE — 99284 EMERGENCY DEPT VISIT MOD MDM: CPT

## 2023-06-05 RX ORDER — AZELASTINE 1 MG/ML
2 SPRAY, METERED NASAL 2 TIMES DAILY
Qty: 30 ML | Refills: 0 | Status: SHIPPED | OUTPATIENT
Start: 2023-06-05 | End: 2023-07-30

## 2023-06-05 RX ORDER — FLUTICASONE PROPIONATE 50 MCG
2 SPRAY, SUSPENSION (ML) NASAL DAILY
Qty: 15 G | Refills: 0 | Status: SHIPPED | OUTPATIENT
Start: 2023-06-05 | End: 2024-01-09 | Stop reason: SDUPTHER

## 2023-06-05 NOTE — ED PROVIDER NOTES
Encounter Date: 2023       History     Chief Complaint   Patient presents with    Sore Throat     SEEN HERE LAST NIGHT FOR A SORE THROAT AND MUCUS AND IT IS GETTING WORSE     28-year-old female presents to the emergency department for the 2nd time 2 days secondary to sore throat.  Patient states she was seen here yesterday and prescribed medications, but feels like her throat is hurting more now.  Patient endorses smoking marijuana regularly and states she got all of her medicines filled except for fluticasone.  She denies fever/chills/nausea/vomiting/chest pain/shortness of breath.    The history is provided by the patient. No  was used.   Review of patient's allergies indicates:  No Known Allergies  Past Medical History:   Diagnosis Date    ADHD (attention deficit hyperactivity disorder)     Anxiety     Bipolar affective     Eczema     History of chlamydia 2016    Insomnia     Morbid obesity with BMI of 40.0-44.9, adult     Sleep disorder     Type 2 diabetes mellitus without complications      Past Surgical History:   Procedure Laterality Date    NO PAST SURGERIES      as of 19     Family History   Problem Relation Age of Onset    Breast cancer Paternal Grandmother     No Known Problems Father     No Known Problems Mother     No Known Problems Sister     Diabetes Maternal Grandmother     No Known Problems Maternal Grandfather     No Known Problems Paternal Grandfather     Colon cancer Neg Hx     Ovarian cancer Neg Hx      Social History     Tobacco Use    Smoking status: Some Days     Types: Vaping with nicotine     Start date:      Last attempt to quit: 2022     Years since quittin.4    Smokeless tobacco: Never    Tobacco comments:     boyfriend smoke cigarettes   Substance Use Topics    Alcohol use: No    Drug use: Yes     Types: Marijuana     Review of Systems   Constitutional:  Negative for chills and fever.   HENT:  Positive for congestion, postnasal drip and sore  throat.    Respiratory:  Negative for shortness of breath.    Gastrointestinal:  Negative for nausea and vomiting.   Skin:  Negative for rash.   All other systems reviewed and are negative.    Physical Exam     Initial Vitals [06/05/23 0339]   BP Pulse Resp Temp SpO2   (!) 170/86 90 18 98.2 °F (36.8 °C) 98 %      MAP       --         Physical Exam    Nursing note and vitals reviewed.  Constitutional: She is not diaphoretic. No distress.   Obese   HENT:   Head: Normocephalic and atraumatic.   Right Ear: External ear normal.   Left Ear: External ear normal.   Clear postnasal drip, posterior oropharyngeal erythema without exudate or edema, enlarged nasal turbinates   Eyes: Conjunctivae are normal.   Neck: Neck supple.   Normal range of motion.  Cardiovascular:  Normal rate and regular rhythm.           Pulmonary/Chest: Breath sounds normal. No respiratory distress.   Musculoskeletal:      Cervical back: Normal range of motion and neck supple.         ED Course   Procedures  Labs Reviewed - No data to display       Imaging Results    None          Medications - No data to display  Medical Decision Making:   Initial Assessment:   28-year-old female presents to the emergency department for the 2nd time 2 days secondary to sore throat.  Patient states she was seen here yesterday and prescribed medications, but feels like her throat is hurting more now.  Patient endorses smoking marijuana regularly and states she got all of her medicines filled except for fluticasone.  She denies fever/chills/nausea/vomiting/chest pain/shortness of breath.  ED Management:  Patient was counseled on the need to use fluticasone as prescribed.  She was also given a prescription for Astelin.  She was counseled on need to discontinue smoking as well.  Instructions for pharyngitis were given and patient was advised to follow-up with her primary care physician within the next week for re-evaluation/return to the emergency department if condition  worsens.                        Clinical Impression:   Final diagnoses:  [J02.9] Pharyngitis, unspecified etiology (Primary)        ED Disposition Condition    Discharge Stable          ED Prescriptions       Medication Sig Dispense Start Date End Date Auth. Provider    azelastine (ASTELIN) 137 mcg (0.1 %) nasal spray 2 sprays (274 mcg total) by Nasal route 2 (two) times daily. 30 mL 6/5/2023 7/30/2023 David Fowler MD    fluticasone propionate (FLONASE) 50 mcg/actuation nasal spray 2 sprays (100 mcg total) by Each Nostril route once daily. 15 g 6/5/2023 -- David Fowler MD          Follow-up Information    None          David Fowler MD  06/05/23 0735

## 2023-06-06 ENCOUNTER — PATIENT MESSAGE (OUTPATIENT)
Dept: ALLERGY | Facility: CLINIC | Age: 28
End: 2023-06-06
Payer: MEDICAID

## 2023-06-06 DIAGNOSIS — J45.30 MILD PERSISTENT ASTHMA, UNSPECIFIED WHETHER COMPLICATED: ICD-10-CM

## 2023-06-06 RX ORDER — FLUTICASONE FUROATE AND VILANTEROL 200; 25 UG/1; UG/1
1 POWDER RESPIRATORY (INHALATION) NIGHTLY
Qty: 90 EACH | Refills: 1 | Status: SHIPPED | OUTPATIENT
Start: 2023-06-06 | End: 2023-06-12 | Stop reason: SDUPTHER

## 2023-06-07 ENCOUNTER — PATIENT MESSAGE (OUTPATIENT)
Dept: ALLERGY | Facility: CLINIC | Age: 28
End: 2023-06-07
Payer: MEDICAID

## 2023-06-07 ENCOUNTER — TELEPHONE (OUTPATIENT)
Dept: PHARMACY | Facility: CLINIC | Age: 28
End: 2023-06-07
Payer: MEDICAID

## 2023-06-08 ENCOUNTER — PATIENT MESSAGE (OUTPATIENT)
Dept: ALLERGY | Facility: CLINIC | Age: 28
End: 2023-06-08
Payer: MEDICAID

## 2023-06-08 NOTE — TELEPHONE ENCOUNTER
See [facility] response regarding PA:    June 7, 2023  Renetta Henderson  to Tjon Allison Staff    CF       9:38 AM  Hi,     This message is in regards to Misael Garcia's medication BREO. Under the patient's insurance plan, the patient needs to try and fail FLOVENT, ADVAIR, DULERA, SYMBICORT AND ASMANEX OR have a complete contraindications to the preferred alternatives in order for insurance to cover the medication. After reviewing the patient's chart, there was not information that either one of those criteria applies to the patient. Thus we will place the prescription on hold as we will not be able to submit the prior authorization as the patient does not meet the criteria. If there is more information you would like to provide that would help the patient meet the criteria, please do not hesitate to reach out to us. If you would like to send in a prescription for the alternative, please do so as soon as possible so that we can initiate a prior authorization if needed. Thank you.     Sincerely,   Prior Authorization Department   [facility]

## 2023-06-11 ENCOUNTER — PATIENT MESSAGE (OUTPATIENT)
Dept: FAMILY MEDICINE | Facility: CLINIC | Age: 28
End: 2023-06-11
Payer: MEDICAID

## 2023-06-11 ENCOUNTER — PATIENT MESSAGE (OUTPATIENT)
Dept: ALLERGY | Facility: CLINIC | Age: 28
End: 2023-06-11
Payer: MEDICAID

## 2023-06-12 ENCOUNTER — PATIENT MESSAGE (OUTPATIENT)
Dept: ALLERGY | Facility: CLINIC | Age: 28
End: 2023-06-12
Payer: MEDICAID

## 2023-06-12 ENCOUNTER — E-VISIT (OUTPATIENT)
Dept: FAMILY MEDICINE | Facility: CLINIC | Age: 28
End: 2023-06-12
Payer: MEDICAID

## 2023-06-12 ENCOUNTER — TELEPHONE (OUTPATIENT)
Dept: ALLERGY | Facility: CLINIC | Age: 28
End: 2023-06-12
Payer: MEDICAID

## 2023-06-12 ENCOUNTER — PATIENT MESSAGE (OUTPATIENT)
Dept: FAMILY MEDICINE | Facility: CLINIC | Age: 28
End: 2023-06-12

## 2023-06-12 DIAGNOSIS — I15.2 HYPERTENSION ASSOCIATED WITH DIABETES: ICD-10-CM

## 2023-06-12 DIAGNOSIS — J02.9 VIRAL PHARYNGITIS: Primary | ICD-10-CM

## 2023-06-12 DIAGNOSIS — J45.30 MILD PERSISTENT ASTHMA, UNSPECIFIED WHETHER COMPLICATED: ICD-10-CM

## 2023-06-12 DIAGNOSIS — B96.89 ACUTE BACTERIAL SINUSITIS: ICD-10-CM

## 2023-06-12 DIAGNOSIS — E11.59 HYPERTENSION ASSOCIATED WITH DIABETES: ICD-10-CM

## 2023-06-12 DIAGNOSIS — R07.0 THROAT PAIN: Primary | ICD-10-CM

## 2023-06-12 DIAGNOSIS — R06.02 SOB (SHORTNESS OF BREATH): Primary | ICD-10-CM

## 2023-06-12 DIAGNOSIS — J01.90 ACUTE BACTERIAL SINUSITIS: ICD-10-CM

## 2023-06-12 PROCEDURE — 99422 PR E&M, ONLINE DIGIT, EST, < 7 DAYS,  11-20 MINS: ICD-10-PCS | Mod: ,,, | Performed by: FAMILY MEDICINE

## 2023-06-12 PROCEDURE — 99422 OL DIG E/M SVC 11-20 MIN: CPT | Mod: ,,, | Performed by: FAMILY MEDICINE

## 2023-06-12 RX ORDER — AMOXICILLIN AND CLAVULANATE POTASSIUM 875; 125 MG/1; MG/1
1 TABLET, FILM COATED ORAL EVERY 12 HOURS
Qty: 10 TABLET | Refills: 0 | Status: SHIPPED | OUTPATIENT
Start: 2023-06-14 | End: 2023-06-19

## 2023-06-12 RX ORDER — IBUPROFEN 800 MG/1
800 TABLET ORAL 3 TIMES DAILY PRN
Qty: 90 TABLET | Refills: 0 | Status: SHIPPED | OUTPATIENT
Start: 2023-06-12 | End: 2024-03-25

## 2023-06-12 RX ORDER — FLUTICASONE PROPIONATE AND SALMETEROL 250; 50 UG/1; UG/1
1 POWDER RESPIRATORY (INHALATION) 2 TIMES DAILY
Qty: 1 EACH | Refills: 0 | Status: SHIPPED | OUTPATIENT
Start: 2023-06-12 | End: 2023-12-06 | Stop reason: SDUPTHER

## 2023-06-12 RX ORDER — ALBUTEROL SULFATE 90 UG/1
2 AEROSOL, METERED RESPIRATORY (INHALATION) EVERY 4 HOURS PRN
Qty: 18 G | Refills: 1 | Status: SHIPPED | OUTPATIENT
Start: 2023-06-12 | End: 2023-10-04

## 2023-06-12 RX ORDER — ALBUTEROL SULFATE 90 UG/1
2 AEROSOL, METERED RESPIRATORY (INHALATION) EVERY 4 HOURS PRN
Qty: 18 G | Refills: 1 | Status: SHIPPED | OUTPATIENT
Start: 2023-06-12 | End: 2024-01-09 | Stop reason: SDUPTHER

## 2023-06-12 RX ORDER — FLUTICASONE FUROATE AND VILANTEROL 200; 25 UG/1; UG/1
1 POWDER RESPIRATORY (INHALATION) NIGHTLY
Qty: 60 EACH | Refills: 0 | Status: SHIPPED | OUTPATIENT
Start: 2023-06-12 | End: 2024-01-09 | Stop reason: ALTCHOICE

## 2023-06-12 NOTE — PROGRESS NOTES
"  Request refill of "discuss" and inhaler.  Per progress notes, she was on Breo at last visit 3/2023 with instructions to f/u in April.    Requested clarification.  Will refill 1 month only until/unless has apt.  "

## 2023-06-12 NOTE — TELEPHONE ENCOUNTER
Good morning Dr. King,    I have sent many message to the patient and I have also informed her via her portal, she has voice message left by me from yourself.    Should you require me to assist you with anything else please let me know.    Kind regards  Mu Ridley MA          ----- Message from Greta Baird sent at 6/12/2023 11:16 AM CDT -----  Regarding: Pt advice  Contact: [phone]  Pt is currently calling returning a missed call from nurse. Pt states she is in school and best time to call is after 3pm. Please call

## 2023-06-12 NOTE — TELEPHONE ENCOUNTER
Good morning Dr. King,    I am unable to make contact with the patient.    I left a voice message with the clinic's number so the patient can make contact with the clinic, where I can relay your message and ascertain exactly what she is requesting from you.    Kind regards  Mu Ridley MA

## 2023-06-12 NOTE — PROGRESS NOTES
Patient ID: Misael Garcia is a 28 y.o. female.    Chief Complaint: URI    The patient initiated a request through [email or web address] on 6/12/2023 for evaluation and management with a chief complaint of URI     I evaluated the questionnaire submission on 06/12/2023.    [facility] Peq Evisit Upper Respitatory/Cough Questionnaire    6/12/2023 12:07 PM CDT - Filed by Patient   Do you agree to participate in an E-Visit? Yes   If you have any of the following symptoms, please present to your local ER or call 911:  I acknowledge   What is the main issue that you would like for your doctor to address today? Sore throat   Are you able to take your vital signs? No   Are you currently pregnant, could you be pregnant, or are you breast feeding? Could be pregnant   What symptoms do you currently have?  Cough;  Nasal Congestion;  Muscle or body aches;  Runny nose;  Sore throat;  Neck pain   Describe your cough: Productive (containing mucus);  Dry;  Bothersome (interferes with daily activities)   Describe the mucus: Yellow;  Clear;  White;  Thick;  Thin   Have you had any of the following? Difficulty breathing;  None of the above   Have you ever smoked? I have smoked in the past   Have you had a fever? No   When did your symptoms first appear? 6/3/2023   In the last two weeks, have you been in close contact with someone who has COVID-19 or the Flu? Yes, both   In the last two weeks, have you worked or volunteered in a healthcare facility or as a ? Healthcare facilities include a hospital, medical or dental clinic, long-term care facility, or nursing home No   Do you live in a long-term care facility, nursing home, group home, or homeless shelter? No   List what you have done or taken to help your symptoms. Motrin 800 mg Cetazizine   How severe are your symptoms? Moderate   Have your symptoms improved since they first appeared? Worse   Have you taken an at home Covid test? No   Have you taken a Flu test? No   Have you been  fully vaccinated for COVID? (2 Pfizer, 2 Moderna or 1 Bhaskar & Bhaskar vaccine injections) Yes   Have you received a booster? Yes   Have you recieved a Flu shot? Yes   When did you recieve your Flu shot? 12/19/2022   Do you have transportation to get tested for COVID if it is indicated and ordered for you at an [facility] location? No   Provide any information you feel is important to your history not asked above    Please attach any relevant images or files        Recent Labs Obtained:  No visits with results within 7 Day(s) from this visit.   Latest known visit with results is:   Admission on 06/04/2023, Discharged on 06/04/2023   Component Date Value Ref Range Status    Molecular Strep A, POC 06/04/2023 Negative  Negative Final     Acceptable 06/04/2023 Yes   Final     Encounter Diagnoses   Name Primary?    Viral pharyngitis Yes    Acute bacterial sinusitis     Hypertension associated with diabetes       No orders of the defined types were placed in this encounter.     Medications Ordered This Encounter   Medications    amoxicillin-clavulanate 875-125mg (AUGMENTIN) 875-125 mg per tablet     Sig: Take 1 tablet by mouth every 12 (twelve) hours. for 5 days     Dispense:  10 tablet     Refill:  0        Follow up if symptoms worsen or fail to improve.      E-Visit Time Tracking:    Day 1 Time (in minutes): 15     Total Time (in minutes): 15

## 2023-06-12 NOTE — TELEPHONE ENCOUNTER
----- Message from Greta Baird sent at 6/12/2023 11:16 AM CDT -----  Regarding: Pt advice  Contact: [phone]  Pt is currently calling returning a missed call from nurse. Pt states she is in school and best time to call is after 3pm. Please call

## 2023-06-12 NOTE — TELEPHONE ENCOUNTER
Good morning Dr. King,    Kindly find attached messages for this patient regarding her medication dn other advice.    Kindly let me know if you would like me to assist you with anything.    Kind regards  Mu Ridley MA

## 2023-06-13 ENCOUNTER — PATIENT MESSAGE (OUTPATIENT)
Dept: ALLERGY | Facility: CLINIC | Age: 28
End: 2023-06-13
Payer: MEDICAID

## 2023-06-13 ENCOUNTER — PATIENT MESSAGE (OUTPATIENT)
Dept: FAMILY MEDICINE | Facility: CLINIC | Age: 28
End: 2023-06-13
Payer: MEDICAID

## 2023-06-13 ENCOUNTER — TELEPHONE (OUTPATIENT)
Dept: ALLERGY | Facility: CLINIC | Age: 28
End: 2023-06-13
Payer: MEDICAID

## 2023-06-14 ENCOUNTER — PATIENT MESSAGE (OUTPATIENT)
Dept: SPINE | Facility: CLINIC | Age: 28
End: 2023-06-14
Payer: MEDICAID

## 2023-06-14 ENCOUNTER — PATIENT MESSAGE (OUTPATIENT)
Dept: FAMILY MEDICINE | Facility: CLINIC | Age: 28
End: 2023-06-14
Payer: MEDICAID

## 2023-06-16 ENCOUNTER — PATIENT MESSAGE (OUTPATIENT)
Dept: OBSTETRICS AND GYNECOLOGY | Facility: CLINIC | Age: 28
End: 2023-06-16
Payer: MEDICAID

## 2023-06-16 RX ORDER — ACETAMINOPHEN AND CODEINE PHOSPHATE 120; 12 MG/5ML; MG/5ML
1 SOLUTION ORAL DAILY
Qty: 28 TABLET | Refills: 6 | Status: SHIPPED | OUTPATIENT
Start: 2023-06-16 | End: 2023-10-04

## 2023-06-19 ENCOUNTER — NUTRITION (OUTPATIENT)
Dept: NUTRITION | Facility: CLINIC | Age: 28
End: 2023-06-19
Payer: MEDICAID

## 2023-06-19 ENCOUNTER — PATIENT MESSAGE (OUTPATIENT)
Dept: FAMILY MEDICINE | Facility: CLINIC | Age: 28
End: 2023-06-19
Payer: MEDICAID

## 2023-06-19 ENCOUNTER — PATIENT MESSAGE (OUTPATIENT)
Dept: BARIATRICS | Facility: CLINIC | Age: 28
End: 2023-06-19
Payer: MEDICAID

## 2023-06-19 VITALS — BODY MASS INDEX: 45.99 KG/M2 | HEIGHT: 67 IN | WEIGHT: 293 LBS

## 2023-06-19 DIAGNOSIS — E66.01 CLASS 3 SEVERE OBESITY DUE TO EXCESS CALORIES WITH SERIOUS COMORBIDITY AND BODY MASS INDEX (BMI) OF 50.0 TO 59.9 IN ADULT: ICD-10-CM

## 2023-06-19 DIAGNOSIS — Z71.3 DIETARY COUNSELING: ICD-10-CM

## 2023-06-19 DIAGNOSIS — E11.65 TYPE 2 DIABETES MELLITUS WITH HYPERGLYCEMIA, WITHOUT LONG-TERM CURRENT USE OF INSULIN: Primary | ICD-10-CM

## 2023-06-19 PROCEDURE — 97802 MEDICAL NUTRITION INDIV IN: CPT | Mod: PBBFAC,59,PO | Performed by: DIETITIAN, REGISTERED

## 2023-06-19 PROCEDURE — 99215 OFFICE O/P EST HI 40 MIN: CPT | Mod: PBBFAC,PO

## 2023-06-19 PROCEDURE — 99999 PR PBB SHADOW E&M-EST. PATIENT-LVL V: CPT | Mod: PBBFAC,,,

## 2023-06-19 PROCEDURE — 99999 PR PBB SHADOW E&M-EST. PATIENT-LVL V: ICD-10-PCS | Mod: PBBFAC,,,

## 2023-06-19 NOTE — PROGRESS NOTES
"Referring Physician:Katelyn Chapman MD     Reason for visit:  Chief Complaint   Patient presents with    Obesity    Nutrition Counseling    Diabetes      Initial Visit    :1995     Allergies Reviewed  Meds Reviewed    Anthropometrics  Weight:(!) 163.6 kg (360 lb 10.8 oz)  Height:5' 7" (1.702 m)  BMI:Body mass index is 56.49 kg/m².   IBW: 61.4 kg  +/-10%    Meds:  Outpatient Medications Prior to Visit   Medication Sig Dispense Refill    adapalene-benzoyl peroxide (EPIDUO FORTE) 0.3-2.5 % GlwP Apply topically.      albuterol (PROVENTIL/VENTOLIN HFA) 90 mcg/actuation inhaler Inhale 2 puffs into the lungs every 4 (four) hours as needed (shortness of breath). Rescue 18 g 1    albuterol (PROVENTIL/VENTOLIN HFA) 90 mcg/actuation inhaler Inhale 2 puffs into the lungs every 4 (four) hours as needed (shortness of breath). Rescue 18 g 1    amoxicillin-clavulanate 875-125mg (AUGMENTIN) 875-125 mg per tablet Take 1 tablet by mouth every 12 (twelve) hours. for 5 days 10 tablet 0    azelastine (ASTELIN) 137 mcg (0.1 %) nasal spray 2 sprays (274 mcg total) by Nasal route 2 (two) times daily. 30 mL 0    blood sugar diagnostic Strp To check BG 2-3x times daily, to use with insurance preferred meter 100 each 0    blood-glucose meter kit To check BG 2-3x times daily, to use with insurance preferred meter 1 each 0    busPIRone (BUSPAR) 15 MG tablet Take 15 mg by mouth 2 (two) times daily.      cariprazine (VRAYLAR) 4.5 mg Cap Take 1 capsule by mouth every night at bedtime 90 capsule 1    cetirizine (ZYRTEC) 10 MG tablet Take 1 tablet (10 mg total) by mouth once daily. May take an extra if needed.  0    cetirizine (ZYRTEC) 10 MG tablet Take 1 tablet (10 mg total) by mouth once daily. 30 tablet 0    clindamycin (CLEOCIN T) 1 % lotion Apply topically every morning. 60 mL 1    clindamycin (CLEOCIN T) 1 % Swab Apply topically once daily. 60 each 2    clonazePAM (KLONOPIN) 1 MG tablet Take 1 mg by mouth 2 (two) times daily as needed.   "    clotrimazole-betamethasone 1-0.05% (LOTRISONE) cream Apply topically 2 (two) times daily. 45 g 0    doxycycline (VIBRA-TABS) 100 MG tablet Take 1 tablet (100 mg total) by mouth once daily. 90 tablet 0    EScitalopram oxalate (LEXAPRO) 20 MG tablet Take 1 tablet by mouth every morning. 90 tablet 1    fluconazole (DIFLUCAN) 150 MG Tab Take 1 tablet (150 mg total) by mouth once daily. Repeat in 3 days if symptoms do not improve 2 tablet 1    fluocinonide 0.05% (LIDEX) 0.05 % cream Apply topically 2 (two) times daily. As needed itchy rash.Stop using steroid topical when skin is smooth and non itchy.  Do not treat dark or red coloring. 60 g 0    fluticasone furoate-vilanteroL (BREO ELLIPTA) 200-25 mcg/dose DsDv diskus inhaler Inhale 1 puff into the lungs every evening. Controller 60 each 0    fluticasone propionate (FLONASE) 50 mcg/actuation nasal spray 2 sprays (100 mcg total) by Each Nostril route once daily. 15 g 0    fluticasone-salmeterol diskus inhaler 250-50 mcg Inhale 1 puff into the lungs 2 (two) times daily. 1 each 0    ibuprofen (ADVIL,MOTRIN) 800 MG tablet Take 1 tablet (800 mg total) by mouth 3 (three) times daily as needed for Pain. 90 tablet 0    lancets 28 gauge Misc To check BG 2-3 times daily, to use with insurance preferred meter 100 each 0    lisinopriL 10 MG tablet Take 1 tablet (10 mg total) by mouth once daily. 90 tablet 3    metFORMIN (GLUCOPHAGE) 500 MG tablet Take 1 tablet (500 mg total) by mouth 2 (two) times daily with meals. 180 tablet 3    norethindrone (ORTHO MICRONOR) 0.35 mg tablet Take 1 tablet (0.35 mg total) by mouth once daily. 28 tablet 6    ondansetron (ZOFRAN) 4 MG tablet Take 1 tablet (4 mg total) by mouth every 8 (eight) hours as needed for Nausea. 15 tablet 0    polyethylene glycol (GLYCOLAX) 17 gram/dose powder Take 17 g by mouth once daily. 510 g 11    semaglutide (OZEMPIC) 1 mg/dose (4 mg/3 mL) Inject 1 mg into the skin every 7 days. For weeks 9-12 3 mL 0    semaglutide  (OZEMPIC) 2 mg/dose (8 mg/3 mL) PnIj Inject 2 mg into the skin every 7 days. For weeks 13-16 3 mL 0    tiZANidine (ZANAFLEX) 2 MG tablet Take 1 tablet (2 mg total) by mouth every 8 (eight) hours as needed (neck pain). 90 tablet 1    traZODone (DESYREL) 50 MG tablet Take 1-2 tablets by mouth every night at bedtime 180 tablet 1    tretinoin (RETIN-A) 0.025 % cream Apply topically every evening. 45 g 2    tretinoin (RETIN-A) 0.05 % cream Apply topically every evening. Start with every other night and move up to nightly after 2 weeks if not too dry. 20 g 1    triamcinolone acetonide 0.1% (KENALOG) 0.1 % ointment Apply topically 2 (two) times daily. 30 g 1    urea (CARMOL) 40 % Crea Apply topically once daily. 185 g 11    VRAYLAR 1.5 mg Cap Take 1.5 mg by mouth every evening.      VRAYLAR 3 mg Cap Take 3 mg by mouth.       No facility-administered medications prior to visit.       Food/Drug Interactions Noted:  n/a    Vitamins/Supplements/Herbs:  n/a    Labs: 2/20/23 CMP wnl    01/05/23 HgbA1c  6.3     Nutrition Prescription: 1842 Kcals/day( 30 kcal/kg IBW),  61 g protein( 1.0 g/kg IBW)     Support System:  pt lives with her boyfriend's mom, who does the grocery shopping.  Boyfriend's grandmother cooks dinner.      Diet Hx: pt states she would like to get healthy and lose weight.  States she is able to prepare meals for herself, but likes the taste of fast food.  Sometimes snacks on chips; fruit ie berries or grapes or canned peaches.  Beverages:  water; zero-calorie soft drinks.  She states it's so expensive to eat healthy; we discussed cost of her fast food lunches vs cost of grocery store shopping.  States she tends to eat large portions because she can't always tell when she's full.    Breakfast: [employer] sausage biscuit and hash brown.  Diet Coke/water  Lunch: Beef jerky or [employer] double cheeseburger and fries.  Water.  Dinner:   last night:  hamburger on bun, 2 hot dogs on bun, 3 pieces bbq chicken.  Water.  (It was Father's Day)    Current activity level and/or physical limitations:   one lap around the park, once a week.  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    Motivation to make changes/anticipated barriers and/or expected adherence:  barrier may be pt's reliance on fast food    Nutrition-Focus Physical Findings:  pt appears well nourished    Assessment:  pt attentive and asked relevant questions about foods recommended and to avoid; sample meal plan and menus; grocery shopping and eating out tips.  All of her questions were answered, and she verbalized understanding of information.    Nutrition Diagnosis: obesity RT excess energy intake and physical inactivity AEB BMI > 40    Recommendations:  1200 calorie, carbohydrate controlled, low fat, high fiber diet. Exercise goal: 30 minutes per day, 3-5 days per week.  Handouts provided and reviewed:  My Plate Planner; Cardiac-TLC Nutrition Therapy; 1200 Calorie Sample 5-Day Menus; Servings of Carbohydrates for Meal Planning; Reading A Food Label; Tips to Support Weight Loss; Weight Loss Tips, Heart-Healthy Cooking Tips, Heart Healthy Shopping Tips;  Eat Fit Plan...Anytime/Anywhere; Shop Fit-Get Fit Shopping List; [facility] Snack List for Diabetes; Exercise & Weight-Losing It and Keeping It Off (NLA); Achieving A Healthy Weight (NLA)      Strategies Implemented:  portion control; avoid all fast foods; increase physical activity    Consultation Time:35 minutes.  Communicated with referring healthcare provider:  Consult note available in pt's Epic chart per MD discretion  Follow Up:  Pt provided with dietitian contact number and advised to call with questions or make future appointment if further intervention needed.  Pt stated she would like to make a follow-up appointment today.

## 2023-06-19 NOTE — PATIENT INSTRUCTIONS
1200 calorie, carbohydrate controlled, low fat, high fiber diet.  Exercise goal: 30 minutes per day, 3-5 days per week.

## 2023-06-21 ENCOUNTER — PATIENT MESSAGE (OUTPATIENT)
Dept: ADMINISTRATIVE | Facility: HOSPITAL | Age: 28
End: 2023-06-21
Payer: MEDICAID

## 2023-06-21 ENCOUNTER — TELEPHONE (OUTPATIENT)
Dept: ENDOSCOPY | Facility: HOSPITAL | Age: 28
End: 2023-06-21
Payer: MEDICAID

## 2023-06-21 NOTE — TELEPHONE ENCOUNTER
Contacted the patient to schedule an endoscopy procedure(s) 6/21/23. The patient did not answer the call and left a voice message requesting a call back.  [phone]

## 2023-06-21 NOTE — TELEPHONE ENCOUNTER
----- Message from Ivon DE LEON Route sent at 6/19/2023  1:19 PM CDT -----  Regarding: Appointment  Contact: pt.[phone]  Pt is calling in ref to scheduling an appt. She says she is diabetic type 2 and she has hormone problems. Patient Requesting Call Back @ pt.[phone]

## 2023-06-23 ENCOUNTER — TELEPHONE (OUTPATIENT)
Dept: OPTOMETRY | Facility: CLINIC | Age: 28
End: 2023-06-23
Payer: MEDICAID

## 2023-06-25 ENCOUNTER — PATIENT MESSAGE (OUTPATIENT)
Dept: ADMINISTRATIVE | Facility: OTHER | Age: 28
End: 2023-06-25
Payer: MEDICAID

## 2023-06-28 ENCOUNTER — PATIENT MESSAGE (OUTPATIENT)
Dept: FAMILY MEDICINE | Facility: CLINIC | Age: 28
End: 2023-06-28
Payer: MEDICAID

## 2023-06-28 DIAGNOSIS — E66.01 CLASS 3 SEVERE OBESITY DUE TO EXCESS CALORIES WITH SERIOUS COMORBIDITY AND BODY MASS INDEX (BMI) OF 50.0 TO 59.9 IN ADULT: ICD-10-CM

## 2023-06-28 DIAGNOSIS — R63.8 UNABLE TO LOSE WEIGHT: ICD-10-CM

## 2023-06-28 DIAGNOSIS — E11.65 TYPE 2 DIABETES MELLITUS WITH HYPERGLYCEMIA, WITHOUT LONG-TERM CURRENT USE OF INSULIN: ICD-10-CM

## 2023-06-28 NOTE — TELEPHONE ENCOUNTER
Care Due:                  Date            Visit Type   Department     Provider  --------------------------------------------------------------------------------                                EP -                              [facility]  Last Visit: 04-      CARE ([facility])   MEDICINE       Katelyn T  Chapman                              EP -                              [facility]  Next Visit: 08-      CARE ([facility])   MEDICINE       Katelyn Chapman                                                            Last  Test          Frequency    Reason                     Performed    Due Date  --------------------------------------------------------------------------------    HBA1C.......  6 months...  metFORMIN, semaglutide...  01- 07-    [facility] Embedded Care Due Messages. Reference number: [id number].   6/28/2023 4:33:35 PM CDT

## 2023-06-29 ENCOUNTER — HOSPITAL ENCOUNTER (EMERGENCY)
Facility: HOSPITAL | Age: 28
Discharge: HOME | End: 2023-06-29
Attending: EMERGENCY MEDICINE
Payer: MEDICAID

## 2023-06-29 VITALS
SYSTOLIC BLOOD PRESSURE: 114 MMHG | OXYGEN SATURATION: 97 % | RESPIRATION RATE: 18 BRPM | HEART RATE: 99 BPM | WEIGHT: 293 LBS | DIASTOLIC BLOOD PRESSURE: 88 MMHG | TEMPERATURE: 99 F | HEIGHT: 67 IN | BODY MASS INDEX: 45.99 KG/M2

## 2023-06-29 DIAGNOSIS — R11.10 VOMITING: ICD-10-CM

## 2023-06-29 DIAGNOSIS — T88.7XXA MEDICATION SIDE EFFECT: ICD-10-CM

## 2023-06-29 DIAGNOSIS — R11.2 NAUSEA AND VOMITING, UNSPECIFIED VOMITING TYPE: Primary | ICD-10-CM

## 2023-06-29 LAB
ALBUMIN SERPL BCP-MCNC: 3.9 G/DL (ref 3.5–5.2)
ALP SERPL-CCNC: 132 U/L (ref 55–135)
ALT SERPL W/O P-5'-P-CCNC: 34 U/L (ref 10–44)
ANION GAP SERPL CALC-SCNC: 11 MMOL/L (ref 8–16)
AST SERPL-CCNC: 24 U/L (ref 10–40)
B-HCG UR QL: NEGATIVE
BASOPHILS # BLD AUTO: 0.03 K/UL (ref 0–0.2)
BASOPHILS NFR BLD: 0.3 % (ref 0–1.9)
BILIRUB SERPL-MCNC: 0.5 MG/DL (ref 0.1–1)
BILIRUB UR QL STRIP: NEGATIVE
BUN SERPL-MCNC: 7 MG/DL (ref 6–20)
CALCIUM SERPL-MCNC: 10.1 MG/DL (ref 8.7–10.5)
CHLORIDE SERPL-SCNC: 102 MMOL/L (ref 95–110)
CLARITY UR: ABNORMAL
CO2 SERPL-SCNC: 25 MMOL/L (ref 23–29)
COLOR UR: YELLOW
CREAT SERPL-MCNC: 0.8 MG/DL (ref 0.5–1.4)
CTP QC/QA: YES
DIFFERENTIAL METHOD: ABNORMAL
EOSINOPHIL # BLD AUTO: 0.2 K/UL (ref 0–0.5)
EOSINOPHIL NFR BLD: 1.4 % (ref 0–8)
ERYTHROCYTE [DISTWIDTH] IN BLOOD BY AUTOMATED COUNT: 14.6 % (ref 11.5–14.5)
EST. GFR  (NO RACE VARIABLE): >60 ML/MIN/1.73 M^2
GLUCOSE SERPL-MCNC: 79 MG/DL (ref 70–110)
GLUCOSE UR QL STRIP: NEGATIVE
HCT VFR BLD AUTO: 41.2 % (ref 37–48.5)
HGB BLD-MCNC: 13.2 G/DL (ref 12–16)
HGB UR QL STRIP: NEGATIVE
IMM GRANULOCYTES # BLD AUTO: 0.02 K/UL (ref 0–0.04)
IMM GRANULOCYTES NFR BLD AUTO: 0.2 % (ref 0–0.5)
KETONES UR QL STRIP: NEGATIVE
LEUKOCYTE ESTERASE UR QL STRIP: NEGATIVE
LIPASE SERPL-CCNC: 6 U/L (ref 4–60)
LYMPHOCYTES # BLD AUTO: 2.9 K/UL (ref 1–4.8)
LYMPHOCYTES NFR BLD: 26.8 % (ref 18–48)
MCH RBC QN AUTO: 25.6 PG (ref 27–31)
MCHC RBC AUTO-ENTMCNC: 32 G/DL (ref 32–36)
MCV RBC AUTO: 80 FL (ref 82–98)
MONOCYTES # BLD AUTO: 0.7 K/UL (ref 0.3–1)
MONOCYTES NFR BLD: 6.3 % (ref 4–15)
NEUTROPHILS # BLD AUTO: 7 K/UL (ref 1.8–7.7)
NEUTROPHILS NFR BLD: 65 % (ref 38–73)
NITRITE UR QL STRIP: NEGATIVE
NRBC BLD-RTO: 0 /100 WBC
PH UR STRIP: 8 [PH] (ref 5–8)
PLATELET # BLD AUTO: 550 K/UL (ref 150–450)
PMV BLD AUTO: 9 FL (ref 9.2–12.9)
POTASSIUM SERPL-SCNC: 3.9 MMOL/L (ref 3.5–5.1)
PROT SERPL-MCNC: 8.3 G/DL (ref 6–8.4)
PROT UR QL STRIP: NEGATIVE
RBC # BLD AUTO: 5.15 M/UL (ref 4–5.4)
SODIUM SERPL-SCNC: 138 MMOL/L (ref 136–145)
SP GR UR STRIP: 1.01 (ref 1–1.03)
URN SPEC COLLECT METH UR: ABNORMAL
UROBILINOGEN UR STRIP-ACNC: NEGATIVE EU/DL
WBC # BLD AUTO: 10.72 K/UL (ref 3.9–12.7)

## 2023-06-29 PROCEDURE — 85025 COMPLETE CBC W/AUTO DIFF WBC: CPT | Performed by: PHYSICIAN ASSISTANT

## 2023-06-29 PROCEDURE — 93005 ELECTROCARDIOGRAM TRACING: CPT

## 2023-06-29 PROCEDURE — 93010 EKG 12-LEAD: ICD-10-PCS | Mod: ,,, | Performed by: INTERNAL MEDICINE

## 2023-06-29 PROCEDURE — 96375 TX/PRO/DX INJ NEW DRUG ADDON: CPT

## 2023-06-29 PROCEDURE — 96374 THER/PROPH/DIAG INJ IV PUSH: CPT

## 2023-06-29 PROCEDURE — 93010 ELECTROCARDIOGRAM REPORT: CPT | Mod: ,,, | Performed by: INTERNAL MEDICINE

## 2023-06-29 PROCEDURE — 83690 ASSAY OF LIPASE: CPT | Performed by: PHYSICIAN ASSISTANT

## 2023-06-29 PROCEDURE — 63600175 PHARM REV CODE 636 W HCPCS: Performed by: PHYSICIAN ASSISTANT

## 2023-06-29 PROCEDURE — 99284 EMERGENCY DEPT VISIT MOD MDM: CPT | Mod: 25

## 2023-06-29 PROCEDURE — 80053 COMPREHEN METABOLIC PANEL: CPT | Performed by: PHYSICIAN ASSISTANT

## 2023-06-29 PROCEDURE — 81003 URINALYSIS AUTO W/O SCOPE: CPT | Performed by: PHYSICIAN ASSISTANT

## 2023-06-29 PROCEDURE — 81025 URINE PREGNANCY TEST: CPT | Performed by: EMERGENCY MEDICINE

## 2023-06-29 RX ORDER — ONDANSETRON 2 MG/ML
4 INJECTION INTRAMUSCULAR; INTRAVENOUS
Status: COMPLETED | OUTPATIENT
Start: 2023-06-29 | End: 2023-06-29

## 2023-06-29 RX ORDER — KETOROLAC TROMETHAMINE 30 MG/ML
15 INJECTION, SOLUTION INTRAMUSCULAR; INTRAVENOUS
Status: COMPLETED | OUTPATIENT
Start: 2023-06-29 | End: 2023-06-29

## 2023-06-29 RX ORDER — FAMOTIDINE 20 MG/1
20 TABLET, FILM COATED ORAL 2 TIMES DAILY
Qty: 60 TABLET | Refills: 0 | Status: SHIPPED | OUTPATIENT
Start: 2023-06-29 | End: 2024-01-30

## 2023-06-29 RX ORDER — DIPHENHYDRAMINE HYDROCHLORIDE 50 MG/ML
50 INJECTION INTRAMUSCULAR; INTRAVENOUS
Status: DISCONTINUED | OUTPATIENT
Start: 2023-06-29 | End: 2023-06-29 | Stop reason: HOSPADM

## 2023-06-29 RX ORDER — ACETAMINOPHEN 500 MG
500 TABLET ORAL EVERY 4 HOURS PRN
Qty: 20 TABLET | Refills: 0 | Status: SHIPPED | OUTPATIENT
Start: 2023-06-29 | End: 2023-07-04

## 2023-06-29 RX ORDER — DICYCLOMINE HYDROCHLORIDE 20 MG/1
20 TABLET ORAL 4 TIMES DAILY PRN
Qty: 28 TABLET | Refills: 0 | Status: SHIPPED | OUTPATIENT
Start: 2023-06-29 | End: 2023-07-06

## 2023-06-29 RX ORDER — ONDANSETRON 4 MG/1
4 TABLET, ORALLY DISINTEGRATING ORAL EVERY 6 HOURS PRN
Qty: 15 TABLET | Refills: 0 | Status: SHIPPED | OUTPATIENT
Start: 2023-06-29 | End: 2023-07-04

## 2023-06-29 RX ADMIN — ONDANSETRON 4 MG: 2 INJECTION INTRAMUSCULAR; INTRAVENOUS at 11:06

## 2023-06-29 RX ADMIN — KETOROLAC TROMETHAMINE 15 MG: 30 INJECTION, SOLUTION INTRAMUSCULAR; INTRAVENOUS at 11:06

## 2023-06-29 NOTE — Clinical Note
"Misael"Jose Garcia was seen and treated in our emergency department on 6/29/2023.  She may return to work on 07/01/2023.       If you have any questions or concerns, please don't hesitate to call.      Rosalie Bryson PA-C"

## 2023-06-29 NOTE — ED PROVIDER NOTES
Encounter Date: 6/29/2023    SCRIBE #1 NOTE: I, Kayla Li, am scribing for, and in the presence of,  Rosalie Bryson PA-C.     History     Chief Complaint   Patient presents with    Vomiting     Pt c/o vomiting since taking her Ozempic shot yesterday. Vomiting x 5 today. Denies fever and diarrhea. MM dry      CC: abdominal pain.     HPI: 29 yo F with a PMHx of DM (w/ medication noncompliance), presents to the ED with generalized abdominal pain with associated nausea and vomiting (x4 episodes) since yesterday, after taking an increased dose of Ozempic. She was on lower dose Ozempic until 2 mo ago, when her provider told her to up her Ozempic dose d/t uncontrolled glucose. However, she did not start the increased dose till yesterday. She is also supposed to be on Metformin but self discontinued the medication d/t experiencing diarrhea. Denies fever, chills, diarrhea, urinary sx, dizziness, lightheadedness, other associated symptoms. No attempted Tx.     The history is provided by the patient.   Review of patient's allergies indicates:  No Known Allergies  Past Medical History:   Diagnosis Date    ADHD (attention deficit hyperactivity disorder)     Anxiety     Bipolar affective     Eczema     History of chlamydia 12/2016    Insomnia     Morbid obesity with BMI of 40.0-44.9, adult     Sleep disorder     Type 2 diabetes mellitus without complications      Past Surgical History:   Procedure Laterality Date    NO PAST SURGERIES      as of 1-13-17     Family History   Problem Relation Age of Onset    Breast cancer Paternal Grandmother     No Known Problems Father     No Known Problems Mother     No Known Problems Sister     Diabetes Maternal Grandmother     No Known Problems Maternal Grandfather     No Known Problems Paternal Grandfather     Colon cancer Neg Hx     Ovarian cancer Neg Hx      Social History     Tobacco Use    Smoking status: Some Days     Types: Vaping with nicotine     Start date: 2022     Last  attempt to quit: 2022     Years since quittin.4    Smokeless tobacco: Never    Tobacco comments:     boyfriend smoke cigarettes   Substance Use Topics    Alcohol use: No    Drug use: Yes     Types: Marijuana     Review of Systems   Constitutional:  Negative for chills and fever.   HENT:  Negative for congestion, rhinorrhea and sore throat.    Eyes:  Negative for visual disturbance.   Respiratory:  Negative for cough and shortness of breath.    Cardiovascular:  Negative for chest pain.   Gastrointestinal:  Positive for abdominal pain, nausea and vomiting. Negative for blood in stool and diarrhea.   Genitourinary:  Negative for dysuria, frequency and hematuria.   Musculoskeletal:  Negative for back pain.   Skin:  Negative for rash.   Neurological:  Negative for dizziness, weakness and headaches.     Physical Exam     Initial Vitals [23 1040]   BP Pulse Resp Temp SpO2   114/88 99 18 99.1 °F (37.3 °C) 97 %      MAP       --         Physical Exam    Nursing note and vitals reviewed.  Constitutional: She appears well-developed and well-nourished. No distress.   HENT:   Head: Normocephalic.   Right Ear: External ear normal.   Left Ear: External ear normal.   Nose: Nose normal.   Mouth/Throat: Oropharynx is clear and moist.   Eyes: Conjunctivae are normal. Right eye exhibits no discharge. Left eye exhibits no discharge. No scleral icterus.   Neck: No tracheal deviation present.   Cardiovascular:  Normal rate and regular rhythm.     Exam reveals no gallop and no friction rub.       No murmur heard.  Pulmonary/Chest: Breath sounds normal. No stridor. No respiratory distress. She has no wheezes. She has no rhonchi. She has no rales.   Abdominal: Abdomen is soft. Bowel sounds are normal. She exhibits no distension. There is no abdominal tenderness.   Abdomen soft and nontender. There is no rebound, no guarding, no tenderness at McBurney's point and negative Babcock's sign.   Musculoskeletal:         General:  Normal range of motion.     Lymphadenopathy:     She has no cervical adenopathy.   Neurological: She is alert. She has normal strength. No cranial nerve deficit or sensory deficit.   Skin: Skin is warm and dry. No rash noted. No erythema.   Psychiatric: She has a normal mood and affect. Her behavior is normal. Judgment and thought content normal.       ED Course   Procedures  Labs Reviewed   CBC W/ AUTO DIFFERENTIAL - Abnormal; Notable for the following components:       Result Value    MCV 80 (*)     MCH 25.6 (*)     RDW 14.6 (*)     Platelets 550 (*)     MPV 9.0 (*)     All other components within normal limits   URINALYSIS, REFLEX TO URINE CULTURE - Abnormal; Notable for the following components:    Appearance, UA Hazy (*)     All other components within normal limits    Narrative:     Specimen Source->Urine   COMPREHENSIVE METABOLIC PANEL   LIPASE   POCT URINE PREGNANCY     EKG Readings: (Independently Interpreted)   Initial Reading: No STEMI. Rhythm: Sinus Tachycardia. Heart Rate: 102. Ectopy: No Ectopy. Conduction: Normal. Axis: Normal. Other Impression:  QRS 80 Qtc 437   Independent interpretation     Imaging Results    None          Medications   ondansetron injection 4 mg (4 mg Intravenous Given 6/29/23 1136)   ketorolac injection 15 mg (15 mg Intravenous Given 6/29/23 1136)     Medical Decision Making:   History:   Old Medical Records: I decided to obtain old medical records.  Old Records Summarized: other records.       <> Summary of Records: External documents reviewed.   Clinical Tests:   Lab Tests: Ordered and Reviewed  ED Management:  27 y/o F presenting for evaluation of abdominal pain with nausea vomiting. pt is afebrile nontoxic in no distress. Exam above. Abdomen soft and nontender. Cbc with no wbc or significant anemia. Cmp with no renal insufficiency or electrolyte abnormality. Lipase normal. Upt negative. UA negative. Pt given IV fluids and toradol and zofran. Doubt acute abdomen,  appendicitis. Think this is likely medication side effect. No evidence of DKA. PCP follow up with primary care in 2 days. Return to ER for worsening symptoms or as needed        Scribe Attestation:   Scribe #1: I performed the above scribed service and the documentation accurately describes the services I performed. I attest to the accuracy of the note.                 I, Rosalie Bryson PA-C, personally performed the services described in this documentation. All medical record entries made by the scribe were at my direction and in my presence. I have reviewed the chart and agree that the record reflects my personal performance and is accurate and complete.    Clinical Impression:   Final diagnoses:  [R11.10] Vomiting  [R11.2] Nausea and vomiting, unspecified vomiting type (Primary)  [T88.7XXA] Medication side effect        ED Disposition Condition    Discharge Stable          ED Prescriptions       Medication Sig Dispense Start Date End Date Auth. Provider    acetaminophen (TYLENOL) 500 MG tablet Take 1 tablet (500 mg total) by mouth every 4 (four) hours as needed for Pain. 20 tablet 6/29/2023 7/4/2023 Rosalie Bryson PA-C    ondansetron (ZOFRAN-ODT) 4 MG TbDL Take 1 tablet (4 mg total) by mouth every 6 (six) hours as needed. 15 tablet 6/29/2023 7/4/2023 Rosalie Bryson PA-C    dicyclomine (BENTYL) 20 mg tablet Take 1 tablet (20 mg total) by mouth 4 (four) times daily as needed (for abdominal cramping). 28 tablet 6/29/2023 7/6/2023 Rosalie Bryson PA-C    famotidine (PEPCID) 20 MG tablet Take 1 tablet (20 mg total) by mouth 2 (two) times daily. 60 tablet 6/29/2023 7/29/2023 Rosalie Bryson PA-C          Follow-up Information       Follow up With Specialties Details Why Contact Info    Katelyn Chapman MD Family Medicine Schedule an appointment as soon as possible for a visit in 2 days for follow up [address] [facility] [id number] [phone]      [facility] Emergency  Medicine Go to  As needed, If symptoms worsen [address]  [facility] [id number] [phone]             Rosalie Bryson PA-C  06/29/23 6561

## 2023-06-29 NOTE — DISCHARGE INSTRUCTIONS

## 2023-06-30 ENCOUNTER — PATIENT MESSAGE (OUTPATIENT)
Dept: FAMILY MEDICINE | Facility: CLINIC | Age: 28
End: 2023-06-30
Payer: MEDICAID

## 2023-07-02 ENCOUNTER — HOSPITAL ENCOUNTER (EMERGENCY)
Facility: HOSPITAL | Age: 28
Discharge: HOME | End: 2023-07-02
Attending: EMERGENCY MEDICINE
Payer: MEDICAID

## 2023-07-02 VITALS
DIASTOLIC BLOOD PRESSURE: 98 MMHG | SYSTOLIC BLOOD PRESSURE: 143 MMHG | RESPIRATION RATE: 17 BRPM | BODY MASS INDEX: 45.99 KG/M2 | OXYGEN SATURATION: 97 % | TEMPERATURE: 99 F | HEART RATE: 99 BPM | WEIGHT: 293 LBS | HEIGHT: 67 IN

## 2023-07-02 DIAGNOSIS — R11.2 NAUSEA AND VOMITING, UNSPECIFIED VOMITING TYPE: Primary | ICD-10-CM

## 2023-07-02 DIAGNOSIS — T50.905A MEDICATION SIDE EFFECT, INITIAL ENCOUNTER: ICD-10-CM

## 2023-07-02 LAB
B-HCG UR QL: NEGATIVE
CTP QC/QA: YES

## 2023-07-02 PROCEDURE — 81025 URINE PREGNANCY TEST: CPT | Performed by: NURSE PRACTITIONER

## 2023-07-02 PROCEDURE — 99283 EMERGENCY DEPT VISIT LOW MDM: CPT

## 2023-07-02 PROCEDURE — 25000003 PHARM REV CODE 250: Performed by: NURSE PRACTITIONER

## 2023-07-02 RX ORDER — ONDANSETRON 4 MG/1
4 TABLET, ORALLY DISINTEGRATING ORAL EVERY 6 HOURS PRN
Qty: 30 TABLET | Refills: 0 | Status: SHIPPED | OUTPATIENT
Start: 2023-07-02 | End: 2023-10-04

## 2023-07-02 RX ORDER — ONDANSETRON 8 MG/1
8 TABLET, ORALLY DISINTEGRATING ORAL
Status: COMPLETED | OUTPATIENT
Start: 2023-07-02 | End: 2023-07-02

## 2023-07-02 RX ADMIN — ONDANSETRON 8 MG: 8 TABLET, ORALLY DISINTEGRATING ORAL at 08:07

## 2023-07-02 NOTE — ED TRIAGE NOTES
"Pt to ER with reports of N/V x few days. Pt seen here two days for same. Reports is still vomiting but its " getting better". Pt is taking Ozempic shot. Pt also stated "last time she was here , she was given IV fluids and zofran, which helped out a lot so she decided to come back again."  "

## 2023-07-02 NOTE — DISCHARGE INSTRUCTIONS

## 2023-07-02 NOTE — ED PROVIDER NOTES
"Encounter Date: 7/2/2023    SCRIBE #1 NOTE: I, Theodora Pepper, am scribing for, and in the presence of,  Rafat Baron NP. I have scribed the following portions of the note - Other sections scribed: HPI, ROS.     History     Chief Complaint   Patient presents with    Nausea     Pt to ER with reports of N/V x few days. Pt seen here two days for same. Reports is still vomiting but its " getting better". Pt is taking Ozempic shot     Misael Garcia is a 28 y.o. female, with a PMHx of Type-2 diabetes, who presents to the ED with nausea and vomiting s/p endorsing Ozempic for the first time 4 days ago. Patient reports she had vomited 5-6 x since, has not been able to eat, and vomited water this morning. Pt states that she was prescribed pantoprazole for acid reflux but no nausea meds. No other exacerbating or alleviating factors. Denies abdominal pain, fever, diarrhea, constipation, hematemesis, dysuria, or any other symptoms.    The history is provided by the patient. No  was used.   Review of patient's allergies indicates:  No Known Allergies  Past Medical History:   Diagnosis Date    ADHD (attention deficit hyperactivity disorder)     Anxiety     Bipolar affective     Eczema     History of chlamydia 12/2016    Insomnia     Morbid obesity with BMI of 40.0-44.9, adult     Sleep disorder     Type 2 diabetes mellitus without complications      Past Surgical History:   Procedure Laterality Date    NO PAST SURGERIES      as of 1-13-17     Family History   Problem Relation Age of Onset    Breast cancer Paternal Grandmother     No Known Problems Father     No Known Problems Mother     No Known Problems Sister     Diabetes Maternal Grandmother     No Known Problems Maternal Grandfather     No Known Problems Paternal Grandfather     Colon cancer Neg Hx     Ovarian cancer Neg Hx      Social History     Tobacco Use    Smoking status: Some Days     Types: Vaping with nicotine     Start date: 2022 "     Last attempt to quit: 2022     Years since quittin.5    Smokeless tobacco: Never    Tobacco comments:     boyfriend smoke cigarettes   Substance Use Topics    Alcohol use: No    Drug use: Yes     Types: Marijuana     Review of Systems   Constitutional:  Negative for chills, fatigue and fever.   HENT:  Negative for congestion, ear pain, nosebleeds, postnasal drip, rhinorrhea, sinus pressure and sore throat.    Eyes:  Negative for photophobia and pain.   Respiratory:  Negative for apnea, cough, choking, chest tightness, shortness of breath, wheezing and stridor.    Cardiovascular:  Negative for chest pain, palpitations and leg swelling.   Gastrointestinal:  Positive for nausea and vomiting. Negative for abdominal pain, constipation and diarrhea.   Genitourinary:  Negative for dysuria, flank pain, hematuria, pelvic pain and vaginal discharge.   Musculoskeletal:  Negative for arthralgias, back pain, gait problem and neck pain.   Skin:  Negative for color change, pallor, rash and wound.   Neurological:  Negative for dizziness, seizures, syncope, facial asymmetry, light-headedness and headaches.   Hematological:  Negative for adenopathy.   Psychiatric/Behavioral:  Negative for confusion. The patient is not nervous/anxious.      Physical Exam     Initial Vitals [23 0750]   BP Pulse Resp Temp SpO2   (!) 143/98 99 17 99 °F (37.2 °C) 97 %      MAP       --         Physical Exam    Nursing note and vitals reviewed.  Constitutional: She appears well-developed and well-nourished. She is not diaphoretic. No distress.   HENT:   Head: Normocephalic and atraumatic.   Right Ear: External ear normal.   Left Ear: External ear normal.   Nose: Nose normal.   Eyes: Conjunctivae and EOM are normal. Right eye exhibits no discharge. Left eye exhibits no discharge.   Neck: Neck supple. No tracheal deviation present.   Normal range of motion.  Cardiovascular:  Normal rate.           Pulmonary/Chest: No stridor. No  respiratory distress.   Abdominal: Abdomen is soft. She exhibits no distension. There is no abdominal tenderness.   Musculoskeletal:         General: No tenderness. Normal range of motion.      Cervical back: Normal range of motion and neck supple.     Neurological: She is alert and oriented to person, place, and time. She has normal strength. No cranial nerve deficit or sensory deficit.   Skin: Skin is warm and dry.   Psychiatric: She has a normal mood and affect. Her behavior is normal. Judgment and thought content normal.       ED Course   Procedures  Labs Reviewed   POCT URINE PREGNANCY          Imaging Results    None          Medications   ondansetron disintegrating tablet 8 mg (8 mg Oral Given 7/2/23 0837)     Medical Decision Making:   History:   Old Medical Records: I decided to obtain old medical records.  Clinical Tests:   Lab Tests: Ordered and Reviewed  ED Management:  Benign abdominal exam. Symptoms due to known common side effects of semaglutide. Patient tolerating PO after Zofran in the ED. Will prescribe Zofran. Shared decision making with the patient.        Scribe Attestation:   Scribe #1: I performed the above scribed service and the documentation accurately describes the services I performed. I attest to the accuracy of the note.                 I, Rafat Baron NP, personally performed the services described in this documentation. All medical record entries made by the scribe were at my direction and in my presence.  I have reviewed the chart and agree that the record reflects my personal performance and is accurate and complete.    Clinical Impression:   Final diagnoses:  [R11.2] Nausea and vomiting, unspecified vomiting type (Primary)  [T50.415A] Medication side effect, initial encounter        ED Disposition Condition    Discharge Stable          ED Prescriptions       Medication Sig Dispense Start Date End Date Auth. Provider    ondansetron (ZOFRAN-ODT) 4 MG TbDL Take 1 tablet (4 mg  total) by mouth every 6 (six) hours as needed (Nausea). 30 tablet 7/2/2023 -- Rafat Baron NP          Follow-up Information       Follow up With Specialties Details Why Contact Info    Katelyn Chapman MD Family Medicine Schedule an appointment as soon as possible for a visit  For further evaluation [address] [facility] [place] [address]  [phone]      [facility] Emergency Medicine Go to  If symptoms worsen, As needed [address] [facility] [address] [phone]             Rafat Baron NP  07/02/23 0982

## 2023-07-07 ENCOUNTER — PATIENT OUTREACH (OUTPATIENT)
Dept: EMERGENCY MEDICINE | Facility: HOSPITAL | Age: 28
End: 2023-07-07
Payer: MEDICAID

## 2023-07-07 NOTE — PROGRESS NOTES
Lidya Wilson  ED Navigator  Emergency Department    Project: [facility] ED Navigator  Role: Community Health Worker    Date: 07/07/2023  Patient Name: Misael Garcia  MRN: [id number]  PCP: Katelyn Chapman MD    Assessment:     Misael Garcia is a 28 y.o. female who has presented to ED for nausea and vomiting. Patient has visited the ED 5 times in the past 3 months. Patient did contact PCP.     ED Navigator Initial Assessment    ED Navigator Enrollment Documentation  Consent to Services  Does patient consent to completing the assessment?: Yes  Contact  Method of Initial Contact: Phone  Transportation  Does the patient have issues with Transportation?: No  Does the patient have transportation to and from healthcare appointments?: Yes  Insurance Coverage  Do you have coverage/adequate coverage?: Yes  Type/kind of coverage: Coverage:  Medicaid/[employer]na [facility]  Is patient able to afford co-pays/deductibles?: Yes  Is patient able to afford HME or supplies?: Yes  Does patient have an established [facility] PCP?: Yes  Able to access?: Yes  Does the patient have a lack of adequate coverage?: No  Specialist Appointment  Did the patient come to the ED to see a specialist?: No  Does the patient have a pending specialist referral?: No  Does the patient have a specialist appointment made?: No  PCP Follow Up Appointment  Has the patient had an appointment with a primary care provider in the past year?: Yes  Approximate date: 6/28/23  Provider: Katelyn Chapman MD  Does the patient have a follow up appontment with a PCP?: Yes  Upcoming appointment date: 8/21/23  Provider: Katelyn Chapman MD  When was the last time you saw your PCP?: 6/28/23  Medications  Is patient able to afford medication?: Yes  Is patient unable to get medication due to lack of transportation?: No  Psychological  Does the patient have psycho-social concerns?: Yes  What concerns does the patient have?: Anxiety and/or Depression  Food  Does the patient have  concerns about food?: No  Communication/Education  Does the patient have limited English proficiency/English not primary language?: No  Does patient have low literacy and/or low health literacy?: Yes  Does patient have concerns with care?: No  Does patient have dissatisfaction with care?: No  Other Financial Concerns  Does the patient have immediate financial distress?: No  Does the patient have general financial concerns?: No  Other Social Barriers/Concerns  Does the patient have any additional barriers or concerns?: Work  Primary Barrier  Barriers identified: Cognitive barrier (health literacy, language and communication, etc.)  Root Cause of ED Utilization: Patient Knowledge/Low Health Literacy  Plan to address Patient Knowledge/Low Health Literacy: Provided information for [facility] On [facility] 24/7 Nurse triage line [phone] or 1-866-[facility] ([phone])  Next steps: Provided Education  Was education/educational materials provided surrounding PCP services/creating a medical home?: Yes Was education verbal or written?: Verbal     Was education/educational materials provided surrounding low cost, healthy foods?: Yes Was education verbal or written?: Written     Was education/educational materials provided surrounding other items? If so, use comment to explain.: Yes Was education verbal or written?: Written   Plan: Provided information for [facility] 24/7 Nurse triage line, [phone] or 1-866-[facility] ([phone])  Expected Date of Follow Up 1: 7/8/24  Additional Documentation: ED Navigator spoke with patient regarding her recent ED visit. Patient stated she is feeling much better. Patient has a follow-up appointment scheduled to see her PCP, Dr Katelyn Chapman, on August 21,2023. Assessment completed. Patient declined the need for resources at this time. ED Navigator sent patient [facility] form, [place] Virtual Visit Flyer, [facility] PCP scheduling assistance, [facility] on call RN#, and Heart Healthy Diet via  verified e-mail.  Lidya Wilson            Social History     Socioeconomic History    Marital status: Single   Occupational History    Occupation:     Tobacco Use    Smoking status: Some Days     Types: Vaping with nicotine     Start date:      Last attempt to quit: 2022     Years since quittin.5    Smokeless tobacco: Never    Tobacco comments:     boyfriend smoke cigarettes   Substance and Sexual Activity    Alcohol use: No    Drug use: Yes     Types: Marijuana    Sexual activity: Yes     Partners: Male     Birth control/protection: None     Comment: Single:       Social Determinants of Health     Financial Resource Strain: Low Risk     Difficulty of Paying Living Expenses: Not hard at all   Food Insecurity: No Food Insecurity    Worried About Running Out of Food in the Last Year: Never true    Ran Out of Food in the Last Year: Never true   Transportation Needs: No Transportation Needs    Lack of Transportation (Medical): No    Lack of Transportation (Non-Medical): No   Physical Activity: Inactive    Days of Exercise per Week: 0 days    Minutes of Exercise per Session: 0 min   Stress: Stress Concern Present    Feeling of Stress : To some extent   Social Connections: Socially Isolated    Frequency of Communication with Friends and Family: More than three times a week    Frequency of Social Gatherings with Friends and Family: More than three times a week    Attends Nondenominational Services: Never    Active Member of Clubs or Organizations: No    Attends Club or Organization Meetings: Never    Marital Status: Never    Housing Stability: Low Risk     Unable to Pay for Housing in the Last Year: No    Number of Places Lived in the Last Year: 1    Unstable Housing in the Last Year: No       Plan:   ED Navigator spoke with patient regarding her recent ED visit. Patient stated she is feeling much better. Patient has a follow-up appointment scheduled to see her PCP, Dr Katelyn Chapman, on .  Assessment completed. Patient declined the need for resources at this time. ED Navigator sent patient Right Care Right Place form, [place] Virtual Visit Flyer, [facility] PCP scheduling assistance, [facility] on call RN#, and Heart Healthy Diet via verified e-mail.      Appointment made with: Katelyn Chapman MD

## 2023-08-16 ENCOUNTER — PATIENT MESSAGE (OUTPATIENT)
Dept: ADMINISTRATIVE | Facility: HOSPITAL | Age: 28
End: 2023-08-16
Payer: MEDICAID

## 2023-08-29 ENCOUNTER — PATIENT MESSAGE (OUTPATIENT)
Dept: SURGERY | Facility: CLINIC | Age: 28
End: 2023-08-29
Payer: MEDICAID

## 2023-08-29 ENCOUNTER — TELEPHONE (OUTPATIENT)
Dept: SURGERY | Facility: CLINIC | Age: 28
End: 2023-08-29
Payer: MEDICAID

## 2023-08-29 ENCOUNTER — TELEPHONE (OUTPATIENT)
Dept: OBSTETRICS AND GYNECOLOGY | Facility: CLINIC | Age: 28
End: 2023-08-29
Payer: MEDICAID

## 2023-08-29 NOTE — TELEPHONE ENCOUNTER
"Spoke with pt regarding appt request. Pt states, "she has had an anal lesion that has started bleeding and wants to be seen." Offered for pt to be seen by Dr. Dutta on 9/29 at 0820 at the [facility], [place]. Pt verbally confirmed. Denies further questions. Appt added to wait list.   "

## 2023-08-29 NOTE — TELEPHONE ENCOUNTER
----- Message from Nima Carbajal sent at 8/28/2023  9:11 AM CDT -----  Regarding: pt call back  Name of Who is Calling:Pt         What is the request in detail: Needs to Cancel procedure that's scheduled on 9-1   Please contact soon             Can the clinic reply by [facility]: yes         What Number [facility] Back [facility] Telephone Information:  [employer]          [phone]

## 2023-08-30 ENCOUNTER — PATIENT MESSAGE (OUTPATIENT)
Dept: SURGERY | Facility: CLINIC | Age: 28
End: 2023-08-30
Payer: MEDICAID

## 2023-08-31 ENCOUNTER — PATIENT MESSAGE (OUTPATIENT)
Dept: OBSTETRICS AND GYNECOLOGY | Facility: CLINIC | Age: 28
End: 2023-08-31
Payer: MEDICAID

## 2023-08-31 ENCOUNTER — TELEPHONE (OUTPATIENT)
Dept: SURGERY | Facility: CLINIC | Age: 28
End: 2023-08-31
Payer: MEDICAID

## 2023-08-31 NOTE — PROGRESS NOTES
CRS Office Visit History and Physical      SUBJECTIVE:     Chief Complaint: Anal ulcer    History of Present Illness:  The patient is known patient to this practice.   Course is as follows:  Patient is a 28 y.o. female presents with sore near her anus.  Had punch biopsy of it with me 2021, feels like it is back (or never went away).  Path was benign at that time. STD testing all negative.    (2021)  Symptoms have been present for 2 years, getting bigger.  Bleeds sometimes with BMs.  Has tried steroid creams, no relief.  No other sores in other places.  Associated bleeding: yes  No history of abnormal Pap smears.  Previous anorectal procedures: no  Blood thinners: No  Saw Derm in Oct, had a culture of that area, which was negative for Herpes.  No other recent STD testing.  No new sexual partners.    Last Colonoscopy: None  Family history of colorectal cancer or IBD: None.    Review of patient's allergies indicates:  No Known Allergies    Past Medical History:   Diagnosis Date    ADHD (attention deficit hyperactivity disorder)     Anxiety     Bipolar affective     Eczema     History of chlamydia 2016    Insomnia     Morbid obesity with BMI of 40.0-44.9, adult     Sleep disorder     Type 2 diabetes mellitus without complications      Past Surgical History:   Procedure Laterality Date    NO PAST SURGERIES      as of 19     Family History   Problem Relation Age of Onset    Breast cancer Paternal Grandmother     No Known Problems Father     No Known Problems Mother     No Known Problems Sister     Diabetes Maternal Grandmother     No Known Problems Maternal Grandfather     No Known Problems Paternal Grandfather     Colon cancer Neg Hx     Ovarian cancer Neg Hx      Social History     Tobacco Use    Smoking status: Some Days     Types: Vaping with nicotine     Start date:      Last attempt to quit: 2022     Years since quittin.6    Smokeless tobacco: Never    Tobacco comments:     boyfriend  smoke cigarettes   Substance Use Topics    Alcohol use: No    Drug use: Yes     Types: Marijuana        Review of Systems:  Review of Systems   All other systems reviewed and are negative.      OBJECTIVE:     Vital Signs (Most Recent)  BP (!) 135/100 Comment: didnt take BP meds  Pulse 100   Wt (!) 160 kg (352 lb 11.8 oz)   BMI 55.25 kg/m²     Physical Exam:  General:    Black or   White female in no distress   Neuro: Alert and oriented x 4.  Moves all extremities.     HEENT: No icterus.  Trachea midline  Respiratory: Respirations are even and unlabored  Cardiac: Regular rate  Extremities: Warm dry and intact  Skin: No rashes    Anorectal:   External exam:  0. 3 cm, shallow ulcer in the posterior midline gluteal cleft (see photo below) with granulation tissue at base.    Digital exam revealed normal tone. No masses.      Procedure : Skin biopsy  The procedure was explained to the patient and gave verbal informed consent for bippsy.  The skin over the ulcer was anesthetized with subcutaneous infiltration of 1% lidocaine with epinephrine.   A 4 mm full-thickness punch biopsy was taken that included the entire ulcer.  Base treated with silver nitrate.  Dry dressings were placed  The patient tolerated the procedure well, without complications.    Postoperative instructions were given to patient.             ASSESSMENT/PLAN:     25yo F w/ perianal skin ulcer, biopsy taken today and treated with silver nitrate  - await results of biopsy  - RTC 3 weeks    Kayla Dutta MD  Staff Surgeon, Colon and Rectal Surgery  [facility]

## 2023-09-01 ENCOUNTER — PATIENT MESSAGE (OUTPATIENT)
Dept: SURGERY | Facility: CLINIC | Age: 28
End: 2023-09-01

## 2023-09-01 ENCOUNTER — OFFICE VISIT (OUTPATIENT)
Dept: SURGERY | Facility: CLINIC | Age: 28
End: 2023-09-01
Attending: COLON & RECTAL SURGERY
Payer: MEDICAID

## 2023-09-01 VITALS
WEIGHT: 293 LBS | SYSTOLIC BLOOD PRESSURE: 135 MMHG | BODY MASS INDEX: 55.25 KG/M2 | DIASTOLIC BLOOD PRESSURE: 100 MMHG | HEART RATE: 100 BPM

## 2023-09-01 DIAGNOSIS — L98.499 ULCER OF PERIANAL AREA, UNSPECIFIED ULCER STAGE: Primary | ICD-10-CM

## 2023-09-01 PROCEDURE — 3044F PR MOST RECENT HEMOGLOBIN A1C LEVEL <7.0%: ICD-10-PCS | Mod: CPTII,,, | Performed by: COLON & RECTAL SURGERY

## 2023-09-01 PROCEDURE — 88305 TISSUE EXAM BY PATHOLOGIST: ICD-10-PCS | Mod: 26,,, | Performed by: STUDENT IN AN ORGANIZED HEALTH CARE EDUCATION/TRAINING PROGRAM

## 2023-09-01 PROCEDURE — 1159F PR MEDICATION LIST DOCUMENTED IN MEDICAL RECORD: ICD-10-PCS | Mod: CPTII,,, | Performed by: COLON & RECTAL SURGERY

## 2023-09-01 PROCEDURE — 11104 PR PUNCH BIOPSY, SKIN, SINGLE LESION: ICD-10-PCS | Mod: S$PBB,,, | Performed by: COLON & RECTAL SURGERY

## 2023-09-01 PROCEDURE — 3080F PR MOST RECENT DIASTOLIC BLOOD PRESSURE >= 90 MM HG: ICD-10-PCS | Mod: CPTII,,, | Performed by: COLON & RECTAL SURGERY

## 2023-09-01 PROCEDURE — 88312 PR  SPECIAL STAINS,GROUP I: ICD-10-PCS | Mod: 26,,, | Performed by: STUDENT IN AN ORGANIZED HEALTH CARE EDUCATION/TRAINING PROGRAM

## 2023-09-01 PROCEDURE — 3044F HG A1C LEVEL LT 7.0%: CPT | Mod: CPTII,,, | Performed by: COLON & RECTAL SURGERY

## 2023-09-01 PROCEDURE — 3075F PR MOST RECENT SYSTOLIC BLOOD PRESS GE 130-139MM HG: ICD-10-PCS | Mod: CPTII,,, | Performed by: COLON & RECTAL SURGERY

## 2023-09-01 PROCEDURE — 11104 PUNCH BX SKIN SINGLE LESION: CPT | Mod: S$PBB,,, | Performed by: COLON & RECTAL SURGERY

## 2023-09-01 PROCEDURE — 3080F DIAST BP >= 90 MM HG: CPT | Mod: CPTII,,, | Performed by: COLON & RECTAL SURGERY

## 2023-09-01 PROCEDURE — 88305 TISSUE EXAM BY PATHOLOGIST: CPT | Performed by: STUDENT IN AN ORGANIZED HEALTH CARE EDUCATION/TRAINING PROGRAM

## 2023-09-01 PROCEDURE — 1160F PR REVIEW ALL MEDS BY PRESCRIBER/CLIN PHARMACIST DOCUMENTED: ICD-10-PCS | Mod: CPTII,,, | Performed by: COLON & RECTAL SURGERY

## 2023-09-01 PROCEDURE — 3066F NEPHROPATHY DOC TX: CPT | Mod: CPTII,,, | Performed by: COLON & RECTAL SURGERY

## 2023-09-01 PROCEDURE — 88305 TISSUE EXAM BY PATHOLOGIST: CPT | Mod: 26,,, | Performed by: STUDENT IN AN ORGANIZED HEALTH CARE EDUCATION/TRAINING PROGRAM

## 2023-09-01 PROCEDURE — 4010F ACE/ARB THERAPY RXD/TAKEN: CPT | Mod: CPTII,,, | Performed by: COLON & RECTAL SURGERY

## 2023-09-01 PROCEDURE — 99214 OFFICE O/P EST MOD 30 MIN: CPT | Mod: PBBFAC,25 | Performed by: COLON & RECTAL SURGERY

## 2023-09-01 PROCEDURE — 4010F PR ACE/ARB THEARPY RXD/TAKEN: ICD-10-PCS | Mod: CPTII,,, | Performed by: COLON & RECTAL SURGERY

## 2023-09-01 PROCEDURE — 3066F PR DOCUMENTATION OF TREATMENT FOR NEPHROPATHY: ICD-10-PCS | Mod: CPTII,,, | Performed by: COLON & RECTAL SURGERY

## 2023-09-01 PROCEDURE — 11104 PUNCH BX SKIN SINGLE LESION: CPT | Mod: PBBFAC | Performed by: COLON & RECTAL SURGERY

## 2023-09-01 PROCEDURE — 3075F SYST BP GE 130 - 139MM HG: CPT | Mod: CPTII,,, | Performed by: COLON & RECTAL SURGERY

## 2023-09-01 PROCEDURE — 3061F PR NEG MICROALBUMINURIA RESULT DOCUMENTED/REVIEW: ICD-10-PCS | Mod: CPTII,,, | Performed by: COLON & RECTAL SURGERY

## 2023-09-01 PROCEDURE — 99213 PR OFFICE/OUTPT VISIT, EST, LEVL III, 20-29 MIN: ICD-10-PCS | Mod: 25,S$PBB,, | Performed by: COLON & RECTAL SURGERY

## 2023-09-01 PROCEDURE — 3061F NEG MICROALBUMINURIA REV: CPT | Mod: CPTII,,, | Performed by: COLON & RECTAL SURGERY

## 2023-09-01 PROCEDURE — 99213 OFFICE O/P EST LOW 20 MIN: CPT | Mod: 25,S$PBB,, | Performed by: COLON & RECTAL SURGERY

## 2023-09-01 PROCEDURE — 1159F MED LIST DOCD IN RCRD: CPT | Mod: CPTII,,, | Performed by: COLON & RECTAL SURGERY

## 2023-09-01 PROCEDURE — 99999 PR PBB SHADOW E&M-EST. PATIENT-LVL IV: CPT | Mod: PBBFAC,,, | Performed by: COLON & RECTAL SURGERY

## 2023-09-01 PROCEDURE — 3008F PR BODY MASS INDEX (BMI) DOCUMENTED: ICD-10-PCS | Mod: CPTII,,, | Performed by: COLON & RECTAL SURGERY

## 2023-09-01 PROCEDURE — 99999 PR PBB SHADOW E&M-EST. PATIENT-LVL IV: ICD-10-PCS | Mod: PBBFAC,,, | Performed by: COLON & RECTAL SURGERY

## 2023-09-01 PROCEDURE — 88312 SPECIAL STAINS GROUP 1: CPT | Mod: 26,,, | Performed by: STUDENT IN AN ORGANIZED HEALTH CARE EDUCATION/TRAINING PROGRAM

## 2023-09-01 PROCEDURE — 88312 SPECIAL STAINS GROUP 1: CPT | Performed by: STUDENT IN AN ORGANIZED HEALTH CARE EDUCATION/TRAINING PROGRAM

## 2023-09-01 PROCEDURE — 3008F BODY MASS INDEX DOCD: CPT | Mod: CPTII,,, | Performed by: COLON & RECTAL SURGERY

## 2023-09-01 PROCEDURE — 1160F RVW MEDS BY RX/DR IN RCRD: CPT | Mod: CPTII,,, | Performed by: COLON & RECTAL SURGERY

## 2023-09-05 ENCOUNTER — TELEPHONE (OUTPATIENT)
Dept: SURGERY | Facility: CLINIC | Age: 28
End: 2023-09-05
Payer: MEDICAID

## 2023-09-05 DIAGNOSIS — Z30.017 ENCOUNTER FOR INITIAL PRESCRIPTION OF NEXPLANON: Primary | ICD-10-CM

## 2023-09-05 NOTE — TELEPHONE ENCOUNTER
Spoke with patient. Reports pain has decreased from the weekend to a 3/10, still bleeding upon wiping. Wondering if there is anything she can put on the area. Informed patient to keep area clean and dry and can put gauze on area to mitigate bleeding episodes. Instructed that if more than a cup of blood is lost to go to the ED.

## 2023-09-05 NOTE — PROGRESS NOTES
Patient missed appointment for nexplanon insertion.  Reordered.  Wants visit in oct 2023.    NELIDA smith MD

## 2023-09-14 LAB
COMMENT: NORMAL
FINAL PATHOLOGIC DIAGNOSIS: NORMAL
GROSS: NORMAL
Lab: NORMAL
MICROSCOPIC EXAM: NORMAL

## 2023-09-26 ENCOUNTER — TELEPHONE (OUTPATIENT)
Dept: SURGERY | Facility: CLINIC | Age: 28
End: 2023-09-26
Payer: MEDICAID

## 2023-09-27 ENCOUNTER — TELEPHONE (OUTPATIENT)
Dept: FAMILY MEDICINE | Facility: CLINIC | Age: 28
End: 2023-09-27
Payer: MEDICAID

## 2023-09-27 ENCOUNTER — OFFICE VISIT (OUTPATIENT)
Dept: SURGERY | Facility: CLINIC | Age: 28
End: 2023-09-27
Attending: COLON & RECTAL SURGERY
Payer: MEDICAID

## 2023-09-27 VITALS
BODY MASS INDEX: 55.94 KG/M2 | HEART RATE: 92 BPM | DIASTOLIC BLOOD PRESSURE: 102 MMHG | SYSTOLIC BLOOD PRESSURE: 137 MMHG | WEIGHT: 293 LBS

## 2023-09-27 DIAGNOSIS — L98.499 ULCER OF PERIANAL AREA, UNSPECIFIED ULCER STAGE: Primary | ICD-10-CM

## 2023-09-27 PROCEDURE — 3044F HG A1C LEVEL LT 7.0%: CPT | Mod: CPTII,,, | Performed by: COLON & RECTAL SURGERY

## 2023-09-27 PROCEDURE — 1159F MED LIST DOCD IN RCRD: CPT | Mod: CPTII,,, | Performed by: COLON & RECTAL SURGERY

## 2023-09-27 PROCEDURE — 4010F PR ACE/ARB THEARPY RXD/TAKEN: ICD-10-PCS | Mod: CPTII,,, | Performed by: COLON & RECTAL SURGERY

## 2023-09-27 PROCEDURE — 99999 PR PBB SHADOW E&M-EST. PATIENT-LVL I: ICD-10-PCS | Mod: PBBFAC,,, | Performed by: COLON & RECTAL SURGERY

## 2023-09-27 PROCEDURE — 1160F PR REVIEW ALL MEDS BY PRESCRIBER/CLIN PHARMACIST DOCUMENTED: ICD-10-PCS | Mod: CPTII,,, | Performed by: COLON & RECTAL SURGERY

## 2023-09-27 PROCEDURE — 99999 PR PBB SHADOW E&M-EST. PATIENT-LVL I: CPT | Mod: PBBFAC,,, | Performed by: COLON & RECTAL SURGERY

## 2023-09-27 PROCEDURE — 1160F RVW MEDS BY RX/DR IN RCRD: CPT | Mod: CPTII,,, | Performed by: COLON & RECTAL SURGERY

## 2023-09-27 PROCEDURE — 99211 OFF/OP EST MAY X REQ PHY/QHP: CPT | Mod: PBBFAC | Performed by: COLON & RECTAL SURGERY

## 2023-09-27 PROCEDURE — 3061F NEG MICROALBUMINURIA REV: CPT | Mod: CPTII,,, | Performed by: COLON & RECTAL SURGERY

## 2023-09-27 PROCEDURE — 3066F PR DOCUMENTATION OF TREATMENT FOR NEPHROPATHY: ICD-10-PCS | Mod: CPTII,,, | Performed by: COLON & RECTAL SURGERY

## 2023-09-27 PROCEDURE — 4010F ACE/ARB THERAPY RXD/TAKEN: CPT | Mod: CPTII,,, | Performed by: COLON & RECTAL SURGERY

## 2023-09-27 PROCEDURE — 99213 PR OFFICE/OUTPT VISIT, EST, LEVL III, 20-29 MIN: ICD-10-PCS | Mod: S$PBB,,, | Performed by: COLON & RECTAL SURGERY

## 2023-09-27 PROCEDURE — 99213 OFFICE O/P EST LOW 20 MIN: CPT | Mod: S$PBB,,, | Performed by: COLON & RECTAL SURGERY

## 2023-09-27 PROCEDURE — 1159F PR MEDICATION LIST DOCUMENTED IN MEDICAL RECORD: ICD-10-PCS | Mod: CPTII,,, | Performed by: COLON & RECTAL SURGERY

## 2023-09-27 PROCEDURE — 3044F PR MOST RECENT HEMOGLOBIN A1C LEVEL <7.0%: ICD-10-PCS | Mod: CPTII,,, | Performed by: COLON & RECTAL SURGERY

## 2023-09-27 PROCEDURE — 3066F NEPHROPATHY DOC TX: CPT | Mod: CPTII,,, | Performed by: COLON & RECTAL SURGERY

## 2023-09-27 PROCEDURE — 3061F PR NEG MICROALBUMINURIA RESULT DOCUMENTED/REVIEW: ICD-10-PCS | Mod: CPTII,,, | Performed by: COLON & RECTAL SURGERY

## 2023-09-27 RX ORDER — ERYTHROMYCIN AND BENZOYL PEROXIDE 30; 50 MG/G; MG/G
GEL TOPICAL 2 TIMES DAILY
Qty: 23.3 G | Refills: 3 | Status: SHIPPED | OUTPATIENT
Start: 2023-09-27 | End: 2024-01-30

## 2023-09-27 NOTE — PROGRESS NOTES
CRS Office Visit Follow-up    SUBJECTIVE:     History of Present Illness:  Patient is a 28 y.o. female who presents following biopsy of perianal ulcer on 9/1/2023. Their post-operative course was uncomplicated. There are no new complaints today.    Final pathology:  Skin, perianal ulcer, punch biopsy:   - Epidermis with psoriasiform hyperplasia, hyperkeratosis, and erosion, favor erythrasma with superimposed reactive changes/ mild lichen simplex chronicus   - Bacterial rods and filamentous organisms on GMS stain associated with neutrophils   - GMS and PASF stains, no definitive fungal organisms identified   - Negative for dysplasia or carcinoma     Review of Systems:  ROS    OBJECTIVE:     Vital Signs (Most Recent)  See Epic    Physical Exam:  General:    Black or   White female in no distress   Neuro: Alert and oriented x 4.  Moves all extremities.     HEENT: No icterus.  Trachea midline  Respiratory: Respirations are even and unlabored  Cardiac: Regular rate  Extremities: Warm dry and intact  Skin: No rashes  Anorectal: Perianal lesion biopsy site healing, treated with silver nitrate today      ASSESSMENT/PLAN:     27yo F s/p biopsy of perianal ulcer on 9/1/2023, doing well    Topical erythromycin BID  RTC 2-3 months    Kayla Dutta MD  Staff Surgeon, Colon and Rectal Surgery  [facility]      DISPLAY PLAN FREE TEXT

## 2023-09-27 NOTE — TELEPHONE ENCOUNTER
----- Message from Chula Finn sent at 9/27/2023  9:20 AM CDT -----  Type:  Patient Returning Call    Who Called:Pt   Would the patient rather a call back or a response via [facility]? Call back   Best Call Back Number:[phone]  Additional Information: pt is labs before her appt on 10/11

## 2023-09-28 ENCOUNTER — PATIENT MESSAGE (OUTPATIENT)
Dept: OBSTETRICS AND GYNECOLOGY | Facility: CLINIC | Age: 28
End: 2023-09-28
Payer: MEDICAID

## 2023-09-28 ENCOUNTER — TELEPHONE (OUTPATIENT)
Dept: OBSTETRICS AND GYNECOLOGY | Facility: CLINIC | Age: 28
End: 2023-09-28
Payer: MEDICAID

## 2023-09-28 NOTE — TELEPHONE ENCOUNTER
Called and spoke to Misael, pt wanted to cancel her Nexplanon insertion appt on 10/2/23. Asked pt if she wanted to reschedule. Pt said not at the moment. Told pt to give us a call back to reschedule her appt at her convenience.

## 2023-10-01 ENCOUNTER — PATIENT MESSAGE (OUTPATIENT)
Dept: FAMILY MEDICINE | Facility: CLINIC | Age: 28
End: 2023-10-01
Payer: MEDICAID

## 2023-10-01 ENCOUNTER — NURSE TRIAGE (OUTPATIENT)
Dept: ADMINISTRATIVE | Facility: CLINIC | Age: 28
End: 2023-10-01
Payer: MEDICAID

## 2023-10-01 NOTE — TELEPHONE ENCOUNTER
Reason for Disposition   Lab or radiology calling with test results    Additional Information   Lab result questions   Commented on: ED call to PCP (i.e., primary care provider; doctor, NP, or PA)     na   Commented on: Doctor (or NP/PA) call to PCP     na   Commented on: Call about patient who is currently hospitalized     N/a   Commented on: Lab or radiology calling with CRITICAL test results     N/a   Commented on: [1] Follow-up call from patient regarding patient's clinical status AND [2] information urgent     N/a   Commented on: [1] Caller requests to speak ONLY to PCP AND [2] URGENT question     N/a   Commented on: [1] Caller requests to speak to PCP now AND [2] won't tell us reason for call  (Exception: If 10 pm to 6 am, caller must first discuss reason for the call.)     N/a   Commented on: Notification of hospital admission     N/a   Commented on: Notification of death     N/a    Protocols used: Information Only Call - No Triage-A-AH, PCP Call - No Triage-A-AH  Pt states her psychiatrist order labs that reveals she has diabetes. Advised [facility] Triage nurses cannot discuss lab results. Advised a message will be sent to the PCP's office to contact her when the office opens. Call was disconnected. Left voice mail for pt advising to call [facility] back.

## 2023-10-02 ENCOUNTER — PATIENT MESSAGE (OUTPATIENT)
Dept: FAMILY MEDICINE | Facility: CLINIC | Age: 28
End: 2023-10-02
Payer: MEDICAID

## 2023-10-02 NOTE — TELEPHONE ENCOUNTER
Please schedule visit. In person preferred, virtual ok if she can check her BP and weight at home.   Sometime in month of October if available, thank you!

## 2023-10-02 NOTE — TELEPHONE ENCOUNTER
Schedule a visit to discuss.     Future Appointments   Date Time Provider Department Center   10/4/2023  1:40 PM Katelyn Chapman MD [facility]   11/3/2023  1:20 PM Katelyn Chapman MD [facility]   11/22/2023  3:20 PM Kayla Dutta MD [facility]

## 2023-10-04 ENCOUNTER — OFFICE VISIT (OUTPATIENT)
Dept: FAMILY MEDICINE | Facility: CLINIC | Age: 28
End: 2023-10-04
Payer: MEDICAID

## 2023-10-04 ENCOUNTER — PATIENT MESSAGE (OUTPATIENT)
Dept: SURGERY | Facility: CLINIC | Age: 28
End: 2023-10-04
Payer: MEDICAID

## 2023-10-04 ENCOUNTER — PATIENT MESSAGE (OUTPATIENT)
Dept: ADMINISTRATIVE | Facility: OTHER | Age: 28
End: 2023-10-04
Payer: MEDICAID

## 2023-10-04 VITALS
SYSTOLIC BLOOD PRESSURE: 132 MMHG | DIASTOLIC BLOOD PRESSURE: 80 MMHG | HEART RATE: 92 BPM | HEIGHT: 67 IN | WEIGHT: 293 LBS | OXYGEN SATURATION: 100 % | BODY MASS INDEX: 45.99 KG/M2

## 2023-10-04 DIAGNOSIS — E11.59 HYPERTENSION ASSOCIATED WITH DIABETES: ICD-10-CM

## 2023-10-04 DIAGNOSIS — I15.2 HYPERTENSION ASSOCIATED WITH DIABETES: ICD-10-CM

## 2023-10-04 DIAGNOSIS — Z23 IMMUNIZATION DUE: ICD-10-CM

## 2023-10-04 DIAGNOSIS — E66.01 CLASS 3 SEVERE OBESITY DUE TO EXCESS CALORIES WITH SERIOUS COMORBIDITY AND BODY MASS INDEX (BMI) OF 50.0 TO 59.9 IN ADULT: ICD-10-CM

## 2023-10-04 DIAGNOSIS — E11.65 TYPE 2 DIABETES MELLITUS WITH HYPERGLYCEMIA, WITHOUT LONG-TERM CURRENT USE OF INSULIN: Primary | ICD-10-CM

## 2023-10-04 DIAGNOSIS — G47.33 OSA ON CPAP: ICD-10-CM

## 2023-10-04 PROBLEM — J02.9 PHARYNGITIS: Status: RESOLVED | Noted: 2023-06-05 | Resolved: 2023-10-04

## 2023-10-04 PROCEDURE — 99214 OFFICE O/P EST MOD 30 MIN: CPT | Mod: S$PBB,,, | Performed by: FAMILY MEDICINE

## 2023-10-04 PROCEDURE — 3044F PR MOST RECENT HEMOGLOBIN A1C LEVEL <7.0%: ICD-10-PCS | Mod: CPTII,,, | Performed by: FAMILY MEDICINE

## 2023-10-04 PROCEDURE — 4010F PR ACE/ARB THEARPY RXD/TAKEN: ICD-10-PCS | Mod: CPTII,,, | Performed by: FAMILY MEDICINE

## 2023-10-04 PROCEDURE — 3061F PR NEG MICROALBUMINURIA RESULT DOCUMENTED/REVIEW: ICD-10-PCS | Mod: CPTII,,, | Performed by: FAMILY MEDICINE

## 2023-10-04 PROCEDURE — 3061F NEG MICROALBUMINURIA REV: CPT | Mod: CPTII,,, | Performed by: FAMILY MEDICINE

## 2023-10-04 PROCEDURE — 99999 PR PBB SHADOW E&M-EST. PATIENT-LVL V: CPT | Mod: PBBFAC,,, | Performed by: FAMILY MEDICINE

## 2023-10-04 PROCEDURE — 1160F RVW MEDS BY RX/DR IN RCRD: CPT | Mod: CPTII,,, | Performed by: FAMILY MEDICINE

## 2023-10-04 PROCEDURE — 1160F PR REVIEW ALL MEDS BY PRESCRIBER/CLIN PHARMACIST DOCUMENTED: ICD-10-PCS | Mod: CPTII,,, | Performed by: FAMILY MEDICINE

## 2023-10-04 PROCEDURE — 99999 PR PBB SHADOW E&M-EST. PATIENT-LVL V: ICD-10-PCS | Mod: PBBFAC,,, | Performed by: FAMILY MEDICINE

## 2023-10-04 PROCEDURE — 3008F BODY MASS INDEX DOCD: CPT | Mod: CPTII,,, | Performed by: FAMILY MEDICINE

## 2023-10-04 PROCEDURE — 1159F PR MEDICATION LIST DOCUMENTED IN MEDICAL RECORD: ICD-10-PCS | Mod: CPTII,,, | Performed by: FAMILY MEDICINE

## 2023-10-04 PROCEDURE — 99215 OFFICE O/P EST HI 40 MIN: CPT | Mod: PBBFAC,PO | Performed by: FAMILY MEDICINE

## 2023-10-04 PROCEDURE — 3066F PR DOCUMENTATION OF TREATMENT FOR NEPHROPATHY: ICD-10-PCS | Mod: CPTII,,, | Performed by: FAMILY MEDICINE

## 2023-10-04 PROCEDURE — 99999PBSHW FLU VACCINE (QUAD) GREATER THAN OR EQUAL TO 3YO PRESERVATIVE FREE IM: Mod: PBBFAC,,,

## 2023-10-04 PROCEDURE — 1159F MED LIST DOCD IN RCRD: CPT | Mod: CPTII,,, | Performed by: FAMILY MEDICINE

## 2023-10-04 PROCEDURE — 90686 IIV4 VACC NO PRSV 0.5 ML IM: CPT | Mod: PBBFAC,PO

## 2023-10-04 PROCEDURE — 99999PBSHW FLU VACCINE (QUAD) GREATER THAN OR EQUAL TO 3YO PRESERVATIVE FREE IM: ICD-10-PCS | Mod: PBBFAC,,,

## 2023-10-04 PROCEDURE — 3008F PR BODY MASS INDEX (BMI) DOCUMENTED: ICD-10-PCS | Mod: CPTII,,, | Performed by: FAMILY MEDICINE

## 2023-10-04 PROCEDURE — 3075F SYST BP GE 130 - 139MM HG: CPT | Mod: CPTII,,, | Performed by: FAMILY MEDICINE

## 2023-10-04 PROCEDURE — 3075F PR MOST RECENT SYSTOLIC BLOOD PRESS GE 130-139MM HG: ICD-10-PCS | Mod: CPTII,,, | Performed by: FAMILY MEDICINE

## 2023-10-04 PROCEDURE — 3079F DIAST BP 80-89 MM HG: CPT | Mod: CPTII,,, | Performed by: FAMILY MEDICINE

## 2023-10-04 PROCEDURE — 4010F ACE/ARB THERAPY RXD/TAKEN: CPT | Mod: CPTII,,, | Performed by: FAMILY MEDICINE

## 2023-10-04 PROCEDURE — 3079F PR MOST RECENT DIASTOLIC BLOOD PRESSURE 80-89 MM HG: ICD-10-PCS | Mod: CPTII,,, | Performed by: FAMILY MEDICINE

## 2023-10-04 PROCEDURE — 99214 PR OFFICE/OUTPT VISIT, EST, LEVL IV, 30-39 MIN: ICD-10-PCS | Mod: S$PBB,,, | Performed by: FAMILY MEDICINE

## 2023-10-04 PROCEDURE — 3044F HG A1C LEVEL LT 7.0%: CPT | Mod: CPTII,,, | Performed by: FAMILY MEDICINE

## 2023-10-04 PROCEDURE — 3066F NEPHROPATHY DOC TX: CPT | Mod: CPTII,,, | Performed by: FAMILY MEDICINE

## 2023-10-04 RX ORDER — HYDROXYZINE HYDROCHLORIDE 25 MG/1
25 TABLET, FILM COATED ORAL 3 TIMES DAILY
COMMUNITY
End: 2024-02-06

## 2023-10-04 RX ORDER — LURASIDONE HYDROCHLORIDE 40 MG/1
40 TABLET, FILM COATED ORAL
COMMUNITY
Start: 2023-09-12

## 2023-10-04 RX ORDER — DROSPIRENONE AND ETHINYL ESTRADIOL 0.02-3(28)
1 KIT ORAL
COMMUNITY
Start: 2023-06-26 | End: 2023-10-04

## 2023-10-04 RX ORDER — AMMONIUM LACTATE 12 G/100G
LOTION TOPICAL
COMMUNITY
Start: 2023-05-18

## 2023-10-04 RX ORDER — LISINOPRIL 10 MG/1
10 TABLET ORAL DAILY
Qty: 90 TABLET | Refills: 3 | Status: SHIPPED | OUTPATIENT
Start: 2023-10-04 | End: 2023-11-21 | Stop reason: SDUPTHER

## 2023-10-04 RX ORDER — LAMOTRIGINE 25 MG/1
TABLET ORAL
COMMUNITY
Start: 2023-06-06 | End: 2024-01-30

## 2023-10-04 RX ORDER — SEMAGLUTIDE 0.68 MG/ML
INJECTION, SOLUTION SUBCUTANEOUS
Qty: 3 ML | Refills: 0 | Status: SHIPPED | OUTPATIENT
Start: 2023-10-04 | End: 2023-12-13 | Stop reason: SDUPTHER

## 2023-10-04 RX ORDER — SULFACETAMIDE SODIUM, SULFUR 100; 50 MG/G; MG/G
EMULSION TOPICAL
COMMUNITY
Start: 2023-05-18

## 2023-10-04 RX ORDER — LURASIDONE HYDROCHLORIDE 20 MG/1
20 TABLET, FILM COATED ORAL
COMMUNITY
Start: 2023-08-15 | End: 2024-01-30

## 2023-10-04 RX ORDER — METRONIDAZOLE 500 MG/1
500 TABLET ORAL 2 TIMES DAILY
COMMUNITY
Start: 2023-05-16 | End: 2023-10-04

## 2023-10-04 RX ORDER — DROSPIRENONE AND ETHINYL ESTRADIOL 0.02-3(28)
1 KIT ORAL DAILY
COMMUNITY
Start: 2023-06-26 | End: 2023-10-04

## 2023-10-04 RX ORDER — METFORMIN HYDROCHLORIDE 500 MG/1
500 TABLET, EXTENDED RELEASE ORAL NIGHTLY
Qty: 90 TABLET | Refills: 3 | Status: SHIPPED | OUTPATIENT
Start: 2023-10-04 | End: 2024-03-25

## 2023-10-04 RX ORDER — CIPROFLOXACIN 500 MG/1
500 TABLET ORAL 2 TIMES DAILY
COMMUNITY
Start: 2023-05-19 | End: 2023-10-04

## 2023-10-04 NOTE — PROGRESS NOTES
Subjective:       Patient ID: Misael Garcia is a 28 y.o. female.    Chief Complaint: Diabetes    Patient Active Problem List   Diagnosis    Corns and callus    Class 3 severe obesity due to excess calories with serious comorbidity and body mass index (BMI) of 50.0 to 59.9 in adult    Bipolar 1 disorder    Type 2 diabetes mellitus with hyperglycemia, without long-term current use of insulin    Recently quit using tobacco    Hypertension associated with diabetes    Oral contraceptive pill surveillance    Thrombocytosis    SID on CPAP    Mild persistent asthma without complication    Cervical spondylosis without myelopathy    Lumbosacral spondylosis without myelopathy    Posttraumatic stress disorder    Thoracic facet syndrome      Diabetes    29 yo female previously with preDM range A1C is concerned outside blood work has worsened and in DM range. Attached in her chart, A1C 6.5%.     June/July with last injection of Ozempic.   She was previously inconsistent then restarted after some time off at 2 mg weekly and noted vomiting and nausea.   Reports there was appetite and craving suppression with medication, but she continued to eat. She does have a regular counselor, but has not discussed her over eating, reasons for eating, and her cycles of being motivated and having anergia.   Sees psychiatry who manages meds and counselor via televisits.    Review of Systems   All other systems reviewed and are negative.       Objective:     Vitals:    10/04/23 1351   BP: 132/80   Pulse: 92     Physical Exam  Vitals and nursing note reviewed.   Constitutional:       General: She is not in acute distress.     Appearance: Normal appearance. She is obese. She is not ill-appearing, toxic-appearing or diaphoretic.   HENT:      Head: Normocephalic and atraumatic.   Eyes:      General: No scleral icterus.     Conjunctiva/sclera: Conjunctivae normal.   Cardiovascular:      Rate and Rhythm: Normal rate.      Heart sounds: No  murmur heard.  Pulmonary:      Effort: Pulmonary effort is normal. No respiratory distress.   Skin:     Coloration: Skin is not pale.   Neurological:      Mental Status: She is alert. Mental status is at baseline.   Psychiatric:         Attention and Perception: Attention and perception normal.         Mood and Affect: Mood and affect normal.         Speech: Speech normal.         Behavior: Behavior normal.         Cognition and Memory: Cognition and memory normal.         Judgment: Judgment normal.       Assessment:       1. Type 2 diabetes mellitus with hyperglycemia, without long-term current use of insulin    2. Hypertension associated with diabetes    3. SID on CPAP    4. Class 3 severe obesity due to excess calories with serious comorbidity and body mass index (BMI) of 50.0 to 59.9 in adult    5. Immunization due          Plan:   Recent relevant labs results reviewed with patient.       1. Type 2 diabetes mellitus with hyperglycemia, without long-term current use of insulin  -     semaglutide (OZEMPIC) 2 mg/dose (8 mg/3 mL) PnIj; Inject 2 mg into the skin every 7 days. For weeks 9-12 and onward  Dispense: 3 mL; Refill: 2  -     semaglutide (OZEMPIC) 1 mg/dose (4 mg/3 mL); Inject 1 mg into the skin every 7 days. For weeks 5-8  Dispense: 3 mL; Refill: 0  -     semaglutide (OZEMPIC) 0.25 mg or 0.5 mg (2 mg/3 mL) pen injector; Inject 0.5mg into the skin  weekly for weeks 1-4  Dispense: 3 mL; Refill: 0  -     metFORMIN (GLUCOPHAGE-XR) 500 MG ER 24hr tablet; Take 1 tablet (500 mg total) by mouth every evening.  Dispense: 90 tablet; Refill: 3  -Restart Ozempic titration, Metformin nightly. Recruit boyfriend to help with accountability and consistency in medication use. Weight management.     2. Hypertension associated with diabetes  -     lisinopriL 10 MG tablet; Take 1 tablet (10 mg total) by mouth once daily.  Dispense: 90 tablet; Refill: 3  Weight management, restart Lisinopril. Resume digital hypertension  program.     3. SID on CPAP  Weight management.   Continue CPAP    4. Class 3 severe obesity due to excess calories with serious comorbidity and body mass index (BMI) of 50.0 to 59.9 in adult  -     semaglutide (OZEMPIC) 2 mg/dose (8 mg/3 mL) PnIj; Inject 2 mg into the skin every 7 days. For weeks 9-12 and onward  Dispense: 3 mL; Refill: 2  -     semaglutide (OZEMPIC) 1 mg/dose (4 mg/3 mL); Inject 1 mg into the skin every 7 days. For weeks 5-8  Dispense: 3 mL; Refill: 0  -     semaglutide (OZEMPIC) 0.25 mg or 0.5 mg (2 mg/3 mL) pen injector; Inject 0.5mg into the skin  weekly for weeks 1-4  Dispense: 3 mL; Refill: 0    Discussed learning to cook and experiment with vegetables and learn about different foods. Patient reports that food to her is largely fast food. She craves fast food. She eats to relax and manage her anxiety and stress and boredom.   Discuss eating related behavior change with counselor. She is nervous about having to exercise. Would not worry about that at this time. Small walks may help her mood to continue with positive behavior change, but at this time the focus should be consistently taking her medications and identifying her reasons and triggers for eating and to expand her food choices.     5. Immunization due  -     Influenza - Quadrivalent *Preferred* (6 months+) (PF)    Patient's questions answered. Plan reviewed with patient at the end of visit. Relevant precautions to chief complaint and reasons to seek further medical care or to contact the office sooner reviewed with patient.     Follow up in about 3 months (around 1/4/2024) for Diabetes Follow-up, Weight Follow-up.

## 2023-10-05 ENCOUNTER — PATIENT MESSAGE (OUTPATIENT)
Dept: SURGERY | Facility: CLINIC | Age: 28
End: 2023-10-05
Payer: MEDICAID

## 2023-10-05 RX ORDER — CLINDAMYCIN PHOSPHATE 10 MG/G
GEL TOPICAL 2 TIMES DAILY
Qty: 30 G | Refills: 3 | Status: SHIPPED | OUTPATIENT
Start: 2023-10-05

## 2023-10-09 ENCOUNTER — TELEPHONE (OUTPATIENT)
Dept: FAMILY MEDICINE | Facility: CLINIC | Age: 28
End: 2023-10-09
Payer: MEDICAID

## 2023-10-09 NOTE — TELEPHONE ENCOUNTER
Good Morning ,I did speak to Deangelo on Thursday about .Deangelo resent the consent/questionnaire to ,she resigned the consent and has been reactivated into the Digital [facility] Program.Once  receive her monitor and take her 1st reading, she will be well on her way.

## 2023-11-03 ENCOUNTER — PATIENT MESSAGE (OUTPATIENT)
Dept: ADMINISTRATIVE | Facility: HOSPITAL | Age: 28
End: 2023-11-03
Payer: MEDICAID

## 2023-11-21 ENCOUNTER — PATIENT MESSAGE (OUTPATIENT)
Dept: FAMILY MEDICINE | Facility: CLINIC | Age: 28
End: 2023-11-21
Payer: MEDICAID

## 2023-11-21 DIAGNOSIS — I15.2 HYPERTENSION ASSOCIATED WITH DIABETES: ICD-10-CM

## 2023-11-21 DIAGNOSIS — E11.59 HYPERTENSION ASSOCIATED WITH DIABETES: ICD-10-CM

## 2023-11-21 RX ORDER — LISINOPRIL 10 MG/1
10 TABLET ORAL DAILY
Qty: 90 TABLET | Refills: 2 | Status: SHIPPED | OUTPATIENT
Start: 2023-11-21 | End: 2024-01-30 | Stop reason: SDUPTHER

## 2023-11-21 NOTE — TELEPHONE ENCOUNTER
Care Due:                  Date            Visit Type   Department     Provider  --------------------------------------------------------------------------------                                EP -                              [facility] FAMILY  Last Visit: 10-      CARE ([facility])   MEDICINE       Katelyn T  Chapman                              EP -                              [facility]  Next Visit: 01-      CARE ([facility])   MEDICINE       Katelyn Chapman                                                            Last  Test          Frequency    Reason                     Performed    Due Date  --------------------------------------------------------------------------------    HBA1C.......  6 months...  metFORMIN, semaglutide...  01- 07-    [facility] Embedded Care Due Messages. Reference number: [id number].   11/21/2023 12:07:46 PM CST

## 2023-11-21 NOTE — TELEPHONE ENCOUNTER
Refill Decision Note   Misael Garcia  is requesting a refill authorization.  Brief Assessment and Rationale for Refill:  Approve     Medication Therapy Plan:         Comments:     Note composed:1:14 PM 11/21/2023

## 2023-11-29 ENCOUNTER — PATIENT MESSAGE (OUTPATIENT)
Dept: FAMILY MEDICINE | Facility: CLINIC | Age: 28
End: 2023-11-29

## 2023-11-29 ENCOUNTER — E-VISIT (OUTPATIENT)
Dept: FAMILY MEDICINE | Facility: CLINIC | Age: 28
End: 2023-11-29
Payer: MEDICAID

## 2023-11-29 DIAGNOSIS — J06.9 VIRAL URI WITH COUGH: Primary | ICD-10-CM

## 2023-11-29 PROCEDURE — 99423 OL DIG E/M SVC 21+ MIN: CPT | Mod: ,,, | Performed by: FAMILY MEDICINE

## 2023-11-29 PROCEDURE — 99423 PR E&M, ONLINE DIGIT, EST, < 7 DAYS,  21+ MINS: ICD-10-PCS | Mod: ,,, | Performed by: FAMILY MEDICINE

## 2023-11-29 RX ORDER — PROMETHAZINE HYDROCHLORIDE AND DEXTROMETHORPHAN HYDROBROMIDE 6.25; 15 MG/5ML; MG/5ML
5 SYRUP ORAL EVERY 6 HOURS PRN
Qty: 240 ML | Refills: 0 | Status: SHIPPED | OUTPATIENT
Start: 2023-11-29 | End: 2023-12-09

## 2023-11-29 NOTE — PROGRESS NOTES
Patient ID: Misael Garcia is a 28 y.o. female.    Chief Complaint: URI (Entered automatically based on patient selection in Patient Portal.)    The patient initiated a request through [employer] on 11/29/2023 for evaluation and management with a chief complaint of URI (Entered automatically based on patient selection in Patient Portal.)     I evaluated the questionnaire submission on 11/29/2023      [facility] Peq Evisit Upper Respitatory/Cough Questionnaire    11/29/2023  8:52 AM CST - Filed by Patient   Do you agree to participate in an E-Visit? Yes   If you have any of the following symptoms, please present to your local ER or call 911:  I acknowledge   What is the main issue that you would like for your doctor to address today? Dry Severe Cough/Phlegm, No sore Throat   Are you able to take your vital signs? No   Are you currently pregnant, could you be pregnant, or are you breast feeding? None of the above   What symptoms do you currently have?  Cough;  Nasal Congestion;  Runny nose   Describe your cough: Dry   Have you ever smoked? I have smoked in the past   Have you had a fever? No   When did your symptoms first appear? 11/24/2023   In the last two weeks, have you been in close contact with someone who has COVID-19 or the Flu? No   In the last two weeks, have you worked or volunteered in a healthcare facility or as a ? Healthcare facilities include a hospital, medical or dental clinic, long-term care facility, or nursing home No   Do you live in a long-term care facility, nursing home, group home, or homeless shelter? No   List what you have done or taken to help your symptoms. Cold and Flu Tablets for 3 days   How severe are your symptoms? Moderate   Have your symptoms improved since they first appeared? No change   Have you taken an at home Covid test? No   Have you taken a Flu test? No   Have you been fully vaccinated for COVID? (2 Pfizer, 2 Moderna or 1 Bhaskar & Bhaskar vaccine  injections) Yes   Have you received a booster? Yes   Have you recieved a Flu shot? Yes   When did you recieve your Flu shot? 9/18/2023   Do you have transportation to get tested for COVID if it is indicated and ordered for you at an [facility] location? No   Provide any information you feel is important to your history not asked above    Please attach any relevant images or files          Recent Labs Obtained:  No visits with results within 7 Day(s) from this visit.   Latest known visit with results is:   Office Visit on 09/01/2023   Component Date Value Ref Range Status    Final Pathologic Diagnosis 09/01/2023    Final                    Value:Skin, perianal ulcer, punch biopsy:  - Epidermis with psoriasiform hyperplasia, hyperkeratosis, and erosion, favor erythrasma with superimposed reactive changes/ mild lichen simplex chronicus  - Bacterial rods and filamentous organisms on GMS stain associated with neutrophils  - GMS and PASF stains, no definitive fungal organisms identified  - Negative for dysplasia or carcinoma      Interp By Hayes Castro M.D., Signed on 09/14/2023 at 12:43    Gross 09/01/2023    Final                    Value:Pathology ID:  [id number]  Patient ID:  [id number]  The specimen is received in formalin labeled &quot;perianal ulcer&quot;.  The specimen consists of 1 tan fragment of soft tissue measuring 0.5 x 0.4 x 0.2 cm.  The specimen is submitted entirely in cassette [id number]      Microscopic Exam 09/01/2023 Microscopic examination is performed and the results are incorporated into the above findings.  Special stains are performed with appropriate and reactive controls.   Final    Comment 09/01/2023 This case was reviewed in consultation with Dr. Marilu Weinstein (dermatopathologist), who concurs with the above findings.   Final    Disclaimer 09/01/2023 Unless the case is a 'gross only' or additional testing only, the final diagnosis for each specimen is based on a  microscopic examination of appropriate tissue sections.   Final       Encounter Diagnosis   Name Primary?    Viral URI with cough Yes        No orders of the defined types were placed in this encounter.     Medications Ordered This Encounter   Medications    promethazine-dextromethorphan (PROMETHAZINE-DM) 6.25-15 mg/5 mL Syrp     Sig: Take 5 mLs by mouth every 6 (six) hours as needed (cough).     Dispense:  240 mL     Refill:  0        Follow up if symptoms worsen or fail to improve.      E-Visit Time Tracking:    Day 1 Time (in minutes): 15     Total Time (in minutes): 15

## 2023-12-05 ENCOUNTER — E-VISIT (OUTPATIENT)
Dept: FAMILY MEDICINE | Facility: CLINIC | Age: 28
End: 2023-12-05
Payer: MEDICAID

## 2023-12-05 ENCOUNTER — TELEPHONE (OUTPATIENT)
Dept: FAMILY MEDICINE | Facility: CLINIC | Age: 28
End: 2023-12-05
Payer: MEDICAID

## 2023-12-05 DIAGNOSIS — J32.9 BACTERIAL SINUSITIS: Primary | ICD-10-CM

## 2023-12-05 DIAGNOSIS — B96.89 BACTERIAL SINUSITIS: Primary | ICD-10-CM

## 2023-12-05 PROCEDURE — 99499 NO LOS: ICD-10-PCS | Mod: ,,, | Performed by: FAMILY MEDICINE

## 2023-12-05 PROCEDURE — 99499 UNLISTED E&M SERVICE: CPT | Mod: ,,, | Performed by: FAMILY MEDICINE

## 2023-12-05 RX ORDER — AMOXICILLIN AND CLAVULANATE POTASSIUM 875; 125 MG/1; MG/1
1 TABLET, FILM COATED ORAL EVERY 12 HOURS
Qty: 14 TABLET | Refills: 0 | Status: SHIPPED | OUTPATIENT
Start: 2023-12-05 | End: 2023-12-12

## 2023-12-05 NOTE — TELEPHONE ENCOUNTER
----- Message from Kristen Salomon sent at 12/5/2023  7:26 AM CST -----  Pt would like a call back still coughing wants antibiotic     Can be reached at [phone]

## 2023-12-05 NOTE — PROGRESS NOTES
Patient ID: Misael Garcia is a 28 y.o. female.    Chief Complaint: URI (Entered automatically based on patient selection in Patient Portal.)    The patient initiated a request through [facility] on 12/5/2023 for evaluation and management with a chief complaint of URI (Entered automatically based on patient selection in Patient Portal.)     I evaluated the questionnaire submission on 12/05/2023    Ohs Peq Evisit Upper Respitatory/Cough Questionnaire    12/5/2023  9:15 AM CST - Filed by Patient   Do you agree to participate in an E-Visit? Yes   If you have any of the following symptoms, please present to your local ER or call 911:  I acknowledge   What is the main issue that you would like for your doctor to address today? Ongoing severe cough   Are you able to take your vital signs? No   Are you currently pregnant, could you be pregnant, or are you breast feeding? None of the above   What symptoms do you currently have?  Cough;  Headache   Describe your cough: Productive (containing mucus);  Dry;  Bothersome (interferes with daily activities)   Describe the mucus: Clear;  White   Have you ever smoked? I have smoked in the past   Have you had a fever? No   When did your symptoms first appear? 11/24/2023   In the last two weeks, have you been in close contact with someone who has COVID-19 or the Flu? No   In the last two weeks, have you worked or volunteered in a healthcare facility or as a ? Healthcare facilities include a hospital, medical or dental clinic, long-term care facility, or nursing home No   Do you live in a long-term care facility, nursing home, group home, or homeless shelter? No   List what you have done or taken to help your symptoms. Cough Syrup, Cold and Flu Tablets   How severe are your symptoms? Severe   Have your symptoms improved since they first appeared? Worse   Have you taken an at home Covid test? No   Have you taken a Flu test? No   Have you been fully vaccinated for  COVID? (2 Pfizer, 2 Moderna or 1 Bhaskar & Bh[employer]ar vaccine injections) Yes   Have you received a booster? Yes   Have you recieved a Flu shot? Yes   When did you recieve your Flu shot? 9/18/2023   Do you have transportation to get tested for COVID if it is indicated and ordered for you at an [facility] location? No   Provide any information you feel is important to your history not asked above    Please attach any relevant images or files          Recent Labs Obtained:  No visits with results within 7 Day(s) from this visit.   Latest known visit with results is:   Office Visit on 09/01/2023   Component Date Value Ref Range Status    Final Pathologic Diagnosis 09/01/2023    Final                    Value:Skin, perianal ulcer, punch biopsy:  - Epidermis with psoriasiform hyperplasia, hyperkeratosis, and erosion, favor erythrasma with superimposed reactive changes/ mild lichen simplex chronicus  - Bacterial rods and filamentous organisms on GMS stain associated with neutrophils  - GMS and PASF stains, no definitive fungal organisms identified  - Negative for dysplasia or carcinoma      Interp By Hayes Castro M.D., Signed on 09/14/2023 at 12:43    Gross 09/01/2023    Final                    Value:Pathology ID:  [id number]  Patient ID:  [id number]  The specimen is received in formalin labeled &quot;perianal ulcer&quot;.  The specimen consists of 1 tan fragment of soft tissue measuring 0.5 x 0.4 x 0.2 cm.  The specimen is submitted entirely in cassette [id number] Lopez      Microscopic Exam 09/01/2023 Microscopic examination is performed and the results are incorporated into the above findings.  Special stains are performed with appropriate and reactive controls.   Final    Comment 09/01/2023 This case was reviewed in consultation with Dr. Marilu Weinstein (dermatopathologist), who concurs with the above findings.   Final    Disclaimer 09/01/2023 Unless the case is a 'gross only' or additional testing only,  the final diagnosis for each specimen is based on a microscopic examination of appropriate tissue sections.   Final       Encounter Diagnosis   Name Primary?    Bacterial sinusitis Yes        No orders of the defined types were placed in this encounter.     Medications Ordered This Encounter   Medications    amoxicillin-clavulanate 875-125mg (AUGMENTIN) 875-125 mg per tablet     Sig: Take 1 tablet by mouth every 12 (twelve) hours. for 7 days     Dispense:  14 tablet     Refill:  0        Follow up if symptoms worsen or fail to improve.      E-Visit Time Tracking:    Day 1 Time (in minutes): 10     Total Time (in minutes): 10

## 2023-12-06 ENCOUNTER — PATIENT MESSAGE (OUTPATIENT)
Dept: ALLERGY | Facility: CLINIC | Age: 28
End: 2023-12-06
Payer: MEDICAID

## 2023-12-06 DIAGNOSIS — J45.30 MILD PERSISTENT ASTHMA, UNSPECIFIED WHETHER COMPLICATED: ICD-10-CM

## 2023-12-06 RX ORDER — FLUTICASONE PROPIONATE AND SALMETEROL 250; 50 UG/1; UG/1
1 POWDER RESPIRATORY (INHALATION) 2 TIMES DAILY
Qty: 60 EACH | Refills: 0 | Status: SHIPPED | OUTPATIENT
Start: 2023-12-06 | End: 2024-01-30

## 2023-12-12 ENCOUNTER — PATIENT MESSAGE (OUTPATIENT)
Dept: FAMILY MEDICINE | Facility: CLINIC | Age: 28
End: 2023-12-12
Payer: MEDICAID

## 2023-12-13 ENCOUNTER — TELEPHONE (OUTPATIENT)
Dept: FAMILY MEDICINE | Facility: CLINIC | Age: 28
End: 2023-12-13
Payer: MEDICAID

## 2023-12-13 ENCOUNTER — PATIENT MESSAGE (OUTPATIENT)
Dept: FAMILY MEDICINE | Facility: CLINIC | Age: 28
End: 2023-12-13

## 2023-12-13 ENCOUNTER — OFFICE VISIT (OUTPATIENT)
Dept: FAMILY MEDICINE | Facility: CLINIC | Age: 28
End: 2023-12-13
Payer: MEDICAID

## 2023-12-13 VITALS — BODY MASS INDEX: 56.42 KG/M2 | OXYGEN SATURATION: 99 % | HEIGHT: 67 IN | HEART RATE: 92 BPM

## 2023-12-13 DIAGNOSIS — E11.65 TYPE 2 DIABETES MELLITUS WITH HYPERGLYCEMIA, WITHOUT LONG-TERM CURRENT USE OF INSULIN: ICD-10-CM

## 2023-12-13 DIAGNOSIS — J06.9 VIRAL URI WITH COUGH: ICD-10-CM

## 2023-12-13 DIAGNOSIS — E66.01 CLASS 3 SEVERE OBESITY DUE TO EXCESS CALORIES WITH SERIOUS COMORBIDITY AND BODY MASS INDEX (BMI) OF 50.0 TO 59.9 IN ADULT: ICD-10-CM

## 2023-12-13 DIAGNOSIS — R05.8 POST-VIRAL COUGH SYNDROME: Primary | ICD-10-CM

## 2023-12-13 DIAGNOSIS — E11.59 HYPERTENSION ASSOCIATED WITH DIABETES: ICD-10-CM

## 2023-12-13 DIAGNOSIS — G47.33 OSA ON CPAP: ICD-10-CM

## 2023-12-13 DIAGNOSIS — I15.2 HYPERTENSION ASSOCIATED WITH DIABETES: ICD-10-CM

## 2023-12-13 LAB
CTP QC/QA: YES
FLUAV AG NPH QL: NEGATIVE
FLUBV AG NPH QL: NEGATIVE
S PYO RRNA THROAT QL PROBE: NEGATIVE
SARS-COV-2 RDRP RESP QL NAA+PROBE: NEGATIVE

## 2023-12-13 PROCEDURE — 99999PBSHW POCT INFLUENZA A/B: ICD-10-PCS | Mod: 59,PBBFAC,,

## 2023-12-13 PROCEDURE — 1159F MED LIST DOCD IN RCRD: CPT | Mod: CPTII,,, | Performed by: FAMILY MEDICINE

## 2023-12-13 PROCEDURE — 87880 STREP A ASSAY W/OPTIC: CPT | Mod: PBBFAC,PO | Performed by: FAMILY MEDICINE

## 2023-12-13 PROCEDURE — 87635 SARS-COV-2 COVID-19 AMP PRB: CPT | Mod: PBBFAC,PO | Performed by: FAMILY MEDICINE

## 2023-12-13 PROCEDURE — 4010F ACE/ARB THERAPY RXD/TAKEN: CPT | Mod: CPTII,,, | Performed by: FAMILY MEDICINE

## 2023-12-13 PROCEDURE — 3044F HG A1C LEVEL LT 7.0%: CPT | Mod: CPTII,,, | Performed by: FAMILY MEDICINE

## 2023-12-13 PROCEDURE — 99999 PR PBB SHADOW E&M-EST. PATIENT-LVL IV: ICD-10-PCS | Mod: PBBFAC,,, | Performed by: FAMILY MEDICINE

## 2023-12-13 PROCEDURE — 87502 INFLUENZA DNA AMP PROBE: CPT | Mod: PBBFAC,PO | Performed by: FAMILY MEDICINE

## 2023-12-13 PROCEDURE — 3061F PR NEG MICROALBUMINURIA RESULT DOCUMENTED/REVIEW: ICD-10-PCS | Mod: CPTII,,, | Performed by: FAMILY MEDICINE

## 2023-12-13 PROCEDURE — 3066F NEPHROPATHY DOC TX: CPT | Mod: CPTII,,, | Performed by: FAMILY MEDICINE

## 2023-12-13 PROCEDURE — 1160F PR REVIEW ALL MEDS BY PRESCRIBER/CLIN PHARMACIST DOCUMENTED: ICD-10-PCS | Mod: CPTII,,, | Performed by: FAMILY MEDICINE

## 2023-12-13 PROCEDURE — 3008F BODY MASS INDEX DOCD: CPT | Mod: CPTII,,, | Performed by: FAMILY MEDICINE

## 2023-12-13 PROCEDURE — 1159F PR MEDICATION LIST DOCUMENTED IN MEDICAL RECORD: ICD-10-PCS | Mod: CPTII,,, | Performed by: FAMILY MEDICINE

## 2023-12-13 PROCEDURE — 99213 OFFICE O/P EST LOW 20 MIN: CPT | Mod: S$PBB,,, | Performed by: FAMILY MEDICINE

## 2023-12-13 PROCEDURE — 99214 OFFICE O/P EST MOD 30 MIN: CPT | Mod: PBBFAC,PO | Performed by: FAMILY MEDICINE

## 2023-12-13 PROCEDURE — 99999PBSHW POCT RAPID STREP A: Mod: PBBFAC,,,

## 2023-12-13 PROCEDURE — 3044F PR MOST RECENT HEMOGLOBIN A1C LEVEL <7.0%: ICD-10-PCS | Mod: CPTII,,, | Performed by: FAMILY MEDICINE

## 2023-12-13 PROCEDURE — 3008F PR BODY MASS INDEX (BMI) DOCUMENTED: ICD-10-PCS | Mod: CPTII,,, | Performed by: FAMILY MEDICINE

## 2023-12-13 PROCEDURE — 3066F PR DOCUMENTATION OF TREATMENT FOR NEPHROPATHY: ICD-10-PCS | Mod: CPTII,,, | Performed by: FAMILY MEDICINE

## 2023-12-13 PROCEDURE — 3061F NEG MICROALBUMINURIA REV: CPT | Mod: CPTII,,, | Performed by: FAMILY MEDICINE

## 2023-12-13 PROCEDURE — 99999PBSHW POCT INFLUENZA A/B: Mod: 59,PBBFAC,,

## 2023-12-13 PROCEDURE — 99999PBSHW: Mod: PBBFAC,,,

## 2023-12-13 PROCEDURE — 4010F PR ACE/ARB THEARPY RXD/TAKEN: ICD-10-PCS | Mod: CPTII,,, | Performed by: FAMILY MEDICINE

## 2023-12-13 PROCEDURE — 99999 PR PBB SHADOW E&M-EST. PATIENT-LVL IV: CPT | Mod: PBBFAC,,, | Performed by: FAMILY MEDICINE

## 2023-12-13 PROCEDURE — 1160F RVW MEDS BY RX/DR IN RCRD: CPT | Mod: CPTII,,, | Performed by: FAMILY MEDICINE

## 2023-12-13 PROCEDURE — 99213 PR OFFICE/OUTPT VISIT, EST, LEVL III, 20-29 MIN: ICD-10-PCS | Mod: S$PBB,,, | Performed by: FAMILY MEDICINE

## 2023-12-13 RX ORDER — BENZONATATE 200 MG/1
200 CAPSULE ORAL 3 TIMES DAILY PRN
Qty: 30 CAPSULE | Refills: 0 | Status: SHIPPED | OUTPATIENT
Start: 2023-12-13 | End: 2023-12-23

## 2023-12-13 RX ORDER — LURASIDONE HYDROCHLORIDE 60 MG/1
60 TABLET, FILM COATED ORAL
COMMUNITY
Start: 2023-11-16 | End: 2024-01-30

## 2023-12-13 RX ORDER — PROMETHAZINE HYDROCHLORIDE AND DEXTROMETHORPHAN HYDROBROMIDE 6.25; 15 MG/5ML; MG/5ML
5 SYRUP ORAL EVERY 4 HOURS PRN
Qty: 240 ML | Refills: 0 | Status: SHIPPED | OUTPATIENT
Start: 2023-12-13 | End: 2023-12-23

## 2023-12-13 NOTE — TELEPHONE ENCOUNTER
LVM requesting patient call the office so that I can get some more information regarding her cough.

## 2023-12-13 NOTE — TELEPHONE ENCOUNTER
No care due was identified.  [facility] Embedded Care Due Messages. Reference number: [id number].   12/13/2023 12:50:33 PM CST

## 2023-12-13 NOTE — PROGRESS NOTES
"Subjective:         Patient ID: Misael Garcia is a 28 y.o. female.    Chief Complaint: Cough    Patient Active Problem List   Diagnosis    Corns and callus    Class 3 severe obesity due to excess calories with serious comorbidity and body mass index (BMI) of 50.0 to 59.9 in adult    Bipolar 1 disorder    Type 2 diabetes mellitus with hyperglycemia, without long-term current use of insulin    Recently quit using tobacco    Hypertension associated with diabetes    Oral contraceptive pill surveillance    Thrombocytosis    SID on CPAP    Mild persistent asthma without complication    Cervical spondylosis without myelopathy    Lumbosacral spondylosis without myelopathy    Posttraumatic stress disorder    Thoracic facet syndrome      LARA Douglas is a 28 y.o. female who presents today for persistent dry cough after URI. Symptoms started three weeks ago at the end of November.     Started on Augmentin 12/5, almost done with course.   No production with cough, no new fever, no myalgias.     Review of Systems   All other systems reviewed and are negative.       Objective:     Vitals:    12/13/23 0930   Pulse: 92   SpO2: 99%   Height: 5' 7" (1.702 m)         Physical Exam  Vitals and nursing note reviewed.   Constitutional:       General: She is not in acute distress.     Appearance: Normal appearance. She is well-developed. She is not ill-appearing, toxic-appearing or diaphoretic.   HENT:      Head: Normocephalic and atraumatic.      Comments: TM: appear normal BL  Nasal congestion noted   Cobblestoning and pharyngeal erythema without exudate noted  No adenopathy  Full ROM of neck    Eyes:      General: No scleral icterus.     Conjunctiva/sclera: Conjunctivae normal.      Pupils: Pupils are equal, round, and reactive to light.   Cardiovascular:      Rate and Rhythm: Normal rate and regular rhythm.      Heart sounds: Normal heart sounds. No murmur heard.  Pulmonary:      Effort: Pulmonary effort is normal. No " respiratory distress.      Breath sounds: Normal breath sounds.   Abdominal:      Palpations: Abdomen is soft.      Tenderness: There is no abdominal tenderness.   Musculoskeletal:      Cervical back: Normal range of motion and neck supple.   Skin:     General: Skin is warm.      Coloration: Skin is not pale.      Findings: No rash.   Neurological:      Mental Status: She is alert and oriented to person, place, and time. Mental status is at baseline.   Psychiatric:         Attention and Perception: Attention and perception normal.         Mood and Affect: Mood and affect normal.         Speech: Speech normal.         Behavior: Behavior normal.         Thought Content: Thought content normal.         Cognition and Memory: Cognition and memory normal.         Judgment: Judgment normal.           Assessment:       1. Post-viral cough syndrome    2. Viral URI with cough          Plan:   Recent relevant labs results reviewed with patient.         1. Post-viral cough syndrome  -     promethazine-dextromethorphan (PROMETHAZINE-DM) 6.25-15 mg/5 mL Syrp; Take 5 mLs by mouth every 4 (four) hours as needed (cough).  Dispense: 240 mL; Refill: 0  -     benzonatate (TESSALON) 200 MG capsule; Take 1 capsule (200 mg total) by mouth 3 (three) times daily as needed for Cough.  Dispense: 30 capsule; Refill: 0    2. Viral URI with cough  -     POCT COVID-19 Rapid Screening  -     POCT Rapid Strep A  -     POCT Influenza A/B      Patient's questions answered. Plan reviewed with patient at the end of visit. Relevant precautions to chief complaint and reasons to seek further medical care or to contact the office sooner reviewed with patient.     Follow up if symptoms worsen or fail to improve.

## 2023-12-13 NOTE — TELEPHONE ENCOUNTER
Continues with productive cough. Denies fever. Has two antibiotic pills left to take. States worried she has bronchitis or pneumonia. Given appointment this am for FU visit.

## 2023-12-14 ENCOUNTER — PATIENT MESSAGE (OUTPATIENT)
Dept: ALLERGY | Facility: CLINIC | Age: 28
End: 2023-12-14
Payer: MEDICAID

## 2023-12-14 ENCOUNTER — PATIENT MESSAGE (OUTPATIENT)
Dept: PODIATRY | Facility: CLINIC | Age: 28
End: 2023-12-14
Payer: MEDICAID

## 2023-12-14 RX ORDER — SEMAGLUTIDE 0.68 MG/ML
INJECTION, SOLUTION SUBCUTANEOUS
Qty: 9 ML | Refills: 0 | Status: SHIPPED | OUTPATIENT
Start: 2023-12-14 | End: 2024-01-30

## 2023-12-14 NOTE — TELEPHONE ENCOUNTER
Refill Routing Note   Medication(s) are not appropriate for processing by [facility] for the following reason(s):        Required labs outdated    [facility] action(s):  Defer               Appointments  past 12m or future 3m with PCP    Date Provider   Last Visit   12/13/2023 Katelyn Chapman MD   Next Visit   1/9/2024 Katelyn Chapman MD   ED visits in past 90 days: 0        Note composed:6:36 PM 12/13/2023

## 2023-12-18 ENCOUNTER — PATIENT MESSAGE (OUTPATIENT)
Dept: PODIATRY | Facility: CLINIC | Age: 28
End: 2023-12-18
Payer: MEDICAID

## 2023-12-28 ENCOUNTER — PATIENT MESSAGE (OUTPATIENT)
Dept: ADMINISTRATIVE | Facility: HOSPITAL | Age: 28
End: 2023-12-28
Payer: MEDICAID

## 2024-01-02 ENCOUNTER — PATIENT MESSAGE (OUTPATIENT)
Dept: FAMILY MEDICINE | Facility: CLINIC | Age: 29
End: 2024-01-02
Payer: MEDICAID

## 2024-01-02 ENCOUNTER — PATIENT MESSAGE (OUTPATIENT)
Dept: OTHER | Facility: OTHER | Age: 29
End: 2024-01-02
Payer: MEDICAID

## 2024-01-02 ENCOUNTER — TELEPHONE (OUTPATIENT)
Dept: ALLERGY | Facility: CLINIC | Age: 29
End: 2024-01-02
Payer: MEDICAID

## 2024-01-02 ENCOUNTER — PATIENT MESSAGE (OUTPATIENT)
Dept: PODIATRY | Facility: CLINIC | Age: 29
End: 2024-01-02
Payer: MEDICAID

## 2024-01-02 ENCOUNTER — OFFICE VISIT (OUTPATIENT)
Dept: OBSTETRICS AND GYNECOLOGY | Facility: CLINIC | Age: 29
End: 2024-01-02
Payer: MEDICAID

## 2024-01-02 ENCOUNTER — PATIENT MESSAGE (OUTPATIENT)
Dept: SURGERY | Facility: CLINIC | Age: 29
End: 2024-01-02
Payer: MEDICAID

## 2024-01-02 ENCOUNTER — PATIENT MESSAGE (OUTPATIENT)
Dept: ALLERGY | Facility: CLINIC | Age: 29
End: 2024-01-02
Payer: MEDICAID

## 2024-01-02 ENCOUNTER — PATIENT MESSAGE (OUTPATIENT)
Dept: SLEEP MEDICINE | Facility: CLINIC | Age: 29
End: 2024-01-02
Payer: MEDICAID

## 2024-01-02 VITALS — SYSTOLIC BLOOD PRESSURE: 148 MMHG | WEIGHT: 293 LBS | BODY MASS INDEX: 54.8 KG/M2 | DIASTOLIC BLOOD PRESSURE: 83 MMHG

## 2024-01-02 DIAGNOSIS — N92.6 MISSED PERIOD: Primary | ICD-10-CM

## 2024-01-02 PROCEDURE — 1159F MED LIST DOCD IN RCRD: CPT | Mod: CPTII,,, | Performed by: STUDENT IN AN ORGANIZED HEALTH CARE EDUCATION/TRAINING PROGRAM

## 2024-01-02 PROCEDURE — 99212 OFFICE O/P EST SF 10 MIN: CPT | Mod: PBBFAC,PO | Performed by: STUDENT IN AN ORGANIZED HEALTH CARE EDUCATION/TRAINING PROGRAM

## 2024-01-02 PROCEDURE — 99999 PR PBB SHADOW E&M-EST. PATIENT-LVL II: CPT | Mod: PBBFAC,,, | Performed by: STUDENT IN AN ORGANIZED HEALTH CARE EDUCATION/TRAINING PROGRAM

## 2024-01-02 PROCEDURE — 3077F SYST BP >= 140 MM HG: CPT | Mod: CPTII,,, | Performed by: STUDENT IN AN ORGANIZED HEALTH CARE EDUCATION/TRAINING PROGRAM

## 2024-01-02 PROCEDURE — 3079F DIAST BP 80-89 MM HG: CPT | Mod: CPTII,,, | Performed by: STUDENT IN AN ORGANIZED HEALTH CARE EDUCATION/TRAINING PROGRAM

## 2024-01-02 PROCEDURE — 3008F BODY MASS INDEX DOCD: CPT | Mod: CPTII,,, | Performed by: STUDENT IN AN ORGANIZED HEALTH CARE EDUCATION/TRAINING PROGRAM

## 2024-01-02 PROCEDURE — 99213 OFFICE O/P EST LOW 20 MIN: CPT | Mod: S$PBB,,, | Performed by: STUDENT IN AN ORGANIZED HEALTH CARE EDUCATION/TRAINING PROGRAM

## 2024-01-02 PROCEDURE — 1160F RVW MEDS BY RX/DR IN RCRD: CPT | Mod: CPTII,,, | Performed by: STUDENT IN AN ORGANIZED HEALTH CARE EDUCATION/TRAINING PROGRAM

## 2024-01-02 RX ORDER — NORETHINDRONE ACETATE AND ETHINYL ESTRADIOL 1MG-20(21)
1 KIT ORAL DAILY
Qty: 30 TABLET | Refills: 11 | Status: SHIPPED | OUTPATIENT
Start: 2024-01-02 | End: 2024-01-02

## 2024-01-02 RX ORDER — NORETHINDRONE ACETATE AND ETHINYL ESTRADIOL 1MG-20(21)
1 KIT ORAL DAILY
Qty: 90 TABLET | Refills: 3 | Status: SHIPPED | OUTPATIENT
Start: 2024-01-02 | End: 2024-01-02

## 2024-01-02 RX ORDER — NORETHINDRONE ACETATE AND ETHINYL ESTRADIOL 1MG-20(21)
1 KIT ORAL DAILY
Qty: 90 TABLET | Refills: 3 | Status: SHIPPED | OUTPATIENT
Start: 2024-01-02 | End: 2025-01-01

## 2024-01-02 NOTE — PROGRESS NOTES
"History & Physical  Gynecology      SUBJECTIVE:     Chief Complaint: "Missed Period"       History of Present Illness:  Misael is a 27 yo G0 who presents to clinic to with a missed periods. She is currently sexually active with a male partner and is not using contraception. She does not desires to be pregnant at this time. She was previously diagnosed with PCOS and was given OCPs which has not taken.     Review of patient's allergies indicates:  No Known Allergies    Past Medical History:   Diagnosis Date    ADHD (attention deficit hyperactivity disorder)     Anxiety     Bipolar affective     Eczema     History of chlamydia 2016    Insomnia     Morbid obesity with BMI of 40.0-44.9, adult     Sleep disorder     Type 2 diabetes mellitus without complications      Past Surgical History:   Procedure Laterality Date    NO PAST SURGERIES      as of 19     OB History          0    Para   0    Term   0       0    AB   0    Living   0         SAB   0    IAB   0    Ectopic   0    Multiple   0    Live Births               Obstetric Comments   Menarche ~ 18/reg  H/o chlamydia   Denies abnl pap             Family History   Problem Relation Age of Onset    Breast cancer Paternal Grandmother     No Known Problems Father     No Known Problems Mother     No Known Problems Sister     Diabetes Maternal Grandmother     No Known Problems Maternal Grandfather     No Known Problems Paternal Grandfather     Colon cancer Neg Hx     Ovarian cancer Neg Hx      Social History     Tobacco Use    Smoking status: Some Days     Types: Vaping with nicotine     Start date:      Last attempt to quit: 2022     Years since quittin.0    Smokeless tobacco: Never    Tobacco comments:     boyfriend smoke cigarettes   Substance Use Topics    Alcohol use: No    Drug use: Yes     Types: Marijuana       Current Outpatient Medications   Medication Sig    adapalene-benzoyl peroxide (EPIDUO FORTE) 0.3-2.5 % GlwP Apply " topically.    albuterol (PROVENTIL/VENTOLIN HFA) 90 mcg/actuation inhaler Inhale 2 puffs into the lungs every 4 (four) hours as needed (shortness of breath). Rescue    ammonium lactate (LAC-HYDRIN) 12 % lotion Apply topically.    benzoyl peroxide-erythromycin (BENZAMYCIN) gel Apply topically 2 (two) times daily.    blood sugar diagnostic Strp To check BG 2-3x times daily, to use with insurance preferred meter    blood-glucose meter kit To check BG 2-3x times daily, to use with insurance preferred meter    busPIRone (BUSPAR) 15 MG tablet Take 15 mg by mouth 2 (two) times daily.    cariprazine (VRAYLAR) 4.5 mg Cap Take 1 capsule by mouth every night at bedtime    cetirizine (ZYRTEC) 10 MG tablet Take 1 tablet (10 mg total) by mouth once daily.    clindamycin phosphate 1% (CLINDAGEL) 1 % gel Apply topically 2 (two) times daily.    fluticasone furoate-vilanteroL (BREO ELLIPTA) 200-25 mcg/dose DsDv diskus inhaler Inhale 1 puff into the lungs every evening. Controller    fluticasone propionate (FLONASE) 50 mcg/actuation nasal spray 2 sprays (100 mcg total) by Each Nostril route once daily.    fluticasone-salmeterol diskus inhaler 250-50 mcg Inhale 1 puff into the lungs 2 (two) times daily.    hydrOXYzine HCL (ATARAX) 25 MG tablet Take 25 mg by mouth 3 (three) times daily.    ibuprofen (ADVIL,MOTRIN) 800 MG tablet Take 1 tablet (800 mg total) by mouth 3 (three) times daily as needed for Pain.    lamoTRIgine (LAMICTAL) 25 MG tablet Take by mouth.    lancets 28 gauge Misc To check BG 2-3 times daily, to use with insurance preferred meter    lisinopriL 10 MG tablet Take 1 tablet (10 mg total) by mouth once daily.    lurasidone (LATUDA) 20 mg Tab tablet Take 20 mg by mouth.    lurasidone (LATUDA) 40 mg Tab tablet Take 40 mg by mouth.    lurasidone (LATUDA) 60 mg Tab tablet Take 60 mg by mouth.    metFORMIN (GLUCOPHAGE-XR) 500 MG ER 24hr tablet Take 1 tablet (500 mg total) by mouth every evening.    semaglutide (OZEMPIC) 0.25  mg or 0.5 mg (2 mg/3 mL) pen injector Inject 0.5mg into the skin  weekly for weeks 1-4    semaglutide (OZEMPIC) 1 mg/dose (4 mg/3 mL) Inject 1 mg into the skin every 7 days. For weeks 5-8    semaglutide (OZEMPIC) 2 mg/dose (8 mg/3 mL) PnIj Inject 2 mg into the skin every 7 days. For weeks 9-12 and onward    sulfacetamide sodium-sulfur 10-5 % (w/w) Clsr Apply topically.    VRAYLAR 1.5 mg Cap Take 1.5 mg by mouth every evening.    VRAYLAR 3 mg Cap Take 3 mg by mouth.    azelastine (ASTELIN) 137 mcg (0.1 %) nasal spray 2 sprays (274 mcg total) by Nasal route 2 (two) times daily.    famotidine (PEPCID) 20 MG tablet Take 1 tablet (20 mg total) by mouth 2 (two) times daily.    norethindrone-ethinyl estradiol (JUNEL FE 1/20) 1 mg-20 mcg (21)/75 mg (7) per tablet Take 1 tablet by mouth once daily.     No current facility-administered medications for this visit.       Review of Systems:  Review of Systems   Constitutional:  Negative for chills, fatigue and fever.   HENT:  Negative for congestion.    Eyes:  Negative for visual disturbance.   Respiratory:  Negative for cough and shortness of breath.    Cardiovascular:  Negative for chest pain and palpitations.   Gastrointestinal:  Negative for abdominal distention, abdominal pain, constipation, diarrhea, nausea and vomiting.   Genitourinary:  Positive for menstrual problem. Negative for difficulty urinating, dysuria, hematuria, vaginal bleeding and vaginal discharge.   Skin:  Negative for rash.   Neurological:  Negative for dizziness, seizures, light-headedness and headaches.   Hematological:  Does not bruise/bleed easily.   Psychiatric/Behavioral:  Negative for dysphoric mood. The patient is not nervous/anxious.         OBJECTIVE:     Physical Exam:  Vitals:    01/02/24 1449   BP: (!) 148/83      Physical Exam  Vitals and nursing note reviewed.   Constitutional:       General: She is not in acute distress.     Appearance: She is well-developed.   HENT:      Head:  Normocephalic and atraumatic.   Eyes:      Pupils: Pupils are equal, round, and reactive to light.   Cardiovascular:      Rate and Rhythm: Normal rate and regular rhythm.   Pulmonary:      Effort: Pulmonary effort is normal. No respiratory distress.   Abdominal:      General: There is no distension.      Palpations: Abdomen is soft. There is no mass.      Tenderness: There is no abdominal tenderness. There is no guarding.   Musculoskeletal:         General: Normal range of motion.      Cervical back: Normal range of motion and neck supple.   Skin:     General: Skin is warm and dry.   Neurological:      Mental Status: She is alert and oriented to person, place, and time.   Psychiatric:         Behavior: Behavior normal.         Thought Content: Thought content normal.         Judgment: Judgment normal.         ASSESSMENT/PLAN:     1. Missed period  - UPT Negative in clinic  - Missed menses likely secondary to anovulation  - Will start OCPS for menstrual regularity and contraception    Fiorella Peña M.D.  OB/GYN  [facility]

## 2024-01-02 NOTE — TELEPHONE ENCOUNTER
----- Message from Kristen Salomon sent at 1/2/2024  7:30 AM CST -----  Pt would like  a call regarding yolis an appt    Can be reached at [phone]

## 2024-01-03 ENCOUNTER — PATIENT MESSAGE (OUTPATIENT)
Dept: FAMILY MEDICINE | Facility: CLINIC | Age: 29
End: 2024-01-03

## 2024-01-03 ENCOUNTER — E-VISIT (OUTPATIENT)
Dept: FAMILY MEDICINE | Facility: CLINIC | Age: 29
End: 2024-01-03
Payer: MEDICAID

## 2024-01-03 ENCOUNTER — PATIENT MESSAGE (OUTPATIENT)
Dept: HEMATOLOGY/ONCOLOGY | Facility: CLINIC | Age: 29
End: 2024-01-03
Payer: MEDICAID

## 2024-01-03 ENCOUNTER — PATIENT MESSAGE (OUTPATIENT)
Dept: BARIATRICS | Facility: CLINIC | Age: 29
End: 2024-01-03
Payer: MEDICAID

## 2024-01-03 DIAGNOSIS — E66.01 CLASS 3 SEVERE OBESITY DUE TO EXCESS CALORIES WITH SERIOUS COMORBIDITY AND BODY MASS INDEX (BMI) OF 50.0 TO 59.9 IN ADULT: Primary | ICD-10-CM

## 2024-01-03 DIAGNOSIS — L30.4 ECZEMA INTERTRIGO: Primary | ICD-10-CM

## 2024-01-03 DIAGNOSIS — D75.839 THROMBOCYTOSIS: Primary | ICD-10-CM

## 2024-01-03 PROCEDURE — 99421 OL DIG E/M SVC 5-10 MIN: CPT | Mod: ,,, | Performed by: FAMILY MEDICINE

## 2024-01-03 RX ORDER — CLOTRIMAZOLE AND BETAMETHASONE DIPROPIONATE 10; .64 MG/G; MG/G
CREAM TOPICAL 2 TIMES DAILY
Qty: 15 G | Refills: 0 | Status: SHIPPED | OUTPATIENT
Start: 2024-01-03 | End: 2024-01-24

## 2024-01-03 NOTE — PROGRESS NOTES
Patient ID: Misael Garcia is a 28 y.o. female.    Chief Complaint: Rash (Entered automatically based on patient selection in Patient Portal.)    The patient initiated a request through [facility] on 1/3/2024 for evaluation and management with a chief complaint of Rash (Entered automatically based on patient selection in Patient Portal.)     I evaluated the questionnaire submission on 01/03/2024      Ohs Peq Evisit Rash    1/3/2024  1:41 PM CST - Filed by Patient   Do you agree to participate in an E-Visit? Yes   If you have any of the following symptoms, please present to your local ER or call 911:  I acknowledge   What is the main issue that you would like for your doctor to address today? Neck Rash   Are you able to take your vital signs? No   Are you currently pregnant, could you be pregnant, or are you breast feeding? None of the above   How would you describe your skin problem? Rash   When did your symptoms first appear? 12/25/2023   Where is it located?  Neck   Does it itch? Yes   Does it hurt? Yes   Where is the pain located? Where the skin change is noted   The pain came on: Gradually   The pain has the character of: Dull   Frequency of the pain (How often does it appear)? This is the first time I have had this rash   Please select the face that most closely captures your pain level: 2   Is there discharge or drainage? No   Is there bleeding? No   Describe the character Streaks;  Flat;  Closed   Describe the color Pink   Has it changed over time? Grown in size   Frequency of skin problem Fluctuates at random   Duration of the skin problem (how long does it stay when it is present) Weeks   I have had a new exposure to No new exposures   I have had a new exposure to No new exposures   What have you used to treat the skin problem? Lotion   If you have used anything for treatment, has it helped the symptoms? No   Other generalized symptoms that you associate with the rash No other symptoms   Provide any  information you feel is important to your history not asked above N/a   At least one photo is required for treatment to be provided. You can upload a maximum of three photos of the affected area.           Recent Labs Obtained:  No visits with results within 7 Day(s) from this visit.   Latest known visit with results is:   Office Visit on 12/13/2023   Component Date Value Ref Range Status    POC Rapid COVID 12/13/2023 Negative  Negative Final     Acceptable 12/13/2023 Yes   Final    Rapid Strep A Screen 12/13/2023 Negative  Negative, Positive Slide, Positive Tube Final     Acceptable 12/13/2023 Yes   Final    Rapid Influenza A Ag 12/13/2023 Negative  Negative Final    Rapid Influenza B Ag 12/13/2023 Negative  Negative Final     Acceptable 12/13/2023 Yes   Final       Encounter Diagnosis   Name Primary?    Eczema intertrigo Yes        No orders of the defined types were placed in this encounter.     Medications Ordered This Encounter   Medications    clotrimazole-betamethasone 1-0.05% (LOTRISONE) cream     Sig: Apply topically 2 (two) times daily. for 14 days     Dispense:  15 g     Refill:  0        No follow-ups on file.      E-Visit Time Tracking:    Day 1 Time (in minutes): 10     Total Time (in minutes): 10

## 2024-01-05 ENCOUNTER — TELEPHONE (OUTPATIENT)
Dept: ALLERGY | Facility: CLINIC | Age: 29
End: 2024-01-05
Payer: MEDICAID

## 2024-01-05 ENCOUNTER — PATIENT MESSAGE (OUTPATIENT)
Dept: ALLERGY | Facility: CLINIC | Age: 29
End: 2024-01-05
Payer: MEDICAID

## 2024-01-09 ENCOUNTER — PATIENT MESSAGE (OUTPATIENT)
Dept: ALLERGY | Facility: CLINIC | Age: 29
End: 2024-01-09

## 2024-01-09 ENCOUNTER — OFFICE VISIT (OUTPATIENT)
Dept: ALLERGY | Facility: CLINIC | Age: 29
End: 2024-01-09
Payer: MEDICAID

## 2024-01-09 VITALS
SYSTOLIC BLOOD PRESSURE: 136 MMHG | DIASTOLIC BLOOD PRESSURE: 90 MMHG | WEIGHT: 293 LBS | HEART RATE: 88 BPM | BODY MASS INDEX: 55.35 KG/M2 | OXYGEN SATURATION: 97 %

## 2024-01-09 DIAGNOSIS — E11.65 TYPE 2 DIABETES MELLITUS WITH HYPERGLYCEMIA, WITHOUT LONG-TERM CURRENT USE OF INSULIN: ICD-10-CM

## 2024-01-09 DIAGNOSIS — J30.9 CHRONIC ALLERGIC RHINITIS: ICD-10-CM

## 2024-01-09 DIAGNOSIS — J45.30 MILD PERSISTENT ASTHMA, UNSPECIFIED WHETHER COMPLICATED: Primary | ICD-10-CM

## 2024-01-09 DIAGNOSIS — Z91.199 PATIENT NON ADHERENCE: ICD-10-CM

## 2024-01-09 DIAGNOSIS — J31.0 MIXED RHINITIS: ICD-10-CM

## 2024-01-09 PROCEDURE — 3008F BODY MASS INDEX DOCD: CPT | Mod: CPTII,,, | Performed by: STUDENT IN AN ORGANIZED HEALTH CARE EDUCATION/TRAINING PROGRAM

## 2024-01-09 PROCEDURE — 3080F DIAST BP >= 90 MM HG: CPT | Mod: CPTII,,, | Performed by: STUDENT IN AN ORGANIZED HEALTH CARE EDUCATION/TRAINING PROGRAM

## 2024-01-09 PROCEDURE — 99215 OFFICE O/P EST HI 40 MIN: CPT | Mod: S$PBB,,, | Performed by: STUDENT IN AN ORGANIZED HEALTH CARE EDUCATION/TRAINING PROGRAM

## 2024-01-09 PROCEDURE — 1159F MED LIST DOCD IN RCRD: CPT | Mod: CPTII,,, | Performed by: STUDENT IN AN ORGANIZED HEALTH CARE EDUCATION/TRAINING PROGRAM

## 2024-01-09 PROCEDURE — 99213 OFFICE O/P EST LOW 20 MIN: CPT | Mod: PBBFAC | Performed by: STUDENT IN AN ORGANIZED HEALTH CARE EDUCATION/TRAINING PROGRAM

## 2024-01-09 PROCEDURE — 3075F SYST BP GE 130 - 139MM HG: CPT | Mod: CPTII,,, | Performed by: STUDENT IN AN ORGANIZED HEALTH CARE EDUCATION/TRAINING PROGRAM

## 2024-01-09 PROCEDURE — 99999 PR PBB SHADOW E&M-EST. PATIENT-LVL III: CPT | Mod: PBBFAC,,, | Performed by: STUDENT IN AN ORGANIZED HEALTH CARE EDUCATION/TRAINING PROGRAM

## 2024-01-09 PROCEDURE — 1160F RVW MEDS BY RX/DR IN RCRD: CPT | Mod: CPTII,,, | Performed by: STUDENT IN AN ORGANIZED HEALTH CARE EDUCATION/TRAINING PROGRAM

## 2024-01-09 RX ORDER — MOMETASONE FUROATE AND FORMOTEROL FUMARATE DIHYDRATE 200; 5 UG/1; UG/1
2 AEROSOL RESPIRATORY (INHALATION) 2 TIMES DAILY
Qty: 13 G | Refills: 1 | Status: SHIPPED | OUTPATIENT
Start: 2024-01-09 | End: 2024-01-30

## 2024-01-09 RX ORDER — FLUTICASONE FUROATE AND VILANTEROL 200; 25 UG/1; UG/1
1 POWDER RESPIRATORY (INHALATION) DAILY
Qty: 60 EACH | Refills: 5 | Status: SHIPPED | OUTPATIENT
Start: 2024-01-09 | End: 2024-02-06

## 2024-01-09 RX ORDER — ALBUTEROL SULFATE 90 UG/1
2 AEROSOL, METERED RESPIRATORY (INHALATION) EVERY 4 HOURS PRN
Qty: 8.5 G | Refills: 1 | Status: SHIPPED | OUTPATIENT
Start: 2024-01-09 | End: 2025-01-08

## 2024-01-09 RX ORDER — FLUTICASONE PROPIONATE 50 MCG
2 SPRAY, SUSPENSION (ML) NASAL DAILY
Qty: 16 G | Refills: 0 | Status: SHIPPED | OUTPATIENT
Start: 2024-01-09 | End: 2024-02-06 | Stop reason: SDUPTHER

## 2024-01-09 NOTE — PATIENT INSTRUCTIONS
Ask your insurance which asthma control medicines are preferred.  Send to me via portal if you need Rx other than Breo.    Resume ...    Breo 1 puff daily    Albuterol as needed    Flonase 2 squirts daily    Return 4 weeks

## 2024-01-09 NOTE — PROGRESS NOTES
Allergy Clinic Note  [facility]    This note was created by combination of typed  and M-Modal dictation. Transcription errors may be present.  If there are any questions, please contact me.    HISTORY      Patient ID: Misael Garcia is a 28 y.o. female.    Chief Complaint: Allergies, Asthma, and Follow-up (Follow Up for Asthma review.  Patient reports she has been having a lot of sinus issues and she recently had a Resp. Infection and she says she finds it hard for her to breathe )      Allergy problem list:    Persistent asthma  Chronic rhinitis  Non adherence with follow-up       History of Present Illness       Misael Garcia is a 28 y.o. female with chronic rhinitis and clinical Dx of asthma and chest symptoms.  She is here alone, and the Hx is difficult.    Related medications and other interventions  Advair 250 -- out  Albuterol  -- out  Flonase --out  Zyrtec   Famotidine  Triamcinalone  (ACE)    1/9/2024:  Client did not keep follow-up appointment April 2023, which was to follow-up chest symptoms after beginning asthma controller therapy. Since last visit Breo changed to Advair 250 for formulary compliance.  Client reports running out of Advair for months ago.  Six weeks ago she developed a lower respiratory infection complicated by asthma exacerbation.  These symptoms lasted about 4 weeks, but she continues to have residual cough, occasional shortness of breath, and runny nose.  Resume Breo (her preferred ICS/LABA)    3/2/23:  At initial visit, pt presented c/o coughing, wheezing and shortness of breath for the last 2-3 months.  She has no Dx of asthma but was given an albuterol inhaler in the past with benefit.  Triggers include smoke.  PFTS this date dominick (off meds and without Sx) are normal.  Made clinical Dx of mild persisten asthma.  Rx Breo and albuterol.  F/U in 4 weeks.    Chief rhinitis symptom is nasal congestion.  Additional Sx include runny nose,  sneezing, itchy eyes re/runny eyes and diffuse itching of body.  Other symptoms are throat clearing and post nasal drip sensation. Pt's itching is not controlled and it interferes with sleep.  She also reports scrathing in her sleep. She denies any ear symptoms.  There is no clear seasonal pattern.  Ppt include air conditioning, weather changes and extremes of temperature.  Claritin was helpful in the past.  She has never had allergy testing.  Immunocaps ordered.  Treated with Zyrtec and Flonase.        MEDICAL HISTORY      Significant past medical history: HTN, DM, recent smoking  Active Problem List reviewed  ENT surgery:  none    Significant family history:  No allergies.  No asthma.  Exposures: tobacco and mj smoke at [employer] (1/2 time).  Cat, occ in bedroom.  SH:  work at [employer]  Smoking Hx:  Client  reports that she has been smoking vaping with nicotine. She started smoking about 2 years ago. She has never used smokeless tobacco.    Meds: MAR reviewed    Asthma: never Dx'd previously  Eczema: yes  Rhinitis: yes--See HPI  Drug allergy/intolerance: NKDA  Venom allergy: No  Latex allergy:  No    Medication List with Changes/Refills   New Medications    FLUTICASONE FUROATE-VILANTEROL (BREO ELLIPTA) 200-25 MCG/DOSE DSDV DISKUS INHALER    Inhale 1 puff into the lungs once daily. Controller       Start Date: 1/9/2024  End Date: --   Current Medications    ADAPALENE-BENZOYL PEROXIDE (EPIDUO FORTE) 0.3-2.5 % GLWP    Apply topically.       Start Date: 2/7/2023  End Date: --    AMMONIUM LACTATE (LAC-HYDRIN) 12 % LOTION    Apply topically.       Start Date: 5/18/2023 End Date: --    AZELASTINE (ASTELIN) 137 MCG (0.1 %) NASAL SPRAY    2 sprays (274 mcg total) by Nasal route 2 (two) times daily.       Start Date: 6/5/2023  End Date: 7/30/2023    BENZOYL PEROXIDE-ERYTHROMYCIN (BENZAMYCIN) GEL    Apply topically 2 (two) times daily.       Start Date: 9/27/2023 End Date: --    BLOOD SUGAR DIAGNOSTIC STRP    To check BG 2-3x  times daily, to use with insurance preferred meter       Start Date: 2/20/2023 End Date: --    BLOOD-GLUCOSE METER KIT    To check BG 2-3x times daily, to use with insurance preferred meter       Start Date: 2/20/2023 End Date: 2/20/2024    BUSPIRONE (BUSPAR) 15 MG TABLET    Take 15 mg by mouth 2 (two) times daily.       Start Date: 12/7/2021 End Date: --    CARIPRAZINE (VRAYLAR) 4.5 MG CAP    Take 1 capsule by mouth every night at bedtime       Start Date: 3/2/2023  End Date: --    CETIRIZINE (ZYRTEC) 10 MG TABLET    Take 1 tablet (10 mg total) by mouth once daily.       Start Date: 6/4/2023  End Date: 6/3/2024    CLINDAMYCIN PHOSPHATE 1% (CLINDAGEL) 1 % GEL    Apply topically 2 (two) times daily.       Start Date: 10/5/2023 End Date: --    CLOTRIMAZOLE-BETAMETHASONE 1-0.05% (LOTRISONE) CREAM    Apply topically 2 (two) times daily. for 14 days       Start Date: 1/3/2024  End Date: 1/17/2024    FAMOTIDINE (PEPCID) 20 MG TABLET    Take 1 tablet (20 mg total) by mouth 2 (two) times daily.       Start Date: 6/29/2023 End Date: 7/29/2023    FLUTICASONE-SALMETEROL DISKUS INHALER 250-50 MCG    Inhale 1 puff into the lungs 2 (two) times daily.       Start Date: 12/6/2023 End Date: --    HYDROXYZINE HCL (ATARAX) 25 MG TABLET    Take 25 mg by mouth 3 (three) times daily.       Start Date: --        End Date: --    IBUPROFEN (ADVIL,MOTRIN) 800 MG TABLET    Take 1 tablet (800 mg total) by mouth 3 (three) times daily as needed for Pain.       Start Date: 6/12/2023 End Date: --    LAMOTRIGINE (LAMICTAL) 25 MG TABLET    Take by mouth.       Start Date: 6/6/2023  End Date: --    LANCETS 28 GAUGE MISC    To check BG 2-3 times daily, to use with insurance preferred meter       Start Date: 2/20/2023 End Date: --    LISINOPRIL 10 MG TABLET    Take 1 tablet (10 mg total) by mouth once daily.       Start Date: 11/21/2023End Date: 8/17/2024    LURASIDONE (LATUDA) 20 MG TAB TABLET    Take 20 mg by mouth.       Start Date: 8/15/2023 End  Date: --    LURASIDONE (LATUDA) 40 MG TAB TABLET    Take 40 mg by mouth.       Start Date: 9/12/2023 End Date: --    LURASIDONE (LATUDA) 60 MG TAB TABLET    Take 60 mg by mouth.       Start Date: 11/16/2023End Date: --    METFORMIN (GLUCOPHAGE-XR) 500 MG ER 24HR TABLET    Take 1 tablet (500 mg total) by mouth every evening.       Start Date: 10/4/2023 End Date: 10/3/2024    NORETHINDRONE-ETHINYL ESTRADIOL (JUNEL FE 1/20) 1 MG-20 MCG (21)/75 MG (7) PER TABLET    Take 1 tablet by mouth once daily.       Start Date: 1/2/2024  End Date: 1/1/2025    SEMAGLUTIDE (OZEMPIC) 0.25 MG OR 0.5 MG (2 MG/3 ML) PEN INJECTOR    Inject 0.5mg into the skin  weekly for weeks 1-4       Start Date: 12/14/2023End Date: --    SEMAGLUTIDE (OZEMPIC) 1 MG/DOSE (4 MG/3 ML)    Inject 1 mg into the skin every 7 days. For weeks 5-8       Start Date: 10/4/2023 End Date: 10/3/2024    SEMAGLUTIDE (OZEMPIC) 2 MG/DOSE (8 MG/3 ML) PNIJ    Inject 2 mg into the skin every 7 days. For weeks 9-12 and onward       Start Date: 10/4/2023 End Date: 10/3/2024    SULFACETAMIDE SODIUM-SULFUR 10-5 % (W/W) CLSR    Apply topically.       Start Date: 5/18/2023 End Date: --    VRAYLAR 1.5 MG CAP    Take 1.5 mg by mouth every evening.       Start Date: 11/18/2022End Date: --    VRAYLAR 3 MG CAP    Take 3 mg by mouth.       Start Date: 2/4/2023  End Date: --   Changed and/or Refilled Medications    Modified Medication Previous Medication    ALBUTEROL (PROVENTIL/VENTOLIN HFA) 90 MCG/ACTUATION INHALER albuterol (PROVENTIL/VENTOLIN HFA) 90 mcg/actuation inhaler       Inhale 2 puffs into the lungs every 4 (four) hours as needed (shortness of breath). Rescue    Inhale 2 puffs into the lungs every 4 (four) hours as needed (shortness of breath). Rescue       Start Date: 1/9/2024  End Date: 1/8/2025    Start Date: 6/12/2023 End Date: 1/9/2024    FLUTICASONE PROPIONATE (FLONASE) 50 MCG/ACTUATION NASAL SPRAY fluticasone propionate (FLONASE) 50 mcg/actuation nasal spray       2  sprays (100 mcg total) by Each Nostril route once daily.    2 sprays (100 mcg total) by Each Nostril route once daily.       Start Date: 1/9/2024  End Date: --    Start Date: 6/5/2023  End Date: 1/9/2024   Discontinued Medications    FLUTICASONE FUROATE-VILANTEROL (BREO ELLIPTA) 200-25 MCG/DOSE DSDV DISKUS INHALER    Inhale 1 puff into the lungs every evening. Controller       Start Date: 6/12/2023 End Date: 1/9/2024           REVIEW OF SYSTEMS      CONST: no F/C/NS, no unintentional weight changes  NEURO:  no tremor, no weakness  EYES: no discharge, no pruritus, no erythema  EARS: no hearing loss, no sensation of fullness  PULM:  + SOB,  wheezing, + cough  :  no dysuria, no hematuria  DERM: no rashes, no skin breaks           PHYSICAL EXAM      BP (!) 136/90 (BP Location: Left arm, Patient Position: Sitting, BP Method: Large (Automatic))   Pulse 88   Wt (!) 160.3 kg (353 lb 6.4 oz)   LMP 11/23/2023 (Exact Date)   SpO2 97%   BMI 55.35 kg/m²   GEN: Awake and alert, no distress  DERM:  No flushing, no rashes  EYE:  No occular discharge, no redness  HEENT: No nasal discharge, no hoarseness, TMs are clear bilaterally.  Nares are pink with moderate to severe turbinate swelling.  Oropharynx is benign without exudate.  Tongue is not coated.   PULM: Normal work of breathing, no cough, CTA  COR:  RRR, normal capillary refill  NEURO:  No focal deficit, speech fluent and logical  PSYCH: appropriate affect, normal behavior            MEDICAL DECISION-MAKING           Data reviewed        Allergy Testing      Inhalant immuno caps low-level positive to Juan grass, 2023   Class I Juan grass   All other aeroallergens less than 0.35 KU/L.  Bermuda grass 0.16; oak 0.12; ragweed 0.11.  Total IgE 91      Pulmonary testing     Normal baseline spirometry (off meds, Asx), 2023  Normal flow volume loop  No bronchodilator response.      Lab results      Total IgE 91, 2023  EO count 200, 2023      Imaging and other diagnostics             Medical records review        Diagnoses:     Misael Garcia is a 28 y.o. female. with  1. Mild persistent asthma (clinical Dx)    2. Mixed rhinitis    3. Chronic allergic rhinitis    4. Patient non adherence    5. Type 2 diabetes mellitus with hyperglycemia, without long-term current use of insulin            Assessment / Plan / Orders   Client is presenting with persistent bronchospasm following an exacerbation due to respiratory infection.  I think the reason for both the exacerbation and the prolonged symptoms as having run out of Advair.  She has also been out of albuterol for the last month.  Restart Breo (her preferred ICS/LABA) and albuterol.  Plan to follow-up in 4 weeks.    For her nasal congestion, I recommended resuming Flonase 2 squirts each nostril daily.      Mild persistent asthma (clinical Dx) -- not adequately controlled due to nonadherence  -     albuterol (PROVENTIL/VENTOLIN HFA) 90 mcg/actuation inhaler; Inhale 2 puffs into the lungs every 4 (four) hours as needed (shortness of breath). Rescue  Dispense: 8.5 g; Refill: 1  -     fluticasone furoate-vilanteroL (BREO ELLIPTA) 200-25 mcg/dose DsDv diskus inhaler; Inhale 1 puff into the lungs once daily. Controller  Dispense: 60 each; Refill: 5    Mixed rhinitis, including chronic allergic rhinitis due to pollen  -     fluticasone propionate (FLONASE) 50 mcg/actuation nasal spray; 2 sprays (100 mcg total) by Each Nostril route once daily.  Dispense: 16 g; Refill: 0        Comorbidities  ADHD and Anxiety/PTSD--avoid oral decongestants  Diabetes --Avoid systemic steroids    Patient Instructions and follow up     Patient Instructions   Ask your insurance which asthma control medicines are preferred.  Send to me via portal if you need Rx other than Breo.    Resume ...    Breo 1 puff daily    Albuterol as needed    Flonase 2 squirts daily    Return 4 weeks        Follow up in about 4 weeks (around 2/6/2024) for f/u asthma  charles King MD  Allergy, Asthma & Immunology      I spent a total of 51 minutes on the day of the visit.This includes face to face time and non-face to face time preparing to see the patient (eg, review of tests), obtaining and/or reviewing separately obtained history, documenting clinical information in the electronic or other health record, independently interpreting results and communicating results to the patient/family/caregiver, or care coordinator.

## 2024-01-10 ENCOUNTER — PATIENT MESSAGE (OUTPATIENT)
Dept: OBSTETRICS AND GYNECOLOGY | Facility: CLINIC | Age: 29
End: 2024-01-10
Payer: MEDICAID

## 2024-01-16 RX ORDER — FLUCONAZOLE 150 MG/1
150 TABLET ORAL DAILY
Qty: 1 TABLET | Refills: 0 | Status: SHIPPED | OUTPATIENT
Start: 2024-01-16 | End: 2024-01-19

## 2024-01-18 ENCOUNTER — LAB VISIT (OUTPATIENT)
Dept: LAB | Facility: HOSPITAL | Age: 29
End: 2024-01-18
Attending: INTERNAL MEDICINE
Payer: MEDICAID

## 2024-01-18 ENCOUNTER — PATIENT MESSAGE (OUTPATIENT)
Dept: OBSTETRICS AND GYNECOLOGY | Facility: CLINIC | Age: 29
End: 2024-01-18
Payer: MEDICAID

## 2024-01-18 DIAGNOSIS — D75.839 THROMBOCYTOSIS: ICD-10-CM

## 2024-01-18 PROCEDURE — 85025 COMPLETE CBC W/AUTO DIFF WBC: CPT | Performed by: INTERNAL MEDICINE

## 2024-01-18 PROCEDURE — 36415 COLL VENOUS BLD VENIPUNCTURE: CPT | Mod: PO | Performed by: INTERNAL MEDICINE

## 2024-01-19 LAB
BASOPHILS # BLD AUTO: 0.05 K/UL (ref 0–0.2)
BASOPHILS NFR BLD: 0.4 % (ref 0–1.9)
DIFFERENTIAL METHOD BLD: ABNORMAL
EOSINOPHIL # BLD AUTO: 0.4 K/UL (ref 0–0.5)
EOSINOPHIL NFR BLD: 3 % (ref 0–8)
ERYTHROCYTE [DISTWIDTH] IN BLOOD BY AUTOMATED COUNT: 15.3 % (ref 11.5–14.5)
HCT VFR BLD AUTO: 37.1 % (ref 37–48.5)
HGB BLD-MCNC: 11.3 G/DL (ref 12–16)
IMM GRANULOCYTES # BLD AUTO: 0.04 K/UL (ref 0–0.04)
IMM GRANULOCYTES NFR BLD AUTO: 0.3 % (ref 0–0.5)
LYMPHOCYTES # BLD AUTO: 3.3 K/UL (ref 1–4.8)
LYMPHOCYTES NFR BLD: 27.3 % (ref 18–48)
MCH RBC QN AUTO: 25.7 PG (ref 27–31)
MCHC RBC AUTO-ENTMCNC: 30.5 G/DL (ref 32–36)
MCV RBC AUTO: 84 FL (ref 82–98)
MONOCYTES # BLD AUTO: 0.6 K/UL (ref 0.3–1)
MONOCYTES NFR BLD: 4.9 % (ref 4–15)
NEUTROPHILS # BLD AUTO: 7.7 K/UL (ref 1.8–7.7)
NEUTROPHILS NFR BLD: 64.1 % (ref 38–73)
NRBC BLD-RTO: 0 /100 WBC
PLATELET # BLD AUTO: 552 K/UL (ref 150–450)
PMV BLD AUTO: 9.7 FL (ref 9.2–12.9)
RBC # BLD AUTO: 4.4 M/UL (ref 4–5.4)
WBC # BLD AUTO: 11.99 K/UL (ref 3.9–12.7)

## 2024-01-20 ENCOUNTER — PATIENT MESSAGE (OUTPATIENT)
Dept: DERMATOLOGY | Facility: CLINIC | Age: 29
End: 2024-01-20
Payer: MEDICAID

## 2024-01-21 ENCOUNTER — PATIENT MESSAGE (OUTPATIENT)
Dept: OBSTETRICS AND GYNECOLOGY | Facility: CLINIC | Age: 29
End: 2024-01-21
Payer: MEDICAID

## 2024-01-21 ENCOUNTER — PATIENT MESSAGE (OUTPATIENT)
Dept: FAMILY MEDICINE | Facility: CLINIC | Age: 29
End: 2024-01-21
Payer: MEDICAID

## 2024-01-22 ENCOUNTER — OFFICE VISIT (OUTPATIENT)
Dept: OBSTETRICS AND GYNECOLOGY | Facility: CLINIC | Age: 29
End: 2024-01-22
Payer: MEDICAID

## 2024-01-22 VITALS — DIASTOLIC BLOOD PRESSURE: 83 MMHG | SYSTOLIC BLOOD PRESSURE: 147 MMHG | WEIGHT: 293 LBS | BODY MASS INDEX: 54.45 KG/M2

## 2024-01-22 DIAGNOSIS — Z01.419 WELL WOMAN EXAM: Primary | ICD-10-CM

## 2024-01-22 PROCEDURE — 1159F MED LIST DOCD IN RCRD: CPT | Mod: CPTII,,, | Performed by: STUDENT IN AN ORGANIZED HEALTH CARE EDUCATION/TRAINING PROGRAM

## 2024-01-22 PROCEDURE — 99212 OFFICE O/P EST SF 10 MIN: CPT | Mod: PBBFAC,PO | Performed by: STUDENT IN AN ORGANIZED HEALTH CARE EDUCATION/TRAINING PROGRAM

## 2024-01-22 PROCEDURE — 3008F BODY MASS INDEX DOCD: CPT | Mod: CPTII,,, | Performed by: STUDENT IN AN ORGANIZED HEALTH CARE EDUCATION/TRAINING PROGRAM

## 2024-01-22 PROCEDURE — 88175 CYTOPATH C/V AUTO FLUID REDO: CPT | Performed by: STUDENT IN AN ORGANIZED HEALTH CARE EDUCATION/TRAINING PROGRAM

## 2024-01-22 PROCEDURE — 3077F SYST BP >= 140 MM HG: CPT | Mod: CPTII,,, | Performed by: STUDENT IN AN ORGANIZED HEALTH CARE EDUCATION/TRAINING PROGRAM

## 2024-01-22 PROCEDURE — 3079F DIAST BP 80-89 MM HG: CPT | Mod: CPTII,,, | Performed by: STUDENT IN AN ORGANIZED HEALTH CARE EDUCATION/TRAINING PROGRAM

## 2024-01-22 PROCEDURE — 1160F RVW MEDS BY RX/DR IN RCRD: CPT | Mod: CPTII,,, | Performed by: STUDENT IN AN ORGANIZED HEALTH CARE EDUCATION/TRAINING PROGRAM

## 2024-01-22 PROCEDURE — 99999 PR PBB SHADOW E&M-EST. PATIENT-LVL II: CPT | Mod: PBBFAC,,, | Performed by: STUDENT IN AN ORGANIZED HEALTH CARE EDUCATION/TRAINING PROGRAM

## 2024-01-22 PROCEDURE — 87491 CHLMYD TRACH DNA AMP PROBE: CPT | Performed by: STUDENT IN AN ORGANIZED HEALTH CARE EDUCATION/TRAINING PROGRAM

## 2024-01-22 PROCEDURE — 99395 PREV VISIT EST AGE 18-39: CPT | Mod: S$PBB,,, | Performed by: STUDENT IN AN ORGANIZED HEALTH CARE EDUCATION/TRAINING PROGRAM

## 2024-01-23 ENCOUNTER — TELEPHONE (OUTPATIENT)
Dept: ALLERGY | Facility: CLINIC | Age: 29
End: 2024-01-23
Payer: MEDICAID

## 2024-01-23 ENCOUNTER — PATIENT MESSAGE (OUTPATIENT)
Dept: HEMATOLOGY/ONCOLOGY | Facility: CLINIC | Age: 29
End: 2024-01-23
Payer: MEDICAID

## 2024-01-23 ENCOUNTER — PATIENT MESSAGE (OUTPATIENT)
Dept: SLEEP MEDICINE | Facility: CLINIC | Age: 29
End: 2024-01-23
Payer: MEDICAID

## 2024-01-23 ENCOUNTER — PATIENT MESSAGE (OUTPATIENT)
Dept: FAMILY MEDICINE | Facility: CLINIC | Age: 29
End: 2024-01-23
Payer: MEDICAID

## 2024-01-23 NOTE — PROGRESS NOTES
History & Physical  Gynecology      SUBJECTIVE:     Chief Complaint: Well Woman Exam       History of Present Illness:  28 y.o. female  here for annual GYN visit.   She describes her periods as regular, when taking OCPs regularly. She denies intermenstrual bleeding. She denies vaginal itching, irritation, or discharge.  Patient is sexually active with 1 male partner but recently split with her partner.  She uses oral contraceptives (estrogen/progesterone) for contraception.  She does not use tobacco and denies clinically significant alcohol or drug use.   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     Patient has no history of abnormal paps  Last Pap:  - NILM    She denies a family history of breast or ovarian cancer.     She recently started Ozempic.     Review of patient's allergies indicates:  No Known Allergies    Past Medical History:   Diagnosis Date    ADHD (attention deficit hyperactivity disorder)     Anxiety     Asthma     Bipolar affective     Eczema     History of chlamydia 2016    Insomnia     Morbid obesity with BMI of 40.0-44.9, adult     Sleep disorder     Type 2 diabetes mellitus without complications      Past Surgical History:   Procedure Laterality Date    NO PAST SURGERIES      as of 19     OB History          0    Para   0    Term   0       0    AB   0    Living   0         SAB   0    IAB   0    Ectopic   0    Multiple   0    Live Births               Obstetric Comments   Menarche ~ 18/reg  H/o chlamydia   Denies abnl pap             Family History   Problem Relation Age of Onset    No Known Problems Paternal Grandfather     Breast cancer Paternal Grandmother     Diabetes Maternal Grandmother     No Known Problems Maternal Grandfather     No Known Problems Father     No Known Problems Mother     No Known Problems Sister     Colon cancer Neg Hx     Ovarian cancer Neg Hx      Social History     Tobacco Use     Smoking status: Former     Types: Vaping with nicotine     Start date:      Quit date: 2022     Years since quittin.0    Smokeless tobacco: Never    Tobacco comments:     boyfriend smoke cigarettes   Substance Use Topics    Alcohol use: No    Drug use: Not Currently     Types: Marijuana       Current Outpatient Medications   Medication Sig    adapalene-benzoyl peroxide (EPIDUO FORTE) 0.3-2.5 % GlwP Apply topically.    albuterol (PROVENTIL/VENTOLIN HFA) 90 mcg/actuation inhaler Inhale 2 puffs into the lungs every 4 (four) hours as needed (shortness of breath). Rescue    ammonium lactate (LAC-HYDRIN) 12 % lotion Apply topically.    benzoyl peroxide-erythromycin (BENZAMYCIN) gel Apply topically 2 (two) times daily.    blood sugar diagnostic Strp To check BG 2-3x times daily, to use with insurance preferred meter    blood-glucose meter kit To check BG 2-3x times daily, to use with insurance preferred meter    busPIRone (BUSPAR) 15 MG tablet Take 15 mg by mouth 2 (two) times daily.    cariprazine (VRAYLAR) 4.5 mg Cap Take 1 capsule by mouth every night at bedtime    cetirizine (ZYRTEC) 10 MG tablet Take 1 tablet (10 mg total) by mouth once daily.    clindamycin phosphate 1% (CLINDAGEL) 1 % gel Apply topically 2 (two) times daily.    clotrimazole-betamethasone 1-0.05% (LOTRISONE) cream Apply topically 2 (two) times daily. for 14 days    fluticasone furoate-vilanteroL (BREO ELLIPTA) 200-25 mcg/dose DsDv diskus inhaler Inhale 1 puff into the lungs once daily. Controller    fluticasone propionate (FLONASE) 50 mcg/actuation nasal spray 2 sprays (100 mcg total) by Each Nostril route once daily.    fluticasone-salmeterol diskus inhaler 250-50 mcg Inhale 1 puff into the lungs 2 (two) times daily.    hydrOXYzine HCL (ATARAX) 25 MG tablet Take 25 mg by mouth 3 (three) times daily.    ibuprofen (ADVIL,MOTRIN) 800 MG tablet Take 1 tablet (800 mg total) by mouth 3 (three) times daily as needed for Pain.    lamoTRIgine  (LAMICTAL) 25 MG tablet Take by mouth.    lancets 28 gauge Misc To check BG 2-3 times daily, to use with insurance preferred meter    lisinopriL 10 MG tablet Take 1 tablet (10 mg total) by mouth once daily.    lurasidone (LATUDA) 20 mg Tab tablet Take 20 mg by mouth.    lurasidone (LATUDA) 40 mg Tab tablet Take 40 mg by mouth.    lurasidone (LATUDA) 60 mg Tab tablet Take 60 mg by mouth.    metFORMIN (GLUCOPHAGE-XR) 500 MG ER 24hr tablet Take 1 tablet (500 mg total) by mouth every evening.    mometasone-formoterol (DULERA) 200-5 mcg/actuation inhaler Inhale 2 puffs into the lungs 2 (two) times daily.    norethindrone-ethinyl estradiol (JUNEL FE 1/20) 1 mg-20 mcg (21)/75 mg (7) per tablet Take 1 tablet by mouth once daily.    semaglutide (OZEMPIC) 0.25 mg or 0.5 mg (2 mg/3 mL) pen injector Inject 0.5mg into the skin  weekly for weeks 1-4    semaglutide (OZEMPIC) 1 mg/dose (4 mg/3 mL) Inject 1 mg into the skin every 7 days. For weeks 5-8    semaglutide (OZEMPIC) 2 mg/dose (8 mg/3 mL) PnIj Inject 2 mg into the skin every 7 days. For weeks 9-12 and onward    sulfacetamide sodium-sulfur 10-5 % (w/w) Clsr Apply topically.    VRAYLAR 1.5 mg Cap Take 1.5 mg by mouth every evening.    VRAYLAR 3 mg Cap Take 3 mg by mouth.    azelastine (ASTELIN) 137 mcg (0.1 %) nasal spray 2 sprays (274 mcg total) by Nasal route 2 (two) times daily.    famotidine (PEPCID) 20 MG tablet Take 1 tablet (20 mg total) by mouth 2 (two) times daily.     No current facility-administered medications for this visit.       Review of Systems:  Review of Systems   Constitutional:  Negative for chills, fatigue and fever.   HENT:  Negative for congestion.    Eyes:  Negative for visual disturbance.   Respiratory:  Negative for cough and shortness of breath.    Cardiovascular:  Negative for chest pain and palpitations.   Gastrointestinal:  Negative for abdominal distention, abdominal pain, constipation, diarrhea, nausea and vomiting.   Genitourinary:  Negative  for difficulty urinating, dysuria, hematuria, vaginal bleeding and vaginal discharge.   Skin:  Negative for rash.   Neurological:  Negative for dizziness, seizures, light-headedness and headaches.   Hematological:  Does not bruise/bleed easily.   Psychiatric/Behavioral:  Negative for dysphoric mood. The patient is not nervous/anxious.         OBJECTIVE:     Physical Exam:  Vitals:    01/22/24 1409   BP: (!) 147/83      Physical Exam  Vitals and nursing note reviewed. Exam conducted with a chaperone present.   Constitutional:       General: She is not in acute distress.     Appearance: She is well-developed.   HENT:      Head: Normocephalic and atraumatic.   Eyes:      Pupils: Pupils are equal, round, and reactive to light.   Cardiovascular:      Rate and Rhythm: Normal rate and regular rhythm.   Pulmonary:      Effort: Pulmonary effort is normal. No respiratory distress.   Chest:   Breasts:     Right: Normal.      Left: Normal.   Abdominal:      General: There is no distension.      Palpations: Abdomen is soft. There is no mass.      Tenderness: There is no abdominal tenderness. There is no guarding.   Genitourinary:     Comments: SSE: Normal external female genitalia, normal urethral meatus, normal vaginal rugae, normal vaginal mucosa, no vaginal blood in canal, normal physiologic discharge, normal cervix, no adnexal masses palpated on bimanual exam.     Musculoskeletal:         General: Normal range of motion.      Cervical back: Normal range of motion and neck supple.   Skin:     General: Skin is warm and dry.   Neurological:      Mental Status: She is alert and oriented to person, place, and time.   Psychiatric:         Behavior: Behavior normal.         Thought Content: Thought content normal.         Judgment: Judgment normal.         ASSESSMENT/PLAN:     1. Well woman exam  - Pap smear - Cytology Today  - Mammogram - At age 40  - Colonoscopy - At age 50   - Calcium and Vitamin D counseling  14 - 30 years old  1,300mg Ca/d; 600 IU vit D/d  31 - 50 years old 1,000 Ca/d; 600 IU vit D/d  51 - 70 year old: 1,200 Ca/d; 600 IU vit D/d  71+ years old 1,200 Ca/d; 800 IU vit D/d  - Liquid-Based Pap Smear, Screening  - C. trachomatis/N. gonorrhoeae by AMP DNA      Fiorella Peña M.D.  OB/GYN  [facility]

## 2024-01-23 NOTE — TELEPHONE ENCOUNTER
Good morning Dr. Kign,    Kindly note the patient has confirmed receipt of the BREO.    Kind Regards  Mu Ridley MA         --- Message from Greta Baird sent at 1/23/2024  9:13 AM CST -----  Regarding: Pt advice  Contact: [phone]  Pt returning callback from missed call. Requesting to speak with somebody in   office. Please call.

## 2024-01-23 NOTE — TELEPHONE ENCOUNTER
----- Message from Greta Baird sent at 1/23/2024  9:13 AM CST -----  Regarding: Pt advice  Contact: [phone]  Pt returning callback from missed call. Requesting to speak with somebody in   office. Please call.

## 2024-01-24 ENCOUNTER — PATIENT MESSAGE (OUTPATIENT)
Dept: FAMILY MEDICINE | Facility: CLINIC | Age: 29
End: 2024-01-24
Payer: MEDICAID

## 2024-01-24 ENCOUNTER — HOSPITAL ENCOUNTER (OUTPATIENT)
Dept: RADIOLOGY | Facility: HOSPITAL | Age: 29
Discharge: HOME | End: 2024-01-24
Attending: INTERNAL MEDICINE
Payer: MEDICAID

## 2024-01-24 ENCOUNTER — PATIENT MESSAGE (OUTPATIENT)
Dept: OBSTETRICS AND GYNECOLOGY | Facility: CLINIC | Age: 29
End: 2024-01-24
Payer: MEDICAID

## 2024-01-24 ENCOUNTER — OFFICE VISIT (OUTPATIENT)
Dept: HEMATOLOGY/ONCOLOGY | Facility: CLINIC | Age: 29
End: 2024-01-24
Payer: MEDICAID

## 2024-01-24 VITALS
SYSTOLIC BLOOD PRESSURE: 117 MMHG | BODY MASS INDEX: 54.07 KG/M2 | OXYGEN SATURATION: 97 % | DIASTOLIC BLOOD PRESSURE: 70 MMHG | WEIGHT: 293 LBS | RESPIRATION RATE: 16 BRPM | HEART RATE: 89 BPM

## 2024-01-24 DIAGNOSIS — R79.0 LOW IRON STORES: ICD-10-CM

## 2024-01-24 DIAGNOSIS — R06.09 DYSPNEA ON EXERTION: ICD-10-CM

## 2024-01-24 DIAGNOSIS — D75.839 THROMBOCYTOSIS: Primary | ICD-10-CM

## 2024-01-24 DIAGNOSIS — E11.9 TYPE 2 DIABETES MELLITUS WITHOUT COMPLICATION, WITHOUT LONG-TERM CURRENT USE OF INSULIN: ICD-10-CM

## 2024-01-24 PROCEDURE — 3044F HG A1C LEVEL LT 7.0%: CPT | Mod: CPTII,,, | Performed by: INTERNAL MEDICINE

## 2024-01-24 PROCEDURE — 99214 OFFICE O/P EST MOD 30 MIN: CPT | Mod: S$PBB,,, | Performed by: INTERNAL MEDICINE

## 2024-01-24 PROCEDURE — 3074F SYST BP LT 130 MM HG: CPT | Mod: CPTII,,, | Performed by: INTERNAL MEDICINE

## 2024-01-24 PROCEDURE — 1159F MED LIST DOCD IN RCRD: CPT | Mod: CPTII,,, | Performed by: INTERNAL MEDICINE

## 2024-01-24 PROCEDURE — 71046 X-RAY EXAM CHEST 2 VIEWS: CPT | Mod: TC,FY

## 2024-01-24 PROCEDURE — 71046 X-RAY EXAM CHEST 2 VIEWS: CPT | Mod: 26,,, | Performed by: RADIOLOGY

## 2024-01-24 PROCEDURE — 3008F BODY MASS INDEX DOCD: CPT | Mod: CPTII,,, | Performed by: INTERNAL MEDICINE

## 2024-01-24 PROCEDURE — 3078F DIAST BP <80 MM HG: CPT | Mod: CPTII,,, | Performed by: INTERNAL MEDICINE

## 2024-01-24 PROCEDURE — 99999 PR PBB SHADOW E&M-EST. PATIENT-LVL V: CPT | Mod: PBBFAC,,, | Performed by: INTERNAL MEDICINE

## 2024-01-24 PROCEDURE — 99215 OFFICE O/P EST HI 40 MIN: CPT | Mod: PBBFAC,25,PO | Performed by: INTERNAL MEDICINE

## 2024-01-25 ENCOUNTER — PATIENT MESSAGE (OUTPATIENT)
Dept: FAMILY MEDICINE | Facility: CLINIC | Age: 29
End: 2024-01-25
Payer: MEDICAID

## 2024-01-25 LAB
C TRACH DNA SPEC QL NAA+PROBE: NOT DETECTED
N GONORRHOEA DNA SPEC QL NAA+PROBE: NOT DETECTED

## 2024-01-28 NOTE — PROGRESS NOTES
PATIENT: Misael Garcia  MRN: [id number]  DATE: 1/24/2024      Subjective:     Chief complaint:  Chief Complaint   Patient presents with    Follow-up       Interval History: Ms. Garcia returns for follow up on thrombocytosis and low iron stores. Recent labs show mild anemia with low-normal MCV and persistent thrombocytosis. Noting some RODRIGUEZ which is of unclear etiology.      Review of Systems   A comprehensive review of systems was performed; pertinent positives and negatives are noted in the HPI.          Objective:      Vitals:   Vitals:    01/24/24 0924   BP: 117/70   BP Location: Right arm   Patient Position: Sitting   BP Method: Large (Automatic)   Pulse: 89   Resp: 16   SpO2: 97%   Weight: (!) 156.6 kg (345 lb 3.9 oz)     BMI: Body mass index is 54.07 kg/m².      Physical Exam:   Physical Exam  Vitals and nursing note reviewed.   Constitutional:       General: She is not in acute distress.     Appearance: Normal appearance. She is not toxic-appearing.   HENT:      Head: Normocephalic and atraumatic.   Eyes:      General: No scleral icterus.     Conjunctiva/sclera: Conjunctivae normal.   Cardiovascular:      Rate and Rhythm: Normal rate.   Pulmonary:      Effort: Pulmonary effort is normal. No respiratory distress.   Musculoskeletal:         General: No swelling or deformity.   Skin:     Coloration: Skin is not jaundiced.      Findings: No erythema.   Neurological:      General: No focal deficit present.      Mental Status: She is alert and oriented to person, place, and time.      Motor: No weakness.   Psychiatric:         Mood and Affect: Mood normal.         Behavior: Behavior normal.           Laboratory Data:  WBC   Date Value Ref Range Status   01/18/2024 11.99 3.90 - 12.70 K/uL Final     Hemoglobin   Date Value Ref Range Status   01/18/2024 11.3 (L) 12.0 - 16.0 g/dL Final     Hematocrit   Date Value Ref Range Status   01/18/2024 37.1 37.0 - 48.5 % Final     Platelets   Date Value Ref Range Status    01/18/2024 552 (H) 150 - 450 K/uL Final     Gran # (ANC)   Date Value Ref Range Status   01/18/2024 7.7 1.8 - 7.7 K/uL Final     Gran %   Date Value Ref Range Status   01/18/2024 64.1 38.0 - 73.0 % Final       Chemistry        Component Value Date/Time     06/29/2023 1131    K 3.9 06/29/2023 1131     06/29/2023 1131    CO2 25 06/29/2023 1131    BUN 7 06/29/2023 1131    CREATININE 0.8 06/29/2023 1131    GLU 79 06/29/2023 1131        Component Value Date/Time    CALCIUM 10.1 06/29/2023 1131    ALKPHOS 132 06/29/2023 1131    AST 24 06/29/2023 1131    ALT 34 06/29/2023 1131    BILITOT 0.5 06/29/2023 1131    ESTGFRAFRICA >60.0 03/25/2022 1146    EGFRNONAA >60.0 03/25/2022 1146              Assessment/Plan:     1. Thrombocytosis    2. Low iron stores    3. Dyspnea on exertion    4. Type 2 diabetes mellitus without complication, without long-term current use of insulin      She is noting significant fatigue and also dyspnea on exertion. Will repeat iron studies; will check TTE to eval RODRIGUEZ. Thrombocytosis persists--will complete MPN evaluation and also check inflamatory markers.    Med and Orders:  Orders Placed This Encounter    X-Ray Chest PA And Lateral    FERRITIN    STFR    Iron and TIBC    VITAMIN B12    FOLATE    MPN (JAK2 V617F)WITH REFLEX TO CALR,MPL    C-REACTIVE PROTEIN    Sedimentation rate    Hemoglobin A1c    Echo Saline Bubble? No       Follow Up:  Follow up in about 4 weeks (around 2/21/2024).      Above care plan was discussed with patient and all questions were addressed to their expressed satisfaction.       Valente Hall MD, FACP  Hematology & Medical Oncology  [facility]

## 2024-01-29 NOTE — PROGRESS NOTES
"CRS Office Visit Follow-up    SUBJECTIVE:     History of Present Illness:  Patient is a 28 y.o. female who presents following biopsy of perianal ulcer (path: erythrasma) on 9/1/2023. Their post-operative course was uncomplicated.   Still has some irritation there.  Never picked up the topical Clinda.  Cleans with dry toilet paper.    (9/27/2023)  There are no new complaints today.    Review of Systems:  ROS    OBJECTIVE:     Vital Signs (Most Recent)  /89 (BP Location: Left arm, Patient Position: Sitting, BP Method: Large (Manual))   Pulse 88   Ht 5' 7" (1.702 m)   Wt (!) 169.8 kg (374 lb 5.5 oz)   LMP 01/06/2024 (Exact Date)   BMI 58.63 kg/m²     Physical Exam:  General:    Black or   White female in no distress   Neuro: Alert and oriented x 4.  Moves all extremities.     HEENT: No icterus.  Trachea midline  Respiratory: Respirations are even and unlabored  Cardiac: Regular rate  Extremities: Warm dry and intact  Skin: No rashes  Anorectal: Perianal lesion biopsy site healing, treated with silver nitrate today      ASSESSMENT/PLAN:     27yo F s/p biopsy of perianal ulcer on 9/1/2023, doing well    Topical Clinda BID  Bidet to clean  RTC 3 months if not better    Kayla Dutta MD  Staff Surgeon, Colon and Rectal Surgery  [facility]     "

## 2024-01-29 NOTE — TELEPHONE ENCOUNTER
Called pt in regards to below message no answer lvm to give our office a call back.    Pt would like  a call regarding yolis an appt    Unknown

## 2024-01-30 ENCOUNTER — HOSPITAL ENCOUNTER (OUTPATIENT)
Dept: CARDIOLOGY | Facility: HOSPITAL | Age: 29
Discharge: HOME | End: 2024-01-30
Attending: INTERNAL MEDICINE
Payer: MEDICAID

## 2024-01-30 ENCOUNTER — PATIENT MESSAGE (OUTPATIENT)
Dept: SPINE | Facility: CLINIC | Age: 29
End: 2024-01-30
Payer: MEDICAID

## 2024-01-30 ENCOUNTER — PATIENT MESSAGE (OUTPATIENT)
Dept: HEMATOLOGY/ONCOLOGY | Facility: CLINIC | Age: 29
End: 2024-01-30
Payer: MEDICAID

## 2024-01-30 ENCOUNTER — OFFICE VISIT (OUTPATIENT)
Dept: FAMILY MEDICINE | Facility: CLINIC | Age: 29
End: 2024-01-30
Payer: MEDICAID

## 2024-01-30 ENCOUNTER — TELEPHONE (OUTPATIENT)
Dept: PHYSICAL MEDICINE AND REHAB | Facility: CLINIC | Age: 29
End: 2024-01-30
Payer: MEDICAID

## 2024-01-30 VITALS
HEART RATE: 95 BPM | DIASTOLIC BLOOD PRESSURE: 82 MMHG | HEIGHT: 67 IN | BODY MASS INDEX: 45.99 KG/M2 | OXYGEN SATURATION: 98 % | SYSTOLIC BLOOD PRESSURE: 134 MMHG | WEIGHT: 293 LBS

## 2024-01-30 VITALS — WEIGHT: 293 LBS | BODY MASS INDEX: 45.99 KG/M2 | HEIGHT: 67 IN

## 2024-01-30 DIAGNOSIS — I15.2 HYPERTENSION ASSOCIATED WITH DIABETES: ICD-10-CM

## 2024-01-30 DIAGNOSIS — G47.33 OSA ON CPAP: ICD-10-CM

## 2024-01-30 DIAGNOSIS — E66.01 CLASS 3 SEVERE OBESITY DUE TO EXCESS CALORIES WITH SERIOUS COMORBIDITY AND BODY MASS INDEX (BMI) OF 50.0 TO 59.9 IN ADULT: ICD-10-CM

## 2024-01-30 DIAGNOSIS — E11.65 TYPE 2 DIABETES MELLITUS WITH HYPERGLYCEMIA, WITHOUT LONG-TERM CURRENT USE OF INSULIN: Primary | ICD-10-CM

## 2024-01-30 DIAGNOSIS — E11.59 HYPERTENSION ASSOCIATED WITH DIABETES: ICD-10-CM

## 2024-01-30 DIAGNOSIS — R06.09 DYSPNEA ON EXERTION: ICD-10-CM

## 2024-01-30 PROCEDURE — 3044F HG A1C LEVEL LT 7.0%: CPT | Mod: CPTII,,, | Performed by: FAMILY MEDICINE

## 2024-01-30 PROCEDURE — C8929 TTE W OR WO FOL WCON,DOPPLER: HCPCS

## 2024-01-30 PROCEDURE — 3079F DIAST BP 80-89 MM HG: CPT | Mod: CPTII,,, | Performed by: FAMILY MEDICINE

## 2024-01-30 PROCEDURE — 93306 TTE W/DOPPLER COMPLETE: CPT | Mod: 26,,, | Performed by: STUDENT IN AN ORGANIZED HEALTH CARE EDUCATION/TRAINING PROGRAM

## 2024-01-30 PROCEDURE — 25500020 PHARM REV CODE 255: Performed by: INTERNAL MEDICINE

## 2024-01-30 PROCEDURE — 4010F ACE/ARB THERAPY RXD/TAKEN: CPT | Mod: CPTII,,, | Performed by: FAMILY MEDICINE

## 2024-01-30 PROCEDURE — 99999 PR PBB SHADOW E&M-EST. PATIENT-LVL V: CPT | Mod: PBBFAC,,, | Performed by: FAMILY MEDICINE

## 2024-01-30 PROCEDURE — 99215 OFFICE O/P EST HI 40 MIN: CPT | Mod: PBBFAC,25,PO | Performed by: FAMILY MEDICINE

## 2024-01-30 PROCEDURE — 3008F BODY MASS INDEX DOCD: CPT | Mod: CPTII,,, | Performed by: FAMILY MEDICINE

## 2024-01-30 PROCEDURE — 99214 OFFICE O/P EST MOD 30 MIN: CPT | Mod: S$PBB,,, | Performed by: FAMILY MEDICINE

## 2024-01-30 PROCEDURE — 3075F SYST BP GE 130 - 139MM HG: CPT | Mod: CPTII,,, | Performed by: FAMILY MEDICINE

## 2024-01-30 RX ORDER — DEXMETHYLPHENIDATE HYDROCHLORIDE 25 MG/1
CAPSULE, EXTENDED RELEASE ORAL
COMMUNITY
End: 2024-01-30

## 2024-01-30 RX ORDER — DEXMETHYLPHENIDATE HYDROCHLORIDE 15 MG/1
CAPSULE, EXTENDED RELEASE ORAL
COMMUNITY
End: 2024-01-30

## 2024-01-30 RX ORDER — DEXMETHYLPHENIDATE HYDROCHLORIDE 20 MG/1
CAPSULE, EXTENDED RELEASE ORAL
COMMUNITY
End: 2024-01-30

## 2024-01-30 RX ORDER — UREA 200 MG/G
CREAM TOPICAL
COMMUNITY
End: 2024-06-06

## 2024-01-30 RX ORDER — LISINOPRIL 20 MG/1
20 TABLET ORAL DAILY
Qty: 90 TABLET | Refills: 1 | Status: SHIPPED | OUTPATIENT
Start: 2024-01-30 | End: 2024-10-26

## 2024-01-30 RX ORDER — FLUOXETINE 10 MG/1
CAPSULE ORAL
COMMUNITY
End: 2024-01-30

## 2024-01-30 RX ORDER — DEXMETHYLPHENIDATE HYDROCHLORIDE 30 MG/1
1 CAPSULE, EXTENDED RELEASE ORAL EVERY MORNING
COMMUNITY
End: 2024-01-30

## 2024-01-30 RX ORDER — CITALOPRAM 20 MG/1
TABLET, FILM COATED ORAL
COMMUNITY
Start: 2024-01-20 | End: 2024-01-30

## 2024-01-30 RX ORDER — TOPIRAMATE 25 MG/1
25 TABLET ORAL 2 TIMES DAILY
COMMUNITY
Start: 2024-01-23 | End: 2024-06-06

## 2024-01-30 RX ORDER — DEXMETHYLPHENIDATE HYDROCHLORIDE 10 MG/1
TABLET ORAL
COMMUNITY
End: 2024-01-30

## 2024-01-30 RX ADMIN — HUMAN ALBUMIN MICROSPHERES AND PERFLUTREN 0.11 MG: 10; .22 INJECTION, SOLUTION INTRAVENOUS at 03:01

## 2024-01-30 NOTE — PROGRESS NOTES
"Subjective:         Patient ID: Misael Garcia is a 28 y.o. female.    Chief Complaint: Diabetes    Patient Active Problem List   Diagnosis    Corns and callus    Class 3 severe obesity due to excess calories with serious comorbidity and body mass index (BMI) of 50.0 to 59.9 in adult    Bipolar 1 disorder    Type 2 diabetes mellitus with hyperglycemia, without long-term current use of insulin    Recently quit using tobacco    Hypertension associated with diabetes    Oral contraceptive pill surveillance    Thrombocytosis    SID on CPAP    Mild persistent asthma without complication    Cervical spondylosis without myelopathy    Lumbosacral spondylosis without myelopathy    Posttraumatic stress disorder    Thoracic facet syndrome    Heart failure with mildly reduced ejection fraction (HFmrEF)    Abnormal echocardiogram      HPI    Misael is a 28 y.o. female who presents today for follow up of DM, obesity and chronic medical problems.     Review of Systems   All other systems reviewed and are negative.       Objective:     Vitals:    01/30/24 1307 01/30/24 1332   BP: (!) 140/82 134/82   BP Location: Right arm    Patient Position: Sitting    BP Method: Medium (Manual)    Pulse: 95    SpO2: 98%    Weight: (!) 157 kg (346 lb 2 oz)    Height: 5' 7" (1.702 m)          Physical Exam  Vitals and nursing note reviewed.   Constitutional:       General: She is not in acute distress.     Appearance: Normal appearance. She is obese. She is not ill-appearing, toxic-appearing or diaphoretic.   HENT:      Head: Normocephalic and atraumatic.   Eyes:      General: No scleral icterus.     Conjunctiva/sclera: Conjunctivae normal.   Cardiovascular:      Rate and Rhythm: Normal rate.   Pulmonary:      Effort: Pulmonary effort is normal. No respiratory distress.   Skin:     Coloration: Skin is not pale.   Neurological:      Mental Status: She is alert. Mental status is at baseline.   Psychiatric:         Attention and Perception: " Attention and perception normal.         Mood and Affect: Mood and affect normal.         Speech: Speech normal.         Behavior: Behavior normal.         Cognition and Memory: Cognition and memory normal.         Judgment: Judgment normal.       Assessment:       1. Type 2 diabetes mellitus with hyperglycemia, without long-term current use of insulin    2. Hypertension associated with diabetes    3. Class 3 severe obesity due to excess calories with serious comorbidity and body mass index (BMI) of 50.0 to 59.9 in adult    4. SID on CPAP        Plan:   Recent relevant labs results reviewed with patient.         1. Type 2 diabetes mellitus with hyperglycemia, without long-term current use of insulin  -     semaglutide (OZEMPIC) 1 mg/dose (4 mg/3 mL); Inject 1 mg into the skin every 7 days. For weeks 5-8  Dispense: 3 mL; Refill: 0  -     semaglutide (OZEMPIC) 2 mg/dose (8 mg/3 mL) PnIj; Inject 2 mg into the skin every 7 days. For weeks 9-12 and onward  Dispense: 3 mL; Refill: 2  -     Hepatic Function Panel; Future; Expected date: 01/30/2024  -     Renal Function Panel; Future; Expected date: 01/30/2024  -     Lipid Panel; Future; Expected date: 01/30/2024  -     TSH; Future; Expected date: 01/30/2024  -     Hemoglobin A1C; Future; Expected date: 01/30/2024  -     CBC Auto Differential; Future; Expected date: 01/30/2024  -     Microalbumin/Creatinine Ratio, Urine; Future; Expected date: 01/30/2024  -     Ambulatory referral/consult to Diabetes Education; Future; Expected date: 02/06/2024  - Chronic health condition is stable and controlled. Continue current medication regimen and relevant lifestyle modifications. Necessary medication refills addressed. Routine ongoing surveillance monitoring.     2. Hypertension associated with diabetes  -     lisinopriL (PRINIVIL,ZESTRIL) 20 MG tablet; Take 1 tablet (20 mg total) by mouth once daily.  Dispense: 90 tablet; Refill: 1  -     semaglutide (OZEMPIC) 1 mg/dose (4 mg/3 mL);  Inject 1 mg into the skin every 7 days. For weeks 5-8  Dispense: 3 mL; Refill: 0  -     semaglutide (OZEMPIC) 2 mg/dose (8 mg/3 mL) PnIj; Inject 2 mg into the skin every 7 days. For weeks 9-12 and onward  Dispense: 3 mL; Refill: 2  -     Hepatic Function Panel; Future; Expected date: 01/30/2024  -     Renal Function Panel; Future; Expected date: 01/30/2024  - Chronic health condition is stable and controlled. Continue current medication regimen and relevant lifestyle modifications. Necessary medication refills addressed. Routine ongoing surveillance monitoring.     3. Class 3 severe obesity due to excess calories with serious comorbidity and body mass index (BMI) of 50.0 to 59.9 in adult  -     semaglutide (OZEMPIC) 1 mg/dose (4 mg/3 mL); Inject 1 mg into the skin every 7 days. For weeks 5-8  Dispense: 3 mL; Refill: 0  -     semaglutide (OZEMPIC) 2 mg/dose (8 mg/3 mL) PnIj; Inject 2 mg into the skin every 7 days. For weeks 9-12 and onward  Dispense: 3 mL; Refill: 2  -     Lipid Panel; Future; Expected date: 01/30/2024  -     TSH; Future; Expected date: 01/30/2024  -     Hemoglobin A1C; Future; Expected date: 01/30/2024    4. SID on CPAP  -     semaglutide (OZEMPIC) 1 mg/dose (4 mg/3 mL); Inject 1 mg into the skin every 7 days. For weeks 5-8  Dispense: 3 mL; Refill: 0  -     semaglutide (OZEMPIC) 2 mg/dose (8 mg/3 mL) PnIj; Inject 2 mg into the skin every 7 days. For weeks 9-12 and onward  Dispense: 3 mL; Refill: 2    Continue to work on weight management.     Patient's questions answered. Plan reviewed with patient at the end of visit. Relevant precautions to chief complaint and reasons to seek further medical care or to contact the office sooner reviewed with patient.     Follow up in about 4 months (around 5/30/2024) for Diabetes Follow-up, Weight Follow-up (prelabs).

## 2024-01-31 ENCOUNTER — OFFICE VISIT (OUTPATIENT)
Dept: SURGERY | Facility: CLINIC | Age: 29
End: 2024-01-31
Attending: COLON & RECTAL SURGERY
Payer: MEDICAID

## 2024-01-31 ENCOUNTER — PATIENT MESSAGE (OUTPATIENT)
Dept: OBSTETRICS AND GYNECOLOGY | Facility: CLINIC | Age: 29
End: 2024-01-31
Payer: MEDICAID

## 2024-01-31 ENCOUNTER — PATIENT MESSAGE (OUTPATIENT)
Dept: FAMILY MEDICINE | Facility: CLINIC | Age: 29
End: 2024-01-31
Payer: MEDICAID

## 2024-01-31 ENCOUNTER — PATIENT MESSAGE (OUTPATIENT)
Dept: DIABETES | Facility: CLINIC | Age: 29
End: 2024-01-31
Payer: MEDICAID

## 2024-01-31 VITALS
HEIGHT: 67 IN | WEIGHT: 293 LBS | HEART RATE: 88 BPM | SYSTOLIC BLOOD PRESSURE: 129 MMHG | BODY MASS INDEX: 45.99 KG/M2 | DIASTOLIC BLOOD PRESSURE: 89 MMHG

## 2024-01-31 DIAGNOSIS — L98.499 ULCER OF PERIANAL AREA, UNSPECIFIED ULCER STAGE: Primary | ICD-10-CM

## 2024-01-31 DIAGNOSIS — R93.1 ABNORMAL ECHOCARDIOGRAM: ICD-10-CM

## 2024-01-31 DIAGNOSIS — I50.22 HEART FAILURE WITH MILDLY REDUCED EJECTION FRACTION (HFMREF): Primary | ICD-10-CM

## 2024-01-31 DIAGNOSIS — R06.09 DYSPNEA ON EXERTION: Primary | ICD-10-CM

## 2024-01-31 LAB
ASCENDING AORTA: 2.81 CM
AV INDEX (PROSTH): 1
AV MEAN GRADIENT: 4 MMHG
AV PEAK GRADIENT: 7 MMHG
AV VALVE AREA BY VELOCITY RATIO: 2.98 CM²
AV VALVE AREA: 3.43 CM²
AV VELOCITY RATIO: 0.87
BSA FOR ECHO PROCEDURE: 2.72 M2
CV ECHO LV RWT: 0.29 CM
DOP CALC AO PEAK VEL: 1.36 M/S
DOP CALC AO VTI: 22.5 CM
DOP CALC LVOT AREA: 3.4 CM2
DOP CALC LVOT DIAMETER: 2.09 CM
DOP CALC LVOT PEAK VEL: 1.18 M/S
DOP CALC LVOT STROKE VOLUME: 77.15 CM3
DOP CALC MV VTI: 28.1 CM
DOP CALCLVOT PEAK VEL VTI: 22.5 CM
E WAVE DECELERATION TIME: 170.57 MSEC
E/A RATIO: 1.69
E/E' RATIO: 7.54 M/S
ECHO LV POSTERIOR WALL: 0.73 CM (ref 0.6–1.1)
FRACTIONAL SHORTENING: 33 % (ref 28–44)
INTERVENTRICULAR SEPTUM: 0.99 CM (ref 0.6–1.1)
IVC DIAMETER: 1.79 CM
LA MAJOR: 5.47 CM
LA MINOR: 4.68 CM
LA WIDTH: 4.2 CM
LEFT ATRIUM SIZE: 4.05 CM
LEFT ATRIUM VOLUME INDEX MOD: 23.5 ML/M2
LEFT ATRIUM VOLUME INDEX: 28.6 ML/M2
LEFT ATRIUM VOLUME MOD: 60 CM3
LEFT ATRIUM VOLUME: 72.93 CM3
LEFT INTERNAL DIMENSION IN SYSTOLE: 3.42 CM (ref 2.1–4)
LEFT VENTRICLE DIASTOLIC VOLUME INDEX: 48.64 ML/M2
LEFT VENTRICLE DIASTOLIC VOLUME: 124.04 ML
LEFT VENTRICLE MASS INDEX: 60 G/M2
LEFT VENTRICLE SYSTOLIC VOLUME INDEX: 18.9 ML/M2
LEFT VENTRICLE SYSTOLIC VOLUME: 48.14 ML
LEFT VENTRICULAR INTERNAL DIMENSION IN DIASTOLE: 5.1 CM (ref 3.5–6)
LEFT VENTRICULAR MASS: 154.16 G
LV LATERAL E/E' RATIO: 7.54 M/S
LV SEPTAL E/E' RATIO: 7.54 M/S
LVOT MG: 2.85 MMHG
LVOT MV: 0.79 CM/S
MV PEAK A VEL: 0.58 M/S
MV PEAK E VEL: 0.98 M/S
MV PEAK GRADIENT: 5 MMHG
MV STENOSIS PRESSURE HALF TIME: 49.46 MS
MV VALVE AREA BY CONTINUITY EQUATION: 2.75 CM2
MV VALVE AREA P 1/2 METHOD: 4.45 CM2
OHS LV EJECTION FRACTION SIMPSONS BIPLANE MOD: 50 %
PISA MRMAX VEL: 2.73 M/S
PISA TR MAX VEL: 2.96 M/S
PULM VEIN S/D RATIO: 1.14
PV PEAK D VEL: 0.44 M/S
PV PEAK GRADIENT: 4 MMHG
PV PEAK S VEL: 0.5 M/S
PV PEAK VELOCITY: 0.99 M/S
RA MAJOR: 5.17 CM
RA PRESSURE ESTIMATED: 3 MMHG
RA WIDTH: 3.39 CM
RV TB RVSP: 6 MMHG
RV TISSUE DOPPLER FREE WALL SYSTOLIC VELOCITY 1 (APICAL 4 CHAMBER VIEW): 13.49 CM/S
SINUS: 3.23 CM
STJ: 2.85 CM
TDI LATERAL: 0.13 M/S
TDI SEPTAL: 0.13 M/S
TDI: 0.13 M/S
TR MAX PG: 35 MMHG
TRICUSPID ANNULAR PLANE SYSTOLIC EXCURSION: 2.13 CM
TV REST PULMONARY ARTERY PRESSURE: 38 MMHG
Z-SCORE OF LEFT VENTRICULAR DIMENSION IN END DIASTOLE: -11.4
Z-SCORE OF LEFT VENTRICULAR DIMENSION IN END SYSTOLE: -8.01

## 2024-01-31 PROCEDURE — 3074F SYST BP LT 130 MM HG: CPT | Mod: CPTII,,, | Performed by: COLON & RECTAL SURGERY

## 2024-01-31 PROCEDURE — 3044F HG A1C LEVEL LT 7.0%: CPT | Mod: CPTII,,, | Performed by: COLON & RECTAL SURGERY

## 2024-01-31 PROCEDURE — 99214 OFFICE O/P EST MOD 30 MIN: CPT | Mod: PBBFAC | Performed by: COLON & RECTAL SURGERY

## 2024-01-31 PROCEDURE — 99213 OFFICE O/P EST LOW 20 MIN: CPT | Mod: S$PBB,,, | Performed by: COLON & RECTAL SURGERY

## 2024-01-31 PROCEDURE — 1159F MED LIST DOCD IN RCRD: CPT | Mod: CPTII,,, | Performed by: COLON & RECTAL SURGERY

## 2024-01-31 PROCEDURE — 4010F ACE/ARB THERAPY RXD/TAKEN: CPT | Mod: CPTII,,, | Performed by: COLON & RECTAL SURGERY

## 2024-01-31 PROCEDURE — 99999 PR PBB SHADOW E&M-EST. PATIENT-LVL IV: CPT | Mod: PBBFAC,,, | Performed by: COLON & RECTAL SURGERY

## 2024-01-31 PROCEDURE — 3079F DIAST BP 80-89 MM HG: CPT | Mod: CPTII,,, | Performed by: COLON & RECTAL SURGERY

## 2024-01-31 PROCEDURE — 3008F BODY MASS INDEX DOCD: CPT | Mod: CPTII,,, | Performed by: COLON & RECTAL SURGERY

## 2024-01-31 PROCEDURE — 1160F RVW MEDS BY RX/DR IN RCRD: CPT | Mod: CPTII,,, | Performed by: COLON & RECTAL SURGERY

## 2024-02-01 ENCOUNTER — PATIENT MESSAGE (OUTPATIENT)
Dept: HEMATOLOGY/ONCOLOGY | Facility: CLINIC | Age: 29
End: 2024-02-01
Payer: MEDICAID

## 2024-02-01 ENCOUNTER — PATIENT MESSAGE (OUTPATIENT)
Dept: FAMILY MEDICINE | Facility: CLINIC | Age: 29
End: 2024-02-01
Payer: MEDICAID

## 2024-02-01 PROBLEM — I50.22 HEART FAILURE WITH MILDLY REDUCED EJECTION FRACTION (HFMREF): Status: ACTIVE | Noted: 2024-02-01

## 2024-02-01 PROBLEM — R93.1 ABNORMAL ECHOCARDIOGRAM: Status: ACTIVE | Noted: 2024-02-01

## 2024-02-02 LAB
LEFT EYE DM RETINOPATHY: NEGATIVE
RIGHT EYE DM RETINOPATHY: NEGATIVE

## 2024-02-06 ENCOUNTER — OFFICE VISIT (OUTPATIENT)
Dept: ALLERGY | Facility: CLINIC | Age: 29
End: 2024-02-06
Payer: MEDICAID

## 2024-02-06 VITALS
BODY MASS INDEX: 53.42 KG/M2 | HEART RATE: 101 BPM | OXYGEN SATURATION: 97 % | DIASTOLIC BLOOD PRESSURE: 78 MMHG | WEIGHT: 293 LBS | SYSTOLIC BLOOD PRESSURE: 117 MMHG

## 2024-02-06 DIAGNOSIS — J31.0 MIXED RHINITIS: Primary | ICD-10-CM

## 2024-02-06 DIAGNOSIS — J45.30 MILD PERSISTENT ASTHMA, UNSPECIFIED WHETHER COMPLICATED: ICD-10-CM

## 2024-02-06 DIAGNOSIS — E11.65 TYPE 2 DIABETES MELLITUS WITH HYPERGLYCEMIA, WITHOUT LONG-TERM CURRENT USE OF INSULIN: ICD-10-CM

## 2024-02-06 PROCEDURE — 1159F MED LIST DOCD IN RCRD: CPT | Mod: CPTII,,, | Performed by: STUDENT IN AN ORGANIZED HEALTH CARE EDUCATION/TRAINING PROGRAM

## 2024-02-06 PROCEDURE — 3008F BODY MASS INDEX DOCD: CPT | Mod: CPTII,,, | Performed by: STUDENT IN AN ORGANIZED HEALTH CARE EDUCATION/TRAINING PROGRAM

## 2024-02-06 PROCEDURE — 4010F ACE/ARB THERAPY RXD/TAKEN: CPT | Mod: CPTII,,, | Performed by: STUDENT IN AN ORGANIZED HEALTH CARE EDUCATION/TRAINING PROGRAM

## 2024-02-06 PROCEDURE — 99999 PR PBB SHADOW E&M-EST. PATIENT-LVL IV: CPT | Mod: PBBFAC,,, | Performed by: STUDENT IN AN ORGANIZED HEALTH CARE EDUCATION/TRAINING PROGRAM

## 2024-02-06 PROCEDURE — 99214 OFFICE O/P EST MOD 30 MIN: CPT | Mod: S$PBB,,, | Performed by: STUDENT IN AN ORGANIZED HEALTH CARE EDUCATION/TRAINING PROGRAM

## 2024-02-06 PROCEDURE — 99214 OFFICE O/P EST MOD 30 MIN: CPT | Mod: PBBFAC | Performed by: STUDENT IN AN ORGANIZED HEALTH CARE EDUCATION/TRAINING PROGRAM

## 2024-02-06 PROCEDURE — 1160F RVW MEDS BY RX/DR IN RCRD: CPT | Mod: CPTII,,, | Performed by: STUDENT IN AN ORGANIZED HEALTH CARE EDUCATION/TRAINING PROGRAM

## 2024-02-06 PROCEDURE — 3044F HG A1C LEVEL LT 7.0%: CPT | Mod: CPTII,,, | Performed by: STUDENT IN AN ORGANIZED HEALTH CARE EDUCATION/TRAINING PROGRAM

## 2024-02-06 PROCEDURE — 3078F DIAST BP <80 MM HG: CPT | Mod: CPTII,,, | Performed by: STUDENT IN AN ORGANIZED HEALTH CARE EDUCATION/TRAINING PROGRAM

## 2024-02-06 PROCEDURE — 3074F SYST BP LT 130 MM HG: CPT | Mod: CPTII,,, | Performed by: STUDENT IN AN ORGANIZED HEALTH CARE EDUCATION/TRAINING PROGRAM

## 2024-02-06 RX ORDER — FLUTICASONE FUROATE AND VILANTEROL 200; 25 UG/1; UG/1
1 POWDER RESPIRATORY (INHALATION) DAILY
Qty: 180 EACH | Refills: 1 | Status: SHIPPED | OUTPATIENT
Start: 2024-02-06 | End: 2024-06-06

## 2024-02-06 RX ORDER — FLUTICASONE PROPIONATE 50 MCG
2 SPRAY, SUSPENSION (ML) NASAL DAILY
Qty: 32 G | Refills: 5 | Status: SHIPPED | OUTPATIENT
Start: 2024-02-06 | End: 2024-04-24 | Stop reason: SDUPTHER

## 2024-02-06 NOTE — PATIENT INSTRUCTIONS
Continue Breo 1 puff daily    Recommend using Flonase on a regular basis    Follow-up 6 months or sooner if needed

## 2024-02-06 NOTE — PROGRESS NOTES
Allergy Clinic Note  [facility]    This note was created by combination of typed  and M-Modal dictation. Transcription errors may be present.  If there are any questions, please contact me.    HISTORY      Patient ID: Misael Garcia is a 28 y.o. female.    Chief Complaint: Asthma and Follow-up (Follow up for Asthma review, she says she feels ok, however she still sometimes feel SOB. )      Allergy problem list:    Mild Persistent asthma  Chronic Mixed rhinitis  Non adherence with follow-up       History of Present Illness       Misael Garcia is a 28 y.o. female with chronic mixed rhinitis and clinical Dx of asthma returns to follow up symptoms after starting controller therapy with Breo.  She is here alone, and the Hx is difficult.    Related medications and other interventions  Breo 200/5, 1 puff daily   Albuterol    Flonase 2 sprays each nostril daily -- irreg use  Zyrtec   Famotidine  Triamcinalone  (ACE)    2/6/2024:  On Breo for the last 4 weeks, she reports shortness of breath is 80% better.  No side effects or other problems Since last visit an echocardiogram ordered by her hematologist turn mildly abnormal and she is scheduled for cardiac evaluation.  She reports rhinitis is somewhat improved with irregular use of Flonase.  Recommended either regular use of Flonase or on demand use of azelastine.  She chose the former.    1/9/2024:  Client did not keep follow-up appointment April 2023, which was to follow-up chest symptoms after beginning asthma controller therapy. Since last visit Breo changed to Advair 250 for formulary compliance.  Client reports running out of Advair for months ago.  Six weeks ago she developed a lower respiratory infection complicated by asthma exacerbation.  These symptoms lasted about 4 weeks, but she continues to have residual cough, occasional shortness of breath, and runny nose.  Resume Breo (her preferred ICS/LABA)    3/2/23:  At  initial visit, pt presented c/o coughing, wheezing and shortness of breath for the last 2-3 months.  She has no Dx of asthma but was given an albuterol inhaler in the past with benefit.  Triggers include smoke.  PFTS this date dominick (off meds and without Sx) are normal.  Made clinical Dx of mild persisten asthma.  Rx Breo and albuterol.  F/U in 4 weeks.    Chief rhinitis symptom is nasal congestion.  Additional Sx include runny nose, sneezing, itchy eyes re/runny eyes and diffuse itching of body.  Other symptoms are throat clearing and post nasal drip sensation. Pt's itching is not controlled and it interferes with sleep.  She also reports scrathing in her sleep. She denies any ear symptoms.  There is no clear seasonal pattern.  Ppt include air conditioning, weather changes and extremes of temperature.  Claritin was helpful in the past.  She has never had allergy testing.  Immunocaps ordered.  Treated with Zyrtec and Flonase.        MEDICAL HISTORY      Significant past medical history: HTN, DM, recent smoking  Active Problem List reviewed  ENT surgery:  none    Significant family history:  No allergies.  No asthma.  Exposures: tobacco and mj smoke at [employer] (1/2 time).  Cat, occ in bedroom.  SH:  work at [employer]  Smoking Hx:  Client  reports that she quit smoking about 13 months ago. Her smoking use included vaping with nicotine. She started smoking about 2 years ago. She has never used smokeless tobacco.    Meds: MAR reviewed    Asthma:  Yes  Eczema: yes  Rhinitis: yes--See HPI  Drug allergy/intolerance: NKDA  Venom allergy: No  Latex allergy:  No    Medication List with Changes/Refills   Current Medications    ADAPALENE-BENZOYL PEROXIDE (EPIDUO FORTE) 0.3-2.5 % GLWP    Apply topically.       Start Date: 2/7/2023  End Date: --    ALBUTEROL (PROVENTIL/VENTOLIN HFA) 90 MCG/ACTUATION INHALER    Inhale 2 puffs into the lungs every 4 (four) hours as needed (shortness of breath). Rescue       Start Date: 1/9/2024  End  Date: 1/8/2025    AMMONIUM LACTATE (LAC-HYDRIN) 12 % LOTION    Apply topically.       Start Date: 5/18/2023 End Date: --    AZELASTINE (ASTELIN) 137 MCG (0.1 %) NASAL SPRAY    2 sprays (274 mcg total) by Nasal route 2 (two) times daily.       Start Date: 6/5/2023  End Date: 7/30/2023    BLOOD SUGAR DIAGNOSTIC STRP    To check BG 2-3x times daily, to use with insurance preferred meter       Start Date: 2/20/2023 End Date: --    BLOOD-GLUCOSE METER KIT    To check BG 2-3x times daily, to use with insurance preferred meter       Start Date: 2/20/2023 End Date: 2/20/2024    CETIRIZINE (ZYRTEC) 10 MG TABLET    Take 1 tablet (10 mg total) by mouth once daily.       Start Date: 6/4/2023  End Date: 6/3/2024    CLINDAMYCIN PHOSPHATE 1% (CLINDAGEL) 1 % GEL    Apply topically 2 (two) times daily.       Start Date: 10/5/2023 End Date: --    IBUPROFEN (ADVIL,MOTRIN) 800 MG TABLET    Take 1 tablet (800 mg total) by mouth 3 (three) times daily as needed for Pain.       Start Date: 6/12/2023 End Date: --    LANCETS 28 GAUGE MISC    To check BG 2-3 times daily, to use with insurance preferred meter       Start Date: 2/20/2023 End Date: --    LISINOPRIL (PRINIVIL,ZESTRIL) 20 MG TABLET    Take 1 tablet (20 mg total) by mouth once daily.       Start Date: 1/30/2024 End Date: 10/26/2024    LURASIDONE (LATUDA) 40 MG TAB TABLET    Take 40 mg by mouth.       Start Date: 9/12/2023 End Date: --    METFORMIN (GLUCOPHAGE-XR) 500 MG ER 24HR TABLET    Take 1 tablet (500 mg total) by mouth every evening.       Start Date: 10/4/2023 End Date: 10/3/2024    NORETHINDRONE-ETHINYL ESTRADIOL (JUNEL FE 1/20) 1 MG-20 MCG (21)/75 MG (7) PER TABLET    Take 1 tablet by mouth once daily.       Start Date: 1/2/2024  End Date: 1/1/2025    SEMAGLUTIDE (OZEMPIC) 1 MG/DOSE (4 MG/3 ML)    Inject 1 mg into the skin every 7 days. For weeks 5-8       Start Date: 1/30/2024 End Date: 1/29/2025    SEMAGLUTIDE (OZEMPIC) 2 MG/DOSE (8 MG/3 ML) PNIJ    Inject 2 mg into the  skin every 7 days. For weeks 9-12 and onward       Start Date: 1/30/2024 End Date: 1/29/2025    SULFACETAMIDE SODIUM-SULFUR 10-5 % (W/W) CLSR    Apply topically.       Start Date: 5/18/2023 End Date: --    TOPIRAMATE (TOPAMAX) 25 MG TABLET    Take 25 mg by mouth 2 (two) times daily.       Start Date: 1/23/2024 End Date: --    UREA 20 % CREA    APPLY TO AFFECTED AREA QD       Start Date: --        End Date: --   Changed and/or Refilled Medications    Modified Medication Previous Medication    FLUTICASONE FUROATE-VILANTEROL (BREO ELLIPTA) 200-25 MCG/DOSE DSDV DISKUS INHALER fluticasone furoate-vilanteroL (BREO ELLIPTA) 200-25 mcg/dose DsDv diskus inhaler       Inhale 1 puff into the lungs once daily. Controller    Inhale 1 puff into the lungs once daily. Controller       Start Date: 2/6/2024  End Date: --    Start Date: 1/9/2024  End Date: 2/6/2024    FLUTICASONE PROPIONATE (FLONASE) 50 MCG/ACTUATION NASAL SPRAY fluticasone propionate (FLONASE) 50 mcg/actuation nasal spray       2 sprays (100 mcg total) by Each Nostril route once daily.    2 sprays (100 mcg total) by Each Nostril route once daily.       Start Date: 2/6/2024  End Date: --    Start Date: 1/9/2024  End Date: 2/6/2024   Discontinued Medications    HYDROXYZINE HCL (ATARAX) 25 MG TABLET    Take 25 mg by mouth 3 (three) times daily.       Start Date: --        End Date: 2/6/2024           REVIEW OF SYSTEMS      CONST: no F/C/NS, no unintentional weight changes  NEURO:  no tremor, no weakness  EYES: no discharge, no pruritus, no erythema  EARS: no hearing loss, no sensation of fullness  PULM:  + SOB,  wheezing, + cough  :  no dysuria, no hematuria  DERM: no rashes, no skin breaks           PHYSICAL EXAM      /78 (BP Location: Right arm, Patient Position: Sitting, BP Method: Large (Automatic))   Pulse 101   Wt (!) 154.7 kg (341 lb 0.8 oz)   LMP 01/06/2024 (Exact Date)   SpO2 97%   BMI 53.42 kg/m²   GEN: Awake and alert, no distress  DERM:  No  flushing, no rashes  EYE:  No occular discharge, no redness  HEENT: No nasal discharge, no hoarseness, oropharynx benign without exudate.  Tongue not coated.  There is cobblestoning of posterior tongue.  PULM: Normal work of breathing, no cough, CTA   COR:  RRR, normal radial pulse, normal capillary refill  NEURO:  No focal deficit, speech fluent and logical  PSYCH: appropriate affect, normal behavior              MEDICAL DECISION-MAKING           Data reviewed        Allergy Testing      Inhalant immuno caps low-level positive to Juan grass, 2023   Class I Juan grass   All other aeroallergens less than 0.35 KU/L.  Bermuda grass 0.16; oak 0.12; ragweed 0.11.  Total IgE 91      Pulmonary testing     Normal baseline spirometry (off meds, Asx), 2023  Normal flow volume loop  No bronchodilator response.      Lab results      Total IgE 91, 2023  EO count 200, 2023      Imaging and other diagnostics            Medical records review        Diagnoses:     Misael Garcia is a 28 y.o. female. with  1. Mixed rhinitis    2. Mild persistent asthma (clinical Dx)    3. Type 2 diabetes mellitus with hyperglycemia, without long-term current use of insulin              Assessment / Plan / Orders   At last Client presented with persistent bronchospasm following an exacerbation due to respiratory infection and  having run out of Advair.  She had also been out of albuterol for the last month.  Since restarting ICS/LABA) and albuterol, she feels 80% better.  Agree with plans for cardio eval.    For her nasal congestion, she plans regular use of Flonase.  Discussed adherence strategies      Mild persistent asthma (clinical Dx) -- not adequately controlled due to nonadherence  -continue albuterol as needed  -refilled Breo 1 puff daily    Mixed rhinitis, including chronic allergic rhinitis due to pollen  -     fluticasone propionate (FLONASE) 50 mcg/actuation nasal spray; 2 sprays (100 mcg total) by Each Nostril route once  daily.  Dispense: 16 g; Refill: 0        Comorbidities  ADHD and Anxiety/PTSD--avoid oral decongestants  Diabetes --Avoid systemic steroids    Patient Instructions and follow up     Patient Instructions       Continue Breo 1 puff daily    Recommend using Flonase on a regular basis    Follow-up 6 months or sooner if needed                Allison King MD  Allergy, Asthma & Immunology      I spent a total of 37 minutes on the day of the visit.This includes face to face time and non-face to face time preparing to see the patient (eg, review of tests), obtaining and/or reviewing separately obtained history, documenting clinical information in the electronic or other health record, independently interpreting results and communicating results to the patient/family/caregiver, or care coordinator.

## 2024-02-07 ENCOUNTER — CLINICAL SUPPORT (OUTPATIENT)
Dept: DIABETES | Facility: CLINIC | Age: 29
End: 2024-02-07
Payer: MEDICAID

## 2024-02-07 DIAGNOSIS — E11.65 TYPE 2 DIABETES MELLITUS WITH HYPERGLYCEMIA, WITHOUT LONG-TERM CURRENT USE OF INSULIN: ICD-10-CM

## 2024-02-07 PROCEDURE — G0108 DIAB MANAGE TRN  PER INDIV: HCPCS | Mod: 95,,, | Performed by: DIETITIAN, REGISTERED

## 2024-02-07 NOTE — TELEPHONE ENCOUNTER
No care due was identified.  [facility] Embedded Care Due Messages. Reference number: [id number].   2/07/2024 8:07:25 AM CST

## 2024-02-07 NOTE — PROGRESS NOTES
Diabetes Care Specialist Progress Note  Author: Maria Victoria Ruiz RD, CDE  Date: 2/7/2024    Diabetes Care Specialist Virtual Visit Note       The patient location is: home  The chief complaint leading to consultation is: Diabetes  Visit type: audiovisual  Total time spent with patient: 60 min   Each patient to whom he or she provides medical services by telemedicine is:  (1) informed of the relationship between the physician and patient and the respective role of any other health care provider with respect to management of the patient; and (2) notified that he or she may decline to receive medical services by telemedicine and may withdraw from such care at any time.    Program Intake  Reason for Diabetes Program Visit:: Initial Diabetes Assessment  Current diabetes risk level:: low  In the last 12 months, have you:: none  Continuous Glucose Monitoring  Patient has CGM: No    Lab Results   Component Value Date    HGBA1C 5.8 (H) 01/24/2024       Clinical    Problem Review  Reviewed Problem List with Patient: yes  Active comorbidities affecting diabetes self-care.: yes  Comorbidities: Hypertension  Reviewed health maintenance: yes    Clinical Assessment  Current Diabetes Treatment: Oral Medication, Injectable  Have you ever experienced hypoglycemia (low blood sugar)?: no  Have you ever experienced hyperglycemia (high blood sugar)?: no    Medication Information  How do you obtain your medications?: Patient drives  How many days a week do you miss your medications?: Never  Do you sometimes have difficulty refilling your medications?: No  Medication adherence impacting ability to self-manage diabetes?: No    Labs  Do you have regular lab work to monitor your medications?: Yes  Type of Regular Lab Work: A1c    Nutritional Status  Diet: Regular (2-3 meals; drinks u/s beverages)  Change in appetite?: No  Dentation:: Intact  Recent Changes in Weight: Weight Loss  Was weight loss intentional or unintentional?: Intentional  Current  nutritional status an area of need that is impacting patient's ability to self-manage diabetes?: No    Additional Social History    Support  Does anyone support you with your diabetes care?: yes  Who supports you?: family member, self  Who takes you to your medical appointments?: self  Does the current support meet the patient's needs?: Yes  Is Support an area impacting ability to self-manage diabetes?: No    Access to [employer] Technology  Does the patient have access to any of the following devices or technologies?: Smart phone  Media or technology needs impacting ability to self-manage diabetes?: No    Cognitive/Behavioral Health  Alert and Oriented: Yes  Difficulty Thinking: No  Requires Prompting: No  Requires assistance for routine expression?: No  Cognitive or behavioral barriers impacting ability to self-manage diabetes?: No    Culture/Catholic  Culture or Synagogue beliefs that may impact ability to access healthcare: No    Communication  Language preference: English  Hearing Problems: No  Vision Problems: No  Communication needs impacting ability to self-manage diabetes?: No    Health Literacy  Preferred Learning Method: Face to Face  How often do you need to have someone help you read instructions, pamphlets, or written material from your doctor or pharmacy?: Never  Health literacy needs impacting ability to self-manage diabetes?: No      Diabetes Self-Management Skills Assessment    Diabetes Disease Process/Treatment Options  Patient/caregiver able to state what happens when someone has diabetes.: no  Patient/caregiver knows what type of diabetes they have.: yes  Diabetes Type : Type II  Patient/caregiver able to identify at least three signs and symptoms of diabetes.: no  Patient able to identify at least three risk factors for diabetes.: no  Diabetes Disease Process/Treatment Options: Skills Assessment Completed: Yes  Assessment indicates:: Instruction Needed  Area of need?: Yes    Nutrition/Healthy  Eating  Method of carbohydrate measurement:: no method  Patient can identify foods that impact blood sugar.: yes  Patient-identified foods:: sweets, soda, fruit/fruit juice  Nutrition/Healthy Eating Skills Assessment Completed:: Yes  Assessment indicates:: Instruction Needed  Area of need?: Yes    Physical Activity/Exercise  Patient's daily activity level:: lightly active  Patient formally exercises outside of work.: yes  Exercise Type: walking (Some walking at the park)  Patient can identify forms of physical activity.: yes  Stated forms of physical activity:: any movement performed by muscles that uses energy  Patient can identify reasons why exercise/physical activity is important in diabetes management.: no  Physical Activity/Exercise Skills Assessment Completed: : Yes  Assessment indicates:: Instruction Needed  Area of need?: Yes    Medications  Patient is able to describe current diabetes management routine.: yes  Diabetes management routine:: injectable medications, oral medications  Patient is able to identify current diabetes medications, dosages, and appropriate timing of medications.: yes  Patient reports problems or concerns with current medication regimen.: no  Medication Skills Assessment Completed:: Yes  Assessment indicates:: Instruction Needed  Area of need?: Yes    Home Blood Glucose Monitoring  Patient states that blood sugar is checked at home daily.: no  Reasons for not monitoring:: other (see comments) (Has [employer] meter through [employer], but no longer in program and now needs regular meter)  Home Blood Glucose Monitoring Skills Assessment Completed: : Yes  Assessment indicates:: Instruction Needed  Area of need?: Yes    Acute Complications  Patient is able to identify types of acute complications: Other (comment)  Acute Complications Skills Assessment Completed: : Yes  Assessment indicates:: Instruction Needed  Area of need?: Yes    Chronic Complications  Patient can identify major chronic  complications of diabetes.: no  Patient is taking statin?: No  Chronic Complications Skills Assessment Completed: : Yes  Assessment indicates:: Instruction Needed  Area of need?: Yes    Psychosocial/Coping  Patient can identify ways of coping with chronic disease.: yes  Patient-stated ways of coping with chronic disease:: support from loved ones  Psychosocial/Coping Skills Assessment Completed: : Yes  Assessment indicates:: Adequate understanding  Area of need?: No      Assessment Summary and Plan    Based on today's diabetes care assessment, the following areas of need were identified:          2/7/2024    12:01 AM   Social   Support No   Access to [employer] Media/Tech No   Cognitive/Behavioral Health No   Culture/Latter day No   Communication No   Health Literacy No            2/7/2024    12:01 AM   Clinical   Medication Adherence No   Nutritional Status No            2/7/2024    12:01 AM   Diabetes Self-Management Skills   Diabetes Disease Process/Treatment Options Yes - reviewed diabetes disease process and treatment options   Nutrition/Healthy Eating Yes - reviewed CHO counting, label reading and addt'l resources to assist w/ CHO counting; plate method reviewed; alternatives to sugary beverages dicussed   Physical Activity/Exercise Yes - reviewed goals and benefits   Medication Yes - reviewed MOA of medications and regimen   Home Blood Glucose Monitoring Yes - reviewed SMBG schedule and BG goals; Rx request sent to provider for meter/supplies   Acute Complications Yes - reviewed s/s and treatment of hypo/hyperglycemia   Chronic Complications Yes - reviewed standards of care   Psychosocial/Coping No          Today's interventions were provided through individual discussion, instruction, and written materials were provided.      Patient verbalized understanding of instruction and written materials.  Pt was able to return back demonstration of instructions today. Patient understood key points, needs reinforcement and  further instruction.     Diabetes Self-Management Care Plan:    Today's Diabetes Self-Management Care Plan was developed with Misael's input. Misael has agreed to work toward the following goal(s) to improve his/her overall diabetes control.      There are no recently modified care plans to display for this patient.      Follow Up Plan     Follow up in about 6 months (around 8/7/2024).    Today's care plan and follow up schedule was discussed with patient.  Misael verbalized understanding of the care plan, goals, and agrees to follow up plan.        The patient was encouraged to communicate with his/her health care provider/physician and care team regarding his/her condition(s) and treatment.  I provided the patient with my contact information today and encouraged to contact me via phone or [facility] Patient Portal as needed.     Length of Visit   Total Time: 60 Minutes

## 2024-02-10 RX ORDER — DEXTROSE 4 G
TABLET,CHEWABLE ORAL
Qty: 1 EACH | Refills: 0 | Status: SHIPPED | OUTPATIENT
Start: 2024-02-10 | End: 2024-06-06

## 2024-02-10 RX ORDER — LANCETS 28 GAUGE
EACH MISCELLANEOUS
Qty: 100 EACH | Refills: 11 | Status: SHIPPED | OUTPATIENT
Start: 2024-02-10 | End: 2024-06-06

## 2024-02-11 ENCOUNTER — PATIENT MESSAGE (OUTPATIENT)
Dept: OBSTETRICS AND GYNECOLOGY | Facility: CLINIC | Age: 29
End: 2024-02-11
Payer: MEDICAID

## 2024-02-19 ENCOUNTER — PATIENT MESSAGE (OUTPATIENT)
Dept: HEMATOLOGY/ONCOLOGY | Facility: CLINIC | Age: 29
End: 2024-02-19
Payer: MEDICAID

## 2024-02-20 ENCOUNTER — HOSPITAL ENCOUNTER (EMERGENCY)
Facility: HOSPITAL | Age: 29
Discharge: HOME | End: 2024-02-20
Attending: EMERGENCY MEDICINE
Payer: MEDICAID

## 2024-02-20 VITALS
HEART RATE: 79 BPM | OXYGEN SATURATION: 98 % | DIASTOLIC BLOOD PRESSURE: 99 MMHG | RESPIRATION RATE: 16 BRPM | SYSTOLIC BLOOD PRESSURE: 159 MMHG | TEMPERATURE: 99 F

## 2024-02-20 DIAGNOSIS — J02.9 SORE THROAT: Primary | ICD-10-CM

## 2024-02-20 LAB
B-HCG UR QL: NEGATIVE
BACTERIA #/AREA URNS HPF: NORMAL /HPF
BILIRUB UR QL STRIP: NEGATIVE
CLARITY UR: ABNORMAL
COLOR UR: YELLOW
CTP QC/QA: YES
GLUCOSE UR QL STRIP: NEGATIVE
GROUP A STREP, MOLECULAR: NEGATIVE
HETEROPH AB SERPL QL IA: NEGATIVE
HGB UR QL STRIP: NEGATIVE
INFLUENZA A, MOLECULAR: NEGATIVE
INFLUENZA B, MOLECULAR: NEGATIVE
KETONES UR QL STRIP: NEGATIVE
LEUKOCYTE ESTERASE UR QL STRIP: ABNORMAL
MICROSCOPIC COMMENT: NORMAL
NITRITE UR QL STRIP: NEGATIVE
PH UR STRIP: 7 [PH] (ref 5–8)
PROT UR QL STRIP: NEGATIVE
RBC #/AREA URNS HPF: 2 /HPF (ref 0–4)
SARS-COV-2 RDRP RESP QL NAA+PROBE: NEGATIVE
SP GR UR STRIP: 1.01 (ref 1–1.03)
SPECIMEN SOURCE: NORMAL
SQUAMOUS #/AREA URNS HPF: 19 /HPF
URN SPEC COLLECT METH UR: ABNORMAL
UROBILINOGEN UR STRIP-ACNC: NEGATIVE EU/DL
WBC #/AREA URNS HPF: 2 /HPF (ref 0–5)

## 2024-02-20 PROCEDURE — 87502 INFLUENZA DNA AMP PROBE: CPT

## 2024-02-20 PROCEDURE — 87651 STREP A DNA AMP PROBE: CPT

## 2024-02-20 PROCEDURE — U0002 COVID-19 LAB TEST NON-CDC: HCPCS

## 2024-02-20 PROCEDURE — 81025 URINE PREGNANCY TEST: CPT

## 2024-02-20 PROCEDURE — 96372 THER/PROPH/DIAG INJ SC/IM: CPT

## 2024-02-20 PROCEDURE — 99284 EMERGENCY DEPT VISIT MOD MDM: CPT | Mod: 25

## 2024-02-20 PROCEDURE — 25000003 PHARM REV CODE 250

## 2024-02-20 PROCEDURE — 86308 HETEROPHILE ANTIBODY SCREEN: CPT

## 2024-02-20 PROCEDURE — 63600175 PHARM REV CODE 636 W HCPCS

## 2024-02-20 PROCEDURE — 87491 CHLMYD TRACH DNA AMP PROBE: CPT

## 2024-02-20 PROCEDURE — 81000 URINALYSIS NONAUTO W/SCOPE: CPT

## 2024-02-20 RX ORDER — CEFTRIAXONE 500 MG/1
500 INJECTION, POWDER, FOR SOLUTION INTRAMUSCULAR; INTRAVENOUS
Status: COMPLETED | OUTPATIENT
Start: 2024-02-20 | End: 2024-02-20

## 2024-02-20 RX ORDER — DOXYCYCLINE 100 MG/1
100 CAPSULE ORAL 2 TIMES DAILY
Qty: 14 CAPSULE | Refills: 0 | Status: SHIPPED | OUTPATIENT
Start: 2024-02-20 | End: 2024-02-28

## 2024-02-20 RX ORDER — DOXYCYCLINE HYCLATE 100 MG
100 TABLET ORAL
Status: COMPLETED | OUTPATIENT
Start: 2024-02-20 | End: 2024-02-20

## 2024-02-20 RX ADMIN — CEFTRIAXONE SODIUM 500 MG: 500 INJECTION, POWDER, FOR SOLUTION INTRAMUSCULAR; INTRAVENOUS at 05:02

## 2024-02-20 RX ADMIN — DOXYCYCLINE HYCLATE 100 MG: 100 TABLET, COATED ORAL at 05:02

## 2024-02-20 NOTE — ED TRIAGE NOTES
"PT arrived with c/o "white spots" by tonsils x 2 -3 days with sore throat starting today.  Pt reports concern for STD, states she performed oral sex on new partner.  Denies any fever.  Pt answering questions appropriately, speaking in complete sentences, respirations even and unlabored.  Aao x 4.    "

## 2024-02-20 NOTE — DISCHARGE INSTRUCTIONS
Ms. Garcia,     Thank you for letting me care for you today! It was nice meeting you, and I hope you feel better soon.   If you would like access to your chart and what was done today please utilize the [facility].   Please don't hesitate to return if your symptoms worsen or you develop any other worrisome symptoms.    Our goal in the emergency department is to always give you outstanding care and exceptional service. You may receive a survey by mail or e-mail in the next week regarding your experience in our ED. We would greatly appreciate you completing and returning the survey. Your feedback provides us with a way to recognize our staff who give very good care and it helps us learn how to improve when your experience was below our aspiration of excellence.     Sincerely,    Katie Cole PA-C  Emergency Department Physician Assistant  [facility]

## 2024-02-20 NOTE — ED PROVIDER NOTES
Encounter Date: 2024       History     Chief Complaint   Patient presents with    Sore Throat     Pt presents to ED today c/o sore throat x 3 days reports seeing white patches to back of throat  Concerned for STD exposure reports having unprotected oral intercourse recently   Denies fever N/V or  sx      29-year-old female with past medical history of anxiety, ADHD, type 2 diabetes presents to the ED with sore throat x 3 days.  States seeing by patches on her tonsils.  No drooling or trismus.  Patient reports concern for STI exposure since having unprotected oral intercourse recently.  Denies URI symptoms.  No urinary symptoms.  No vaginal discharge or odor.  No fever, nausea, vomiting, chills, chest pain, shortness of breath, abdominal pain.  No other acute complaints today.        The history is provided by the patient.     Review of patient's allergies indicates:  No Known Allergies  Past Medical History:   Diagnosis Date    ADHD (attention deficit hyperactivity disorder)     Anxiety     Asthma     Bipolar affective     Eczema     History of chlamydia 2016    Insomnia     Morbid obesity with BMI of 40.0-44.9, adult     Sleep disorder     Type 2 diabetes mellitus without complications      Past Surgical History:   Procedure Laterality Date    NO PAST SURGERIES      as of 19     Family History   Problem Relation Age of Onset    No Known Problems Paternal Grandfather     Breast cancer Paternal Grandmother     Diabetes Maternal Grandmother     No Known Problems Maternal Grandfather     No Known Problems Father     No Known Problems Mother     No Known Problems Sister     Colon cancer Neg Hx     Ovarian cancer Neg Hx      Social History     Tobacco Use    Smoking status: Former     Types: Vaping with nicotine     Start date:      Quit date: 2022     Years since quittin.1    Smokeless tobacco: Never    Tobacco comments:     boyfriend smoke cigarettes   Substance Use Topics    Alcohol use:  No    Drug use: Not Currently     Types: Marijuana     Review of Systems   Constitutional:  Negative for chills, fatigue and fever.   HENT:  Positive for sore throat. Negative for congestion, rhinorrhea and trouble swallowing.    Respiratory:  Negative for chest tightness and shortness of breath.    Cardiovascular:  Negative for chest pain.   Gastrointestinal:  Negative for abdominal distention, abdominal pain, nausea and vomiting.   Genitourinary:  Positive for vaginal discharge. Negative for dysuria, frequency, hematuria and vaginal bleeding.   Musculoskeletal:  Negative for back pain, neck pain and neck stiffness.   Skin:  Negative for rash.   Neurological:  Negative for light-headedness and headaches.       Physical Exam     Initial Vitals [02/20/24 1439]   BP Pulse Resp Temp SpO2   (!) 159/99 79 16 98.8 °F (37.1 °C) 98 %      MAP       --         Physical Exam    Vitals reviewed.  Constitutional: She appears well-developed and well-nourished. She is not diaphoretic. No distress.   HENT:   Head: Normocephalic and atraumatic.   Right Ear: External ear normal.   Left Ear: External ear normal.   Mouth/Throat: Oropharynx is clear and moist.   BL exudated noted on tonsils. No significant tonsillitis appreciated on exam. No signs of Ayo's angina or retropharyngeal abscess. No uvula deviation   Eyes: EOM are normal. Pupils are equal, round, and reactive to light.   Neck: Neck supple.   Normal range of motion.  Cardiovascular:  Normal rate and normal heart sounds.           Pulmonary/Chest: Breath sounds normal. No respiratory distress. She has no wheezes.   Abdominal: Abdomen is soft. Bowel sounds are normal. She exhibits no distension. There is no abdominal tenderness. There is no rebound.   Musculoskeletal:         General: Normal range of motion.      Cervical back: Normal range of motion and neck supple.     Neurological: She is alert and oriented to person, place, and time. GCS score is 15. GCS eye subscore is  4. GCS verbal subscore is 5. GCS motor subscore is 6.   Skin: Skin is warm. Capillary refill takes less than 2 seconds.   Psychiatric: She has a normal mood and affect. Her behavior is normal. Judgment and thought content normal.         ED Course   Procedures  Labs Reviewed   URINALYSIS, REFLEX TO URINE CULTURE - Abnormal; Notable for the following components:       Result Value    Appearance, UA Hazy (*)     Leukocytes, UA 1+ (*)     All other components within normal limits    Narrative:     Specimen Source->Urine   INFLUENZA A & B BY MOLECULAR   GROUP A STREP, MOLECULAR   C. TRACHOMATIS/N. GONORRHOEAE BY AMP DNA   SARS-COV-2 RNA AMPLIFICATION, QUAL   HETEROPHILE AB SCREEN   URINALYSIS MICROSCOPIC    Narrative:     Specimen Source->Urine   POCT URINE PREGNANCY          Imaging Results    None          Medications   cefTRIAXone injection 500 mg (500 mg Intramuscular Given 2/20/24 1746)   doxycycline tablet 100 mg (100 mg Oral Given 2/20/24 1746)     Medical Decision Making  Differential Diagnosis includes, but is not limited to:  Ayo's angina, epiglottitis, foreign body aspiration, retropharyngeal abscess, peritonsillar abscess, esophageal perforation, mediastinitis, esophagitis, sialolithiasis, parotitis, laryngitis, tracheitis, dental/periapical abscess, TMJ disorder, pneumonia, Streptococcal/bacterial, pharyngitis, viral pharyngitis   STI, HSV, BV, PID, TOA, vaginal foreign body, vaginal trauma, ovarian torsion, ovarian cyst, UTI/pyelonephritis, pregnancy complication, dysmenorrhea, mittelschmertz, appendicitis, nephrolithiasis, appendicitis, colitis, diverticulitis,    ED management     29-year-old female with past medical history of anxiety, ADHD, type 2 diabetes presents to the ED with sore throat x 3 days.    Patient is not toxic appearing, hemodynamically stable and resting comfortably on bed. Patient is well-appearing.  Awake and alert.  Afebrile with vitals WNL. No distress on exam.  Physical exam as  stated above.  Lab results discussed on ED course. Pending G/C testing results. Pt given Rocephin in the ED. Will send home with doxycyline to treat STI prophylactically. Pt is comfortable with this plan. Discussed the importance of taking all medication as prescribed.  Educated on no sexual intercourse while undergoin treatment and discussing infection with partners.  Patient given return precautions and educated on worrisome symptoms for which they should return to the ER, including fever, worsening pelvic pain, dysuria, or flank pain. She reported understanding and all questions were answered.        I have discussed the specifics of the workup with the patient and the patient has verbalized understanding of the details of the workup, the diagnosis, the treatment plan, and the need for outpatient follow-up with PCP. ED precautions given. Discussed with pt about returning to the ED, if symptoms fail to improve or worsen.     RESULTS:  Documented in ED course.  Labs/ekg interpreted by myself                 Amount and/or Complexity of Data Reviewed  Labs: ordered. Decision-making details documented in ED Course.    Risk  Prescription drug management.               ED Course as of 02/21/24 1129   Tue Feb 20, 2024   1600 hCG Qualitative, Urine: Negative  Negative  [NW]   1619 Urinalysis, Reflex to Urine Culture Urine, Clean Catch(!)  +1 leukocytes. No nitrates, occult blood. No evidence of UTI.  [NW]   1620 Group A Strep, Molecular: Negative  Negative  [NW]   1620 Urinalysis Microscopic [NW]   1631 COVID-19 Rapid Screening  negative [NW]   1631 Influenza A & B by Molecular  negative [NW]   1650 Mono Differential Absorption: Negative  negative [NW]      ED Course User Index  [NW] Katie Cole PA-C                             Clinical Impression:  Final diagnoses:  [J02.9] Sore throat (Primary)          ED Disposition Condition    Discharge Stable          ED Prescriptions       Medication Sig Dispense Start Date End  Date Auth. Provider    doxycycline (VIBRAMYCIN) 100 MG Cap Take 1 capsule (100 mg total) by mouth 2 (two) times daily. for 7 days 14 capsule 2/20/2024 2/28/2024 Katie Cole PA-C          Follow-up Information       Follow up With Specialties Details Why Contact Info    Katelyn Chapman MD Family Medicine Schedule an appointment as soon as possible for a visit in 2 days As needed, If symptoms worsen [address] [facility]  [place] [address]  [phone]               Katie Cole PA-C  02/21/24 4151

## 2024-02-21 ENCOUNTER — PATIENT MESSAGE (OUTPATIENT)
Dept: FAMILY MEDICINE | Facility: CLINIC | Age: 29
End: 2024-02-21
Payer: MEDICAID

## 2024-02-21 ENCOUNTER — HOSPITAL ENCOUNTER (EMERGENCY)
Facility: HOSPITAL | Age: 29
Discharge: HOME | End: 2024-02-21
Attending: EMERGENCY MEDICINE
Payer: MEDICAID

## 2024-02-21 ENCOUNTER — PATIENT MESSAGE (OUTPATIENT)
Dept: OBSTETRICS AND GYNECOLOGY | Facility: CLINIC | Age: 29
End: 2024-02-21
Payer: MEDICAID

## 2024-02-21 VITALS
SYSTOLIC BLOOD PRESSURE: 139 MMHG | BODY MASS INDEX: 45.99 KG/M2 | WEIGHT: 293 LBS | RESPIRATION RATE: 17 BRPM | OXYGEN SATURATION: 96 % | HEIGHT: 67 IN | DIASTOLIC BLOOD PRESSURE: 90 MMHG | TEMPERATURE: 98 F | HEART RATE: 93 BPM

## 2024-02-21 DIAGNOSIS — J03.90 TONSILLITIS: ICD-10-CM

## 2024-02-21 DIAGNOSIS — R11.2 NAUSEA AND VOMITING, UNSPECIFIED VOMITING TYPE: Primary | ICD-10-CM

## 2024-02-21 LAB
C TRACH DNA SPEC QL NAA+PROBE: NOT DETECTED
N GONORRHOEA DNA SPEC QL NAA+PROBE: NOT DETECTED

## 2024-02-21 PROCEDURE — 99283 EMERGENCY DEPT VISIT LOW MDM: CPT

## 2024-02-21 PROCEDURE — 25000003 PHARM REV CODE 250

## 2024-02-21 RX ORDER — ONDANSETRON 4 MG/1
4 TABLET, ORALLY DISINTEGRATING ORAL
Status: COMPLETED | OUTPATIENT
Start: 2024-02-21 | End: 2024-02-21

## 2024-02-21 RX ORDER — ONDANSETRON 4 MG/1
4 TABLET, ORALLY DISINTEGRATING ORAL EVERY 6 HOURS PRN
Qty: 6 TABLET | Refills: 0 | Status: SHIPPED | OUTPATIENT
Start: 2024-02-21 | End: 2024-03-22 | Stop reason: SDUPTHER

## 2024-02-21 RX ADMIN — ONDANSETRON 4 MG: 4 TABLET, ORALLY DISINTEGRATING ORAL at 08:02

## 2024-02-21 NOTE — DISCHARGE INSTRUCTIONS
and complete doxycycline prescription.  Abstain from sexual activity for 7-10 days after finishing antibiotics.  Follow your portal for any pending results. If positive, inform all sexual partners.    Thank you for coming in to see us at [facility]! It was nice to meet you, and I hope you feel better soon. Please feel free to return to the ER at any time should your symptoms get worse, or if you have different emergent concerns.    Our goal in the emergency department is to always give you outstanding care and exceptional service. You may receive a survey by mail or e-mail in the next week regarding your experience in our ED. We would greatly appreciate your completing and returning the survey. Your feedback provides us with a way to recognize our staff who give very good care and it helps us learn how to improve when your experience was below our aspiration of excellence.       Sincerely,    Rocyk Martinez MD  Medical Director  Emergency Department  [facility]

## 2024-02-21 NOTE — ED NOTES
"Patient presents to the ED with c/o nausea and vomiting. Pt states "I dont know if I was exposed but I have white spots on my tonsils." Patient states that she was seen in the ED yesterday and treated with IM injection and oral antibiotics. Pt states she did not  her prescription yet. States she is concerned because she did not have the nausea and vomiting yesterday. Pt is alert and oriented. Nadn. Awaiting orders. Care ongoing.   "

## 2024-02-21 NOTE — ED PROVIDER NOTES
Encounter Date: 2024       History     Chief Complaint   Patient presents with    Nausea     Pt seen here yesterday and treated and screened for std's. Pt now has nausea and vomiting and requesting further testing including HIV screening. Pt has not picked up her antibiotic prescribed yesterday.      HPI  This is a 29 y.o. female who presents to the Emergency Department with nausea and 1 episode of vomiting this morning after waking up.  Patient was seen here yesterday, screen for gonorrhea/chlamydia, tested for COVID, flu, strep which were all negative.  Patient also negative for UTI per urinalysis.  Patient complaining of sore throat for the last 4 days.  Last sexual activity with new partner was on 2024.    Patient has some concerns about STD infection in her throat.  Also enquiring about HIV screening.    Review of patient's allergies indicates:  No Known Allergies  Past Medical History:   Diagnosis Date    ADHD (attention deficit hyperactivity disorder)     Anxiety     Asthma     Bipolar affective     Eczema     History of chlamydia 2016    Insomnia     Morbid obesity with BMI of 40.0-44.9, adult     Sleep disorder     Type 2 diabetes mellitus without complications      Past Surgical History:   Procedure Laterality Date    NO PAST SURGERIES      as of 19     Family History   Problem Relation Age of Onset    No Known Problems Paternal Grandfather     Breast cancer Paternal Grandmother     Diabetes Maternal Grandmother     No Known Problems Maternal Grandfather     No Known Problems Father     No Known Problems Mother     No Known Problems Sister     Colon cancer Neg Hx     Ovarian cancer Neg Hx      Social History     Tobacco Use    Smoking status: Former     Types: Vaping with nicotine     Start date:      Quit date: 2022     Years since quittin.1    Smokeless tobacco: Never    Tobacco comments:     boyfriend smoke cigarettes   Substance Use Topics    Alcohol use: No    Drug  use: Not Currently     Types: Marijuana     Review of Systems   HENT:  Positive for sore throat.    Respiratory:  Positive for cough.    Gastrointestinal:  Positive for nausea and vomiting.   Genitourinary:  Negative for difficulty urinating and dysuria.   Musculoskeletal:  Positive for back pain. Negative for myalgias.       Physical Exam     Initial Vitals [02/21/24 0841]   BP Pulse Resp Temp SpO2   133/77 85 18 98.4 °F (36.9 °C) 98 %      MAP       --         Physical Exam    Nursing note and vitals reviewed.  Constitutional: She appears well-developed and well-nourished. She is not diaphoretic. No distress.   HENT:   Head: Normocephalic and atraumatic.   Mouth/Throat: Oropharynx is clear and moist.   Mild pharyngeal erythema, mild tonsillar swelling with possible exudates versus tonsillar stones.  No uvular deviation   Eyes: Conjunctivae are normal.   Cardiovascular:  Normal rate, regular rhythm and intact distal pulses.           Pulmonary/Chest: Breath sounds normal. No respiratory distress. She has no wheezes. She has no rhonchi. She has no rales.   Abdominal: Abdomen is soft. Bowel sounds are normal. There is abdominal tenderness (minimal in the suprapubic area).   Musculoskeletal:         General: Normal range of motion.     Neurological: She is alert and oriented to person, place, and time.   Skin: Skin is warm and dry. Capillary refill takes less than 2 seconds. No rash noted. No erythema.   Psychiatric: She has a normal mood and affect.         ED Course   Procedures  Labs Reviewed - No data to display       Imaging Results    None          Medications   ondansetron disintegrating tablet 4 mg (4 mg Oral Given 2/21/24 0852)     Medical Decision Making  This is an emergent evaluation of a 29 y.o.female patient with presentation of nausea and vomiting. Recent abx for possible STD exposure (other partner not tested). Pt also having cough, sore throat.     Initial differentials include but are not limited to:  viral syndrome, antibiotic effect, gastritis, less likely ulcer, less likely gonorrhea pharyngitis.     Plan:  Reassurance.  Patient given Zofran here.  Rx for Zofran.  OTC meds for upset stomach and nausea/vomiting recommended.  Patient will be given resources for screening tests for STDs, informed that we do not routinely screen for HIV here.      Amount and/or Complexity of Data Reviewed  External Data Reviewed: labs.     Details: Labs from ED visit 2/20/2024: negative UA for UTI. Negative Heterphile Ab.    Risk  Prescription drug management.  Diagnosis or treatment significantly limited by social determinants of health.  Risk Details: Social determinants of health considered:  Access to healthcare including difficulty in obtaining follow-up and difficulty in obtaining medications; health literacy.                                      Clinical Impression:  Final diagnoses:  [R11.2] Nausea and vomiting, unspecified vomiting type (Primary)  [J03.90] Tonsillitis          ED Disposition Condition    Discharge Stable          ED Prescriptions       Medication Sig Dispense Start Date End Date Auth. Provider    ondansetron (ZOFRAN-ODT) 4 MG TbDL Take 1 tablet (4 mg total) by mouth every 6 (six) hours as needed (n/v). 6 tablet 2/21/2024 -- Rocky Martinez MD          Follow-up Information       Follow up With Specialties Details Why Contact Info    Fiorella Peña MD Obstetrics and Gynecology Schedule an appointment as soon as possible for a visit   [address] [facility]  [address] [place] [address] [id number] [phone]               Rocky Martinez MD  02/21/24 2880

## 2024-02-22 ENCOUNTER — OFFICE VISIT (OUTPATIENT)
Dept: FAMILY MEDICINE | Facility: CLINIC | Age: 29
End: 2024-02-22
Payer: MEDICAID

## 2024-02-22 ENCOUNTER — LAB VISIT (OUTPATIENT)
Dept: LAB | Facility: HOSPITAL | Age: 29
End: 2024-02-22
Attending: STUDENT IN AN ORGANIZED HEALTH CARE EDUCATION/TRAINING PROGRAM
Payer: MEDICAID

## 2024-02-22 VITALS
HEIGHT: 67 IN | DIASTOLIC BLOOD PRESSURE: 74 MMHG | OXYGEN SATURATION: 97 % | WEIGHT: 293 LBS | HEART RATE: 96 BPM | BODY MASS INDEX: 45.99 KG/M2 | SYSTOLIC BLOOD PRESSURE: 118 MMHG

## 2024-02-22 DIAGNOSIS — Z11.3 SCREEN FOR STD (SEXUALLY TRANSMITTED DISEASE): ICD-10-CM

## 2024-02-22 DIAGNOSIS — Z11.3 SCREEN FOR STD (SEXUALLY TRANSMITTED DISEASE): Primary | ICD-10-CM

## 2024-02-22 DIAGNOSIS — J03.90 TONSILLITIS WITH EXUDATE: Primary | ICD-10-CM

## 2024-02-22 LAB
HAV IGM SERPL QL IA: NORMAL
HBV CORE IGM SERPL QL IA: NORMAL
HBV SURFACE AG SERPL QL IA: NORMAL
HCV AB SERPL QL IA: NORMAL
HIV 1+2 AB+HIV1 P24 AG SERPL QL IA: NORMAL

## 2024-02-22 PROCEDURE — 1160F RVW MEDS BY RX/DR IN RCRD: CPT | Mod: CPTII,,, | Performed by: STUDENT IN AN ORGANIZED HEALTH CARE EDUCATION/TRAINING PROGRAM

## 2024-02-22 PROCEDURE — 1159F MED LIST DOCD IN RCRD: CPT | Mod: CPTII,,, | Performed by: STUDENT IN AN ORGANIZED HEALTH CARE EDUCATION/TRAINING PROGRAM

## 2024-02-22 PROCEDURE — 86592 SYPHILIS TEST NON-TREP QUAL: CPT | Performed by: STUDENT IN AN ORGANIZED HEALTH CARE EDUCATION/TRAINING PROGRAM

## 2024-02-22 PROCEDURE — 99214 OFFICE O/P EST MOD 30 MIN: CPT | Mod: PBBFAC,PO | Performed by: STUDENT IN AN ORGANIZED HEALTH CARE EDUCATION/TRAINING PROGRAM

## 2024-02-22 PROCEDURE — 99214 OFFICE O/P EST MOD 30 MIN: CPT | Mod: S$PBB,,, | Performed by: STUDENT IN AN ORGANIZED HEALTH CARE EDUCATION/TRAINING PROGRAM

## 2024-02-22 PROCEDURE — 3008F BODY MASS INDEX DOCD: CPT | Mod: CPTII,,, | Performed by: STUDENT IN AN ORGANIZED HEALTH CARE EDUCATION/TRAINING PROGRAM

## 2024-02-22 PROCEDURE — 3074F SYST BP LT 130 MM HG: CPT | Mod: CPTII,,, | Performed by: STUDENT IN AN ORGANIZED HEALTH CARE EDUCATION/TRAINING PROGRAM

## 2024-02-22 PROCEDURE — 87389 HIV-1 AG W/HIV-1&-2 AB AG IA: CPT | Performed by: STUDENT IN AN ORGANIZED HEALTH CARE EDUCATION/TRAINING PROGRAM

## 2024-02-22 PROCEDURE — 3044F HG A1C LEVEL LT 7.0%: CPT | Mod: CPTII,,, | Performed by: STUDENT IN AN ORGANIZED HEALTH CARE EDUCATION/TRAINING PROGRAM

## 2024-02-22 PROCEDURE — 3078F DIAST BP <80 MM HG: CPT | Mod: CPTII,,, | Performed by: STUDENT IN AN ORGANIZED HEALTH CARE EDUCATION/TRAINING PROGRAM

## 2024-02-22 PROCEDURE — 36415 COLL VENOUS BLD VENIPUNCTURE: CPT | Mod: PO | Performed by: STUDENT IN AN ORGANIZED HEALTH CARE EDUCATION/TRAINING PROGRAM

## 2024-02-22 PROCEDURE — 99999 PR PBB SHADOW E&M-EST. PATIENT-LVL IV: CPT | Mod: PBBFAC,,, | Performed by: STUDENT IN AN ORGANIZED HEALTH CARE EDUCATION/TRAINING PROGRAM

## 2024-02-22 PROCEDURE — 80074 ACUTE HEPATITIS PANEL: CPT | Performed by: STUDENT IN AN ORGANIZED HEALTH CARE EDUCATION/TRAINING PROGRAM

## 2024-02-22 PROCEDURE — 4010F ACE/ARB THERAPY RXD/TAKEN: CPT | Mod: CPTII,,, | Performed by: STUDENT IN AN ORGANIZED HEALTH CARE EDUCATION/TRAINING PROGRAM

## 2024-02-22 NOTE — PROGRESS NOTES
Progress Note  [facility]    Subjective:     Misael Kristen Garcia is a 29 y.o. year old female who presents to clinic for STD concern    Started coughing earlier this week. Had white spots on back of tonsils. Went ot the ED to be evaluated. Strep negative. EBV negative. Urine negative for GCCT. Her partner was not tested. Only had oral penetration no vaginal or rectal. Sexual encounter was 2/14. Got rocephin IM and has been taking doxycycline x2 days.     Patient Active Problem List    Diagnosis Date Noted    Heart failure with mildly reduced ejection fraction (HFmrEF) 02/01/2024    Abnormal echocardiogram 02/01/2024    Mild persistent asthma without complication 03/02/2023    Cervical spondylosis without myelopathy 03/02/2023    Lumbosacral spondylosis without myelopathy 03/02/2023    Posttraumatic stress disorder 03/02/2023    Thoracic facet syndrome 03/02/2023    SID on CPAP 02/26/2023    Thrombocytosis 01/05/2023    Recently quit using tobacco 01/04/2023    Hypertension associated with diabetes 01/04/2023    Oral contraceptive pill surveillance 01/04/2023    Type 2 diabetes mellitus with hyperglycemia, without long-term current use of insulin 08/01/2018    Class 3 severe obesity due to excess calories with serious comorbidity and body mass index (BMI) of 50.0 to 59.9 in adult 05/03/2018    Bipolar 1 disorder 05/03/2018    Corns and callus 04/29/2018        Review of patient's allergies indicates:  No Known Allergies     Past Medical History:   Diagnosis Date    ADHD (attention deficit hyperactivity disorder)     Anxiety     Asthma     Bipolar affective     Eczema     History of chlamydia 12/2016    Insomnia     Morbid obesity with BMI of 40.0-44.9, adult     Sleep disorder     Type 2 diabetes mellitus without complications       Past Surgical History:   Procedure Laterality Date    NO PAST SURGERIES      as of 1-13-17      Family History   Problem Relation Age of Onset     "No Known Problems Paternal Grandfather     Breast cancer Paternal Grandmother     Diabetes Maternal Grandmother     No Known Problems Maternal Grandfather     No Known Problems Father     No Known Problems Mother     No Known Problems Sister     Colon cancer Neg Hx     Ovarian cancer Neg Hx       Social History     Tobacco Use    Smoking status: Every Day     Types: Vaping with nicotine     Start date:      Last attempt to quit: 2022     Years since quittin.1    Smokeless tobacco: Never    Tobacco comments:     boyfriend smoke cigarettes   Substance Use Topics    Alcohol use: No        Objective:     Vitals:    24 1030   BP: 118/74   BP Location: Left arm   Patient Position: Sitting   BP Method: Large (Manual)   Pulse: 96   SpO2: 97%   Weight: (!) 152.3 kg (335 lb 12.2 oz)   Height: 5' 7" (1.702 m)     BMI: 52.59    Gen: No apparent distress, well nourished and developed, appears stated age  CV: RRR, S1 and S2 present, no LE edema  Resp: CTAB, normal respiratory effort  HEENT: Oropharynx mildly erythematous with tonsillar exudates    Assessment/Plan:     Misael Peterson Aileen Garcia is a 29 y.o. year old female who presents to clinic for STD concern    1. Tonsillitis with exudate  Swabbed throat for GCCT. Discussed this may not be accurate given has been on antibiotics. Also discussed some viral illnesses can cause tonsillar exudates. Recommend no oral penetration until exudates and tonsillitis resolved. Pt verbalized understanding and was in agreement with the plan.    - C.trach/N.gonor AMP RNA; Future    2. Screen for STD (sexually transmitted disease)  As above  - C.trach/N.gonor AMP RNA; Future      Follow-up:BELINDA Muro, DO  Family Medicine        "

## 2024-02-23 ENCOUNTER — PATIENT MESSAGE (OUTPATIENT)
Dept: FAMILY MEDICINE | Facility: CLINIC | Age: 29
End: 2024-02-23
Payer: MEDICAID

## 2024-02-23 DIAGNOSIS — G47.33 OSA ON CPAP: ICD-10-CM

## 2024-02-23 DIAGNOSIS — E11.65 TYPE 2 DIABETES MELLITUS WITH HYPERGLYCEMIA, WITHOUT LONG-TERM CURRENT USE OF INSULIN: ICD-10-CM

## 2024-02-23 DIAGNOSIS — E66.01 CLASS 3 SEVERE OBESITY DUE TO EXCESS CALORIES WITH SERIOUS COMORBIDITY AND BODY MASS INDEX (BMI) OF 50.0 TO 59.9 IN ADULT: ICD-10-CM

## 2024-02-23 DIAGNOSIS — I15.2 HYPERTENSION ASSOCIATED WITH DIABETES: ICD-10-CM

## 2024-02-23 DIAGNOSIS — E11.59 HYPERTENSION ASSOCIATED WITH DIABETES: ICD-10-CM

## 2024-02-23 LAB — RPR SER QL: NORMAL

## 2024-02-29 ENCOUNTER — OFFICE VISIT (OUTPATIENT)
Dept: CARDIOLOGY | Facility: CLINIC | Age: 29
End: 2024-02-29
Payer: MEDICAID

## 2024-02-29 VITALS
OXYGEN SATURATION: 98 % | WEIGHT: 293 LBS | SYSTOLIC BLOOD PRESSURE: 118 MMHG | HEIGHT: 67 IN | HEART RATE: 84 BPM | BODY MASS INDEX: 45.99 KG/M2 | DIASTOLIC BLOOD PRESSURE: 84 MMHG

## 2024-02-29 DIAGNOSIS — I15.2 HYPERTENSION ASSOCIATED WITH DIABETES: ICD-10-CM

## 2024-02-29 DIAGNOSIS — R06.09 DYSPNEA ON EXERTION: ICD-10-CM

## 2024-02-29 DIAGNOSIS — R07.89 OTHER CHEST PAIN: Primary | ICD-10-CM

## 2024-02-29 DIAGNOSIS — E11.65 TYPE 2 DIABETES MELLITUS WITH HYPERGLYCEMIA, WITHOUT LONG-TERM CURRENT USE OF INSULIN: ICD-10-CM

## 2024-02-29 DIAGNOSIS — J45.30 MILD PERSISTENT ASTHMA WITHOUT COMPLICATION: ICD-10-CM

## 2024-02-29 DIAGNOSIS — G47.33 OSA ON CPAP: ICD-10-CM

## 2024-02-29 DIAGNOSIS — E11.59 HYPERTENSION ASSOCIATED WITH DIABETES: ICD-10-CM

## 2024-02-29 DIAGNOSIS — R00.2 PALPITATIONS: ICD-10-CM

## 2024-02-29 DIAGNOSIS — Z87.891 RECENTLY QUIT USING TOBACCO: ICD-10-CM

## 2024-02-29 DIAGNOSIS — E66.01 CLASS 3 SEVERE OBESITY DUE TO EXCESS CALORIES WITH SERIOUS COMORBIDITY AND BODY MASS INDEX (BMI) OF 50.0 TO 59.9 IN ADULT: ICD-10-CM

## 2024-02-29 DIAGNOSIS — F12.90 MARIJUANA USE: ICD-10-CM

## 2024-02-29 DIAGNOSIS — F31.9 BIPOLAR 1 DISORDER: ICD-10-CM

## 2024-02-29 DIAGNOSIS — I50.22 HEART FAILURE WITH MILDLY REDUCED EJECTION FRACTION (HFMREF): ICD-10-CM

## 2024-02-29 PROBLEM — E66.813 CLASS 3 SEVERE OBESITY DUE TO EXCESS CALORIES WITH SERIOUS COMORBIDITY AND BODY MASS INDEX (BMI) OF 50.0 TO 59.9 IN ADULT: Status: RESOLVED | Noted: 2018-05-03 | Resolved: 2024-02-29

## 2024-02-29 PROCEDURE — 99999 PR PBB SHADOW E&M-EST. PATIENT-LVL IV: CPT | Mod: PBBFAC,,,

## 2024-02-29 PROCEDURE — 99214 OFFICE O/P EST MOD 30 MIN: CPT | Mod: PBBFAC,PN

## 2024-02-29 PROCEDURE — 3008F BODY MASS INDEX DOCD: CPT | Mod: CPTII,,,

## 2024-02-29 PROCEDURE — 4010F ACE/ARB THERAPY RXD/TAKEN: CPT | Mod: CPTII,,,

## 2024-02-29 PROCEDURE — 99214 OFFICE O/P EST MOD 30 MIN: CPT | Mod: S$PBB,,,

## 2024-02-29 PROCEDURE — 1159F MED LIST DOCD IN RCRD: CPT | Mod: CPTII,,,

## 2024-02-29 PROCEDURE — 3044F HG A1C LEVEL LT 7.0%: CPT | Mod: CPTII,,,

## 2024-02-29 PROCEDURE — 1160F RVW MEDS BY RX/DR IN RCRD: CPT | Mod: CPTII,,,

## 2024-02-29 PROCEDURE — 3079F DIAST BP 80-89 MM HG: CPT | Mod: CPTII,,,

## 2024-02-29 PROCEDURE — 3074F SYST BP LT 130 MM HG: CPT | Mod: CPTII,,,

## 2024-02-29 NOTE — ASSESSMENT & PLAN NOTE
-Goal BP < 130/80  -continue medical therapy- lisinopril 20 mg   -discussed lifestyle modifications

## 2024-02-29 NOTE — ASSESSMENT & PLAN NOTE
Body mass index is 52.31 kg/m². Morbid obesity complicates all aspects of disease management from diagnostic modalities to treatment. Weight loss encouraged and health benefits explained to patient.   -currently on Ozempic, reports a 30 pound weight loss

## 2024-02-29 NOTE — ASSESSMENT & PLAN NOTE
- NM stress test to evaluate for ischemia- patient is unable to walk 2/2 RODRIGUEZ   -LVEF 50%- 1/30/24

## 2024-02-29 NOTE — ASSESSMENT & PLAN NOTE
Cardiac echo 1/30/24  Summary         Left Ventricle: The left ventricle is normal in size. Normal wall thickness. Normal wall motion. There is reduced systolic function. Biplane (2D) method of discs ejection fraction is 50%. There is normal diastolic function.    -discussed lifestyle modifications, - low salt diet, exercise, HTN control, weight loss  -Ischemic evaluation ordered- NM stress test

## 2024-02-29 NOTE — PROGRESS NOTES
Subjective:    Patient ID:  Misael Garcia is a 29 y.o. female who presents for evaluation of No chief complaint on file.      PCP: Katelyn Chapman MD     Referring Provider: Valente Hall MD    HPI: Patient is a 28 yo F w/PMH of HTN, DM-2, morbid obesity,  SID, ADHD, anxiety, Bipolar affective disorder, thrombocytosis, low iron, marijuana use (prior vape use)  who presents today to establish care w c/o SOB. She reports SOB at rest and increases with anxiety. She also reports RODRIGUEZ w minimal activity. She reports episodes of palpitations. She also reports left sided CP, described as heaviness and pressure, non- radiating, not associated with N/V or presyncope.    Patient denies ,  orthopnea, PND, presyncope, LOC, swelling, or claudication.   Patient does not monitor her  home BP. Patient reports intermittent medication compliance without side effects. She notes 30 pound  weight loss since starting Ozempic. Patient does not exercise regularly, maybe once a week. .      Past Medical History:   Diagnosis Date    ADHD (attention deficit hyperactivity disorder)     Anxiety     Asthma     Bipolar affective     Eczema     History of chlamydia 2016    Insomnia     Morbid obesity with BMI of 40.0-44.9, adult     Sleep disorder     Type 2 diabetes mellitus without complications      Past Surgical History:   Procedure Laterality Date    NO PAST SURGERIES      as of 19     Social History     Socioeconomic History    Marital status: Single   Occupational History    Occupation: [employer]    Tobacco Use    Smoking status: Former     Types: Vaping with nicotine     Start date:      Quit date: 11/15/2023     Years since quittin.2    Smokeless tobacco: Never    Tobacco comments:     boyfriend smoke cigarettes   Substance and Sexual Activity    Alcohol use: No    Drug use: Not Currently     Types: Marijuana    Sexual activity: Not Currently     Partners: Male     Birth control/protection: None     Comment:  Single:       Social Determinants of Health     Financial Resource Strain: Low Risk  (7/7/2023)    Overall Financial Resource Strain (CARDIA)     Difficulty of Paying Living Expenses: Not hard at all   Food Insecurity: No Food Insecurity (7/7/2023)    Hunger Vital Sign     Worried About Running Out of Food in the Last Year: Never true     Ran Out of Food in the Last Year: Never true   Transportation Needs: No Transportation Needs (7/7/2023)    PRAPARE - Transportation     Lack of Transportation (Medical): No     Lack of Transportation (Non-Medical): No   Physical Activity: Inactive (7/7/2023)    Exercise Vital Sign     Days of Exercise per Week: 0 days     Minutes of Exercise per Session: 0 min   Stress: Stress Concern Present (7/7/2023)    [employer] - Occupational Stress Questionnaire     Feeling of Stress : To some extent   Social Connections: Socially Isolated (7/7/2023)    Social Connection and Isolation Panel [NHANES]     Frequency of Communication with Friends and Family: More than three times a week     Frequency of Social Gatherings with Friends and Family: More than three times a week     Attends Orthodoxy Services: Never     Active Member of Clubs or Organizations: No     Attends Club or Organization Meetings: Never     Marital Status: Never    Housing Stability: Low Risk  (7/7/2023)    Housing Stability Vital Sign     Unable to Pay for Housing in the Last Year: No     Number of Places Lived in the Last Year: 1     Unstable Housing in the Last Year: No     Family History   Problem Relation Age of Onset    No Known Problems Paternal Grandfather     Breast cancer Paternal Grandmother     Diabetes Maternal Grandmother     No Known Problems Maternal Grandfather     No Known Problems Father     No Known Problems Mother     No Known Problems Sister     Colon cancer Neg Hx     Ovarian cancer Neg Hx        Review of patient's allergies indicates:  No Known  Allergies    Medication List with Changes/Refills   Current Medications    ADAPALENE-BENZOYL PEROXIDE (EPIDUO FORTE) 0.3-2.5 % GLWP    Apply topically.    ALBUTEROL (PROVENTIL/VENTOLIN HFA) 90 MCG/ACTUATION INHALER    Inhale 2 puffs into the lungs every 4 (four) hours as needed (shortness of breath). Rescue    AMMONIUM LACTATE (LAC-HYDRIN) 12 % LOTION    Apply topically.    AZELASTINE (ASTELIN) 137 MCG (0.1 %) NASAL SPRAY    2 sprays (274 mcg total) by Nasal route 2 (two) times daily.    BLOOD SUGAR DIAGNOSTIC STRP    To check Blood Glucose  2-3x times daily,    BLOOD-GLUCOSE METER MISC    use as directed    CETIRIZINE (ZYRTEC) 10 MG TABLET    Take 1 tablet (10 mg total) by mouth once daily.    CLINDAMYCIN PHOSPHATE 1% (CLINDAGEL) 1 % GEL    Apply topically 2 (two) times daily.    FLUTICASONE FUROATE-VILANTEROL (BREO ELLIPTA) 200-25 MCG/DOSE DSDV DISKUS INHALER    Inhale 1 puff into the lungs once daily. Controller    FLUTICASONE PROPIONATE (FLONASE) 50 MCG/ACTUATION NASAL SPRAY    2 sprays (100 mcg total) by Each Nostril route once daily.    IBUPROFEN (ADVIL,MOTRIN) 800 MG TABLET    Take 1 tablet (800 mg total) by mouth 3 (three) times daily as needed for Pain.    LANCETS 28 GAUGE MISC    To check Blood Glucose  2-3 times daily,    LISINOPRIL (PRINIVIL,ZESTRIL) 20 MG TABLET    Take 1 tablet (20 mg total) by mouth once daily.    LURASIDONE (LATUDA) 40 MG TAB TABLET    Take 40 mg by mouth.    METFORMIN (GLUCOPHAGE-XR) 500 MG ER 24HR TABLET    Take 1 tablet (500 mg total) by mouth every evening.    NORETHINDRONE-ETHINYL ESTRADIOL (JUNEL FE 1/20) 1 MG-20 MCG (21)/75 MG (7) PER TABLET    Take 1 tablet by mouth once daily.    ONDANSETRON (ZOFRAN-ODT) 4 MG TBDL    Dissolve 1 tablet (4 mg total) by mouth every 6 (six) hours as needed (nausea/vomiting).    SEMAGLUTIDE (OZEMPIC) 1 MG/DOSE (4 MG/3 ML)    Inject 1 mg into the skin every 7 days. For weeks 5-8    SEMAGLUTIDE (OZEMPIC) 2 MG/DOSE (8 MG/3 ML) PNIJ    Inject 2 mg  "into the skin every 7 days. For weeks 9-12 and onward    SULFACETAMIDE SODIUM-SULFUR 10-5 % (W/W) CLSR    Apply topically.    TOPIRAMATE (TOPAMAX) 25 MG TABLET    Take 25 mg by mouth 2 (two) times daily.    UREA 20 % CREA    APPLY TO AFFECTED AREA QD       Review of Systems   Constitutional: Negative for diaphoresis and fever.   HENT:  Negative for congestion and hearing loss.    Eyes:  Negative for blurred vision and pain.   Cardiovascular:  Positive for chest pain, dyspnea on exertion and palpitations. Negative for claudication, leg swelling, near-syncope and syncope.   Respiratory:  Positive for shortness of breath. Negative for sleep disturbances due to breathing.    Hematologic/Lymphatic: Negative for bleeding problem. Does not bruise/bleed easily.   Skin:  Negative for color change and poor wound healing.   Gastrointestinal:  Negative for abdominal pain and nausea.   Genitourinary:  Negative for bladder incontinence and flank pain.   Neurological:  Negative for focal weakness and light-headedness.        Objective:   /84 (BP Location: Left arm, Patient Position: Sitting, BP Method: Large (Manual))   Pulse 84   Ht 5' 7" (1.702 m)   Wt (!) 151.5 kg (334 lb)   LMP 02/07/2024   SpO2 98%   BMI 52.31 kg/m²    Physical Exam  Constitutional:       Appearance: She is well-developed. She is obese. She is not diaphoretic.   HENT:      Head: Normocephalic and atraumatic.   Eyes:      General: No scleral icterus.     Pupils: Pupils are equal, round, and reactive to light.   Neck:      Vascular: No JVD.   Cardiovascular:      Rate and Rhythm: Normal rate and regular rhythm.      Pulses: Intact distal pulses.      Heart sounds: S1 normal and S2 normal. No murmur heard.     No friction rub. No gallop.   Pulmonary:      Effort: Pulmonary effort is normal. No respiratory distress.      Breath sounds: Normal breath sounds. No wheezing or rales.   Chest:      Chest wall: No tenderness.   Abdominal:      General: Bowel " sounds are normal. There is no distension.      Palpations: Abdomen is soft. There is no mass.      Tenderness: There is no abdominal tenderness. There is no rebound.   Musculoskeletal:         General: No tenderness. Normal range of motion.      Cervical back: Normal range of motion and neck supple.   Skin:     General: Skin is warm and dry.      Coloration: Skin is not pale.   Neurological:      Mental Status: She is alert and oriented to person, place, and time.      Coordination: Coordination normal.      Deep Tendon Reflexes: Reflexes normal.   Psychiatric:         Behavior: Behavior normal.         Judgment: Judgment normal.             Cardiac echo 1/30/24  Summary         Left Ventricle: The left ventricle is normal in size. Normal wall thickness. Normal wall motion. There is reduced systolic function. Biplane (2D) method of discs ejection fraction is 50%. There is normal diastolic function.    Right Ventricle: Systolic function is normal. TAPSE is 2.13 cm.    Mitral Valve: There is mild regurgitation.    Pulmonary Artery: The estimated pulmonary artery systolic pressure is 38 mmHg.    IVC/SVC: Normal venous pressure at 3 mmHg.    Overall the study quality was technically difficult. The study was difficult due to patient's poor endocardial visualization.      EKG reviewed 6/29/2023  Assessment:       1. Other chest pain    2. Dyspnea on exertion    3. Palpitations    4. Hypertension associated with diabetes    5. Bipolar 1 disorder    6. Mild persistent asthma without complication    7. Type 2 diabetes mellitus with hyperglycemia, without long-term current use of insulin    8. Class 3 severe obesity due to excess calories with serious comorbidity and body mass index (BMI) of 50.0 to 59.9 in adult    9. SID on CPAP    10. Recently quit using tobacco    11. Marijuana use    12. Heart failure with mildly reduced ejection fraction (HFmrEF)         Plan:         Other chest pain  - NM stress test to evaluate for  ischemia- patient is unable to walk 2/2 RODRIGUEZ   -LVEF 50%- 1/30/24    Palpitations  -48 HR Holter to evaluate for arrhthymias     Hypertension associated with diabetes  -Goal BP < 130/80  -continue medical therapy- lisinopril 20 mg   -discussed lifestyle modifications    Bipolar 1 disorder  -Followed by Behavioral Health  -continue medical therapy- patient reports intermittent medication compliance     Mild persistent asthma without complication  -Managed by PCP  -continue medical therapy    Type 2 diabetes mellitus with hyperglycemia, without long-term current use of insulin  -A1c 5.8 1/24/24  -Managed by PCP  -continue medical therapy- currently on Ozempic     Class 3 severe obesity due to excess calories with serious comorbidity and body mass index (BMI) of 50.0 to 59.9 in adult  Body mass index is 52.31 kg/m². Morbid obesity complicates all aspects of disease management from diagnostic modalities to treatment. Weight loss encouraged and health benefits explained to patient.   -currently on Ozempic, reports a 30 pound weight loss     SID on CPAP  -CPAP nightly- non compliant  -discussed the importance of nightly use     Recently quit using tobacco  -Currently smokes marijuana     Marijuana use  -Reports daily -ever other day use     Heart failure with mildly reduced ejection fraction (HFmrEF)  Cardiac echo 1/30/24  Summary         Left Ventricle: The left ventricle is normal in size. Normal wall thickness. Normal wall motion. There is reduced systolic function. Biplane (2D) method of discs ejection fraction is 50%. There is normal diastolic function.    -discussed lifestyle modifications, - low salt diet, exercise, HTN control, weight loss  -Ischemic evaluation ordered- NM stress test        Total duration of face to face visit time 30 minutes.  Total time spent counseling greater than fifty percent of total visit time.  Counseling included discussion regarding imaging findings, diagnosis, possibilities, treatment  options, risks and benefits.  The patient had many questions regarding the options and long-term effects      Misael Weiss, JAN  Cardiology

## 2024-02-29 NOTE — ASSESSMENT & PLAN NOTE
-Followed by Behavioral Health  -continue medical therapy- patient reports intermittent medication compliance

## 2024-03-04 ENCOUNTER — PATIENT MESSAGE (OUTPATIENT)
Dept: FAMILY MEDICINE | Facility: CLINIC | Age: 29
End: 2024-03-04
Payer: MEDICAID

## 2024-03-04 ENCOUNTER — PATIENT MESSAGE (OUTPATIENT)
Dept: CARDIOLOGY | Facility: CLINIC | Age: 29
End: 2024-03-04
Payer: MEDICAID

## 2024-03-06 ENCOUNTER — TELEPHONE (OUTPATIENT)
Dept: CARDIOLOGY | Facility: CLINIC | Age: 29
End: 2024-03-06
Payer: MEDICAID

## 2024-03-06 ENCOUNTER — PATIENT MESSAGE (OUTPATIENT)
Dept: CARDIOLOGY | Facility: CLINIC | Age: 29
End: 2024-03-06
Payer: MEDICAID

## 2024-03-06 NOTE — TELEPHONE ENCOUNTER
Contacted [facility] in regards to a peer to peer- They will call tomorrow morning at 8am.    ----- Message from Misael Weiss NP sent at 3/5/2024 12:05 PM CST -----  Please set up a peer to peer time.    Thank you,  Misael Weiss NP   ----- Message -----  From: Gabrielle Skinner  Sent: 3/4/2024  10:53 AM CST  To: Misael Weiss NP; Abhishek Cortez    Good Morning,    In regards to this patient [employer] plan CPT 18490 has been denied for the following reason:    Your healthcare provider told us that there is a concern related to the blood vessels in your heart. The request cannot be approved because:    You must have one of the following.  -Findings on a tracing of your heart's electrical activity (electrocardiogram or ECG) that could make it hard to tell if your heart is getting enough blood supply with exercise.  -Results of an ECG and blood pressure check done before, during, and after walking on a treadmill (exercise stress test) were unclear (inconclusive) or not normal as defined in the guideline.  -Other indication as listed in this guideline.  Based on [employer] Cardiac Imaging Guidelines Section(s): Stress Testing with Imaging - Indications (CD 1.4),        A peer to peer discussion can be done by calling [phone] case #[id number].    Thank You,    Gabrielle LOBO   Pre-Service  Radiology Dept

## 2024-03-07 ENCOUNTER — PATIENT MESSAGE (OUTPATIENT)
Dept: CARDIOLOGY | Facility: CLINIC | Age: 29
End: 2024-03-07
Payer: MEDICAID

## 2024-03-07 ENCOUNTER — TELEPHONE (OUTPATIENT)
Dept: CARDIOLOGY | Facility: CLINIC | Age: 29
End: 2024-03-07
Payer: MEDICAID

## 2024-03-07 NOTE — TELEPHONE ENCOUNTER
Called to let Ms Garcia Know her appointment has been scheduled and told her to the instructions for the test. She verbally agreed that she understood.   Paulette  Medical

## 2024-03-07 NOTE — TELEPHONE ENCOUNTER
I responded to MsDelphine Douglas 's message - by calling her to notify her that her insurance has approved her testing. She verbally acknowledged that she understood. Her testing is in the process of being rescheduled.     Paulette Sampson

## 2024-03-11 ENCOUNTER — TELEPHONE (OUTPATIENT)
Dept: CARDIOLOGY | Facility: CLINIC | Age: 29
End: 2024-03-11
Payer: MEDICAID

## 2024-03-11 NOTE — TELEPHONE ENCOUNTER
Ms Garcia reached out to ask about caffeine and the holter. I told her Not to over do the caffeine. That's the monitor is there to record her heart. And her heart activity for 48 hours.

## 2024-03-14 ENCOUNTER — PATIENT MESSAGE (OUTPATIENT)
Dept: CARDIOLOGY | Facility: CLINIC | Age: 29
End: 2024-03-14
Payer: MEDICAID

## 2024-03-14 ENCOUNTER — TELEPHONE (OUTPATIENT)
Dept: CARDIOLOGY | Facility: CLINIC | Age: 29
End: 2024-03-14
Payer: MEDICAID

## 2024-03-14 ENCOUNTER — PATIENT MESSAGE (OUTPATIENT)
Dept: FAMILY MEDICINE | Facility: CLINIC | Age: 29
End: 2024-03-14
Payer: MEDICAID

## 2024-03-14 ENCOUNTER — PATIENT MESSAGE (OUTPATIENT)
Dept: GASTROENTEROLOGY | Facility: CLINIC | Age: 29
End: 2024-03-14
Payer: MEDICAID

## 2024-03-14 NOTE — TELEPHONE ENCOUNTER
Contacted Ms. Misael, she explained that since she quit smoking she's had palpitations and minimal chest pains. She feels like her panic attacks are getting worse. I told her I would forward the message to Misael Weiss NP. She got the holter off yesterday and she's doing her stress test today. She would like a sooner appt to see Misael Weiss NP.      Message from Misael YUEN  Hi, Don stopped smoking for 4 days and Im noticing that Inoe been having heart palpitations and chest pain daily. Im breathing hard when I exercise and whenever I have anxiety. Im not sure what to do. Don had this monitor on as well for 2 days and I know Im taking a stress test. Could I schedule a sooner appointment?      Thank you

## 2024-03-14 NOTE — TELEPHONE ENCOUNTER
Reached out to Ms Douglas for the second time today. Still having the same episodes as this morning. I advised Ms Misael Garcia- to go to the er if she feels like that.    Paulette  Medical Assistant for  Misael Weiss, JAN  [phone] -   [phone] - Fax  [email or web address] - email  [address] [address]    Message from Misaellaila Garcia  I'm still experiencing chest pain, and heart flutters things like that. I'm not sure what to do. I took a nap, but it still feels like a little pressure in the center of my chest. I took 2 ibuprofens to help me feel better, but I'm not sure if that helped.  Please let me know what to do as I'm starting to get worried.    Thank you.   Comment: Curette was used Render Risk Assessment In Note?: no Detail Level: Generalized Comment: Declines bx.

## 2024-03-15 ENCOUNTER — PATIENT MESSAGE (OUTPATIENT)
Dept: OBSTETRICS AND GYNECOLOGY | Facility: CLINIC | Age: 29
End: 2024-03-15
Payer: MEDICAID

## 2024-03-15 ENCOUNTER — PATIENT MESSAGE (OUTPATIENT)
Dept: CARDIOLOGY | Facility: CLINIC | Age: 29
End: 2024-03-15
Payer: MEDICAID

## 2024-03-15 DIAGNOSIS — R00.2 PALPITATIONS: Primary | ICD-10-CM

## 2024-03-15 RX ORDER — METOPROLOL SUCCINATE 25 MG/1
25 TABLET, EXTENDED RELEASE ORAL DAILY
Qty: 30 TABLET | Refills: 11 | Status: SHIPPED | OUTPATIENT
Start: 2024-03-15 | End: 2024-03-25

## 2024-03-15 NOTE — TELEPHONE ENCOUNTER
Called Ms Garcia and gave her the results, and told her that she can get her meds at [facility]. Ms. Garcia Verbally stated she understood.    Paulette  Medical Assistant         ----- Message from Misael Weiss NP sent at 3/15/2024  3:17 PM CDT -----  Please inform Ms. Garcia that her Holter is did not show any irregular rhythms. It did show some times of fast heart rate but regular. I am prescribing metoprolol 25 mg, once daily to slow the heart rate.     Thank you,    Misael Weiss NP

## 2024-03-18 ENCOUNTER — PATIENT MESSAGE (OUTPATIENT)
Dept: GASTROENTEROLOGY | Facility: CLINIC | Age: 29
End: 2024-03-18
Payer: MEDICAID

## 2024-03-18 ENCOUNTER — PATIENT MESSAGE (OUTPATIENT)
Dept: FAMILY MEDICINE | Facility: CLINIC | Age: 29
End: 2024-03-18
Payer: MEDICAID

## 2024-03-18 ENCOUNTER — PATIENT MESSAGE (OUTPATIENT)
Dept: OBSTETRICS AND GYNECOLOGY | Facility: CLINIC | Age: 29
End: 2024-03-18
Payer: MEDICAID

## 2024-03-18 RX ORDER — FLUCONAZOLE 150 MG/1
150 TABLET ORAL DAILY
Qty: 1 TABLET | Refills: 0 | Status: SHIPPED | OUTPATIENT
Start: 2024-03-18 | End: 2024-03-18

## 2024-03-18 RX ORDER — FLUCONAZOLE 150 MG/1
150 TABLET ORAL DAILY
Qty: 1 TABLET | Refills: 0 | Status: SHIPPED | OUTPATIENT
Start: 2024-03-18 | End: 2024-03-19

## 2024-03-19 ENCOUNTER — PATIENT MESSAGE (OUTPATIENT)
Dept: FAMILY MEDICINE | Facility: CLINIC | Age: 29
End: 2024-03-19
Payer: MEDICAID

## 2024-03-19 ENCOUNTER — PATIENT MESSAGE (OUTPATIENT)
Dept: CARDIOLOGY | Facility: CLINIC | Age: 29
End: 2024-03-19
Payer: MEDICAID

## 2024-03-19 DIAGNOSIS — G47.33 OSA ON CPAP: ICD-10-CM

## 2024-03-19 DIAGNOSIS — I15.2 HYPERTENSION ASSOCIATED WITH DIABETES: ICD-10-CM

## 2024-03-19 DIAGNOSIS — E11.59 HYPERTENSION ASSOCIATED WITH DIABETES: ICD-10-CM

## 2024-03-19 DIAGNOSIS — E11.65 TYPE 2 DIABETES MELLITUS WITH HYPERGLYCEMIA, WITHOUT LONG-TERM CURRENT USE OF INSULIN: ICD-10-CM

## 2024-03-19 DIAGNOSIS — E66.01 CLASS 3 SEVERE OBESITY DUE TO EXCESS CALORIES WITH SERIOUS COMORBIDITY AND BODY MASS INDEX (BMI) OF 50.0 TO 59.9 IN ADULT: ICD-10-CM

## 2024-03-21 ENCOUNTER — PATIENT MESSAGE (OUTPATIENT)
Dept: FAMILY MEDICINE | Facility: CLINIC | Age: 29
End: 2024-03-21
Payer: MEDICAID

## 2024-03-21 ENCOUNTER — OFFICE VISIT (OUTPATIENT)
Dept: URGENT CARE | Facility: CLINIC | Age: 29
End: 2024-03-21
Payer: MEDICAID

## 2024-03-21 VITALS
HEART RATE: 71 BPM | RESPIRATION RATE: 17 BRPM | WEIGHT: 293 LBS | HEIGHT: 67 IN | BODY MASS INDEX: 45.99 KG/M2 | TEMPERATURE: 98 F | DIASTOLIC BLOOD PRESSURE: 85 MMHG | OXYGEN SATURATION: 96 % | SYSTOLIC BLOOD PRESSURE: 127 MMHG

## 2024-03-21 DIAGNOSIS — Z20.2 STD EXPOSURE: Primary | ICD-10-CM

## 2024-03-21 PROCEDURE — 99213 OFFICE O/P EST LOW 20 MIN: CPT | Mod: S$GLB,,,

## 2024-03-21 PROCEDURE — 87591 N.GONORRHOEAE DNA AMP PROB: CPT

## 2024-03-21 RX ORDER — DOXYCYCLINE 100 MG/1
100 CAPSULE ORAL 2 TIMES DAILY
Qty: 14 CAPSULE | Refills: 0 | Status: SHIPPED | OUTPATIENT
Start: 2024-03-21 | End: 2024-03-25

## 2024-03-21 RX ORDER — CEFTRIAXONE 1 G/1
1 INJECTION, POWDER, FOR SOLUTION INTRAMUSCULAR; INTRAVENOUS
Status: COMPLETED | OUTPATIENT
Start: 2024-03-21 | End: 2024-03-21

## 2024-03-21 RX ADMIN — CEFTRIAXONE 1 G: 1 INJECTION, POWDER, FOR SOLUTION INTRAMUSCULAR; INTRAVENOUS at 07:03

## 2024-03-21 NOTE — PATIENT INSTRUCTIONS
- Rest.    - Drink plenty of fluids.    - Acetaminophen (tylenol) or Ibuprofen (advil,motrin) as directed as needed for fever/pain. Avoid tylenol if you have a history of liver disease. Do not take ibuprofen if you have a history of GI bleeding, kidney disease, or if you take blood thinners.      - Follow up with your PCP or specialty clinic as directed in the next 1-2 weeks if not improved or as needed.  You can call [phone] to schedule an appointment with the appropriate provider.    - Go to the ER or seek medical attention immediately if you develop new or worsening symptoms.     - You must understand that you have received an Urgent Care treatment only and that you may be released before all of your medical problems are known or treated.   - You, the patient, will arrange for follow up care as instructed.   - If your condition worsens or fails to improve we recommend that you receive another evaluation at the ER immediately or contact your PCP to discuss your concerns or return here.

## 2024-03-21 NOTE — PROGRESS NOTES
"Subjective:      Patient ID: Misael Garcia is a 29 y.o. female.    Vitals:  height is 5' 7" (1.702 m) and weight is 149 kg (328 lb 7.8 oz) (abnormal). Her oral temperature is 98 °F (36.7 °C). Her blood pressure is 127/85 and her pulse is 71. Her respiration is 17 and oxygen saturation is 96%.     Chief Complaint: Exposure to STD    29-year-old female presents to clinic today with chief complaint of redness in her throat.  Patient states she had oral sex last night as concern for STD.  Patient would like to get oral STD tested today in clinic.  She states that she did have oral sex with a new partner and last night.  LMC was 2/22/24. Denies any urinary frequency, urgency, dysuria, hematuria, vaginal discharge, vaginal odor, vaginal pain, pelvic pain, or abdominal pain. Denies numbness or tingling. Denies radiation of pain. Denies fever, chills, body aches, chest pain, shortness of breath, abdominal pain, nausea, vomiting, diarrhea, or rashes.             Exposure to STD   The patient's pertinent negatives include no discharge, dyspareunia, dysuria, genital itching, genital lesions, genital rash or pelvic pain. This is a new problem. The current episode started today. The problem has been unchanged. The vaginal discharge was normal. Associate symptoms include a sore throat. Pertinent negatives include no abdominal pain, anorexia, diaphoresis, fever, genital odor, rectal pain or urinary frequency. She has tried nothing for the symptoms. The treatment provided no relief.     Constitution: Negative for activity change, appetite change, chills, sweating, fatigue, fever, unexpected weight change, generalized weakness and international travel in last 60 days.   HENT:  Positive for sore throat. Negative for ear pain, ear discharge, tinnitus, hearing loss, congestion, nosebleeds, foreign body in nose, postnasal drip, sinus pain, sinus pressure, trouble swallowing and voice change.    Neck: Negative for neck pain, " neck stiffness and neck swelling.   Cardiovascular:  Negative for chest pain, leg swelling, palpitations and sob on exertion.   Eyes:  Negative for eye discharge, eye itching, eye pain, eye redness, photophobia, vision loss, double vision and blurred vision.   Respiratory:  Negative for chest tightness, cough, sputum production, shortness of breath, wheezing and asthma.    Gastrointestinal:  Negative for abdominal pain, nausea, vomiting, constipation, diarrhea, bright red blood in stool, rectal pain and heartburn.   Endocrine: cold intolerance and heat intolerance.   Genitourinary:  Negative for dysuria, frequency, urgency, urine decreased, flank pain, bladder incontinence, bed wetting, hematuria, history of kidney stones, painful menstruation, irregular menstruation, missed menses, heavy menstrual bleeding, ovarian cysts, genital trauma, vaginal pain, vaginal discharge, vaginal bleeding, vaginal odor, painful intercourse, genital sore and pelvic pain.   Musculoskeletal:  Negative for pain, joint pain, joint swelling, back pain and muscle ache.   Skin:  Negative for rash, erythema, bruising and hives.   Allergic/Immunologic: Negative for environmental allergies, seasonal allergies, food allergies, eczema, asthma, hives, itching and sneezing.   Neurological:  Negative for dizziness, light-headedness, headaches, disorientation and altered mental status.   Psychiatric/Behavioral:  Negative for altered mental status, disorientation, confusion, agitation and nervous/anxious. The patient is not nervous/anxious.       Objective:     Physical Exam   Constitutional: She is oriented to person, place, and time. She appears well-developed. She is cooperative.   HENT:   Head: Normocephalic and atraumatic.   Ears:   Right Ear: Hearing and external ear normal.   Left Ear: Hearing and external ear normal.   Nose: Nose normal. No mucosal edema.   Mouth/Throat: Uvula is midline, oropharynx is clear and moist and mucous membranes are  normal. No trismus in the jaw. Normal dentition. No uvula swelling. No oropharyngeal exudate, posterior oropharyngeal edema, posterior oropharyngeal erythema, tonsillar abscesses or cobblestoning. Tonsils are 1+ on the right. Tonsils are 1+ on the left. No tonsillar exudate.   Eyes: Conjunctivae and lids are normal. Extraocular movement intact   Neck: Trachea normal and phonation normal. Neck supple.   Cardiovascular: Normal rate, regular rhythm, normal heart sounds and normal pulses.   Pulmonary/Chest: Effort normal and breath sounds normal.   Abdominal: Normal appearance and bowel sounds are normal. Soft.   Musculoskeletal: Normal range of motion.         General: Normal range of motion.   Neurological: She is alert and oriented to person, place, and time. She exhibits normal muscle tone.   Skin: Skin is warm, dry and intact. No erythema   Psychiatric: Her speech is normal and behavior is normal. Judgment and thought content normal.   Nursing note and vitals reviewed.      Assessment:     1. STD exposure        Plan:       STD exposure  -     C.TRACH/N.GONOR AMP RNA (Ocular/Fluid)  -     cefTRIAXone injection 1 g  -     doxycycline (VIBRAMYCIN) 100 MG Cap; Take 1 capsule (100 mg total) by mouth 2 (two) times daily. for 7 days  Dispense: 14 capsule; Refill: 0      She was interested in prophylactically treating today in clinic.  Recommended patient to refrain from sexual intercourse especially oral sex until results finalized.  Patient will be called back with results follow up with PCP.          Patient Instructions   - Rest.    - Drink plenty of fluids.    - Acetaminophen (tylenol) or Ibuprofen (advil,motrin) as directed as needed for fever/pain. Avoid tylenol if you have a history of liver disease. Do not take ibuprofen if you have a history of GI bleeding, kidney disease, or if you take blood thinners.      - Follow up with your PCP or specialty clinic as directed in the next 1-2 weeks if not improved or as  needed.  You can call [phone] to schedule an appointment with the appropriate provider.    - Go to the ER or seek medical attention immediately if you develop new or worsening symptoms.     - You must understand that you have received an Urgent Care treatment only and that you may be released before all of your medical problems are known or treated.   - You, the patient, will arrange for follow up care as instructed.   - If your condition worsens or fails to improve we recommend that you receive another evaluation at the ER immediately or contact your PCP to discuss your concerns or return here.

## 2024-03-22 ENCOUNTER — OFFICE VISIT (OUTPATIENT)
Dept: URGENT CARE | Facility: CLINIC | Age: 29
End: 2024-03-22
Payer: MEDICAID

## 2024-03-22 VITALS
HEIGHT: 67 IN | BODY MASS INDEX: 45.99 KG/M2 | OXYGEN SATURATION: 95 % | TEMPERATURE: 98 F | DIASTOLIC BLOOD PRESSURE: 84 MMHG | HEART RATE: 75 BPM | WEIGHT: 293 LBS | SYSTOLIC BLOOD PRESSURE: 132 MMHG | RESPIRATION RATE: 17 BRPM

## 2024-03-22 DIAGNOSIS — K29.00 OTHER ACUTE GASTRITIS WITHOUT HEMORRHAGE: Primary | ICD-10-CM

## 2024-03-22 DIAGNOSIS — R11.2 NAUSEA AND VOMITING, UNSPECIFIED VOMITING TYPE: ICD-10-CM

## 2024-03-22 DIAGNOSIS — Z20.2 POSSIBLE EXPOSURE TO STD: ICD-10-CM

## 2024-03-22 DIAGNOSIS — T50.905A ADVERSE EFFECT OF DRUG, INITIAL ENCOUNTER: ICD-10-CM

## 2024-03-22 LAB
B-HCG UR QL: NEGATIVE
BILIRUB UR QL STRIP: NEGATIVE
CTP QC/QA: YES
GLUCOSE UR QL STRIP: NEGATIVE
KETONES UR QL STRIP: NEGATIVE
LEUKOCYTE ESTERASE UR QL STRIP: POSITIVE
PH, POC UA: 5
POC BLOOD, URINE: NEGATIVE
POC NITRATES, URINE: NEGATIVE
PROT UR QL STRIP: POSITIVE
SP GR UR STRIP: 1.02 (ref 1–1.03)
UROBILINOGEN UR STRIP-ACNC: ABNORMAL (ref 0.1–1.1)

## 2024-03-22 PROCEDURE — 99214 OFFICE O/P EST MOD 30 MIN: CPT | Mod: S$GLB,,, | Performed by: PHYSICIAN ASSISTANT

## 2024-03-22 PROCEDURE — 81025 URINE PREGNANCY TEST: CPT | Mod: S$GLB,,, | Performed by: PHYSICIAN ASSISTANT

## 2024-03-22 PROCEDURE — 81003 URINALYSIS AUTO W/O SCOPE: CPT | Mod: QW,S$GLB,, | Performed by: PHYSICIAN ASSISTANT

## 2024-03-22 RX ORDER — DICYCLOMINE HYDROCHLORIDE 10 MG/1
10 CAPSULE ORAL 2 TIMES DAILY PRN
Qty: 14 CAPSULE | Refills: 0 | Status: SHIPPED | OUTPATIENT
Start: 2024-03-22 | End: 2024-04-01 | Stop reason: SDUPTHER

## 2024-03-22 RX ORDER — FAMOTIDINE 20 MG/1
20 TABLET, FILM COATED ORAL 2 TIMES DAILY
Qty: 14 TABLET | Refills: 0 | Status: SHIPPED | OUTPATIENT
Start: 2024-03-22 | End: 2024-03-29

## 2024-03-22 RX ORDER — ONDANSETRON 4 MG/1
4 TABLET, ORALLY DISINTEGRATING ORAL EVERY 8 HOURS PRN
Qty: 12 TABLET | Refills: 0 | OUTPATIENT
Start: 2024-03-22 | End: 2024-05-11

## 2024-03-22 NOTE — PROGRESS NOTES
"Subjective:      Patient ID: Misael Garcia is a 29 y.o. female.    Vitals:  height is 5' 7" (1.702 m) and weight is 149 kg (328 lb 7.8 oz) (abnormal). Her oral temperature is 98.4 °F (36.9 °C). Her blood pressure is 132/84 and her pulse is 75. Her respiration is 17 and oxygen saturation is 95%.     Chief Complaint: Nausea    29-year-old female who presents to urgent care clinic for re-evaluation.  She was seen at this clinic by different provider yesterday for concern for STD.  She had unprotected oral sex with a known male partner on Wednesday.  She had no symptoms but requested specifically oral gonorrhea testing done.  This is the same male partner she had unprotected oral sex with last month and had oral GC testing done by her PCP since ER was not able to perform previously.  She is received Rocephin previously and had mild diarrhea but did not have any diarrhea with Rocephin injection yesterday.  Did start taking doxycycline course 1 tablet yesterday and this morning on an empty stomach.  Developed generalized abdominal pain that is mild today with nausea, heartburn, burping, belching, and 1 episode of clear vomiting.  Took previously prescribed Zofran 4 mg this morning with mild improvements.  No other associated symptoms.  No sore throat today.      Nausea  This is a new problem. The current episode started today. The problem occurs constantly. The problem has been unchanged. Associated symptoms include abdominal pain, nausea and vomiting. Pertinent negatives include no anorexia, arthralgias, change in bowel habit, chest pain, chills, congestion, coughing, diaphoresis, fatigue, fever, headaches, joint swelling, myalgias, neck pain, numbness, rash, sore throat, swollen glands, urinary symptoms, vertigo, visual change or weakness. Nothing aggravates the symptoms. Treatments tried: Zofran. The treatment provided mild relief.       Constitution: Negative for activity change, appetite change, chills, " sweating, fatigue, fever and generalized weakness.   HENT:  Negative for ear pain, hearing loss, facial swelling, congestion, postnasal drip, sinus pain, sinus pressure, sore throat, trouble swallowing and voice change.    Neck: Negative for neck pain, neck stiffness and painful lymph nodes.   Cardiovascular:  Negative for chest pain, leg swelling, palpitations, sob on exertion and passing out.   Eyes:  Negative for eye discharge, eye pain, photophobia, vision loss, double vision and blurred vision.   Respiratory:  Negative for chest tightness, cough, sputum production, bloody sputum, COPD, shortness of breath, stridor, wheezing and asthma.    Gastrointestinal:  Positive for abdominal pain, abdominal bloating, nausea, vomiting and heartburn. Negative for constipation, diarrhea, bright red blood in stool, rectal bleeding and bowel incontinence.   Genitourinary:  Negative for dysuria, frequency, urgency, urine decreased, flank pain, bladder incontinence and hematuria.   Musculoskeletal:  Negative for trauma, joint pain, joint swelling, abnormal ROM of joint, muscle cramps and muscle ache.   Skin:  Negative for color change, pale, rash and wound.   Allergic/Immunologic: Negative for seasonal allergies, asthma and immunocompromised state.   Neurological:  Negative for dizziness, history of vertigo, light-headedness, passing out, facial drooping, speech difficulty, coordination disturbances, loss of balance, headaches, disorientation, altered mental status, loss of consciousness, numbness, tingling and seizures.   Hematologic/Lymphatic: Negative for swollen lymph nodes, easy bruising/bleeding and trouble clotting. Does not bruise/bleed easily.   Psychiatric/Behavioral:  Negative for altered mental status and disorientation.       Objective:     Physical Exam   Constitutional: She is oriented to person, place, and time. She appears well-developed. She is cooperative. She does not appear ill. No distress.   HENT:   Head:  Normocephalic.   Ears:   Right Ear: Hearing, external ear and ear canal normal. No no drainage, swelling or tenderness. No mastoid tenderness.   Left Ear: Hearing, external ear and ear canal normal. No no drainage, swelling or tenderness. No mastoid tenderness.   Nose: Nose normal. No rhinorrhea or congestion. Right sinus exhibits no maxillary sinus tenderness and no frontal sinus tenderness. Left sinus exhibits no maxillary sinus tenderness and no frontal sinus tenderness.   Mouth/Throat: Uvula is midline, oropharynx is clear and moist and mucous membranes are normal. Mucous membranes are moist. No oral lesions. No trismus in the jaw. No uvula swelling. No oropharyngeal exudate, posterior oropharyngeal edema, posterior oropharyngeal erythema or tonsillar abscesses. No tonsillar exudate. Oropharynx is clear.   Eyes: Conjunctivae, EOM and lids are normal. Right eye exhibits no discharge. Left eye exhibits no discharge. Right conjunctiva is not injected. Right conjunctiva has no hemorrhage. Left conjunctiva is not injected. Left conjunctiva has no hemorrhage. Extraocular movement intact vision grossly intact gaze aligned appropriately   Neck: Phonation normal. Neck supple. No neck rigidity present.   Cardiovascular: Normal rate, regular rhythm, normal heart sounds and normal pulses.   No murmur heard.  Pulmonary/Chest: Effort normal and breath sounds normal. No accessory muscle usage. No respiratory distress. She has no wheezes. She exhibits no tenderness.   Abdominal: Normal appearance. She exhibits no distension. Soft. There is no abdominal tenderness. There is no rebound, no guarding, no left CVA tenderness and no right CVA tenderness.   Musculoskeletal: Normal range of motion.         General: Normal range of motion.      Comments: Moves all extremities with normal tone, strength, and ROM.  Gait normal.   Lymphadenopathy:     She has no cervical adenopathy.        Right cervical: No superficial cervical adenopathy  present.       Left cervical: No superficial cervical adenopathy present.   Neurological: no focal deficit. She is alert, oriented to person, place, and time and at baseline. She has normal motor skills and normal sensation. She displays facial symmetry and no dysarthria. She exhibits normal muscle tone. Gait and coordination normal. Coordination normal. GCS eye subscore is 4. GCS verbal subscore is 5. GCS motor subscore is 6.   Skin: Skin is warm, dry and no rash. Capillary refill takes less than 2 seconds.   Psychiatric: She experiences Normal attention. Her speech is normal and behavior is normal. Thought content normal.   Nursing note and vitals reviewed.    Results for orders placed or performed in visit on 03/22/24   POCT Urinalysis, Dipstick, Automated, W/O Scope   Result Value Ref Range    POC Blood, Urine Negative Negative    POC Bilirubin, Urine Negative Negative    POC Urobilinogen, Urine norm 0.1 - 1.1    POC Ketones, Urine Negative Negative    POC Protein, Urine Positive (A) Negative    POC Nitrates, Urine Negative Negative    POC Glucose, Urine Negative Negative    pH, UA 5.0     POC Specific Gravity, Urine 1.025 1.003 - 1.029    POC Leukocytes, Urine Positive (A) Negative   POCT urine pregnancy   Result Value Ref Range    POC Preg Test, Ur Negative Negative     Acceptable Yes      *Note: Due to a large number of results and/or encounters for the requested time period, some results have not been displayed. A complete set of results can be found in Results Review.       Assessment:     1. Other acute gastritis without hemorrhage    2. Nausea and vomiting, unspecified vomiting type    3. Adverse effect of drug, initial encounter    4. Possible exposure to STD      Note dictated with voice recognition software, please excuse any grammatical errors.    Patient presents with clinical exam findings and history consistent with above.  We discussed the differential diagnosis.    On exam, patient  is nontoxic appearing and vitals are stable.  Patient is essentially neurovascularly intact on exam.  Gastritis secondary to expected side effects of doxycycline while taking on an empty stomach.    Diagnostic testing results were independently reviewed and interpreted, which were discussed in depth with patient.   Test ordered in clinic:  UPT negative.  Urinalysis with 10 leukocytes and minimal protein.  Additionally, previous progress notes/admissions/lab were reviewed and interpreted.  Good kidney function and EGFR.  Previous progress note 03/21/2024 reviewed.    Previous PCP progress note 02/22/2024 reviewed.    Previous ED progress note 02/21/2024 reviewed.  Previous GC oral throat culture, group a molecular, influenza a and B molecular 2/20/2024 reviewed.    Plan:       Oral GC from yesterday's visit 03/21/2024 pending.  Patient is asymptomatic from an STD standpoint.  Recommend her to avoid excessive antibiotic treatment without positive lab results as there is increased risk for antibiotic resistance and side effects.  Such as current side effects of gastritis due to doxycycline.  Recommended her to discontinue doxycycline until her GC results return in the next few days.  She does have my chart.  At this time, we will treat her symptoms of gastritis .  I reiterated STD precautions and safe sex practice.  We also discussed abstinence until lab results return.  Should her GC returned positive, she will need to complete doxycycline and take concomitant symptomatic GI medication to combat the side effects.  Clinic versus ER precautions given.  Patient verbalized understanding agree plan of care.      Other acute gastritis without hemorrhage  -     POCT Urinalysis, Dipstick, Automated, W/O Scope  -     POCT urine pregnancy  -     ondansetron (ZOFRAN-ODT) 4 MG TbDL; Take 1 tablet (4 mg total) by mouth every 8 (eight) hours as needed (severe nausea and vomiting).  Dispense: 12 tablet; Refill: 0  -     dicyclomine  (BENTYL) 10 MG capsule; Take 1 capsule (10 mg total) by mouth 2 (two) times daily as needed (stomach cramping).  Dispense: 14 capsule; Refill: 0  -     famotidine (PEPCID) 20 MG tablet; Take 1 tablet (20 mg total) by mouth 2 (two) times daily. for 7 days  Dispense: 14 tablet; Refill: 0    Nausea and vomiting, unspecified vomiting type  -     ondansetron (ZOFRAN-ODT) 4 MG TbDL; Take 1 tablet (4 mg total) by mouth every 8 (eight) hours as needed (severe nausea and vomiting).  Dispense: 12 tablet; Refill: 0    Adverse effect of drug, initial encounter    Possible exposure to STD        Note dictated with voice recognition software, please excuse any grammatical errors.    We had shared decision making for patient's treatment. We discussed side effects/alternatives/benefits/risk and patient would like to proceed with treatment plan. We also discussed other OTC treatment recommendations.    Patient was counseled, explained with the test results meaning, expected course, and answered all of questions. They can also receive results via my chart.        Patient was instructed to return for re-evaluation with urgent care or PCP for continued outpatient workup and management if symptoms do not improve/worsening symptoms. Strict ED versus clinic precautions given in depth.   Guideline, discharge and follow-up instructions given verbally/printed; patient will also receive via [employer]. Patient verbalized understanding and agreed with the entirety of plan of care.       Additional MDM:     Heart Failure Score:   COPD = No        Patient Instructions   PLEASE READ YOUR DISCHARGE INSTRUCTIONS ENTIRELY AS IT CONTAINS IMPORTANT INFORMATION.  For your GI symptoms:  -Take pepcid if you have heartburn or reflux sensation.  -Take the zofran for nausea (it dissolves under your tongue)   -Take the bentyl for stomach cramping (may cause drowsiness).       Please discontinue doxycycline at this time since it is causing your GI symptoms.   Avoid any sexual interaction or intercourse until all of your STD lab results return.  You can check MyChart or contact our clinic for further recommendation.    -Use gatorade/pedialyte or rehydration packets to help stay hydrated. Vitamin water and plain water do not contain rehydrating electrolytes.  -Increase clear liquids (water, gatorade, pedialyte, broths, jello, etc) Hold off on solids for 12-18 hours. Then advance to BRAT diet (banana, rice, applesauce, tea, toast/crackers), then advance further as tolerated with plain boiled chicken. Avoid spicy or fatty foods.     -Use Peptobismol or Immodium to help alleviate your diarrhea symptoms.  Take 1 dose and monitor to see if you can repeat AS IT WILL CAUSE CONSTIPATION.    -Avoid imodium unless you have more than 6 loose stools in 24 hours.   -Take mylanta or simethicone for bloating or gas pain.       -Wash hands frequently while sick. Avoid ibuprofen or other NSAIDS until you are well.     -Please go to the ER if you experience worsening abdominal pain, blood in your vomit or stool, high fever, dizziness, fainting, swelling of your abdomen, inability to pass gas or stool, or inability to urinate.       -Please return or see your primary care doctor if you develop new or worsening symptoms in next 2-5 days.  Strict clinic versus ER precautions given.    Please arrange follow up with your primary medical clinic as soon as possible. You must understand that you've received an Urgent Care treatment only and that you may be released before all of your medical problems are known or treated. You, the patient, will arrange for follow up as instructed. If your symptoms worsen or fail to improve you should go to the Emergency Room.    WE CANNOT RULE OUT ALL POSSIBLE CAUSES OF YOUR SYMPTOMS IN THE URGENT CARE SETTING PLEASE GO TO THE ER IF YOU FEELS YOUR CONDITION IS WORSENING OR YOU WOULD LIKE EMERGENT EVALUATION.          Call 911  Call 911 if any of these  occur:  Trouble breathing  Confused  Very drowsy or trouble awakening  Fainting or loss of consciousness  Rapid heart rate  Chest pain  Seizure  Stiff neck  When to seek medical advice  Call your healthcare provider right away if any of these occur:  Increasing abdominal pain or constant lower right abdominal pain  Continued vomiting (unable to keep liquids down)  Diarrhea for more than 2 days in adults and 24 hours in children  Stools containing blood or pus or black tarry stools  Dark urine, reduced urine output  Weakness, dizziness  Drowsiness  Fever of 100.4°F (38.0°C), oral, or higher; or not better with fever medicine  New rash  If you are experiencing muscle weakness or arthritis symptoms during or after your gastroenteritis is gone      STD Screening    You were tested for sexual transmitted diseases today, we will call you in 3-7 days with the results of the testing. If you need more medication when the labs result come in, we will call you and phone them in for you.  PLEASE SET UP YOUR [email or web address] ACCOUNT SO THAT YOU CAN RECEIVE YOUR LAB RESULTS ONCE THEY RETURN.  Increase condom use to prevent further occurance.    Please remain abstinent until further notice.       IF POSITIVE:  Notify sexual partners of the need for testing.    NO sexual intercourse until given all lab results and appropriate treatment.     Complete ALL medications prescribed (if required).  Please supplement with OTC probiotics and yogurt if prescribed antibiotics.   NO sexual intercourse for 7-14 days after treatment (if required).     Retest to ensure infection has cleared-there are infections that require more agressive treatment.  Retest for all in 6 weeks (if still have symptoms) and again in 6 months to ensure true negative results.       Today's testing will give no crediable information if you have unprotected sexual activities going forward.     Today's testing will give no crediable information if you have unprotected sexual  activities going forward.     Syphillis cases are rising!  Gonorrhea has RESISTANT strains which is why repeat testing after treatment is important.  Gonorrhea may be present in multiple sites from just ONE area of exposure.      REMEMBER WEAR CONDOMS AND GET TESTED OFTEN.       Discussed diagnosis with patient as well as treatment and home care. Discussed return to clinic precautions vs ER precautions. All patients questions answered. Patient verbalized understanding. Patient agreed with plan of care.    You must understand that you have received an Urgent Care treatment only and that you may be released before all of your medical problems are known or treated.

## 2024-03-22 NOTE — PATIENT INSTRUCTIONS
PLEASE READ YOUR DISCHARGE INSTRUCTIONS ENTIRELY AS IT CONTAINS IMPORTANT INFORMATION.  For your GI symptoms:  -Take pepcid if you have heartburn or reflux sensation.  -Take the zofran for nausea (it dissolves under your tongue)   -Take the bentyl for stomach cramping (may cause drowsiness).       Please discontinue doxycycline at this time since it is causing your GI symptoms.  Avoid any sexual interaction or intercourse until all of your STD lab results return.  You can check MyChart or contact our clinic for further recommendation.    -Use gatorade/pedialyte or rehydration packets to help stay hydrated. Vitamin water and plain water do not contain rehydrating electrolytes.  -Increase clear liquids (water, gatorade, pedialyte, broths, jello, etc) Hold off on solids for 12-18 hours. Then advance to BRAT diet (banana, rice, applesauce, tea, toast/crackers), then advance further as tolerated with plain boiled chicken. Avoid spicy or fatty foods.     -Use Peptobismol or Immodium to help alleviate your diarrhea symptoms.  Take 1 dose and monitor to see if you can repeat AS IT WILL CAUSE CONSTIPATION.    -Avoid imodium unless you have more than 6 loose stools in 24 hours.   -Take mylanta or simethicone for bloating or gas pain.       -Wash hands frequently while sick. Avoid ibuprofen or other NSAIDS until you are well.     -Please go to the ER if you experience worsening abdominal pain, blood in your vomit or stool, high fever, dizziness, fainting, swelling of your abdomen, inability to pass gas or stool, or inability to urinate.       -Please return or see your primary care doctor if you develop new or worsening symptoms in next 2-5 days.  Strict clinic versus ER precautions given.    Please arrange follow up with your primary medical clinic as soon as possible. You must understand that you've received an Urgent Care treatment only and that you may be released before all of your medical problems are known or treated.  You, the patient, will arrange for follow up as instructed. If your symptoms worsen or fail to improve you should go to the Emergency Room.    WE CANNOT RULE OUT ALL POSSIBLE CAUSES OF YOUR SYMPTOMS IN THE URGENT CARE SETTING PLEASE GO TO THE ER IF YOU FEELS YOUR CONDITION IS WORSENING OR YOU WOULD LIKE EMERGENT EVALUATION.          Call 911  Call 911 if any of these occur:  Trouble breathing  Confused  Very drowsy or trouble awakening  Fainting or loss of consciousness  Rapid heart rate  Chest pain  Seizure  Stiff neck  When to seek medical advice  Call your healthcare provider right away if any of these occur:  Increasing abdominal pain or constant lower right abdominal pain  Continued vomiting (unable to keep liquids down)  Diarrhea for more than 2 days in adults and 24 hours in children  Stools containing blood or pus or black tarry stools  Dark urine, reduced urine output  Weakness, dizziness  Drowsiness  Fever of 100.4°F (38.0°C), oral, or higher; or not better with fever medicine  New rash  If you are experiencing muscle weakness or arthritis symptoms during or after your gastroenteritis is gone      STD Screening    You were tested for sexual transmitted diseases today, we will call you in 3-7 days with the results of the testing. If you need more medication when the labs result come in, we will call you and phone them in for you.  PLEASE SET UP YOUR [employer] ACCOUNT SO THAT YOU CAN RECEIVE YOUR LAB RESULTS ONCE THEY RETURN.  Increase condom use to prevent further occurance.    Please remain abstinent until further notice.       IF POSITIVE:  Notify sexual partners of the need for testing.    NO sexual intercourse until given all lab results and appropriate treatment.     Complete ALL medications prescribed (if required).  Please supplement with OTC probiotics and yogurt if prescribed antibiotics.   NO sexual intercourse for 7-14 days after treatment (if required).     Retest to ensure infection has  cleared-there are infections that require more agressive treatment.  Retest for all in 6 weeks (if still have symptoms) and again in 6 months to ensure true negative results.       Today's testing will give no crediable information if you have unprotected sexual activities going forward.     Today's testing will give no crediable information if you have unprotected sexual activities going forward.     Syphillis cases are rising!  Gonorrhea has RESISTANT strains which is why repeat testing after treatment is important.  Gonorrhea may be present in multiple sites from just ONE area of exposure.      REMEMBER WEAR CONDOMS AND GET TESTED OFTEN.       Discussed diagnosis with patient as well as treatment and home care. Discussed return to clinic precautions vs ER precautions. All patients questions answered. Patient verbalized understanding. Patient agreed with plan of care.    You must understand that you have received an Urgent Care treatment only and that you may be released before all of your medical problems are known or treated.

## 2024-03-24 ENCOUNTER — HOSPITAL ENCOUNTER (EMERGENCY)
Facility: HOSPITAL | Age: 29
Discharge: HOME | End: 2024-03-24
Attending: EMERGENCY MEDICINE
Payer: MEDICAID

## 2024-03-24 VITALS
OXYGEN SATURATION: 97 % | TEMPERATURE: 98 F | SYSTOLIC BLOOD PRESSURE: 116 MMHG | RESPIRATION RATE: 17 BRPM | WEIGHT: 293 LBS | BODY MASS INDEX: 45.99 KG/M2 | DIASTOLIC BLOOD PRESSURE: 81 MMHG | HEART RATE: 89 BPM | HEIGHT: 67 IN

## 2024-03-24 DIAGNOSIS — Z11.3 ROUTINE SCREENING FOR STI (SEXUALLY TRANSMITTED INFECTION): ICD-10-CM

## 2024-03-24 DIAGNOSIS — B96.89 BV (BACTERIAL VAGINOSIS): ICD-10-CM

## 2024-03-24 DIAGNOSIS — N76.0 BV (BACTERIAL VAGINOSIS): ICD-10-CM

## 2024-03-24 DIAGNOSIS — R07.9 CHEST PAIN: ICD-10-CM

## 2024-03-24 DIAGNOSIS — K29.70 GASTRITIS, PRESENCE OF BLEEDING UNSPECIFIED, UNSPECIFIED CHRONICITY, UNSPECIFIED GASTRITIS TYPE: Primary | ICD-10-CM

## 2024-03-24 LAB
ALBUMIN SERPL BCP-MCNC: 3.7 G/DL (ref 3.5–5.2)
ALP SERPL-CCNC: 108 U/L (ref 55–135)
ALT SERPL W/O P-5'-P-CCNC: 22 U/L (ref 10–44)
AMORPH CRY UR QL COMP ASSIST: ABNORMAL
ANION GAP SERPL CALC-SCNC: 9 MMOL/L (ref 8–16)
AST SERPL-CCNC: 21 U/L (ref 10–40)
B-HCG UR QL: NEGATIVE
BACTERIA GENITAL QL WET PREP: ABNORMAL
BASOPHILS # BLD AUTO: 0.03 K/UL (ref 0–0.2)
BASOPHILS NFR BLD: 0.3 % (ref 0–1.9)
BILIRUB SERPL-MCNC: 0.5 MG/DL (ref 0.1–1)
BILIRUB UR QL STRIP: NEGATIVE
BNP SERPL-MCNC: <10 PG/ML (ref 0–99)
BUN SERPL-MCNC: 8 MG/DL (ref 6–20)
BUN SERPL-MCNC: 8 MG/DL (ref 6–30)
CALCIUM SERPL-MCNC: 9.8 MG/DL (ref 8.7–10.5)
CHLORIDE SERPL-SCNC: 104 MMOL/L (ref 95–110)
CHLORIDE SERPL-SCNC: 105 MMOL/L (ref 95–110)
CLARITY UR REFRACT.AUTO: ABNORMAL
CLUE CELLS VAG QL WET PREP: ABNORMAL
CO2 SERPL-SCNC: 23 MMOL/L (ref 23–29)
COLOR UR AUTO: YELLOW
CREAT SERPL-MCNC: 0.8 MG/DL (ref 0.5–1.4)
CREAT SERPL-MCNC: 0.8 MG/DL (ref 0.5–1.4)
CTP QC/QA: YES
DIFFERENTIAL METHOD BLD: ABNORMAL
EOSINOPHIL # BLD AUTO: 0.1 K/UL (ref 0–0.5)
EOSINOPHIL NFR BLD: 1.3 % (ref 0–8)
ERYTHROCYTE [DISTWIDTH] IN BLOOD BY AUTOMATED COUNT: 15.2 % (ref 11.5–14.5)
EST. GFR  (NO RACE VARIABLE): >60 ML/MIN/1.73 M^2
FILAMENT FUNGI VAG WET PREP-#/AREA: ABNORMAL
GLUCOSE SERPL-MCNC: 89 MG/DL (ref 70–110)
GLUCOSE SERPL-MCNC: 92 MG/DL (ref 70–110)
GLUCOSE UR QL STRIP: NEGATIVE
HCT VFR BLD AUTO: 40.5 % (ref 37–48.5)
HCT VFR BLD CALC: 40 %PCV (ref 36–54)
HGB BLD-MCNC: 12.7 G/DL (ref 12–16)
HGB UR QL STRIP: NEGATIVE
IMM GRANULOCYTES # BLD AUTO: 0.03 K/UL (ref 0–0.04)
IMM GRANULOCYTES NFR BLD AUTO: 0.3 % (ref 0–0.5)
KETONES UR QL STRIP: ABNORMAL
LEUKOCYTE ESTERASE UR QL STRIP: NEGATIVE
LIPASE SERPL-CCNC: 14 U/L (ref 4–60)
LYMPHOCYTES # BLD AUTO: 2.7 K/UL (ref 1–4.8)
LYMPHOCYTES NFR BLD: 24.2 % (ref 18–48)
MAGNESIUM SERPL-MCNC: 2.1 MG/DL (ref 1.6–2.6)
MCH RBC QN AUTO: 25.2 PG (ref 27–31)
MCHC RBC AUTO-ENTMCNC: 31.4 G/DL (ref 32–36)
MCV RBC AUTO: 80 FL (ref 82–98)
MICROSCOPIC COMMENT: ABNORMAL
MONOCYTES # BLD AUTO: 0.7 K/UL (ref 0.3–1)
MONOCYTES NFR BLD: 6.1 % (ref 4–15)
NEUTROPHILS # BLD AUTO: 7.6 K/UL (ref 1.8–7.7)
NEUTROPHILS NFR BLD: 67.8 % (ref 38–73)
NITRITE UR QL STRIP: NEGATIVE
NRBC BLD-RTO: 0 /100 WBC
OHS QRS DURATION: 80 MS
OHS QTC CALCULATION: 394 MS
PH UR STRIP: 5 [PH] (ref 5–8)
PLATELET # BLD AUTO: 553 K/UL (ref 150–450)
PMV BLD AUTO: 9.4 FL (ref 9.2–12.9)
POC IONIZED CALCIUM: 1.24 MMOL/L (ref 1.06–1.42)
POC TCO2 (MEASURED): 26 MMOL/L (ref 23–29)
POCT GLUCOSE: 88 MG/DL (ref 70–110)
POTASSIUM BLD-SCNC: 4 MMOL/L (ref 3.5–5.1)
POTASSIUM SERPL-SCNC: 4 MMOL/L (ref 3.5–5.1)
PROT SERPL-MCNC: 7.9 G/DL (ref 6–8.4)
PROT UR QL STRIP: NEGATIVE
RBC # BLD AUTO: 5.04 M/UL (ref 4–5.4)
SAMPLE: NORMAL
SODIUM BLD-SCNC: 140 MMOL/L (ref 136–145)
SODIUM SERPL-SCNC: 137 MMOL/L (ref 136–145)
SP GR UR STRIP: 1.03 (ref 1–1.03)
SPECIMEN SOURCE: ABNORMAL
T VAGINALIS GENITAL QL WET PREP: ABNORMAL
TSH SERPL DL<=0.005 MIU/L-ACNC: 0.59 UIU/ML (ref 0.4–4)
URN SPEC COLLECT METH UR: ABNORMAL
WBC # BLD AUTO: 11.19 K/UL (ref 3.9–12.7)
WBC #/AREA VAG WET PREP: ABNORMAL
YEAST GENITAL QL WET PREP: ABNORMAL

## 2024-03-24 PROCEDURE — 63600175 PHARM REV CODE 636 W HCPCS: Performed by: PHYSICIAN ASSISTANT

## 2024-03-24 PROCEDURE — 93010 ELECTROCARDIOGRAM REPORT: CPT | Mod: ,,, | Performed by: INTERNAL MEDICINE

## 2024-03-24 PROCEDURE — 82962 GLUCOSE BLOOD TEST: CPT

## 2024-03-24 PROCEDURE — 80047 BASIC METABLC PNL IONIZED CA: CPT

## 2024-03-24 PROCEDURE — 83735 ASSAY OF MAGNESIUM: CPT | Performed by: PHYSICIAN ASSISTANT

## 2024-03-24 PROCEDURE — 96374 THER/PROPH/DIAG INJ IV PUSH: CPT

## 2024-03-24 PROCEDURE — 99285 EMERGENCY DEPT VISIT HI MDM: CPT | Mod: 25

## 2024-03-24 PROCEDURE — 84443 ASSAY THYROID STIM HORMONE: CPT | Performed by: PHYSICIAN ASSISTANT

## 2024-03-24 PROCEDURE — 85025 COMPLETE CBC W/AUTO DIFF WBC: CPT | Performed by: PHYSICIAN ASSISTANT

## 2024-03-24 PROCEDURE — 87491 CHLMYD TRACH DNA AMP PROBE: CPT | Mod: 59 | Performed by: PHYSICIAN ASSISTANT

## 2024-03-24 PROCEDURE — 96361 HYDRATE IV INFUSION ADD-ON: CPT

## 2024-03-24 PROCEDURE — 80053 COMPREHEN METABOLIC PANEL: CPT | Performed by: PHYSICIAN ASSISTANT

## 2024-03-24 PROCEDURE — 93005 ELECTROCARDIOGRAM TRACING: CPT

## 2024-03-24 PROCEDURE — 81001 URINALYSIS AUTO W/SCOPE: CPT | Performed by: PHYSICIAN ASSISTANT

## 2024-03-24 PROCEDURE — 83690 ASSAY OF LIPASE: CPT | Performed by: PHYSICIAN ASSISTANT

## 2024-03-24 PROCEDURE — 83880 ASSAY OF NATRIURETIC PEPTIDE: CPT | Performed by: PHYSICIAN ASSISTANT

## 2024-03-24 PROCEDURE — 81025 URINE PREGNANCY TEST: CPT | Performed by: PHYSICIAN ASSISTANT

## 2024-03-24 PROCEDURE — 87591 N.GONORRHOEAE DNA AMP PROB: CPT | Performed by: PHYSICIAN ASSISTANT

## 2024-03-24 PROCEDURE — 25000003 PHARM REV CODE 250: Performed by: PHYSICIAN ASSISTANT

## 2024-03-24 PROCEDURE — 87210 SMEAR WET MOUNT SALINE/INK: CPT | Performed by: PHYSICIAN ASSISTANT

## 2024-03-24 RX ORDER — METRONIDAZOLE 7.5 MG/G
GEL TOPICAL
Qty: 45 G | Refills: 0 | Status: SHIPPED | OUTPATIENT
Start: 2024-03-24 | End: 2024-06-06

## 2024-03-24 RX ORDER — PANTOPRAZOLE SODIUM 20 MG/1
20 TABLET, DELAYED RELEASE ORAL DAILY
Qty: 14 TABLET | Refills: 0 | Status: SHIPPED | OUTPATIENT
Start: 2024-03-24 | End: 2024-04-25 | Stop reason: DRUGHIGH

## 2024-03-24 RX ORDER — ALUMINUM HYDROXIDE, MAGNESIUM HYDROXIDE, AND SIMETHICONE 1200; 120; 1200 MG/30ML; MG/30ML; MG/30ML
30 SUSPENSION ORAL
Status: COMPLETED | OUTPATIENT
Start: 2024-03-24 | End: 2024-03-24

## 2024-03-24 RX ORDER — METRONIDAZOLE 7.5 MG/G
GEL TOPICAL
Qty: 45 G | Refills: 0 | Status: SHIPPED | OUTPATIENT
Start: 2024-03-24 | End: 2024-03-24

## 2024-03-24 RX ORDER — PANTOPRAZOLE SODIUM 20 MG/1
20 TABLET, DELAYED RELEASE ORAL DAILY
Qty: 14 TABLET | Refills: 0 | Status: SHIPPED | OUTPATIENT
Start: 2024-03-24 | End: 2024-03-24

## 2024-03-24 RX ORDER — FAMOTIDINE 20 MG/1
20 TABLET, FILM COATED ORAL
Status: COMPLETED | OUTPATIENT
Start: 2024-03-24 | End: 2024-03-24

## 2024-03-24 RX ORDER — ONDANSETRON HYDROCHLORIDE 2 MG/ML
4 INJECTION, SOLUTION INTRAVENOUS
Status: COMPLETED | OUTPATIENT
Start: 2024-03-24 | End: 2024-03-24

## 2024-03-24 RX ADMIN — ALUMINUM HYDROXIDE, MAGNESIUM HYDROXIDE, AND SIMETHICONE 30 ML: 1200; 120; 1200 SUSPENSION ORAL at 08:03

## 2024-03-24 RX ADMIN — SODIUM CHLORIDE 1000 ML: 9 INJECTION, SOLUTION INTRAVENOUS at 08:03

## 2024-03-24 RX ADMIN — ONDANSETRON 4 MG: 2 INJECTION INTRAMUSCULAR; INTRAVENOUS at 08:03

## 2024-03-24 RX ADMIN — FAMOTIDINE 20 MG: 20 TABLET, FILM COATED ORAL at 08:03

## 2024-03-24 NOTE — ED NOTES
"Patient states seen at  and treated for possible STD exposure, took IM meds and Doxy Thursday, currently feels like her body is " working against me"   "

## 2024-03-24 NOTE — ED NOTES
I-STAT Chem-8+ Results:   Value Reference Range   Sodium 140 136-145 mmol/L   Potassium  4.0 3.5-5.1 mmol/L   Chloride 104  mmol/L   Ionized Calcium 1.24 1.06-1.42 mmol/L   CO2 (measured) 26 23-29 mmol/L   Glucose 92  mg/dL   BUN 8 6-30 mg/dL   Creatinine 0.8 0.5-1.4 mg/dL   Hematocrit 40 36-54%

## 2024-03-24 NOTE — ED NOTES
Patient identifiers verified and correct for  MS Garcia  C/C: Abd pain SEE NN  APPEARANCE: awake and alert in NAD. PAIN  4/10  SKIN: warm, dry and intact. No breakdown or bruising.  MUSCULOSKELETAL: Patient moving all extremities spontaneously, no obvious swelling or deformities noted. Ambulates independently.  RESPIRATORY: Denies shortness of breath.Respirations unlabored.   CARDIAC: Denies CP, 2+ distal pulses; no peripheral edema  ABDOMEN: S/ND/NT, Denies nausea  : voids spontaneously, denies difficulty  Neurologic: AAO x 4; follows commands equal strength in all extremities; denies numbness/tingling. Denies dizziness  Denies new weakness

## 2024-03-24 NOTE — ED PROVIDER NOTES
Encounter Date: 3/24/2024       History     Chief Complaint   Patient presents with    Multiple complaints     Abd pain, heavy period on 23 rd feb, stopped doxy yest for unprotected sex, diabetic on ozempic, feeling shakey     29 y.o. Female with a PMHx of morbid obesity, sleep apnea, T2DM, HFmrEF, anxiety, bipolar, ADHD, asthma presents to the ED with multiple complaints.  Three days ago she was empirically treated at urgent care with Rocephin and doxycycline for STIs after having oral sex the night before and a sore throat.  She took 4 doses of the doxycycline, though discontinued it as she felt bad.  She developed upper abdominal pain, nausea, 1 episode of vomiting, multiple bowel movements, fatigue.  She did not take antibiotics yesterday though continues to feel bad.  Shes on Ozempic since 01/2024, denies recent dose adjustments.  She has a normal appetite.  Also notes urinary frequency and vaginal itching since taking antibiotics. She denies fever, sore throat, bloody stool, melena, vaginal bleeding/discharge/pain     She also notes intermittent chest pressure last night.  Episode lasted 30 minutes.  She had associated palpitations. She does not currently have chest pain.  She denies shortness of breath, lower extremity swelling, syncopal episodes, cough, wheezing.         The history is provided by the patient.     Review of patient's allergies indicates:  No Known Allergies  Past Medical History:   Diagnosis Date    ADHD (attention deficit hyperactivity disorder)     Anxiety     Asthma     Bipolar affective     Eczema     History of chlamydia 12/2016    Insomnia     Morbid obesity with BMI of 40.0-44.9, adult     Sleep disorder     Type 2 diabetes mellitus without complications      Past Surgical History:   Procedure Laterality Date    NO PAST SURGERIES      as of 1-13-17     Family History   Problem Relation Age of Onset    No Known Problems Paternal Grandfather     Breast cancer Paternal Grandmother      Diabetes Maternal Grandmother     No Known Problems Maternal Grandfather     No Known Problems Father     No Known Problems Mother     No Known Problems Sister     Colon cancer Neg Hx     Ovarian cancer Neg Hx      Social History     Tobacco Use    Smoking status: Never    Smokeless tobacco: Never    Tobacco comments:     boyfriend smoke cigarettes   Substance Use Topics    Alcohol use: No    Drug use: Not Currently     Types: Marijuana     Review of Systems   Constitutional:  Positive for fatigue. Negative for appetite change and fever.   HENT:  Negative for congestion and sore throat.    Respiratory:  Negative for cough and shortness of breath.    Cardiovascular:  Positive for chest pain. Negative for palpitations and leg swelling.   Gastrointestinal:  Positive for abdominal pain, nausea and vomiting. Negative for blood in stool, constipation and diarrhea.   Genitourinary:  Positive for frequency. Negative for dysuria, pelvic pain, vaginal bleeding, vaginal discharge and vaginal pain.       Physical Exam     Initial Vitals [03/24/24 0756]   BP Pulse Resp Temp SpO2   (!) 153/89 73 18 98.3 °F (36.8 °C) 98 %      MAP       --         Physical Exam    Nursing note and vitals reviewed.  Constitutional: She appears well-developed and well-nourished. She is not diaphoretic. No distress.   HENT:   Head: Normocephalic and atraumatic.   Nose: Nose normal.   Eyes: Conjunctivae and EOM are normal.   Neck: Neck supple.   Cardiovascular:  Normal rate, regular rhythm, normal heart sounds and intact distal pulses.           Pulmonary/Chest: Breath sounds normal. No respiratory distress.   Abdominal: There is abdominal tenderness in the epigastric area.   No lower abdominal tenderness There is no guarding and negative Babcock's sign.   Musculoskeletal:      Cervical back: Neck supple.     Neurological: She is alert and oriented to person, place, and time. Gait normal.   Skin: No rash noted.   Psychiatric: She has a normal mood and  affect. Her behavior is normal. Judgment and thought content normal.         ED Course   Procedures  Labs Reviewed   VAGINAL SCREEN - Abnormal; Notable for the following components:       Result Value    Clue Cells Rare (*)     Bacteria - Vaginal Screen Occasional (*)     All other components within normal limits    Narrative:     Release to patient->Immediate   CBC W/ AUTO DIFFERENTIAL - Abnormal; Notable for the following components:    MCV 80 (*)     MCH 25.2 (*)     MCHC 31.4 (*)     RDW 15.2 (*)     Platelets 553 (*)     All other components within normal limits   URINALYSIS, REFLEX TO URINE CULTURE - Abnormal; Notable for the following components:    Appearance, UA Cloudy (*)     Ketones, UA Trace (*)     All other components within normal limits    Narrative:     Specimen Source->Urine   URINALYSIS MICROSCOPIC - Abnormal; Notable for the following components:    Amorphous, UA Many (*)     All other components within normal limits    Narrative:     Specimen Source->Urine   C. TRACHOMATIS/N. GONORRHOEAE BY AMP DNA   COMPREHENSIVE METABOLIC PANEL   LIPASE   MAGNESIUM   B-TYPE NATRIURETIC PEPTIDE   TSH   C.TRACH/N.GONOR AMP RNA, VARIES   POCT URINE PREGNANCY   POCT GLUCOSE   ISTAT PROCEDURE     EKG Readings: (Independently Interpreted)   Initial Reading: No STEMI. Previous EKG: Compared with most recent EKG Previous EKG Date: 3/14/2024. Rhythm: Normal Sinus Rhythm. Heart Rate: 83. T Waves Flipped: III and V3. Axis: Normal. Clinical Impression: Normal Sinus Rhythm     ECG Results              EKG 12-lead (Final result)        Collection Time Result Time QRS Duration OHS QTC Calculation    03/24/24 08:21:43 03/24/24 09:23:28 80 394                     Final result by Interface, Lab In [facility] (03/24/24 09:23:35)                   Narrative:    Test Reason : R07.9,    Vent. Rate : 083 BPM     Atrial Rate : 083 BPM     P-R Int : 138 ms          QRS Dur : 080 ms      QT Int : 336 ms       P-R-T Axes : 015 016 -13  degrees     QTc Int : 394 ms    Normal sinus rhythm  Normal ECG  When compared with ECG of 14-MAR-2024 09:21,  No significant change was found  Confirmed by Sid MARTEL MD ([id number]) on 3/24/2024 9:23:26 AM    Referred By:             Confirmed By:Sid MARTEL MD                                  Imaging Results              X-Ray Chest AP Portable (Final result)  Result time 03/24/24 09:10:17      Final result by Larry Majano MD (03/24/24 09:10:17)                   Impression:      Unremarkable examination.      Electronically signed by: Larry Majano  Date:    03/24/2024  Time:    09:10               Narrative:    EXAMINATION:  XR CHEST AP PORTABLE    CLINICAL HISTORY:  Chest pain, unspecified    TECHNIQUE:  Single frontal view of the chest was performed.    COMPARISON:  Chest radiograph 01/24/2024    FINDINGS:  Lines and tubes: None.    Heart and mediastinum: Unremarkable.    Pleura: No pleural effusion or pneumothorax.    Lungs: Lungs are well inflated. No focal consolidations or evidence of pulmonary edema.    Soft tissue/bone: Unremarkable.                                       Medications   sodium chloride 0.9% bolus 1,000 mL 1,000 mL (0 mLs Intravenous Stopped 3/24/24 0950)   ondansetron injection 4 mg (4 mg Intravenous Given 3/24/24 0854)   famotidine tablet 20 mg (20 mg Oral Given 3/24/24 0851)   aluminum-magnesium hydroxide-simethicone 200-200-20 mg/5 mL suspension 30 mL (30 mLs Oral Given 3/24/24 0851)     Medical Decision Making  29 y.o. Female with a PMHx of morbid obesity, sleep apnea, T2DM, HFmrEF, anxiety, bipolar, ADHD, asthma presents to the ED with multiple complaints.  Nontoxic appearing.  Vitals with hypertension. Afebrile. Exam as above. I will initiate workup and reassess.    Ddx:  GERD/gastritis, gastroenteritis, hypoglycemia, vaginitis, anxiety, arrhythmia, ACS/CHF, asthma exacerbation, dehydration, electrolyte derangement, medication side effect    Labs without leukocytosis. No anemia, H&H  stable.     Urine negative for signs of infection including nitrites, leukocytes, blood. Vaginal screen with clue cells, will give metrogel for BV given she is symptomatic.  Gonorrhea/chlamydia testing of throat was not initially preformed, I will obtain swabs at this time.     EKG with NSR. T wave inversions in V3 and III though present when compared to prior. No St segmental depression or elevation. Denies chest pain currently. Recently evaluated by cardiology who does not believe that chest pain is cardiac in nature. CXR without infiltrate, effusion, pneumothorax, mass or other acute findings. Normal TSH. No significant electrolyte derangements that may cause palpations. I do not suspect cardiac or pulmonary pathology at this time. I suspect chest pain is from gastritis.    On reassessment, her symptoms improved with GI cocktail. I suspect her symptoms are caused by gastritis 2/2 doxycycline. She is overall well appearing. She is scheduled for EGD in one month. I will start on PPI therapy at this time. I believe she is stable for discharge. Strict ED precautions given to return immediately for new, worsening, or concerning symptoms    Amount and/or Complexity of Data Reviewed  Labs: ordered. Decision-making details documented in ED Course.  Radiology: ordered.    Risk  OTC drugs.  Prescription drug management.               ED Course as of 03/24/24 1326   Sun Mar 24, 2024   0908 WBC: 11.19 [HM]   0908 Hemoglobin: 12.7 [HM]   0908 Hematocrit: 40.5 [HM]   0908 Blood, UA: Negative [HM]   0908 NITRITE UA: Negative [HM]   0908 Leukocyte Esterase, UA: Negative [HM]   0908 hCG Qualitative, Urine: Negative [HM]   0908 BNP: <10 [HM]   0917 Clue Cells, Wet Prep(!): Rare [HM]   0917 Bacteria - Vaginal Screen(!): Occasional [HM]      ED Course User Index  [HM] Lianne Lechuga PA-C                           Clinical Impression:  Final diagnoses:  [R07.9] Chest pain  [K29.70] Gastritis, presence of bleeding unspecified,  unspecified chronicity, unspecified gastritis type (Primary)  [N76.0, B96.89] BV (bacterial vaginosis)  [Z11.3] Routine screening for STI (sexually transmitted infection)          ED Disposition Condition    Discharge Stable          ED Prescriptions       Medication Sig Dispense Start Date End Date Auth. Provider    pantoprazole (PROTONIX) 20 MG tablet  (Status: Discontinued) Take 1 tablet (20 mg total) by mouth once daily. for 14 days 14 tablet 3/24/2024 3/24/2024 Lianne Lechuga PA-C    metroNIDAZOLE (METROGEL) 0.75 % gel  (Status: Discontinued) Use one vaginal applicatorful once daily at bedtime for 5 days 45 g 3/24/2024 3/24/2024 Lianne Lechuga PA-C    metroNIDAZOLE (METROGEL) 0.75 % gel Use one vaginal applicatorful once daily at bedtime for 5 days 45 g 3/24/2024 -- Lianne Lechuga PA-C    pantoprazole (PROTONIX) 20 MG tablet Take 1 tablet (20 mg total) by mouth once daily. for 14 days 14 tablet 3/24/2024 4/7/2024 Lianne Lechuga PA-C          Follow-up Information    None          Lianne Lechuga PA-C  03/24/24 [id number]

## 2024-03-24 NOTE — DISCHARGE INSTRUCTIONS
I suspect symptoms are caused by gastritis secondary to doxycycline use.  Protonix prescribed for this.  It takes a few days for this medication to work.  You may take Pepcid with this medication for pain relief in the meantime    Gonorrhea and chlamydia throat swabs were obtained today.  If your test are positive you will receive a call    Take Protonix 30 minutes before breakfast with water    Please follow-up with your gastroenterologist regarding upcoming EGD and Protonix use    Signs of bacterial vaginitis were noted on your vaginal screen.  Metrogel prescribed today.  This is a intravaginal gel use nightly for 5 days    Avoid NSAID use (ibuprofen), alcohol, spicy foods, acidic foods( ex tomato sauce, orange juice) to prevent worsening of gastritis    Strict ED precautions given to return immediately for new, worsening, or concerning symptoms

## 2024-03-25 ENCOUNTER — OFFICE VISIT (OUTPATIENT)
Dept: FAMILY MEDICINE | Facility: CLINIC | Age: 29
End: 2024-03-25
Payer: MEDICAID

## 2024-03-25 ENCOUNTER — PATIENT MESSAGE (OUTPATIENT)
Dept: SLEEP MEDICINE | Facility: CLINIC | Age: 29
End: 2024-03-25
Payer: MEDICAID

## 2024-03-25 ENCOUNTER — PATIENT MESSAGE (OUTPATIENT)
Dept: OBSTETRICS AND GYNECOLOGY | Facility: CLINIC | Age: 29
End: 2024-03-25
Payer: MEDICAID

## 2024-03-25 VITALS
OXYGEN SATURATION: 98 % | BODY MASS INDEX: 45.99 KG/M2 | WEIGHT: 293 LBS | DIASTOLIC BLOOD PRESSURE: 80 MMHG | HEIGHT: 67 IN | SYSTOLIC BLOOD PRESSURE: 110 MMHG | HEART RATE: 86 BPM

## 2024-03-25 DIAGNOSIS — G47.33 OSA ON CPAP: ICD-10-CM

## 2024-03-25 DIAGNOSIS — Z00.01 ENCOUNTER FOR GENERAL ADULT MEDICAL EXAMINATION WITH ABNORMAL FINDINGS: Primary | ICD-10-CM

## 2024-03-25 DIAGNOSIS — K59.03 DRUG-INDUCED CONSTIPATION: ICD-10-CM

## 2024-03-25 DIAGNOSIS — E66.01 CLASS 3 SEVERE OBESITY DUE TO EXCESS CALORIES WITH SERIOUS COMORBIDITY AND BODY MASS INDEX (BMI) OF 50.0 TO 59.9 IN ADULT: ICD-10-CM

## 2024-03-25 DIAGNOSIS — E11.65 TYPE 2 DIABETES MELLITUS WITH HYPERGLYCEMIA, WITHOUT LONG-TERM CURRENT USE OF INSULIN: ICD-10-CM

## 2024-03-25 DIAGNOSIS — R10.13 EPIGASTRIC PAIN: ICD-10-CM

## 2024-03-25 DIAGNOSIS — F31.9 BIPOLAR 1 DISORDER: ICD-10-CM

## 2024-03-25 DIAGNOSIS — E11.59 HYPERTENSION ASSOCIATED WITH DIABETES: ICD-10-CM

## 2024-03-25 DIAGNOSIS — I15.2 HYPERTENSION ASSOCIATED WITH DIABETES: ICD-10-CM

## 2024-03-25 LAB
C TRACH DNA SPEC QL NAA+PROBE: NOT DETECTED
N GONORRHOEA DNA SPEC QL NAA+PROBE: NOT DETECTED

## 2024-03-25 PROCEDURE — 99214 OFFICE O/P EST MOD 30 MIN: CPT | Mod: PBBFAC,PO | Performed by: FAMILY MEDICINE

## 2024-03-25 PROCEDURE — 1160F RVW MEDS BY RX/DR IN RCRD: CPT | Mod: CPTII,,, | Performed by: FAMILY MEDICINE

## 2024-03-25 PROCEDURE — 3008F BODY MASS INDEX DOCD: CPT | Mod: CPTII,,, | Performed by: FAMILY MEDICINE

## 2024-03-25 PROCEDURE — 4010F ACE/ARB THERAPY RXD/TAKEN: CPT | Mod: CPTII,,, | Performed by: FAMILY MEDICINE

## 2024-03-25 PROCEDURE — 3079F DIAST BP 80-89 MM HG: CPT | Mod: CPTII,,, | Performed by: FAMILY MEDICINE

## 2024-03-25 PROCEDURE — 1159F MED LIST DOCD IN RCRD: CPT | Mod: CPTII,,, | Performed by: FAMILY MEDICINE

## 2024-03-25 PROCEDURE — 99395 PREV VISIT EST AGE 18-39: CPT | Mod: S$PBB,,, | Performed by: FAMILY MEDICINE

## 2024-03-25 PROCEDURE — 3074F SYST BP LT 130 MM HG: CPT | Mod: CPTII,,, | Performed by: FAMILY MEDICINE

## 2024-03-25 PROCEDURE — 99999 PR PBB SHADOW E&M-EST. PATIENT-LVL IV: CPT | Mod: PBBFAC,,, | Performed by: FAMILY MEDICINE

## 2024-03-25 PROCEDURE — 3044F HG A1C LEVEL LT 7.0%: CPT | Mod: CPTII,,, | Performed by: FAMILY MEDICINE

## 2024-03-25 RX ORDER — CLINDAMYCIN HYDROCHLORIDE 300 MG/1
300 CAPSULE ORAL 2 TIMES DAILY
Qty: 14 CAPSULE | Refills: 0 | Status: SHIPPED | OUTPATIENT
Start: 2024-03-25 | End: 2024-04-01

## 2024-03-25 NOTE — PROGRESS NOTES
"Subjective:         Patient ID: Misael Garcia is a 29 y.o. female.    Chief Complaint: Exposure to STD    Patient Active Problem List   Diagnosis    Corns and callus    Class 3 severe obesity due to excess calories with serious comorbidity and body mass index (BMI) of 50.0 to 59.9 in adult    Bipolar 1 disorder    Type 2 diabetes mellitus with hyperglycemia, without long-term current use of insulin    Recently quit using tobacco    Hypertension associated with diabetes    Oral contraceptive pill surveillance    Thrombocytosis    SID on CPAP    Mild persistent asthma without complication    Cervical spondylosis without myelopathy    Lumbosacral spondylosis without myelopathy    Posttraumatic stress disorder    Thoracic facet syndrome    Heart failure with mildly reduced ejection fraction (HFmrEF)    Abnormal echocardiogram    Other chest pain    Palpitations    Marijuana use      Exposure to STD       Misael is a 29 y.o. female who presents today for follow up.   Was in ED - STI exposure s/p oral sex and abnormal throat sensation. Empirically treated.     Has been going to the park and gym 4-5 times per week.   Has been doing weight machines and treadmill.   Walking around 1 mile in park.   Losing weight on GLP1a.     Reports right after eating, will overeat, epigastric pain. No vomiting. Chronic constipation.   No fever/chills.   With recent abx vomited but not ongoing.     Review of Systems   All other systems reviewed and are negative.       Objective:     Vitals:    03/25/24 1238   BP: 110/80   BP Location: Left arm   Patient Position: Sitting   BP Method: Medium (Manual)   Pulse: 86   SpO2: 98%   Weight: (!) 149.5 kg (329 lb 9.4 oz)   Height: 5' 7" (1.702 m)         Physical Exam  Vitals and nursing note reviewed.   Constitutional:       General: She is not in acute distress.     Appearance: Normal appearance. She is not ill-appearing, toxic-appearing or diaphoretic.   HENT:      Head: Normocephalic " and atraumatic.   Eyes:      General: No scleral icterus.     Conjunctiva/sclera: Conjunctivae normal.   Cardiovascular:      Rate and Rhythm: Normal rate.   Pulmonary:      Effort: Pulmonary effort is normal. No respiratory distress.   Skin:     Coloration: Skin is not pale.   Neurological:      Mental Status: She is alert. Mental status is at baseline.   Psychiatric:         Attention and Perception: Attention and perception normal.         Mood and Affect: Mood and affect normal.         Speech: Speech normal.         Behavior: Behavior normal.         Cognition and Memory: Cognition and memory normal.         Judgment: Judgment normal.       Assessment:       1. Encounter for general adult medical examination with abnormal findings    2. Drug-induced constipation    3. Epigastric pain    4. Type 2 diabetes mellitus with hyperglycemia, without long-term current use of insulin    5. Class 3 severe obesity due to excess calories with serious comorbidity and body mass index (BMI) of 50.0 to 59.9 in adult    6. Hypertension associated with diabetes    7. SID on CPAP    8. Bipolar 1 disorder          Plan:   Recent relevant labs results reviewed with patient.     Chronic medical problems overall improving with weight loss.   Continue same.   Patient nervous about gastric bypass and does not want to proceed. EGD scheduled with MARYAM as part of preop with recent symptoms, but appears GLP1a related.   Making progress with GLP1a, continue.         1. Drug-induced constipation  2. Epigastric pain  Due to GLP1a. Discussion of reducing dose to 1 mg vs watching portions.   Patient opts to continue on 2 mg.   Expectations and precautions around GLP1a use.     3. Type 2 diabetes mellitus with hyperglycemia, without long-term current use of insulin  Controlled, chronic    4. Class 3 severe obesity due to excess calories with serious comorbidity and body mass index (BMI) of 50.0 to 59.9 in adult  Improvement on GLP1a,  continue.  Doing great! Continue efforts to increase physical activity. Explore more food options, new recipes/vegetables.     5. Hypertension associated with diabetes  - Chronic health condition is stable and controlled. Continue current medication regimen and relevant lifestyle modifications. Necessary medication refills addressed. Routine ongoing surveillance monitoring.     6. SID on CPAP  Stable, continue    7. Bipolar 1 disorder  Continue per psychiatry    Patient's questions answered. Plan reviewed with patient at the end of visit. Relevant precautions to chief complaint and reasons to seek further medical care or to contact the office sooner reviewed with patient.     Follow up in about 4 months (around 7/25/2024) for Diabetes Follow-up, Hypertension Follow-up, Weight Follow-up (prelabs).

## 2024-03-26 ENCOUNTER — LAB VISIT (OUTPATIENT)
Dept: LAB | Facility: HOSPITAL | Age: 29
End: 2024-03-26
Attending: STUDENT IN AN ORGANIZED HEALTH CARE EDUCATION/TRAINING PROGRAM
Payer: MEDICAID

## 2024-03-26 ENCOUNTER — PATIENT MESSAGE (OUTPATIENT)
Dept: OBSTETRICS AND GYNECOLOGY | Facility: CLINIC | Age: 29
End: 2024-03-26
Payer: MEDICAID

## 2024-03-26 DIAGNOSIS — Z11.3 SCREENING EXAMINATION FOR STD (SEXUALLY TRANSMITTED DISEASE): Primary | ICD-10-CM

## 2024-03-26 DIAGNOSIS — Z11.3 SCREENING EXAMINATION FOR STD (SEXUALLY TRANSMITTED DISEASE): ICD-10-CM

## 2024-03-26 LAB
C TRACH RRNA SPEC QL NAA+PROBE: NEGATIVE
HAV IGM SERPL QL IA: NORMAL
HBV CORE IGM SERPL QL IA: NORMAL
HBV SURFACE AG SERPL QL IA: NORMAL
HCV AB SERPL QL IA: NORMAL
HIV 1+2 AB+HIV1 P24 AG SERPL QL IA: NORMAL
N GONORRHOEA RRNA SPEC QL NAA+PROBE: NEGATIVE
N.GONORROHEAE, AMP RNA SOURCE: NORMAL
SPECIMEN SOURCE: NORMAL

## 2024-03-26 PROCEDURE — 86592 SYPHILIS TEST NON-TREP QUAL: CPT | Performed by: STUDENT IN AN ORGANIZED HEALTH CARE EDUCATION/TRAINING PROGRAM

## 2024-03-26 PROCEDURE — 80074 ACUTE HEPATITIS PANEL: CPT | Performed by: STUDENT IN AN ORGANIZED HEALTH CARE EDUCATION/TRAINING PROGRAM

## 2024-03-26 PROCEDURE — 87491 CHLMYD TRACH DNA AMP PROBE: CPT | Performed by: STUDENT IN AN ORGANIZED HEALTH CARE EDUCATION/TRAINING PROGRAM

## 2024-03-26 PROCEDURE — 36415 COLL VENOUS BLD VENIPUNCTURE: CPT | Performed by: STUDENT IN AN ORGANIZED HEALTH CARE EDUCATION/TRAINING PROGRAM

## 2024-03-26 PROCEDURE — 87389 HIV-1 AG W/HIV-1&-2 AB AG IA: CPT | Performed by: STUDENT IN AN ORGANIZED HEALTH CARE EDUCATION/TRAINING PROGRAM

## 2024-03-27 LAB
C TRACH DNA SPEC QL NAA+PROBE: NOT DETECTED
N GONORRHOEA DNA SPEC QL NAA+PROBE: NOT DETECTED
RPR SER QL: NORMAL

## 2024-03-31 ENCOUNTER — HOSPITAL ENCOUNTER (EMERGENCY)
Facility: HOSPITAL | Age: 29
Discharge: HOME | End: 2024-03-31
Attending: EMERGENCY MEDICINE
Payer: MEDICAID

## 2024-03-31 VITALS
TEMPERATURE: 98 F | RESPIRATION RATE: 15 BRPM | SYSTOLIC BLOOD PRESSURE: 130 MMHG | DIASTOLIC BLOOD PRESSURE: 91 MMHG | OXYGEN SATURATION: 97 % | BODY MASS INDEX: 45.99 KG/M2 | WEIGHT: 293 LBS | HEIGHT: 67 IN | HEART RATE: 82 BPM

## 2024-03-31 DIAGNOSIS — N64.4 NIPPLE PAIN: Primary | ICD-10-CM

## 2024-03-31 PROCEDURE — 99284 EMERGENCY DEPT VISIT MOD MDM: CPT

## 2024-03-31 RX ORDER — MUPIROCIN 20 MG/G
OINTMENT TOPICAL 2 TIMES DAILY
Qty: 15 G | Refills: 0 | Status: SHIPPED | OUTPATIENT
Start: 2024-03-31 | End: 2024-06-06

## 2024-03-31 RX ORDER — CLINDAMYCIN PHOSPHATE 20 MG/G
CREAM VAGINAL NIGHTLY
Qty: 40 G | Refills: 0 | Status: SHIPPED | OUTPATIENT
Start: 2024-03-31 | End: 2024-04-07

## 2024-03-31 NOTE — ED PROVIDER NOTES
"Encounter Date: 3/31/2024       History     Chief Complaint   Patient presents with    Breast Discharge     Patient reports right nipple piercing x two months ago. Endorses "snagging" her nipple and now reports white discharge, pain, and redness to area. Patient is also reporting N/V x two days.      29-year-old female with medical history significant for DM type 2, morbid obesity, bipolar affective disorder, anxiety, ADHD presents to the ED for evaluation of breast pain and discharge.  Patient has a nipple piercing that was done in February.  She states that she snagged the piercing about 4 days ago.  She noticed pain, redness and pus draining from the area 2 days ago.  She also notes that she is currently on oral clindamycin for BV infection, but this medication has caused vomiting.  She denies fever or other acute complaints.       Review of patient's allergies indicates:  No Known Allergies  Past Medical History:   Diagnosis Date    ADHD (attention deficit hyperactivity disorder)     Anxiety     Asthma     Bipolar affective     Eczema     History of chlamydia 12/2016    Insomnia     Morbid obesity with BMI of 40.0-44.9, adult     Sleep disorder     Type 2 diabetes mellitus without complications      Past Surgical History:   Procedure Laterality Date    NO PAST SURGERIES      as of 1-13-17     Family History   Problem Relation Age of Onset    No Known Problems Paternal Grandfather     Breast cancer Paternal Grandmother     Diabetes Maternal Grandmother     No Known Problems Maternal Grandfather     No Known Problems Father     No Known Problems Mother     No Known Problems Sister     Colon cancer Neg Hx     Ovarian cancer Neg Hx      Social History     Tobacco Use    Smoking status: Former     Types: Vaping with nicotine     Passive exposure: Past    Smokeless tobacco: Never    Tobacco comments:     boyfriend smoke cigarettes     Former weed consumption   Substance Use Topics    Alcohol use: No    Drug use: Not " Currently     Types: Marijuana     Review of Systems   Musculoskeletal:         Breast pain, discharge       Physical Exam     Initial Vitals [03/31/24 0833]   BP Pulse Resp Temp SpO2   (!) 130/91 82 15 97.9 °F (36.6 °C) 97 %      MAP       --         Physical Exam    Nursing note and vitals reviewed.  Constitutional: She appears well-developed and well-nourished. She is not diaphoretic. She is Obese .  Non-toxic appearance. She does not appear ill. No distress.   HENT:   Head: Normocephalic and atraumatic.   Neck: Neck supple.   Cardiovascular:  Normal rate and regular rhythm.     Exam reveals no gallop and no friction rub.       No murmur heard.  Pulmonary/Chest: Effort normal and breath sounds normal. No accessory muscle usage. No tachypnea. No respiratory distress. She has no decreased breath sounds. She has no wheezes. She has no rhonchi. She has no rales.   Patient has bilateral nipple piercings.  There is very scant dried cloudy drainage at piercing site.  No erythema, no induration, minimal tenderness.   Abdominal: She exhibits no distension.   Musculoskeletal:      Cervical back: Neck supple.     Neurological: She is alert.   Skin: No rash noted.   Psychiatric: She has a normal mood and affect. Her behavior is normal.         ED Course   Procedures  Labs Reviewed - No data to display       Imaging Results    None          Medications - No data to display  Medical Decision Making  29-year-old female presents to the ED for evaluation of nipple pain for the past couple of days.  Mild hypertension but vitals otherwise within normal limits.    On exam, there is no evidence of cellulitis.  Possible dried purulent drainage. No abscess.      My differential diagnosis includes but is not limited to:  breast cellulitis, abscess, mastitis, nipple trauma    Patient counseled on cleansing the area. Will treat with topical mupirocin for mild infection. Pain is also likely from trauma.  Will also change patient's PO  clindamycin to vaginal clindamycin for her reported BV infection since she has not been able to tolerate the oral abx. Close follow up recommended if no improvement in the next few days. ED return precautions given.      Risk  Prescription drug management.                                      Clinical Impression:  Final diagnoses:  [N64.4] Nipple pain (Primary)          ED Disposition Condition    Discharge Stable          ED Prescriptions       Medication Sig Dispense Start Date End Date Auth. Provider    mupirocin (BACTROBAN) 2 % ointment Apply topically 2 (two) times daily. Apply to the affected area 2 times a day for 1 week. 15 g 3/31/2024 -- Julianne Parsons PA-C    clindamycin (CLINDESSE) 2 % vaginal cream Place vaginally every evening. Place 1 applicator full vaginally at night for 7 days for 7 days 40 g 3/31/2024 4/7/2024 Julianne Parsons PA-C          Follow-up Information    None          Julianne Parsons PA-C  03/31/24 [id number]

## 2024-03-31 NOTE — ED NOTES
"Patient identifiers for Misael Garcia 29 y.o. female checked and correct.  Chief Complaint   Patient presents with    Breast Discharge     Patient reports right nipple piercing x two months ago. Endorses "snagging" her nipple and now reports white discharge, pain, and redness to area. Patient is also reporting N/V x two days.      Past Medical History:   Diagnosis Date    ADHD (attention deficit hyperactivity disorder)     Anxiety     Asthma     Bipolar affective     Eczema     History of chlamydia 12/2016    Insomnia     Morbid obesity with BMI of 40.0-44.9, adult     Sleep disorder     Type 2 diabetes mellitus without complications      Allergies reported: Review of patient's allergies indicates:  No Known Allergies      LOC: Patient is awake, alert, and aware of environment with an appropriate affect. Patient is oriented x 4 and speaking appropriately.  APPEARANCE: Patient resting comfortably and in no acute distress. Patient is clean and well groomed, patient's clothing is properly fastened.  HEENT: WDL  SKIN: The skin is warm and dry. Patient has normal skin turgor and moist mucus membranes. Right nipple is not red, swollen, and no drainage noted when compared to left nipple, both nipples pierced.    Musculoskeletal: Patient is moving all extremities well, no obvious deformities noted. Pulses intact.   RESPIRATORY: Airway is open and patent. Respirations are spontaneous and non-labored with normal effort and rate.  CARDIAC: Patient has a normal rate and rhythm. 80 on cardiac monitor. No peripheral edema noted. Denies chest pain and SOB at at time of assessment.   ABDOMEN: No distention noted. Soft and non-tender upon palpation.  NEUROLOGICAL: pupils 3mm, PERRL. Facial expression is symmetrical. Hand grasps are equal bilaterally. Normal sensation in all extremities when touched with finger.          "

## 2024-03-31 NOTE — DISCHARGE INSTRUCTIONS
Apply the mupirocin ointment to the right nipple twice a day.  You can wash the area with soap and water and clean with alcohol twice a day before applying the ointment.    Use the clindamycin vaginal cream nightly for your bacterial vaginosis (BV) infection for 7 days.    Return to the ER if you develop significant swelling, increased pus-like drainage, spreading redness or if you develop fever greater than 100.4° or for any other worrisome symptoms.

## 2024-04-01 ENCOUNTER — HOSPITAL ENCOUNTER (EMERGENCY)
Facility: HOSPITAL | Age: 29
Discharge: HOME | End: 2024-04-01
Attending: EMERGENCY MEDICINE
Payer: MEDICAID

## 2024-04-01 ENCOUNTER — PATIENT MESSAGE (OUTPATIENT)
Dept: FAMILY MEDICINE | Facility: CLINIC | Age: 29
End: 2024-04-01
Payer: MEDICAID

## 2024-04-01 VITALS
DIASTOLIC BLOOD PRESSURE: 85 MMHG | TEMPERATURE: 98 F | OXYGEN SATURATION: 98 % | WEIGHT: 293 LBS | SYSTOLIC BLOOD PRESSURE: 117 MMHG | HEART RATE: 74 BPM | BODY MASS INDEX: 51.52 KG/M2 | RESPIRATION RATE: 17 BRPM

## 2024-04-01 DIAGNOSIS — E11.59 HYPERTENSION ASSOCIATED WITH DIABETES: ICD-10-CM

## 2024-04-01 DIAGNOSIS — R11.2 NAUSEA AND VOMITING, UNSPECIFIED VOMITING TYPE: ICD-10-CM

## 2024-04-01 DIAGNOSIS — E11.65 TYPE 2 DIABETES MELLITUS WITH HYPERGLYCEMIA, WITHOUT LONG-TERM CURRENT USE OF INSULIN: ICD-10-CM

## 2024-04-01 DIAGNOSIS — E66.01 CLASS 3 SEVERE OBESITY DUE TO EXCESS CALORIES WITH SERIOUS COMORBIDITY AND BODY MASS INDEX (BMI) OF 50.0 TO 59.9 IN ADULT: ICD-10-CM

## 2024-04-01 DIAGNOSIS — I15.2 HYPERTENSION ASSOCIATED WITH DIABETES: ICD-10-CM

## 2024-04-01 DIAGNOSIS — R10.13 EPIGASTRIC ABDOMINAL PAIN: ICD-10-CM

## 2024-04-01 DIAGNOSIS — K80.20 CALCULUS OF GALLBLADDER WITHOUT CHOLECYSTITIS WITHOUT OBSTRUCTION: Primary | ICD-10-CM

## 2024-04-01 DIAGNOSIS — G47.33 OSA ON CPAP: ICD-10-CM

## 2024-04-01 LAB
ALBUMIN SERPL BCP-MCNC: 3.9 G/DL (ref 3.5–5.2)
ALP SERPL-CCNC: 116 U/L (ref 55–135)
ALT SERPL W/O P-5'-P-CCNC: 21 U/L (ref 10–44)
ANION GAP SERPL CALC-SCNC: 10 MMOL/L (ref 8–16)
AST SERPL-CCNC: 15 U/L (ref 10–40)
B-HCG UR QL: NEGATIVE
BASOPHILS # BLD AUTO: 0.04 K/UL (ref 0–0.2)
BASOPHILS NFR BLD: 0.4 % (ref 0–1.9)
BILIRUB SERPL-MCNC: 0.6 MG/DL (ref 0.1–1)
BILIRUB UR QL STRIP: NEGATIVE
BUN SERPL-MCNC: 7 MG/DL (ref 6–20)
CALCIUM SERPL-MCNC: 9.8 MG/DL (ref 8.7–10.5)
CHLORIDE SERPL-SCNC: 102 MMOL/L (ref 95–110)
CLARITY UR REFRACT.AUTO: CLEAR
CO2 SERPL-SCNC: 26 MMOL/L (ref 23–29)
COLOR UR AUTO: YELLOW
CREAT SERPL-MCNC: 0.8 MG/DL (ref 0.5–1.4)
CTP QC/QA: YES
DIFFERENTIAL METHOD BLD: ABNORMAL
EOSINOPHIL # BLD AUTO: 0.1 K/UL (ref 0–0.5)
EOSINOPHIL NFR BLD: 1 % (ref 0–8)
ERYTHROCYTE [DISTWIDTH] IN BLOOD BY AUTOMATED COUNT: 15 % (ref 11.5–14.5)
EST. GFR  (NO RACE VARIABLE): >60 ML/MIN/1.73 M^2
GLUCOSE SERPL-MCNC: 84 MG/DL (ref 70–110)
GLUCOSE UR QL STRIP: NEGATIVE
HCT VFR BLD AUTO: 42.3 % (ref 37–48.5)
HGB BLD-MCNC: 13.3 G/DL (ref 12–16)
HGB UR QL STRIP: NEGATIVE
IMM GRANULOCYTES # BLD AUTO: 0.05 K/UL (ref 0–0.04)
IMM GRANULOCYTES NFR BLD AUTO: 0.5 % (ref 0–0.5)
KETONES UR QL STRIP: ABNORMAL
LEUKOCYTE ESTERASE UR QL STRIP: NEGATIVE
LIPASE SERPL-CCNC: 11 U/L (ref 4–60)
LYMPHOCYTES # BLD AUTO: 2.4 K/UL (ref 1–4.8)
LYMPHOCYTES NFR BLD: 21.3 % (ref 18–48)
MCH RBC QN AUTO: 26.1 PG (ref 27–31)
MCHC RBC AUTO-ENTMCNC: 31.4 G/DL (ref 32–36)
MCV RBC AUTO: 83 FL (ref 82–98)
MONOCYTES # BLD AUTO: 0.6 K/UL (ref 0.3–1)
MONOCYTES NFR BLD: 5.7 % (ref 4–15)
NEUTROPHILS # BLD AUTO: 7.9 K/UL (ref 1.8–7.7)
NEUTROPHILS NFR BLD: 71.1 % (ref 38–73)
NITRITE UR QL STRIP: NEGATIVE
NRBC BLD-RTO: 0 /100 WBC
OHS QRS DURATION: 90 MS
OHS QTC CALCULATION: 400 MS
PH UR STRIP: 5 [PH] (ref 5–8)
PLATELET # BLD AUTO: 605 K/UL (ref 150–450)
PMV BLD AUTO: 9.5 FL (ref 9.2–12.9)
POCT GLUCOSE: 91 MG/DL (ref 70–110)
POTASSIUM SERPL-SCNC: 3.7 MMOL/L (ref 3.5–5.1)
PROT SERPL-MCNC: 8.1 G/DL (ref 6–8.4)
PROT UR QL STRIP: NEGATIVE
RBC # BLD AUTO: 5.09 M/UL (ref 4–5.4)
SODIUM SERPL-SCNC: 138 MMOL/L (ref 136–145)
SP GR UR STRIP: 1.02 (ref 1–1.03)
URN SPEC COLLECT METH UR: ABNORMAL
WBC # BLD AUTO: 11.03 K/UL (ref 3.9–12.7)

## 2024-04-01 PROCEDURE — 81003 URINALYSIS AUTO W/O SCOPE: CPT | Performed by: PHYSICIAN ASSISTANT

## 2024-04-01 PROCEDURE — 81025 URINE PREGNANCY TEST: CPT | Performed by: PHYSICIAN ASSISTANT

## 2024-04-01 PROCEDURE — 93010 ELECTROCARDIOGRAM REPORT: CPT | Mod: ,,, | Performed by: INTERNAL MEDICINE

## 2024-04-01 PROCEDURE — 63600175 PHARM REV CODE 636 W HCPCS: Performed by: PHYSICIAN ASSISTANT

## 2024-04-01 PROCEDURE — 96372 THER/PROPH/DIAG INJ SC/IM: CPT | Performed by: PHYSICIAN ASSISTANT

## 2024-04-01 PROCEDURE — 93005 ELECTROCARDIOGRAM TRACING: CPT

## 2024-04-01 PROCEDURE — 99285 EMERGENCY DEPT VISIT HI MDM: CPT | Mod: 25

## 2024-04-01 PROCEDURE — 96374 THER/PROPH/DIAG INJ IV PUSH: CPT

## 2024-04-01 PROCEDURE — 82962 GLUCOSE BLOOD TEST: CPT

## 2024-04-01 PROCEDURE — 85025 COMPLETE CBC W/AUTO DIFF WBC: CPT | Performed by: PHYSICIAN ASSISTANT

## 2024-04-01 PROCEDURE — 83690 ASSAY OF LIPASE: CPT | Performed by: PHYSICIAN ASSISTANT

## 2024-04-01 PROCEDURE — 80053 COMPREHEN METABOLIC PANEL: CPT | Performed by: PHYSICIAN ASSISTANT

## 2024-04-01 RX ORDER — DICYCLOMINE HYDROCHLORIDE 10 MG/ML
20 INJECTION INTRAMUSCULAR
Status: COMPLETED | OUTPATIENT
Start: 2024-04-01 | End: 2024-04-01

## 2024-04-01 RX ORDER — DICYCLOMINE HYDROCHLORIDE 20 MG/1
20 TABLET ORAL 3 TIMES DAILY PRN
Qty: 15 TABLET | Refills: 0 | OUTPATIENT
Start: 2024-04-01 | End: 2024-05-16

## 2024-04-01 RX ORDER — ONDANSETRON 4 MG/1
4 TABLET, ORALLY DISINTEGRATING ORAL 2 TIMES DAILY
Qty: 16 TABLET | Refills: 0 | OUTPATIENT
Start: 2024-04-01 | End: 2024-05-11

## 2024-04-01 RX ORDER — FAMOTIDINE 20 MG/1
20 TABLET, FILM COATED ORAL 2 TIMES DAILY PRN
Qty: 20 TABLET | Refills: 0 | Status: SHIPPED | OUTPATIENT
Start: 2024-04-01 | End: 2024-04-23 | Stop reason: SDUPTHER

## 2024-04-01 RX ORDER — ONDANSETRON HYDROCHLORIDE 2 MG/ML
4 INJECTION, SOLUTION INTRAVENOUS
Status: COMPLETED | OUTPATIENT
Start: 2024-04-01 | End: 2024-04-01

## 2024-04-01 RX ADMIN — DICYCLOMINE HYDROCHLORIDE 20 MG: 20 INJECTION, SOLUTION INTRAMUSCULAR at 08:04

## 2024-04-01 RX ADMIN — ONDANSETRON 4 MG: 2 INJECTION INTRAMUSCULAR; INTRAVENOUS at 08:04

## 2024-04-01 NOTE — Clinical Note
"Misael"Jose Garcia was seen and treated in our emergency department on 4/1/2024.  She may return to work on 04/02/2024.       If you have any questions or concerns, please don't hesitate to call.      Lianne Lechuga PA-C"

## 2024-04-01 NOTE — DISCHARGE INSTRUCTIONS
Your workup today is reassuring. I suspect your symptoms may have been caused by dose adjustment of Ozempic. You may have gastritis. Pepcid prescribed today for this.    One gallstone was noted on your ultrasound.  No signs of acute gallbladder disease that would require surgery or follow-up at this time    Return to the emergency department if you develop right upper quadrant abdominal pain.    Drink plenty of fluids    Zofran prescribed for nausea and vomiting    Bentyl prescribed for abdominal cramping and pain    Please follow-up at your scheduled appointment with gastroenterology on 4/9 for upper endoscopy study    Strict ED precautions given to return immediately for new, worsening, or concerning symptoms

## 2024-04-01 NOTE — ED TRIAGE NOTES
"Pt presents to the ED complaining of persistent nausea and vomiting since increasing her dose of Ozempic on Wednesday. Pt states she has "barely been able to keep anything down". Pt also complains of feeling very lethargic and weak. Pt denies diarrhea, chest pain, and shortness of breath at this time.   "

## 2024-04-01 NOTE — ED PROVIDER NOTES
Encounter Date: 4/1/2024       History     Chief Complaint   Patient presents with    Abdominal Pain     Pt has c/o abd pain and nausea. Took ozempic Wednesday and has had symptoms since. Denies vomiting/ diarrhea.      29-year-old female with a PMHx of type 2 diabetes, asthma, ADHD, bipolar disorder presents to the ED with abdominal pain.  Her symptoms started 4-5 days ago after her Ozempic dose was adjusted.  Her pain is in the epigastric region.  She has associated nausea and vomiting.  She also was switched from oral clindamycin to topical clindamycin yesterday due to nausea and vomiting.  She denies fever, diarrhea, hematochezia, melena, hematemesis, chest pain, shortness of breath.    The history is provided by the patient.     Review of patient's allergies indicates:  No Known Allergies  Past Medical History:   Diagnosis Date    ADHD (attention deficit hyperactivity disorder)     Anxiety     Asthma     Bipolar affective     Eczema     History of chlamydia 12/2016    Insomnia     Morbid obesity with BMI of 40.0-44.9, adult     Sleep disorder     Type 2 diabetes mellitus without complications      Past Surgical History:   Procedure Laterality Date    NO PAST SURGERIES      as of 1-13-17     Family History   Problem Relation Age of Onset    No Known Problems Paternal Grandfather     Breast cancer Paternal Grandmother     Diabetes Maternal Grandmother     No Known Problems Maternal Grandfather     No Known Problems Father     No Known Problems Mother     No Known Problems Sister     Colon cancer Neg Hx     Ovarian cancer Neg Hx      Social History     Tobacco Use    Smoking status: Former     Types: Vaping with nicotine     Passive exposure: Past    Smokeless tobacco: Never    Tobacco comments:     boyfriend smoke cigarettes     Former weed consumption   Substance Use Topics    Alcohol use: No    Drug use: Not Currently     Types: Marijuana     Review of Systems   Constitutional:  Negative for fever.    Respiratory:  Negative for shortness of breath.    Cardiovascular:  Negative for chest pain.   Gastrointestinal:  Positive for abdominal pain, nausea and vomiting. Negative for blood in stool, constipation and diarrhea.       Physical Exam     Initial Vitals [04/01/24 0638]   BP Pulse Resp Temp SpO2   134/87 81 16 97.8 °F (36.6 °C) 98 %      MAP       --         Physical Exam    Nursing note and vitals reviewed.  Constitutional: She appears well-developed and well-nourished. She is not diaphoretic. No distress.   HENT:   Head: Normocephalic and atraumatic.   Nose: Nose normal.   Eyes: Conjunctivae and EOM are normal.   Neck: Neck supple.   Cardiovascular:  Normal rate, regular rhythm, normal heart sounds and intact distal pulses.           Pulmonary/Chest: Breath sounds normal. No respiratory distress.   Abdominal: Abdomen is soft. Bowel sounds are normal. She exhibits no distension and no mass. There is abdominal tenderness in the right upper quadrant and epigastric area. There is no guarding.   Musculoskeletal:      Cervical back: Neck supple.     Neurological: She is alert and oriented to person, place, and time. Gait normal.   Skin: No rash noted.   Psychiatric: She has a normal mood and affect. Her behavior is normal. Judgment and thought content normal.         ED Course   Procedures  Labs Reviewed   CBC W/ AUTO DIFFERENTIAL - Abnormal; Notable for the following components:       Result Value    MCH 26.1 (*)     MCHC 31.4 (*)     RDW 15.0 (*)     Platelets 605 (*)     Gran # (ANC) 7.9 (*)     Immature Grans (Abs) 0.05 (*)     All other components within normal limits   URINALYSIS, REFLEX TO URINE CULTURE - Abnormal; Notable for the following components:    Ketones, UA 1+ (*)     All other components within normal limits    Narrative:     Specimen Source->Urine   COMPREHENSIVE METABOLIC PANEL   LIPASE   POCT URINE PREGNANCY   POCT GLUCOSE     EKG Readings: (Independently Interpreted)   Initial Reading: No  STEMI. Previous EKG Date: 3/24/24. Rhythm: Normal Sinus Rhythm. Heart Rate: 76. Clinical Impression: Normal Sinus Rhythm   T-wave inversion in 3, V1, V3 though prior when compared to previous studies     ECG Results              EKG 12-lead (Final result)        Collection Time Result Time QRS Duration OHS QTC Calculation    04/01/24 08:07:06 04/01/24 09:41:59 90 400                     Final result by Interface, Lab In [facility] (04/01/24 09:42:05)                   Narrative:    Test Reason : R10.13,    Vent. Rate : 076 BPM     Atrial Rate : 076 BPM     P-R Int : 138 ms          QRS Dur : 090 ms      QT Int : 356 ms       P-R-T Axes : 025 054 -11 degrees     QTc Int : 400 ms    Normal sinus rhythm  Nonspecific T wave abnormality  Abnormal ECG  When compared with ECG of 24-MAR-2024 08:21,  No significant change was found  Confirmed by Sid MARTEL MD ([id number]) on 4/1/2024 9:41:55 AM    Referred By: [email or web address]   SELF           Confirmed By:Sid MARTEL MD                                  Imaging Results              US Abdomen Limited (Final result)  Result time 04/01/24 09:09:42      Final result by Chuy Muro MD (04/01/24 09:09:42)                   Impression:      No significant sonographic abnormality.  No evidence of acute cholecystitis.      Electronically signed by: Chuy Muro MD  Date:    04/01/2024  Time:    09:09               Narrative:    EXAMINATION:  US ABDOMEN LIMITED    CLINICAL HISTORY:  Abdominal Pain - Gallbladder;    TECHNIQUE:  Limited ultrasound of the right upper quadrant of the abdomen focused on the biliary system was performed.    COMPARISON:  Ultrasound from 02/20/2023.    FINDINGS:  Gallbladder: Single 1 mm mobile biliary crystal.  No calculi, wall thickening, or pericholecystic fluid.  No sonographic Babcock's sign.    Biliary system: The common duct is not dilated, measuring 3 mm.  No intrahepatic ductal dilatation.    Pancreas: The visualized portions of pancreas appear  normal.    Miscellaneous: No upper abdominal ascites and no abnormalities of the visualized liver.                                       Medications   ondansetron injection 4 mg (4 mg Intravenous Given 4/1/24 0831)   dicyclomine injection 20 mg (20 mg Intramuscular Given 4/1/24 0831)     Medical Decision Making  29-year-old female with a PMHx of type 2 diabetes, asthma, ADHD, bipolar disorder presents to the ED with abdominal pain. Nontoxic appearing. Hemodynamically stable. Afebrile. Exam as above. I will initiate workup and reassess.    Ddx: GERD, gastritis, pancreatitis, cholecystitis, choledocholithiasis, medication side effect, electrolyte derangement, dehydration    Labs without leukocytosis. H&H stable. No significant electrolyte derangements.    Abdominal labs are reassuring. LFTs, alk-phos, bilirubin, lipase are within normal limits. RUQ US with one 1 mm stone without gallbladder wall thickening or pericholecystic changes that may indicate acute cholecystitis.  No biliary duct dilation or obstruction observed.     On reassessment patient notes improvement in her symptoms.  She no longer has abdominal pain.  Successfully p.o. challenged    I suspect symptoms are from gastritis vs medication side effect. History of recent visit to the ED with similar symptoms.  She has an EGD scheduled in 1-2 weeks.  Given reassuring workup I believe she was stable at this time for discharge.  Advised follow up with her gastroenterologist. Strict ED precautions given to return immediately for new, worsening, or concerning symptoms      Amount and/or Complexity of Data Reviewed  Labs: ordered. Decision-making details documented in ED Course.  Radiology: ordered.    Risk  Prescription drug management.               ED Course as of 04/01/24 1253   Mon Apr 01, 2024   0820 WBC: 11.03 [HM]   0820 Hemoglobin: 13.3 [HM]   0820 Hematocrit: 42.3 [HM]   0820 hCG Qualitative, Urine: Negative [HM]   0820 POCT Glucose: 91 [HM]   0859  BILIRUBIN TOTAL: 0.6 [HM]   0900 ALP: 116 [HM]   0900 AST: 15 [HM]   0900 ALT: 21 [HM]   1250 Lipase: 11 [HM]   1250 Blood, UA: Negative [HM]   1250 NITRITE UA: Negative [HM]   1250 Leukocyte Esterase, UA: Negative [HM]      ED Course User Index  [HM] Lianne Lechuga PA-C                           Clinical Impression:  Final diagnoses:  [R10.13] Epigastric abdominal pain  [K80.20] Calculus of gallbladder without cholecystitis without obstruction (Primary)  [R11.2] Nausea and vomiting, unspecified vomiting type          ED Disposition Condition    Discharge Stable          ED Prescriptions       Medication Sig Dispense Start Date End Date Auth. Provider    ondansetron (ZOFRAN-ODT) 4 MG TbDL Take 1 tablet (4 mg total) by mouth 2 (two) times daily. 16 tablet 4/1/2024 -- Lianne Lechuga PA-C    famotidine (PEPCID) 20 MG tablet Take 1 tablet (20 mg total) by mouth 2 (two) times daily as needed for Heartburn (epigastric abdominal pain). 20 tablet 4/1/2024 4/11/2024 Lianne Lechuga PA-C    dicyclomine (BENTYL) 20 mg tablet Take 1 tablet (20 mg total) by mouth 3 (three) times daily as needed (abdominal pain). 15 tablet 4/1/2024 -- Lianne Lechuga PA-C          Follow-up Information       Follow up With Specialties Details Why Contact Info    Katelyn Chapman MD Family Medicine  As needed [address] [address] [phone]      [facility] - Emergency Dept Emergency Medicine  If symptoms worsen [address] [address] [phone]             Lianne Lechuga PA-C  04/01/24 3629

## 2024-04-02 RX ORDER — SEMAGLUTIDE 1.34 MG/ML
1 INJECTION, SOLUTION SUBCUTANEOUS
Qty: 3 ML | Refills: 3 | OUTPATIENT
Start: 2024-04-02 | End: 2024-05-11

## 2024-04-03 PROBLEM — K81.9 CHOLECYSTITIS WITHOUT CHOLELITHIASIS: Status: ACTIVE | Noted: 2024-04-03

## 2024-04-08 ENCOUNTER — OFFICE VISIT (OUTPATIENT)
Dept: SLEEP MEDICINE | Facility: CLINIC | Age: 29
End: 2024-04-08
Payer: MEDICAID

## 2024-04-08 VITALS
SYSTOLIC BLOOD PRESSURE: 127 MMHG | DIASTOLIC BLOOD PRESSURE: 83 MMHG | WEIGHT: 293 LBS | BODY MASS INDEX: 45.99 KG/M2 | HEIGHT: 67 IN | HEART RATE: 84 BPM

## 2024-04-08 DIAGNOSIS — G47.33 OSA (OBSTRUCTIVE SLEEP APNEA): ICD-10-CM

## 2024-04-08 DIAGNOSIS — Z78.9 INTOLERANCE OF CONTINUOUS POSITIVE AIRWAY PRESSURE (CPAP) VENTILATION: Primary | ICD-10-CM

## 2024-04-08 DIAGNOSIS — G47.09 OTHER INSOMNIA: ICD-10-CM

## 2024-04-08 PROCEDURE — 99214 OFFICE O/P EST MOD 30 MIN: CPT | Mod: S$PBB,,, | Performed by: INTERNAL MEDICINE

## 2024-04-08 PROCEDURE — 3074F SYST BP LT 130 MM HG: CPT | Mod: CPTII,,, | Performed by: INTERNAL MEDICINE

## 2024-04-08 PROCEDURE — 3044F HG A1C LEVEL LT 7.0%: CPT | Mod: CPTII,,, | Performed by: INTERNAL MEDICINE

## 2024-04-08 PROCEDURE — 1159F MED LIST DOCD IN RCRD: CPT | Mod: CPTII,,, | Performed by: INTERNAL MEDICINE

## 2024-04-08 PROCEDURE — 99214 OFFICE O/P EST MOD 30 MIN: CPT | Mod: PBBFAC | Performed by: INTERNAL MEDICINE

## 2024-04-08 PROCEDURE — 4010F ACE/ARB THERAPY RXD/TAKEN: CPT | Mod: CPTII,,, | Performed by: INTERNAL MEDICINE

## 2024-04-08 PROCEDURE — 3079F DIAST BP 80-89 MM HG: CPT | Mod: CPTII,,, | Performed by: INTERNAL MEDICINE

## 2024-04-08 PROCEDURE — 3008F BODY MASS INDEX DOCD: CPT | Mod: CPTII,,, | Performed by: INTERNAL MEDICINE

## 2024-04-08 PROCEDURE — 99999 PR PBB SHADOW E&M-EST. PATIENT-LVL IV: CPT | Mod: PBBFAC,,, | Performed by: INTERNAL MEDICINE

## 2024-04-15 ENCOUNTER — PATIENT MESSAGE (OUTPATIENT)
Dept: GASTROENTEROLOGY | Facility: CLINIC | Age: 29
End: 2024-04-15
Payer: MEDICAID

## 2024-04-23 ENCOUNTER — TELEPHONE (OUTPATIENT)
Dept: ALLERGY | Facility: CLINIC | Age: 29
End: 2024-04-23
Payer: MEDICAID

## 2024-04-23 ENCOUNTER — PATIENT MESSAGE (OUTPATIENT)
Dept: SLEEP MEDICINE | Facility: CLINIC | Age: 29
End: 2024-04-23
Payer: MEDICAID

## 2024-04-23 ENCOUNTER — PATIENT MESSAGE (OUTPATIENT)
Dept: FAMILY MEDICINE | Facility: CLINIC | Age: 29
End: 2024-04-23
Payer: MEDICAID

## 2024-04-23 NOTE — TELEPHONE ENCOUNTER
----- Message from Bev Masterson sent at 4/23/2024 11:33 AM CDT -----  Regarding: appt  Contact: @ [phone]  Pt is returning a missed call from Debora ... in regards to making a appointment ...Please call and adv @ [phone]

## 2024-04-23 NOTE — TELEPHONE ENCOUNTER
----- Message from Michelle Taylor sent at 4/23/2024 11:14 AM CDT -----  Regarding: Pt called to schedule a work in appt due to an allergy flare up and pt would like a call back today  Appointment Access Request:    Pt called to schedule a work in appt due to an allergy flare up and pt would like a call back today    Pt can be reached at  [phone]

## 2024-04-23 NOTE — TELEPHONE ENCOUNTER
Pt stated that she has been using Breo BID as instructed but recently started exercising. Since that pt has noticed she is having increase in SOB.  Scheduled f/u tomorrow with Dr. King at [place]. Directions given to pt.

## 2024-04-23 NOTE — TELEPHONE ENCOUNTER
----- Message from Carlotta Mak sent at 4/23/2024 11:23 AM CDT -----    Patient Returning Call        Who Called:pt  Who Left Message for Patient:Elizabeth Bosworth  Does the patient know what this is regarding?:returning call  Would the patient rather a call back or a response via [facility]? call  Best Call Back Number:[phone]  Additional Information: call back

## 2024-04-23 NOTE — TELEPHONE ENCOUNTER
No care due was identified.  [facility] Embedded Care Due Messages. Reference number: [id number].   4/23/2024 11:23:48 AM CDT

## 2024-04-24 ENCOUNTER — OFFICE VISIT (OUTPATIENT)
Dept: ALLERGY | Facility: CLINIC | Age: 29
End: 2024-04-24
Payer: MEDICAID

## 2024-04-24 ENCOUNTER — PATIENT MESSAGE (OUTPATIENT)
Dept: FAMILY MEDICINE | Facility: CLINIC | Age: 29
End: 2024-04-24
Payer: MEDICAID

## 2024-04-24 ENCOUNTER — PATIENT MESSAGE (OUTPATIENT)
Dept: PODIATRY | Facility: CLINIC | Age: 29
End: 2024-04-24
Payer: MEDICAID

## 2024-04-24 ENCOUNTER — PATIENT MESSAGE (OUTPATIENT)
Dept: DERMATOLOGY | Facility: CLINIC | Age: 29
End: 2024-04-24
Payer: MEDICAID

## 2024-04-24 ENCOUNTER — PATIENT MESSAGE (OUTPATIENT)
Dept: SPINE | Facility: CLINIC | Age: 29
End: 2024-04-24
Payer: MEDICAID

## 2024-04-24 VITALS
SYSTOLIC BLOOD PRESSURE: 117 MMHG | DIASTOLIC BLOOD PRESSURE: 83 MMHG | BODY MASS INDEX: 49.62 KG/M2 | HEART RATE: 81 BPM | OXYGEN SATURATION: 96 % | WEIGHT: 293 LBS

## 2024-04-24 DIAGNOSIS — Z78.9 HEALTH MAINTENANCE ALTERATION: Primary | ICD-10-CM

## 2024-04-24 DIAGNOSIS — J31.0 MIXED RHINITIS: Chronic | ICD-10-CM

## 2024-04-24 DIAGNOSIS — J45.40 MODERATE PERSISTENT ASTHMA, UNSPECIFIED WHETHER COMPLICATED: ICD-10-CM

## 2024-04-24 PROCEDURE — 3008F BODY MASS INDEX DOCD: CPT | Mod: CPTII,,, | Performed by: STUDENT IN AN ORGANIZED HEALTH CARE EDUCATION/TRAINING PROGRAM

## 2024-04-24 PROCEDURE — 99215 OFFICE O/P EST HI 40 MIN: CPT | Mod: PBBFAC | Performed by: STUDENT IN AN ORGANIZED HEALTH CARE EDUCATION/TRAINING PROGRAM

## 2024-04-24 PROCEDURE — 4010F ACE/ARB THERAPY RXD/TAKEN: CPT | Mod: CPTII,,, | Performed by: STUDENT IN AN ORGANIZED HEALTH CARE EDUCATION/TRAINING PROGRAM

## 2024-04-24 PROCEDURE — 1160F RVW MEDS BY RX/DR IN RCRD: CPT | Mod: CPTII,,, | Performed by: STUDENT IN AN ORGANIZED HEALTH CARE EDUCATION/TRAINING PROGRAM

## 2024-04-24 PROCEDURE — 99214 OFFICE O/P EST MOD 30 MIN: CPT | Mod: S$PBB,,, | Performed by: STUDENT IN AN ORGANIZED HEALTH CARE EDUCATION/TRAINING PROGRAM

## 2024-04-24 PROCEDURE — 1159F MED LIST DOCD IN RCRD: CPT | Mod: CPTII,,, | Performed by: STUDENT IN AN ORGANIZED HEALTH CARE EDUCATION/TRAINING PROGRAM

## 2024-04-24 PROCEDURE — 3074F SYST BP LT 130 MM HG: CPT | Mod: CPTII,,, | Performed by: STUDENT IN AN ORGANIZED HEALTH CARE EDUCATION/TRAINING PROGRAM

## 2024-04-24 PROCEDURE — 3079F DIAST BP 80-89 MM HG: CPT | Mod: CPTII,,, | Performed by: STUDENT IN AN ORGANIZED HEALTH CARE EDUCATION/TRAINING PROGRAM

## 2024-04-24 PROCEDURE — 3044F HG A1C LEVEL LT 7.0%: CPT | Mod: CPTII,,, | Performed by: STUDENT IN AN ORGANIZED HEALTH CARE EDUCATION/TRAINING PROGRAM

## 2024-04-24 PROCEDURE — 99999 PR PBB SHADOW E&M-EST. PATIENT-LVL V: CPT | Mod: PBBFAC,,, | Performed by: STUDENT IN AN ORGANIZED HEALTH CARE EDUCATION/TRAINING PROGRAM

## 2024-04-24 RX ORDER — AZELASTINE 1 MG/ML
2 SPRAY, METERED NASAL 2 TIMES DAILY PRN
Qty: 30 ML | Refills: 0 | Status: SHIPPED | OUTPATIENT
Start: 2024-04-24 | End: 2024-06-06

## 2024-04-24 RX ORDER — FLUTICASONE PROPIONATE 50 MCG
2 SPRAY, SUSPENSION (ML) NASAL DAILY
Qty: 32 G | Refills: 5 | Status: SHIPPED | OUTPATIENT
Start: 2024-04-24 | End: 2024-06-06

## 2024-04-24 NOTE — TELEPHONE ENCOUNTER
Refill Routing Note   Medication(s) are not appropriate for processing by [facility] for the following reason(s):        Outside of protocol: multitude of emergent care related to medication    [facility] action(s):  Route      Medication Therapy Plan:         Appointments  past 12m or future 3m with PCP    Date Provider   Last Visit   3/25/2024 Katelyn Chapman MD   Next Visit   8/1/2024 Katelyn Chapman MD   ED visits in past 90 days: 5        Note composed:8:00 PM 04/23/2024

## 2024-04-24 NOTE — TELEPHONE ENCOUNTER
I scheduled it!! Make sure she knows it is at [facility] and not at [facility].  Thank you!  There is some block on that day.  I had to override schedule to get it booked

## 2024-04-24 NOTE — PATIENT INSTRUCTIONS
Flonase = fluticasone: 2 sprays daily   Needs to be used regularly  for full effect.     AND/OR  Astelin = azelastine nasal spray:    2 squirts each nostril as needed, up to twice a day   Do not snort (because it burns and tastes bad)   Does not need to be used regularly    Return 3 months

## 2024-04-24 NOTE — PROGRESS NOTES
Allergy Clinic Note  [facility]    This note was created by combination of typed  and M-Modal dictation. Transcription errors may be present.  If there are any questions, please contact me.    HISTORY      Patient ID: Misael Garcia is a 29 y.o. female.    Chief Complaint: Nasal Congestion (SOB when exercising ) and Shortness of Breath      Allergy problem list:    Mild Persistent asthma  Chronic Mixed rhinitis  Non adherence with follow-up       History of Present Illness       Misael Garcia is a 29 y.o. female with chronic mixed rhinitis and clinical Dx of asthma returns to follow up symptoms after starting controller therapy with Breo.  She is here alone, and the Hx is difficult.    Related medications and other interventions  Breo 200/5, 1 puff daily   Albuterol    Flonase 2 sprays each nostril daily  Zyrtec   Famotidine  (Triamcinolone)  (pantoprazole 20 mg daily)  (ACE)    04/24/2024:  Client complains of difficulty breathing with exercise.  On close questioning she appears to be referring to nasal congestion.  This has not like her usual asthma.  She has not been taking Flonase or azelastine and requests refills of both.  Asthma is under borderline control, still with difficulty cleaning house and with exercise.  States excellent adherence with Breo.  ACT score 18.  Reassess in 3 months    2/6/2024:  On Breo for the last 4 weeks, she reports shortness of breath is 80% better.  No side effects or other problems Since last visit an echocardiogram ordered by her hematologist turn mildly abnormal and she is scheduled for cardiac evaluation.  She reports rhinitis is somewhat improved with irregular use of Flonase.  Recommended either regular use of Flonase or on demand use of azelastine.  She chose the former.    1/9/2024:  Client did not keep follow-up appointment April 2023, which was to follow-up chest symptoms after beginning asthma controller therapy. Since  last visit Breo changed to Advair 250 for formulary compliance.  Client reports running out of Advair for months ago.  Six weeks ago she developed a lower respiratory infection complicated by asthma exacerbation.  These symptoms lasted about 4 weeks, but she continues to have residual cough, occasional shortness of breath, and runny nose.  Resume Breo (her preferred ICS/LABA)    3/2/23:  At initial visit, pt presented c/o coughing, wheezing and shortness of breath for the last 2-3 months.  She has no Dx of asthma but was given an albuterol inhaler in the past with benefit.  Triggers include smoke.  PFTS this date dominick (off meds and without Sx) are normal.  Made clinical Dx of mild persistent asthma.  Rx Breo and albuterol.  F/U in 4 weeks.    Chief rhinitis symptom is nasal congestion.  Additional Sx include runny nose, sneezing, itchy eyes re/runny eyes and diffuse itching of body.  Other symptoms are throat clearing and post nasal drip sensation. Pt's itching is not controlled and it interferes with sleep.  She also reports scratching in her sleep. She denies any ear symptoms.  There is no clear seasonal pattern.  Ppt include air conditioning, weather changes and extremes of temperature.  Claritin was helpful in the past.  She has never had allergy testing.  Immunocaps ordered.  Treated with Zyrtec and Flonase.        MEDICAL HISTORY      Significant past medical history: HTN, DM, recent smoking  Active Problem List reviewed  ENT surgery:  none    Significant family history:  No allergies.  No asthma.  Exposures: tobacco and mj smoke at  house (1/2 time).  Cat, occ in bedroom.  SH:  work at [employer]  Smoking Hx:  Client  reports that she has quit smoking. Her smoking use included vaping with nicotine. She has been exposed to tobacco smoke. She has never used smokeless tobacco.    Meds: MAR reviewed    Asthma:  Yes  Eczema: yes  Rhinitis: yes--See HPI  Drug allergy/intolerance: NKDA  Venom allergy: No  Latex  allergy:  No    Medication List with Changes/Refills   Current Medications    ADAPALENE-BENZOYL PEROXIDE (EPIDUO FORTE) 0.3-2.5 % GLWP    Apply topically.       Start Date: 2/7/2023  End Date: --    ALBUTEROL (PROVENTIL/VENTOLIN HFA) 90 MCG/ACTUATION INHALER    Inhale 2 puffs into the lungs every 4 (four) hours as needed (shortness of breath). Rescue       Start Date: 1/9/2024  End Date: 1/8/2025    AMMONIUM LACTATE (LAC-HYDRIN) 12 % LOTION    Apply topically.       Start Date: 5/18/2023 End Date: --    BLOOD SUGAR DIAGNOSTIC STRP    To check Blood Glucose  2-3x times daily,       Start Date: 2/10/2024 End Date: --    BLOOD-GLUCOSE METER MISC    use as directed       Start Date: 2/10/2024 End Date: --    CETIRIZINE (ZYRTEC) 10 MG TABLET    Take 1 tablet (10 mg total) by mouth once daily.       Start Date: 6/4/2023  End Date: 6/3/2024    CLINDAMYCIN PHOSPHATE 1% (CLINDAGEL) 1 % GEL    Apply topically 2 (two) times daily.       Start Date: 10/5/2023 End Date: --    DICYCLOMINE (BENTYL) 20 MG TABLET    Take 1 tablet (20 mg total) by mouth 3 (three) times daily as needed (abdominal pain).       Start Date: 4/1/2024  End Date: --    FAMOTIDINE (PEPCID) 20 MG TABLET    Take 1 tablet (20 mg total) by mouth 2 (two) times daily as needed for Heartburn (epigastric abdominal pain).       Start Date: 4/1/2024  End Date: 4/11/2024    FLUTICASONE FUROATE-VILANTEROL (BREO ELLIPTA) 200-25 MCG/DOSE DSDV DISKUS INHALER    Inhale 1 puff into the lungs once daily. Controller       Start Date: 2/6/2024  End Date: --    LANCETS 28 GAUGE MISC    To check Blood Glucose  2-3 times daily,       Start Date: 2/10/2024 End Date: --    LISINOPRIL (PRINIVIL,ZESTRIL) 20 MG TABLET    Take 1 tablet (20 mg total) by mouth once daily.       Start Date: 1/30/2024 End Date: 10/26/2024    LURASIDONE (LATUDA) 40 MG TAB TABLET    Take 40 mg by mouth.       Start Date: 9/12/2023 End Date: --    METRONIDAZOLE (METROGEL) 0.75 % GEL    Use one vaginal  applicatorful once daily at bedtime for 5 days       Start Date: 3/24/2024 End Date: --    MUPIROCIN (BACTROBAN) 2 % OINTMENT    Apply topically 2 (two) times daily. Apply to the affected area 2 times a day for 1 week.       Start Date: 3/31/2024 End Date: --    ONDANSETRON (ZOFRAN-ODT) 4 MG TBDL    Take 1 tablet (4 mg total) by mouth every 8 (eight) hours as needed (severe nausea and vomiting).       Start Date: 3/22/2024 End Date: --    ONDANSETRON (ZOFRAN-ODT) 4 MG TBDL    Take 1 tablet (4 mg total) by mouth 2 (two) times daily.       Start Date: 4/1/2024  End Date: --    PANTOPRAZOLE (PROTONIX) 20 MG TABLET    Take 1 tablet (20 mg total) by mouth once daily. for 14 days       Start Date: 3/24/2024 End Date: 4/7/2024    SEMAGLUTIDE (OZEMPIC) 1 MG/DOSE (4 MG/3 ML)    Inject 1 mg into the skin every 7 days.       Start Date: 4/2/2024  End Date: 4/2/2025    SULFACETAMIDE SODIUM-SULFUR 10-5 % (W/W) CLSR    Apply topically.       Start Date: 5/18/2023 End Date: --    TOPIRAMATE (TOPAMAX) 25 MG TABLET    Take 25 mg by mouth 2 (two) times daily.       Start Date: 1/23/2024 End Date: --    UREA 20 % CREA    APPLY TO AFFECTED AREA QD       Start Date: --        End Date: --   Changed and/or Refilled Medications    Modified Medication Previous Medication    AZELASTINE (ASTELIN) 137 MCG (0.1 %) NASAL SPRAY azelastine (ASTELIN) 137 mcg (0.1 %) nasal spray       2 sprays (274 mcg total) by Nasal route 2 (two) times daily as needed for Rhinitis.    2 sprays (274 mcg total) by Nasal route 2 (two) times daily.       Start Date: 4/24/2024 End Date: 6/18/2024    Start Date: 6/5/2023  End Date: 4/24/2024    FLUTICASONE PROPIONATE (FLONASE) 50 MCG/ACTUATION NASAL SPRAY fluticasone propionate (FLONASE) 50 mcg/actuation nasal spray       2 sprays (100 mcg total) by Each Nostril route once daily.    2 sprays (100 mcg total) by Each Nostril route once daily.       Start Date: 4/24/2024 End Date: --    Start Date: 2/6/2024  End Date:  4/24/2024           REVIEW OF SYSTEMS      CONST: no F/C/NS, no unintentional weight changes  NEURO:  no tremor, no weakness  EYES: no discharge, no pruritus, no erythema  EARS: no hearing loss, no sensation of fullness  PULM:  + SOB,  wheezing, + cough  :  no dysuria, no hematuria  DERM: no rashes, no skin breaks           PHYSICAL EXAM      /83 (BP Location: Left arm, Patient Position: Sitting)   Pulse 81   Wt (!) 143.7 kg (316 lb 12.8 oz)   LMP 03/25/2024 (Exact Date)   SpO2 96%   BMI 49.62 kg/m²   GEN: Awake and alert, no distress  DERM:  No flushing, no rashes  EYE:  No ocular discharge, no redness  HEENT: No nasal discharge, no hoarseness, TMs translucent bilaterally.  Nares pink with moderate turbinates swelling.  Oropharynx benign without exudate.  Tongue not coated.  There is cobblestoning of posterior tongue.  No LAD  PULM: Normal work of breathing, no cough,   COR:   normal radial pulse, normal capillary refill  NEURO:  No focal deficit, speech fluent and logical  PSYCH: appropriate affect, normal behavior              MEDICAL DECISION-MAKING           Data reviewed        Allergy Testing      Inhalant immuno caps low-level positive to Juan grass, 2023   Class I Juan grass   All other aeroallergens less than 0.35 KU/L.  Bermuda grass 0.16; oak 0.12; ragweed 0.11.  Total IgE 91      Pulmonary testing     Normal baseline spirometry (off meds, Asx), 2023  Normal flow volume loop  No bronchodilator response.      Lab results      Total IgE 91, 2023  EO count 200, 2023      Imaging and other diagnostics            Medical records review        Diagnoses:     Misael Garcia is a 29 y.o. female. with  1. Health maintenance alteration    2. Moderate persistent asthma, unspecified whether complicated    3. Mixed rhinitis                Assessment / Plan / Orders   Clients asthma is improved but still not entirely controlled.  She continues to have limitations with exercise and house  cleaning.  Reassess in 3 months.    For her nasal congestion, she refilled both azelastine and fluticasone nasal sprays.  She can use 1, the other, or both      Mild persistent asthma (clinical Dx) -- not adequately controlled due to nonadherence  -continue albuterol as needed  -refilled Breo 1 puff daily    Mixed rhinitis, including chronic allergic rhinitis due to pollen  -     fluticasone propionate (FLONASE) 50 mcg/actuation nasal spray; 2 sprays (100 mcg total) by Each Nostril route once daily.  Dispense: 16 g; Refill: 0    Health maintenance  Congratulated on exercise and weight loss    Comorbidities  ADHD and Anxiety/PTSD--avoid oral decongestants  Diabetes --Avoid systemic steroids    Patient Instructions and follow up     Patient Instructions         Flonase = fluticasone: 2 sprays daily   Needs to be used regularly  for full effect.     AND/OR  Astelin = azelastine nasal spray:    2 squirts each nostril as needed, up to twice a day   Do not snort (because it burns and tastes bad)   Does not need to be used regularly    Return 3 months        Allison King MD  Allergy, Asthma & Immunology      I spent a total of 30 minutes on the day of the visit.This includes face to face time and non-face to face time preparing to see the patient (eg, review of tests), obtaining and/or reviewing separately obtained history, documenting clinical information in the electronic or other health record, independently interpreting results and communicating results to the patient/family/caregiver, or care coordinator.

## 2024-04-25 ENCOUNTER — HOSPITAL ENCOUNTER (EMERGENCY)
Facility: HOSPITAL | Age: 29
Discharge: LEFT BEFORE DISCHARGE | End: 2024-04-25
Attending: EMERGENCY MEDICINE
Payer: MEDICAID

## 2024-04-25 ENCOUNTER — OFFICE VISIT (OUTPATIENT)
Dept: GASTROENTEROLOGY | Facility: CLINIC | Age: 29
End: 2024-04-25
Payer: MEDICAID

## 2024-04-25 ENCOUNTER — PATIENT MESSAGE (OUTPATIENT)
Dept: GASTROENTEROLOGY | Facility: CLINIC | Age: 29
End: 2024-04-25

## 2024-04-25 ENCOUNTER — TELEPHONE (OUTPATIENT)
Dept: SLEEP MEDICINE | Facility: OTHER | Age: 29
End: 2024-04-25
Payer: MEDICAID

## 2024-04-25 VITALS
SYSTOLIC BLOOD PRESSURE: 128 MMHG | HEIGHT: 67 IN | BODY MASS INDEX: 45.99 KG/M2 | WEIGHT: 293 LBS | TEMPERATURE: 98 F | DIASTOLIC BLOOD PRESSURE: 82 MMHG | RESPIRATION RATE: 16 BRPM | OXYGEN SATURATION: 98 % | HEART RATE: 66 BPM

## 2024-04-25 DIAGNOSIS — K59.09 CHRONIC CONSTIPATION: ICD-10-CM

## 2024-04-25 DIAGNOSIS — R10.10 UPPER ABDOMINAL PAIN: ICD-10-CM

## 2024-04-25 DIAGNOSIS — R10.9 ABDOMINAL PAIN, UNSPECIFIED ABDOMINAL LOCATION: Primary | ICD-10-CM

## 2024-04-25 DIAGNOSIS — E66.01 CLASS 3 SEVERE OBESITY DUE TO EXCESS CALORIES WITH SERIOUS COMORBIDITY AND BODY MASS INDEX (BMI) OF 45.0 TO 49.9 IN ADULT: ICD-10-CM

## 2024-04-25 DIAGNOSIS — K21.9 GASTROESOPHAGEAL REFLUX DISEASE, UNSPECIFIED WHETHER ESOPHAGITIS PRESENT: Primary | ICD-10-CM

## 2024-04-25 PROBLEM — N83.201 CYST OF RIGHT OVARY: Status: ACTIVE | Noted: 2024-04-25

## 2024-04-25 PROBLEM — R93.1 ABNORMAL ECHOCARDIOGRAM: Status: RESOLVED | Noted: 2024-02-01 | Resolved: 2024-04-25

## 2024-04-25 PROBLEM — R07.89 OTHER CHEST PAIN: Status: RESOLVED | Noted: 2024-02-29 | Resolved: 2024-04-25

## 2024-04-25 PROBLEM — D75.839 THROMBOCYTOSIS: Status: RESOLVED | Noted: 2023-01-05 | Resolved: 2024-04-25

## 2024-04-25 LAB
ALBUMIN SERPL BCP-MCNC: 3.7 G/DL (ref 3.5–5.2)
ALP SERPL-CCNC: 107 U/L (ref 55–135)
ALT SERPL W/O P-5'-P-CCNC: 20 U/L (ref 10–44)
ANION GAP SERPL CALC-SCNC: 9 MMOL/L (ref 8–16)
AST SERPL-CCNC: 15 U/L (ref 10–40)
B-HCG UR QL: NEGATIVE
BACTERIA #/AREA URNS AUTO: ABNORMAL /HPF
BASOPHILS # BLD AUTO: 0.05 K/UL (ref 0–0.2)
BASOPHILS NFR BLD: 0.5 % (ref 0–1.9)
BILIRUB SERPL-MCNC: 0.4 MG/DL (ref 0.1–1)
BILIRUB UR QL STRIP: NEGATIVE
BUN SERPL-MCNC: 8 MG/DL (ref 6–20)
CALCIUM SERPL-MCNC: 9.7 MG/DL (ref 8.7–10.5)
CHLORIDE SERPL-SCNC: 107 MMOL/L (ref 95–110)
CLARITY UR REFRACT.AUTO: ABNORMAL
CO2 SERPL-SCNC: 22 MMOL/L (ref 23–29)
COLOR UR AUTO: ABNORMAL
CREAT SERPL-MCNC: 0.8 MG/DL (ref 0.5–1.4)
CTP QC/QA: YES
DIFFERENTIAL METHOD BLD: ABNORMAL
EOSINOPHIL # BLD AUTO: 0.2 K/UL (ref 0–0.5)
EOSINOPHIL NFR BLD: 1.9 % (ref 0–8)
ERYTHROCYTE [DISTWIDTH] IN BLOOD BY AUTOMATED COUNT: 15.5 % (ref 11.5–14.5)
EST. GFR  (NO RACE VARIABLE): >60 ML/MIN/1.73 M^2
GLUCOSE SERPL-MCNC: 80 MG/DL (ref 70–110)
GLUCOSE UR QL STRIP: NEGATIVE
HCT VFR BLD AUTO: 40.6 % (ref 37–48.5)
HGB BLD-MCNC: 12.7 G/DL (ref 12–16)
HGB UR QL STRIP: ABNORMAL
HYALINE CASTS UR QL AUTO: 0 /LPF
IMM GRANULOCYTES # BLD AUTO: 0.03 K/UL (ref 0–0.04)
IMM GRANULOCYTES NFR BLD AUTO: 0.3 % (ref 0–0.5)
KETONES UR QL STRIP: ABNORMAL
LEUKOCYTE ESTERASE UR QL STRIP: ABNORMAL
LIPASE SERPL-CCNC: 17 U/L (ref 4–60)
LYMPHOCYTES # BLD AUTO: 2.6 K/UL (ref 1–4.8)
LYMPHOCYTES NFR BLD: 24.1 % (ref 18–48)
MCH RBC QN AUTO: 25.8 PG (ref 27–31)
MCHC RBC AUTO-ENTMCNC: 31.3 G/DL (ref 32–36)
MCV RBC AUTO: 83 FL (ref 82–98)
MICROSCOPIC COMMENT: ABNORMAL
MONOCYTES # BLD AUTO: 0.6 K/UL (ref 0.3–1)
MONOCYTES NFR BLD: 5.6 % (ref 4–15)
NEUTROPHILS # BLD AUTO: 7.3 K/UL (ref 1.8–7.7)
NEUTROPHILS NFR BLD: 67.6 % (ref 38–73)
NITRITE UR QL STRIP: NEGATIVE
NRBC BLD-RTO: 0 /100 WBC
PH UR STRIP: 6 [PH] (ref 5–8)
PLATELET # BLD AUTO: 595 K/UL (ref 150–450)
PMV BLD AUTO: 9.4 FL (ref 9.2–12.9)
POTASSIUM SERPL-SCNC: 4.1 MMOL/L (ref 3.5–5.1)
PROT SERPL-MCNC: 7.6 G/DL (ref 6–8.4)
PROT UR QL STRIP: ABNORMAL
RBC # BLD AUTO: 4.92 M/UL (ref 4–5.4)
RBC #/AREA URNS AUTO: >100 /HPF (ref 0–4)
SODIUM SERPL-SCNC: 138 MMOL/L (ref 136–145)
SP GR UR STRIP: 1.03 (ref 1–1.03)
SQUAMOUS #/AREA URNS AUTO: 6 /HPF
URN SPEC COLLECT METH UR: ABNORMAL
WBC # BLD AUTO: 10.79 K/UL (ref 3.9–12.7)
WBC #/AREA URNS AUTO: 0 /HPF (ref 0–5)

## 2024-04-25 PROCEDURE — 81025 URINE PREGNANCY TEST: CPT | Performed by: PHYSICIAN ASSISTANT

## 2024-04-25 PROCEDURE — 1160F RVW MEDS BY RX/DR IN RCRD: CPT | Mod: CPTII,95,, | Performed by: NURSE PRACTITIONER

## 2024-04-25 PROCEDURE — 99900041 HC LEFT WITHOUT BEING SEEN- EMERGENCY

## 2024-04-25 PROCEDURE — 25000003 PHARM REV CODE 250: Performed by: PHYSICIAN ASSISTANT

## 2024-04-25 PROCEDURE — 3044F HG A1C LEVEL LT 7.0%: CPT | Mod: CPTII,95,, | Performed by: NURSE PRACTITIONER

## 2024-04-25 PROCEDURE — 1159F MED LIST DOCD IN RCRD: CPT | Mod: CPTII,95,, | Performed by: NURSE PRACTITIONER

## 2024-04-25 PROCEDURE — 80053 COMPREHEN METABOLIC PANEL: CPT | Performed by: PHYSICIAN ASSISTANT

## 2024-04-25 PROCEDURE — 83690 ASSAY OF LIPASE: CPT | Performed by: PHYSICIAN ASSISTANT

## 2024-04-25 PROCEDURE — 81001 URINALYSIS AUTO W/SCOPE: CPT | Performed by: PHYSICIAN ASSISTANT

## 2024-04-25 PROCEDURE — 85025 COMPLETE CBC W/AUTO DIFF WBC: CPT | Performed by: PHYSICIAN ASSISTANT

## 2024-04-25 PROCEDURE — 99214 OFFICE O/P EST MOD 30 MIN: CPT | Mod: 95,,, | Performed by: NURSE PRACTITIONER

## 2024-04-25 PROCEDURE — 4010F ACE/ARB THERAPY RXD/TAKEN: CPT | Mod: CPTII,95,, | Performed by: NURSE PRACTITIONER

## 2024-04-25 RX ORDER — PANTOPRAZOLE SODIUM 40 MG/1
40 TABLET, DELAYED RELEASE ORAL DAILY
Qty: 30 TABLET | Refills: 5 | Status: SHIPPED | OUTPATIENT
Start: 2024-04-25 | End: 2024-05-11

## 2024-04-25 RX ORDER — NAPROXEN 500 MG/1
500 TABLET ORAL
Status: DISCONTINUED | OUTPATIENT
Start: 2024-04-25 | End: 2024-04-25

## 2024-04-25 RX ORDER — ACETAMINOPHEN 325 MG/1
650 TABLET ORAL
Status: COMPLETED | OUTPATIENT
Start: 2024-04-25 | End: 2024-04-25

## 2024-04-25 RX ORDER — FAMOTIDINE 20 MG/1
20 TABLET, FILM COATED ORAL 2 TIMES DAILY PRN
Qty: 180 TABLET | Refills: 1 | Status: SHIPPED | OUTPATIENT
Start: 2024-04-25 | End: 2024-06-06

## 2024-04-25 RX ADMIN — ACETAMINOPHEN 650 MG: 325 TABLET ORAL at 10:04

## 2024-04-25 NOTE — ED NOTES
Pt requesting diarrhea medication at this time. LEN notified. Awaiting orders. Pt requesting to leave at this time. LEN and MD notified.

## 2024-04-25 NOTE — ED PROVIDER NOTES
"Encounter Date: 4/25/2024       History     Chief Complaint   Patient presents with    Abdominal Pain     Patient presents for evaluation of upper and lower abdominal pain x two days. Endorses diarrhea with one episode of emesis.      10:20 AM  Patient is a 29-year-old female with a history of DM 2, anxiety, bipolar, insomnia, obesity, ovarian cyst, who presents to [facility] ED for emergent evaluation of abdominal pain.    Patient states that her pain started again yesterday.  She started her menstrual cycle 3 days ago.  She endorses suprapubic pain.  In addition she endorses bilateral upper quadrants pain again with nausea and vomiting.  She thinks she vomit some "flutter" and saliva.  She is concerned because she has a history of gall bladder stones, although on recent ultrasound 24 days ago, there were no signs of common bile duct obstruction or gallbladder stones.    Patient denies any concerns for STDs or vaginal discharge.  She last had penetrating intercourse in 4 months ago.  She has had 4- gonorrhea and chlamydia test in the past 3-4 months.    She has had pelvic US that showed left (most recent) and right cyst.   Had EGD scheduled at  for abdominal pain, but canceled due to fear of procedure.  Had [facility] Multi Pharmacologic last month that was negative.        Review of patient's allergies indicates:  No Known Allergies  Past Medical History:   Diagnosis Date    ADHD (attention deficit hyperactivity disorder)     Anxiety     Asthma     Bipolar affective     Eczema     History of chlamydia 12/2016    Insomnia     Morbid obesity with BMI of 40.0-44.9, adult     Sleep disorder     Type 2 diabetes mellitus without complications      Past Surgical History:   Procedure Laterality Date    NO PAST SURGERIES      as of 1-13-17     Family History   Problem Relation Name Age of Onset    No Known Problems Paternal Grandfather      Breast cancer Paternal Grandmother      Diabetes Maternal Grandmother "      No Known Problems Maternal Grandfather      No Known Problems Father      No Known Problems Mother      No Known Problems Sister      Colon cancer Neg Hx      Ovarian cancer Neg Hx       Social History     Tobacco Use    Smoking status: Former     Types: Vaping with nicotine     Passive exposure: Past    Smokeless tobacco: Never    Tobacco comments:     boyfriend smoke cigarettes     Former weed consumption   Substance Use Topics    Alcohol use: No    Drug use: Not Currently     Types: Marijuana     Review of Systems   Constitutional:  Negative for fever.   HENT:  Negative for sore throat.    Respiratory:  Negative for shortness of breath.    Cardiovascular:  Negative for chest pain.   Gastrointestinal:  Positive for abdominal pain, nausea and vomiting. Negative for constipation and diarrhea.   Genitourinary:  Positive for pelvic pain and vaginal bleeding (started menstral cycle 3 days ago). Negative for decreased urine volume, dysuria, frequency, hematuria and vaginal discharge.   Musculoskeletal:  Negative for back pain.   Skin:  Negative for rash.   Neurological:  Negative for weakness.   Hematological:  Does not bruise/bleed easily.       Physical Exam     Initial Vitals [04/25/24 0942]   BP Pulse Resp Temp SpO2   139/81 73 16 98.6 °F (37 °C) 100 %      MAP       --         Physical Exam    ED Course   Procedures  Labs Reviewed   COMPREHENSIVE METABOLIC PANEL - Abnormal; Notable for the following components:       Result Value    CO2 22 (*)     All other components within normal limits   CBC W/ AUTO DIFFERENTIAL - Abnormal; Notable for the following components:    MCH 25.8 (*)     MCHC 31.3 (*)     RDW 15.5 (*)     Platelets 595 (*)     All other components within normal limits   URINALYSIS, REFLEX TO URINE CULTURE - Abnormal; Notable for the following components:    Color, UA Orange (*)     Appearance, UA Hazy (*)     Protein, UA 1+ (*)     Ketones, UA 1+ (*)     Occult Blood UA 3+ (*)     Leukocytes, UA  Trace (*)     All other components within normal limits    Narrative:     Specimen Source->Urine   URINALYSIS MICROSCOPIC - Abnormal; Notable for the following components:    RBC, UA >100 (*)     All other components within normal limits    Narrative:     Specimen Source->Urine   LIPASE   POCT URINE PREGNANCY          Imaging Results    None          Medications   acetaminophen tablet 650 mg (650 mg Oral Given 4/25/24 1021)     Medical Decision Making  Patient is a 29-year-old female with a history of DM 2, anxiety, bipolar, insomnia, obesity, ovarian cyst, who presents [place] [facility] [place] for emergent evaluation of abdominal pain.    Differential diagnosis includes but isn't limited to GERD, gastritis, peptic ulcer disease, menstrual cramps, gastroenteritis, UTI, ovarian cyst, fibroids, torsion.  She has a negative Babcock sign.  No other peritoneal signs.  Low suspicion for cholecystitis, choledocholithiasis, or other acute surgical abdomen, however will consider if labs unremarkable.  She has not sexually active.  Last intercourse 4 months ago.  She has had several negative STD checks since.  Lower suspicion for PID or TOA.    Patient with a history of bilateral ovarian cyst presenting to [facility] ED today for pelvic pain.  Will obtain ultrasound, obtain basic labs, give acetaminophen, and reassess.    She does not take NSAIDs because she was told that her upper abdominal pain was related to gastritis.    Amount and/or Complexity of Data Reviewed  External Data Reviewed: labs, radiology and notes.  Labs: ordered. Decision-making details documented in ED Course.  Radiology: ordered.    Risk  OTC drugs.               ED Course as of 04/25/24 1545   Thu Apr 25, 2024   0947 BP: 139/81 [CL]   0947 Temp: 98.6 °F (37 °C) [CL]   0947 Resp: 16 [CL]   0947 Pulse: 73 [CL]   0947 SpO2: 100 % [CL]   1019 RBC, UA(!): >100  She is on her menstrual cycle.  [CL]   1019 WBC: 10.79 [CL]   1019 Hemoglobin: 12.7 [CL]   1019 hCG Qualitative, Urine:  Negative [CL]   1125 Lipase: 17 [CL]   1125 Sodium: 138 [CL]   1125 Potassium: 4.1 [CL]   1125 Creatinine: 0.8 [CL]   1125 BILIRUBIN TOTAL: 0.4 [CL]   1125 AST: 15 [CL]   1125 ALT: 20 [CL]   1127 RN notified me that patient wanted to leave the ER prior to US. I discussed plan of care with patient and the etiologies we are trying to rule out with US. Patient will stay for US. [CL]      ED Course User Index  [CL] Usha Simon PA-C                   1330 PM  Nurse notified me that patient requested to leave the ED.  She eloped from the emergency department prior to obtain an ultrasound.      Clinical Impression:  Final diagnoses:  [R10.9] Abdominal pain, unspecified abdominal location (Primary)          ED Disposition Condition    Eloped Good          Future Appointments   Date Time Provider Department Center   4/30/2024  7:30 PM [facility]   5/1/2024  8:45 AM Cachorro Isaac DPM [facility]   5/2/2024 10:30 AM Emily Barr MD [facility]   6/14/2024 11:30 AM Misael Weiss, JAN [facility] CARDIO [place]   7/2/2024  9:00 AM Tavo Adams MD [facility]   7/24/2024  9:40 AM Allison King MD [facility]   7/29/2024  7:00 AM SPECIMEN, JUAN [facility] SPECLAB [place]   7/29/2024  7:15 AM LAB, TANYA KENH LAB [place]   8/1/2024  1:20 PM Katelyn Chapman MD [facility]               Usha Simon PA-C  04/26/24 1207

## 2024-04-25 NOTE — PATIENT INSTRUCTIONS
- Last dose of Ozempic injection on Tuesday, 5/7/2024. Do not take after this date.    EGD Prep Instructions    [facility] [address] [place]  [address]    You are scheduled for an EGD with Dr. Hood on 5/16/2024 at [facility].  You will enter through the [facility] entrance and check in at Same Day Surgery.    Start a CLEAR LIQUID DIET ONE DAY BEFORE YOUR PROCEDURE.  This means no solid food for the entire day before.   CLEAR LIQUID DIET:   - NO DAIRY   - You can have:  Coffee with sugar (no creamer), tea, water, soda, apple or white grape juice, chicken or beef broth/bouillon (no meat, noodles, or veggies), popsicles, Jell-O, lemonade.     Nothing to eat or drink after midnight before the procedure.  You MAY brush your teeth.    You MAY take your blood pressure, heart, and seizure medication on the morning of the procedure, with a SIP of water.  Hold ALL other medications until after the procedure.    You must have someone with you to DRIVE YOU HOME since you will be receiving IV sedation for the procedure.    If you are on blood thinners THAT YOU HAVE BEEN INSTRUCTED TO HOLD BY YOUR DOCTOR FOR THIS PROCEDURE, then do NOT take this the morning of your EGD.  Do NOT stop these medications on your own, they must be approved to be held by your doctor.  Your EGD can NOT be done if you are on these medications.  Examples of blood thinners include: Coumadin, Aggrenox, Plavix, Pradaxa, Reapro, Pletal, Xarelto, Ticagrelor, Brilinta, Eliquis, and high dose aspirin (325 mg).  You do not have to stop baby aspirin 81 mg.    You will receive a call the afternoon before your EGD to tell you the time to arrive.  If you have not received a call by the day before your procedure, call the Pre-op Coordinator at [phone].

## 2024-04-25 NOTE — PROGRESS NOTES
Subjective:       Patient ID: Misael Garcia is a 29 y.o. female.    Chief Complaint: Abdominal Pain and Constipation    The patient location is: [place], [place]  The chief complaint leading to consultation is: abdominal pain    Visit type: audiovisual    Face to Face time with patient: 20 minutes  30 minutes of total time spent on the encounter, which includes face to face time and non-face to face time preparing to see the patient (eg, review of tests), Obtaining and/or reviewing separately obtained history, Documenting clinical information in the electronic or other health record, Independently interpreting results (not separately reported) and communicating results to the patient/family/caregiver, or Care coordination (not separately reported).         Each patient to whom he or she provides medical services by telemedicine is:  (1) informed of the relationship between the physician and patient and the respective role of any other health care provider with respect to management of the patient; and (2) notified that he or she may decline to receive medical services by telemedicine and may withdraw from such care at any time.    Notes:     28 y/o female with HTN, DM-2, morbid obesity,  SID, ADHD, anxiety, bipolar affective disorder, thrombocytosis, and marijuana use presents for virtual visit with c/o abdominal pain. Last seen in clinic 4/2023 for constipation. Reports intermittent upper abdominal cramping and burning pain over the past month. Pain worse after large meal. Has frequent heartburn and regurgitation at night when lying flat. Some nausea without vomiting. No dysphagia. She is still constipated. Has BM every 3-4 daily. No longer taking Miralax. Canceled EGD scheduled with [facility] for bariatric surgery clearance due to fear.         Past Medical History:   Diagnosis Date    ADHD (attention deficit hyperactivity disorder)     Anxiety     Asthma     Bipolar affective     Eczema     History of  chlamydia 12/2016    Insomnia     Morbid obesity with BMI of 40.0-44.9, adult     Sleep disorder     Type 2 diabetes mellitus without complications        Past Surgical History:   Procedure Laterality Date    NO PAST SURGERIES      as of 1-13-17       Family History   Problem Relation Name Age of Onset    No Known Problems Paternal Grandfather      Breast cancer Paternal Grandmother      Diabetes Maternal Grandmother      No Known Problems Maternal Grandfather      No Known Problems Father      No Known Problems Mother      No Known Problems Sister      Colon cancer Neg Hx      Ovarian cancer Neg Hx         Social History     Socioeconomic History    Marital status: Single   Occupational History    Occupation:     Tobacco Use    Smoking status: Former     Types: Vaping with nicotine     Passive exposure: Past    Smokeless tobacco: Never    Tobacco comments:     boyfriend smoke cigarettes     Former weed consumption   Substance and Sexual Activity    Alcohol use: No    Drug use: Not Currently     Types: Marijuana    Sexual activity: Not Currently     Partners: Male     Birth control/protection: None     Comment: Single:       Social Determinants of Health     Financial Resource Strain: Low Risk  (8/24/2023)    [place] [facility]    Overall Financial Resource Strain (CARDIA)     Difficulty of Paying Living Expenses: Not hard at all   Food Insecurity: No Food Insecurity (8/24/2023)    [place] [facility]    Hunger Vital Sign     Worried About Running Out of Food in the Last Year: Never true     Ran Out of Food in the Last Year: Never true   Transportation Needs: No Transportation Needs (8/24/2023)    [place] [facility]    PRAPARE - Transportation     Lack of Transportation (Medical): No     Lack of Transportation (Non-Medical): No   Physical Activity: Inactive (8/24/2023)    [facility]    Exercise Vital Sign     Days of  Exercise per Week: 0 days     Minutes of Exercise per Session: 0 min   Stress: Stress Concern Present (8/24/2023)    Received from [facility]    [employer] - Occupational Stress Questionnaire     Feeling of Stress : Very much   Social Connections: Unknown (8/24/2023)    Received from [facility]    Social Connection and Isolation Panel [NHANES]     Frequency of Communication with Friends and Family: More than three times a week     Frequency of Social Gatherings with Friends and Family: Twice a week     Attends Episcopal Services: Never     Active Member of Clubs or Organizations: No     Attends Club or Organization Meetings: Never     Marital Status: Patient declined   Recent Concern: Social Connections - Socially Isolated (7/7/2023)    Social Connection and Isolation Panel [NHANES]     Frequency of Communication with Friends and Family: More than three times a week     Frequency of Social Gatherings with Friends and Family: More than three times a week     Attends Episcopal Services: Never     Active Member of Clubs or Organizations: No     Attends Club or Organization Meetings: Never     Marital Status: Never    Housing Stability: Unknown (8/24/2023)    Received from [facility]    Housing Stability Vital Sign     Unable to Pay for Housing in the Last Year: No     In the last 12 months, was there a time when you did not have a steady place to sleep or slept in a shelter (including now)?: No       Review of Systems   Constitutional:  Negative for appetite change and unexpected weight change.   HENT:  Negative for trouble swallowing.    Respiratory:  Negative for shortness of breath.    Cardiovascular:  Negative for chest pain.   Gastrointestinal:  Positive for abdominal pain and constipation. Negative for blood in stool, diarrhea, nausea, rectal pain and vomiting.   Hematological:  Negative for adenopathy. Does not bruise/bleed easily.    Psychiatric/Behavioral:  Negative for dysphoric mood.          Objective:     There were no vitals filed for this visit.       Physical Exam  Constitutional:       General: She is not in acute distress.     Appearance: She is obese.   HENT:      Head: Normocephalic.   Eyes:      Conjunctiva/sclera: Conjunctivae normal.   Pulmonary:      Effort: Pulmonary effort is normal. No respiratory distress.   Skin:     Coloration: Skin is not jaundiced or pale.   Neurological:      Mental Status: She is alert and oriented to person, place, and time.   Psychiatric:         Mood and Affect: Mood normal.         Behavior: Behavior normal.               Assessment:         ICD-10-CM ICD-9-CM   1. Gastroesophageal reflux disease, unspecified whether esophagitis present  K21.9 530.81   2. Chronic constipation  K59.09 564.00   3. Class 3 severe obesity due to excess calories with serious comorbidity and body mass index (BMI) of 45.0 to 49.9 in adult  E66.01 278.01    Z68.42 V85.42   4. Upper abdominal pain  R10.10 789.09       Plan:       Gastroesophageal reflux disease, unspecified whether esophagitis present; Upper abdominal pain  -     pantoprazole (PROTONIX) 40 MG tablet; Take 1 tablet (40 mg total) by mouth once daily.  Dispense: 30 tablet; Refill: 5  -     Case Request Endoscopy: EGD (ESOPHAGOGASTRODUODENOSCOPY)    Chronic constipation  -     linaCLOtide (LINZESS) 72 mcg Cap capsule; Take 1 capsule (72 mcg total) by mouth before breakfast.  Dispense: 30 capsule; Refill: 5    Class 3 severe obesity due to excess calories with serious comorbidity and body mass index (BMI) of 45.0 to 49.9 in adult  -     Weight loss advised. Dietary and exercise counseling done.      Follow up if symptoms worsen or fail to improve.     Patient's Medications   New Prescriptions    LINACLOTIDE (LINZESS) 72 MCG CAP CAPSULE    Take 1 capsule (72 mcg total) by mouth before breakfast.    PANTOPRAZOLE (PROTONIX) 40 MG TABLET    Take 1 tablet (40 mg  total) by mouth once daily.   Previous Medications    ADAPALENE-BENZOYL PEROXIDE (EPIDUO FORTE) 0.3-2.5 % GLWP    Apply topically.    ALBUTEROL (PROVENTIL/VENTOLIN HFA) 90 MCG/ACTUATION INHALER    Inhale 2 puffs into the lungs every 4 (four) hours as needed (shortness of breath). Rescue    AMMONIUM LACTATE (LAC-HYDRIN) 12 % LOTION    Apply topically.    AZELASTINE (ASTELIN) 137 MCG (0.1 %) NASAL SPRAY    2 sprays (274 mcg total) by Nasal route 2 (two) times daily as needed for Rhinitis.    BLOOD SUGAR DIAGNOSTIC STRP    To check Blood Glucose  2-3x times daily,    BLOOD-GLUCOSE METER MISC    use as directed    CETIRIZINE (ZYRTEC) 10 MG TABLET    Take 1 tablet (10 mg total) by mouth once daily.    CLINDAMYCIN PHOSPHATE 1% (CLINDAGEL) 1 % GEL    Apply topically 2 (two) times daily.    DICYCLOMINE (BENTYL) 20 MG TABLET    Take 1 tablet (20 mg total) by mouth 3 (three) times daily as needed (abdominal pain).    FLUTICASONE FUROATE-VILANTEROL (BREO ELLIPTA) 200-25 MCG/DOSE DSDV DISKUS INHALER    Inhale 1 puff into the lungs once daily. Controller    FLUTICASONE PROPIONATE (FLONASE) 50 MCG/ACTUATION NASAL SPRAY    2 sprays (100 mcg total) by Each Nostril route once daily.    LANCETS 28 GAUGE MISC    To check Blood Glucose  2-3 times daily,    LISINOPRIL (PRINIVIL,ZESTRIL) 20 MG TABLET    Take 1 tablet (20 mg total) by mouth once daily.    LURASIDONE (LATUDA) 40 MG TAB TABLET    Take 40 mg by mouth.    METRONIDAZOLE (METROGEL) 0.75 % GEL    Use one vaginal applicatorful once daily at bedtime for 5 days    MUPIROCIN (BACTROBAN) 2 % OINTMENT    Apply topically 2 (two) times daily. Apply to the affected area 2 times a day for 1 week.    ONDANSETRON (ZOFRAN-ODT) 4 MG TBDL    Take 1 tablet (4 mg total) by mouth every 8 (eight) hours as needed (severe nausea and vomiting).    ONDANSETRON (ZOFRAN-ODT) 4 MG TBDL    Take 1 tablet (4 mg total) by mouth 2 (two) times daily.    SEMAGLUTIDE (OZEMPIC) 1 MG/DOSE (4 MG/3 ML)    Inject 1  mg into the skin every 7 days.    SULFACETAMIDE SODIUM-SULFUR 10-5 % (W/W) CLSR    Apply topically.    TOPIRAMATE (TOPAMAX) 25 MG TABLET    Take 25 mg by mouth 2 (two) times daily.    UREA 20 % CREA    APPLY TO AFFECTED AREA QD   Modified Medications    Modified Medication Previous Medication    FAMOTIDINE (PEPCID) 20 MG TABLET famotidine (PEPCID) 20 MG tablet       Take 1 tablet (20 mg total) by mouth 2 (two) times daily as needed for Heartburn (epigastric abdominal pain).    Take 1 tablet (20 mg total) by mouth 2 (two) times daily as needed for Heartburn (epigastric abdominal pain).   Discontinued Medications    PANTOPRAZOLE (PROTONIX) 20 MG TABLET    Take 1 tablet (20 mg total) by mouth once daily. for 14 days

## 2024-04-25 NOTE — ED TRIAGE NOTES
Patient comes into the emergency department by POV with complaints of abdominal pain. Patient states that pain started yesterday when she started menstrual cycle, also diagnosed with a gallstone last month. Patient doesn't know if pain is from stone or just cramps; pain is in lower abdomen and mid gastric area.

## 2024-04-27 ENCOUNTER — PATIENT MESSAGE (OUTPATIENT)
Dept: GASTROENTEROLOGY | Facility: CLINIC | Age: 29
End: 2024-04-27
Payer: MEDICAID

## 2024-04-27 ENCOUNTER — PATIENT MESSAGE (OUTPATIENT)
Dept: FAMILY MEDICINE | Facility: CLINIC | Age: 29
End: 2024-04-27
Payer: MEDICAID

## 2024-04-29 ENCOUNTER — TELEPHONE (OUTPATIENT)
Dept: GASTROENTEROLOGY | Facility: CLINIC | Age: 29
End: 2024-04-29
Payer: MEDICAID

## 2024-04-29 NOTE — TELEPHONE ENCOUNTER
Called and spoke with pt. Pt stated she would like to reschedule her EGD for a sooner date. Informed pt the only thing sooner we have is Friday May 10, 2024. Pt accepted day and rescheduled EGD to 5/10/2024. Pt asked if the EGD was safe to get. Informed pt it is safe and she will be in good hands. Pt voiced understanding and had no further questions.         ----- Message from Bettye Corbett sent at 4/27/2024  9:52 AM CDT -----  Type:  Needs Medical Advice    Who Called: pt  Would the patient rather a call back or a response via [facility]? call  Best Call Back Number: [phone]   Additional Information:   Pt requesting a call regarding rescheduling her procedure on 05/16/24

## 2024-04-30 ENCOUNTER — TELEPHONE (OUTPATIENT)
Dept: GASTROENTEROLOGY | Facility: CLINIC | Age: 29
End: 2024-04-30
Payer: MEDICAID

## 2024-04-30 NOTE — TELEPHONE ENCOUNTER
Returned pt's call. Pt stated she has some questions regarding her EGD procedure. Pt stated she is very nervous and feels like she is going to die during the EGD procedure. Informed pt the EGD is a safe procedure and she will not die. Informed pt she can discuss with the nurses the morning of her procedure if she needs medicine for anxiety. Informed pt Dr Hood will speak with her before and after the procedure. Informed pt she will be in good hands as Dr Hood makes sure all her patients are in great condition before giving them anesthesia. Informed pt she will be sleep for the procedure and it will take 30 mins or less. Pt stated she feels better since speaking with me about it. Pt voiced understanding and had no further questions.     ----- Message from Chula Finn sent at 4/30/2024  9:33 AM CDT -----  Type:  Patient Returning Call    Who Called:Pt   Would the patient rather a call back or a response via [facility]? Call back   Best Call Back Number:[phone]  Additional Information:  need to ask some questions about procedure

## 2024-05-03 ENCOUNTER — TELEPHONE (OUTPATIENT)
Dept: GASTROENTEROLOGY | Facility: CLINIC | Age: 29
End: 2024-05-03
Payer: MEDICAID

## 2024-05-03 ENCOUNTER — PATIENT MESSAGE (OUTPATIENT)
Dept: FAMILY MEDICINE | Facility: CLINIC | Age: 29
End: 2024-05-03
Payer: MEDICAID

## 2024-05-03 DIAGNOSIS — E66.01 CLASS 3 SEVERE OBESITY DUE TO EXCESS CALORIES WITH SERIOUS COMORBIDITY AND BODY MASS INDEX (BMI) OF 50.0 TO 59.9 IN ADULT: ICD-10-CM

## 2024-05-03 DIAGNOSIS — E11.65 TYPE 2 DIABETES MELLITUS WITH HYPERGLYCEMIA, WITHOUT LONG-TERM CURRENT USE OF INSULIN: ICD-10-CM

## 2024-05-03 DIAGNOSIS — E11.59 HYPERTENSION ASSOCIATED WITH DIABETES: ICD-10-CM

## 2024-05-03 DIAGNOSIS — I15.2 HYPERTENSION ASSOCIATED WITH DIABETES: ICD-10-CM

## 2024-05-03 DIAGNOSIS — G47.33 OSA ON CPAP: ICD-10-CM

## 2024-05-03 NOTE — TELEPHONE ENCOUNTER
Pt sent pt portal message. Pt was responded to on patient portal.          ----- Message from Sara Payton sent at 5/3/2024  2:34 PM CDT -----  Type:  Needs Medical Advice    Who Called:  PT   Symptoms (please be specific):  stomach pains,   How long has patient had these symptoms:  3 days   Pharmacy [facility] and phone #:  [id number] - [place] - [address]  AT [facility] [address]  [address] [place] [address]  Phone: [phone] Fax: [phone]  Would the patient rather a call back or a response via [facility]? Callback   Best Call Back Number:  [phone]  Additional Information: pt is requesting a sooner appt

## 2024-05-06 ENCOUNTER — PATIENT MESSAGE (OUTPATIENT)
Dept: GASTROENTEROLOGY | Facility: CLINIC | Age: 29
End: 2024-05-06
Payer: MEDICAID

## 2024-05-06 RX ORDER — SEMAGLUTIDE 0.68 MG/ML
0.5 INJECTION, SOLUTION SUBCUTANEOUS
Qty: 3 ML | Refills: 0 | OUTPATIENT
Start: 2024-05-06 | End: 2024-05-11

## 2024-05-07 ENCOUNTER — TELEPHONE (OUTPATIENT)
Dept: GASTROENTEROLOGY | Facility: CLINIC | Age: 29
End: 2024-05-07
Payer: MEDICAID

## 2024-05-07 ENCOUNTER — TELEPHONE (OUTPATIENT)
Dept: ENDOSCOPY | Facility: HOSPITAL | Age: 29
End: 2024-05-07
Payer: MEDICAID

## 2024-05-07 NOTE — TELEPHONE ENCOUNTER
Contacted Pt for endoscopy pre-call for upcoming procedure.  Pre-call complete, Pt does not have any further questions. Arrival time: 9:30AM

## 2024-05-07 NOTE — TELEPHONE ENCOUNTER
----- Message from Krista Chapman sent at 5/7/2024  8:40 AM CDT -----  Type:  Patient Returning Call    Who Called:pt   Who Left Message for Patient:  Does the patient know what this is regarding?:procedure   Would the patient rather a call back or a response via [facility]? Call   Best Call Back Number:[phone]  Additional Information: states she would like to speak with office in regards to procedure Friday

## 2024-05-07 NOTE — TELEPHONE ENCOUNTER
JAN Moran spoke with patient via phone to inform her to increase her Linzess to 2 capsules daily and add Miralax daily.

## 2024-05-09 ENCOUNTER — TELEPHONE (OUTPATIENT)
Dept: ENDOSCOPY | Facility: HOSPITAL | Age: 29
End: 2024-05-09
Payer: MEDICAID

## 2024-05-09 NOTE — TELEPHONE ENCOUNTER
Patient wanted to know what medications she could take before the EGD. Informed patient that she needs to take any blood pressure, heart or seizure medications before the EGD.

## 2024-05-09 NOTE — TELEPHONE ENCOUNTER
----- Message from Maria Victoria Kemp RN sent at 5/9/2024  8:45 AM CDT -----    ----- Message -----  From: Sarah Hu MA  Sent: 5/9/2024   8:42 AM CDT  To: Mindy B Seipel, RN; [facility] Schedul[facility]    Pt is scheduled for EGD tomorrow. Pt would like to know arrival time and what medications she can and cannot take tomorrow before her procedure.

## 2024-05-10 ENCOUNTER — PATIENT MESSAGE (OUTPATIENT)
Dept: GASTROENTEROLOGY | Facility: CLINIC | Age: 29
End: 2024-05-10
Payer: MEDICAID

## 2024-05-10 ENCOUNTER — NURSE TRIAGE (OUTPATIENT)
Dept: ADMINISTRATIVE | Facility: CLINIC | Age: 29
End: 2024-05-10
Payer: MEDICAID

## 2024-05-10 ENCOUNTER — PATIENT MESSAGE (OUTPATIENT)
Dept: FAMILY MEDICINE | Facility: CLINIC | Age: 29
End: 2024-05-10
Payer: MEDICAID

## 2024-05-10 ENCOUNTER — PATIENT MESSAGE (OUTPATIENT)
Dept: OBSTETRICS AND GYNECOLOGY | Facility: CLINIC | Age: 29
End: 2024-05-10
Payer: MEDICAID

## 2024-05-10 ENCOUNTER — ON-DEMAND VIRTUAL (OUTPATIENT)
Dept: URGENT CARE | Facility: CLINIC | Age: 29
End: 2024-05-10
Payer: MEDICAID

## 2024-05-10 DIAGNOSIS — A08.4 VIRAL GASTROENTERITIS: Primary | ICD-10-CM

## 2024-05-10 PROCEDURE — 99212 OFFICE O/P EST SF 10 MIN: CPT | Mod: 95,,,

## 2024-05-10 NOTE — PROGRESS NOTES
Subjective:      Patient ID: Misael Garcia is a 29 y.o. female at home    Vitals:  vitals were not taken for this visit.     Chief Complaint: Diarrhea      Visit Type: TELE AUDIOVISUAL    Present with the patient at the time of consultation: TELEMED PRESENT WITH PATIENT: None    Past Medical History:   Diagnosis Date    ADHD (attention deficit hyperactivity disorder)     Anxiety     Asthma     Bipolar affective     Eczema     History of chlamydia 12/2016    Insomnia     Morbid obesity with BMI of 40.0-44.9, adult     Sleep disorder     Type 2 diabetes mellitus without complications      Past Surgical History:   Procedure Laterality Date    NO PAST SURGERIES      as of 1-13-17     Review of patient's allergies indicates:  No Known Allergies  Current Outpatient Medications on File Prior to Visit   Medication Sig Dispense Refill    adapalene-benzoyl peroxide (EPIDUO FORTE) 0.3-2.5 % GlwP Apply topically.      albuterol (PROVENTIL/VENTOLIN HFA) 90 mcg/actuation inhaler Inhale 2 puffs into the lungs every 4 (four) hours as needed (shortness of breath). Rescue 8.5 g 1    ammonium lactate (LAC-HYDRIN) 12 % lotion Apply topically.      azelastine (ASTELIN) 137 mcg (0.1 %) nasal spray 2 sprays (274 mcg total) by Nasal route 2 (two) times daily as needed for Rhinitis. 30 mL 0    blood sugar diagnostic Strp To check Blood Glucose  2-3x times daily, 100 each 11    blood-glucose meter Misc use as directed 1 each 0    cetirizine (ZYRTEC) 10 MG tablet Take 1 tablet (10 mg total) by mouth once daily. 30 tablet 0    clindamycin phosphate 1% (CLINDAGEL) 1 % gel Apply topically 2 (two) times daily. 30 g 3    dicyclomine (BENTYL) 20 mg tablet Take 1 tablet (20 mg total) by mouth 3 (three) times daily as needed (abdominal pain). 15 tablet 0    famotidine (PEPCID) 20 MG tablet Take 1 tablet (20 mg total) by mouth 2 (two) times daily as needed for Heartburn (epigastric abdominal pain). 180 tablet 1    fluticasone  furoate-vilanteroL (BREO ELLIPTA) 200-25 mcg/dose DsDv diskus inhaler Inhale 1 puff into the lungs once daily. Controller 180 each 1    fluticasone propionate (FLONASE) 50 mcg/actuation nasal spray 2 sprays (100 mcg total) by Each Nostril route once daily. 32 g 5    lancets 28 gauge Misc To check Blood Glucose  2-3 times daily, 100 each 11    linaCLOtide (LINZESS) 72 mcg Cap capsule Take 1 capsule (72 mcg total) by mouth before breakfast. 30 capsule 5    lisinopriL (PRINIVIL,ZESTRIL) 20 MG tablet Take 1 tablet (20 mg total) by mouth once daily. 90 tablet 1    lurasidone (LATUDA) 40 mg Tab tablet Take 40 mg by mouth.      metroNIDAZOLE (METROGEL) 0.75 % gel Use one vaginal applicatorful once daily at bedtime for 5 days 45 g 0    mupirocin (BACTROBAN) 2 % ointment Apply topically 2 (two) times daily. Apply to the affected area 2 times a day for 1 week. 15 g 0    ondansetron (ZOFRAN-ODT) 4 MG TbDL Take 1 tablet (4 mg total) by mouth every 8 (eight) hours as needed (severe nausea and vomiting). 12 tablet 0    ondansetron (ZOFRAN-ODT) 4 MG TbDL Take 1 tablet (4 mg total) by mouth 2 (two) times daily. 16 tablet 0    pantoprazole (PROTONIX) 40 MG tablet Take 1 tablet (40 mg total) by mouth once daily. 30 tablet 5    semaglutide (OZEMPIC) 0.25 mg or 0.5 mg (2 mg/3 mL) pen injector Inject 0.5 mg into the skin every 7 days. for 4 doses 3 mL 0    semaglutide (OZEMPIC) 1 mg/dose (4 mg/3 mL) Inject 1 mg into the skin every 7 days. 3 mL 3    sulfacetamide sodium-sulfur 10-5 % (w/w) Clsr Apply topically.      topiramate (TOPAMAX) 25 MG tablet Take 25 mg by mouth 2 (two) times daily.      urea 20 % Crea APPLY TO AFFECTED AREA QD       No current facility-administered medications on file prior to visit.     Family History   Problem Relation Name Age of Onset    No Known Problems Paternal Grandfather      Breast cancer Paternal Grandmother      Diabetes Maternal Grandmother      No Known Problems Maternal Grandfather      No Known  Problems Father      No Known Problems Mother      No Known Problems Sister      Colon cancer Neg Hx      Ovarian cancer Neg Hx             Ohs Peq Odvv Intake    5/10/2024  2:42 PM CDT - Filed by Patient   What is your current physical address in the event of a medical emergency? [address] [place] [address]   Are you able to take your vital signs? No   Please attach any relevant images or files          Patient states that yesterday she took her linzess for a prep to have an EDG. Patient states that she did not have this done and about 0930 this morning she began to have diarrhea. Patient states that she is having generalized abdominal pain. Patient states that she is not having any blood in stool. Patient states that her abdominal area is soft to touch. Patient denies any fever or other symptoms at this time.         Constitution: Negative.   HENT: Negative.     Neck: neck negative.   Cardiovascular: Negative.    Eyes: Negative.    Respiratory: Negative.     Gastrointestinal:  Positive for abdominal pain and diarrhea.   Endocrine: negative.   Genitourinary: Negative.    Musculoskeletal: Negative.    Skin: Negative.    Allergic/Immunologic: Negative.    Neurological: Negative.    Hematologic/Lymphatic: Negative.    Psychiatric/Behavioral: Negative.          Objective:   The physical exam was conducted virtually.  Physical Exam   Constitutional: She is oriented to person, place, and time.   HENT:   Head: Normocephalic and atraumatic.   Nose: Nose normal.   Eyes: Conjunctivae are normal. Pupils are equal, round, and reactive to light. Extraocular movement intact   Neck: Neck supple.   Pulmonary/Chest: Effort normal.   Abdominal: Normal appearance. Soft.   Musculoskeletal: Normal range of motion.         General: Normal range of motion.   Neurological: no focal deficit. She is alert, oriented to person, place, and time and at baseline.   Skin: Skin is warm.   Psychiatric: Her behavior is normal.  Mood, judgment and thought content normal.       Assessment:     1. Viral gastroenteritis        Plan:       Viral gastroenteritis           Report directly to emergency department for any acute worsening of symptoms.    Recommend clear liquid bland diet times 12 hours.    Please get plenty of rest.    Follow-up with PCP or return to clinic if no improvement in symptoms over the next 2-3 days.

## 2024-05-10 NOTE — TELEPHONE ENCOUNTER
Pt calls, report sore throat and not feeling well. Was supposed to have EGD today but had to cancel. Pt went home and took linzess. Pt reports 6 episodes of watery yellowish diarrhea after taking linzess.     Dispo is to go to ED/[facility] now (or to office with PCP approval). Approval for office visit or OD VV per JOCE Zimmerman who recommended GI visit rather than PCP. Will send high priority message to GI as I am unable to make specialty appts. Also recommended OD VV to patient who accepted. Care advice given. Pt v/u.     Reason for Disposition   Constant abdominal pain lasting > 2 hours    Additional Information   Negative: Shock suspected (e.g., cold/pale/clammy skin, too weak to stand, low BP, rapid pulse)   Negative: Difficult to awaken or acting confused (e.g., disoriented, slurred speech)   Negative: Sounds like a life-threatening emergency to the triager   Negative: SEVERE abdominal pain (e.g., excruciating) and present > 1 hour     7/10   Negative: SEVERE abdominal pain and age > 60 years   Negative: Bloody, black, or tarry bowel movements  (Exception: Chronic-unchanged black-grey bowel movements and is taking iron pills or Pepto-Bismol.)   Negative: SEVERE diarrhea (e.g., 7 or more times / day more than normal) and age > 60 years    Protocols used: Diarrhea-A-OH

## 2024-05-13 ENCOUNTER — TELEPHONE (OUTPATIENT)
Dept: ENDOSCOPY | Facility: HOSPITAL | Age: 29
End: 2024-05-13
Payer: MEDICAID

## 2024-05-13 ENCOUNTER — PATIENT MESSAGE (OUTPATIENT)
Dept: FAMILY MEDICINE | Facility: CLINIC | Age: 29
End: 2024-05-13
Payer: MEDICAID

## 2024-05-13 ENCOUNTER — PATIENT MESSAGE (OUTPATIENT)
Dept: GASTROENTEROLOGY | Facility: CLINIC | Age: 29
End: 2024-05-13
Payer: MEDICAID

## 2024-05-13 NOTE — TELEPHONE ENCOUNTER
Spoke to patient to schedule procedure(s) Upper Endoscopy (EGD)       Physician to perform procedure(s) Dr. VERONICA Valdez  Date of Procedure (s) 6/10/24  Arrival Time 10:30 AM  Time of Procedure(s) 11:30 AM   Location of Procedure(s) [facility]  Type of Rx Prep sent to patient: N/A  Instructions provided to patient via [facility]    Patient was informed on the following information and verbalized understanding. Screening questionnaire reviewed with patient and complete. If procedure requires anesthesia, a responsible adult needs to be present to accompany the patient home, patient cannot drive after receiving anesthesia. Appointment details are tentative, especially check-in time. Patient will receive a prep-op call 7 days prior to confirm check-in time for procedure. If applicable the patient should contact their pharmacy to verify Rx for procedure prep is ready for pick-up. Patient was advised to call the scheduling department at [phone] if pharmacy states no Rx is available. Patient was advised to call the endoscopy scheduling department if any questions or concerns arise.      [facility] Scheduling Department

## 2024-05-13 NOTE — TELEPHONE ENCOUNTER
Received Message Pt MRN:[id number] is asking to reschedule pt is asking to schedule procedure this week or next week pt is open to schedule at all locations

## 2024-05-16 ENCOUNTER — OFFICE VISIT (OUTPATIENT)
Dept: GASTROENTEROLOGY | Facility: CLINIC | Age: 29
End: 2024-05-16
Payer: MEDICAID

## 2024-05-16 ENCOUNTER — HOSPITAL ENCOUNTER (EMERGENCY)
Facility: HOSPITAL | Age: 29
Discharge: HOME | End: 2024-05-16
Attending: EMERGENCY MEDICINE
Payer: MEDICAID

## 2024-05-16 ENCOUNTER — PATIENT MESSAGE (OUTPATIENT)
Dept: GASTROENTEROLOGY | Facility: CLINIC | Age: 29
End: 2024-05-16

## 2024-05-16 ENCOUNTER — PATIENT MESSAGE (OUTPATIENT)
Dept: OBSTETRICS AND GYNECOLOGY | Facility: CLINIC | Age: 29
End: 2024-05-16
Payer: MEDICAID

## 2024-05-16 ENCOUNTER — PATIENT MESSAGE (OUTPATIENT)
Dept: FAMILY MEDICINE | Facility: CLINIC | Age: 29
End: 2024-05-16
Payer: MEDICAID

## 2024-05-16 VITALS
HEART RATE: 78 BPM | WEIGHT: 293 LBS | TEMPERATURE: 98 F | DIASTOLIC BLOOD PRESSURE: 97 MMHG | BODY MASS INDEX: 49.49 KG/M2 | RESPIRATION RATE: 18 BRPM | OXYGEN SATURATION: 98 % | SYSTOLIC BLOOD PRESSURE: 163 MMHG

## 2024-05-16 DIAGNOSIS — R11.2 NAUSEA AND VOMITING, UNSPECIFIED VOMITING TYPE: ICD-10-CM

## 2024-05-16 DIAGNOSIS — R19.8 ALTERNATING CONSTIPATION AND DIARRHEA: ICD-10-CM

## 2024-05-16 DIAGNOSIS — K62.5 RECTAL BLEEDING: ICD-10-CM

## 2024-05-16 DIAGNOSIS — E66.01 CLASS 3 SEVERE OBESITY DUE TO EXCESS CALORIES WITH SERIOUS COMORBIDITY AND BODY MASS INDEX (BMI) OF 45.0 TO 49.9 IN ADULT: ICD-10-CM

## 2024-05-16 DIAGNOSIS — R10.13 EPIGASTRIC PAIN: Primary | ICD-10-CM

## 2024-05-16 DIAGNOSIS — T88.7XXA MEDICATION SIDE EFFECT: ICD-10-CM

## 2024-05-16 DIAGNOSIS — K21.9 GASTROESOPHAGEAL REFLUX DISEASE, UNSPECIFIED WHETHER ESOPHAGITIS PRESENT: ICD-10-CM

## 2024-05-16 DIAGNOSIS — R10.10 UPPER ABDOMINAL PAIN: Primary | ICD-10-CM

## 2024-05-16 LAB
ALBUMIN SERPL BCP-MCNC: 3.6 G/DL (ref 3.5–5.2)
ALP SERPL-CCNC: 97 U/L (ref 55–135)
ALT SERPL W/O P-5'-P-CCNC: 21 U/L (ref 10–44)
ANION GAP SERPL CALC-SCNC: 13 MMOL/L (ref 8–16)
AST SERPL-CCNC: 14 U/L (ref 10–40)
BACTERIA #/AREA URNS HPF: NORMAL /HPF
BASOPHILS # BLD AUTO: 0.04 K/UL (ref 0–0.2)
BASOPHILS NFR BLD: 0.4 % (ref 0–1.9)
BILIRUB SERPL-MCNC: 0.8 MG/DL (ref 0.1–1)
BILIRUB UR QL STRIP: ABNORMAL
BUN SERPL-MCNC: 4 MG/DL (ref 6–20)
CALCIUM SERPL-MCNC: 9.1 MG/DL (ref 8.7–10.5)
CHLORIDE SERPL-SCNC: 107 MMOL/L (ref 95–110)
CLARITY UR: ABNORMAL
CO2 SERPL-SCNC: 18 MMOL/L (ref 23–29)
COLOR UR: YELLOW
CREAT SERPL-MCNC: 0.8 MG/DL (ref 0.5–1.4)
DIFFERENTIAL METHOD BLD: ABNORMAL
EOSINOPHIL # BLD AUTO: 0.1 K/UL (ref 0–0.5)
EOSINOPHIL NFR BLD: 1.1 % (ref 0–8)
ERYTHROCYTE [DISTWIDTH] IN BLOOD BY AUTOMATED COUNT: 15.5 % (ref 11.5–14.5)
EST. GFR  (NO RACE VARIABLE): >60 ML/MIN/1.73 M^2
GLUCOSE SERPL-MCNC: 81 MG/DL (ref 70–110)
GLUCOSE UR QL STRIP: NEGATIVE
HCT VFR BLD AUTO: 37.3 % (ref 37–48.5)
HGB BLD-MCNC: 12.1 G/DL (ref 12–16)
HGB UR QL STRIP: NEGATIVE
HYALINE CASTS #/AREA URNS LPF: 0 /LPF
IMM GRANULOCYTES # BLD AUTO: 0.03 K/UL (ref 0–0.04)
IMM GRANULOCYTES NFR BLD AUTO: 0.3 % (ref 0–0.5)
KETONES UR QL STRIP: ABNORMAL
LEUKOCYTE ESTERASE UR QL STRIP: ABNORMAL
LIPASE SERPL-CCNC: 6 U/L (ref 4–60)
LYMPHOCYTES # BLD AUTO: 2.1 K/UL (ref 1–4.8)
LYMPHOCYTES NFR BLD: 19 % (ref 18–48)
MCH RBC QN AUTO: 26.3 PG (ref 27–31)
MCHC RBC AUTO-ENTMCNC: 32.4 G/DL (ref 32–36)
MCV RBC AUTO: 81 FL (ref 82–98)
MICROSCOPIC COMMENT: NORMAL
MONOCYTES # BLD AUTO: 0.6 K/UL (ref 0.3–1)
MONOCYTES NFR BLD: 5.3 % (ref 4–15)
NEUTROPHILS # BLD AUTO: 8 K/UL (ref 1.8–7.7)
NEUTROPHILS NFR BLD: 73.9 % (ref 38–73)
NITRITE UR QL STRIP: NEGATIVE
NRBC BLD-RTO: 0 /100 WBC
PH UR STRIP: 7 [PH] (ref 5–8)
PLATELET # BLD AUTO: 466 K/UL (ref 150–450)
PMV BLD AUTO: 10 FL (ref 9.2–12.9)
POTASSIUM SERPL-SCNC: 3.6 MMOL/L (ref 3.5–5.1)
PROT SERPL-MCNC: 7.1 G/DL (ref 6–8.4)
PROT UR QL STRIP: ABNORMAL
RBC # BLD AUTO: 4.6 M/UL (ref 4–5.4)
RBC #/AREA URNS HPF: 1 /HPF (ref 0–4)
SODIUM SERPL-SCNC: 138 MMOL/L (ref 136–145)
SP GR UR STRIP: 1.02 (ref 1–1.03)
SQUAMOUS #/AREA URNS HPF: 7 /HPF
URN SPEC COLLECT METH UR: ABNORMAL
UROBILINOGEN UR STRIP-ACNC: ABNORMAL EU/DL
WBC # BLD AUTO: 10.83 K/UL (ref 3.9–12.7)
WBC #/AREA URNS HPF: 4 /HPF (ref 0–5)

## 2024-05-16 PROCEDURE — 63600175 PHARM REV CODE 636 W HCPCS: Performed by: EMERGENCY MEDICINE

## 2024-05-16 PROCEDURE — 96365 THER/PROPH/DIAG IV INF INIT: CPT

## 2024-05-16 PROCEDURE — 81000 URINALYSIS NONAUTO W/SCOPE: CPT | Performed by: EMERGENCY MEDICINE

## 2024-05-16 PROCEDURE — 25000003 PHARM REV CODE 250: Performed by: EMERGENCY MEDICINE

## 2024-05-16 PROCEDURE — 3044F HG A1C LEVEL LT 7.0%: CPT | Mod: CPTII,95,, | Performed by: NURSE PRACTITIONER

## 2024-05-16 PROCEDURE — 80053 COMPREHEN METABOLIC PANEL: CPT | Performed by: EMERGENCY MEDICINE

## 2024-05-16 PROCEDURE — 1159F MED LIST DOCD IN RCRD: CPT | Mod: CPTII,95,, | Performed by: NURSE PRACTITIONER

## 2024-05-16 PROCEDURE — 96372 THER/PROPH/DIAG INJ SC/IM: CPT | Mod: 59 | Performed by: EMERGENCY MEDICINE

## 2024-05-16 PROCEDURE — 4010F ACE/ARB THERAPY RXD/TAKEN: CPT | Mod: CPTII,95,, | Performed by: NURSE PRACTITIONER

## 2024-05-16 PROCEDURE — 96361 HYDRATE IV INFUSION ADD-ON: CPT

## 2024-05-16 PROCEDURE — 1160F RVW MEDS BY RX/DR IN RCRD: CPT | Mod: CPTII,95,, | Performed by: NURSE PRACTITIONER

## 2024-05-16 PROCEDURE — 99284 EMERGENCY DEPT VISIT MOD MDM: CPT | Mod: 25

## 2024-05-16 PROCEDURE — 99214 OFFICE O/P EST MOD 30 MIN: CPT | Mod: 95,,, | Performed by: NURSE PRACTITIONER

## 2024-05-16 PROCEDURE — 83690 ASSAY OF LIPASE: CPT | Performed by: EMERGENCY MEDICINE

## 2024-05-16 PROCEDURE — 85025 COMPLETE CBC W/AUTO DIFF WBC: CPT | Performed by: EMERGENCY MEDICINE

## 2024-05-16 RX ORDER — PROMETHAZINE HYDROCHLORIDE 25 MG/1
25 TABLET ORAL EVERY 6 HOURS PRN
Qty: 20 TABLET | Refills: 0 | Status: SHIPPED | OUTPATIENT
Start: 2024-05-16 | End: 2024-06-06

## 2024-05-16 RX ORDER — DICYCLOMINE HYDROCHLORIDE 10 MG/ML
20 INJECTION INTRAMUSCULAR
Status: COMPLETED | OUTPATIENT
Start: 2024-05-16 | End: 2024-05-16

## 2024-05-16 RX ORDER — LIDOCAINE HYDROCHLORIDE 20 MG/ML
15 SOLUTION OROPHARYNGEAL ONCE
Status: COMPLETED | OUTPATIENT
Start: 2024-05-16 | End: 2024-05-16

## 2024-05-16 RX ORDER — DICYCLOMINE HYDROCHLORIDE 20 MG/1
20 TABLET ORAL 2 TIMES DAILY PRN
Qty: 15 TABLET | Refills: 0 | Status: SHIPPED | OUTPATIENT
Start: 2024-05-16 | End: 2024-05-16 | Stop reason: SDUPTHER

## 2024-05-16 RX ORDER — ALUMINUM HYDROXIDE, MAGNESIUM HYDROXIDE, AND SIMETHICONE 1200; 120; 1200 MG/30ML; MG/30ML; MG/30ML
30 SUSPENSION ORAL ONCE
Status: COMPLETED | OUTPATIENT
Start: 2024-05-16 | End: 2024-05-16

## 2024-05-16 RX ORDER — DICYCLOMINE HYDROCHLORIDE 20 MG/1
20 TABLET ORAL 3 TIMES DAILY PRN
Qty: 90 TABLET | Refills: 1 | Status: SHIPPED | OUTPATIENT
Start: 2024-05-16 | End: 2024-05-28

## 2024-05-16 RX ADMIN — ALUMINUM HYDROXIDE, MAGNESIUM HYDROXIDE, AND SIMETHICONE 30 ML: 1200; 120; 1200 SUSPENSION ORAL at 08:05

## 2024-05-16 RX ADMIN — DICYCLOMINE HYDROCHLORIDE 20 MG: 20 INJECTION, SOLUTION INTRAMUSCULAR at 07:05

## 2024-05-16 RX ADMIN — SODIUM CHLORIDE, POTASSIUM CHLORIDE, SODIUM LACTATE AND CALCIUM CHLORIDE 1000 ML: 600; 310; 30; 20 INJECTION, SOLUTION INTRAVENOUS at 07:05

## 2024-05-16 RX ADMIN — PROMETHAZINE HYDROCHLORIDE 12.5 MG: 25 INJECTION INTRAMUSCULAR; INTRAVENOUS at 07:05

## 2024-05-16 RX ADMIN — LIDOCAINE HYDROCHLORIDE 15 ML: 20 SOLUTION ORAL at 08:05

## 2024-05-16 NOTE — PROGRESS NOTES
Subjective:       Patient ID: Misael Garcia is a 29 y.o. female.    Chief Complaint: Rectal Bleeding    The patient location is: [place], [place]  The chief complaint leading to consultation is: abdominal pain     Visit type: audiovisual     Face to Face time with patient: 20 minutes  30 minutes of total time spent on the encounter, which includes face to face time and non-face to face time preparing to see the patient (eg, review of tests), Obtaining and/or reviewing separately obtained history, Documenting clinical information in the electronic or other health record, Independently interpreting results (not separately reported) and communicating results to the patient/family/caregiver, or Care coordination (not separately reported).            Each patient to whom he or she provides medical services by telemedicine is:  (1) informed of the relationship between the physician and patient and the respective role of any other health care provider with respect to management of the patient; and (2) notified that he or she may decline to receive medical services by telemedicine and may withdraw from such care at any time.     Notes:      30 y/o female with HTN, DM-2, morbid obesity,  SID, ADHD, anxiety, bipolar affective disorder, thrombocytosis, and marijuana use presents for virtual visit with c/o abdominal pain and rectal bleeding. Last seen in GI clinic on 4/25/2024. Patient canceled EGD 5/10 as she was not feeling well overall and anxiety. She has been to ER multiple since last office visit for abdominal pain. Labs and imaging have been negative for any acute findings that would explain her symptoms. Today she is more concerned about rectal bleeding. Has noticed blood in stool and after wiping. Bowel habits are irregular with alternating constipation and diarrhea. Diarrhea worse after starting Linzess. Reports intermittent upper abdominal cramping and burning pain with associated nausea and vomiting. No  hematemesis. Pain worse after large meal. Has frequent heartburn and regurgitation at night when lying flat.          Past Medical History:   Diagnosis Date    ADHD (attention deficit hyperactivity disorder)     Anxiety     Asthma     Bipolar affective     Eczema     History of chlamydia 12/2016    Insomnia     Morbid obesity with BMI of 40.0-44.9, adult     Sleep disorder     Type 2 diabetes mellitus without complications        Past Surgical History:   Procedure Laterality Date    NO PAST SURGERIES      as of 1-13-17       Family History   Problem Relation Name Age of Onset    No Known Problems Paternal Grandfather      Breast cancer Paternal Grandmother      Diabetes Maternal Grandmother      No Known Problems Maternal Grandfather      No Known Problems Father      No Known Problems Mother      No Known Problems Sister      Colon cancer Neg Hx      Ovarian cancer Neg Hx         Social History     Socioeconomic History    Marital status: Single   Occupational History    Occupation: [employer]    Tobacco Use    Smoking status: Former     Types: Vaping with nicotine     Passive exposure: Past    Smokeless tobacco: Never    Tobacco comments:     boyfriend smoke cigarettes     Former weed consumption   Substance and Sexual Activity    Alcohol use: No    Drug use: Not Currently     Types: Marijuana    Sexual activity: Not Currently     Partners: Male     Birth control/protection: None     Comment: Single:       Social Determinants of Health     Financial Resource Strain: Low Risk  (5/14/2024)    Received from [facility]    Overall Financial Resource Strain (CARDIA)     Difficulty of Paying Living Expenses: Not hard at all   Food Insecurity: No Food Insecurity (5/14/2024)    Received from [facility]    Hunger Vital Sign     Worried About Running Out of Food in the Last Year: Never true     Ran Out of Food in the Last Year: Never true   Transportation Needs: No Transportation Needs (5/14/2024)    Received from [facility]     PRAPARE - Transportation     Lack of Transportation (Medical): No     Lack of Transportation (Non-Medical): No   Physical Activity: Insufficiently Active (5/14/2024)    Received from [facility]    Exercise Vital Sign     Days of Exercise per Week: 4 days     Minutes of Exercise per Session: 20 min   Stress: Stress Concern Present (5/14/2024)    Received from [facility]    [employer] - Occupational Stress Questionnaire     Feeling of Stress : Very much   Housing Stability: Unknown (8/24/2023)    Received from [facility], [facility] [employer], [facility] [employer]    Housing Stability Vital Sign     Unable to Pay for Housing in the Last Year: No     In the last 12 months, was there a time when you did not have a steady place to sleep or slept in a shelter (including now)?: No       Review of Systems   Constitutional:  Negative for appetite change and unexpected weight change.   HENT:  Negative for trouble swallowing.    Respiratory:  Negative for shortness of breath.    Cardiovascular:  Negative for chest pain.   Gastrointestinal:  Positive for abdominal pain, blood in stool, constipation, diarrhea, nausea and vomiting. Negative for rectal pain.   Hematological:  Negative for adenopathy. Does not bruise/bleed easily.   Psychiatric/Behavioral:  Negative for dysphoric mood.          Objective:     There were no vitals filed for this visit.       Physical Exam  Constitutional:       General: She is not in acute distress.     Appearance: She is obese.   HENT:      Head: Normocephalic.   Eyes:      Conjunctiva/sclera: Conjunctivae normal.   Pulmonary:      Effort: Pulmonary effort is normal. No respiratory distress.   Skin:     Coloration: Skin is not jaundiced or pale.   Neurological:      Mental Status: She is alert and oriented to person, place, and time.   Psychiatric:         Mood and Affect: Mood normal.         Behavior: Behavior normal.               Assessment:         ICD-10-CM ICD-9-CM   1.  Upper abdominal pain  R10.10 789.09   2. Gastroesophageal reflux disease, unspecified whether esophagitis present  K21.9 530.81   3. Alternating constipation and diarrhea  R19.8 787.99   4. Rectal bleeding  K62.5 569.3   5. Class 3 severe obesity due to excess calories with serious comorbidity and body mass index (BMI) of 45.0 to 49.9 in adult  E66.01 278.01    Z68.42 V85.42       Plan:       Upper abdominal pain  -     dicyclomine (BENTYL) 20 mg tablet; Take 1 tablet (20 mg total) by mouth 3 (three) times daily as needed (Abdominal pain).  Dispense: 90 tablet; Refill: 1  -     Trial gastroparesis diet  -     Schedule EGD    Gastroesophageal reflux disease, unspecified whether esophagitis present        -     Schedule EGD        -     Continue pantoprazole daily    Alternating constipation and diarrhea        -     Stop Linzess. Daily fiber supplement only    Rectal bleeding  -     Ambulatory referral/consult to Colorectal Surgery; Future; Expected date: 05/23/2024    Class 3 severe obesity due to excess calories with serious comorbidity and body mass index (BMI) of 45.0 to 49.9 in adult        -    Weight loss advised. Dietary and exercise         counseling done.      Follow up if symptoms worsen or fail to improve.     Patient's Medications   New Prescriptions    No medications on file   Previous Medications    ADAPALENE-BENZOYL PEROXIDE (EPIDUO FORTE) 0.3-2.5 % GLWP    Apply topically.    ALBUTEROL (PROVENTIL/VENTOLIN HFA) 90 MCG/ACTUATION INHALER    Inhale 2 puffs into the lungs every 4 (four) hours as needed (shortness of breath). Rescue    AMMONIUM LACTATE (LAC-HYDRIN) 12 % LOTION    Apply topically.    AZELASTINE (ASTELIN) 137 MCG (0.1 %) NASAL SPRAY    2 sprays (274 mcg total) by Nasal route 2 (two) times daily as needed for Rhinitis.    BLOOD SUGAR DIAGNOSTIC STRP    To check Blood Glucose  2-3x times daily,    BLOOD-GLUCOSE METER MISC    use as directed    CETIRIZINE (ZYRTEC) 10 MG TABLET    Take 1 tablet  (10 mg total) by mouth once daily.    CLINDAMYCIN PHOSPHATE 1% (CLINDAGEL) 1 % GEL    Apply topically 2 (two) times daily.    FAMOTIDINE (PEPCID) 20 MG TABLET    Take 1 tablet (20 mg total) by mouth 2 (two) times daily as needed for Heartburn (epigastric abdominal pain).    FLUTICASONE FUROATE-VILANTEROL (BREO ELLIPTA) 200-25 MCG/DOSE DSDV DISKUS INHALER    Inhale 1 puff into the lungs once daily. Controller    FLUTICASONE PROPIONATE (FLONASE) 50 MCG/ACTUATION NASAL SPRAY    2 sprays (100 mcg total) by Each Nostril route once daily.    LANCETS 28 GAUGE MISC    To check Blood Glucose  2-3 times daily,    LINACLOTIDE (LINZESS) 72 MCG CAP CAPSULE    Take 1 capsule (72 mcg total) by mouth before breakfast.    LISINOPRIL (PRINIVIL,ZESTRIL) 20 MG TABLET    Take 1 tablet (20 mg total) by mouth once daily.    LURASIDONE (LATUDA) 40 MG TAB TABLET    Take 40 mg by mouth.    METRONIDAZOLE (METROGEL) 0.75 % GEL    Use one vaginal applicatorful once daily at bedtime for 5 days    MUPIROCIN (BACTROBAN) 2 % OINTMENT    Apply topically 2 (two) times daily. Apply to the affected area 2 times a day for 1 week.    ONDANSETRON (ZOFRAN) 8 MG TABLET    Take 1 tablet (8 mg total) by mouth every 12 (twelve) hours as needed for Nausea.    PANTOPRAZOLE (PROTONIX) 40 MG TABLET    Take 1 tablet (40 mg total) by mouth 2 (two) times daily.    PROMETHAZINE (PHENERGAN) 25 MG TABLET    Take 1 tablet (25 mg total) by mouth every 6 (six) hours as needed for Nausea.    SULFACETAMIDE SODIUM-SULFUR 10-5 % (W/W) CLSR    Apply topically.    TOPIRAMATE (TOPAMAX) 25 MG TABLET    Take 25 mg by mouth 2 (two) times daily.    UREA 20 % CREA    APPLY TO AFFECTED AREA QD   Modified Medications    Modified Medication Previous Medication    DICYCLOMINE (BENTYL) 20 MG TABLET dicyclomine (BENTYL) 20 mg tablet       Take 1 tablet (20 mg total) by mouth 3 (three) times daily as needed (Abdominal pain).    Take 1 tablet (20 mg total) by mouth 2 (two) times daily as  needed (Abdominal pain).   Discontinued Medications    No medications on file

## 2024-05-16 NOTE — PATIENT INSTRUCTIONS
- Pantoprazole 40 mg every morning on an empty stomach 30-45 minutes before food or any other medications  - Dicyclomine 20 mg thee times per day as needed for abdominal pain  - Eat foods low in fat and fiber, eat five or six small, nutritious meals a day instead of two or three large meals, chew your food thoroughly, eat soft, well-cooked foods, avoid carbonated, or fizzy, beverages, and avoid alcohol.  - Stop marijuana use  - Stop Linzess due to diarrhea and start daily fiber supplement such as Benefiber or other fiber supplement for irregular bowel habits  - Rectal bleeding likely due to internal hemorrhoids. Recommend OTC Preparation H. Referral to colorectal pending.      EGD has been rescheduled to 5/28/2024 at [facility] in [place].   Do not take Ozempic injection on or after 5/19/2024.     EGD Prep Instructions     [facility] [address] [place] [address]     You are scheduled for an EGD with Dr. Hood on 5/28/2024 at [facility].  You will enter through the [facility] entrance and check in at Same Day Surgery.     Start a CLEAR LIQUID DIET ONE DAY BEFORE YOUR PROCEDURE.  This means no solid food for the entire day before.   CLEAR LIQUID DIET:   - NO DAIRY   - You can have:  Coffee with sugar (no creamer), tea, water, soda, apple or white grape juice, chicken or beef broth/bouillon (no meat, noodles, or veggies), popsicles, Jell-O, lemonade.      Nothing to eat or drink after midnight before the procedure.  You MAY brush your teeth.     You MAY take your blood pressure, heart, and seizure medication on the morning of the procedure, with a SIP of water.  Hold ALL other medications until after the procedure.     You must have someone with you to DRIVE YOU HOME since you will be receiving IV sedation for the procedure.     If you are on blood thinners THAT YOU HAVE BEEN INSTRUCTED TO HOLD BY YOUR DOCTOR FOR THIS PROCEDURE, then do NOT take this  the morning of your EGD.  Do NOT stop these medications on your own, they must be approved to be held by your doctor.  Your EGD can NOT be done if you are on these medications.  Examples of blood thinners include: Coumadin, Aggrenox, Plavix, Pradaxa, Reapro, Pletal, Xarelto, Ticagrelor, Brilinta, Eliquis, and high dose aspirin (325 mg).  You do not have to stop baby aspirin 81 mg.     You will receive a call the afternoon before your EGD to tell you the time to arrive.  If you have not received a call by the day before your procedure, call the Pre-op Coordinator at [phone].

## 2024-05-16 NOTE — PROGRESS NOTES
Subjective:       Patient ID: Misael Garcia is a 29 y.o. female.    Chief Complaint: Rectal Bleeding    The patient location is: [place], [place]  The chief complaint leading to consultation is: abdominal pain     Visit type: audiovisual     Face to Face time with patient: 20 minutes  30 minutes of total time spent on the encounter, which includes face to face time and non-face to face time preparing to see the patient (eg, review of tests), Obtaining and/or reviewing separately obtained history, Documenting clinical information in the electronic or other health record, Independently interpreting results (not separately reported) and communicating results to the patient/family/caregiver, or Care coordination (not separately reported).            Each patient to whom he or she provides medical services by telemedicine is:  (1) informed of the relationship between the physician and patient and the respective role of any other health care provider with respect to management of the patient; and (2) notified that he or she may decline to receive medical services by telemedicine and may withdraw from such care at any time.     Notes:      28 y/o female with HTN, DM-2, morbid obesity,  SID, ADHD, anxiety, bipolar affective disorder, thrombocytosis, and marijuana use presents for virtual visit with c/o abdominal pain and rectal bleeding. Last seen in GI clinic on 4/25/2024. Patient canceled EGD 5/10 as she was not feeling well overall and anxiety. She has been to ER multiple since last office visit for abdominal pain. Labs and imaging have been negative for any acute findings that would explain her symptoms. Today she is more concerned about rectal bleeding. Has noticed blood in stool and after wiping. Bowel habits are irregular with alternating constipation and diarrhea. Diarrhea worse after starting Linzess. Reports intermittent upper abdominal cramping and burning pain with associated nausea and vomiting. No  hematemesis. Pain worse after large meal. Has frequent heartburn and regurgitation at night when lying flat.        Past Medical History:   Diagnosis Date    ADHD (attention deficit hyperactivity disorder)     Anxiety     Asthma     Bipolar affective     Eczema     History of chlamydia 12/2016    Insomnia     Morbid obesity with BMI of 40.0-44.9, adult     Sleep disorder     Type 2 diabetes mellitus without complications        Past Surgical History:   Procedure Laterality Date    NO PAST SURGERIES      as of 1-13-17       Family History   Problem Relation Name Age of Onset    No Known Problems Paternal Grandfather      Breast cancer Paternal Grandmother      Diabetes Maternal Grandmother      No Known Problems Maternal Grandfather      No Known Problems Father      No Known Problems Mother      No Known Problems Sister      Colon cancer Neg Hx      Ovarian cancer Neg Hx         Social History     Socioeconomic History    Marital status: Single   Occupational History    Occupation: [employer]    Tobacco Use    Smoking status: Former     Types: Vaping with nicotine     Passive exposure: Past    Smokeless tobacco: Never    Tobacco comments:     boyfriend smoke cigarettes     Former weed consumption   Substance and Sexual Activity    Alcohol use: No    Drug use: Not Currently     Types: Marijuana    Sexual activity: Not Currently     Partners: Male     Birth control/protection: None     Comment: Single:       Social Determinants of Health     Financial Resource Strain: Low Risk  (5/14/2024)    Received from [facility]    Overall Financial Resource Strain (CARDIA)     Difficulty of Paying Living Expenses: Not hard at all   Food Insecurity: No Food Insecurity (5/14/2024)    Received from [facility]    Hunger Vital Sign     Worried About Running Out of Food in the Last Year: Never true     Ran Out of Food in the Last Year: Never true   Transportation Needs: No Transportation Needs (5/14/2024)    Received from [facility] [employer]     PRAPARE - Transportation     Lack of Transportation (Medical): No     Lack of Transportation (Non-Medical): No   Physical Activity: Insufficiently Active (5/14/2024)    Received from [facility]    Exercise Vital Sign     Days of Exercise per Week: 4 days     Minutes of Exercise per Session: 20 min   Stress: Stress Concern Present (5/14/2024)    Received from [facility]    [employer] [place] [employer] - Occupational Stress Questionnaire     Feeling of Stress : Very much   Housing Stability: Unknown (8/24/2023)    Received from [facility] [employer], [facility] [employer], [facility] [employer]    Housing Stability Vital Sign     Unable to Pay for Housing in the Last Year: No     In the last 12 months, was there a time when you did not have a steady place to sleep or slept in a shelter (including now)?: No       Review of Systems   Constitutional:  Negative for appetite change and unexpected weight change.   HENT:  Negative for trouble swallowing.    Respiratory:  Negative for shortness of breath.    Cardiovascular:  Negative for chest pain.   Gastrointestinal:  Positive for abdominal pain, blood in stool, constipation, diarrhea, nausea and vomiting. Negative for rectal pain.   Hematological:  Negative for adenopathy. Does not bruise/bleed easily.   Psychiatric/Behavioral:  Negative for dysphoric mood.          Objective:     There were no vitals filed for this visit.       Physical Exam  Constitutional:       General: She is not in acute distress.     Appearance: She is obese.   HENT:      Head: Normocephalic.   Eyes:      Conjunctiva/sclera: Conjunctivae normal.   Pulmonary:      Effort: Pulmonary effort is normal. No respiratory distress.   Skin:     Coloration: Skin is not jaundiced or pale.   Neurological:      Mental Status: She is alert and oriented to person, place, and time.   Psychiatric:         Mood and Affect: Mood normal.         Behavior: Behavior normal.               Assessment:         ICD-10-CM ICD-9-CM   1.  Upper abdominal pain  R10.10 789.09   2. Gastroesophageal reflux disease, unspecified whether esophagitis present  K21.9 530.81   3. Alternating constipation and diarrhea  R19.8 787.99   4. Rectal bleeding  K62.5 569.3   5. Class 3 severe obesity due to excess calories with serious comorbidity and body mass index (BMI) of 45.0 to 49.9 in adult  E66.01 278.01    Z68.42 V85.42       Plan:       Upper abdominal pain  -     dicyclomine (BENTYL) 20 mg tablet; Take 1 tablet (20 mg total) by mouth 3 (three) times daily as needed (Abdominal pain).  Dispense: 90 tablet; Refill: 1  -     Trial gastroparesis diet  -     Schedule EGD    Gastroesophageal reflux disease, unspecified whether esophagitis present        -     Schedule EGD        -     Continue pantoprazole daily    Alternating constipation and diarrhea        -     Stop Linzess. Daily fiber supplement only    Rectal bleeding  -     Ambulatory referral/consult to Colorectal Surgery; Future; Expected date: 05/23/2024    Class 3 severe obesity due to excess calories with serious comorbidity and body mass index (BMI) of 45.0 to 49.9 in adult        -    Weight loss advised. Dietary and exercise         counseling done.      Follow up if symptoms worsen or fail to improve.     Patient's Medications   New Prescriptions    No medications on file   Previous Medications    ADAPALENE-BENZOYL PEROXIDE (EPIDUO FORTE) 0.3-2.5 % GLWP    Apply topically.    ALBUTEROL (PROVENTIL/VENTOLIN HFA) 90 MCG/ACTUATION INHALER    Inhale 2 puffs into the lungs every 4 (four) hours as needed (shortness of breath). Rescue    AMMONIUM LACTATE (LAC-HYDRIN) 12 % LOTION    Apply topically.    AZELASTINE (ASTELIN) 137 MCG (0.1 %) NASAL SPRAY    2 sprays (274 mcg total) by Nasal route 2 (two) times daily as needed for Rhinitis.    BLOOD SUGAR DIAGNOSTIC STRP    To check Blood Glucose  2-3x times daily,    BLOOD-GLUCOSE METER MISC    use as directed    CETIRIZINE (ZYRTEC) 10 MG TABLET    Take 1 tablet  (10 mg total) by mouth once daily.    CLINDAMYCIN PHOSPHATE 1% (CLINDAGEL) 1 % GEL    Apply topically 2 (two) times daily.    FAMOTIDINE (PEPCID) 20 MG TABLET    Take 1 tablet (20 mg total) by mouth 2 (two) times daily as needed for Heartburn (epigastric abdominal pain).    FLUTICASONE FUROATE-VILANTEROL (BREO ELLIPTA) 200-25 MCG/DOSE DSDV DISKUS INHALER    Inhale 1 puff into the lungs once daily. Controller    FLUTICASONE PROPIONATE (FLONASE) 50 MCG/ACTUATION NASAL SPRAY    2 sprays (100 mcg total) by Each Nostril route once daily.    LANCETS 28 GAUGE MISC    To check Blood Glucose  2-3 times daily,    LINACLOTIDE (LINZESS) 72 MCG CAP CAPSULE    Take 1 capsule (72 mcg total) by mouth before breakfast.    LISINOPRIL (PRINIVIL,ZESTRIL) 20 MG TABLET    Take 1 tablet (20 mg total) by mouth once daily.    LURASIDONE (LATUDA) 40 MG TAB TABLET    Take 40 mg by mouth.    METRONIDAZOLE (METROGEL) 0.75 % GEL    Use one vaginal applicatorful once daily at bedtime for 5 days    MUPIROCIN (BACTROBAN) 2 % OINTMENT    Apply topically 2 (two) times daily. Apply to the affected area 2 times a day for 1 week.    ONDANSETRON (ZOFRAN) 8 MG TABLET    Take 1 tablet (8 mg total) by mouth every 12 (twelve) hours as needed for Nausea.    PANTOPRAZOLE (PROTONIX) 40 MG TABLET    Take 1 tablet (40 mg total) by mouth 2 (two) times daily.    PROMETHAZINE (PHENERGAN) 25 MG TABLET    Take 1 tablet (25 mg total) by mouth every 6 (six) hours as needed for Nausea.    SULFACETAMIDE SODIUM-SULFUR 10-5 % (W/W) CLSR    Apply topically.    TOPIRAMATE (TOPAMAX) 25 MG TABLET    Take 25 mg by mouth 2 (two) times daily.    UREA 20 % CREA    APPLY TO AFFECTED AREA QD   Modified Medications    Modified Medication Previous Medication    DICYCLOMINE (BENTYL) 20 MG TABLET dicyclomine (BENTYL) 20 mg tablet       Take 1 tablet (20 mg total) by mouth 3 (three) times daily as needed (Abdominal pain).    Take 1 tablet (20 mg total) by mouth 2 (two) times daily as  needed (Abdominal pain).   Discontinued Medications    No medications on file

## 2024-05-16 NOTE — ED PROVIDER NOTES
Encounter Date: 5/16/2024       History     Chief Complaint   Patient presents with    Abdominal Pain     Intermittent LUQ burning/sharp pain that started 3 weeks ago. Reports N/V. Last episode was 3 days ago. States she has noticed blood in stool. Reports recent ER visits. Prescribed medications not helping. Ozempic started in January.      Patient is a 29-year-old female who complains of upper abdominal pain with vomiting and diarrhea.  Patient says the last time she vomited was 2 days ago.  She also noticed small amount of blood in her stool.  Patient is on Ozempic and says her last shot was approximately 3 weeks ago.  Patient has had 6 ER visits in the past few days for the same complaint.  Patient has seen a gastroenterologist, but her mother says they are not happy with this physician in her requesting to be seen by someone else.      Review of patient's allergies indicates:  No Known Allergies  Past Medical History:   Diagnosis Date    ADHD (attention deficit hyperactivity disorder)     Anxiety     Asthma     Bipolar affective     Eczema     History of chlamydia 12/2016    Insomnia     Morbid obesity with BMI of 40.0-44.9, adult     Sleep disorder     Type 2 diabetes mellitus without complications      Past Surgical History:   Procedure Laterality Date    NO PAST SURGERIES      as of 1-13-17     Family History   Problem Relation Name Age of Onset    No Known Problems Paternal Grandfather      Breast cancer Paternal Grandmother      Diabetes Maternal Grandmother      No Known Problems Maternal Grandfather      No Known Problems Father      No Known Problems Mother      No Known Problems Sister      Colon cancer Neg Hx      Ovarian cancer Neg Hx       Social History     Tobacco Use    Smoking status: Former     Types: Vaping with nicotine     Passive exposure: Past    Smokeless tobacco: Never    Tobacco comments:     boyfriend smoke cigarettes     Former weed consumption   Substance Use Topics    Alcohol use:  No    Drug use: Not Currently     Types: Marijuana     Review of Systems   Constitutional:  Negative for fever.   Gastrointestinal:  Positive for abdominal pain, diarrhea, nausea and vomiting.       Physical Exam     Initial Vitals [05/16/24 0629]   BP Pulse Resp Temp SpO2   (!) 163/97 78 18 98.4 °F (36.9 °C) 98 %      MAP       --         Physical Exam    Nursing note and vitals reviewed.  Constitutional: No distress.   HENT:   Mouth/Throat: Oropharynx is clear and moist.   Eyes: Conjunctivae are normal.   Cardiovascular:  Normal rate, regular rhythm and normal heart sounds.           Pulmonary/Chest: No respiratory distress.   Abdominal: Abdomen is soft.   There is very mild diffuse tenderness mainly localized to the epigastrium.   Musculoskeletal:         General: Normal range of motion.     Neurological: She is alert and oriented to person, place, and time.   Skin: Skin is warm and dry.         ED Course   Procedures  Labs Reviewed   CBC W/ AUTO DIFFERENTIAL - Abnormal; Notable for the following components:       Result Value    MCV 81 (*)     MCH 26.3 (*)     RDW 15.5 (*)     Platelets 466 (*)     Gran # (ANC) 8.0 (*)     Gran % 73.9 (*)     All other components within normal limits   COMPREHENSIVE METABOLIC PANEL - Abnormal; Notable for the following components:    CO2 18 (*)     BUN 4 (*)     All other components within normal limits   URINALYSIS - Abnormal; Notable for the following components:    Appearance, UA Hazy (*)     Protein, UA 1+ (*)     Ketones, UA 3+ (*)     Bilirubin (UA) 1+ (*)     Urobilinogen, UA 4.0-6.0 (*)     Leukocytes, UA Trace (*)     All other components within normal limits   LIPASE   URINALYSIS MICROSCOPIC          Imaging Results    None          Medications   lactated ringers bolus 1,000 mL (0 mLs Intravenous Stopped 5/16/24 6632)   promethazine (PHENERGAN) 12.5 mg in dextrose 5 % (D5W) 50 mL IVPB (0 mg Intravenous Stopped 5/16/24 3916)   dicyclomine injection 20 mg (20 mg  Intramuscular Given 5/16/24 1337)   aluminum-magnesium hydroxide-simethicone 200-200-20 mg/5 mL suspension 30 mL (30 mLs Oral Given 5/16/24 0872)     And   LIDOcaine viscous HCl 2% oral solution 15 mL (15 mLs Oral Given 5/16/24 0829)     Medical Decision Making  Emergent evaluation of a 29-year-old female who has been having upper abdominal pain with intermittent vomiting over the past few weeks.  Her last shot of Ozempic was at the end of last month.  She is scheduled to see a gastroenterologist at the end of next week but both the patient and the mother are not satisfied with his gastroenterologist and would like to see a different one.  I have placed an urgent ambulatory referral to GI for follow-up.  All lab work is stable.  I will place her on Bentyl and Phenergan to use as needed.  She may return to the ED for any possible worsening.      Amount and/or Complexity of Data Reviewed  Labs: ordered.     Details: CBC and CMP unremarkable.    Risk  OTC drugs.  Prescription drug management.                                      Clinical Impression:  Final diagnoses:  [R10.13] Epigastric pain (Primary)  [R11.2] Nausea and vomiting, unspecified vomiting type  [T88.7XXA] Medication side effect - from Ozempic          ED Disposition Condition    Discharge Stable          ED Prescriptions       Medication Sig Dispense Start Date End Date Auth. Provider    promethazine (PHENERGAN) 25 MG tablet Take 1 tablet (25 mg total) by mouth every 6 (six) hours as needed for Nausea. 20 tablet 5/16/2024 -- Alexsander Frey MD    dicyclomine (BENTYL) 20 mg tablet (Expires today) Take 1 tablet (20 mg total) by mouth 2 (two) times daily as needed (Abdominal pain). 15 tablet 5/16/2024 5/16/2024 Alexsander Frey MD          Follow-up Information       Follow up With Specialties Details Why Contact Info    Gastroenterologist  Schedule an appointment as soon as possible for a visit                Alexsander Frey  MD  05/16/24 1426

## 2024-05-17 ENCOUNTER — HOSPITAL ENCOUNTER (EMERGENCY)
Facility: HOSPITAL | Age: 29
Discharge: HOME | End: 2024-05-17
Attending: EMERGENCY MEDICINE
Payer: MEDICAID

## 2024-05-17 VITALS
TEMPERATURE: 98 F | WEIGHT: 293 LBS | RESPIRATION RATE: 18 BRPM | SYSTOLIC BLOOD PRESSURE: 115 MMHG | BODY MASS INDEX: 49.49 KG/M2 | DIASTOLIC BLOOD PRESSURE: 81 MMHG | OXYGEN SATURATION: 99 % | HEART RATE: 69 BPM

## 2024-05-17 DIAGNOSIS — R10.12 LEFT UPPER QUADRANT ABDOMINAL PAIN: ICD-10-CM

## 2024-05-17 DIAGNOSIS — R07.9 CHEST PAIN: Primary | ICD-10-CM

## 2024-05-17 LAB
ALBUMIN SERPL BCP-MCNC: 3.8 G/DL (ref 3.5–5.2)
ALP SERPL-CCNC: 112 U/L (ref 55–135)
ALT SERPL W/O P-5'-P-CCNC: 20 U/L (ref 10–44)
ANION GAP SERPL CALC-SCNC: 12 MMOL/L (ref 8–16)
AST SERPL-CCNC: 16 U/L (ref 10–40)
B-HCG UR QL: NEGATIVE
BACTERIA GENITAL QL WET PREP: ABNORMAL
BASOPHILS # BLD AUTO: 0.03 K/UL (ref 0–0.2)
BASOPHILS NFR BLD: 0.3 % (ref 0–1.9)
BILIRUB SERPL-MCNC: 0.7 MG/DL (ref 0.1–1)
BUN SERPL-MCNC: 5 MG/DL (ref 6–20)
CALCIUM SERPL-MCNC: 9.7 MG/DL (ref 8.7–10.5)
CHLORIDE SERPL-SCNC: 105 MMOL/L (ref 95–110)
CLUE CELLS VAG QL WET PREP: ABNORMAL
CO2 SERPL-SCNC: 19 MMOL/L (ref 23–29)
CREAT SERPL-MCNC: 0.8 MG/DL (ref 0.5–1.4)
CTP QC/QA: YES
D DIMER PPP IA.FEU-MCNC: 0.45 MG/L FEU
DIFFERENTIAL METHOD BLD: ABNORMAL
EOSINOPHIL # BLD AUTO: 0.1 K/UL (ref 0–0.5)
EOSINOPHIL NFR BLD: 1.1 % (ref 0–8)
ERYTHROCYTE [DISTWIDTH] IN BLOOD BY AUTOMATED COUNT: 15.6 % (ref 11.5–14.5)
EST. GFR  (NO RACE VARIABLE): >60 ML/MIN/1.73 M^2
FILAMENT FUNGI VAG WET PREP-#/AREA: ABNORMAL
GLUCOSE SERPL-MCNC: 80 MG/DL (ref 70–110)
HCT VFR BLD AUTO: 40.8 % (ref 37–48.5)
HGB BLD-MCNC: 12.5 G/DL (ref 12–16)
IMM GRANULOCYTES # BLD AUTO: 0.05 K/UL (ref 0–0.04)
IMM GRANULOCYTES NFR BLD AUTO: 0.4 % (ref 0–0.5)
LIPASE SERPL-CCNC: 12 U/L (ref 4–60)
LYMPHOCYTES # BLD AUTO: 2.4 K/UL (ref 1–4.8)
LYMPHOCYTES NFR BLD: 21.1 % (ref 18–48)
MCH RBC QN AUTO: 25.4 PG (ref 27–31)
MCHC RBC AUTO-ENTMCNC: 30.6 G/DL (ref 32–36)
MCV RBC AUTO: 83 FL (ref 82–98)
MONOCYTES # BLD AUTO: 0.6 K/UL (ref 0.3–1)
MONOCYTES NFR BLD: 5.2 % (ref 4–15)
NEUTROPHILS # BLD AUTO: 8.2 K/UL (ref 1.8–7.7)
NEUTROPHILS NFR BLD: 71.9 % (ref 38–73)
NRBC BLD-RTO: 0 /100 WBC
OHS QRS DURATION: 90 MS
OHS QTC CALCULATION: 416 MS
PLATELET # BLD AUTO: 502 K/UL (ref 150–450)
PMV BLD AUTO: 10.4 FL (ref 9.2–12.9)
POTASSIUM SERPL-SCNC: 3.5 MMOL/L (ref 3.5–5.1)
PROT SERPL-MCNC: 7.6 G/DL (ref 6–8.4)
RBC # BLD AUTO: 4.93 M/UL (ref 4–5.4)
SODIUM SERPL-SCNC: 136 MMOL/L (ref 136–145)
SPECIMEN SOURCE: ABNORMAL
T VAGINALIS GENITAL QL WET PREP: ABNORMAL
TROPONIN I SERPL DL<=0.01 NG/ML-MCNC: <0.006 NG/ML (ref 0–0.03)
WBC # BLD AUTO: 11.35 K/UL (ref 3.9–12.7)
WBC #/AREA VAG WET PREP: ABNORMAL
YEAST GENITAL QL WET PREP: ABNORMAL

## 2024-05-17 PROCEDURE — 80053 COMPREHEN METABOLIC PANEL: CPT | Performed by: PHYSICIAN ASSISTANT

## 2024-05-17 PROCEDURE — 99285 EMERGENCY DEPT VISIT HI MDM: CPT | Mod: 25

## 2024-05-17 PROCEDURE — 87210 SMEAR WET MOUNT SALINE/INK: CPT | Performed by: PHYSICIAN ASSISTANT

## 2024-05-17 PROCEDURE — 25000003 PHARM REV CODE 250: Performed by: PHYSICIAN ASSISTANT

## 2024-05-17 PROCEDURE — 85379 FIBRIN DEGRADATION QUANT: CPT | Performed by: PHYSICIAN ASSISTANT

## 2024-05-17 PROCEDURE — 81025 URINE PREGNANCY TEST: CPT | Performed by: PHYSICIAN ASSISTANT

## 2024-05-17 PROCEDURE — 85025 COMPLETE CBC W/AUTO DIFF WBC: CPT | Performed by: PHYSICIAN ASSISTANT

## 2024-05-17 PROCEDURE — 93005 ELECTROCARDIOGRAM TRACING: CPT

## 2024-05-17 PROCEDURE — 84484 ASSAY OF TROPONIN QUANT: CPT | Performed by: PHYSICIAN ASSISTANT

## 2024-05-17 PROCEDURE — 83690 ASSAY OF LIPASE: CPT | Performed by: PHYSICIAN ASSISTANT

## 2024-05-17 PROCEDURE — 93010 ELECTROCARDIOGRAM REPORT: CPT | Mod: ,,, | Performed by: INTERNAL MEDICINE

## 2024-05-17 RX ORDER — LIDOCAINE HYDROCHLORIDE 20 MG/ML
5 SOLUTION OROPHARYNGEAL
Status: COMPLETED | OUTPATIENT
Start: 2024-05-17 | End: 2024-05-17

## 2024-05-17 RX ORDER — HYOSCYAMINE SULFATE 0.12 MG/1
0.12 TABLET SUBLINGUAL
Status: DISCONTINUED | OUTPATIENT
Start: 2024-05-17 | End: 2024-05-17 | Stop reason: HOSPADM

## 2024-05-17 RX ORDER — HYOSCYAMINE SULFATE 0.12 MG/1
0.12 TABLET SUBLINGUAL EVERY 4 HOURS PRN
Qty: 30 TABLET | Refills: 0 | Status: SHIPPED | OUTPATIENT
Start: 2024-05-17 | End: 2024-06-06

## 2024-05-17 RX ORDER — ALUMINUM HYDROXIDE, MAGNESIUM HYDROXIDE, AND SIMETHICONE 1200; 120; 1200 MG/30ML; MG/30ML; MG/30ML
30 SUSPENSION ORAL
Status: COMPLETED | OUTPATIENT
Start: 2024-05-17 | End: 2024-05-17

## 2024-05-17 RX ORDER — ONDANSETRON 4 MG/1
4 TABLET, ORALLY DISINTEGRATING ORAL EVERY 6 HOURS PRN
Qty: 20 TABLET | Refills: 0 | Status: SHIPPED | OUTPATIENT
Start: 2024-05-17 | End: 2024-06-06

## 2024-05-17 RX ADMIN — ALUMINUM HYDROXIDE, MAGNESIUM HYDROXIDE, AND SIMETHICONE 30 ML: 1200; 120; 1200 SUSPENSION ORAL at 01:05

## 2024-05-17 RX ADMIN — LIDOCAINE HYDROCHLORIDE 5 ML: 20 SOLUTION ORAL at 01:05

## 2024-05-17 NOTE — ED TRIAGE NOTES
Pt reports mid-sternal chest pain that started yesterday. States she was prescribed Bentyl for abdominal pain and read that chest pain could be side effect + wants to be evaluated. Denies cardiac hx.

## 2024-05-17 NOTE — FIRST PROVIDER EVALUATION
Emergency Department TeleTriage Encounter Note      CHIEF COMPLAINT    Chief Complaint   Patient presents with    Chest Pain     Pt reports mid-sternal chest pain that started yesterday. States she was prescribed Bentyl for abdominal pain and read that chest pain could be side effect + wants to be evaluated. Denies cardiac hx.       VITAL SIGNS   Initial Vitals [05/17/24 1133]   BP Pulse Resp Temp SpO2   (!) 144/86 70 18 98.1 °F (36.7 °C) 98 %      MAP       --            ALLERGIES    Review of patient's allergies indicates:  No Known Allergies    PROVIDER TRIAGE NOTE  Patient presents with complaint of chest pain that started after taking bentyl. Reports mild sOB.       Phy:   Constitutional: well nourished, well developed, appearing stated age, NAD        Initial orders will be placed and care will be transferred to an alternate provider when patient is roomed for a full evaluation. Any additional orders and the final disposition will be determined by that provider.        ORDERS  Labs Reviewed - No data to display    ED Orders (720h ago, onward)      None              Virtual Visit Note: The provider triage portion of this emergency department evaluation and documentation was performed via [employer], a HIPAA-compliant telemedicine application, in concert with a tele-presenter in the room. A face to face patient evaluation with one of my colleagues will occur once the patient is placed in an emergency department room.      DISCLAIMER: This note was prepared with [employer] voice recognition transcription software. Garbled syntax, mangled pronouns, and other bizarre constructions may be attributed to that software system.

## 2024-05-17 NOTE — ED NOTES
Telemetry Verification   Patient placed on Telemetry Box  Verified with War Room  Box # [id number]   Monitor Tech    Rate 74   Rhythm SR

## 2024-05-17 NOTE — DISCHARGE INSTRUCTIONS
Diagnosis: Abdominal pain, chest discomfort    I think that your pain may be due to the increased dose of Ozempic.  I am prescribing a different medication, Levsin that you can take as needed.  Your lab tests, EKG and chest x-ray did not show any concerning findings.    Please schedule follow-up appointment with your gastroenterologist.  If you start to have any worsening symptoms, please return to the emergency department.

## 2024-05-17 NOTE — EKG INTERPRETATIONS - EMERGENCY DEPT.
Independently interpreted by MD:  Rate 77, NSR, no stemi, no ectopy, no hypertrophy, nonspecific ST and T wave changes shahram inf

## 2024-05-17 NOTE — ED PROVIDER NOTES
Encounter Date: 5/17/2024       History     Chief Complaint   Patient presents with    Chest Pain     Pt reports mid-sternal chest pain that started yesterday. States she was prescribed Bentyl for abdominal pain and read that chest pain could be side effect + wants to be evaluated. Denies cardiac hx.     29 year old female with ADHD, bipolar disorder, diabetes, obesity presents for chest discomfort which started last night after taking Bentyl.  She reports left upper quadrant and epigastric pain for almost a month now after receiving an increased dosage of Ozempic.  Reports associated nausea and diarrhea.  She was seen in the ED last night and prescribed Bentyl.  Reports that her abdominal pain does improve with this but she started to have some chest discomfort after this with palpitations which prompted her to come to the ED for evaluation.  She also reports some vaginal itching and would like to be tested for a yeast infection. Of note, this is the patient's 8th ED visit in the past week.  She states that she feels like she is going to die which is why came to the ED today.  Her symptoms are not worsening but they are not improving.  She is concerned about an ulcer.  No history of DVT/PE, no recent surgeries or immobilization.  Does not take OCPs or other hormonal medications.      Review of patient's allergies indicates:  No Known Allergies  Past Medical History:   Diagnosis Date    ADHD (attention deficit hyperactivity disorder)     Anxiety     Asthma     Bipolar affective     Eczema     History of chlamydia 12/2016    Insomnia     Morbid obesity with BMI of 40.0-44.9, adult     Sleep disorder     Type 2 diabetes mellitus without complications      Past Surgical History:   Procedure Laterality Date    NO PAST SURGERIES      as of 1-13-17     Family History   Problem Relation Name Age of Onset    No Known Problems Paternal Grandfather      Breast cancer Paternal Grandmother      Diabetes Maternal Grandmother       No Known Problems Maternal Grandfather      No Known Problems Father      No Known Problems Mother      No Known Problems Sister      Colon cancer Neg Hx      Ovarian cancer Neg Hx       Social History     Tobacco Use    Smoking status: Former     Types: Vaping with nicotine     Passive exposure: Past    Smokeless tobacco: Never    Tobacco comments:     boyfriend smoke cigarettes     Former weed consumption   Substance Use Topics    Alcohol use: No    Drug use: Not Currently     Types: Marijuana     Review of Systems    Physical Exam     Initial Vitals [05/17/24 1133]   BP Pulse Resp Temp SpO2   (!) 144/86 70 18 98.1 °F (36.7 °C) 98 %      MAP       --         Physical Exam    Nursing note and vitals reviewed.  Constitutional: She appears well-developed and well-nourished. She is not diaphoretic. She is Obese . No distress.   HENT:   Head: Normocephalic and atraumatic.   Eyes: No scleral icterus.   Cardiovascular:  Normal rate, regular rhythm, normal heart sounds and intact distal pulses.     Exam reveals no gallop and no friction rub.       No murmur heard.  Pulmonary/Chest: Breath sounds normal. No respiratory distress. She has no wheezes. She has no rhonchi. She has no rales. She exhibits no tenderness.   Abdominal: Abdomen is soft. Bowel sounds are normal. She exhibits no distension and no mass. There is no abdominal tenderness.   Diffuse, mild tenderness without rebound or guarding There is no rebound and no guarding.   Musculoskeletal:         General: Normal range of motion.     Neurological: She is alert and oriented to person, place, and time.   Skin: Skin is warm and dry. There is pallor.   Psychiatric: She has a normal mood and affect.         ED Course   Procedures  Labs Reviewed   VAGINAL SCREEN - Abnormal; Notable for the following components:       Result Value    WBC - Vaginal Screen Rare (*)     Bacteria - Vaginal Screen Occasional (*)     All other components within normal limits    Narrative:      Release to patient->Immediate   CBC W/ AUTO DIFFERENTIAL - Abnormal; Notable for the following components:    MCH 25.4 (*)     MCHC 30.6 (*)     RDW 15.6 (*)     Platelets 502 (*)     Gran # (ANC) 8.2 (*)     Immature Grans (Abs) 0.05 (*)     All other components within normal limits    Narrative:     add on lipase per  /order#[id number] @ 05/17/2024  12:19    COMPREHENSIVE METABOLIC PANEL - Abnormal; Notable for the following components:    CO2 19 (*)     BUN 5 (*)     All other components within normal limits    Narrative:     add on lipase per  /order#[id number] @ 05/17/2024  12:19    TROPONIN I    Narrative:     add on lipase per  /order#[id number] @ 05/17/2024  12:19    LIPASE   D DIMER, QUANTITATIVE   LIPASE    Narrative:     add on lipase per  /order#[id number] @ 05/17/2024  12:19    POCT URINE PREGNANCY     EKG Readings: (Independently Interpreted)   Initial Reading: No STEMI. Previous EKG: Compared with most recent EKG Previous EKG Date: 4/1/2024. Rhythm: Normal Sinus Rhythm. Heart Rate: 77. Ectopy: No Ectopy. ST Segments: Normal ST Segments. T Waves Flipped: III and AVF. Clinical Impression: Normal Sinus Rhythm Other Impression: Flipped T-waves in inferior leads, similar compared to prior     ECG Results              EKG 12-lead (Final result)        Collection Time Result Time QRS Duration OHS QTC Calculation    05/17/24 11:45:46 05/17/24 12:51:21 90 416                     Final result by Interface, Lab In [facility] (05/17/24 12:51:31)                   Narrative:    Test Reason : R07.9,    Vent. Rate : 077 BPM     Atrial Rate : 077 BPM     P-R Int : 118 ms          QRS Dur : 090 ms      QT Int : 368 ms       P-R-T Axes : 024 022 -17 degrees     QTc Int : 416 ms    Normal sinus rhythm  Nonspecific ST and/or T wave abnormalities  Abnormal ECG  When compared with ECG of 01-APR-2024 08:07,  No significant change was found  Confirmed by Luma TINAJERO, Taylor ([id number]) on 5/17/2024  12:51:20 PM    Referred By:             Confirmed By:Taylor Ge MD                                  Imaging Results              X-Ray Chest PA And Lateral (Final result)  Result time 05/17/24 14:04:24      Final result by Avril Velasco MD (05/17/24 14:04:24)                   Impression:      No significant abnormality seen.      Electronically signed by: Avril Velasco MD  Date:    05/17/2024  Time:    14:04               Narrative:    EXAMINATION:  XR CHEST PA AND LATERAL    TECHNIQUE:  PA and lateral views of the chest were performed.    COMPARISON:  03/24/2024    FINDINGS:  The cardiac silhouette is normal in size, midline.    The pulmonary vascularity is normal.    The pulmonary nathaniel appear normal bilaterally.    The lungs are well expanded and clear.    No focal airspace disease.    No pleural effusion, no pneumothorax.    The osseous structures appear within normal limits for age.                                       Medications   hyoscyamine ODT tablet 0.125 mg (has no administration in time range)   aluminum-magnesium hydroxide-simethicone 200-200-20 mg/5 mL suspension 30 mL (30 mLs Oral Given 5/17/24 1319)   LIDOcaine viscous HCl 2% oral solution 5 mL (5 mLs Oral Given 5/17/24 1319)     Medical Decision Making  29-year-old female presenting for chest discomfort for 1 day as well as abdominal discomfort for almost a month.  /86 with otherwise normal vitals.  She appears anxious but nontoxic.    Differential diagnosis:  Medication side-effect   Anxiety   GERD   Gastritis   Low suspicion for pancreatitis, normal lipase yesterday, minimal epigastric tenderness  I doubt ACS, notably she had a stress test earlier this year however notably it was nondiagnostic as she has resting ST abnormalities  PERC negative  I do not think her clinical presentation is consistent with aortic dissection.  Her abdominal pain does not radiate to her chest, she has strong, intact peripheral pulses, no  neurologic deficits and reassuring vitals    Will check labs, give analgesics, chest x-ray, EKG reassess.    Workup is reassuring.  Her chest pain has resolved.  Discussed with her that I suspect her abdominal discomfort may be due to a side-effect of Ozempic.  She has an endoscopy scheduled in 10 days with her gastroenterologist, advised her to follow up with her gastroenterologist as well as PCP return to the ED for worsening symptoms. Stressed the importance of follow-up, strict ED return precautions given.  Patient voiced understanding and is comfortable with discharge.       Amount and/or Complexity of Data Reviewed  External Data Reviewed: labs, radiology and notes.     Details: Seen at [facility] yesterday for same complaint  Labs: ordered. Decision-making details documented in ED Course.  Radiology: ordered and independent interpretation performed. Decision-making details documented in ED Course.  ECG/medicine tests: ordered and independent interpretation performed. Decision-making details documented in ED Course.    Risk  OTC drugs.  Prescription drug management.               ED Course as of 05/17/24 1625   Fri May 17, 2024   1231 hCG Qualitative, Urine: Negative [CC]   1234 Hemoglobin: 12.5 [CC]   1234 Platelet Count(!): 502 [CC]   1253 Lipase: 12 [CC]   1253 CO2(!): 19  Chronically low [CC]   1254 Troponin I: <0.006 [CC]   1313 D-Dimer: 0.45 [CC]   1349 Patient reports chest discomfort is relieved but still having left upper quadrant discomfort.  Will give Levsin [CC]   1349 X-Ray Chest PA And Lateral  Per my independent interpretation, no focal consolidation or pulmonary edema [CC]   1444 Budding Yeast: None [CC]      ED Course User Index  [CC] Rebecca Ahuja PA-C                           Clinical Impression:  Final diagnoses:  [R07.9] Chest pain (Primary)  [R10.12] Left upper quadrant abdominal pain          ED Disposition Condition    Discharge Stable          ED Prescriptions       Medication Sig  Dispense Start Date End Date Auth. Provider    hyoscyamine (LEVSIN) 0.125 mg Subl Place 1 tablet (0.125 mg total) under the tongue every 4 (four) hours as needed (Absominal cramping). 30 tablet 5/17/2024 -- Rebecca Ahuja PA-C    ondansetron (ZOFRAN-ODT) 4 MG TbDL Take 1 tablet (4 mg total) by mouth every 6 (six) hours as needed (Nausea). 20 tablet 5/17/2024 -- Rebecca Ahuja PA-C          Follow-up Information       Follow up With Specialties Details Why Contact Info    Leigh Hood MD Gastroenterology Go in 1 week  [address]   [address]  [facility] [address]  [phone]      Christopher Haque - Emergency Dept Emergency Medicine Go to  If symptoms worsen [address]  [facility] [id number] [phone]             Rebecca Ahuja PA-C  05/17/24 6313

## 2024-05-18 ENCOUNTER — PATIENT MESSAGE (OUTPATIENT)
Dept: FAMILY MEDICINE | Facility: CLINIC | Age: 29
End: 2024-05-18
Payer: MEDICAID

## 2024-05-18 ENCOUNTER — HOSPITAL ENCOUNTER (EMERGENCY)
Facility: HOSPITAL | Age: 29
Discharge: LEFT BEFORE DISCHARGE | End: 2024-05-18
Attending: STUDENT IN AN ORGANIZED HEALTH CARE EDUCATION/TRAINING PROGRAM
Payer: MEDICAID

## 2024-05-18 ENCOUNTER — NURSE TRIAGE (OUTPATIENT)
Dept: ADMINISTRATIVE | Facility: CLINIC | Age: 29
End: 2024-05-18
Payer: MEDICAID

## 2024-05-18 VITALS
BODY MASS INDEX: 45.99 KG/M2 | DIASTOLIC BLOOD PRESSURE: 73 MMHG | WEIGHT: 293 LBS | RESPIRATION RATE: 20 BRPM | SYSTOLIC BLOOD PRESSURE: 113 MMHG | HEART RATE: 89 BPM | TEMPERATURE: 98 F | HEIGHT: 67 IN | OXYGEN SATURATION: 99 %

## 2024-05-18 DIAGNOSIS — Z53.21 ELOPED FROM EMERGENCY DEPARTMENT: Primary | ICD-10-CM

## 2024-05-18 LAB
BASOPHILS # BLD AUTO: 0.03 K/UL (ref 0–0.2)
BASOPHILS NFR BLD: 0.3 % (ref 0–1.9)
BUN SERPL-MCNC: 5 MG/DL (ref 6–30)
CHLORIDE SERPL-SCNC: 106 MMOL/L (ref 95–110)
CREAT SERPL-MCNC: 0.6 MG/DL (ref 0.5–1.4)
DIFFERENTIAL METHOD BLD: ABNORMAL
EOSINOPHIL # BLD AUTO: 0.2 K/UL (ref 0–0.5)
EOSINOPHIL NFR BLD: 1.7 % (ref 0–8)
ERYTHROCYTE [DISTWIDTH] IN BLOOD BY AUTOMATED COUNT: 16.3 % (ref 11.5–14.5)
GLUCOSE SERPL-MCNC: 89 MG/DL (ref 70–110)
HCT VFR BLD AUTO: 39.6 % (ref 37–48.5)
HCT VFR BLD CALC: 39 %PCV (ref 36–54)
HGB BLD-MCNC: 12.8 G/DL (ref 12–16)
IMM GRANULOCYTES # BLD AUTO: 0.02 K/UL (ref 0–0.04)
IMM GRANULOCYTES NFR BLD AUTO: 0.2 % (ref 0–0.5)
LYMPHOCYTES # BLD AUTO: 2 K/UL (ref 1–4.8)
LYMPHOCYTES NFR BLD: 22.1 % (ref 18–48)
MCH RBC QN AUTO: 26.2 PG (ref 27–31)
MCHC RBC AUTO-ENTMCNC: 32.3 G/DL (ref 32–36)
MCV RBC AUTO: 81 FL (ref 82–98)
MONOCYTES # BLD AUTO: 0.5 K/UL (ref 0.3–1)
MONOCYTES NFR BLD: 5.7 % (ref 4–15)
NEUTROPHILS # BLD AUTO: 6.2 K/UL (ref 1.8–7.7)
NEUTROPHILS NFR BLD: 70 % (ref 38–73)
NRBC BLD-RTO: 0 /100 WBC
PLATELET # BLD AUTO: 504 K/UL (ref 150–450)
PMV BLD AUTO: 10.3 FL (ref 9.2–12.9)
POC IONIZED CALCIUM: 1.06 MMOL/L (ref 1.06–1.42)
POC TCO2 (MEASURED): 21 MMOL/L (ref 23–29)
POTASSIUM BLD-SCNC: 4.8 MMOL/L (ref 3.5–5.1)
RBC # BLD AUTO: 4.89 M/UL (ref 4–5.4)
SAMPLE: ABNORMAL
SODIUM BLD-SCNC: 137 MMOL/L (ref 136–145)
WBC # BLD AUTO: 8.81 K/UL (ref 3.9–12.7)

## 2024-05-18 PROCEDURE — 25000003 PHARM REV CODE 250: Performed by: PHYSICIAN ASSISTANT

## 2024-05-18 PROCEDURE — 63600175 PHARM REV CODE 636 W HCPCS: Performed by: PHYSICIAN ASSISTANT

## 2024-05-18 PROCEDURE — 85025 COMPLETE CBC W/AUTO DIFF WBC: CPT | Performed by: PHYSICIAN ASSISTANT

## 2024-05-18 PROCEDURE — 96374 THER/PROPH/DIAG INJ IV PUSH: CPT

## 2024-05-18 PROCEDURE — 80047 BASIC METABLC PNL IONIZED CA: CPT

## 2024-05-18 PROCEDURE — 99284 EMERGENCY DEPT VISIT MOD MDM: CPT

## 2024-05-18 RX ORDER — DROPERIDOL 2.5 MG/ML
1.25 INJECTION, SOLUTION INTRAMUSCULAR; INTRAVENOUS
Status: COMPLETED | OUTPATIENT
Start: 2024-05-18 | End: 2024-05-18

## 2024-05-18 RX ORDER — ALUMINUM HYDROXIDE, MAGNESIUM HYDROXIDE, AND SIMETHICONE 1200; 120; 1200 MG/30ML; MG/30ML; MG/30ML
5 SUSPENSION ORAL
Status: COMPLETED | OUTPATIENT
Start: 2024-05-18 | End: 2024-05-18

## 2024-05-18 RX ADMIN — DROPERIDOL 1.25 MG: 2.5 INJECTION, SOLUTION INTRAMUSCULAR; INTRAVENOUS at 08:05

## 2024-05-18 RX ADMIN — SODIUM CHLORIDE 1000 ML: 9 INJECTION, SOLUTION INTRAVENOUS at 08:05

## 2024-05-18 RX ADMIN — ALUMINUM HYDROXIDE, MAGNESIUM HYDROXIDE, AND SIMETHICONE 5 ML: 1200; 120; 1200 SUSPENSION ORAL at 08:05

## 2024-05-18 NOTE — TELEPHONE ENCOUNTER
Pt reports abd pain x's 3 weeks. Pt stopped Ozempic shot at the end of April. Pt reports N/V and has already been to the ER today. Dr. Moran is on vacation. Mother got onto phone and said that the providers think that she has stomach paralysis. Weakness started 1.5 weeks ago. Pt does not get any relief from any suggestions. Mother wants to speak to MD on call for Dr. Moran. Mother insists that patient is not getting treatment that she needs because she has Medicaid insurance. They are upset and insist that there has to be some kind of medication for this issue with Ozempic.     Dispo for difficult call is to call PCP now. No doctor on call for Dr. Moran. Did notify that per patient's symptoms that the recommendation is that she go to the ED now especially since there is no one on call for her to speak with. Mother declined disposition of ED and continued to express frustrations about past care and ED visits. Provided patient relations phone number.   Reason for Disposition   [1] SEVERE pain (e.g., excruciating) AND [2] present > 1 hour   [1] Caller demands to speak with the PCP AND [2] about sick adult (or sick caller)    Additional Information   Negative: Shock suspected (e.g., cold/pale/clammy skin, too weak to stand, low BP, rapid pulse)   Negative: Difficult to awaken or acting confused (e.g., disoriented, slurred speech)   Negative: Passed out (i.e., lost consciousness, collapsed and was not responding)   Negative: Sounds like a life-threatening emergency to the triager    Protocols used: Abdominal Pain - Female-A-AH, Difficult Call-A-AH

## 2024-05-18 NOTE — TELEPHONE ENCOUNTER
Patient states she was seen in the ED on yesterday, 5/17/24, for abdominal and chest pain. Patient states history of use of Bentyl 20 mg for abdominal cramping and received a new prescription for Levsin 0.125 mg for management of abdominal discomfort.  Patient inquiring if use of Bentyl 20 mg and Levsin 0.125 mg for management of abdominal cramping/pain are compatible medications vs medications that are contraindicated for use at the same time.  Patient states use of Levsin 0.125 mg alone does not manage her c/o abdominal cramping/pain. Patient declined triage at this time.     Patient advised to contact her Pharmacist for care advice regarding medication contraindications. Patient also advised to contact the O Triage Service if assessment for symptoms desired. Patient states understanding of care advice.      Reason for Disposition   [1] Caller has medicine question about med NOT prescribed by PCP AND [2] triager unable to answer question (e.g., compatibility with other med, storage)    Additional Information   Negative: [1] Intentional drug overdose AND [2] suicidal thoughts or ideas   Negative: Drug overdose and triager unable to answer question   Negative: Caller requesting a renewal or refill of a medicine patient is currently taking   Negative: Caller requesting information unrelated to medicine   Negative: Caller requesting information about COVID-19 Vaccine   Negative: Caller requesting information about Emergency Contraception   Negative: Caller requesting information about Combined Birth Control Pills   Negative: Caller requesting information about Progestin Birth Control Pills   Negative: Caller requesting information about Post-Op pain or medicines   Negative: Caller requesting a prescription antibiotic (such as Penicillin) for Strep throat and has a positive culture result   Negative: Caller requesting a prescription anti-viral med (such as Tamiflu) and has influenza (flu) symptoms   Negative:  Immunization reaction suspected   Negative: Rash while taking a medicine or within 3 days of stopping it   Negative: [1] Asthma and [2] having symptoms of asthma (cough, wheezing, etc.)   Negative: [1] Symptom of illness (e.g., headache, abdominal pain, earache, vomiting) AND [2] more than mild   Negative: Breastfeeding questions about mother's medicines and diet   Negative: MORE THAN A DOUBLE DOSE of a prescription or over-the-counter (OTC) drug   Negative: [1] DOUBLE DOSE (an extra dose or lesser amount) of prescription drug AND [2] any symptoms (e.g., dizziness, nausea, pain, sleepiness)   Negative: [1] DOUBLE DOSE (an extra dose or lesser amount) of over-the-counter (OTC) drug AND [2] any symptoms (e.g., dizziness, nausea, pain, sleepiness)   Negative: Took another person's prescription drug   Negative: [1] DOUBLE DOSE (an extra dose or lesser amount) of prescription drug AND [2] NO symptoms  (Exception: A double dose of antibiotics.)   Negative: Diabetes drug error or overdose (e.g., took wrong type of insulin or took extra dose)   Negative: [1] Prescription not at pharmacy AND [2] was prescribed by PCP recently (Exception: Triager has access to EMR and prescription is recorded there. Go to Home Care and confirm for pharmacy.)   Negative: [1] Pharmacy calling with prescription question AND [2] triager unable to answer question   Negative: [1] Caller has URGENT medicine question about med that PCP or specialist prescribed AND [2] triager unable to answer question   Negative: Medicine patch causing local rash or itching    Protocols used: Medication Question Call-A-

## 2024-05-18 NOTE — ED NOTES
I-STAT Chem-8+ Results:   Value Reference Range   Sodium 137 136-145 mmol/L   Potassium  4.8 3.5-5.1 mmol/L   Chloride 106  mmol/L   Ionized Calcium 1.06 1.06-1.42 mmol/L   CO2 (measured) 21 23-29 mmol/L   Glucose 89  mg/dL   BUN 5 6-30 mg/dL   Creatinine 0.6 0.5-1.4 mg/dL   Hematocrit 39 36-54%

## 2024-05-18 NOTE — ED PROVIDER NOTES
Encounter Date: 5/18/2024       History     Chief Complaint   Patient presents with    Abdominal Pain     Abdominal pain emesis on ozempic here yesterday for same complaint     29-year-old female with history anxiety, asthma, bipolar disorder, morbid obesity, type 2 DM who presents to the emergency department with chief complaint upper abdominal pain.  The pain began 3 weeks ago after discontinuing Ozempic.  It has been intermittent.  She has had a few episodes of vomiting.  The last episode was yesterday.  She endorses nausea but denies any vomiting today.  She has had diarrhea over the last few weeks, but has had none in the last few days.  Her last bowel movement was 2 days ago.  She is still passing flatus.  She denies history of abdominal surgeries.  This is her 9th ED visit for the same symptoms.  She was prescribed Bentyl a few visits ago, which helped with her abdominal pain we gave her chest pain.  She was prescribed Levsin yesterday, which did not help her abdominal pain as much.  She requests a new prescription for pain medication today.  She denies chest pain, shortness of breath, urinary symptoms, fever.  She denies other worsening or alleviating factors.      Review of patient's allergies indicates:  No Known Allergies  Past Medical History:   Diagnosis Date    ADHD (attention deficit hyperactivity disorder)     Anxiety     Asthma     Bipolar affective     Eczema     History of chlamydia 12/2016    Insomnia     Morbid obesity with BMI of 40.0-44.9, adult     Sleep disorder     Type 2 diabetes mellitus without complications      Past Surgical History:   Procedure Laterality Date    NO PAST SURGERIES      as of 1-13-17     Family History   Problem Relation Name Age of Onset    No Known Problems Paternal Grandfather      Breast cancer Paternal Grandmother      Diabetes Maternal Grandmother      No Known Problems Maternal Grandfather      No Known Problems Father      No Known Problems Mother      No Known  Problems Sister      Colon cancer Neg Hx      Ovarian cancer Neg Hx       Social History     Tobacco Use    Smoking status: Former     Types: Vaping with nicotine     Passive exposure: Past    Smokeless tobacco: Never    Tobacco comments:     boyfriend smoke cigarettes     Former weed consumption   Substance Use Topics    Alcohol use: No    Drug use: Not Currently     Types: Marijuana     Review of Systems   Gastrointestinal:  Positive for abdominal pain, nausea and vomiting.       Physical Exam     Initial Vitals [05/18/24 0704]   BP Pulse Resp Temp SpO2   113/73 89 20 98.4 °F (36.9 °C) 99 %      MAP       --         Physical Exam    Vitals reviewed.  Constitutional: She appears well-developed and well-nourished. She is not diaphoretic. No distress.   HENT:   Head: Normocephalic and atraumatic.   Mouth/Throat: Oropharynx is clear and moist.   Eyes: Conjunctivae and EOM are normal. Pupils are equal, round, and reactive to light.   Neck: Neck supple.   Normal range of motion.  Cardiovascular:  Normal rate, regular rhythm, normal heart sounds and intact distal pulses.     Exam reveals no gallop and no friction rub.       No murmur heard.  Pulmonary/Chest: Breath sounds normal. She has no wheezes. She has no rhonchi. She has no rales.   Abdominal: Abdomen is soft. Bowel sounds are normal. There is abdominal tenderness in the epigastric area.   Musculoskeletal:         General: Normal range of motion.      Cervical back: Normal range of motion and neck supple.     Neurological: She is alert and oriented to person, place, and time. She has normal strength. No cranial nerve deficit or sensory deficit. GCS score is 15. GCS eye subscore is 4. GCS verbal subscore is 5. GCS motor subscore is 6.   Skin: Skin is warm and dry. Capillary refill takes less than 2 seconds.   Psychiatric: She has a normal mood and affect. Her behavior is normal. Judgment and thought content normal.         ED Course   Procedures  Labs Reviewed   CBC  W/ AUTO DIFFERENTIAL - Abnormal; Notable for the following components:       Result Value    MCV 81 (*)     MCH 26.2 (*)     RDW 16.3 (*)     Platelets 504 (*)     All other components within normal limits   ISTAT PROCEDURE - Abnormal; Notable for the following components:    POC BUN 5 (*)     POC TCO2 (MEASURED) 21 (*)     All other components within normal limits   URINALYSIS, REFLEX TO URINE CULTURE   LIPASE   POCT URINE PREGNANCY   ISTAT CHEM8          Imaging Results    None          Medications   sodium chloride 0.9% bolus 1,000 mL 1,000 mL (1,000 mLs Intravenous New Bag 5/18/24 0808)   droPERidol injection 1.25 mg (1.25 mg Intravenous Given 5/18/24 0808)   aluminum-magnesium hydroxide-simethicone 200-200-20 mg/5 mL suspension 5 mL (5 mLs Oral Given 5/18/24 0807)     Medical Decision Making  Emergent evaluation of a 29 y.o. female presenting to the emergency department complaining of upper abdominal pain, NV that began 3 weeks ago. Patient is afebrile, hemodynamically stable, and non toxic appearing.   Will order labs, symptom control.     Differential diagnosis includes but isn't limited to GERD, gastritis, peptic ulcer disease, pancreatitis, biliary colic.    Patient has had multiple ED visits in the last week.  She is requesting additional pain medication to go home with for her abdominal pain.  I think she would benefit from a PPI and maybe Maalox but would be hesitant to try other medications.  I explained to her that she needs outpatient GI follow-up for definitive management.  She later tells me that she has an EGD scheduled in about a week.  She has no severe hematologic derangements.  Her i-STAT does not show significant derangements.    I am notified by the nurse that the patient wishes to leave the emergency department.  She eloped prior to my discussion of her lab results thus far. I was unable to provide her with any discharge paperwork.  The patient's history, physical exam, and plan of care was  discussed with and agreed upon with my supervising physician.     Amount and/or Complexity of Data Reviewed  Labs: ordered. Decision-making details documented in ED Course.    Risk  OTC drugs.  Prescription drug management.               ED Course as of 05/18/24 0857   Sat May 18, 2024   0803 WBC: 8.81 [JM]   0803 Hemoglobin: 12.8 [JM]   0803 Hematocrit: 39.6 [JM]   0803 Platelet Count(!): 504 [JM]      ED Course User Index  [JM] Armida Gómez PA-C                           Clinical Impression:  Final diagnoses:  [Z53.21] Eloped from emergency department (Primary)          ED Disposition Condition    Eloped Stable                Armida Gómez PA-C  05/18/24 0857

## 2024-05-18 NOTE — ED NOTES
Seen yesterday in ED for same issue, states abd pain 9/10 to LUQ. Has not eaten or had BM since yesterday, no diarrhea. Endorses nausea.    Patient identifiers for Misael Garcia 29 y.o. female checked and correct.  Chief Complaint   Patient presents with    Abdominal Pain     Abdominal pain emesis on ozempic here yesterday for same complaint     Past Medical History:   Diagnosis Date    ADHD (attention deficit hyperactivity disorder)     Anxiety     Asthma     Bipolar affective     Eczema     History of chlamydia 12/2016    Insomnia     Morbid obesity with BMI of 40.0-44.9, adult     Sleep disorder     Type 2 diabetes mellitus without complications      Allergies reported: Review of patient's allergies indicates:  No Known Allergies    Appearance: Pt awake, alert & oriented to person, place & time. Pt in no acute distress at present time. Pt is clean and well groomed with clothes appropriately fastened.   Skin: Skin warm, dry & intact. Color consistent with ethnicity. Mucous membranes moist. No breakdown or brusing noted.   Musculoskeletal: Patient moving all extremities well, no obvious swelling or deformities noted.   Respiratory: Respirations spontaneous, even, and non-labored. Visible chest rise noted. Airway is open and patent. No accessory muscle use noted.   Neurologic: Sensation is intact. Speech is clear and appropriate. Eyes open spontaneously, behavior appropriate to situation, follows commands, facial expression symmetrical, bilateral hand grasp equal and even, purposeful motor response noted.  Cardiac: All peripheral pulses present. No Bilateral lower extremity edema. Cap refill is <3 seconds.  Abdomen: Abdomen soft, non distended, non tender to palpation.   : Pt voids independently, denies dysuria, hematuria, frequency.

## 2024-05-20 ENCOUNTER — NURSE TRIAGE (OUTPATIENT)
Dept: ADMINISTRATIVE | Facility: CLINIC | Age: 29
End: 2024-05-20
Payer: MEDICAID

## 2024-05-20 ENCOUNTER — PATIENT MESSAGE (OUTPATIENT)
Dept: FAMILY MEDICINE | Facility: CLINIC | Age: 29
End: 2024-05-20
Payer: MEDICAID

## 2024-05-20 NOTE — TELEPHONE ENCOUNTER
Attempted to contact patient to discuss GI symptoms, no answer, left VM for patient to call our office.

## 2024-05-20 NOTE — TELEPHONE ENCOUNTER
Already spoke with another triage nurse  Reason for Disposition   Caller has already spoken with another triager and has no further questions.     Already spoke with another triage nurse    Protocols used: No Contact or Duplicate Contact Call-A-AH

## 2024-05-20 NOTE — TELEPHONE ENCOUNTER
Caller requesting a message be sent to pcp office urgently. Caller states that she has abd pain x 3 weeks and has appt on tomorrow however caller states she really needs to be seen today.  Pt advised per protocol and verbalized understanding.     Reason for Disposition   Requesting regular office appointment    Additional Information   Negative: RN needs further essential information from caller in order to complete triage    Protocols used: Information Only Call - No Triage-A-

## 2024-05-21 ENCOUNTER — PATIENT MESSAGE (OUTPATIENT)
Dept: FAMILY MEDICINE | Facility: CLINIC | Age: 29
End: 2024-05-21

## 2024-05-21 ENCOUNTER — OFFICE VISIT (OUTPATIENT)
Dept: FAMILY MEDICINE | Facility: CLINIC | Age: 29
End: 2024-05-21
Payer: MEDICAID

## 2024-05-21 VITALS
OXYGEN SATURATION: 98 % | SYSTOLIC BLOOD PRESSURE: 120 MMHG | HEART RATE: 85 BPM | WEIGHT: 293 LBS | HEIGHT: 67 IN | TEMPERATURE: 98 F | BODY MASS INDEX: 45.99 KG/M2 | DIASTOLIC BLOOD PRESSURE: 80 MMHG

## 2024-05-21 DIAGNOSIS — R11.2 NAUSEA AND VOMITING, UNSPECIFIED VOMITING TYPE: ICD-10-CM

## 2024-05-21 DIAGNOSIS — K30 DELAYED GASTRIC EMPTYING: Primary | ICD-10-CM

## 2024-05-21 DIAGNOSIS — T50.905S MEDICATION SIDE EFFECT, SEQUELA: ICD-10-CM

## 2024-05-21 PROBLEM — E11.65 TYPE 2 DIABETES MELLITUS WITH HYPERGLYCEMIA, WITHOUT LONG-TERM CURRENT USE OF INSULIN: Status: RESOLVED | Noted: 2018-08-01 | Resolved: 2024-05-21

## 2024-05-21 PROCEDURE — 3044F HG A1C LEVEL LT 7.0%: CPT | Mod: CPTII,,, | Performed by: FAMILY MEDICINE

## 2024-05-21 PROCEDURE — 4010F ACE/ARB THERAPY RXD/TAKEN: CPT | Mod: CPTII,,, | Performed by: FAMILY MEDICINE

## 2024-05-21 PROCEDURE — 3079F DIAST BP 80-89 MM HG: CPT | Mod: CPTII,,, | Performed by: FAMILY MEDICINE

## 2024-05-21 PROCEDURE — 99999 PR PBB SHADOW E&M-EST. PATIENT-LVL V: CPT | Mod: PBBFAC,,, | Performed by: FAMILY MEDICINE

## 2024-05-21 PROCEDURE — 1159F MED LIST DOCD IN RCRD: CPT | Mod: CPTII,,, | Performed by: FAMILY MEDICINE

## 2024-05-21 PROCEDURE — 3074F SYST BP LT 130 MM HG: CPT | Mod: CPTII,,, | Performed by: FAMILY MEDICINE

## 2024-05-21 PROCEDURE — 3008F BODY MASS INDEX DOCD: CPT | Mod: CPTII,,, | Performed by: FAMILY MEDICINE

## 2024-05-21 PROCEDURE — 1160F RVW MEDS BY RX/DR IN RCRD: CPT | Mod: CPTII,,, | Performed by: FAMILY MEDICINE

## 2024-05-21 PROCEDURE — 99215 OFFICE O/P EST HI 40 MIN: CPT | Mod: S$PBB,,, | Performed by: FAMILY MEDICINE

## 2024-05-21 PROCEDURE — 99215 OFFICE O/P EST HI 40 MIN: CPT | Mod: PBBFAC,PO | Performed by: FAMILY MEDICINE

## 2024-05-21 NOTE — PROGRESS NOTES
Subjective:         Patient ID: Misael Garcia is a 29 y.o. female.    Chief Complaint: Abdominal Pain    Patient Active Problem List   Diagnosis    Corns and callus    Class 3 severe obesity due to excess calories with serious comorbidity and body mass index (BMI) of 50.0 to 59.9 in adult    Bipolar 1 disorder    Recently quit using tobacco    Hypertension associated with diabetes    Oral contraceptive pill surveillance    SID on CPAP    Mild persistent asthma without complication    Cervical spondylosis without myelopathy    Lumbosacral spondylosis without myelopathy    Posttraumatic stress disorder    Thoracic facet syndrome    Heart failure with mildly reduced ejection fraction (HFmrEF)    Palpitations    Marijuana use    Cholecystitis without cholelithiasis    Cyst of right ovary    Gastroesophageal reflux disease    Upper abdominal pain    Chronic constipation      LARA Douglas is a 29 y.o. female who presents today for ongoing nausea and vomiting.   Patient previously started on Ozempic with increase in titration for weight loss and DM management.   She started having nausea and vomiting, epigastric pain. ER visit ruled out pancreatitis. There was discussion to reduce dose vs discontinue. Patient opted to continue on a reduced dose. Symptoms persistented, were in tolerable.   Patient has taken her last injection 04/23/2024.   Over the past month she has continued to be in and out of the ER with similar concerns. Labs and a CT abd/pelvis without contrast has been unremarkable. S/p outpatient GI appt.   EGD planned for next week.     Reviewed lists of medications for symptoms. Underlying anxiety.   Clonazepam given by outside provider to help with anxiety.    Lost 16 lbs in past week.     Review of Systems   All other systems reviewed and are negative.       Objective:     Vitals:    05/21/24 1000   BP: 120/80   BP Location: Right arm   Patient Position: Sitting   BP Method: Medium (Manual)   Pulse:  "85   Temp: 98.3 °F (36.8 °C)   TempSrc: Oral   SpO2: 98%   Weight: (!) 136.4 kg (300 lb 11.3 oz)   Height: 5' 7" (1.702 m)         Physical Exam  Vitals and nursing note reviewed.   Constitutional:       General: She is not in acute distress.     Appearance: Normal appearance. She is not ill-appearing, toxic-appearing or diaphoretic.   HENT:      Head: Normocephalic and atraumatic.   Eyes:      General: No scleral icterus.     Conjunctiva/sclera: Conjunctivae normal.   Cardiovascular:      Rate and Rhythm: Normal rate.   Pulmonary:      Effort: Pulmonary effort is normal. No respiratory distress.   Skin:     Coloration: Skin is not pale.   Neurological:      Mental Status: She is alert. Mental status is at baseline.   Psychiatric:         Attention and Perception: Attention and perception normal.         Mood and Affect: Affect normal. Mood is anxious.         Speech: Speech normal.         Behavior: Behavior normal.         Cognition and Memory: Cognition and memory normal.         Judgment: Judgment normal.       Assessment:       1. Delayed gastric emptying    2. Medication side effect, sequela    3. Nausea and vomiting, unspecified vomiting type        Plan:   Recent relevant labs results reviewed with patient.     Spent time speaking with patient and her mom.   Possible gastroparalysis from GLP1a use, though has stopped.   Tolerating po and hydrating.   Normalized current anxiety and frustrations given not feeling well, also self soothing from eating is not currently available given symptoms.   Can take clonazepam.     Mother would like EGD moved up. Discussed what information can be obtained from EGD.  Discussed overall approach to treatment/ diety for gastroparesis.   Symptoms compounded by anxiety. Overall reassured that she is physically hydrated. Despite weight loss due to decreased intake, will slowly be able to resume eating though may have to manage diet specifically to diagnosis.         1. Delayed " gastric emptying  -     NM Gastric Emptying; Future; Expected date: 05/21/2024    2. Medication side effect, sequela  3. Nausea and vomiting, unspecified vomiting type    Patient's questions answered. Plan reviewed with patient at the end of visit. Relevant precautions to chief complaint and reasons to seek further medical care or to contact the office sooner reviewed with patient.     Follow up if symptoms worsen or fail to improve.      I spent a total of 41 minutes on the day of the visit.  This includes face to face time and non-face to face time preparing to see the patient (eg, review of tests), obtaining and/or reviewing separately obtained history, documenting clinical information in the electronic or other health record, independently interpreting results and communicating results to the patient/family/caregiver, or care coordinator.

## 2024-05-22 ENCOUNTER — TELEPHONE (OUTPATIENT)
Dept: ENDOSCOPY | Facility: HOSPITAL | Age: 29
End: 2024-05-22
Payer: MEDICAID

## 2024-05-23 ENCOUNTER — HOSPITAL ENCOUNTER (OUTPATIENT)
Dept: RADIOLOGY | Facility: HOSPITAL | Age: 29
Discharge: HOME | End: 2024-05-23
Attending: FAMILY MEDICINE
Payer: MEDICAID

## 2024-05-23 DIAGNOSIS — K30 DELAYED GASTRIC EMPTYING: ICD-10-CM

## 2024-05-23 PROCEDURE — A9541 TC99M SULFUR COLLOID: HCPCS | Performed by: FAMILY MEDICINE

## 2024-05-23 PROCEDURE — 78264 GASTRIC EMPTYING IMG STUDY: CPT | Mod: 26,,, | Performed by: STUDENT IN AN ORGANIZED HEALTH CARE EDUCATION/TRAINING PROGRAM

## 2024-05-23 PROCEDURE — 78264 GASTRIC EMPTYING IMG STUDY: CPT | Mod: TC

## 2024-05-23 RX ORDER — TECHNETIUM TC 99M SULFUR COLLOID 2 MG
1 KIT MISCELLANEOUS
Status: COMPLETED | OUTPATIENT
Start: 2024-05-23 | End: 2024-05-23

## 2024-05-23 RX ADMIN — TECHNETIUM TC 99M SULFUR COLLOID 1 MILLICURIE: KIT at 08:05

## 2024-05-25 ENCOUNTER — PATIENT MESSAGE (OUTPATIENT)
Dept: FAMILY MEDICINE | Facility: CLINIC | Age: 29
End: 2024-05-25
Payer: MEDICAID

## 2024-05-28 ENCOUNTER — HOSPITAL ENCOUNTER (EMERGENCY)
Facility: HOSPITAL | Age: 29
Discharge: HOME | End: 2024-05-28
Attending: EMERGENCY MEDICINE
Payer: MEDICAID

## 2024-05-28 VITALS
HEART RATE: 69 BPM | SYSTOLIC BLOOD PRESSURE: 121 MMHG | BODY MASS INDEX: 44.41 KG/M2 | RESPIRATION RATE: 16 BRPM | TEMPERATURE: 98 F | OXYGEN SATURATION: 98 % | DIASTOLIC BLOOD PRESSURE: 84 MMHG | WEIGHT: 293 LBS | HEIGHT: 68 IN

## 2024-05-28 DIAGNOSIS — B37.2 CANDIDIASIS OF SKIN: ICD-10-CM

## 2024-05-28 DIAGNOSIS — R10.9 ABDOMINAL PAIN, UNSPECIFIED ABDOMINAL LOCATION: Primary | ICD-10-CM

## 2024-05-28 DIAGNOSIS — R10.10 UPPER ABDOMINAL PAIN: ICD-10-CM

## 2024-05-28 LAB
ALBUMIN SERPL BCP-MCNC: 3.6 G/DL (ref 3.5–5.2)
ALP SERPL-CCNC: 95 U/L (ref 55–135)
ALT SERPL W/O P-5'-P-CCNC: 35 U/L (ref 10–44)
ANION GAP SERPL CALC-SCNC: 9 MMOL/L (ref 8–16)
AST SERPL-CCNC: 20 U/L (ref 10–40)
B-HCG UR QL: NEGATIVE
BASOPHILS # BLD AUTO: 0.05 K/UL (ref 0–0.2)
BASOPHILS NFR BLD: 0.6 % (ref 0–1.9)
BILIRUB SERPL-MCNC: 0.4 MG/DL (ref 0.1–1)
BILIRUB UR QL STRIP: NEGATIVE
BUN SERPL-MCNC: 5 MG/DL (ref 6–20)
BUN SERPL-MCNC: 5 MG/DL (ref 6–30)
CALCIUM SERPL-MCNC: 9.6 MG/DL (ref 8.7–10.5)
CHLORIDE SERPL-SCNC: 103 MMOL/L (ref 95–110)
CHLORIDE SERPL-SCNC: 107 MMOL/L (ref 95–110)
CLARITY UR REFRACT.AUTO: CLEAR
CO2 SERPL-SCNC: 24 MMOL/L (ref 23–29)
COLOR UR AUTO: YELLOW
CREAT SERPL-MCNC: 0.7 MG/DL (ref 0.5–1.4)
CREAT SERPL-MCNC: 0.7 MG/DL (ref 0.5–1.4)
CTP QC/QA: YES
DIFFERENTIAL METHOD BLD: ABNORMAL
EOSINOPHIL # BLD AUTO: 0.2 K/UL (ref 0–0.5)
EOSINOPHIL NFR BLD: 2.1 % (ref 0–8)
ERYTHROCYTE [DISTWIDTH] IN BLOOD BY AUTOMATED COUNT: 15.7 % (ref 11.5–14.5)
EST. GFR  (NO RACE VARIABLE): >60 ML/MIN/1.73 M^2
GLUCOSE SERPL-MCNC: 92 MG/DL (ref 70–110)
GLUCOSE SERPL-MCNC: 99 MG/DL (ref 70–110)
GLUCOSE UR QL STRIP: NEGATIVE
HCT VFR BLD AUTO: 39.5 % (ref 37–48.5)
HCT VFR BLD CALC: 39 %PCV (ref 36–54)
HGB BLD-MCNC: 12.6 G/DL (ref 12–16)
HGB UR QL STRIP: NEGATIVE
IMM GRANULOCYTES # BLD AUTO: 0.01 K/UL (ref 0–0.04)
IMM GRANULOCYTES NFR BLD AUTO: 0.1 % (ref 0–0.5)
KETONES UR QL STRIP: NEGATIVE
LEUKOCYTE ESTERASE UR QL STRIP: NEGATIVE
LIPASE SERPL-CCNC: 10 U/L (ref 4–60)
LIPASE SERPL-CCNC: 9 U/L (ref 4–60)
LYMPHOCYTES # BLD AUTO: 2.4 K/UL (ref 1–4.8)
LYMPHOCYTES NFR BLD: 29.2 % (ref 18–48)
MCH RBC QN AUTO: 26 PG (ref 27–31)
MCHC RBC AUTO-ENTMCNC: 31.9 G/DL (ref 32–36)
MCV RBC AUTO: 82 FL (ref 82–98)
MONOCYTES # BLD AUTO: 0.6 K/UL (ref 0.3–1)
MONOCYTES NFR BLD: 7.2 % (ref 4–15)
NEUTROPHILS # BLD AUTO: 5 K/UL (ref 1.8–7.7)
NEUTROPHILS NFR BLD: 60.8 % (ref 38–73)
NITRITE UR QL STRIP: NEGATIVE
NRBC BLD-RTO: 0 /100 WBC
PH UR STRIP: 6 [PH] (ref 5–8)
PLATELET # BLD AUTO: 496 K/UL (ref 150–450)
PMV BLD AUTO: 10.5 FL (ref 9.2–12.9)
POC IONIZED CALCIUM: 1.18 MMOL/L (ref 1.06–1.42)
POC TCO2 (MEASURED): 28 MMOL/L (ref 23–29)
POTASSIUM BLD-SCNC: 4.9 MMOL/L (ref 3.5–5.1)
POTASSIUM SERPL-SCNC: 3.9 MMOL/L (ref 3.5–5.1)
PROT SERPL-MCNC: 7 G/DL (ref 6–8.4)
PROT UR QL STRIP: NEGATIVE
RBC # BLD AUTO: 4.84 M/UL (ref 4–5.4)
SAMPLE: ABNORMAL
SODIUM BLD-SCNC: 137 MMOL/L (ref 136–145)
SODIUM SERPL-SCNC: 140 MMOL/L (ref 136–145)
SP GR UR STRIP: 1.02 (ref 1–1.03)
URN SPEC COLLECT METH UR: NORMAL
WBC # BLD AUTO: 8.15 K/UL (ref 3.9–12.7)

## 2024-05-28 PROCEDURE — 81025 URINE PREGNANCY TEST: CPT

## 2024-05-28 PROCEDURE — 83690 ASSAY OF LIPASE: CPT | Mod: 91

## 2024-05-28 PROCEDURE — 81003 URINALYSIS AUTO W/O SCOPE: CPT

## 2024-05-28 PROCEDURE — 96372 THER/PROPH/DIAG INJ SC/IM: CPT

## 2024-05-28 PROCEDURE — 63600175 PHARM REV CODE 636 W HCPCS

## 2024-05-28 PROCEDURE — 80053 COMPREHEN METABOLIC PANEL: CPT

## 2024-05-28 PROCEDURE — 85025 COMPLETE CBC W/AUTO DIFF WBC: CPT

## 2024-05-28 PROCEDURE — 25000003 PHARM REV CODE 250

## 2024-05-28 RX ORDER — LIDOCAINE HYDROCHLORIDE 20 MG/ML
15 SOLUTION OROPHARYNGEAL ONCE
Status: COMPLETED | OUTPATIENT
Start: 2024-05-28 | End: 2024-05-28

## 2024-05-28 RX ORDER — SUCRALFATE 1 G/10ML
1 SUSPENSION ORAL 4 TIMES DAILY
Qty: 414 ML | Refills: 0 | Status: SHIPPED | OUTPATIENT
Start: 2024-05-28 | End: 2024-06-06

## 2024-05-28 RX ORDER — ALUMINUM HYDROXIDE, MAGNESIUM HYDROXIDE, AND SIMETHICONE 1200; 120; 1200 MG/30ML; MG/30ML; MG/30ML
30 SUSPENSION ORAL ONCE
Status: COMPLETED | OUTPATIENT
Start: 2024-05-28 | End: 2024-05-28

## 2024-05-28 RX ORDER — DROPERIDOL 2.5 MG/ML
1.25 INJECTION, SOLUTION INTRAMUSCULAR; INTRAVENOUS ONCE
Status: COMPLETED | OUTPATIENT
Start: 2024-05-28 | End: 2024-05-28

## 2024-05-28 RX ORDER — DICYCLOMINE HYDROCHLORIDE 20 MG/1
20 TABLET ORAL 3 TIMES DAILY PRN
Qty: 90 TABLET | Refills: 0 | Status: SHIPPED | OUTPATIENT
Start: 2024-05-28 | End: 2024-06-06

## 2024-05-28 RX ORDER — NYSTATIN 100000 [USP'U]/G
POWDER TOPICAL 2 TIMES DAILY
Qty: 30 G | Refills: 0 | Status: SHIPPED | OUTPATIENT
Start: 2024-05-28 | End: 2024-06-06

## 2024-05-28 RX ADMIN — ALUMINUM HYDROXIDE, MAGNESIUM HYDROXIDE, AND SIMETHICONE 30 ML: 1200; 120; 1200 SUSPENSION ORAL at 07:05

## 2024-05-28 RX ADMIN — DROPERIDOL 1.25 MG: 2.5 INJECTION, SOLUTION INTRAMUSCULAR; INTRAVENOUS at 10:05

## 2024-05-28 RX ADMIN — LIDOCAINE HYDROCHLORIDE 15 ML: 20 SOLUTION ORAL at 07:05

## 2024-05-28 NOTE — ED NOTES
I-STAT Chem-8+ Results:   Value Reference Range   Sodium 137 136-145 mmol/L   Potassium  4.9 3.5-5.1 mmol/L   Chloride 103  mmol/L   Ionized Calcium 1.18 1.06-1.42 mmol/L   CO2 (measured) 28 23-29 mmol/L   Glucose 99  mg/dL   BUN 5 6-30 mg/dL   Creatinine 0.7 0.5-1.4 mg/dL   Hematocrit 39 36-54%

## 2024-05-28 NOTE — ED NOTES
Pt complains of diffuse abd pain x 5 weeks (since stopping Ozempic)- States constipated- denies any urinary symptoms, denies n/v- denies chest pain= LMP 1 week ago, denies any vaginal discharge= Patient states she was told she needed an EGD but her anxiety has prevented her from getting the procedure

## 2024-05-28 NOTE — ED PROVIDER NOTES
Encounter Date: 5/28/2024       History     Chief Complaint   Patient presents with    Abdominal Pain     Reports abdominal pain in the lower right quadrant x 5 days, states that it was getting better but the pain came back,  reports constipation, denies N/V and dysuria.      29-year-old female with history anxiety, asthma, bipolar disorder, morbid obesity, type 2 DM who presents to the emergency department with chief complaint of abdominal pain onset 4/23/24. The pain began 3 weeks ago after discontinuing Ozempic. It has been intermittent.  She has seen a gastroenterologist and was supposed to have an EGD today but did not go due to anxiety.  States the pain is mostly epigastric but now migrates into the left lower quadrant.  Admits to constipation though last bowel movement was yesterday and she is passing gas.  She was resting Bentyl initially but this stopped working.  She took Mylanta yesterday with no relief.  Denies nausea, vomiting, diarrhea, fever, dysuria, chest pain, or shortness of breath.  She does also complain of an itchy rash below both breast.  Has not tried any medications.    She denies history of abdominal surgeries.  Has had multiple ED visits for the same symptoms. She was seen by GI and diagnosed with gastroparesis. EGD was to be performed today to assess for gastric outlet obstruction.     The history is provided by the patient and medical records.     Review of patient's allergies indicates:  No Known Allergies  Past Medical History:   Diagnosis Date    ADHD (attention deficit hyperactivity disorder)     Anxiety     Asthma     Bipolar affective     Eczema     History of chlamydia 12/2016    Insomnia     Morbid obesity with BMI of 40.0-44.9, adult     Sleep disorder     Type 2 diabetes mellitus without complications      Past Surgical History:   Procedure Laterality Date    NO PAST SURGERIES      as of 1-13-17     Family History   Problem Relation Name Age of Onset    No Known Problems  Paternal Grandfather      Breast cancer Paternal Grandmother      Diabetes Maternal Grandmother      No Known Problems Maternal Grandfather      No Known Problems Father      No Known Problems Mother      No Known Problems Sister      Colon cancer Neg Hx      Ovarian cancer Neg Hx       Social History     Tobacco Use    Smoking status: Former     Types: Vaping with nicotine     Passive exposure: Past    Smokeless tobacco: Never    Tobacco comments:     boyfriend smoke cigarettes     Former weed consumption   Substance Use Topics    Alcohol use: No    Drug use: Not Currently     Types: Marijuana     Review of Systems    Physical Exam     Initial Vitals [05/28/24 0651]   BP Pulse Resp Temp SpO2   113/83 84 18 98 °F (36.7 °C) 99 %      MAP       --         Physical Exam    Vitals reviewed.  Constitutional: She appears well-developed and well-nourished. She is not diaphoretic. She is Obese . No distress.   HENT:   Head: Normocephalic and atraumatic.   Neck: Neck supple.   Cardiovascular:  Normal rate, regular rhythm and normal heart sounds.     Exam reveals no gallop and no friction rub.       No murmur heard.  Pulmonary/Chest: Breath sounds normal. No respiratory distress. She has no wheezes. She has no rhonchi. She has no rales.   Abdominal: Abdomen is soft. She exhibits no distension. There is no abdominal tenderness. There is no rebound, no guarding and no tenderness at McBurney's point.   Musculoskeletal:      Cervical back: Neck supple.     Neurological: She is alert and oriented to person, place, and time.   Skin:   Discoloration/dark pigmentation under bilateral breasts as shown below   Psychiatric: She has a normal mood and affect.         ED Course   Procedures  Labs Reviewed   CBC W/ AUTO DIFFERENTIAL - Abnormal; Notable for the following components:       Result Value    MCH 26.0 (*)     MCHC 31.9 (*)     RDW 15.7 (*)     Platelets 496 (*)     All other components within normal limits   COMPREHENSIVE  METABOLIC PANEL - Abnormal; Notable for the following components:    BUN 5 (*)     All other components within normal limits   ISTAT PROCEDURE - Abnormal; Notable for the following components:    POC BUN 5 (*)     All other components within normal limits   LIPASE   URINALYSIS, REFLEX TO URINE CULTURE    Narrative:     Specimen Source->Urine   LIPASE   POCT URINE PREGNANCY          Imaging Results    None          Medications   aluminum-magnesium hydroxide-simethicone 200-200-20 mg/5 mL suspension 30 mL (30 mLs Oral Given 5/28/24 0742)     And   LIDOcaine viscous HCl 2% oral solution 15 mL (15 mLs Oral Given 5/28/24 0742)   droPERidol injection 1.25 mg (1.25 mg Intramuscular Given 5/28/24 1008)     Medical Decision Making  Amount and/or Complexity of Data Reviewed  Labs: ordered. Decision-making details documented in ED Course.    Risk  OTC drugs.  Prescription drug management.         APC / Resident Notes:   Emergent evaluation of 29-year-old female presenting with abdominal pain.  Vitals WNL.  Clinically well-appearing, in no acute distress.  See physical exam findings above.  Abdomen is soft, nontender.  Symptoms consistent with usual chronic abdominal pain.  Suspicion for acute abnormality. Will if GI cocktail and reassess.  Offered fluids for constipation but she declined.  Low suspicion of bowel obstruction given last bowel movement yesterday and passing gas.  No abdominal distention.  Regarding rash, appears consistent with yeast. No signs of cellulitis. We will prescribe nystatin powder.    My differential diagnoses include but are not limited to:   Gastroparesis, gastritis, PUD, viral gastroenteritis, intertrigo, electrolyte abnormality, JULIO, UTI    See ED course.        ED Course as of 05/28/24 1259   Tue May 28, 2024   0724 hCG Qualitative, Urine: Negative [KB]   0805 CBC W/ AUTO DIFFERENTIAL(!)  No leukocytosis or anemia [KB]   0805 Urinalysis, Reflex to Urine Culture Urine, Clean Catch  Appears non  infectious [KB]   0855 Lipase: 10 [KB]   0940 Comprehensive metabolic panel(!)  Unremarkable.  No electrolyte or metabolic derangement.  No JULIO.  No elevated LFTs or hyperbilirubinemia [KB]   0954 Patient educated on findings.  Advised for follow up with Gastroenterology and emphasized need for endoscopy.  Prescribed Bentyl and Carafate as well as nystatin powder for yeast infection.  Strict ED return precautions given with all questions answered.  Patient verbalized understanding and agreed to plan. [KB]      ED Course User Index  [KB] Leigh West PA-C                           Clinical Impression:  Final diagnoses:  [R10.9] Abdominal pain, unspecified abdominal location (Primary)  [B37.2] Candidiasis of skin          ED Disposition Condition    Discharge Stable          ED Prescriptions       Medication Sig Dispense Start Date End Date Auth. Provider    nystatin (MYCOSTATIN) powder Apply topically 2 (two) times daily. 30 g 5/28/2024 -- Leigh West PA-C    sucralfate (CARAFATE) 100 mg/mL suspension Take 10 mLs (1 g total) by mouth 4 (four) times daily. 414 mL 5/28/2024 -- Leigh West PA-C    dicyclomine (BENTYL) 20 mg tablet Take 1 tablet (20 mg total) by mouth 3 (three) times daily as needed (Abdominal pain). 90 tablet 5/28/2024 -- Leigh West PA-C          Follow-up Information       Follow up With Specialties Details Why Contact Info    Katelyn Chapman MD Family Medicine Schedule an appointment as soon as possible for a visit   [address] [facility]  [place] [address] [phone]      Leigh Hood MD Gastroenterology Schedule an appointment as soon as possible for a visit   [address]   [address]  [facility] [address] [phone]      [facility] - Emergency Dept Emergency Medicine Go to  If symptoms worsen [address]  [facility] [address] [phone]             Leigh West PA-C  05/28/24 7253

## 2024-05-29 ENCOUNTER — TELEPHONE (OUTPATIENT)
Dept: FAMILY MEDICINE | Facility: CLINIC | Age: 29
End: 2024-05-29
Payer: MEDICAID

## 2024-05-29 NOTE — TELEPHONE ENCOUNTER
----- Message from Michelle Brandon sent at 5/29/2024 11:32 AM CDT -----  Regarding: Pt called to speak to the nurse to request a referral to a different gastro provider and she would ilke a call back today  Patient Advice:    Pt called to speak to the nurse to request a referral to a different gastro provider and she would ilke a call back today asap due having stomach pain.    Pt can be reached at [phone]

## 2024-05-31 ENCOUNTER — PATIENT MESSAGE (OUTPATIENT)
Dept: FAMILY MEDICINE | Facility: CLINIC | Age: 29
End: 2024-05-31
Payer: MEDICAID

## 2024-05-31 ENCOUNTER — ON-DEMAND VIRTUAL (OUTPATIENT)
Dept: URGENT CARE | Facility: CLINIC | Age: 29
End: 2024-05-31
Payer: MEDICAID

## 2024-05-31 DIAGNOSIS — B37.2 SKIN YEAST INFECTION: Primary | ICD-10-CM

## 2024-05-31 DIAGNOSIS — L83 ACANTHOSIS NIGRICANS: Primary | ICD-10-CM

## 2024-05-31 PROCEDURE — 99213 OFFICE O/P EST LOW 20 MIN: CPT | Mod: 95,,, | Performed by: NURSE PRACTITIONER

## 2024-05-31 RX ORDER — NYSTATIN 100000 U/G
CREAM TOPICAL 2 TIMES DAILY
Qty: 30 G | Refills: 0 | Status: SHIPPED | OUTPATIENT
Start: 2024-05-31 | End: 2024-06-06

## 2024-05-31 NOTE — PROGRESS NOTES
Subjective:      Patient ID: Misael Garcia is a 29 y.o. female.    Vitals:  vitals were not taken for this visit.     Chief Complaint: Rash      Visit Type: TELE AUDIOVISUAL    Present with the patient at the time of consultation: TELEMED PRESENT WITH PATIENT: None        Past Medical History:   Diagnosis Date    ADHD (attention deficit hyperactivity disorder)     Anxiety     Asthma     Bipolar affective     Eczema     History of chlamydia 12/2016    Insomnia     Morbid obesity with BMI of 40.0-44.9, adult     Sleep disorder     Type 2 diabetes mellitus without complications      Past Surgical History:   Procedure Laterality Date    NO PAST SURGERIES      as of 1-13-17     Review of patient's allergies indicates:  No Known Allergies  Current Outpatient Medications on File Prior to Visit   Medication Sig Dispense Refill    adapalene-benzoyl peroxide (EPIDUO FORTE) 0.3-2.5 % GlwP Apply topically. (Patient not taking: Reported on 5/21/2024)      albuterol (PROVENTIL/VENTOLIN HFA) 90 mcg/actuation inhaler Inhale 2 puffs into the lungs every 4 (four) hours as needed (shortness of breath). Rescue 8.5 g 1    ammonium lactate (LAC-HYDRIN) 12 % lotion Apply topically. (Patient not taking: Reported on 5/21/2024)      azelastine (ASTELIN) 137 mcg (0.1 %) nasal spray 2 sprays (274 mcg total) by Nasal route 2 (two) times daily as needed for Rhinitis. 30 mL 0    blood sugar diagnostic Strp To check Blood Glucose  2-3x times daily, 100 each 11    blood-glucose meter Misc use as directed 1 each 0    cetirizine (ZYRTEC) 10 MG tablet Take 1 tablet (10 mg total) by mouth once daily. (Patient not taking: Reported on 5/21/2024) 30 tablet 0    clindamycin phosphate 1% (CLINDAGEL) 1 % gel Apply topically 2 (two) times daily. (Patient not taking: Reported on 5/21/2024) 30 g 3    dicyclomine (BENTYL) 20 mg tablet Take 1 tablet (20 mg total) by mouth 3 (three) times daily as needed (Abdominal pain). 90 tablet 0    famotidine  (PEPCID) 20 MG tablet Take 1 tablet (20 mg total) by mouth 2 (two) times daily as needed for Heartburn (epigastric abdominal pain). (Patient not taking: Reported on 5/21/2024) 180 tablet 1    fluticasone furoate-vilanteroL (BREO ELLIPTA) 200-25 mcg/dose DsDv diskus inhaler Inhale 1 puff into the lungs once daily. Controller (Patient not taking: Reported on 5/21/2024) 180 each 1    fluticasone propionate (FLONASE) 50 mcg/actuation nasal spray 2 sprays (100 mcg total) by Each Nostril route once daily. 32 g 5    hyoscyamine (LEVSIN) 0.125 mg Subl Place 1 tablet (0.125 mg total) under the tongue every 4 (four) hours as needed (Absominal cramping). (Patient not taking: Reported on 5/21/2024) 30 tablet 0    lancets 28 gauge Misc To check Blood Glucose  2-3 times daily, 100 each 11    linaCLOtide (LINZESS) 72 mcg Cap capsule Take 1 capsule (72 mcg total) by mouth before breakfast. (Patient not taking: Reported on 5/21/2024) 30 capsule 5    lisinopriL (PRINIVIL,ZESTRIL) 20 MG tablet Take 1 tablet (20 mg total) by mouth once daily. 90 tablet 1    lurasidone (LATUDA) 40 mg Tab tablet Take 40 mg by mouth.      metroNIDAZOLE (METROGEL) 0.75 % gel Use one vaginal applicatorful once daily at bedtime for 5 days (Patient not taking: Reported on 5/21/2024) 45 g 0    mupirocin (BACTROBAN) 2 % ointment Apply topically 2 (two) times daily. Apply to the affected area 2 times a day for 1 week. (Patient not taking: Reported on 5/21/2024) 15 g 0    nystatin (MYCOSTATIN) powder Apply topically 2 (two) times daily. 30 g 0    ondansetron (ZOFRAN) 8 MG tablet Take 1 tablet (8 mg total) by mouth every 12 (twelve) hours as needed for Nausea. (Patient not taking: Reported on 5/21/2024) 8 tablet 1    ondansetron (ZOFRAN-ODT) 4 MG TbDL Take 1 tablet (4 mg total) by mouth every 6 (six) hours as needed (Nausea). 20 tablet 0    pantoprazole (PROTONIX) 40 MG tablet Take 1 tablet (40 mg total) by mouth 2 (two) times daily. 60 tablet 0    promethazine  (PHENERGAN) 25 MG tablet Take 1 tablet (25 mg total) by mouth every 6 (six) hours as needed for Nausea. (Patient not taking: Reported on 5/21/2024) 20 tablet 0    sucralfate (CARAFATE) 100 mg/mL suspension Take 10 mLs (1 g total) by mouth 4 (four) times daily. 414 mL 0    sulfacetamide sodium-sulfur 10-5 % (w/w) Clsr Apply topically. (Patient not taking: Reported on 5/21/2024)      topiramate (TOPAMAX) 25 MG tablet Take 25 mg by mouth 2 (two) times daily. (Patient not taking: Reported on 5/21/2024)      urea 20 % Crea APPLY TO AFFECTED AREA QD (Patient not taking: Reported on 5/21/2024)       No current facility-administered medications on file prior to visit.     Family History   Problem Relation Name Age of Onset    No Known Problems Paternal Grandfather      Breast cancer Paternal Grandmother      Diabetes Maternal Grandmother      No Known Problems Maternal Grandfather      No Known Problems Father      No Known Problems Mother      No Known Problems Sister      Colon cancer Neg Hx      Ovarian cancer Neg Hx         Medications Ordered                [id number] - [place] - [address]  [address] [facility] [address]   [address] [place] [address]    Telephone: [phone]   Fax: [phone]   Hours: Open 24 hours                         E-Prescribed (1 of 1)              nystatin (MYCOSTATIN) cream    Sig: Apply topically 2 (two) times daily.       Start: 5/31/24     Quantity: 30 g Refills: 0                           Ohs Peq Odvv Intake    5/31/2024  5:48 PM CDT - Filed by Patient   What is your current physical address in the event of a medical emergency?    Are you able to take your vital signs? No   Please attach any relevant images or files          30 yo female with c/o rash under left breast. She has tried powder nystatin and not improving. She states itchy, no discharge. She denies skin breakdown or lesions.         Constitution: Negative.   HENT: Negative.      Cardiovascular: Negative.    Respiratory: Negative.     Gastrointestinal: Negative.    Endocrine: negative.   Genitourinary: Negative.  Negative for frequency and urgency.   Musculoskeletal: Negative.    Skin:  Positive for rash.   Allergic/Immunologic: Negative.    Neurological: Negative.    Hematologic/Lymphatic: Negative.    Psychiatric/Behavioral: Negative.          Objective:   The physical exam was conducted virtually.  LOCATION OF PATIENT home  Physical Exam   Constitutional: She is oriented to person, place, and time. She appears well-developed.   HENT:   Head: Normocephalic and atraumatic.   Ears:   Right Ear: Hearing, tympanic membrane and external ear normal.   Left Ear: Hearing, tympanic membrane and external ear normal.   Nose: Nose normal.   Mouth/Throat: Uvula is midline, oropharynx is clear and moist and mucous membranes are normal.   Eyes: Conjunctivae and EOM are normal. Pupils are equal, round, and reactive to light.   Neck: Neck supple.   Cardiovascular: Normal rate.   Pulmonary/Chest: Effort normal and breath sounds normal.   Musculoskeletal: Normal range of motion.         General: Normal range of motion.   Neurological: She is alert and oriented to person, place, and time.   Skin: Skin is warm and rash.        Psychiatric: Her behavior is normal. Thought content normal.   Nursing note and vitals reviewed.      Assessment:     1. Skin yeast infection        Plan:   Follow up with your primary care provider if symptoms persist.  Go to the Emergency room for worsening of symptoms.       Skin yeast infection  -     nystatin (MYCOSTATIN) cream; Apply topically 2 (two) times daily.  Dispense: 30 g; Refill: 0

## 2024-06-06 ENCOUNTER — OFFICE VISIT (OUTPATIENT)
Dept: FAMILY MEDICINE | Facility: CLINIC | Age: 29
End: 2024-06-06
Payer: MEDICAID

## 2024-06-06 ENCOUNTER — PATIENT MESSAGE (OUTPATIENT)
Dept: FAMILY MEDICINE | Facility: CLINIC | Age: 29
End: 2024-06-06

## 2024-06-06 VITALS
OXYGEN SATURATION: 98 % | SYSTOLIC BLOOD PRESSURE: 116 MMHG | DIASTOLIC BLOOD PRESSURE: 78 MMHG | HEIGHT: 68 IN | HEART RATE: 85 BPM | BODY MASS INDEX: 44.41 KG/M2 | WEIGHT: 293 LBS

## 2024-06-06 DIAGNOSIS — B37.2 CANDIDAL INTERTRIGO: ICD-10-CM

## 2024-06-06 DIAGNOSIS — F43.10 POSTTRAUMATIC STRESS DISORDER: ICD-10-CM

## 2024-06-06 DIAGNOSIS — I15.2 HYPERTENSION ASSOCIATED WITH DIABETES: ICD-10-CM

## 2024-06-06 DIAGNOSIS — L81.9 HYPERPIGMENTATION: ICD-10-CM

## 2024-06-06 DIAGNOSIS — E66.01 CLASS 3 SEVERE OBESITY DUE TO EXCESS CALORIES WITH SERIOUS COMORBIDITY AND BODY MASS INDEX (BMI) OF 45.0 TO 49.9 IN ADULT: ICD-10-CM

## 2024-06-06 DIAGNOSIS — F31.9 BIPOLAR 1 DISORDER: ICD-10-CM

## 2024-06-06 DIAGNOSIS — E11.59 HYPERTENSION ASSOCIATED WITH DIABETES: ICD-10-CM

## 2024-06-06 DIAGNOSIS — L83 ACANTHOSIS NIGRICANS: Primary | ICD-10-CM

## 2024-06-06 PROCEDURE — 3074F SYST BP LT 130 MM HG: CPT | Mod: CPTII,,, | Performed by: FAMILY MEDICINE

## 2024-06-06 PROCEDURE — 99214 OFFICE O/P EST MOD 30 MIN: CPT | Mod: S$PBB,,, | Performed by: FAMILY MEDICINE

## 2024-06-06 PROCEDURE — 99999 PR PBB SHADOW E&M-EST. PATIENT-LVL IV: CPT | Mod: PBBFAC,,, | Performed by: FAMILY MEDICINE

## 2024-06-06 PROCEDURE — 99214 OFFICE O/P EST MOD 30 MIN: CPT | Mod: PBBFAC,PO | Performed by: FAMILY MEDICINE

## 2024-06-06 PROCEDURE — 3078F DIAST BP <80 MM HG: CPT | Mod: CPTII,,, | Performed by: FAMILY MEDICINE

## 2024-06-06 PROCEDURE — 4010F ACE/ARB THERAPY RXD/TAKEN: CPT | Mod: CPTII,,, | Performed by: FAMILY MEDICINE

## 2024-06-06 PROCEDURE — 3008F BODY MASS INDEX DOCD: CPT | Mod: CPTII,,, | Performed by: FAMILY MEDICINE

## 2024-06-06 PROCEDURE — 3044F HG A1C LEVEL LT 7.0%: CPT | Mod: CPTII,,, | Performed by: FAMILY MEDICINE

## 2024-06-06 RX ORDER — CLOTRIMAZOLE AND BETAMETHASONE DIPROPIONATE 10; .64 MG/G; MG/G
CREAM TOPICAL 2 TIMES DAILY
Qty: 45 G | Refills: 0 | Status: SHIPPED | OUTPATIENT
Start: 2024-06-06 | End: 2024-06-20

## 2024-06-06 RX ORDER — HYDROQUINONE 40 MG/G
CREAM TOPICAL 2 TIMES DAILY
Qty: 28.35 G | Refills: 5 | Status: SHIPPED | OUTPATIENT
Start: 2024-06-06

## 2024-06-06 NOTE — PROGRESS NOTES
"Subjective:         Patient ID: Misael Garcia is a 29 y.o. female.    Chief Complaint: Diabetes and Abdominal Pain    Patient Active Problem List   Diagnosis    Corns and callus    Class 3 severe obesity due to excess calories with serious comorbidity and body mass index (BMI) of 45.0 to 49.9 in adult    Bipolar 1 disorder    Recently quit using tobacco    Primary hypertension    Oral contraceptive pill surveillance    SID on CPAP    Mild persistent asthma without complication    Cervical spondylosis without myelopathy    Lumbosacral spondylosis without myelopathy    Posttraumatic stress disorder    Thoracic facet syndrome    Heart failure with mildly reduced ejection fraction (HFmrEF)    Palpitations    Marijuana use    Cholecystitis without cholelithiasis    Cyst of right ovary    Gastroesophageal reflux disease    Upper abdominal pain    Chronic constipation      Diabetes    Abdominal Pain      Misael is a 29 y.o. female who presents today to discuss management plan for anxiety.   Was on GLP1a for weight loss, did not tolerate side effects. Nausea persists after discontinuation. Gastric emptying study normal. Has had GI consultations.   S/p EGD with outside GI provider. Unremarkable EGD.     Sees psychiatry for Bipolar 1 disorder.    She has concerns about dark hyperpigmented rash under her breasts.     Review of Systems   Gastrointestinal:  Positive for abdominal pain.   All other systems reviewed and are negative.       Objective:     Vitals:    06/06/24 1303   BP: 116/78   BP Location: Left arm   Patient Position: Sitting   Pulse: 85   SpO2: 98%   Weight: 135.9 kg (299 lb 9.7 oz)   Height: 5' 8" (1.727 m)         Physical Exam  Vitals and nursing note reviewed.   Constitutional:       General: She is not in acute distress.     Appearance: Normal appearance. She is obese. She is not ill-appearing, toxic-appearing or diaphoretic.   HENT:      Head: Normocephalic and atraumatic.   Eyes:      General: " Hepatology Clinic note  Niesha Adhikari   Date of Birth 1949  Date of Service 9/11/2023         Assessment/plan:   Niesha Adhikari is a 74 year old female with history of Hepatitis C who achieved SVR in 2017 and positive Hep B core antibody with low surface antibody titer who has been maintained on Hep B antiviral for reactivation prophylaxis. She has been compliant with medication. FibroScan in 2022 showing Stage 0-1, 4.1 kilopascals.     # Positive Hep B core antibody, low surface antibody titer with golimumab:  - Switch to tenofovir alafenamide 25 mg (from tenofovir disoproxil) daily given history of osteoporosis and recent fracture.   - BMP, Hepatic panel, CBC, INR in six months    # Will be due for colonoscopy in 2024    # Follow-up in clinic in in one year     Tristan Chua PA-C   [facility] Hepatology clinic  -----------------------------------------------------       HPI:   Niesha Adhikari is a 74 year old female  who presents to clinic today for the following health issues:     Hep B core antibody, low surface antibody   - Risk factors for reactivation: possible Humira start  Treatment: Tenfovir DF   FibroScan in 7/2022 showing Stage 0-1, 4.1 kilopascals      History of Hep C, achieved SVR in 2017  Genotype 1  Liver biopsy: 2012, Stage 1   - Telapravir/PEG interferon + Ribavirin stopped due to skin reaction  - Tx: Harvoni x 12 weeks, achieved SVR    Patient was last seen by me on 4/27/2022. In May 2023, she suffered a fall to her right lateral chest and right upper quadrant abdomen.  She was found to have 8 right-sided rib fractures noted, without any internal injuries.     Appetite is good. Weight is stable. She continues to take tenofovir DF daily without any issues.     Chronic constipation, taking senna and MiraLax and psyillium .     Patient denies jaundice, lower extremity edema, abdominal distension or confusion.  Patient also denies melena, hematochezia or hematemesis.  No scleral icterus.     Conjunctiva/sclera: Conjunctivae normal.   Cardiovascular:      Rate and Rhythm: Normal rate.   Pulmonary:      Effort: Pulmonary effort is normal. No respiratory distress.   Skin:     Coloration: Skin is not pale.   Neurological:      Mental Status: She is alert. Mental status is at baseline.   Psychiatric:         Attention and Perception: Attention and perception normal.         Mood and Affect: Mood and affect normal.         Speech: Speech normal.         Behavior: Behavior normal.         Cognition and Memory: Cognition and memory normal.         Judgment: Judgment normal.       Assessment:       1. Acanthosis nigricans    2. Candidal intertrigo    3. Hyperpigmentation    4. Hypertension associated with diabetes    5. Bipolar 1 disorder    6. Posttraumatic stress disorder    7. Class 3 severe obesity due to excess calories with serious comorbidity and body mass index (BMI) of 45.0 to 49.9 in adult          Plan:   Recent relevant labs results reviewed with patient.         Acanthosis nigricans  Candidal intertrigo  -     clotrimazole-betamethasone 1-0.05% (LOTRISONE) cream; Apply topically 2 (two) times daily. for 14 days  Dispense: 45 g; Refill: 0  Hyperpigmentation  -     hydroquinone 4 % Crea; Apply topically 2 (two) times daily.  Dispense: 28.35 g; Refill: 5    Discussed chronic nature of acanthosis nigricans and difficulty in treating appearance. Discussed Metformin for insulin resistance. Other approaches without GLP1a to assist in weight loss.      Hypertension associated with diabetes  - Chronic health condition is stable and controlled. Continue current medication regimen and relevant lifestyle modifications. Necessary medication refills addressed. Routine ongoing surveillance monitoring.   -     MYC E-Visit     Bipolar 1 disorder  Posttraumatic stress disorder  - Per psychiatry    Class 3 severe obesity due to excess calories with serious comorbidity and body mass index (BMI)  Patient denies weight loss, fevers, sweats or chills.    Colonoscopy in 2019, recall 3-5 years. Does not drink alcohol.     Hepatitis B:   Lab Results   Component Value Date    HEPBANG Nonreactive 06/07/2017    HBCAB (A) 06/07/2017     Reactive   A reactive result indicates acute, chronic or past/resolved hepatitis B   infection.      AUSAB 0.65 06/07/2017    HCVAB (A) 06/07/2017     Reactive   A reactive result indicates one of the following 1) current HCV infection 2)   past HCV infection that has resolved or 3) false positivity. The CDC recommends   that a reactive result should be followed by Nucleic acid testing for HCV RNA.  If HCV RNA is detected, that indicates current HCV infection. If HCV RNA is not   detected, that indicates either past, resolved HCV infection, or false HCV   antibody positivity.   Assay performance characteristics have not been established for newborns,   infants, and children       Lab Results   Component Value Date    HBQRES Not Detected 09/06/2023    HBQRES Not Detected 05/16/2022    HBQRES  06/30/2017     HBV DNA Not Detected   The LUIS ARMANDO AmpliPrep/LUIS ARMANDO TaqMan HBV Test is an FDA-approved in vitro nucleic   acid amplification test for the quantitation of HBV DNA in human plasma (EDTA   plasma) or serum using the LUIS ARMANDO AmpliPrep Instrument for automated viral   nucleic acid extraction and the LUIS ARMANDO TaqMan Analyzer or LUIS ARMANDO TaqMan for the   automated Real Time PCR amplification and detection of viral nucleic acid   target.   Titer results are reported in International Units/mL (IU/mL) using the 1st WHO   International standard for HBV for Nucleic Acid Amplification based assays.       No results found for: HBEABY, HBEAGN    Recent Labs   Lab Test 09/06/23  0832 01/12/23  0818 05/16/22  1044 02/14/22  1121 09/10/21  0933 06/07/21  0811 01/06/21  1303 09/04/20  0756 02/17/20  0810 01/20/20  0741   ALKPHOS 78 80 110 107 86 79 82 78 82 75   ALT 14 21 22 23 25 20 22 26 25 23   AST 27 21 22  27 25 22 22 25 27 25          Medical hx Surgical hx   Past Medical History:   Diagnosis Date    ABUSE BY SPOUSE/PARTNER 07/27/2005    Degeneration of lumbar or lumbosacral intervertebral disc     Depressive disorder     Esophageal reflux 1/28/2005    HELICOBACTER PYLORI INFECTION 01/28/2005    Hepatitis C - Cured. Achieved SVR in 2017     History of blood transfusion     Hypertension     Malignant neoplasm (H)     Osteoporosis     Other and unspecified alcohol dependence, unspecified drinking behavior     Other malaise and fatigue     Past Surgical History:   Procedure Laterality Date    BIOPSY ANAL CANAL  1/21/13    [facility]     BREAST BIOPSY, RT/LT Left 1975    Breat Biopsy RT/LT    COLONOSCOPY  8/25/2009    COLONOSCOPY  2/14/2011    COLONOSCOPY performed by CRISTIN LAGUNAS at  GI    COLONOSCOPY N/A 1/8/2019    Procedure: Colonscopy, Biopsies by Biopsy;  Surgeon: Omega Talavera MD;  Location:  GI    CYSTOSCOPY  2/28/2011    CYSTOSCOPY performed by CAYLA FLOR at  OR    ENDOSCOPY  05/21/12    [facility]    ENT SURGERY  1965    GI SURGERY  06/25/12     UGI ENDOSCOPY DIAG W BIOPSY  10/01/09    HEMORRHOIDECTOMY  06/25/12    [facility]    LAPAROSCOPIC SALPINGO-OOPHORECTOMY  2/28/2011    LAPAROSCOPIC SALPINGO-OOPHORECTOMY performed by CAYLA FLOR at  OR    TONSILLECTOMY & ADENOIDECTOMY  1965    [facility] NONSPECIFIC PROCEDURE  1965    Removed bone left index finger knuckle, casts broken bones    [facility] COLONOSCOPY W/WO BRUSH/WASH  08/22/05    [facility] UGI ENDOSCOPY, SIMPLE EXAM  08/08/07                 Physical Exam:   BP (!) 144/81   Pulse 65   Temp 98.3  F (36.8  C) (Oral)   Wt 64.1 kg (141 lb 4.8 oz)   LMP 11/27/2003   SpO2 100%   BMI 25.03 kg/m      Gen- well, NAD, A+Ox3, normal color  Lym- no palpable LAD  CVS- RRR  RS- CTA  Abd- soft, nontender  Extr- hands normal, no CARROLL  Skin- no rash or jaundice  Neuro- no asterixis  Psych- normal mood          of 45.0 to 49.9 in adult  -     Ambulatory Referral/Consult to Lifestyle Nutrition; Future; Expected date: 06/13/2024  -     Ambulatory referral/consult to Nutrition Services; Future; Expected date: 06/13/2024  Poor candidate for GLP1a. Very intolerable of side effects.   Triggers anxiety.     Could benefit from nutrition education. Concept of food at this time not conducive to effective changes. Large self soothing component to purpose of eating as well.    Patient's questions answered. Plan reviewed with patient at the end of visit. Relevant precautions to chief complaint and reasons to seek further medical care or to contact the office sooner reviewed with patient.     Follow up in about 2 months (around 8/6/2024) for Weight Follow-up, - already scheduled.           Data:   Reviewed in person and significant for:    Lab Results   Component Value Date     09/06/2023     02/16/2021      Lab Results   Component Value Date    POTASSIUM 4.7 09/06/2023    POTASSIUM 4.3 05/16/2022    POTASSIUM 4.7 02/16/2021     Lab Results   Component Value Date    CHLORIDE 100 09/06/2023    CHLORIDE 109 05/16/2022    CHLORIDE 100 02/16/2021     Lab Results   Component Value Date    CO2 28 09/06/2023    CO2 29 05/16/2022    CO2 31 02/16/2021     Lab Results   Component Value Date    BUN 10.4 09/06/2023    BUN 13 05/16/2022    BUN 10 02/16/2021     Lab Results   Component Value Date    CR 0.80 09/06/2023    CR 0.76 06/07/2021       Lab Results   Component Value Date    WBC 3.1 09/06/2023    WBC 3.0 06/07/2021     Lab Results   Component Value Date    HGB 10.9 09/06/2023    HGB 13.1 06/07/2021     Lab Results   Component Value Date    HCT 35.5 09/06/2023    HCT 40.0 06/07/2021     Lab Results   Component Value Date    MCV 82 09/06/2023    MCV 89 06/07/2021     Lab Results   Component Value Date     09/06/2023     06/07/2021       Lab Results   Component Value Date    AST 27 09/06/2023    AST 22 06/07/2021     Lab Results   Component Value Date    ALT 14 09/06/2023    ALT 20 06/07/2021     Lab Results   Component Value Date    BILICONJ 0.0 03/14/2012      Lab Results   Component Value Date    BILITOTAL 0.3 09/06/2023    BILITOTAL 0.5 06/07/2021       Lab Results   Component Value Date    ALBUMIN 4.2 09/06/2023    ALBUMIN 3.6 01/12/2023    ALBUMIN 3.8 06/07/2021     Lab Results   Component Value Date    PROTTOTAL 6.5 09/06/2023    PROTTOTAL 6.7 06/07/2021      Lab Results   Component Value Date    ALKPHOS 78 09/06/2023    ALKPHOS 79 06/07/2021       Lab Results   Component Value Date    INR 1.20 09/06/2023    INR 1.02 08/19/2013

## 2024-06-07 ENCOUNTER — OFFICE VISIT (OUTPATIENT)
Dept: URGENT CARE | Facility: CLINIC | Age: 29
End: 2024-06-07
Payer: MEDICAID

## 2024-06-07 VITALS
HEART RATE: 79 BPM | BODY MASS INDEX: 44.41 KG/M2 | OXYGEN SATURATION: 98 % | HEIGHT: 68 IN | SYSTOLIC BLOOD PRESSURE: 135 MMHG | WEIGHT: 293 LBS | DIASTOLIC BLOOD PRESSURE: 92 MMHG | TEMPERATURE: 99 F | RESPIRATION RATE: 17 BRPM

## 2024-06-07 DIAGNOSIS — Z20.2 EXPOSURE TO STD: Primary | ICD-10-CM

## 2024-06-07 DIAGNOSIS — J02.9 SORE THROAT: ICD-10-CM

## 2024-06-07 LAB
CTP QC/QA: YES
MOLECULAR STREP A: NEGATIVE

## 2024-06-07 PROCEDURE — 87491 CHLMYD TRACH DNA AMP PROBE: CPT

## 2024-06-07 PROCEDURE — 87591 N.GONORRHOEAE DNA AMP PROB: CPT | Mod: 59

## 2024-06-07 PROCEDURE — 87591 N.GONORRHOEAE DNA AMP PROB: CPT

## 2024-06-07 PROCEDURE — 99213 OFFICE O/P EST LOW 20 MIN: CPT | Mod: S$GLB,,,

## 2024-06-07 PROCEDURE — 87651 STREP A DNA AMP PROBE: CPT | Mod: QW,S$GLB,,

## 2024-06-07 NOTE — PATIENT INSTRUCTIONS
- Rest.    - Drink plenty of fluids.    - Acetaminophen (tylenol) or Ibuprofen (advil,motrin) as directed as needed for fever/pain. Avoid tylenol if you have a history of liver disease. Do not take ibuprofen if you have a history of GI bleeding, kidney disease, or if you take blood thinners.   - Patient will be called with results. Please refrain from sexual intercourse and oral sex until results are finalized.   - Follow up with your PCP or specialty clinic as directed in the next 1-2 weeks if not improved or as needed.  You can call [phone] to schedule an appointment with the appropriate provider.    - Go to the ER or seek medical attention immediately if you develop new or worsening symptoms.     - You must understand that you have received an Urgent Care treatment only and that you may be released before all of your medical problems are known or treated.   - You, the patient, will arrange for follow up care as instructed.   - If your condition worsens or fails to improve we recommend that you receive another evaluation at the ER immediately or contact your PCP to discuss your concerns or return here.

## 2024-06-07 NOTE — PROGRESS NOTES
"Subjective:      Patient ID: Misael Garcia is a 29 y.o. female.    Vitals:  height is 5' 8" (1.727 m) and weight is 135.6 kg (299 lb). Her oral temperature is 98.6 °F (37 °C). Her blood pressure is 135/92 (abnormal) and her pulse is 79. Her respiration is 17 and oxygen saturation is 98%.     Chief Complaint: Mouth Lesions    This is a 29 y.o. female who presents today with a chief complaint of mouth sores, sore throat, cough and swollen sensation in her throat. Symptoms started yesterday and patient states she had oral sex the day before. Denies any new partners. She is concerned for possible STD exposure. Denies any other URI sx. Denies numbness or tingling. Denies radiation of pain. Denies fever, chills, body aches, chest pain, shortness of breath, abdominal pain, nausea, vomiting, diarrhea, or rashes.       Mouth Lesions   The current episode started 2 days ago. The onset was sudden. The problem is mild. Nothing relieves the symptoms. Nothing aggravates the symptoms. Associated symptoms include mouth sores, sore throat and swollen glands. Pertinent negatives include no fever, no decreased vision, no double vision, no eye itching, no photophobia, no abdominal pain, no constipation, no diarrhea, no nausea, no vomiting, no congestion, no ear discharge, no ear pain, no headaches, no hearing loss, no neck pain, no cough, no wheezing, no rash, no eye discharge, no eye pain and no eye redness.       Constitution: Negative for activity change, appetite change, chills, sweating, fatigue, fever, generalized weakness and international travel in last 60 days.   HENT:  Positive for mouth sores and sore throat. Negative for ear pain, ear discharge, tinnitus, hearing loss, congestion, nosebleeds, foreign body in nose, postnasal drip, sinus pain, sinus pressure, trouble swallowing and voice change.    Neck: Negative for neck pain, neck stiffness and neck swelling.   Cardiovascular:  Negative for chest pain, leg " swelling, palpitations and sob on exertion.   Eyes:  Negative for eye discharge, eye itching, eye pain, eye redness, photophobia, vision loss, double vision and blurred vision.   Respiratory:  Negative for chest tightness, cough, sputum production, shortness of breath, wheezing and asthma.    Gastrointestinal:  Negative for abdominal pain, nausea, vomiting, constipation, diarrhea, bright red blood in stool and heartburn.   Endocrine: cold intolerance and heat intolerance.   Genitourinary:  Negative for dysuria, frequency, urgency, urine decreased, flank pain and hematuria.   Musculoskeletal:  Negative for pain, joint pain, joint swelling, back pain and muscle ache.   Skin:  Negative for rash, erythema, bruising and hives.   Allergic/Immunologic: Negative for environmental allergies, seasonal allergies, food allergies, eczema, asthma, hives, itching and sneezing.   Neurological:  Negative for dizziness, light-headedness, headaches, disorientation and altered mental status.   Psychiatric/Behavioral:  Negative for altered mental status, disorientation, confusion, agitation and nervous/anxious. The patient is not nervous/anxious.       Objective:     Physical Exam   Constitutional: She is oriented to person, place, and time. She appears well-developed. She is cooperative.  Non-toxic appearance. She does not appear ill. No distress. normal  HENT:   Head: Normocephalic and atraumatic.   Ears:   Right Ear: Hearing and external ear normal.   Left Ear: Hearing and external ear normal.   Nose: Nose normal. No mucosal edema, rhinorrhea, purulent discharge or nasal deformity. No epistaxis. Right sinus exhibits no maxillary sinus tenderness and no frontal sinus tenderness. Left sinus exhibits no maxillary sinus tenderness and no frontal sinus tenderness.   Mouth/Throat: Uvula is midline, oropharynx is clear and moist and mucous membranes are normal. No trismus in the jaw. Normal dentition. No uvula swelling. No oropharyngeal  exudate, posterior oropharyngeal edema, posterior oropharyngeal erythema, tonsillar abscesses or cobblestoning. Tonsils are 1+ on the right. Tonsils are 1+ on the left. Tonsillar exudate.   Eyes: Conjunctivae and lids are normal. No scleral icterus. Extraocular movement intact   Neck: Trachea normal and phonation normal. Neck supple. No edema present. No erythema present. No neck rigidity present.   Cardiovascular: Normal rate, regular rhythm, normal heart sounds and normal pulses.   Pulmonary/Chest: Effort normal and breath sounds normal. No stridor. No respiratory distress. She has no decreased breath sounds. She has no wheezes. She has no rhonchi. She has no rales.   Abdominal: Normal appearance.   Musculoskeletal: Normal range of motion.         General: No deformity. Normal range of motion.   Lymphadenopathy:     She has cervical adenopathy.   Neurological: She is alert and oriented to person, place, and time. She exhibits normal muscle tone. Coordination normal.   Skin: Skin is warm, dry, intact, not diaphoretic and not pale. No erythema   Psychiatric: Her speech is normal and behavior is normal. Judgment and thought content normal.   Nursing note and vitals reviewed.      Assessment:     1. Exposure to STD    2. Sore throat      Results for orders placed or performed in visit on 06/07/24   POCT Strep A, Molecular   Result Value Ref Range    Molecular Strep A, POC Negative Negative     Acceptable Yes      *Note: Due to a large number of results and/or encounters for the requested time period, some results have not been displayed. A complete set of results can be found in Results Review.         Plan:       Exposure to STD  -     C. trachomatis/N. gonorrhoeae by AMP DNA  -     C.TRACH/N.GONOR AMP RNA (Ocular/Fluid)    Sore throat  -     POCT Strep A, Molecular      Since tonsillar exudate was very minimal, I explained to patient that we would rule out bacterial etiology as well as std etiology  before finalizing a diagnosis of viral tonsillitis or viral uri. Patient will be notified of results, if any medication changes need to be made or adjusted, patient will be notified. We will hold off on STD treatment until results finalized. Until then, refrain from both oral sex and regular intercourse for the time being.  We had shared decision making for patient's treatment. We discussed side effects/alternatives/benefits/risk and patient would like to proceed with treatment plan. We also discussed other OTC treatment recommendations. Patient was counseled, explained with the test results meaning, expected course, and answered all of questions. Patient can take OTC Acetaminophen (Tylenol) and/or Ibuprofen (Motrin) as needed for pain relief and/or fever relief. Continue to drink plenty of fluids. Follow up with PCP in the next 1-2 weeks as needed.  Gave patient strict ER/urgent care precautions in case symptoms worsen or if any new concerns arise.

## 2024-06-08 ENCOUNTER — HOSPITAL ENCOUNTER (EMERGENCY)
Facility: HOSPITAL | Age: 29
Discharge: HOME | End: 2024-06-08
Attending: EMERGENCY MEDICINE
Payer: MEDICAID

## 2024-06-08 VITALS
HEIGHT: 67 IN | DIASTOLIC BLOOD PRESSURE: 81 MMHG | HEART RATE: 66 BPM | TEMPERATURE: 99 F | BODY MASS INDEX: 45.99 KG/M2 | OXYGEN SATURATION: 99 % | SYSTOLIC BLOOD PRESSURE: 118 MMHG | WEIGHT: 293 LBS | RESPIRATION RATE: 16 BRPM

## 2024-06-08 DIAGNOSIS — R05.9 COUGH: Primary | ICD-10-CM

## 2024-06-08 DIAGNOSIS — J02.9 PHARYNGITIS, UNSPECIFIED ETIOLOGY: ICD-10-CM

## 2024-06-08 LAB
B-HCG UR QL: NEGATIVE
CTP QC/QA: YES
OHS QRS DURATION: 80 MS
OHS QTC CALCULATION: 407 MS

## 2024-06-08 PROCEDURE — 93010 ELECTROCARDIOGRAM REPORT: CPT | Mod: ,,, | Performed by: INTERNAL MEDICINE

## 2024-06-08 PROCEDURE — 25000003 PHARM REV CODE 250

## 2024-06-08 PROCEDURE — 93005 ELECTROCARDIOGRAM TRACING: CPT

## 2024-06-08 PROCEDURE — 81025 URINE PREGNANCY TEST: CPT

## 2024-06-08 PROCEDURE — 99285 EMERGENCY DEPT VISIT HI MDM: CPT | Mod: 25

## 2024-06-08 PROCEDURE — 99284 EMERGENCY DEPT VISIT MOD MDM: CPT | Mod: 25

## 2024-06-08 RX ORDER — LIDOCAINE HYDROCHLORIDE 20 MG/ML
5 SOLUTION OROPHARYNGEAL
Status: COMPLETED | OUTPATIENT
Start: 2024-06-08 | End: 2024-06-08

## 2024-06-08 RX ADMIN — LIDOCAINE HYDROCHLORIDE 5 ML: 20 SOLUTION ORAL; TOPICAL at 03:06

## 2024-06-08 NOTE — DISCHARGE INSTRUCTIONS
As discussed, your symptoms likely represent a viral illness. Unfortunately there are no specific medicines to make the illness end faster. Antibiotics do not work for viral illnesses because they do not kill viruses.    Imaging Results              X-Ray Chest PA And Lateral (Final result)  Result time 06/08/24 04:45:02      Final result by Frank Tran MD (06/08/24 04:45:02)                   Impression:      No acute cardiopulmonary process.      Electronically signed by: Frank Tran MD  Date:    06/08/2024  Time:    04:45               Narrative:    EXAMINATION:  XR CHEST PA AND LATERAL    CLINICAL HISTORY:  Cough, unspecified    TECHNIQUE:  PA and lateral views of the chest were performed.    COMPARISON:  05/17/2024.    FINDINGS:  There is no consolidation, effusion, or pneumothorax.    Cardiomediastinal silhouette is unremarkable.    Regional osseous structures are unremarkable.                                      Treatments you had today:   Medications   LIDOcaine viscous HCl 2% oral solution 5 mL (5 mLs Oral Given 6/8/24 0315)       Home Care Instructions:  - Take acetaminophen (Tylenol) or ibuprofen (Advil, Motrin) for with fevers, aches and pains   - Stay well hydrated and well rested  - Continue taking your home medications as prescribed    Other suggestions for symptomatic relief:              - Salt water gargles to soothe throat pain.              - Chloroseptic spray also helps to numb throat pain.              - Nasal saline spray reduces inflammation and dryness.              - Warm face compresses to help with facial sinus pain/pressure.              - Albino's vapor rub at night.              - Flonase OTC or Nasocort OTC for nasal congestion.              - Simple foods like bread, rice, soup.              - Delsym helps with coughing at night              - Zyrtec/Claritin during the day & Benadryl at night may help with allergies.    If you DO HAVE hypertension or palpitations, it is  safe to take Coricidin HBP for relief of sinus symptoms.    If you DO NOT HAVE hypertension or any history of palpitations, it is ok to take over the counter Sudafed or Mucinex D or Allegra-D or Claritin-D or Zyrtec-D.  If you do take one of the above, it is ok to combine that with plain over the counter Mucinex or Allegra or Claritin or Zyrtec.   If, for example, you are taking Zyrtec-D, you can combine that with Mucinex, but not Mucinex-D.  If you are taking Mucinex-D, you can combine that with plain Allegra or Claritin or Zyrtec.     Follow-up plan:  - Follow-up with: Primary care doctor within 3 - 5 days  - Follow-up for additional testing and/or evaluation as directed by your primary doctor    Return to the Emergency Department for symptoms including but not limited to: worsening symptoms, severe headache, shortness of breath or chest pain, worsening cough with thick yellow/green sputum, vomiting with inability to hold down fluids, fevers greater than 100.4°F, dizziness, passing out/fainting/unconsciousness, rash, symptoms persisting beyond 7 days, or other concerning symptoms.

## 2024-06-08 NOTE — ED PROVIDER NOTES
Source of History:  Patient and chart    Chief complaint:  Sore Throat (x3 days ) and Cough (Dry non-productive cough)      HPI:  Misael Garcia is a 29 y.o. female with a medical history as below presents to the emergency department with a chief complaint of sore throat.  Patient was performing oral sex on Wednesday and by Thursday she has a bad sore throat and cough.  She was concerned that she was exposed to an oral STD.  Patient was seen at urgent care yesterday in her throat was swabbed.  She was negative for strep.  However, her chlamydia and gonorrhea swabs are still pending.  She endorses continued sore throat and cough.  She has been afebrile and has no other concerns at this time.    Review of patient's allergies indicates:  No Known Allergies    No current facility-administered medications on file prior to encounter.     Current Outpatient Medications on File Prior to Encounter   Medication Sig Dispense Refill    clotrimazole-betamethasone 1-0.05% (LOTRISONE) cream Apply topically 2 (two) times daily. for 14 days 45 g 0    hydroquinone 4 % Crea Apply topically 2 (two) times daily. 28.35 g 5    lisinopriL (PRINIVIL,ZESTRIL) 20 MG tablet Take 1 tablet (20 mg total) by mouth once daily. (Patient not taking: Reported on 6/6/2024) 90 tablet 1    lurasidone (LATUDA) 40 mg Tab tablet Take 40 mg by mouth.      pantoprazole (PROTONIX) 40 MG tablet Take 1 tablet (40 mg total) by mouth 2 (two) times daily. 60 tablet 0    prucalopride 2 mg Tab Take 1 tablet by mouth once daily.         PMH:  As per HPI and below:  Past Medical History:   Diagnosis Date    ADHD (attention deficit hyperactivity disorder)     Anxiety     Asthma     Bipolar affective     Eczema     History of chlamydia 12/2016    Insomnia     Morbid obesity with BMI of 40.0-44.9, adult     Sleep disorder     Type 2 diabetes mellitus without complications      Past Surgical History:   Procedure Laterality Date    NO PAST SURGERIES      as of  1-13-17       Social History     Socioeconomic History    Marital status: Single   Occupational History    Occupation:     Tobacco Use    Smoking status: Former     Types: Vaping with nicotine     Passive exposure: Past    Smokeless tobacco: Never    Tobacco comments:     boyfriend smoke cigarettes     Former weed consumption   Substance and Sexual Activity    Alcohol use: No    Drug use: Not Currently     Types: Marijuana    Sexual activity: Not Currently     Partners: Male     Birth control/protection: None     Comment: Single:       Social Determinants of Health     Financial Resource Strain: Low Risk  (5/14/2024)    Received from [facility]    Overall Financial Resource Strain (CARDIA)     Difficulty of Paying Living Expenses: Not hard at all   Food Insecurity: No Food Insecurity (5/30/2024)    Received from [facility]    Hunger Vital Sign     Worried About Running Out of Food in the Last Year: Never true     Ran Out of Food in the Last Year: Never true   Transportation Needs: No Transportation Needs (5/30/2024)    Received from [facility]    PRAPARE - Transportation     Lack of Transportation (Medical): No     Lack of Transportation (Non-Medical): No   Physical Activity: Insufficiently Active (5/14/2024)    Received from [facility]    Exercise Vital Sign     Days of Exercise per Week: 4 days     Minutes of Exercise per Session: 20 min   Stress: Stress Concern Present (5/14/2024)    Received from [facility]    [employer] - Occupational Stress Questionnaire     Feeling of Stress : Very much   Housing Stability: Unknown (8/24/2023)    [facility]    Housing Stability Vital Sign     Unable to Pay for Housing in the Last Year: No     In the last 12 months, was there a time when you did not have a steady place to sleep or slept in a shelter (including now)?: No       Family History   Problem Relation Name Age of Onset    No Known  Problems Paternal Grandfather      Breast cancer Paternal Grandmother      Diabetes Maternal Grandmother      No Known Problems Maternal Grandfather      No Known Problems Father      No Known Problems Mother      No Known Problems Sister      Colon cancer Neg Hx      Ovarian cancer Neg Hx         Physical Exam:      Vitals:    06/08/24 0430   BP: 118/81   Pulse: 66   Resp: 16   Temp: 98.7 °F (37.1 °C)     Physical Exam  Gen:  Hemodynamically stable in no acute distress  Mental Status:  Alert and oriented x3 appropriate conversant    Skin: Warm, dry. No rashes seen.  Eyes: No conjunctival injection.  Pulm: CTAB. No increased work of breathing.  No significant tachypnea.  No conversational dyspnea.    CV: Regular rate.   Abd: Soft.  Not distended.  Nontender.   MSK: No deformities.    Neuro: Awake. Speech normal. No focal neuro deficit observed.    Some exudate apparent on the throat.  Uvula midline.  No sign of airway compromise.  No sign of tonsillar edema.    Procedures  Laboratory Studies:  Labs Reviewed   POCT URINE PREGNANCY       EKG (independently interpreted by me):  Sinus rhythm.  Normal axis.  Normal intervals.  T-wave inversions in lead V1    X-rays (independently interpreted by me):  No pneumothorax or consolidation apparent    Chart reviewed.  Patient was seen at urgent care yesterday.  She was provided a strep, gonorrhea,    Imaging Results              X-Ray Chest PA And Lateral (Final result)  Result time 06/08/24 04:45:02      Final result by Frank Tran MD (06/08/24 04:45:02)                   Impression:      No acute cardiopulmonary process.      Electronically signed by: Frank Tran MD  Date:    06/08/2024  Time:    04:45               Narrative:    EXAMINATION:  XR CHEST PA AND LATERAL    CLINICAL HISTORY:  Cough, unspecified    TECHNIQUE:  PA and lateral views of the chest were performed.    COMPARISON:  05/17/2024.    FINDINGS:  There is no consolidation, effusion, or  pneumothorax.    Cardiomediastinal silhouette is unremarkable.    Regional osseous structures are unremarkable.                                      Medications Given:  Medications   LIDOcaine viscous HCl 2% oral solution 5 mL (5 mLs Oral Given 6/8/24 0315)           MDM:    29 y.o. female with sore throat and cough    Differential includes but is not limited to viral URI, bacterial pharyngitis secondary to oral sex, allergic postnasal drip    Patient was throat pain dramatically reduced after lidocaine gargle.  Chest x-rays unremarkable.    I have discuss the options with the patient.  It was possible that she has a pharyngitis secondary to oral sex.  However, it was more likely to be a viral pharyngitis.  Patient was swabbed yesterday for strep which was negative with the appropriate controls.  Chlamydia and gonorrhea we will swabs are still pending.  Engaged in joint decision-making with the patient who elected not to treat for STD empirically.    I have discharged the patient with return precautions.      Medical Decision Making  Amount and/or Complexity of Data Reviewed  Labs: ordered.  Radiology: ordered.    Risk  Prescription drug management.         Diagnostic Impression:    1. Cough    2. Pharyngitis, unspecified etiology         ED Disposition Condition    Discharge Stable          ED Prescriptions    None       Follow-up Information       Follow up With Specialties Details Why Contact Info    Katelyn Chapman MD Family Medicine Schedule an appointment as soon as possible for a visit   [id number] [facility] [address] [phone]              Patient and/or family understands the plan and is in agreement, verbalized understanding, questions answered    V Michele Manrique MD  Resident  Emergency Medicine         Js Manrique MD  Resident  06/08/24 0603    Attending Note:  I have seen the patient, have repeated the key portions of the history and physical, reviewed and agree with the medical documentation,  and supervised and managed the medical care of the patient. Additionally, I was present for the critical portion of any procedure(s) performed.         Bettye Angel MD  06/08/24 4936

## 2024-06-08 NOTE — ED TRIAGE NOTES
Misael Garcia, a 29 y.o. female presents to the ED w/ complaint of sore throat and cough x3days     Triage note:  Chief Complaint   Patient presents with    Sore Throat     x3 days     Cough     Dry non-productive cough     Review of patient's allergies indicates:  No Known Allergies  Past Medical History:   Diagnosis Date    ADHD (attention deficit hyperactivity disorder)     Anxiety     Asthma     Bipolar affective     Eczema     History of chlamydia 12/2016    Insomnia     Morbid obesity with BMI of 40.0-44.9, adult     Sleep disorder     Type 2 diabetes mellitus without complications

## 2024-06-09 ENCOUNTER — PATIENT MESSAGE (OUTPATIENT)
Dept: OBSTETRICS AND GYNECOLOGY | Facility: CLINIC | Age: 29
End: 2024-06-09
Payer: MEDICAID

## 2024-06-10 ENCOUNTER — TELEPHONE (OUTPATIENT)
Dept: FAMILY MEDICINE | Facility: CLINIC | Age: 29
End: 2024-06-10
Payer: MEDICAID

## 2024-06-10 ENCOUNTER — OFFICE VISIT (OUTPATIENT)
Dept: FAMILY MEDICINE | Facility: CLINIC | Age: 29
End: 2024-06-10
Payer: MEDICAID

## 2024-06-10 ENCOUNTER — TELEPHONE (OUTPATIENT)
Dept: OBSTETRICS AND GYNECOLOGY | Facility: CLINIC | Age: 29
End: 2024-06-10
Payer: MEDICAID

## 2024-06-10 ENCOUNTER — PATIENT MESSAGE (OUTPATIENT)
Dept: FAMILY MEDICINE | Facility: CLINIC | Age: 29
End: 2024-06-10

## 2024-06-10 VITALS
OXYGEN SATURATION: 97 % | HEART RATE: 92 BPM | WEIGHT: 293 LBS | TEMPERATURE: 98 F | HEIGHT: 67 IN | DIASTOLIC BLOOD PRESSURE: 86 MMHG | SYSTOLIC BLOOD PRESSURE: 128 MMHG | BODY MASS INDEX: 45.99 KG/M2

## 2024-06-10 DIAGNOSIS — Z11.3 SCREEN FOR STD (SEXUALLY TRANSMITTED DISEASE): ICD-10-CM

## 2024-06-10 DIAGNOSIS — J03.90 TONSILLITIS WITH EXUDATE: Primary | ICD-10-CM

## 2024-06-10 LAB
C TRACH DNA SPEC QL NAA+PROBE: NOT DETECTED
C TRACH RRNA SPEC QL NAA+PROBE: NORMAL
C.TRACH RNA SOURCE: NORMAL
CTP QC/QA: YES
CTP QC/QA: YES
N GONORRHOEA DNA SPEC QL NAA+PROBE: NOT DETECTED
N.GONORROHEAE, AMP RNA SOURCE: NORMAL
N.GONORROHEAE, MISC. AMP RNA: NORMAL
POC MOLECULAR INFLUENZA A AGN: NEGATIVE
POC MOLECULAR INFLUENZA B AGN: NEGATIVE
SARS-COV-2 RDRP RESP QL NAA+PROBE: NEGATIVE

## 2024-06-10 PROCEDURE — 4010F ACE/ARB THERAPY RXD/TAKEN: CPT | Mod: CPTII,,, | Performed by: STUDENT IN AN ORGANIZED HEALTH CARE EDUCATION/TRAINING PROGRAM

## 2024-06-10 PROCEDURE — 99999PBSHW: Mod: PBBFAC,,,

## 2024-06-10 PROCEDURE — 3074F SYST BP LT 130 MM HG: CPT | Mod: CPTII,,, | Performed by: STUDENT IN AN ORGANIZED HEALTH CARE EDUCATION/TRAINING PROGRAM

## 2024-06-10 PROCEDURE — 1160F RVW MEDS BY RX/DR IN RCRD: CPT | Mod: CPTII,,, | Performed by: STUDENT IN AN ORGANIZED HEALTH CARE EDUCATION/TRAINING PROGRAM

## 2024-06-10 PROCEDURE — 1159F MED LIST DOCD IN RCRD: CPT | Mod: CPTII,,, | Performed by: STUDENT IN AN ORGANIZED HEALTH CARE EDUCATION/TRAINING PROGRAM

## 2024-06-10 PROCEDURE — 99999 PR PBB SHADOW E&M-EST. PATIENT-LVL III: CPT | Mod: PBBFAC,,, | Performed by: STUDENT IN AN ORGANIZED HEALTH CARE EDUCATION/TRAINING PROGRAM

## 2024-06-10 PROCEDURE — 99213 OFFICE O/P EST LOW 20 MIN: CPT | Mod: PBBFAC,PO | Performed by: STUDENT IN AN ORGANIZED HEALTH CARE EDUCATION/TRAINING PROGRAM

## 2024-06-10 PROCEDURE — 99214 OFFICE O/P EST MOD 30 MIN: CPT | Mod: S$PBB,,, | Performed by: STUDENT IN AN ORGANIZED HEALTH CARE EDUCATION/TRAINING PROGRAM

## 2024-06-10 PROCEDURE — 3079F DIAST BP 80-89 MM HG: CPT | Mod: CPTII,,, | Performed by: STUDENT IN AN ORGANIZED HEALTH CARE EDUCATION/TRAINING PROGRAM

## 2024-06-10 PROCEDURE — 87591 N.GONORRHOEAE DNA AMP PROB: CPT | Performed by: STUDENT IN AN ORGANIZED HEALTH CARE EDUCATION/TRAINING PROGRAM

## 2024-06-10 PROCEDURE — 87635 SARS-COV-2 COVID-19 AMP PRB: CPT | Mod: PBBFAC,PO | Performed by: STUDENT IN AN ORGANIZED HEALTH CARE EDUCATION/TRAINING PROGRAM

## 2024-06-10 PROCEDURE — 87502 INFLUENZA DNA AMP PROBE: CPT | Mod: PBBFAC,PO | Performed by: STUDENT IN AN ORGANIZED HEALTH CARE EDUCATION/TRAINING PROGRAM

## 2024-06-10 PROCEDURE — 99999PBSHW POCT INFLUENZA A/B MOLECULAR: Mod: PBBFAC,,,

## 2024-06-10 PROCEDURE — 3008F BODY MASS INDEX DOCD: CPT | Mod: CPTII,,, | Performed by: STUDENT IN AN ORGANIZED HEALTH CARE EDUCATION/TRAINING PROGRAM

## 2024-06-10 PROCEDURE — 3044F HG A1C LEVEL LT 7.0%: CPT | Mod: CPTII,,, | Performed by: STUDENT IN AN ORGANIZED HEALTH CARE EDUCATION/TRAINING PROGRAM

## 2024-06-10 NOTE — TELEPHONE ENCOUNTER
----- Message from Luz Skinner sent at 6/10/2024  3:37 PM CDT -----      Name of Who is Calling: MALIK BROWNING [[id number]]      What is the request in detail: Medicaid Pt called to reschedule appt.Please contact to further discuss and advise.          Can the clinic reply by [facility]: Y      [facility]  [phone]

## 2024-06-10 NOTE — TELEPHONE ENCOUNTER
Patient states after oral sex last week developed a sore throat. Went to  for swab to R/O STD. States that they used the incorrect swab. Wants to make sure that we use the correct swab on her today. Informed we will use the correct swab. Verbalized understanding.

## 2024-06-10 NOTE — PROGRESS NOTES
Progress Note  [facility]    Subjective:     Misael Garcia is a 29 y.o. year old female who presents to clinic for sore throat.    Had oral sex Wednesday then started to have a sore throat and white spots on the R tonsil. No fevers. The throat is uncomfortable and has a runny nose with cough. Was swabbed for strep at  which was negative. Went to the ED the next day with unremarkable work-up. This is the same partner she has had for a while. Was last with him last night and had another oral intercourse encounter. The pt also noted she went to a different urgent care  outside of the [facility] system on Saturday, was given rocephin shot and started on doxycycline.     Patient Active Problem List    Diagnosis Date Noted    Cyst of right ovary 04/25/2024    Gastroesophageal reflux disease 04/25/2024    Upper abdominal pain 04/25/2024    Chronic constipation 04/25/2024    Cholecystitis without cholelithiasis 04/03/2024    Palpitations 02/29/2024    Marijuana use 02/29/2024    Heart failure with mildly reduced ejection fraction (HFmrEF) 02/01/2024    Mild persistent asthma without complication 03/02/2023    Cervical spondylosis without myelopathy 03/02/2023    Lumbosacral spondylosis without myelopathy 03/02/2023    Posttraumatic stress disorder 03/02/2023    Thoracic facet syndrome 03/02/2023    SID on CPAP 02/26/2023    Recently quit using tobacco 01/04/2023    Primary hypertension 01/04/2023    Oral contraceptive pill surveillance 01/04/2023    Class 3 severe obesity due to excess calories with serious comorbidity and body mass index (BMI) of 45.0 to 49.9 in adult 05/03/2018    Bipolar 1 disorder 05/03/2018    Corns and callus 04/29/2018        Review of patient's allergies indicates:  No Known Allergies     Past Medical History:   Diagnosis Date    ADHD (attention deficit hyperactivity disorder)     Anxiety     Asthma     Bipolar affective     Eczema     History of  "chlamydia 12/2016    Insomnia     Morbid obesity with BMI of 40.0-44.9, adult     Sleep disorder     Type 2 diabetes mellitus without complications       Past Surgical History:   Procedure Laterality Date    NO PAST SURGERIES      as of 1-13-17      Family History   Problem Relation Name Age of Onset    No Known Problems Paternal Grandfather      Breast cancer Paternal Grandmother      Diabetes Maternal Grandmother      No Known Problems Maternal Grandfather      No Known Problems Father      No Known Problems Mother      No Known Problems Sister      Colon cancer Neg Hx      Ovarian cancer Neg Hx        Social History     Tobacco Use    Smoking status: Former     Types: Vaping with nicotine     Passive exposure: Past    Smokeless tobacco: Never    Tobacco comments:     boyfriend smoke cigarettes     Former weed consumption   Substance Use Topics    Alcohol use: No        Objective:     Vitals:    06/10/24 1305   BP: 128/86   BP Location: Right arm   Patient Position: Sitting   BP Method: Large (Manual)   Pulse: 92   Temp: 98.2 °F (36.8 °C)   SpO2: 97%   Weight: 134 kg (295 lb 6.7 oz)   Height: 5' 7" (1.702 m)     BMI: 46.27    Gen: No apparent distress, well nourished and developed, appears stated age  CV: RRR, S1 and S2 present, no LE edema  Resp: CTAB, normal respiratory effort  HEENT: Oropharynx non-erythematous without lesions, very mild exudate on the R tonsil without erythema or edema.   Neck: No LAD    POCT Flu: Negative  POCT COVID: Negative     Assessment/Plan:     Misael Garcia is a 29 y.o. year old female who presents to clinic for sore throat    1. Tonsillitis with exudate  Most likely inflammation to the tonsil from trauma of penetration with oral sex. Recommend cessation or oral sex or at least reduced penetration as tonsils seem very sensitive to any oral penetration. Exam reassuring. Unfortunately given patient has already started empiric STD treatment recommend completing and this may " make swab inaccurate today. All questions and concerns answered. Pt verbalized understanding and was in agreement with the plan.     - POCT COVID-19 Rapid Screening  - POCT Influenza A/B Molecular    2. Screen for STD (sexually transmitted disease)  As above  - C.trach/N.gonor AMP RNA       Follow-up:BELINDA Muro, DO  Family Medicine

## 2024-06-11 ENCOUNTER — TELEPHONE (OUTPATIENT)
Dept: URGENT CARE | Facility: CLINIC | Age: 29
End: 2024-06-11
Payer: MEDICAID

## 2024-06-11 NOTE — TELEPHONE ENCOUNTER
Results for orders placed or performed in visit on 06/10/24   POCT COVID-19 Rapid Screening   Result Value Ref Range    POC Rapid COVID Negative Negative     Acceptable Yes    POCT Influenza A/B Molecular   Result Value Ref Range    POC Molecular Influenza A Ag Negative Negative    POC Molecular Influenza B Ag Negative Negative     Acceptable Yes      *Note: Due to a large number of results and/or encounters for the requested time period, some results have not been displayed. A complete set of results can be found in Results Review.      Component Ref Range & Units 4 d ago  (6/7/24) 2 mo ago  (3/26/24) 2 mo ago  (3/24/24) 3 mo ago  (2/20/24) 4 mo ago  (1/22/24) 1 yr ago  (5/18/23) 1 yr ago  (12/10/22)   Chlamydia, Amplified DNA Not Detected Not Detected Not Detected Not Detected Not Detected Not Detected Not Detected Not Detected   N gonorrhoeae, amplified DNA Not Detected Not Detected Not Detected Not Detected Not Detected Not Detected Not Detected Not Detected   Resulting Agency  [facility]           Called and discussed test results with patient, she verified full name and date of birth.   Advise gonorrhea and chlamydia test negative, advised that ocular test results was not performed, she says symptoms have improved and resolved.  Advised to follow up with PCP or gynecologist if symptoms return she agreed with plan.

## 2024-06-12 LAB
C TRACH RRNA SPEC QL NAA+PROBE: NEGATIVE
N GONORRHOEA RRNA SPEC QL NAA+PROBE: NEGATIVE
N.GONORROHEAE, AMP RNA SOURCE: NORMAL
SPECIMEN SOURCE: NORMAL

## 2024-06-13 ENCOUNTER — PATIENT MESSAGE (OUTPATIENT)
Dept: FAMILY MEDICINE | Facility: CLINIC | Age: 29
End: 2024-06-13
Payer: MEDICAID

## 2024-06-13 ENCOUNTER — TELEPHONE (OUTPATIENT)
Dept: OBSTETRICS AND GYNECOLOGY | Facility: CLINIC | Age: 29
End: 2024-06-13
Payer: MEDICAID

## 2024-06-13 ENCOUNTER — PATIENT MESSAGE (OUTPATIENT)
Dept: OBSTETRICS AND GYNECOLOGY | Facility: CLINIC | Age: 29
End: 2024-06-13
Payer: MEDICAID

## 2024-06-13 DIAGNOSIS — Z11.3 SCREEN FOR STD (SEXUALLY TRANSMITTED DISEASE): Primary | ICD-10-CM

## 2024-06-14 ENCOUNTER — PATIENT MESSAGE (OUTPATIENT)
Dept: FAMILY MEDICINE | Facility: CLINIC | Age: 29
End: 2024-06-14
Payer: MEDICAID

## 2024-06-18 ENCOUNTER — LAB VISIT (OUTPATIENT)
Dept: LAB | Facility: HOSPITAL | Age: 29
End: 2024-06-18
Attending: STUDENT IN AN ORGANIZED HEALTH CARE EDUCATION/TRAINING PROGRAM
Payer: MEDICAID

## 2024-06-18 DIAGNOSIS — Z11.3 SCREEN FOR STD (SEXUALLY TRANSMITTED DISEASE): ICD-10-CM

## 2024-06-18 PROCEDURE — 86695 HERPES SIMPLEX TYPE 1 TEST: CPT | Performed by: STUDENT IN AN ORGANIZED HEALTH CARE EDUCATION/TRAINING PROGRAM

## 2024-06-18 PROCEDURE — 36415 COLL VENOUS BLD VENIPUNCTURE: CPT | Performed by: STUDENT IN AN ORGANIZED HEALTH CARE EDUCATION/TRAINING PROGRAM

## 2024-06-19 ENCOUNTER — PATIENT MESSAGE (OUTPATIENT)
Dept: CARDIOLOGY | Facility: CLINIC | Age: 29
End: 2024-06-19

## 2024-06-19 LAB
HSV1 IGG SERPL QL IA: NEGATIVE
HSV2 IGG SERPL QL IA: NEGATIVE

## 2024-06-21 ENCOUNTER — PATIENT MESSAGE (OUTPATIENT)
Dept: OBSTETRICS AND GYNECOLOGY | Facility: CLINIC | Age: 29
End: 2024-06-21
Payer: MEDICAID

## 2024-06-21 ENCOUNTER — PATIENT MESSAGE (OUTPATIENT)
Dept: SURGERY | Facility: CLINIC | Age: 29
End: 2024-06-21
Payer: MEDICAID

## 2024-06-21 ENCOUNTER — OFFICE VISIT (OUTPATIENT)
Dept: SURGERY | Facility: CLINIC | Age: 29
End: 2024-06-21
Attending: COLON & RECTAL SURGERY
Payer: MEDICAID

## 2024-06-21 VITALS
RESPIRATION RATE: 18 BRPM | WEIGHT: 293 LBS | BODY MASS INDEX: 45.99 KG/M2 | HEIGHT: 67 IN | SYSTOLIC BLOOD PRESSURE: 128 MMHG | HEART RATE: 81 BPM | DIASTOLIC BLOOD PRESSURE: 86 MMHG

## 2024-06-21 DIAGNOSIS — L98.499 ULCER OF PERIANAL AREA, UNSPECIFIED ULCER STAGE: Primary | ICD-10-CM

## 2024-06-21 DIAGNOSIS — L29.0 ANAL ITCHING: ICD-10-CM

## 2024-06-21 PROCEDURE — 99213 OFFICE O/P EST LOW 20 MIN: CPT | Mod: PBBFAC | Performed by: COLON & RECTAL SURGERY

## 2024-06-21 PROCEDURE — 99999 PR PBB SHADOW E&M-EST. PATIENT-LVL III: CPT | Mod: PBBFAC,,, | Performed by: COLON & RECTAL SURGERY

## 2024-06-21 NOTE — PATIENT INSTRUCTIONS
Pruritus Ani    1. During bath or shower, wash outside the anal area very gently. Do not use soap on anal area. Soap may cause irritation.    2. Pat dry! Avoid rubbing area with wash cloth or towel. You may use a hair dryer (on low setting) to dry the anal area.    3. Following each bowel movement, try to use water to clean the area instead of toilet paper. A handheld bidet bottle (available on [employer]) or a bidet attachment for your toilet can be helpful.  Avoid use of wipes with alcohol or witch hazel, or dry toilet paper on irritated skin.    4. Use a topical skin protectant cream 2-3 times per day (such as Calmoseptine, Aquapore, or Jenna's Butt paste)    5. During the day wear absorbent cotton underwear. Use a small piece torn from a cotton ball or a 4 x 4 gauze to keep the area dry.    6. It is important to keep the stool soft and large so that it passes through the rectum without causing trauma. This can be accomplished by the following:    a. Metamucil, Citrucel, Benefiber, Fibercon, or Konsyl.  b. Eat a high fiber diet (25-30grams/day) which includes:  8-10 glasses of water or juice a day, plenty of fruits and vegetables, bran cereal everyday.    6. Avoid foods that cause bowel irritation, that are mucous-producing or aggravate drainage. These include: Dark cayla, pepper, citrus fruits and juices, coffee (regular and decaffeinated), beer and alcoholic beverages, nuts, popcorn, milk, and any items you have found to produce gas or indigestion. You may have 7-Up, ginger ale, and other light colored soft drinks.

## 2024-06-23 ENCOUNTER — PATIENT MESSAGE (OUTPATIENT)
Dept: SURGERY | Facility: CLINIC | Age: 29
End: 2024-06-23
Payer: MEDICAID

## 2024-06-24 ENCOUNTER — LAB VISIT (OUTPATIENT)
Dept: LAB | Facility: HOSPITAL | Age: 29
End: 2024-06-24
Attending: COLON & RECTAL SURGERY
Payer: MEDICAID

## 2024-06-24 DIAGNOSIS — L29.0 ANAL ITCHING: ICD-10-CM

## 2024-06-24 DIAGNOSIS — L29.0 ANAL ITCHING: Primary | ICD-10-CM

## 2024-06-24 PROCEDURE — 87209 SMEAR COMPLEX STAIN: CPT | Performed by: COLON & RECTAL SURGERY

## 2024-06-27 ENCOUNTER — PATIENT MESSAGE (OUTPATIENT)
Dept: SURGERY | Facility: CLINIC | Age: 29
End: 2024-06-27
Payer: MEDICAID

## 2024-06-27 LAB — O+P STL MICRO: NORMAL

## 2024-06-30 ENCOUNTER — PATIENT MESSAGE (OUTPATIENT)
Dept: FAMILY MEDICINE | Facility: CLINIC | Age: 29
End: 2024-06-30
Payer: MEDICAID

## 2024-07-01 DIAGNOSIS — K21.9 GASTROESOPHAGEAL REFLUX DISEASE, UNSPECIFIED WHETHER ESOPHAGITIS PRESENT: ICD-10-CM

## 2024-07-01 DIAGNOSIS — R10.10 UPPER ABDOMINAL PAIN: ICD-10-CM

## 2024-07-01 NOTE — TELEPHONE ENCOUNTER
Refill Routing Note   Medication(s) are not appropriate for processing by [facility] for the following reason(s):        No active prescription written by provider    [facility] action(s):  Defer      Medication Therapy Plan: 6/8/24 ed vist reason;   Cough      Appointments  past 12m or future 3m with PCP    Date Provider   Last Visit   6/6/2024 Katelyn Chapman MD   Next Visit   8/1/2024 Katelyn Chapman MD   ED visits in past 90 days: 8        Note composed:7:30 AM 07/01/2024

## 2024-07-01 NOTE — TELEPHONE ENCOUNTER
No care due was identified.  [facility] Embedded Care Due Messages. Reference number: [id number].   7/01/2024 7:27:42 AM CDT

## 2024-07-02 ENCOUNTER — TELEPHONE (OUTPATIENT)
Dept: CARDIOLOGY | Facility: CLINIC | Age: 29
End: 2024-07-02
Payer: MEDICAID

## 2024-07-02 ENCOUNTER — PATIENT MESSAGE (OUTPATIENT)
Dept: CARDIOLOGY | Facility: CLINIC | Age: 29
End: 2024-07-02
Payer: MEDICAID

## 2024-07-02 RX ORDER — PANTOPRAZOLE SODIUM 40 MG/1
40 TABLET, DELAYED RELEASE ORAL 2 TIMES DAILY
Qty: 180 TABLET | Refills: 3 | Status: SHIPPED | OUTPATIENT
Start: 2024-07-02

## 2024-07-03 ENCOUNTER — PATIENT MESSAGE (OUTPATIENT)
Dept: ALLERGY | Facility: CLINIC | Age: 29
End: 2024-07-03
Payer: MEDICAID

## 2024-07-03 ENCOUNTER — PATIENT MESSAGE (OUTPATIENT)
Dept: CARDIOLOGY | Facility: CLINIC | Age: 29
End: 2024-07-03

## 2024-07-03 ENCOUNTER — OFFICE VISIT (OUTPATIENT)
Dept: CARDIOLOGY | Facility: CLINIC | Age: 29
End: 2024-07-03
Payer: MEDICAID

## 2024-07-03 ENCOUNTER — TELEPHONE (OUTPATIENT)
Dept: CARDIOLOGY | Facility: CLINIC | Age: 29
End: 2024-07-03
Payer: MEDICAID

## 2024-07-03 VITALS — DIASTOLIC BLOOD PRESSURE: 80 MMHG | SYSTOLIC BLOOD PRESSURE: 137 MMHG | HEART RATE: 88 BPM

## 2024-07-03 DIAGNOSIS — K21.9 GASTROESOPHAGEAL REFLUX DISEASE, UNSPECIFIED WHETHER ESOPHAGITIS PRESENT: ICD-10-CM

## 2024-07-03 DIAGNOSIS — I50.22 HEART FAILURE WITH MILDLY REDUCED EJECTION FRACTION (HFMREF): ICD-10-CM

## 2024-07-03 DIAGNOSIS — Z87.891 RECENTLY QUIT USING TOBACCO: ICD-10-CM

## 2024-07-03 DIAGNOSIS — I15.2 HYPERTENSION ASSOCIATED WITH DIABETES: ICD-10-CM

## 2024-07-03 DIAGNOSIS — E11.59 HYPERTENSION ASSOCIATED WITH DIABETES: ICD-10-CM

## 2024-07-03 DIAGNOSIS — G47.33 OSA ON CPAP: ICD-10-CM

## 2024-07-03 DIAGNOSIS — J45.30 MILD PERSISTENT ASTHMA WITHOUT COMPLICATION: ICD-10-CM

## 2024-07-03 DIAGNOSIS — R00.2 PALPITATIONS: Primary | ICD-10-CM

## 2024-07-03 DIAGNOSIS — I10 PRIMARY HYPERTENSION: ICD-10-CM

## 2024-07-03 DIAGNOSIS — F31.9 BIPOLAR 1 DISORDER: ICD-10-CM

## 2024-07-03 DIAGNOSIS — F12.90 MARIJUANA USE: ICD-10-CM

## 2024-07-03 DIAGNOSIS — E66.01 CLASS 3 SEVERE OBESITY DUE TO EXCESS CALORIES WITH SERIOUS COMORBIDITY AND BODY MASS INDEX (BMI) OF 45.0 TO 49.9 IN ADULT: ICD-10-CM

## 2024-07-03 PROCEDURE — 1160F RVW MEDS BY RX/DR IN RCRD: CPT | Mod: CPTII,95,,

## 2024-07-03 PROCEDURE — 1159F MED LIST DOCD IN RCRD: CPT | Mod: CPTII,95,,

## 2024-07-03 PROCEDURE — 3075F SYST BP GE 130 - 139MM HG: CPT | Mod: CPTII,95,,

## 2024-07-03 PROCEDURE — 3044F HG A1C LEVEL LT 7.0%: CPT | Mod: CPTII,95,,

## 2024-07-03 PROCEDURE — 4010F ACE/ARB THERAPY RXD/TAKEN: CPT | Mod: CPTII,95,,

## 2024-07-03 PROCEDURE — 3079F DIAST BP 80-89 MM HG: CPT | Mod: CPTII,95,,

## 2024-07-03 PROCEDURE — 99214 OFFICE O/P EST MOD 30 MIN: CPT | Mod: 95,,,

## 2024-07-03 RX ORDER — LISINOPRIL 20 MG/1
20 TABLET ORAL DAILY
Qty: 90 TABLET | Refills: 3 | Status: SHIPPED | OUTPATIENT
Start: 2024-07-03 | End: 2025-06-28

## 2024-07-03 RX ORDER — METOPROLOL SUCCINATE 25 MG/1
12.5 TABLET, EXTENDED RELEASE ORAL DAILY
Qty: 45 TABLET | Refills: 3 | Status: SHIPPED | OUTPATIENT
Start: 2024-07-03 | End: 2025-07-03

## 2024-07-03 NOTE — ASSESSMENT & PLAN NOTE
48 HR Holter 3/12/24  Interpretation Summary  Show Result Comparison     The predominant rhythm is sinus.    There were PVCs totalling 0    There were very rare PACs  Monitoring started at 10:39 and continued for 43 hr 24 min. The average heart rate was 91 BPM. The minimum heart rate was 63 BPM, occurring at 17:49:00 D1. The maximum heart rate was 169 BPM, occurring at 17:57:12 D1. The patient's rhythm included 7 hr 20 min 49 sec of tachycardia. The fastest single episode of tachycardia occurred at 17:56:43 D1, lasting 3 min 55 sec, with maximum heart rate of 169 BPM. Supraventricular ectopic activity consisted of 12 beats, of which, 8 were late beats, 4 were single PACs. The longest R-R interval was 1.2 seconds occurring at 11:00:25 D1. The longest N-N interval was 1.2 seconds occurring at 11:00:25 D1.     -add metoprolol 12.5 mg

## 2024-07-03 NOTE — PROGRESS NOTES
Subjective:    Patient ID:  Misael Garcia is a 29 y.o. female who presents for evaluation of No chief complaint on file.      PCP: Katelyn Chapman MD     Referring Provider: Valente Hall MD        The patient location is: [place], [place]  The chief complaint leading to consultation is:  Follow up and Medications    Visit type: audiovisual    30 minutes of total time spent on the encounter, which includes face to face time and non-face to face time preparing to see the patient (eg, review of tests), Obtaining and/or reviewing separately obtained history, Documenting clinical information in the electronic or other health record, Independently interpreting results (not separately reported) and communicating results to the patient/family/caregiver, or Care coordination (not separately reported).         Each patient to whom he or she provides medical services by telemedicine is:  (1) informed of the relationship between the physician and patient and the respective role of any other health care provider with respect to management of the patient; and (2) notified that he or she may decline to receive medical services by telemedicine and may withdraw from such care at any time.    Notes:   HPI: Patient is a 28 yo F w/PMH of HTN, DM-2, morbid obesity,  SID, ADHD, anxiety, Bipolar affective disorder, thrombocytosis, low iron, marijuana use (prior vape use)  who presents today  for f/u appt. Patient missed 2 prior appta. She was last seen on 2/29/24 to establish care w c/o SOB. She reported  SOB at rest and RODRIGUEZ w minimal activity. She also reported  palpitations and  left sided CP. 48 HR Holter, predominantly sinus rhythm w supraventricular ectopy and max  BPM. Patient was started on metoprolol succinate 25 mg. Ischemic evaluation completed w negative NM stress test.   Patient reports some palpitations but not limiting.    Patient denies ,  orthopnea, PND, presyncope, LOC, swelling, or claudication. Patient  does not monitor her  home BP. Patient reports intermittent medication compliance without side effects. She reports stopping her cardiac medication because she thought the would interfere with her other medications.  She notes 30 pound  weight loss since starting Ozempic. Patient does not exercise regularly, maybe once a week. .      Past Medical History:   Diagnosis Date    ADHD (attention deficit hyperactivity disorder)     Anxiety     Asthma     Bipolar affective     Eczema     History of chlamydia 12/2016    Insomnia     Morbid obesity with BMI of 40.0-44.9, adult     Sleep disorder     Type 2 diabetes mellitus without complications      Past Surgical History:   Procedure Laterality Date    NO PAST SURGERIES      as of 1-13-17     Social History     Socioeconomic History    Marital status: Single   Occupational History    Occupation: [email or web address]    Tobacco Use    Smoking status: Former     Types: Vaping with nicotine     Passive exposure: Past    Smokeless tobacco: Never    Tobacco comments:     boyfriend smoke cigarettes     Former weed consumption   Substance and Sexual Activity    Alcohol use: No    Drug use: Not Currently     Types: Marijuana    Sexual activity: Not Currently     Partners: Male     Birth control/protection: None     Comment: Single:       Social Determinants of Health     Financial Resource Strain: Low Risk  (5/14/2024)    Received from [facility]    Overall Financial Resource Strain (CARDIA)     Difficulty of Paying Living Expenses: Not hard at all   Food Insecurity: No Food Insecurity (5/30/2024)    Received from [facility]    Hunger Vital Sign     Worried About Running Out of Food in the Last Year: Never true     Ran Out of Food in the Last Year: Never true   Transportation Needs: No Transportation Needs (5/30/2024)    Received from [facility]    PRAPARE - Transportation     Lack of Transportation (Medical): No     Lack of Transportation (Non-Medical): No   Physical Activity: Unknown  (6/9/2024)    Exercise Vital Sign     Days of Exercise per Week: Patient declined     Minutes of Exercise per Session: 0 min   Recent Concern: Physical Activity - Insufficiently Active (5/14/2024)    Received from [facility]    Exercise Vital Sign     Days of Exercise per Week: 4 days     Minutes of Exercise per Session: 20 min   Stress: Stress Concern Present (5/14/2024)    Received from [facility]    [employer] - Occupational Stress Questionnaire     Feeling of Stress : Very much   Housing Stability: Unknown (8/24/2023)    [facility]    Housing Stability Vital Sign     Unable to Pay for Housing in the Last Year: No     In the last 12 months, was there a time when you did not have a steady place to sleep or slept in a shelter (including now)?: No     Family History   Problem Relation Name Age of Onset    No Known Problems Paternal Grandfather      Breast cancer Paternal Grandmother      Diabetes Maternal Grandmother      No Known Problems Maternal Grandfather      No Known Problems Father      No Known Problems Mother      No Known Problems Sister      Colon cancer Neg Hx      Ovarian cancer Neg Hx         Review of patient's allergies indicates:  No Known Allergies    Medication List with Changes/Refills   New Medications    METOPROLOL SUCCINATE (TOPROL-XL) 25 MG 24 HR TABLET    Take 0.5 tablets (12.5 mg total) by mouth once daily.   Current Medications    HYDROQUINONE 4 % CREA    Apply topically 2 (two) times daily.    LURASIDONE (LATUDA) 40 MG TAB TABLET    Take 40 mg by mouth.    PANTOPRAZOLE (PROTONIX) 40 MG TABLET    Take 1 tablet (40 mg total) by mouth 2 (two) times daily.    PRUCALOPRIDE 2 MG TAB    Take 1 tablet by mouth once daily.   Changed and/or Refilled Medications    Modified Medication Previous Medication    LISINOPRIL (PRINIVIL,ZESTRIL) 20 MG TABLET lisinopriL (PRINIVIL,ZESTRIL) 20 MG tablet       Take 1 tablet (20 mg total)  by mouth once daily.    Take 1 tablet (20 mg total) by mouth once daily.       Review of Systems   Constitutional: Negative for diaphoresis and fever.   HENT:  Negative for congestion and hearing loss.    Eyes:  Negative for blurred vision and pain.   Cardiovascular:  Positive for palpitations. Negative for claudication, leg swelling, near-syncope and syncope.   Respiratory:  Negative for sleep disturbances due to breathing.    Hematologic/Lymphatic: Negative for bleeding problem. Does not bruise/bleed easily.   Skin:  Negative for color change and poor wound healing.   Gastrointestinal:  Negative for abdominal pain and nausea.   Genitourinary:  Negative for bladder incontinence and flank pain.   Neurological:  Negative for focal weakness and light-headedness.        Objective:   /80   Pulse 88   LMP  (LMP Unknown) Comment: UPT neg today   Physical Exam  Constitutional:       Appearance: She is well-developed. She is obese. She is not diaphoretic.   HENT:      Head: Normocephalic and atraumatic.   Eyes:      General: No scleral icterus.  Neck:      Vascular: No JVD.   Pulmonary:      Effort: Pulmonary effort is normal.   Musculoskeletal:         General: Normal range of motion.      Cervical back: Normal range of motion.   Skin:     Coloration: Skin is not pale.   Neurological:      Mental Status: She is alert and oriented to person, place, and time.      Coordination: Coordination normal.   Psychiatric:         Behavior: Behavior normal.         Judgment: Judgment normal.             NM Stress test 3/14/24  Interpretation Summary    The ECG portion of the study is abnormal but not diagnostic due to resting ST-T abnormalities.    The patient reported chest pain during the stress test.    There were no arrhythmias during stress.  Impression:     1. Examination is compromised by GI activity adjacent to the inferior wall of the left ventricle as well as heterogeneous distribution of tracer activity throughout  the left ventricle on both the resting and stress images.  No convincing scintigraphic evidence for ischemia or infarct.  2. The global left ventricular systolic function is normal with an LV ejection fraction of 71% and no evidence of LV dilatation. Wall motion is normal.    48 HR Holter 3/12/24  Interpretation Summary  Show Result Comparison     The predominant rhythm is sinus.    There were PVCs totalling 0    There were very rare PACs  Monitoring started at 10:39 and continued for 43 hr 24 min. The average heart rate was 91 BPM. The minimum heart rate was 63 BPM, occurring at 17:49:00 D1. The maximum heart rate was 169 BPM, occurring at 17:57:12 D1. The patient's rhythm included 7 hr 20 min 49 sec of tachycardia. The fastest single episode of tachycardia occurred at 17:56:43 D1, lasting 3 min 55 sec, with maximum heart rate of 169 BPM. Supraventricular ectopic activity consisted of 12 beats, of which, 8 were late beats, 4 were single PACs. The longest R-R interval was 1.2 seconds occurring at 11:00:25 D1. The longest N-N interval was 1.2 seconds occurring at 11:00:25 D1.     Cardiac echo 1/30/24  Summary         Left Ventricle: The left ventricle is normal in size. Normal wall thickness. Normal wall motion. There is reduced systolic function. Biplane (2D) method of discs ejection fraction is 50%. There is normal diastolic function.    Right Ventricle: Systolic function is normal. TAPSE is 2.13 cm.    Mitral Valve: There is mild regurgitation.    Pulmonary Artery: The estimated pulmonary artery systolic pressure is 38 mmHg.    IVC/SVC: Normal venous pressure at 3 mmHg.    Overall the study quality was technically difficult. The study was difficult due to patient's poor endocardial visualization.      EKG reviewed 6/29/2023  Assessment:       1. Palpitations    2. Hypertension associated with diabetes    3. Primary hypertension    4. Heart failure with mildly reduced ejection fraction (HFmrEF)    5. Mild  persistent asthma without complication    6. Class 3 severe obesity due to excess calories with serious comorbidity and body mass index (BMI) of 45.0 to 49.9 in adult    7. Gastroesophageal reflux disease, unspecified whether esophagitis present    8. Marijuana use    9. SID on CPAP    10. Recently quit using tobacco    11. Bipolar 1 disorder           Plan:         Palpitations  48 HR Holter 3/12/24  Interpretation Summary  Show Result Comparison     The predominant rhythm is sinus.    There were PVCs totalling 0    There were very rare PACs  Monitoring started at 10:39 and continued for 43 hr 24 min. The average heart rate was 91 BPM. The minimum heart rate was 63 BPM, occurring at 17:49:00 D1. The maximum heart rate was 169 BPM, occurring at 17:57:12 D1. The patient's rhythm included 7 hr 20 min 49 sec of tachycardia. The fastest single episode of tachycardia occurred at 17:56:43 D1, lasting 3 min 55 sec, with maximum heart rate of 169 BPM. Supraventricular ectopic activity consisted of 12 beats, of which, 8 were late beats, 4 were single PACs. The longest R-R interval was 1.2 seconds occurring at 11:00:25 D1. The longest N-N interval was 1.2 seconds occurring at 11:00:25 D1.     -add metoprolol 12.5 mg     Primary hypertension  -Goal BP < 130/80  -continue medical therapy- lisinopril 20 mg   -discussed lifestyle modifications    Heart failure with mildly reduced ejection fraction (HFmrEF)  Cardiac echo 1/30/24  Summary         Left Ventricle: The left ventricle is normal in size. Normal wall thickness. Normal wall motion. There is reduced systolic function. Biplane (2D) method of discs ejection fraction is 50%. There is normal diastolic function.    -discussed lifestyle modifications, - low salt diet, exercise, HTN control, weight loss  - NM stress test  3/14/24- negative for ischemia     Mild persistent asthma without complication  -Managed by PCP  -continue medical therapy    Class 3 severe obesity due to  excess calories with serious comorbidity and body mass index (BMI) of 45.0 to 49.9 in adult   BMI 46.53.  Morbid obesity complicates all aspects of disease management from diagnostic modalities to treatment. Weight loss encouraged and health benefits explained to patient.   -currently on Ozempic, reports a 30 pound weight loss     Gastroesophageal reflux disease  -Followed by GI  -continue medical therapy- pantoprazole 40 mg     Marijuana use  -Reports daily -ever other day use     SID on CPAP  -CPAP nightly- non compliant  -discussed the importance of nightly use     Recently quit using tobacco  -Currently smokes marijuana     Bipolar 1 disorder  -Followed by Behavioral Health  -continue medical therapy-   -currently prescribed- Latuda         Total duration of face to face visit time 30 minutes.  Total time spent counseling greater than fifty percent of total visit time.  Counseling included discussion regarding imaging findings, diagnosis, possibilities, treatment options, risks and benefits.  The patient had many questions regarding the options and long-term effects      Misael Weiss NP  Cardiology

## 2024-07-03 NOTE — ASSESSMENT & PLAN NOTE
Cardiac echo 1/30/24  Summary         Left Ventricle: The left ventricle is normal in size. Normal wall thickness. Normal wall motion. There is reduced systolic function. Biplane (2D) method of discs ejection fraction is 50%. There is normal diastolic function.    -discussed lifestyle modifications, - low salt diet, exercise, HTN control, weight loss  - NM stress test  3/14/24- negative for ischemia

## 2024-07-03 NOTE — ASSESSMENT & PLAN NOTE
BMI 46.53.  Morbid obesity complicates all aspects of disease management from diagnostic modalities to treatment. Weight loss encouraged and health benefits explained to patient.   -currently on Ozempic, reports a 30 pound weight loss

## 2024-07-05 ENCOUNTER — TELEPHONE (OUTPATIENT)
Dept: OBSTETRICS AND GYNECOLOGY | Facility: CLINIC | Age: 29
End: 2024-07-05
Payer: MEDICAID

## 2024-07-05 ENCOUNTER — PATIENT MESSAGE (OUTPATIENT)
Dept: OBSTETRICS AND GYNECOLOGY | Facility: CLINIC | Age: 29
End: 2024-07-05
Payer: MEDICAID

## 2024-07-05 NOTE — TELEPHONE ENCOUNTER
----- Message from Luz Skinner sent at 7/5/2024 11:39 AM CDT -----  Name of Who is Calling: MALIK BROWNING [[id number]]      What is the request in detail: Medicaid pt called to be scheduled for an appt however Epic didn't allow the access to do so.Please contact to further discuss and advise.        Can the clinic reply by [facility]: Y      What Number to Call Back if not in [facility]: [phone]

## 2024-07-07 ENCOUNTER — PATIENT MESSAGE (OUTPATIENT)
Dept: FAMILY MEDICINE | Facility: CLINIC | Age: 29
End: 2024-07-07
Payer: MEDICAID

## 2024-07-09 ENCOUNTER — HOSPITAL ENCOUNTER (OUTPATIENT)
Dept: RADIOLOGY | Facility: HOSPITAL | Age: 29
Discharge: HOME | End: 2024-07-09
Attending: STUDENT IN AN ORGANIZED HEALTH CARE EDUCATION/TRAINING PROGRAM
Payer: MEDICAID

## 2024-07-09 ENCOUNTER — TELEPHONE (OUTPATIENT)
Dept: FAMILY MEDICINE | Facility: CLINIC | Age: 29
End: 2024-07-09
Payer: MEDICAID

## 2024-07-09 ENCOUNTER — PATIENT MESSAGE (OUTPATIENT)
Dept: INTERNAL MEDICINE | Facility: CLINIC | Age: 29
End: 2024-07-09
Payer: MEDICAID

## 2024-07-09 ENCOUNTER — PATIENT MESSAGE (OUTPATIENT)
Dept: PRIMARY CARE CLINIC | Facility: CLINIC | Age: 29
End: 2024-07-09
Payer: MEDICAID

## 2024-07-09 ENCOUNTER — OFFICE VISIT (OUTPATIENT)
Dept: INTERNAL MEDICINE | Facility: CLINIC | Age: 29
End: 2024-07-09
Payer: MEDICAID

## 2024-07-09 ENCOUNTER — OFFICE VISIT (OUTPATIENT)
Dept: OBSTETRICS AND GYNECOLOGY | Facility: CLINIC | Age: 29
End: 2024-07-09
Payer: MEDICAID

## 2024-07-09 ENCOUNTER — TELEPHONE (OUTPATIENT)
Dept: INTERNAL MEDICINE | Facility: CLINIC | Age: 29
End: 2024-07-09

## 2024-07-09 VITALS
OXYGEN SATURATION: 98 % | RESPIRATION RATE: 16 BRPM | HEART RATE: 72 BPM | WEIGHT: 293 LBS | DIASTOLIC BLOOD PRESSURE: 86 MMHG | HEIGHT: 67 IN | TEMPERATURE: 98 F | BODY MASS INDEX: 45.99 KG/M2 | SYSTOLIC BLOOD PRESSURE: 124 MMHG

## 2024-07-09 VITALS
WEIGHT: 293 LBS | DIASTOLIC BLOOD PRESSURE: 81 MMHG | BODY MASS INDEX: 45.99 KG/M2 | HEIGHT: 67 IN | SYSTOLIC BLOOD PRESSURE: 119 MMHG

## 2024-07-09 DIAGNOSIS — R10.9 ABDOMINAL PAIN, UNSPECIFIED ABDOMINAL LOCATION: Primary | ICD-10-CM

## 2024-07-09 DIAGNOSIS — R10.2 PELVIC PAIN: ICD-10-CM

## 2024-07-09 DIAGNOSIS — R10.11 RIGHT UPPER QUADRANT ABDOMINAL PAIN: Primary | ICD-10-CM

## 2024-07-09 PROCEDURE — 99214 OFFICE O/P EST MOD 30 MIN: CPT | Mod: PBBFAC,27,PO,25 | Performed by: NURSE PRACTITIONER

## 2024-07-09 PROCEDURE — 1159F MED LIST DOCD IN RCRD: CPT | Mod: CPTII,,, | Performed by: STUDENT IN AN ORGANIZED HEALTH CARE EDUCATION/TRAINING PROGRAM

## 2024-07-09 PROCEDURE — 76856 US EXAM PELVIC COMPLETE: CPT | Mod: TC

## 2024-07-09 PROCEDURE — 99213 OFFICE O/P EST LOW 20 MIN: CPT | Mod: S$PBB,,, | Performed by: STUDENT IN AN ORGANIZED HEALTH CARE EDUCATION/TRAINING PROGRAM

## 2024-07-09 PROCEDURE — 3074F SYST BP LT 130 MM HG: CPT | Mod: CPTII,,, | Performed by: NURSE PRACTITIONER

## 2024-07-09 PROCEDURE — 3079F DIAST BP 80-89 MM HG: CPT | Mod: CPTII,,, | Performed by: NURSE PRACTITIONER

## 2024-07-09 PROCEDURE — 3008F BODY MASS INDEX DOCD: CPT | Mod: CPTII,,, | Performed by: STUDENT IN AN ORGANIZED HEALTH CARE EDUCATION/TRAINING PROGRAM

## 2024-07-09 PROCEDURE — 1159F MED LIST DOCD IN RCRD: CPT | Mod: CPTII,,, | Performed by: NURSE PRACTITIONER

## 2024-07-09 PROCEDURE — 4010F ACE/ARB THERAPY RXD/TAKEN: CPT | Mod: CPTII,,, | Performed by: STUDENT IN AN ORGANIZED HEALTH CARE EDUCATION/TRAINING PROGRAM

## 2024-07-09 PROCEDURE — 1160F RVW MEDS BY RX/DR IN RCRD: CPT | Mod: CPTII,,, | Performed by: NURSE PRACTITIONER

## 2024-07-09 PROCEDURE — 99999 PR PBB SHADOW E&M-EST. PATIENT-LVL IV: CPT | Mod: PBBFAC,,, | Performed by: NURSE PRACTITIONER

## 2024-07-09 PROCEDURE — 4010F ACE/ARB THERAPY RXD/TAKEN: CPT | Mod: CPTII,,, | Performed by: NURSE PRACTITIONER

## 2024-07-09 PROCEDURE — 3074F SYST BP LT 130 MM HG: CPT | Mod: CPTII,,, | Performed by: STUDENT IN AN ORGANIZED HEALTH CARE EDUCATION/TRAINING PROGRAM

## 2024-07-09 PROCEDURE — 3044F HG A1C LEVEL LT 7.0%: CPT | Mod: CPTII,,, | Performed by: STUDENT IN AN ORGANIZED HEALTH CARE EDUCATION/TRAINING PROGRAM

## 2024-07-09 PROCEDURE — 1160F RVW MEDS BY RX/DR IN RCRD: CPT | Mod: CPTII,,, | Performed by: STUDENT IN AN ORGANIZED HEALTH CARE EDUCATION/TRAINING PROGRAM

## 2024-07-09 PROCEDURE — 3044F HG A1C LEVEL LT 7.0%: CPT | Mod: CPTII,,, | Performed by: NURSE PRACTITIONER

## 2024-07-09 PROCEDURE — 99213 OFFICE O/P EST LOW 20 MIN: CPT | Mod: PBBFAC,PO | Performed by: STUDENT IN AN ORGANIZED HEALTH CARE EDUCATION/TRAINING PROGRAM

## 2024-07-09 PROCEDURE — 76856 US EXAM PELVIC COMPLETE: CPT | Mod: 26,,, | Performed by: STUDENT IN AN ORGANIZED HEALTH CARE EDUCATION/TRAINING PROGRAM

## 2024-07-09 PROCEDURE — 3008F BODY MASS INDEX DOCD: CPT | Mod: CPTII,,, | Performed by: NURSE PRACTITIONER

## 2024-07-09 PROCEDURE — 99999 PR PBB SHADOW E&M-EST. PATIENT-LVL III: CPT | Mod: PBBFAC,,, | Performed by: STUDENT IN AN ORGANIZED HEALTH CARE EDUCATION/TRAINING PROGRAM

## 2024-07-09 PROCEDURE — 99213 OFFICE O/P EST LOW 20 MIN: CPT | Mod: S$PBB,,, | Performed by: NURSE PRACTITIONER

## 2024-07-09 PROCEDURE — 3079F DIAST BP 80-89 MM HG: CPT | Mod: CPTII,,, | Performed by: STUDENT IN AN ORGANIZED HEALTH CARE EDUCATION/TRAINING PROGRAM

## 2024-07-09 RX ORDER — ESCITALOPRAM OXALATE 10 MG/1
1 TABLET ORAL DAILY
COMMUNITY
Start: 2024-07-01

## 2024-07-09 NOTE — PROGRESS NOTES
History & Physical  Gynecology      SUBJECTIVE:     Chief Complaint: Right Sided Abdominal Pain       History of Present Illness:  Misael is a 29 yr old G0 who presents with complaints of right sided abdominal pain for the past 4-5 days. She was seen in the ED yesterday with these complaints. She indicates pain under her right breast that sometime radiates down into the pelvis. That said, her most significant area of concern is the right upper quadrant. She states that she is no longer taking her OCPs. She has a known history of a small right ovarian cyts. She is concerned that it ruptured.     Review of patient's allergies indicates:  No Known Allergies    Past Medical History:   Diagnosis Date    ADHD (attention deficit hyperactivity disorder)     Anxiety     Asthma     Bipolar affective     Eczema     History of chlamydia 2016    Insomnia     Morbid obesity with BMI of 40.0-44.9, adult     Sleep disorder     Type 2 diabetes mellitus without complications      Past Surgical History:   Procedure Laterality Date    NO PAST SURGERIES      as of 19     OB History          0    Para   0    Term   0       0    AB   0    Living   0         SAB   0    IAB   0    Ectopic   0    Multiple   0    Live Births               Obstetric Comments   Menarche ~ 18/reg  H/o chlamydia   Denies abnl pap             Family History   Problem Relation Name Age of Onset    No Known Problems Paternal Grandfather      Breast cancer Paternal Grandmother      Diabetes Maternal Grandmother      No Known Problems Maternal Grandfather      No Known Problems Father      No Known Problems Mother      No Known Problems Sister      Colon cancer Neg Hx      Ovarian cancer Neg Hx       Social History     Tobacco Use    Smoking status: Former     Types: Vaping with nicotine     Passive exposure: Past    Smokeless tobacco: Never    Tobacco comments:     boyfriend smoke cigarettes     Former weed consumption   Substance Use Topics     Alcohol use: No    Drug use: Not Currently     Types: Marijuana       Current Outpatient Medications   Medication Sig    cephALEXin (KEFLEX) 500 MG capsule Take 1 capsule (500 mg total) by mouth 4 (four) times daily. for 5 days    hydroquinone 4 % Crea Apply topically 2 (two) times daily.    lisinopriL (PRINIVIL,ZESTRIL) 20 MG tablet Take 1 tablet (20 mg total) by mouth once daily.    lurasidone (LATUDA) 40 mg Tab tablet Take 40 mg by mouth.    metoprolol succinate (TOPROL-XL) 25 MG 24 hr tablet Take 0.5 tablets (12.5 mg total) by mouth once daily.    pantoprazole (PROTONIX) 40 MG tablet Take 1 tablet (40 mg total) by mouth 2 (two) times daily.    prucalopride 2 mg Tab Take 1 tablet by mouth once daily.     No current facility-administered medications for this visit.       Review of Systems:  Review of Systems   Constitutional:  Negative for chills, fatigue and fever.   HENT:  Negative for congestion.    Eyes:  Negative for visual disturbance.   Respiratory:  Negative for cough and shortness of breath.    Cardiovascular:  Negative for chest pain and palpitations.   Gastrointestinal:  Positive for abdominal pain. Negative for abdominal distention, constipation, diarrhea, nausea and vomiting.   Genitourinary:  Positive for pelvic pain. Negative for difficulty urinating, dysuria, hematuria, vaginal bleeding and vaginal discharge.   Skin:  Negative for rash.   Neurological:  Negative for dizziness, seizures, light-headedness and headaches.   Hematological:  Does not bruise/bleed easily.   Psychiatric/Behavioral:  Negative for dysphoric mood. The patient is not nervous/anxious.         OBJECTIVE:     Physical Exam:  Vitals:    07/09/24 0857   BP: 119/81      Physical Exam  Vitals and nursing note reviewed. Exam conducted with a chaperone present.   Constitutional:       General: She is not in acute distress.     Appearance: She is well-developed. She is obese.   HENT:      Head: Normocephalic and atraumatic.   Eyes:       Pupils: Pupils are equal, round, and reactive to light.   Cardiovascular:      Rate and Rhythm: Normal rate and regular rhythm.   Pulmonary:      Effort: Pulmonary effort is normal. No respiratory distress.   Abdominal:      General: There is no distension.      Palpations: Abdomen is soft. There is no mass.      Tenderness: There is no abdominal tenderness. There is no guarding.   Genitourinary:     Comments: SSE: Normal external female genitalia, normal urethral meatus, normal vaginal rugae, normal vaginal mucosa, no vaginal blood in canal, normal physiologic discharge, normal cervix, no adnexal masses palpated on bimanual exam.   Musculoskeletal:         General: Normal range of motion.      Cervical back: Normal range of motion and neck supple.   Skin:     General: Skin is warm and dry.   Neurological:      Mental Status: She is alert and oriented to person, place, and time.   Psychiatric:         Behavior: Behavior normal.         Thought Content: Thought content normal.         Judgment: Judgment normal.         ASSESSMENT/PLAN:     1. Abdominal pain, unspecified abdominal location  2. Pelvic pain  - Grossly reassuring GYN exam, without abnormal finding  - Will get pelvic imaging to assess for occult finding  - Can consider GI consult/work-up with persistent RUQ pain  - US Pelvis Complete Non OB; Future    Fiorella Peña M.D.  OB/GYN  [facility]

## 2024-07-09 NOTE — TELEPHONE ENCOUNTER
----- Message from Debora Zaragoza sent at 7/9/2024  2:19 PM CDT -----  Contact: Pt  [phone]  Patient is requesting a COMPLETE US Ultra sound, the  found gas in her kidneys,    Please call and advise.    Thank You

## 2024-07-09 NOTE — TELEPHONE ENCOUNTER
Patient C/O abdominal pain. Given an appointment next week with NP. Requesting an ultrasound prior. Instructed of pain that severe, she needs to go to ED

## 2024-07-09 NOTE — TELEPHONE ENCOUNTER
Patient C/O right sided abdominal pain, upper and lower quadrant. States pain started 4 days ago, described as sharp and constant. Denies fever, no nausea or vomiting. States it is accompanied by fatigue. Given appointment with NP, Eufemia Corbett. Verbalized understanding.

## 2024-07-09 NOTE — PROGRESS NOTES
Subjective:       Patient ID: Misael Garcia is a 29 y.o. female.    Chief Complaint: Abdominal Pain (RUQ; CONSTANT PAIN) and Shortness of Breath      Patient is a 29 y.o. female who traditionally follows with Katelyn Chapman MD presenting today for new onset abdominal pain.      Abdominal pain   Pt. states she started with RUQ pain 3 days ago and describes it as dull to sharp constant pain.  States the pain is worse after meals.  She notes indigestion, palpations and some SOB with exertion and denies N/V or fever. Patient notes pain is worse with taking deep breath and certain movements. Tylenol has relieved the pain.  She states she has recently been constipated and started Miralax today.  Last BM small and hard this morning.  She mentioned she has previously had a gallstone and wonders if that could be the cause of the pain.  She follows with GI at [facility] for IBS.  With ED visit yesterday she was told she has a UTI and started the abx this AM.  She also mentioned with GYN appt this morning a pelvic US was ordered.    Review of patient's allergies indicates:  No Known Allergies    Medication List with Changes/Refills   Current Medications    CEPHALEXIN (KEFLEX) 500 MG CAPSULE    Take 1 capsule (500 mg total) by mouth 4 (four) times daily. for 5 days    ESCITALOPRAM OXALATE (LEXAPRO) 10 MG TABLET    Take 1 tablet by mouth once daily.    HYDROQUINONE 4 % CREA    Apply topically 2 (two) times daily.    LISINOPRIL (PRINIVIL,ZESTRIL) 20 MG TABLET    Take 1 tablet (20 mg total) by mouth once daily.    LURASIDONE (LATUDA) 40 MG TAB TABLET    Take 40 mg by mouth.    METOPROLOL SUCCINATE (TOPROL-XL) 25 MG 24 HR TABLET    Take 0.5 tablets (12.5 mg total) by mouth once daily.    PANTOPRAZOLE (PROTONIX) 40 MG TABLET    Take 1 tablet (40 mg total) by mouth 2 (two) times daily.    PRUCALOPRIDE 2 MG TAB    Take 1 tablet by mouth once daily.       Medical, social and surgical history has been reviewed with the patient.  "    Review of Systems   Constitutional:  Positive for chills. Negative for fever.   Respiratory:  Positive for shortness of breath.    Cardiovascular:  Positive for palpitations.   Gastrointestinal:  Positive for abdominal pain, constipation, diarrhea and reflux. Negative for nausea and vomiting.   Genitourinary:  Negative for dysuria, frequency and urgency.   Neurological:  Negative for dizziness, light-headedness and headaches.        Objective:   /86 (BP Location: Right arm, Patient Position: Sitting, BP Method: Large (Manual))   Pulse 72   Temp 97.6 °F (36.4 °C) (Temporal)   Resp 16   Ht 5' 7" (1.702 m)   Wt 134.3 kg (296 lb 1.2 oz)   LMP 07/07/2024 (Exact Date)   SpO2 98%   BMI 46.37 kg/m²       Physical Exam  Constitutional:       Appearance: Normal appearance. She is obese.   HENT:      Head: Normocephalic and atraumatic.   Cardiovascular:      Rate and Rhythm: Normal rate and regular rhythm.      Pulses: Normal pulses.   Pulmonary:      Effort: Pulmonary effort is normal.      Breath sounds: Normal breath sounds.   Abdominal:      General: Bowel sounds are decreased.      Palpations: Abdomen is soft.      Tenderness: There is abdominal tenderness in the right upper quadrant and epigastric area. Negative signs include Babcock's sign.   Musculoskeletal:         General: Normal range of motion.      Cervical back: Normal range of motion and neck supple.   Neurological:      Mental Status: She is alert. Mental status is at baseline.   Psychiatric:         Mood and Affect: Mood normal.         Behavior: Behavior normal.       I reviewed and independently interpreted the labs and imaging below:  Last Labs:  Glucose   Date Value Ref Range Status   07/08/2024 89 70 - 110 mg/dL Final   05/28/2024 92 70 - 110 mg/dL Final     BUN   Date Value Ref Range Status   07/08/2024 10 6 - 20 mg/dL Final   05/28/2024 5 (L) 6 - 20 mg/dL Final     Creatinine   Date Value Ref Range Status   07/08/2024 0.7 0.5 - 1.4 " mg/dL Final   05/28/2024 0.7 0.5 - 1.4 mg/dL Final     Cholesterol   Date Value Ref Range Status   07/08/2024 231 (H) <200 mg/dL Final   01/04/2023 174 120 - 199 mg/dL Final     Comment:     The National Cholesterol Education Program (NCEP) has set the  following guidelines (reference ranges) for Cholesterol:  Optimal.....................<200 mg/dL  Borderline High.............200-239 mg/dL  High........................> or = 240 mg/dL     05/13/2020 145 120 - 199 mg/dL Final     Comment:     The National Cholesterol Education Program (NCEP) has set the  following guidelines (reference ranges) for Cholesterol:  Optimal.....................<200 mg/dL  Borderline High.............200-239 mg/dL  High........................> or = 240 mg/dL       Hemoglobin A1C   Date Value Ref Range Status   07/08/2024 5.2 4.7 - 5.6 % Final   01/24/2024 5.8 (H) 4.0 - 5.6 % Final     Comment:     ADA Screening Guidelines:  5.7-6.4%  Consistent with prediabetes  >or=6.5%  Consistent with diabetes    High levels of fetal hemoglobin interfere with the HbA1C  assay. Heterozygous hemoglobin variants (HbS, HgC, etc)do  not significantly interfere with this assay.   However, presence of multiple variants may affect accuracy.     01/05/2023 6.3 (H) 4.0 - 5.6 % Final     Comment:     ADA Screening Guidelines:  5.7-6.4%  Consistent with prediabetes  >or=6.5%  Consistent with diabetes    High levels of fetal hemoglobin interfere with the HbA1C  assay. Heterozygous hemoglobin variants (HbS, HgC, etc)do  not significantly interfere with this assay.   However, presence of multiple variants may affect accuracy.       Hemoglobin   Date Value Ref Range Status   07/08/2024 11.4 (L) 12.0 - 16.0 g/dL Final   05/28/2024 12.6 12.0 - 16.0 g/dL Final     POC Hematocrit   Date Value Ref Range Status   05/28/2024 39 36 - 54 %PCV Final   05/18/2024 39 36 - 54 %PCV Final     Hematocrit   Date Value Ref Range Status   07/08/2024 34.9 (L) 37.0 - 48.5 % Final    05/28/2024 39.5 37.0 - 48.5 % Final       Assessment and Plan:     1. Right upper quadrant abdominal pain  - US Abdomen Limited; Future  -Advised to continuing taking Miralax and to add magnesium citrate if no BM.    -Advised to FU with GI   - recent CT from May reviewed: patient with diverticulosis without mention of diarrhea, fever or chills today in clinic.    Abbygail Jeansonne, BSN, RN, BMTCN  Family Nurse Practitioner Student       As the co-signing provider I have reviewed the students documentation and am responsible for the treatment, assessment, and plan.     Addendum - patient requesting US imaging of kidneys as well as gallbladder. Given patient symptoms will order CT Scan in place of US.   - CT Abdomen Pelvis  Without Contrast; Future

## 2024-07-10 ENCOUNTER — PATIENT MESSAGE (OUTPATIENT)
Dept: FAMILY MEDICINE | Facility: CLINIC | Age: 29
End: 2024-07-10

## 2024-07-10 ENCOUNTER — OFFICE VISIT (OUTPATIENT)
Dept: FAMILY MEDICINE | Facility: CLINIC | Age: 29
End: 2024-07-10
Payer: MEDICAID

## 2024-07-10 ENCOUNTER — HOSPITAL ENCOUNTER (OUTPATIENT)
Dept: RADIOLOGY | Facility: HOSPITAL | Age: 29
Discharge: HOME | End: 2024-07-10
Attending: NURSE PRACTITIONER
Payer: MEDICAID

## 2024-07-10 ENCOUNTER — PATIENT MESSAGE (OUTPATIENT)
Dept: INTERNAL MEDICINE | Facility: CLINIC | Age: 29
End: 2024-07-10
Payer: MEDICAID

## 2024-07-10 VITALS
OXYGEN SATURATION: 97 % | HEIGHT: 67 IN | HEART RATE: 63 BPM | SYSTOLIC BLOOD PRESSURE: 120 MMHG | DIASTOLIC BLOOD PRESSURE: 82 MMHG | BODY MASS INDEX: 45.99 KG/M2 | WEIGHT: 293 LBS

## 2024-07-10 DIAGNOSIS — E66.01 CLASS 3 SEVERE OBESITY DUE TO EXCESS CALORIES WITH SERIOUS COMORBIDITY AND BODY MASS INDEX (BMI) OF 45.0 TO 49.9 IN ADULT: ICD-10-CM

## 2024-07-10 DIAGNOSIS — R10.9 ABDOMINAL WALL PAIN IN RIGHT FLANK: ICD-10-CM

## 2024-07-10 DIAGNOSIS — N28.9 KIDNEY LESION: ICD-10-CM

## 2024-07-10 DIAGNOSIS — R10.11 RIGHT UPPER QUADRANT ABDOMINAL PAIN: ICD-10-CM

## 2024-07-10 DIAGNOSIS — F41.1 GENERALIZED ANXIETY DISORDER: Primary | ICD-10-CM

## 2024-07-10 DIAGNOSIS — D17.71 ANGIOMYOLIPOMA OF RIGHT KIDNEY: ICD-10-CM

## 2024-07-10 DIAGNOSIS — N28.9 KIDNEY LESION: Primary | ICD-10-CM

## 2024-07-10 PROCEDURE — 76770 US EXAM ABDO BACK WALL COMP: CPT | Mod: TC,PN

## 2024-07-10 PROCEDURE — 99214 OFFICE O/P EST MOD 30 MIN: CPT | Mod: PBBFAC,25,PO | Performed by: FAMILY MEDICINE

## 2024-07-10 PROCEDURE — 3008F BODY MASS INDEX DOCD: CPT | Mod: CPTII,,, | Performed by: FAMILY MEDICINE

## 2024-07-10 PROCEDURE — 76770 US EXAM ABDO BACK WALL COMP: CPT | Mod: 26,,, | Performed by: RADIOLOGY

## 2024-07-10 PROCEDURE — 1160F RVW MEDS BY RX/DR IN RCRD: CPT | Mod: CPTII,,, | Performed by: FAMILY MEDICINE

## 2024-07-10 PROCEDURE — 4010F ACE/ARB THERAPY RXD/TAKEN: CPT | Mod: CPTII,,, | Performed by: FAMILY MEDICINE

## 2024-07-10 PROCEDURE — 1159F MED LIST DOCD IN RCRD: CPT | Mod: CPTII,,, | Performed by: FAMILY MEDICINE

## 2024-07-10 PROCEDURE — 3044F HG A1C LEVEL LT 7.0%: CPT | Mod: CPTII,,, | Performed by: FAMILY MEDICINE

## 2024-07-10 PROCEDURE — 3074F SYST BP LT 130 MM HG: CPT | Mod: CPTII,,, | Performed by: FAMILY MEDICINE

## 2024-07-10 PROCEDURE — 99999 PR PBB SHADOW E&M-EST. PATIENT-LVL IV: CPT | Mod: PBBFAC,,, | Performed by: FAMILY MEDICINE

## 2024-07-10 PROCEDURE — 76705 ECHO EXAM OF ABDOMEN: CPT | Mod: 26,,, | Performed by: RADIOLOGY

## 2024-07-10 PROCEDURE — 76705 ECHO EXAM OF ABDOMEN: CPT | Mod: TC

## 2024-07-10 PROCEDURE — 3079F DIAST BP 80-89 MM HG: CPT | Mod: CPTII,,, | Performed by: FAMILY MEDICINE

## 2024-07-10 PROCEDURE — 99215 OFFICE O/P EST HI 40 MIN: CPT | Mod: S$PBB,,, | Performed by: FAMILY MEDICINE

## 2024-07-10 NOTE — PROGRESS NOTES
"Subjective:         Patient ID: Misael Garcia is a 29 y.o. female.    Chief Complaint: Anxiety    Patient Active Problem List   Diagnosis    Corns and callus    Class 3 severe obesity due to excess calories with serious comorbidity and body mass index (BMI) of 45.0 to 49.9 in adult    Bipolar 1 disorder    Recently quit using tobacco    Primary hypertension    Oral contraceptive pill surveillance    SID on CPAP    Mild persistent asthma without complication    Cervical spondylosis without myelopathy    Lumbosacral spondylosis without myelopathy    Posttraumatic stress disorder    Thoracic facet syndrome    Heart failure with mildly reduced ejection fraction (HFmrEF)    Palpitations    Marijuana use    Cholecystitis without cholelithiasis    Cyst of right ovary    Gastroesophageal reflux disease    Upper abdominal pain    Chronic constipation      Misael is a 29 y.o. female who presents today for concern about incidental finding on her right kidney.   Reports that she was having right abd and flank pain. Reports that pain worse after eating.   Reports no fever/chills, diarrhea.   Unclear which foods trigger.   Imaging showed possible angiolipoma. Very much worried that she will die from finding.     Has not been taking medication for anxiety.  Pelvic US also done.     Review of Systems   All other systems reviewed and are negative.       Objective:     Vitals:    07/10/24 1401   BP: 120/82   BP Location: Right arm   Patient Position: Sitting   BP Method: Large (Manual)   Pulse: 63   SpO2: 97%   Weight: 132.9 kg (292 lb 15.9 oz)   Height: 5' 7" (1.702 m)         Physical Exam  Vitals and nursing note reviewed.   Constitutional:       General: She is not in acute distress.     Appearance: Normal appearance. She is obese. She is not ill-appearing, toxic-appearing or diaphoretic.   HENT:      Head: Normocephalic and atraumatic.   Eyes:      General: No scleral icterus.     Conjunctiva/sclera: Conjunctivae " normal.   Cardiovascular:      Rate and Rhythm: Normal rate.   Pulmonary:      Effort: Pulmonary effort is normal. No respiratory distress.   Musculoskeletal:      Comments: Abd exam with +BS, soft, NT/ND.   Pain reproduced with lateral side stretches   Skin:     Coloration: Skin is not pale.   Neurological:      Mental Status: She is alert. Mental status is at baseline.   Psychiatric:         Attention and Perception: Attention and perception normal.         Mood and Affect: Affect normal. Mood is anxious.         Speech: Speech normal.         Behavior: Behavior normal.         Cognition and Memory: Cognition and memory normal.         Judgment: Judgment normal.       Assessment:       1. Generalized anxiety disorder    2. Angiomyolipoma of right kidney    3. Abdominal wall pain in right flank    4. Class 3 severe obesity due to excess calories with serious comorbidity and body mass index (BMI) of 45.0 to 49.9 in adult        Plan:   Recent relevant labs results reviewed with patient.     Reviewed imaging with patient. She has a renal US ordered for today after my visit with her. Can complete.   Reviewed CT abd/pelvis in May.     Reassurance. Discussed that likely benign incidental finding. Does not require follow up. Not the cause of her current pain.   Home exercises for suspected MSK cause. Videos and handouts given. In office demonstration of stretches.   Possible bloating and stretching of tight abd wall muscles, deep abd muscles s/p eating.   Nutritional referral scheduled. Would benefit from more nutritional and health literacy.   Noom may be helpful program if useful to patient.     Sees psychiatry and counseling regularly. Follow up on anxiety treatment. Work with counselor on route of intense fear of death related to medical cause.      1. Generalized anxiety disorder  2. Angiomyolipoma of right kidney  3. Abdominal wall pain in right flank  4. Class 3 severe obesity due to excess calories with serious  comorbidity and body mass index (BMI) of 45.0 to 49.9 in adult    Patient's questions answered. Plan reviewed with patient at the end of visit. Relevant precautions to chief complaint and reasons to seek further medical care or to contact the office sooner reviewed with patient.     Follow up if symptoms worsen or fail to improve.        I spent a total of 40 minutes on the day of the visit.  This includes face to face time and non-face to face time preparing to see the patient (eg, review of tests), obtaining and/or reviewing separately obtained history, documenting clinical information in the electronic or other health record, independently interpreting results and communicating results to the patient/family/caregiver, or care coordinator.

## 2024-07-10 NOTE — PATIENT INSTRUCTIONS
[email or web address]      [email or web address]

## 2024-07-10 NOTE — TELEPHONE ENCOUNTER
Pt states she is freaking out over the findings of her US, she would like someone to call her. She also states she is in so much pain right now.

## 2024-07-11 ENCOUNTER — PATIENT MESSAGE (OUTPATIENT)
Dept: FAMILY MEDICINE | Facility: CLINIC | Age: 29
End: 2024-07-11
Payer: MEDICAID

## 2024-07-11 ENCOUNTER — LAB VISIT (OUTPATIENT)
Dept: LAB | Facility: HOSPITAL | Age: 29
End: 2024-07-11
Attending: INTERNAL MEDICINE
Payer: MEDICAID

## 2024-07-11 ENCOUNTER — PATIENT MESSAGE (OUTPATIENT)
Dept: OBSTETRICS AND GYNECOLOGY | Facility: CLINIC | Age: 29
End: 2024-07-11
Payer: MEDICAID

## 2024-07-11 ENCOUNTER — PATIENT MESSAGE (OUTPATIENT)
Dept: HEMATOLOGY/ONCOLOGY | Facility: CLINIC | Age: 29
End: 2024-07-11
Payer: MEDICAID

## 2024-07-11 ENCOUNTER — PATIENT MESSAGE (OUTPATIENT)
Dept: INTERNAL MEDICINE | Facility: CLINIC | Age: 29
End: 2024-07-11
Payer: MEDICAID

## 2024-07-11 ENCOUNTER — PATIENT MESSAGE (OUTPATIENT)
Dept: ALLERGY | Facility: CLINIC | Age: 29
End: 2024-07-11
Payer: MEDICAID

## 2024-07-11 DIAGNOSIS — R79.0 LOW IRON STORES: ICD-10-CM

## 2024-07-11 DIAGNOSIS — R79.0 LOW IRON STORES: Primary | ICD-10-CM

## 2024-07-11 LAB
FERRITIN SERPL-MCNC: 61 NG/ML (ref 20–300)
IRON SERPL-MCNC: 34 UG/DL (ref 30–160)
SATURATED IRON: 8 % (ref 20–50)
TOTAL IRON BINDING CAPACITY: 407 UG/DL (ref 250–450)
TRANSFERRIN SERPL-MCNC: 275 MG/DL (ref 200–375)

## 2024-07-11 PROCEDURE — 82728 ASSAY OF FERRITIN: CPT | Performed by: INTERNAL MEDICINE

## 2024-07-11 PROCEDURE — 83540 ASSAY OF IRON: CPT | Performed by: INTERNAL MEDICINE

## 2024-07-11 PROCEDURE — 36415 COLL VENOUS BLD VENIPUNCTURE: CPT | Performed by: INTERNAL MEDICINE

## 2024-07-12 ENCOUNTER — PATIENT MESSAGE (OUTPATIENT)
Dept: INTERNAL MEDICINE | Facility: CLINIC | Age: 29
End: 2024-07-12
Payer: MEDICAID

## 2024-07-12 ENCOUNTER — PATIENT MESSAGE (OUTPATIENT)
Dept: FAMILY MEDICINE | Facility: CLINIC | Age: 29
End: 2024-07-12
Payer: MEDICAID

## 2024-07-13 ENCOUNTER — PATIENT MESSAGE (OUTPATIENT)
Dept: FAMILY MEDICINE | Facility: CLINIC | Age: 29
End: 2024-07-13
Payer: MEDICAID

## 2024-07-16 ENCOUNTER — PATIENT MESSAGE (OUTPATIENT)
Dept: SPINE | Facility: CLINIC | Age: 29
End: 2024-07-16
Payer: MEDICAID

## 2024-07-16 ENCOUNTER — PATIENT MESSAGE (OUTPATIENT)
Dept: DERMATOLOGY | Facility: CLINIC | Age: 29
End: 2024-07-16
Payer: MEDICAID

## 2024-07-17 ENCOUNTER — PATIENT MESSAGE (OUTPATIENT)
Dept: FAMILY MEDICINE | Facility: CLINIC | Age: 29
End: 2024-07-17
Payer: MEDICAID

## 2024-07-18 ENCOUNTER — TELEPHONE (OUTPATIENT)
Dept: SLEEP MEDICINE | Facility: OTHER | Age: 29
End: 2024-07-18
Payer: MEDICAID

## 2024-07-18 ENCOUNTER — PATIENT MESSAGE (OUTPATIENT)
Dept: SLEEP MEDICINE | Facility: CLINIC | Age: 29
End: 2024-07-18
Payer: MEDICAID

## 2024-07-19 ENCOUNTER — PATIENT MESSAGE (OUTPATIENT)
Dept: FAMILY MEDICINE | Facility: CLINIC | Age: 29
End: 2024-07-19
Payer: MEDICAID

## 2024-07-20 ENCOUNTER — PATIENT MESSAGE (OUTPATIENT)
Dept: CARDIOLOGY | Facility: CLINIC | Age: 29
End: 2024-07-20
Payer: MEDICAID

## 2024-07-21 ENCOUNTER — HOSPITAL ENCOUNTER (EMERGENCY)
Facility: HOSPITAL | Age: 29
Discharge: HOME | End: 2024-07-21
Attending: STUDENT IN AN ORGANIZED HEALTH CARE EDUCATION/TRAINING PROGRAM
Payer: MEDICAID

## 2024-07-21 VITALS
WEIGHT: 293 LBS | HEIGHT: 67 IN | SYSTOLIC BLOOD PRESSURE: 138 MMHG | DIASTOLIC BLOOD PRESSURE: 84 MMHG | TEMPERATURE: 99 F | BODY MASS INDEX: 45.99 KG/M2 | HEART RATE: 65 BPM | OXYGEN SATURATION: 98 % | RESPIRATION RATE: 20 BRPM

## 2024-07-21 DIAGNOSIS — F41.9 ANXIETY: ICD-10-CM

## 2024-07-21 DIAGNOSIS — R07.9 CHEST PAIN: Primary | ICD-10-CM

## 2024-07-21 LAB
ALBUMIN SERPL BCP-MCNC: 3.5 G/DL (ref 3.5–5.2)
ALP SERPL-CCNC: 111 U/L (ref 55–135)
ALT SERPL W/O P-5'-P-CCNC: 19 U/L (ref 10–44)
ANION GAP SERPL CALC-SCNC: 7 MMOL/L (ref 8–16)
AST SERPL-CCNC: 13 U/L (ref 10–40)
BASOPHILS # BLD AUTO: 0.03 K/UL (ref 0–0.2)
BASOPHILS NFR BLD: 0.3 % (ref 0–1.9)
BILIRUB SERPL-MCNC: 0.5 MG/DL (ref 0.1–1)
BNP SERPL-MCNC: 16 PG/ML (ref 0–99)
BUN SERPL-MCNC: 8 MG/DL (ref 6–20)
CALCIUM SERPL-MCNC: 9.2 MG/DL (ref 8.7–10.5)
CHLORIDE SERPL-SCNC: 107 MMOL/L (ref 95–110)
CO2 SERPL-SCNC: 26 MMOL/L (ref 23–29)
CREAT SERPL-MCNC: 0.7 MG/DL (ref 0.5–1.4)
DIFFERENTIAL METHOD BLD: ABNORMAL
EOSINOPHIL # BLD AUTO: 0.2 K/UL (ref 0–0.5)
EOSINOPHIL NFR BLD: 1.7 % (ref 0–8)
ERYTHROCYTE [DISTWIDTH] IN BLOOD BY AUTOMATED COUNT: 15 % (ref 11.5–14.5)
EST. GFR  (NO RACE VARIABLE): >60 ML/MIN/1.73 M^2
GLUCOSE SERPL-MCNC: 85 MG/DL (ref 70–110)
HCT VFR BLD AUTO: 39.4 % (ref 37–48.5)
HGB BLD-MCNC: 12.2 G/DL (ref 12–16)
IMM GRANULOCYTES # BLD AUTO: 0.02 K/UL (ref 0–0.04)
IMM GRANULOCYTES NFR BLD AUTO: 0.2 % (ref 0–0.5)
LYMPHOCYTES # BLD AUTO: 2.6 K/UL (ref 1–4.8)
LYMPHOCYTES NFR BLD: 25.2 % (ref 18–48)
MCH RBC QN AUTO: 25.6 PG (ref 27–31)
MCHC RBC AUTO-ENTMCNC: 31 G/DL (ref 32–36)
MCV RBC AUTO: 83 FL (ref 82–98)
MONOCYTES # BLD AUTO: 0.7 K/UL (ref 0.3–1)
MONOCYTES NFR BLD: 6.8 % (ref 4–15)
NEUTROPHILS # BLD AUTO: 6.8 K/UL (ref 1.8–7.7)
NEUTROPHILS NFR BLD: 65.8 % (ref 38–73)
NRBC BLD-RTO: 0 /100 WBC
PLATELET # BLD AUTO: 523 K/UL (ref 150–450)
PMV BLD AUTO: 10 FL (ref 9.2–12.9)
POTASSIUM SERPL-SCNC: 4 MMOL/L (ref 3.5–5.1)
PROT SERPL-MCNC: 6.9 G/DL (ref 6–8.4)
RBC # BLD AUTO: 4.77 M/UL (ref 4–5.4)
SODIUM SERPL-SCNC: 140 MMOL/L (ref 136–145)
TROPONIN I SERPL DL<=0.01 NG/ML-MCNC: <0.006 NG/ML (ref 0–0.03)
WBC # BLD AUTO: 10.37 K/UL (ref 3.9–12.7)

## 2024-07-21 PROCEDURE — 99285 EMERGENCY DEPT VISIT HI MDM: CPT | Mod: 25

## 2024-07-21 PROCEDURE — 85025 COMPLETE CBC W/AUTO DIFF WBC: CPT

## 2024-07-21 PROCEDURE — 84484 ASSAY OF TROPONIN QUANT: CPT

## 2024-07-21 PROCEDURE — 80053 COMPREHEN METABOLIC PANEL: CPT

## 2024-07-21 PROCEDURE — 83880 ASSAY OF NATRIURETIC PEPTIDE: CPT

## 2024-07-21 PROCEDURE — 93010 ELECTROCARDIOGRAM REPORT: CPT | Mod: ,,, | Performed by: INTERNAL MEDICINE

## 2024-07-21 PROCEDURE — 93005 ELECTROCARDIOGRAM TRACING: CPT

## 2024-07-21 RX ORDER — HYDROXYZINE HYDROCHLORIDE 25 MG/1
25 TABLET, FILM COATED ORAL EVERY 6 HOURS
Qty: 12 TABLET | Refills: 0 | Status: SHIPPED | OUTPATIENT
Start: 2024-07-21

## 2024-07-21 NOTE — ED TRIAGE NOTES
Misael Peterson Aileen Garcia, a 29 y.o. female presents to the ED w/ complaint of midsternal CP on and off 3 days, non radiating    Triage note:  Chief Complaint   Patient presents with    Chest Pain     X2 days.      Review of patient's allergies indicates:  No Known Allergies  Past Medical History:   Diagnosis Date    ADHD (attention deficit hyperactivity disorder)     Anxiety     Asthma     Bipolar affective     Eczema     History of chlamydia 12/2016    Insomnia     Morbid obesity with BMI of 40.0-44.9, adult     Sleep disorder     Type 2 diabetes mellitus without complications          APPEARANCE: awake and alert in NAD. PAIN  8/10  SKIN: warm, dry and intact. No breakdown or bruising.  MUSCULOSKELETAL: Patient moving all extremities spontaneously, no obvious swelling or deformities noted. Ambulates independently.  RESPIRATORY: Denies shortness of breath.Respirations unlabored.   CARDIAC: Endorses CP, 2+ distal pulses; no peripheral edema  ABDOMEN: S/ND/NT, Denies nausea  : voids spontaneously, denies difficulty  Neurologic: AAO x 4; follows commands equal strength in all extremities; denies numbness/tingling. Denies dizziness

## 2024-07-21 NOTE — ED PROVIDER NOTES
Encounter Date: 7/21/2024       History     Chief Complaint   Patient presents with    Chest Pain     X2 days.      29-year-old female with history of anxiety, asthma, bipolar disorder, type 2 diabetes, morbid obesity presents to the ED regarding midsternal chest pain onset 3 days.  She reports pain has been intermittent though constant since 8:00 a.m. this morning.  States it intermittently radiates into left chest.  Describes it as a sharp 5/10 pain.  It is not tender to palpate.  Also complains of mild shortness of breath though not currently present.  She does have history of reflux but states this feels different.  Also does not feel like her typical asthma.  Denies fever, URI symptoms, abdominal pain, breast pain, or other complaints at this time.  She has not taken any medications prior to arrival.  Denies any recent travel.  No leg swelling or hemoptysis.  No history of DVT or PE.    The history is provided by the patient and medical records.     Review of patient's allergies indicates:  No Known Allergies  Past Medical History:   Diagnosis Date    ADHD (attention deficit hyperactivity disorder)     Anxiety     Asthma     Bipolar affective     Eczema     History of chlamydia 12/2016    Insomnia     Morbid obesity with BMI of 40.0-44.9, adult     Sleep disorder     Type 2 diabetes mellitus without complications      Past Surgical History:   Procedure Laterality Date    NO PAST SURGERIES      as of 1-13-17     Family History   Problem Relation Name Age of Onset    No Known Problems Paternal Grandfather      Breast cancer Paternal Grandmother      Diabetes Maternal Grandmother      No Known Problems Maternal Grandfather      No Known Problems Father      No Known Problems Mother      No Known Problems Sister      Colon cancer Neg Hx      Ovarian cancer Neg Hx       Social History     Tobacco Use    Smoking status: Former     Types: Vaping with nicotine     Passive exposure: Past    Smokeless tobacco: Never     Tobacco comments:     boyfriend smoke cigarettes     Former weed consumption   Substance Use Topics    Alcohol use: No    Drug use: Not Currently     Types: Marijuana     Review of Systems  See HPI  Physical Exam     Initial Vitals [07/21/24 1219]   BP Pulse Resp Temp SpO2   134/85 70 15 98.6 °F (37 °C) 98 %      MAP       --         Physical Exam    Vitals reviewed.  Constitutional: She appears well-developed and well-nourished. She is not diaphoretic. No distress.   HENT:   Head: Normocephalic and atraumatic.   Neck: Neck supple.   Cardiovascular:  Normal rate, regular rhythm and normal heart sounds.     Exam reveals no gallop and no friction rub.       No murmur heard.  No lower extremity edema   Pulmonary/Chest: Breath sounds normal. No respiratory distress. She has no wheezes. She has no rhonchi. She has no rales. She exhibits no tenderness.   Musculoskeletal:      Cervical back: Neck supple.     Neurological: She is alert and oriented to person, place, and time.   Psychiatric: Her mood appears anxious.   Mildly anxious         ED Course   Procedures  Labs Reviewed   CBC W/ AUTO DIFFERENTIAL - Abnormal       Result Value    WBC 10.37      RBC 4.77      Hemoglobin 12.2      Hematocrit 39.4      MCV 83      MCH 25.6 (*)     MCHC 31.0 (*)     RDW 15.0 (*)     Platelets 523 (*)     MPV 10.0      Immature Granulocytes 0.2      Gran # (ANC) 6.8      Immature Grans (Abs) 0.02      Lymph # 2.6      Mono # 0.7      Eos # 0.2      Baso # 0.03      nRBC 0      Gran % 65.8      Lymph % 25.2      Mono % 6.8      Eosinophil % 1.7      Basophil % 0.3      Differential Method Automated     COMPREHENSIVE METABOLIC PANEL - Abnormal    Sodium 140      Potassium 4.0      Chloride 107      CO2 26      Glucose 85      BUN 8      Creatinine 0.7      Calcium 9.2      Total Protein 6.9      Albumin 3.5      Total Bilirubin 0.5      Alkaline Phosphatase 111      AST 13      ALT 19      eGFR >60.0      Anion Gap 7 (*)    TROPONIN I     Troponin I <0.006     B-TYPE NATRIURETIC PEPTIDE    BNP 16       EKG Readings: (Independently Interpreted)   Initial Reading: No STEMI. Rhythm: Normal Sinus Rhythm. Heart Rate: 82. Ectopy: No Ectopy. Conduction: Normal. ST Segments: Normal ST Segments. T Waves: Normal.       Imaging Results              X-Ray Chest PA And Lateral (Final result)  Result time 07/21/24 14:17:06      Final result by Babak Finnegan MD (07/21/24 14:17:06)                   Impression:      1. No acute cardiopulmonary process.      Electronically signed by: Babak Finnegan MD  Date:    07/21/2024  Time:    14:17               Narrative:    EXAMINATION:  XR CHEST PA AND LATERAL    CLINICAL HISTORY:  Chest Pain;    TECHNIQUE:  PA and lateral views of the chest were performed.    COMPARISON:  06/08/2024    FINDINGS:  The cardiomediastinal silhouette is not enlarged.  There is no pleural effusion.  The trachea is midline.  The lungs are symmetrically expanded bilaterally without evidence of acute parenchymal process. No large focal consolidation seen.  There is no pneumothorax.  The osseous structures are unremarkable.                                       Medications - No data to display  Medical Decision Making  Amount and/or Complexity of Data Reviewed  Labs: ordered. Decision-making details documented in ED Course.  Radiology: ordered. Decision-making details documented in ED Course.    Risk  Prescription drug management.      Additional MDM:   PERC Rule:   Age is greater than or equal to 50 = 0.0  Heart Rate is greater than or equal to 100 = 0.0  SaO2 on room air < 95% = 0.0  Unilateral leg swelling = 0.0  Hemoptysis = 0.0  Recent surgery or trauma = 0.0  Prior PE or DVT =  0.0  Hormone use = 0.00  PERC Score = 0        Well's Criteria Score:  -Clinical symptoms of DVT (leg swelling, pain with palpation) = 0.0  -PE is #1 diagnosis OR equally likely =            0.0  -Heart Rate >100 =   0.0  -Immobilization (= or > than 3 days) or  surgery in the previous 4 weeks = 0.0  -Previous DVT/PE = 0.0  -Hemoptysis =          0.0  -Malignancy =           0.0  Well's Probability Score =    0        APC / Resident Notes:   Emergent evaluation of 29-year-old female presenting with midsternal chest pain intermittently for the past 3 days.  Vitals WNL.  Clinically well-appearing, in no acute distress.  Mildly anxious.  See physical exam findings above.  Will obtain labs and chest x-ray to assess for acute abnormalities but low suspicion for ACS.  Likely anxiety.    My differential diagnoses include but are not limited to:   Anxiety, asthma, GERD, costochondritis, muscle strain, ACS, pneumonia, pleural effusion, pneumothorax, PE    See ED course.        ED Course as of 07/21/24 1507   Sun Jul 21, 2024   1416 WBC: 10.37 [KB]   1416 Hemoglobin: 12.2 [KB]   1416 BNP: 16 [KB]   1416 Troponin I: <0.006 [KB]   1416 Comprehensive metabolic panel(!)  WNL [KB]   1420 X-Ray Chest PA And Lateral  Per my independent interpretation, cardiac silhouette within normal limits. No consolidation, pleural effusion, or pneumothorax noted. No bony abnormalities.  [KB]   1421 Patient educated on findings.  She reports resolution of symptoms without any intervention.  Is feeling a little anxious.  Offered medication prior to discharge but patient would prefer to  at pharmacy.  Prescribed hydroxyzine.  Advised to follow up with PCP and psychiatrist. Strict ED precautions discussed with all questions answered.  She verbalized understanding and agreed to plan. Vitals are stable and safe for discharge.  [KB]      ED Course User Index  [KB] Leigh West PA-C                           Clinical Impression:  Final diagnoses:  [R07.9] Chest pain (Primary)  [F41.9] Anxiety          ED Disposition Condition    Discharge Stable          ED Prescriptions       Medication Sig Dispense Start Date End Date Auth. Provider    hydrOXYzine HCL (ATARAX) 25 MG tablet Take 1 tablet (25 mg  total) by mouth every 6 (six) hours. 12 tablet 7/21/2024 -- Leigh West PA-C          Follow-up Information       Follow up With Specialties Details Why Contact Info    Katelyn Chapman MD Family Medicine Schedule an appointment as soon as possible for a visit   [address] [facility]  [place] [address]  [phone]      [facility] - Emergency Dept Emergency Medicine Go to  If symptoms worsen [id number]  [facility] [id number] [phone]             Leigh West PA-C  07/21/24 1501

## 2024-07-22 ENCOUNTER — TELEPHONE (OUTPATIENT)
Dept: CARDIOLOGY | Facility: CLINIC | Age: 29
End: 2024-07-22

## 2024-07-22 ENCOUNTER — PATIENT MESSAGE (OUTPATIENT)
Dept: CARDIOLOGY | Facility: CLINIC | Age: 29
End: 2024-07-22

## 2024-07-22 LAB
OHS QRS DURATION: 84 MS
OHS QTC CALCULATION: 432 MS

## 2024-07-23 ENCOUNTER — PATIENT MESSAGE (OUTPATIENT)
Dept: CARDIOLOGY | Facility: CLINIC | Age: 29
End: 2024-07-23
Payer: MEDICAID

## 2024-07-23 ENCOUNTER — TELEPHONE (OUTPATIENT)
Dept: CARDIOLOGY | Facility: CLINIC | Age: 29
End: 2024-07-23
Payer: MEDICAID

## 2024-07-23 NOTE — TELEPHONE ENCOUNTER
Reached out to Ms Garcia and she stated she think the beta blocker is causing the chest pains. Wants to know if she can stop taking that until she sees Misael Weiss DNP.

## 2024-07-24 ENCOUNTER — OFFICE VISIT (OUTPATIENT)
Dept: CARDIOLOGY | Facility: CLINIC | Age: 29
End: 2024-07-24
Payer: MEDICAID

## 2024-07-24 ENCOUNTER — PATIENT OUTREACH (OUTPATIENT)
Dept: ADMINISTRATIVE | Facility: HOSPITAL | Age: 29
End: 2024-07-24
Payer: MEDICAID

## 2024-07-24 ENCOUNTER — PATIENT MESSAGE (OUTPATIENT)
Dept: CARDIOLOGY | Facility: CLINIC | Age: 29
End: 2024-07-24

## 2024-07-24 VITALS
HEART RATE: 80 BPM | OXYGEN SATURATION: 98 % | HEIGHT: 67 IN | SYSTOLIC BLOOD PRESSURE: 118 MMHG | BODY MASS INDEX: 45.45 KG/M2 | DIASTOLIC BLOOD PRESSURE: 80 MMHG | WEIGHT: 289.56 LBS

## 2024-07-24 DIAGNOSIS — E66.01 CLASS 3 SEVERE OBESITY DUE TO EXCESS CALORIES WITH SERIOUS COMORBIDITY AND BODY MASS INDEX (BMI) OF 45.0 TO 49.9 IN ADULT: ICD-10-CM

## 2024-07-24 DIAGNOSIS — F12.90 MARIJUANA USE: ICD-10-CM

## 2024-07-24 DIAGNOSIS — I10 PRIMARY HYPERTENSION: ICD-10-CM

## 2024-07-24 DIAGNOSIS — R00.2 PALPITATIONS: ICD-10-CM

## 2024-07-24 DIAGNOSIS — R07.89 OTHER CHEST PAIN: Primary | ICD-10-CM

## 2024-07-24 DIAGNOSIS — K21.9 GASTROESOPHAGEAL REFLUX DISEASE, UNSPECIFIED WHETHER ESOPHAGITIS PRESENT: ICD-10-CM

## 2024-07-24 DIAGNOSIS — F31.9 BIPOLAR 1 DISORDER: ICD-10-CM

## 2024-07-24 DIAGNOSIS — G47.33 OSA ON CPAP: ICD-10-CM

## 2024-07-24 DIAGNOSIS — Z87.891 RECENTLY QUIT USING TOBACCO: ICD-10-CM

## 2024-07-24 DIAGNOSIS — I50.22 HEART FAILURE WITH MILDLY REDUCED EJECTION FRACTION (HFMREF): ICD-10-CM

## 2024-07-24 DIAGNOSIS — F41.9 ANXIETY: ICD-10-CM

## 2024-07-24 DIAGNOSIS — J45.30 MILD PERSISTENT ASTHMA WITHOUT COMPLICATION: ICD-10-CM

## 2024-07-24 PROCEDURE — 1159F MED LIST DOCD IN RCRD: CPT | Mod: CPTII,,,

## 2024-07-24 PROCEDURE — 4010F ACE/ARB THERAPY RXD/TAKEN: CPT | Mod: CPTII,,,

## 2024-07-24 PROCEDURE — 99213 OFFICE O/P EST LOW 20 MIN: CPT | Mod: PBBFAC,PN

## 2024-07-24 PROCEDURE — 3079F DIAST BP 80-89 MM HG: CPT | Mod: CPTII,,,

## 2024-07-24 PROCEDURE — 99214 OFFICE O/P EST MOD 30 MIN: CPT | Mod: S$PBB,,,

## 2024-07-24 PROCEDURE — 3074F SYST BP LT 130 MM HG: CPT | Mod: CPTII,,,

## 2024-07-24 PROCEDURE — 1160F RVW MEDS BY RX/DR IN RCRD: CPT | Mod: CPTII,,,

## 2024-07-24 PROCEDURE — 3008F BODY MASS INDEX DOCD: CPT | Mod: CPTII,,,

## 2024-07-24 PROCEDURE — 3044F HG A1C LEVEL LT 7.0%: CPT | Mod: CPTII,,,

## 2024-07-24 PROCEDURE — 99999 PR PBB SHADOW E&M-EST. PATIENT-LVL III: CPT | Mod: PBBFAC,,,

## 2024-07-24 NOTE — ASSESSMENT & PLAN NOTE
NM Stress test 3/14/24  Interpretation Summary    The ECG portion of the study is abnormal but not diagnostic due to resting ST-T abnormalities.    The patient reported chest pain during the stress test.    There were no arrhythmias during stress.  Impression:     1. Examination is compromised by GI activity adjacent to the inferior wall of the left ventricle as well as heterogeneous distribution of tracer activity throughout the left ventricle on both the resting and stress images.  No convincing scintigraphic evidence for ischemia or infarct.  2. The global left ventricular systolic function is normal with an LV ejection fraction of 71% and no evidence of LV dilatation. Wall motion is normal.

## 2024-07-24 NOTE — ASSESSMENT & PLAN NOTE
-instructed to contact her Psychiatrist today and inform them of her severe anxiety.   -She reports her anxiety is very bad right now. She is extremely anxious at thinks something is wrong with her heart. Discussed her stress test results and normal EKGs.   -She was prescribed Hydroxyzine in the ED but has not picked up the medication.

## 2024-07-24 NOTE — PROGRESS NOTES
Subjective:    Patient ID:  Misael Garcia is a 29 y.o. female who presents for evaluation of No chief complaint on file.      PCP: Katelyn Chapman MD     Referring Provider: Valente Hall MD    HPI: Patient is a 28 yo F w/PMH of HTN, DM-2, morbid obesity,  SID, ADHD, anxiety, Bipolar affective disorder, thrombocytosis, low iron, marijuana use (prior vape use)  who presents today for f/u appt. She was last seen on 7/3/24 for f/u and to discuss medications. At prior visit she reported  SOB at rest and RODRIGUEZ w minimal activity. She also reported  palpitations and  left sided CP. 48 HR Holter, predominantly sinus rhythm w supraventricular ectopy and max  BPM. Patient was started on metoprolol succinate 25 mg, but she had not started the medication. Ischemic evaluation completed w negative NM stress test. Patient reported some palpitations but not limiting. Metoprolol succinate was restarted at 12.5 mg. Since last visit she has had multiple ED visits w c/o CP and abdominal pain w negative acute EKG changes. She was referred to GI for further evaluation. She also received hydroxyzine for anxiety. She reports she has not picked up the hydroxyzine.     She reports her anxiety is very bad right now. She is extremely anxious at thinks something is wrong with her heart.   Patient CP,SOB, denies ,orthopnea, PND, presyncope, LOC, swelling, or claudication. Patient does not monitor her  home BP. Patient reports intermittent medication compliance without side effects. She reports stopping her cardiac medication because she thought the would interfere with her other medications.  She notes 30 pound  weight loss since starting Ozempic. Patient does not exercise regularly, maybe once a week. .           Past Medical History:   Diagnosis Date    ADHD (attention deficit hyperactivity disorder)     Anxiety     Asthma     Bipolar affective     Eczema     History of chlamydia 12/2016    Insomnia     Morbid obesity with BMI  of 40.0-44.9, adult     Sleep disorder     Type 2 diabetes mellitus without complications      Past Surgical History:   Procedure Laterality Date    NO PAST SURGERIES      as of 19     Social History     Socioeconomic History    Marital status: Single   Occupational History    Occupation:     Tobacco Use    Smoking status: Former     Types: Vaping with nicotine     Quit date:      Years since quittin.5     Passive exposure: Past    Smokeless tobacco: Never    Tobacco comments:     boyfriend smoke cigarettes     Former weed consumption   Substance and Sexual Activity    Alcohol use: No    Drug use: Not Currently     Types: Marijuana    Sexual activity: Not Currently     Partners: Male     Birth control/protection: None     Comment: Single:       Social Determinants of Health     Financial Resource Strain: Low Risk  (2024)    Received from [facility]    Overall Financial Resource Strain (CARDIA)     Difficulty of Paying Living Expenses: Not hard at all   Food Insecurity: No Food Insecurity (2024)    Received from [facility]    Hunger Vital Sign     Worried About Running Out of Food in the Last Year: Never true     Ran Out of Food in the Last Year: Never true   Transportation Needs: No Transportation Needs (2024)    Received from [facility]    PRAPARE - Transportation     Lack of Transportation (Medical): No     Lack of Transportation (Non-Medical): No   Physical Activity: Unknown (2024)    Exercise Vital Sign     Days of Exercise per Week: Patient declined   Recent Concern: Physical Activity - Insufficiently Active (2024)    Received from [facility]    Exercise Vital Sign     Days of Exercise per Week: 4 days     Minutes of Exercise per Session: 20 min   Stress: Stress Concern Present (2024)    Received from [facility]    [employer] - Occupational Stress Questionnaire     Feeling of Stress : Very much   Housing Stability: Unknown  (8/24/2023)    Received from [facility], [facility], [facility]    Housing Stability Vital Sign     Unable to Pay for Housing in the Last Year: No     In the last 12 months, was there a time when you did not have a steady place to sleep or slept in a shelter (including now)?: No     Family History   Problem Relation Name Age of Onset    No Known Problems Paternal Grandfather      Breast cancer Paternal Grandmother      Diabetes Maternal Grandmother      No Known Problems Maternal Grandfather      No Known Problems Father      No Known Problems Mother      No Known Problems Sister      Colon cancer Neg Hx      Ovarian cancer Neg Hx         Review of patient's allergies indicates:  No Known Allergies    Medication List with Changes/Refills   Current Medications    ESCITALOPRAM OXALATE (LEXAPRO) 10 MG TABLET    Take 1 tablet by mouth once daily.    HYDROQUINONE 4 % CREA    Apply topically 2 (two) times daily.    HYDROXYZINE HCL (ATARAX) 25 MG TABLET    Take 1 tablet (25 mg total) by mouth every 6 (six) hours.    LISINOPRIL (PRINIVIL,ZESTRIL) 20 MG TABLET    Take 1 tablet (20 mg total) by mouth once daily.    LURASIDONE (LATUDA) 40 MG TAB TABLET    Take 40 mg by mouth.    METOPROLOL SUCCINATE (TOPROL-XL) 25 MG 24 HR TABLET    Take 0.5 tablets (12.5 mg total) by mouth once daily.    PANTOPRAZOLE (PROTONIX) 40 MG TABLET    Take 1 tablet (40 mg total) by mouth 2 (two) times daily.    PRUCALOPRIDE 2 MG TAB    Take 1 tablet by mouth once daily.       Review of Systems   Constitutional: Negative for diaphoresis and fever.   HENT:  Negative for congestion and hearing loss.    Eyes:  Negative for blurred vision and pain.   Cardiovascular:  Negative for claudication, leg swelling, near-syncope and syncope.   Respiratory:  Negative for sleep disturbances due to breathing.    Hematologic/Lymphatic: Negative for bleeding problem. Does not bruise/bleed easily.   Skin:  Negative for color change and poor wound healing.  "  Gastrointestinal:  Negative for abdominal pain and nausea.   Genitourinary:  Negative for bladder incontinence and flank pain.   Neurological:  Negative for focal weakness and light-headedness.   Psychiatric/Behavioral:  The patient is nervous/anxious.         Objective:   /80 (BP Location: Left arm, Patient Position: Sitting, BP Method: Large (Manual))   Pulse 80   Ht 5' 7" (1.702 m)   Wt 131.4 kg (289 lb 9.2 oz)   LMP 07/07/2024 (Exact Date)   SpO2 98%   BMI 45.35 kg/m²    Physical Exam  Constitutional:       Appearance: She is well-developed. She is obese. She is not diaphoretic.   HENT:      Head: Normocephalic and atraumatic.   Eyes:      General: No scleral icterus.     Pupils: Pupils are equal, round, and reactive to light.   Neck:      Vascular: No JVD.   Cardiovascular:      Rate and Rhythm: Normal rate and regular rhythm.      Pulses: Intact distal pulses.      Heart sounds: S1 normal and S2 normal. No murmur heard.     No friction rub. No gallop.   Pulmonary:      Effort: Pulmonary effort is normal. No respiratory distress.      Breath sounds: Normal breath sounds. No wheezing or rales.   Chest:      Chest wall: No tenderness.   Abdominal:      General: Bowel sounds are normal. There is no distension.      Palpations: Abdomen is soft. There is no mass.      Tenderness: There is no abdominal tenderness. There is no rebound.   Musculoskeletal:         General: No tenderness. Normal range of motion.      Cervical back: Normal range of motion and neck supple.   Skin:     General: Skin is warm and dry.      Coloration: Skin is not pale.   Neurological:      Mental Status: She is alert and oriented to person, place, and time.      Coordination: Coordination normal.      Deep Tendon Reflexes: Reflexes normal.   Psychiatric:         Mood and Affect: Mood is anxious.         Behavior: Behavior normal.         Judgment: Judgment normal.           EKG reviewed 7/21/24: NSR     NM Stress test " 3/14/24  Interpretation Summary    The ECG portion of the study is abnormal but not diagnostic due to resting ST-T abnormalities.    The patient reported chest pain during the stress test.    There were no arrhythmias during stress.  Impression:     1. Examination is compromised by GI activity adjacent to the inferior wall of the left ventricle as well as heterogeneous distribution of tracer activity throughout the left ventricle on both the resting and stress images.  No convincing scintigraphic evidence for ischemia or infarct.  2. The global left ventricular systolic function is normal with an LV ejection fraction of 71% and no evidence of LV dilatation. Wall motion is normal.     48 HR Holter 3/12/24  Interpretation Summary  Show Result Comparison     The predominant rhythm is sinus.    There were PVCs totalling 0    There were very rare PACs  Monitoring started at 10:39 and continued for 43 hr 24 min. The average heart rate was 91 BPM. The minimum heart rate was 63 BPM, occurring at 17:49:00 D1. The maximum heart rate was 169 BPM, occurring at 17:57:12 D1. The patient's rhythm included 7 hr 20 min 49 sec of tachycardia. The fastest single episode of tachycardia occurred at 17:56:43 D1, lasting 3 min 55 sec, with maximum heart rate of 169 BPM. Supraventricular ectopic activity consisted of 12 beats, of which, 8 were late beats, 4 were single PACs. The longest R-R interval was 1.2 seconds occurring at 11:00:25 D1. The longest N-N interval was 1.2 seconds occurring at 11:00:25 D1.      Cardiac echo 1/30/24  Summary    Left Ventricle: The left ventricle is normal in size. Normal wall thickness. Normal wall motion. There is reduced systolic function. Biplane (2D) method of discs ejection fraction is 50%. There is normal diastolic function.    Right Ventricle: Systolic function is normal. TAPSE is 2.13 cm.    Mitral Valve: There is mild regurgitation.    Pulmonary Artery: The estimated pulmonary artery systolic  pressure is 38 mmHg.    IVC/SVC: Normal venous pressure at 3 mmHg.    Overall the study quality was technically difficult. The study was difficult due to patient's poor endocardial visualization.      EKG reviewed 6/29/2023  Assessment:       1. Other chest pain    2. Palpitations    3. Primary hypertension    4. Heart failure with mildly reduced ejection fraction (HFmrEF)    5. Mild persistent asthma without complication    6. Class 3 severe obesity due to excess calories with serious comorbidity and body mass index (BMI) of 45.0 to 49.9 in adult    7. Gastroesophageal reflux disease, unspecified whether esophagitis present    8. Marijuana use    9. Recently quit using tobacco    10. SID on CPAP    11. Bipolar 1 disorder    12. Anxiety           Plan:         Other chest pain  NM Stress test 3/14/24  Interpretation Summary    The ECG portion of the study is abnormal but not diagnostic due to resting ST-T abnormalities.    The patient reported chest pain during the stress test.    There were no arrhythmias during stress.  Impression:     1. Examination is compromised by GI activity adjacent to the inferior wall of the left ventricle as well as heterogeneous distribution of tracer activity throughout the left ventricle on both the resting and stress images.  No convincing scintigraphic evidence for ischemia or infarct.  2. The global left ventricular systolic function is normal with an LV ejection fraction of 71% and no evidence of LV dilatation. Wall motion is normal.    Palpitations  48 HR Holter 3/12/24  Interpretation Summary  Show Result Comparison     The predominant rhythm is sinus.    There were PVCs totalling 0    There were very rare PACs  Monitoring started at 10:39 and continued for 43 hr 24 min. The average heart rate was 91 BPM. The minimum heart rate was 63 BPM, occurring at 17:49:00 D1. The maximum heart rate was 169 BPM, occurring at 17:57:12 D1. The patient's rhythm included 7 hr 20 min 49 sec of  tachycardia. The fastest single episode of tachycardia occurred at 17:56:43 D1, lasting 3 min 55 sec, with maximum heart rate of 169 BPM. Supraventricular ectopic activity consisted of 12 beats, of which, 8 were late beats, 4 were single PACs. The longest R-R interval was 1.2 seconds occurring at 11:00:25 D1. The longest N-N interval was 1.2 seconds occurring at 11:00:25 D1.     -continue  metoprolol 12.5 mg     Primary hypertension  -Goal BP < 130/80  -continue medical therapy- lisinopril 20 mg   -discussed lifestyle modifications    Heart failure with mildly reduced ejection fraction (HFmrEF)  Cardiac echo 1/30/24  Summary    Left Ventricle: The left ventricle is normal in size. Normal wall thickness. Normal wall motion. There is reduced systolic function. Biplane (2D) method of discs ejection fraction is 50%. There is normal diastolic function.    -discussed lifestyle modifications, - low salt diet, exercise, HTN control, weight loss  - NM stress test  3/14/24- negative for ischemia     Mild persistent asthma without complication  -Managed by PCP  -continue medical therapy    Class 3 severe obesity due to excess calories with serious comorbidity and body mass index (BMI) of 45.0 to 49.9 in adult   BMI 46.53.  Morbid obesity complicates all aspects of disease management from diagnostic modalities to treatment. Weight loss encouraged and health benefits explained to patient.   -currently on Ozempic, reports a 30 pound weight loss     Gastroesophageal reflux disease  Referral placed for GI evaluation     Marijuana use  -Reports daily -ever other day use     Recently quit using tobacco  -Currently smokes marijuana     SID on CPAP  -CPAP nightly- non compliant  -discussed the importance of nightly use     Bipolar 1 disorder  -Followed by Behavioral Health  -continue medical therapy-   -currently prescribed- Latuda     Anxiety  -instructed to contact her Psychiatrist today and inform them of her severe anxiety.   -She  reports her anxiety is very bad right now. She is extremely anxious at thinks something is wrong with her heart. Discussed her stress test results and normal EKGs.   -She was prescribed Hydroxyzine in the ED but has not picked up the medication.         Total duration of face to face visit time 30 minutes.  Total time spent counseling greater than fifty percent of total visit time.  Counseling included discussion regarding imaging findings, diagnosis, possibilities, treatment options, risks and benefits.  The patient had many questions regarding the options and long-term effects      Misael Weiss, NAVIN  Cardiology

## 2024-07-24 NOTE — PROGRESS NOTES
Health Maintenance Topic(s) Outreach Outcomes & Actions Taken:    Eye Exam - Outreach Outcomes & Actions Taken  : External Records Requested & Care Team Updated if Applicable and [facility]      Additional Notes:  Lisseth werner/ [facility] - to fax eye exam      Care Management, Digital Medicine, and/or Education Referrals  Next Steps - Referral Actions: Digital Medicine Outcomes and Actions Taken: active

## 2024-07-24 NOTE — ASSESSMENT & PLAN NOTE
48 HR Holter 3/12/24  Interpretation Summary  Show Result Comparison     The predominant rhythm is sinus.    There were PVCs totalling 0    There were very rare PACs  Monitoring started at 10:39 and continued for 43 hr 24 min. The average heart rate was 91 BPM. The minimum heart rate was 63 BPM, occurring at 17:49:00 D1. The maximum heart rate was 169 BPM, occurring at 17:57:12 D1. The patient's rhythm included 7 hr 20 min 49 sec of tachycardia. The fastest single episode of tachycardia occurred at 17:56:43 D1, lasting 3 min 55 sec, with maximum heart rate of 169 BPM. Supraventricular ectopic activity consisted of 12 beats, of which, 8 were late beats, 4 were single PACs. The longest R-R interval was 1.2 seconds occurring at 11:00:25 D1. The longest N-N interval was 1.2 seconds occurring at 11:00:25 D1.     -continue  metoprolol 12.5 mg

## 2024-07-25 ENCOUNTER — TELEPHONE (OUTPATIENT)
Dept: CARDIOLOGY | Facility: CLINIC | Age: 29
End: 2024-07-25
Payer: MEDICAID

## 2024-07-25 ENCOUNTER — PATIENT MESSAGE (OUTPATIENT)
Dept: ALLERGY | Facility: CLINIC | Age: 29
End: 2024-07-25
Payer: MEDICAID

## 2024-07-25 ENCOUNTER — PATIENT MESSAGE (OUTPATIENT)
Dept: CARDIOLOGY | Facility: CLINIC | Age: 29
End: 2024-07-25
Payer: MEDICAID

## 2024-07-25 NOTE — TELEPHONE ENCOUNTER
Reached out to Ms. Garcia to advise her to go to the Er if she's having problems breathing. I advised Ms Garcia to reach out to to her primary care doctor regarding her medication. Her therapist is out of town for another week. Ms. Garcia reached out to her psychiatrist and was placed on Lexapro. Pt states she understands that if she is having issues breathing that she needs to go to the ED.    Thank you,    Paulette Menchaca  Medical Assistant

## 2024-07-26 ENCOUNTER — PATIENT OUTREACH (OUTPATIENT)
Dept: ADMINISTRATIVE | Facility: HOSPITAL | Age: 29
End: 2024-07-26
Payer: MEDICAID

## 2024-07-26 ENCOUNTER — TELEPHONE (OUTPATIENT)
Dept: CARDIOLOGY | Facility: CLINIC | Age: 29
End: 2024-07-26
Payer: MEDICAID

## 2024-07-26 NOTE — TELEPHONE ENCOUNTER
Reached out to MsDelphine Garcia to advise her to not take the beta blocker. She said she does understand.     Thank you,    Paulette Menchaca  Medical Assistant

## 2024-07-29 ENCOUNTER — PATIENT MESSAGE (OUTPATIENT)
Dept: FAMILY MEDICINE | Facility: CLINIC | Age: 29
End: 2024-07-29
Payer: MEDICAID

## 2024-07-29 ENCOUNTER — TELEPHONE (OUTPATIENT)
Dept: SLEEP MEDICINE | Facility: OTHER | Age: 29
End: 2024-07-29
Payer: MEDICAID

## 2024-07-29 NOTE — PROGRESS NOTES
ALLERGY [facility]MM[facility]OLOGY CLINIC   HISTORY OF PRESENT ILLNESS   Referral from: No ref. provider found  CC: SOB    HPI: Misael Garcia is a 29 y.o. female   Known to Dr. King here for a sick visit for SOB  History obtained from patient  Seen by ER 24 for this where workup was reassuring including EKG, 99% sats  CXR normal 24  PFT normal  off medications  Prior immunocaps +grasses, low positives to trees    Thinks about a heart attack or a stroke, as her grandma had both (but is ok)  She spoke with her psychiatrist   She has a therapist, but has been out for the last 2 weeks when her anxiety started, she has an appointment tomorrow  Worried about her heart and lungs  Gets transient sensation of pressure on her chest, sometimes bilateral chest tightness also transient, got ringing ears after hot shower, wakes up shaking and with cold sweats sometimes  Her mind is racing starting from the moment she wakes up, she wonders if she is going to be ok  Anxiety started around 17yo when her brother     Drug Allergies: Review of patient's allergies indicates:  No Known Allergies      MEDICAL HISTORY   SurgHx:  Past Surgical History:   Procedure Laterality Date    NO PAST SURGERIES      as of 19        PHYSICAL EXAM   VS: LMP 2024 (Exact Date)   GENERAL: NAD, well nourished, well appearing  EYES: no conjunctival injection, no discharge, no infraorbital shiners  EARS: external auditory canals normal B/L  LUNGS: CTAB, no increased WOB  DERM: no rashes     ASSESSMENT & PLAN     Anxiety/anxiety about health  - Reviewed ER and recent labs, imaging, EKG, which are all reassuring. Patient endorses anxiety since 17yo which has worsened for unclear reason over last 2 weeks, and manifests as thoughts she will have a heart attack or stroke when she sleeps or throughout the day (compounded by transient physical sensations). We reviewed that we can't control our thoughts, but we can control our reaction.  "Thoughts are transient unless you hold onto them. Therefore what we can control is what we do with a thought, as no thought will stay in our heads, by re-directing. Can you clear the thought by focusing on breath (which may not be helpful in this circumstance), or tactile sensation like feet on the ground or hands pressed together, by listening to ambient sound, or affirmations which can be anything that you say over and over like "I am ok" or "calm and clear" (specifically focusing on the sound of the affirmation in your head). This is what CBT is. We reviewed vasovagal reactions which are a stress response, along with manifestations of anxiety. She has an appointment with her therapist tomorrow. While she continued to be anxious that her heart and lungs may not be ok, she was reassurable, and reported she felt better after the visit. Exam and vitals reassuring. Discussed obtaining PFT however after discussion I don't see a benefit of this. Also reviewed her inhalers can make her transiently tachycardic and shakey - a normal side effect of the albuterol that can feel like anxiety. She has beta blockers that she is going to re-trial.     Follow up: PRN, in 3 months to check in    I spent a total of 60 minutes on the day of the visit. This includes face to face time and non-face to face time preparing to see the patient (eg, review of tests), obtaining and/or reviewing separately obtained history, documenting clinical information in the electronic or other health record, independently interpreting results and communicating results to the patient/family/caregiver, or care coordinator.        "

## 2024-07-29 NOTE — TELEPHONE ENCOUNTER
Phone called to patient.  Patient will follow up with Dr Choudhary for SOB that had continue after stopping [employer]abl[employer]'s.  Confirming with patient that SOB is not any worse. Patient to follow up at ED if symptoms worsens.  Patient verbalized understanding on instruction given.

## 2024-07-30 ENCOUNTER — OFFICE VISIT (OUTPATIENT)
Dept: ALLERGY | Facility: CLINIC | Age: 29
End: 2024-07-30
Payer: MEDICAID

## 2024-07-30 ENCOUNTER — PATIENT MESSAGE (OUTPATIENT)
Dept: FAMILY MEDICINE | Facility: CLINIC | Age: 29
End: 2024-07-30
Payer: MEDICAID

## 2024-07-30 ENCOUNTER — PATIENT MESSAGE (OUTPATIENT)
Dept: ALLERGY | Facility: CLINIC | Age: 29
End: 2024-07-30
Payer: MEDICAID

## 2024-07-30 VITALS
DIASTOLIC BLOOD PRESSURE: 77 MMHG | SYSTOLIC BLOOD PRESSURE: 122 MMHG | BODY MASS INDEX: 45.01 KG/M2 | HEART RATE: 84 BPM | WEIGHT: 286.81 LBS | OXYGEN SATURATION: 99 % | HEIGHT: 67 IN

## 2024-07-30 DIAGNOSIS — F41.9 ANXIETY: Primary | ICD-10-CM

## 2024-07-30 DIAGNOSIS — R45.89 ANXIETY ABOUT HEALTH: ICD-10-CM

## 2024-07-30 PROCEDURE — 3044F HG A1C LEVEL LT 7.0%: CPT | Mod: CPTII,,, | Performed by: STUDENT IN AN ORGANIZED HEALTH CARE EDUCATION/TRAINING PROGRAM

## 2024-07-30 PROCEDURE — 3074F SYST BP LT 130 MM HG: CPT | Mod: CPTII,,, | Performed by: STUDENT IN AN ORGANIZED HEALTH CARE EDUCATION/TRAINING PROGRAM

## 2024-07-30 PROCEDURE — 1159F MED LIST DOCD IN RCRD: CPT | Mod: CPTII,,, | Performed by: STUDENT IN AN ORGANIZED HEALTH CARE EDUCATION/TRAINING PROGRAM

## 2024-07-30 PROCEDURE — 4010F ACE/ARB THERAPY RXD/TAKEN: CPT | Mod: CPTII,,, | Performed by: STUDENT IN AN ORGANIZED HEALTH CARE EDUCATION/TRAINING PROGRAM

## 2024-07-30 PROCEDURE — 99213 OFFICE O/P EST LOW 20 MIN: CPT | Mod: PBBFAC | Performed by: STUDENT IN AN ORGANIZED HEALTH CARE EDUCATION/TRAINING PROGRAM

## 2024-07-30 PROCEDURE — 3008F BODY MASS INDEX DOCD: CPT | Mod: CPTII,,, | Performed by: STUDENT IN AN ORGANIZED HEALTH CARE EDUCATION/TRAINING PROGRAM

## 2024-07-30 PROCEDURE — 3078F DIAST BP <80 MM HG: CPT | Mod: CPTII,,, | Performed by: STUDENT IN AN ORGANIZED HEALTH CARE EDUCATION/TRAINING PROGRAM

## 2024-07-30 PROCEDURE — 99215 OFFICE O/P EST HI 40 MIN: CPT | Mod: S$PBB,,, | Performed by: STUDENT IN AN ORGANIZED HEALTH CARE EDUCATION/TRAINING PROGRAM

## 2024-07-30 PROCEDURE — 99999 PR PBB SHADOW E&M-EST. PATIENT-LVL III: CPT | Mod: PBBFAC,,, | Performed by: STUDENT IN AN ORGANIZED HEALTH CARE EDUCATION/TRAINING PROGRAM

## 2024-07-30 RX ORDER — TRAZODONE HYDROCHLORIDE 50 MG/1
50-100 TABLET ORAL NIGHTLY PRN
COMMUNITY
Start: 2024-07-15

## 2024-07-31 ENCOUNTER — TELEPHONE (OUTPATIENT)
Dept: SPINE | Facility: CLINIC | Age: 29
End: 2024-07-31
Payer: MEDICAID

## 2024-07-31 NOTE — TELEPHONE ENCOUNTER
Staff contacted patient to confirm appointment, patient canceled appointment and stated that she would reschedule through the patient portal.

## 2024-08-01 ENCOUNTER — PATIENT MESSAGE (OUTPATIENT)
Dept: HEMATOLOGY/ONCOLOGY | Facility: CLINIC | Age: 29
End: 2024-08-01
Payer: MEDICAID

## 2024-08-01 ENCOUNTER — PATIENT MESSAGE (OUTPATIENT)
Dept: FAMILY MEDICINE | Facility: CLINIC | Age: 29
End: 2024-08-01

## 2024-08-01 ENCOUNTER — OFFICE VISIT (OUTPATIENT)
Dept: FAMILY MEDICINE | Facility: CLINIC | Age: 29
End: 2024-08-01
Payer: MEDICAID

## 2024-08-01 ENCOUNTER — PATIENT MESSAGE (OUTPATIENT)
Dept: FAMILY MEDICINE | Facility: CLINIC | Age: 29
End: 2024-08-01
Payer: MEDICAID

## 2024-08-01 VITALS
HEIGHT: 67 IN | HEART RATE: 86 BPM | BODY MASS INDEX: 45.06 KG/M2 | OXYGEN SATURATION: 98 % | DIASTOLIC BLOOD PRESSURE: 64 MMHG | WEIGHT: 287.06 LBS | SYSTOLIC BLOOD PRESSURE: 110 MMHG

## 2024-08-01 DIAGNOSIS — F31.9 BIPOLAR 1 DISORDER: ICD-10-CM

## 2024-08-01 DIAGNOSIS — I10 PRIMARY HYPERTENSION: ICD-10-CM

## 2024-08-01 DIAGNOSIS — E66.01 CLASS 3 SEVERE OBESITY DUE TO EXCESS CALORIES WITH SERIOUS COMORBIDITY AND BODY MASS INDEX (BMI) OF 40.0 TO 44.9 IN ADULT: ICD-10-CM

## 2024-08-01 DIAGNOSIS — Z00.01 ENCOUNTER FOR GENERAL ADULT MEDICAL EXAMINATION WITH ABNORMAL FINDINGS: Primary | ICD-10-CM

## 2024-08-01 DIAGNOSIS — F43.10 POSTTRAUMATIC STRESS DISORDER: ICD-10-CM

## 2024-08-01 DIAGNOSIS — J45.30 MILD PERSISTENT ASTHMA WITHOUT COMPLICATION: ICD-10-CM

## 2024-08-01 DIAGNOSIS — I50.22 HEART FAILURE WITH MILDLY REDUCED EJECTION FRACTION (HFMREF): ICD-10-CM

## 2024-08-01 DIAGNOSIS — R07.89 OTHER CHEST PAIN: ICD-10-CM

## 2024-08-01 DIAGNOSIS — R00.2 PALPITATIONS: ICD-10-CM

## 2024-08-01 PROBLEM — K58.2 IRRITABLE BOWEL SYNDROME WITH BOTH CONSTIPATION AND DIARRHEA: Status: ACTIVE | Noted: 2024-04-25

## 2024-08-01 PROBLEM — R10.10 UPPER ABDOMINAL PAIN: Status: RESOLVED | Noted: 2024-04-25 | Resolved: 2024-08-01

## 2024-08-01 PROCEDURE — 3066F NEPHROPATHY DOC TX: CPT | Mod: CPTII,,, | Performed by: FAMILY MEDICINE

## 2024-08-01 PROCEDURE — 3078F DIAST BP <80 MM HG: CPT | Mod: CPTII,,, | Performed by: FAMILY MEDICINE

## 2024-08-01 PROCEDURE — 99395 PREV VISIT EST AGE 18-39: CPT | Mod: S$PBB,,, | Performed by: FAMILY MEDICINE

## 2024-08-01 PROCEDURE — 99999 PR PBB SHADOW E&M-EST. PATIENT-LVL III: CPT | Mod: PBBFAC,,, | Performed by: FAMILY MEDICINE

## 2024-08-01 PROCEDURE — 3008F BODY MASS INDEX DOCD: CPT | Mod: CPTII,,, | Performed by: FAMILY MEDICINE

## 2024-08-01 PROCEDURE — 4010F ACE/ARB THERAPY RXD/TAKEN: CPT | Mod: CPTII,,, | Performed by: FAMILY MEDICINE

## 2024-08-01 PROCEDURE — 2023F DILAT RTA XM W/O RTNOPTHY: CPT | Mod: CPTII,,, | Performed by: FAMILY MEDICINE

## 2024-08-01 PROCEDURE — 3061F NEG MICROALBUMINURIA REV: CPT | Mod: CPTII,,, | Performed by: FAMILY MEDICINE

## 2024-08-01 PROCEDURE — 1160F RVW MEDS BY RX/DR IN RCRD: CPT | Mod: CPTII,,, | Performed by: FAMILY MEDICINE

## 2024-08-01 PROCEDURE — 1159F MED LIST DOCD IN RCRD: CPT | Mod: CPTII,,, | Performed by: FAMILY MEDICINE

## 2024-08-01 PROCEDURE — 99213 OFFICE O/P EST LOW 20 MIN: CPT | Mod: PBBFAC,PO | Performed by: FAMILY MEDICINE

## 2024-08-01 PROCEDURE — 3044F HG A1C LEVEL LT 7.0%: CPT | Mod: CPTII,,, | Performed by: FAMILY MEDICINE

## 2024-08-01 PROCEDURE — 3074F SYST BP LT 130 MM HG: CPT | Mod: CPTII,,, | Performed by: FAMILY MEDICINE

## 2024-08-01 RX ORDER — ATENOLOL 25 MG/1
25 TABLET ORAL DAILY
Qty: 90 TABLET | Refills: 3 | Status: SHIPPED | OUTPATIENT
Start: 2024-08-01

## 2024-08-01 NOTE — PROGRESS NOTES
Subjective:         Patient ID: Misael Garcia is a 29 y.o. female.    Chief Complaint: Hypertension and Diabetes    Patient Active Problem List   Diagnosis    Corns and callus    Class 3 severe obesity due to excess calories with serious comorbidity and body mass index (BMI) of 40.0 to 44.9 in adult    Bipolar 1 disorder    Recently quit using tobacco    Primary hypertension    Oral contraceptive pill surveillance    SID on CPAP    Cervical spondylosis without myelopathy    Lumbosacral spondylosis without myelopathy    Posttraumatic stress disorder    Thoracic facet syndrome    Heart failure with mildly reduced ejection fraction (HFmrEF)    Other chest pain    Palpitations    Marijuana use    Cholecystitis without cholelithiasis    Cyst of right ovary    Gastroesophageal reflux disease    Irritable bowel syndrome with both constipation and diarrhea    Anxiety      Hypertension    Diabetes      Misael is a 29 y.o. female who presents today for follow up of chronic medical problems. Multiple urgent care and ER visit for chest pain, palpitations. Chronic anxiety, PTSD. Follows with psychiatry. Has been evaluated by cardiology.   Reviewed cardiology notes.   Discussed with patient that no clear interventional need seen on her cardiac testing. Last stress testing somewhat inconclusive, but reviewed in visit with cardiology.     Patient reports worsening symptoms off of Latuda and on Lexapro. Back on both.   Will be doing to aunt's in [place] for 2 weeks to help her with her anxiety.   Was placed on Metoprolol by cards. She reports she took it some, but then noted trouble breathing and stopped. She read BB are bad for patients with asthma. She does carry a dx of asthma, but her PFTs in 2023 were normal and did not support asthma dx.     Review of Systems   All other systems reviewed and are negative.       Objective:     Vitals:    08/01/24 1308   BP: 110/64   BP Location: Right arm   Patient Position: Sitting   BP  "Method: Medium (Manual)   Pulse: 86   SpO2: 98%   Weight: 130.2 kg (287 lb 0.6 oz)   Height: 5' 7" (1.702 m)         Physical Exam  Vitals and nursing note reviewed.   Constitutional:       General: She is not in acute distress.     Appearance: Normal appearance. She is not ill-appearing, toxic-appearing or diaphoretic.   HENT:      Head: Normocephalic and atraumatic.   Eyes:      General: No scleral icterus.     Conjunctiva/sclera: Conjunctivae normal.   Cardiovascular:      Rate and Rhythm: Normal rate.   Pulmonary:      Effort: Pulmonary effort is normal. No respiratory distress.   Skin:     Coloration: Skin is not pale.   Neurological:      Mental Status: She is alert. Mental status is at baseline.   Psychiatric:         Attention and Perception: Attention and perception normal.         Mood and Affect: Affect normal. Mood is anxious.         Speech: Speech normal.         Behavior: Behavior normal.         Cognition and Memory: Cognition and memory normal.         Judgment: Judgment normal.           Assessment:       1. Encounter for general adult medical examination with abnormal findings    2. Primary hypertension    3. Palpitations    4. Heart failure with mildly reduced ejection fraction (HFmrEF)    5. Other chest pain    6. Mild persistent asthma without complication    7. Class 3 severe obesity due to excess calories with serious comorbidity and body mass index (BMI) of 40.0 to 44.9 in adult    8. Posttraumatic stress disorder    9. Bipolar 1 disorder          Plan:   Recent relevant labs results reviewed with patient.         1. Encounter for general adult medical examination with abnormal findings  - Risk and age appropriate anticipatory guidance. HM reviewed and updated. Recommendations discussed with patient as appropriate.     2. Primary hypertension  -     atenoloL (TENORMIN) 25 MG tablet; Take 1 tablet (25 mg total) by mouth once daily.  Dispense: 90 tablet; Refill: 3  Switch to atenolol and see " if she responds better.   BP well controlled with Lisinopril, has refills    3. Palpitations  -     atenoloL (TENORMIN) 25 MG tablet; Take 1 tablet (25 mg total) by mouth once daily.  Dispense: 90 tablet; Refill: 3    4. Heart failure with mildly reduced ejection fraction (HFmrEF)  S/p eval with cardiology.   Metoprolol XL - patient did not react well. I'll try atenolol given active and persistent anxiety symptoms. Can transition to BB with more evidence related to CHF.     5. Other chest pain  As above    6. Mild persistent asthma without complication  Overview:  PFTs 03/2023 normal spirometry, does not support asthma  Not concerned about BB worsening asthma.     7. Class 3 severe obesity due to excess calories with serious comorbidity and body mass index (BMI) of 40.0 to 44.9 in adult  Losing weight - A1C and lipids improved.   Continue lifestyle modifications.   GLP1a - very intolerant of side effects    8. Posttraumatic stress disorder  9. Bipolar 1 disorder  Continue with psychiatry for med management.     Patient's questions answered. Plan reviewed with patient at the end of visit. Relevant precautions to chief complaint and reasons to seek further medical care or to contact the office sooner reviewed with patient.     Follow up in about 6 months (around 2/1/2025) for Hypertension Follow-up.        Part of this note was dictated using voice recognition software. Please excuse any typographical errors.

## 2024-08-04 ENCOUNTER — PATIENT MESSAGE (OUTPATIENT)
Dept: FAMILY MEDICINE | Facility: CLINIC | Age: 29
End: 2024-08-04
Payer: MEDICAID

## 2024-08-12 ENCOUNTER — PATIENT MESSAGE (OUTPATIENT)
Dept: SURGERY | Facility: CLINIC | Age: 29
End: 2024-08-12
Payer: MEDICAID

## 2024-08-14 ENCOUNTER — PATIENT MESSAGE (OUTPATIENT)
Dept: OBSTETRICS AND GYNECOLOGY | Facility: CLINIC | Age: 29
End: 2024-08-14
Payer: MEDICAID

## 2024-08-15 ENCOUNTER — PATIENT MESSAGE (OUTPATIENT)
Dept: OBSTETRICS AND GYNECOLOGY | Facility: CLINIC | Age: 29
End: 2024-08-15
Payer: MEDICAID

## 2024-08-15 ENCOUNTER — PATIENT MESSAGE (OUTPATIENT)
Dept: SURGERY | Facility: CLINIC | Age: 29
End: 2024-08-15
Payer: MEDICAID

## 2024-08-21 ENCOUNTER — OFFICE VISIT (OUTPATIENT)
Dept: OBSTETRICS AND GYNECOLOGY | Facility: CLINIC | Age: 29
End: 2024-08-21
Payer: MEDICAID

## 2024-08-21 VITALS
HEIGHT: 67 IN | DIASTOLIC BLOOD PRESSURE: 70 MMHG | SYSTOLIC BLOOD PRESSURE: 126 MMHG | BODY MASS INDEX: 45.99 KG/M2 | WEIGHT: 293 LBS

## 2024-08-21 DIAGNOSIS — N89.8 VAGINAL LESION: Primary | ICD-10-CM

## 2024-08-21 PROCEDURE — 4010F ACE/ARB THERAPY RXD/TAKEN: CPT | Mod: CPTII,,, | Performed by: STUDENT IN AN ORGANIZED HEALTH CARE EDUCATION/TRAINING PROGRAM

## 2024-08-21 PROCEDURE — 3078F DIAST BP <80 MM HG: CPT | Mod: CPTII,,, | Performed by: STUDENT IN AN ORGANIZED HEALTH CARE EDUCATION/TRAINING PROGRAM

## 2024-08-21 PROCEDURE — 3066F NEPHROPATHY DOC TX: CPT | Mod: CPTII,,, | Performed by: STUDENT IN AN ORGANIZED HEALTH CARE EDUCATION/TRAINING PROGRAM

## 2024-08-21 PROCEDURE — 3061F NEG MICROALBUMINURIA REV: CPT | Mod: CPTII,,, | Performed by: STUDENT IN AN ORGANIZED HEALTH CARE EDUCATION/TRAINING PROGRAM

## 2024-08-21 PROCEDURE — 3074F SYST BP LT 130 MM HG: CPT | Mod: CPTII,,, | Performed by: STUDENT IN AN ORGANIZED HEALTH CARE EDUCATION/TRAINING PROGRAM

## 2024-08-21 PROCEDURE — 1160F RVW MEDS BY RX/DR IN RCRD: CPT | Mod: CPTII,,, | Performed by: STUDENT IN AN ORGANIZED HEALTH CARE EDUCATION/TRAINING PROGRAM

## 2024-08-21 PROCEDURE — 1159F MED LIST DOCD IN RCRD: CPT | Mod: CPTII,,, | Performed by: STUDENT IN AN ORGANIZED HEALTH CARE EDUCATION/TRAINING PROGRAM

## 2024-08-21 PROCEDURE — 99213 OFFICE O/P EST LOW 20 MIN: CPT | Mod: PBBFAC,PO | Performed by: STUDENT IN AN ORGANIZED HEALTH CARE EDUCATION/TRAINING PROGRAM

## 2024-08-21 PROCEDURE — 3044F HG A1C LEVEL LT 7.0%: CPT | Mod: CPTII,,, | Performed by: STUDENT IN AN ORGANIZED HEALTH CARE EDUCATION/TRAINING PROGRAM

## 2024-08-21 PROCEDURE — 99213 OFFICE O/P EST LOW 20 MIN: CPT | Mod: S$PBB,,, | Performed by: STUDENT IN AN ORGANIZED HEALTH CARE EDUCATION/TRAINING PROGRAM

## 2024-08-21 PROCEDURE — 3008F BODY MASS INDEX DOCD: CPT | Mod: CPTII,,, | Performed by: STUDENT IN AN ORGANIZED HEALTH CARE EDUCATION/TRAINING PROGRAM

## 2024-08-21 PROCEDURE — 99999 PR PBB SHADOW E&M-EST. PATIENT-LVL III: CPT | Mod: PBBFAC,,, | Performed by: STUDENT IN AN ORGANIZED HEALTH CARE EDUCATION/TRAINING PROGRAM

## 2024-08-21 NOTE — PROGRESS NOTES
"History & Physical  Gynecology      SUBJECTIVE:     Chief Complaint: STD CHECK       History of Present Illness:  Misael is a 29 yr old female who presents with concerns about "some lesions" between her vagina and rectum. She first noticed the area a few days ago and was concerned that this may be HSV vs. HPV. She denies significant pain associated with the area. No new recent sexual partners.     Review of patient's allergies indicates:  No Known Allergies    Past Medical History:   Diagnosis Date    ADHD (attention deficit hyperactivity disorder)     Anxiety     Asthma     Bipolar affective     Eczema     History of chlamydia 2016    Insomnia     Morbid obesity with BMI of 40.0-44.9, adult     Sleep disorder     Type 2 diabetes mellitus without complications      Past Surgical History:   Procedure Laterality Date    NO PAST SURGERIES      as of 19     OB History          0    Para   0    Term   0       0    AB   0    Living   0         SAB   0    IAB   0    Ectopic   0    Multiple   0    Live Births               Obstetric Comments   Menarche ~ 18/reg  H/o chlamydia   Denies abnl pap             Family History   Problem Relation Name Age of Onset    No Known Problems Paternal Grandfather      Breast cancer Paternal Grandmother      Diabetes Maternal Grandmother      No Known Problems Maternal Grandfather      No Known Problems Father      No Known Problems Mother      No Known Problems Sister      Colon cancer Neg Hx      Ovarian cancer Neg Hx       Social History     Tobacco Use    Smoking status: Former     Types: Vaping with nicotine     Quit date:      Years since quittin.6     Passive exposure: Past    Smokeless tobacco: Never    Tobacco comments:     boyfriend smoke cigarettes     Former weed consumption   Substance Use Topics    Alcohol use: No    Drug use: Not Currently     Types: Marijuana       Current Outpatient Medications   Medication Sig    atenoloL (TENORMIN) 25 MG " tablet Take 1 tablet (25 mg total) by mouth once daily.    EScitalopram oxalate (LEXAPRO) 10 MG tablet Take 1 tablet by mouth once daily.    hydroquinone 4 % Crea Apply topically 2 (two) times daily.    hydrOXYzine HCL (ATARAX) 25 MG tablet Take 1 tablet (25 mg total) by mouth every 6 (six) hours.    lisinopriL (PRINIVIL,ZESTRIL) 20 MG tablet Take 1 tablet (20 mg total) by mouth once daily.    lurasidone (LATUDA) 40 mg Tab tablet Take 40 mg by mouth.    pantoprazole (PROTONIX) 40 MG tablet Take 1 tablet (40 mg total) by mouth 2 (two) times daily.    prucalopride 2 mg Tab Take 1 tablet by mouth once daily.    traZODone (DESYREL) 50 MG tablet Take  mg by mouth nightly as needed.     No current facility-administered medications for this visit.       Review of Systems:  Review of Systems   Constitutional:  Negative for chills, fatigue and fever.   HENT:  Negative for congestion.    Eyes:  Negative for visual disturbance.   Respiratory:  Negative for cough and shortness of breath.    Cardiovascular:  Negative for chest pain and palpitations.   Gastrointestinal:  Negative for abdominal distention, abdominal pain, constipation, diarrhea, nausea and vomiting.   Genitourinary:  Positive for genital sores. Negative for difficulty urinating, dysuria, hematuria, vaginal bleeding and vaginal discharge.   Skin:  Negative for rash.   Neurological:  Negative for dizziness, seizures, light-headedness and headaches.   Hematological:  Does not bruise/bleed easily.   Psychiatric/Behavioral:  Negative for dysphoric mood. The patient is not nervous/anxious.         OBJECTIVE:     Physical Exam:  Vitals:    08/21/24 1032   BP: 126/70      Physical Exam  Vitals and nursing note reviewed. Exam conducted with a chaperone present.   Constitutional:       General: She is not in acute distress.     Appearance: She is well-developed.   HENT:      Head: Normocephalic and atraumatic.   Eyes:      Pupils: Pupils are equal, round, and reactive  to light.   Cardiovascular:      Rate and Rhythm: Normal rate and regular rhythm.   Pulmonary:      Effort: Pulmonary effort is normal. No respiratory distress.   Abdominal:      General: There is no distension.      Palpations: Abdomen is soft. There is no mass.      Tenderness: There is no abdominal tenderness. There is no guarding.   Genitourinary:      Musculoskeletal:         General: Normal range of motion.      Cervical back: Normal range of motion and neck supple.   Skin:     General: Skin is warm and dry.   Neurological:      Mental Status: She is alert and oriented to person, place, and time.   Psychiatric:         Behavior: Behavior normal.         Thought Content: Thought content normal.         Judgment: Judgment normal.         ASSESSMENT/PLAN:     1. Vaginal lesion  - Reassuring clinical exam   - Reassurance provided  - Stressed importance of safe sex practices    Fiorella Peña M.D.  OB/GYN  [facility]

## 2024-09-13 ENCOUNTER — TELEPHONE (OUTPATIENT)
Dept: OBSTETRICS AND GYNECOLOGY | Facility: CLINIC | Age: 29
End: 2024-09-13
Payer: MEDICAID

## 2024-09-13 ENCOUNTER — PATIENT MESSAGE (OUTPATIENT)
Dept: OBSTETRICS AND GYNECOLOGY | Facility: CLINIC | Age: 29
End: 2024-09-13
Payer: MEDICAID

## 2024-09-13 DIAGNOSIS — N89.8 VAGINAL IRRITATION: Primary | ICD-10-CM

## 2024-10-09 ENCOUNTER — TELEPHONE (OUTPATIENT)
Dept: SLEEP MEDICINE | Facility: OTHER | Age: 29
End: 2024-10-09
Payer: MEDICAID

## 2024-10-10 ENCOUNTER — PATIENT MESSAGE (OUTPATIENT)
Dept: OBSTETRICS AND GYNECOLOGY | Facility: CLINIC | Age: 29
End: 2024-10-10
Payer: MEDICAID

## 2024-10-10 ENCOUNTER — OFFICE VISIT (OUTPATIENT)
Dept: FAMILY MEDICINE | Facility: CLINIC | Age: 29
End: 2024-10-10
Payer: MEDICAID

## 2024-10-10 ENCOUNTER — TELEPHONE (OUTPATIENT)
Dept: FAMILY MEDICINE | Facility: CLINIC | Age: 29
End: 2024-10-10
Payer: MEDICAID

## 2024-10-10 VITALS
OXYGEN SATURATION: 98 % | SYSTOLIC BLOOD PRESSURE: 118 MMHG | HEART RATE: 66 BPM | BODY MASS INDEX: 45.99 KG/M2 | HEIGHT: 67 IN | DIASTOLIC BLOOD PRESSURE: 82 MMHG | WEIGHT: 293 LBS

## 2024-10-10 DIAGNOSIS — J02.9 ACUTE SORE THROAT: ICD-10-CM

## 2024-10-10 DIAGNOSIS — J02.9 SORE THROAT: Primary | ICD-10-CM

## 2024-10-10 PROCEDURE — 99999 PR PBB SHADOW E&M-EST. PATIENT-LVL IV: CPT | Mod: PBBFAC,,,

## 2024-10-10 PROCEDURE — 99999PBSHW PR PBB SHADOW TECHNICAL ONLY FILED TO HB: Mod: PBBFAC,,,

## 2024-10-10 PROCEDURE — 87591 N.GONORRHOEAE DNA AMP PROB: CPT

## 2024-10-10 PROCEDURE — 99214 OFFICE O/P EST MOD 30 MIN: CPT | Mod: PBBFAC,PO

## 2024-10-10 PROCEDURE — 96372 THER/PROPH/DIAG INJ SC/IM: CPT | Mod: PBBFAC,PO

## 2024-10-10 RX ORDER — DOXYCYCLINE HYCLATE 100 MG
100 TABLET ORAL 2 TIMES DAILY
Qty: 14 TABLET | Refills: 0 | Status: SHIPPED | OUTPATIENT
Start: 2024-10-10 | End: 2024-10-17

## 2024-10-10 RX ORDER — CEFTRIAXONE 1 G/1
1 INJECTION, POWDER, FOR SOLUTION INTRAMUSCULAR; INTRAVENOUS
Status: COMPLETED | OUTPATIENT
Start: 2024-10-10 | End: 2024-10-10

## 2024-10-10 RX ADMIN — CEFTRIAXONE SODIUM 1 G: 500 INJECTION, POWDER, FOR SOLUTION INTRAMUSCULAR; INTRAVENOUS at 10:10

## 2024-10-10 NOTE — PROGRESS NOTES
[facility]    10/10/2024      Subjective:       Patient ID:  Misael is a 29 y.o. female being seen for an established visit.    Chief Complaint: Sore Throat      Current concerns   Sore throat - Reports sore throat x 3 days. Denies fever, chills, nausea, vomiting. Endorses cough, runny nose, pain with swallowing and eating. Denies sick contacts and recent travel. Tried lonsages that did not improve symptoms. Stated that she had oral sex 2 days ago and this often occurs after the activity.       Patient Active Problem List   Diagnosis    Corns and callus    Class 3 severe obesity due to excess calories with serious comorbidity and body mass index (BMI) of 40.0 to 44.9 in adult    Bipolar 1 disorder    Recently quit using tobacco    Primary hypertension    Oral contraceptive pill surveillance    SID on CPAP    Cervical spondylosis without myelopathy    Lumbosacral spondylosis without myelopathy    Posttraumatic stress disorder    Thoracic facet syndrome    Heart failure with mildly reduced ejection fraction (HFmrEF)    Other chest pain    Palpitations    Marijuana use    Cholecystitis without cholelithiasis    Cyst of right ovary    Gastroesophageal reflux disease    Irritable bowel syndrome with both constipation and diarrhea    Anxiety         Review of Systems   Constitutional:  Negative for chills and fever.   HENT:  Positive for rhinorrhea and sore throat. Negative for ear discharge and sneezing.    Respiratory:  Positive for cough. Negative for shortness of breath.    Cardiovascular:  Negative for chest pain.   Gastrointestinal:  Negative for abdominal pain, constipation, diarrhea, nausea and vomiting.   Neurological:  Negative for dizziness, light-headedness and headaches.         Last HgbA1C:    Lab Results   Component Value Date    HGBA1C 5.1 07/30/2024    HGBA1C 5.2 07/08/2024    HGBA1C 5.8 (H) 01/24/2024         Last  "Lipid Panel:    Lab Results   Component Value Date    HDL 59 07/30/2024    HDL 57 07/08/2024    HDL 55 01/04/2023       Lab Results   Component Value Date    LDLCALC 120.2 07/30/2024    LDLCALC 154 (H) 07/08/2024    LDLCALC 106.6 01/04/2023       Lab Results   Component Value Date    TRIG 64 07/30/2024    TRIG 102 07/08/2024    TRIG 62 01/04/2023       Lab Results   Component Value Date    CHOLHDL 30.7 07/30/2024    CHOLHDL 4.05 07/08/2024    CHOLHDL 31.6 01/04/2023               Review of patient's allergies indicates:  No Known Allergies     Medication List with Changes/Refills   Current Medications    ATENOLOL (TENORMIN) 25 MG TABLET    Take 1 tablet (25 mg total) by mouth once daily.    ESCITALOPRAM OXALATE (LEXAPRO) 10 MG TABLET    Take 1 tablet by mouth once daily.    HYDROQUINONE 4 % CREA    Apply topically 2 (two) times daily.    HYDROXYZINE HCL (ATARAX) 25 MG TABLET    Take 1 tablet (25 mg total) by mouth every 6 (six) hours.    LISINOPRIL (PRINIVIL,ZESTRIL) 20 MG TABLET    Take 1 tablet (20 mg total) by mouth once daily.    LURASIDONE (LATUDA) 40 MG TAB TABLET    Take 40 mg by mouth.    PANTOPRAZOLE (PROTONIX) 40 MG TABLET    Take 1 tablet (40 mg total) by mouth 2 (two) times daily.    PRUCALOPRIDE 2 MG TAB    Take 1 tablet by mouth once daily.    TRAZODONE (DESYREL) 50 MG TABLET    Take  mg by mouth nightly as needed.               Objective:      /82   Pulse 66   Ht 5' 7" (1.702 m)   Wt (!) 137.8 kg (303 lb 12.7 oz)   SpO2 98%   BMI 47.58 kg/m²   Estimated body mass index is 47.58 kg/m² as calculated from the following:    Height as of this encounter: 5' 7" (1.702 m).    Weight as of this encounter: 137.8 kg (303 lb 12.7 oz).  Physical Exam  Vitals reviewed.   Constitutional:       General: She is not in acute distress.     Appearance: She is obese.   HENT:      Head: Normocephalic and atraumatic.      Mouth/Throat:      Mouth: Mucous membranes are moist.      Pharynx: Oropharyngeal " exudate present.   Eyes:      Conjunctiva/sclera: Conjunctivae normal.   Cardiovascular:      Rate and Rhythm: Normal rate and regular rhythm.   Pulmonary:      Effort: Pulmonary effort is normal. No respiratory distress.   Musculoskeletal:         General: Normal range of motion.      Cervical back: Normal range of motion.   Skin:     General: Skin is warm.   Neurological:      Mental Status: She is alert.           Assessment and Plan:       1. Sore throat (Primary)  -negative for covid, flu, and strep in clinic   - swabbed for gonorrhea and chlamydia in clinic, treated patient empirically.  - will discontinue medication if negative.   - educated patient to treat sore throat symptomatic if continues.   - work note provided    - POCT Rapid Strep A  - POCT COVID-19 Rapid Screening  - POCT Influenza A/B Molecular  - C.trach/N.gonor AMP RNA  - cefTRIAXone injection 1 g  - doxycycline (VIBRA-TABS) 100 MG tablet; Take 1 tablet (100 mg total) by mouth 2 (two) times daily. for 7 days  Dispense: 14 tablet; Refill: 0    Follow Up:  RTC as needed          Other Orders Placed This Visit:  Orders Placed This Encounter   Procedures    C.trach/N.gonor AMP RNA    POCT Rapid Strep A    POCT COVID-19 Rapid Screening    POCT Influenza A/B Molecular         Eneida Quach PA-C

## 2024-10-10 NOTE — LETTER
October 10, 2024      [facility]  [address]  [place] [address]  Phone: [phone]  Fax: [phone]       Patient: Misael Garcia   YOB: 1995  Date of Visit: 10/10/2024    To Whom It May Concern:    Dora Garcia  was at [facility] on 10/10/2024. The patient may return to work/school on 10/10/2024 with no restrictions. If you have any questions or concerns, or if I can be of further assistance, please do not hesitate to contact me.    Sincerely,    JOHN Dubose

## 2024-10-10 NOTE — TELEPHONE ENCOUNTER
Please advise. Pt would like to get back on birth control pills. Pt was on Michelle before. Pt was seen 8/21/2024.

## 2024-10-10 NOTE — TELEPHONE ENCOUNTER
Called patient to see if she can come little bit early, patient said she is coming now. Patient KENDRA.

## 2024-10-11 ENCOUNTER — LAB VISIT (OUTPATIENT)
Dept: LAB | Facility: HOSPITAL | Age: 29
End: 2024-10-11
Attending: STUDENT IN AN ORGANIZED HEALTH CARE EDUCATION/TRAINING PROGRAM
Payer: MEDICAID

## 2024-10-11 DIAGNOSIS — N89.8 VAGINAL IRRITATION: ICD-10-CM

## 2024-10-11 PROCEDURE — 87086 URINE CULTURE/COLONY COUNT: CPT | Performed by: STUDENT IN AN ORGANIZED HEALTH CARE EDUCATION/TRAINING PROGRAM

## 2024-10-11 RX ORDER — DROSPIRENONE AND ETHINYL ESTRADIOL 0.02-3(28)
1 KIT ORAL DAILY
Qty: 28 TABLET | Refills: 11 | Status: SHIPPED | OUTPATIENT
Start: 2024-10-11 | End: 2025-10-11

## 2024-10-13 LAB — BACTERIA UR CULT: NORMAL

## 2024-10-14 ENCOUNTER — PATIENT MESSAGE (OUTPATIENT)
Dept: FAMILY MEDICINE | Facility: CLINIC | Age: 29
End: 2024-10-14
Payer: MEDICAID

## 2024-10-14 RX ORDER — FLUCONAZOLE 150 MG/1
150 TABLET ORAL DAILY
Qty: 1 TABLET | Refills: 0 | Status: SHIPPED | OUTPATIENT
Start: 2024-10-14 | End: 2024-10-16

## 2024-10-21 ENCOUNTER — PATIENT MESSAGE (OUTPATIENT)
Dept: FAMILY MEDICINE | Facility: CLINIC | Age: 29
End: 2024-10-21
Payer: MEDICAID

## 2024-10-21 DIAGNOSIS — R00.2 PALPITATIONS: ICD-10-CM

## 2024-10-21 DIAGNOSIS — I10 PRIMARY HYPERTENSION: ICD-10-CM

## 2024-10-21 NOTE — TELEPHONE ENCOUNTER
No care due was identified.  [facility] Embedded Care Due Messages. Reference number: [id number].   10/21/2024 4:06:38 PM CDT

## 2024-10-22 RX ORDER — ATENOLOL 25 MG/1
25 TABLET ORAL DAILY
Qty: 90 TABLET | Refills: 3 | Status: SHIPPED | OUTPATIENT
Start: 2024-10-22

## 2024-10-22 NOTE — TELEPHONE ENCOUNTER
Refill Decision Note   Misael Garcia  is requesting a refill authorization.  Brief Assessment and Rationale for Refill:  Approve     Medication Therapy Plan:        Comments:     Note composed:12:20 AM 10/22/2024

## 2024-10-31 ENCOUNTER — PATIENT MESSAGE (OUTPATIENT)
Dept: OBSTETRICS AND GYNECOLOGY | Facility: CLINIC | Age: 29
End: 2024-10-31
Payer: MEDICAID

## 2024-11-05 ENCOUNTER — OFFICE VISIT (OUTPATIENT)
Dept: FAMILY MEDICINE | Facility: CLINIC | Age: 29
End: 2024-11-05
Payer: MEDICAID

## 2024-11-05 VITALS
HEIGHT: 67 IN | SYSTOLIC BLOOD PRESSURE: 112 MMHG | DIASTOLIC BLOOD PRESSURE: 72 MMHG | WEIGHT: 293 LBS | HEART RATE: 64 BPM | BODY MASS INDEX: 45.99 KG/M2 | OXYGEN SATURATION: 98 %

## 2024-11-05 DIAGNOSIS — Z11.3 ROUTINE SCREENING FOR STI (SEXUALLY TRANSMITTED INFECTION): ICD-10-CM

## 2024-11-05 DIAGNOSIS — I10 PRIMARY HYPERTENSION: Primary | ICD-10-CM

## 2024-11-05 DIAGNOSIS — F41.1 GAD (GENERALIZED ANXIETY DISORDER): ICD-10-CM

## 2024-11-05 DIAGNOSIS — G47.33 OSA (OBSTRUCTIVE SLEEP APNEA): ICD-10-CM

## 2024-11-05 DIAGNOSIS — F43.10 POSTTRAUMATIC STRESS DISORDER: ICD-10-CM

## 2024-11-05 DIAGNOSIS — R00.2 PALPITATIONS: ICD-10-CM

## 2024-11-05 DIAGNOSIS — E66.813 CLASS 3 SEVERE OBESITY DUE TO EXCESS CALORIES WITH SERIOUS COMORBIDITY AND BODY MASS INDEX (BMI) OF 45.0 TO 49.9 IN ADULT: ICD-10-CM

## 2024-11-05 DIAGNOSIS — E66.01 CLASS 3 SEVERE OBESITY DUE TO EXCESS CALORIES WITH SERIOUS COMORBIDITY AND BODY MASS INDEX (BMI) OF 45.0 TO 49.9 IN ADULT: ICD-10-CM

## 2024-11-05 DIAGNOSIS — K21.9 GASTROESOPHAGEAL REFLUX DISEASE WITHOUT ESOPHAGITIS: ICD-10-CM

## 2024-11-05 DIAGNOSIS — K58.2 IRRITABLE BOWEL SYNDROME WITH BOTH CONSTIPATION AND DIARRHEA: ICD-10-CM

## 2024-11-05 DIAGNOSIS — Z28.21 INFLUENZA VACCINATION DECLINED: ICD-10-CM

## 2024-11-05 DIAGNOSIS — F31.9 BIPOLAR 1 DISORDER: ICD-10-CM

## 2024-11-05 PROBLEM — F17.201: Status: ACTIVE | Noted: 2023-01-04

## 2024-11-05 PROBLEM — K81.9 CHOLECYSTITIS WITHOUT CHOLELITHIASIS: Status: RESOLVED | Noted: 2024-04-03 | Resolved: 2024-11-05

## 2024-11-05 PROBLEM — F12.90 MARIJUANA USE: Status: RESOLVED | Noted: 2024-02-29 | Resolved: 2024-11-05

## 2024-11-05 PROBLEM — F17.201: Status: RESOLVED | Noted: 2023-01-04 | Resolved: 2024-11-05

## 2024-11-05 PROCEDURE — 1159F MED LIST DOCD IN RCRD: CPT | Mod: CPTII,,, | Performed by: FAMILY MEDICINE

## 2024-11-05 PROCEDURE — 3044F HG A1C LEVEL LT 7.0%: CPT | Mod: CPTII,,, | Performed by: FAMILY MEDICINE

## 2024-11-05 PROCEDURE — 3008F BODY MASS INDEX DOCD: CPT | Mod: CPTII,,, | Performed by: FAMILY MEDICINE

## 2024-11-05 PROCEDURE — 99214 OFFICE O/P EST MOD 30 MIN: CPT | Mod: S$PBB,,, | Performed by: FAMILY MEDICINE

## 2024-11-05 PROCEDURE — 99999 PR PBB SHADOW E&M-EST. PATIENT-LVL III: CPT | Mod: PBBFAC,,, | Performed by: FAMILY MEDICINE

## 2024-11-05 PROCEDURE — 3061F NEG MICROALBUMINURIA REV: CPT | Mod: CPTII,,, | Performed by: FAMILY MEDICINE

## 2024-11-05 PROCEDURE — 1160F RVW MEDS BY RX/DR IN RCRD: CPT | Mod: CPTII,,, | Performed by: FAMILY MEDICINE

## 2024-11-05 PROCEDURE — 3078F DIAST BP <80 MM HG: CPT | Mod: CPTII,,, | Performed by: FAMILY MEDICINE

## 2024-11-05 PROCEDURE — 3066F NEPHROPATHY DOC TX: CPT | Mod: CPTII,,, | Performed by: FAMILY MEDICINE

## 2024-11-05 PROCEDURE — 4010F ACE/ARB THERAPY RXD/TAKEN: CPT | Mod: CPTII,,, | Performed by: FAMILY MEDICINE

## 2024-11-05 PROCEDURE — 3074F SYST BP LT 130 MM HG: CPT | Mod: CPTII,,, | Performed by: FAMILY MEDICINE

## 2024-11-05 PROCEDURE — 2023F DILAT RTA XM W/O RTNOPTHY: CPT | Mod: CPTII,,, | Performed by: FAMILY MEDICINE

## 2024-11-05 PROCEDURE — 99213 OFFICE O/P EST LOW 20 MIN: CPT | Mod: PBBFAC,PO | Performed by: FAMILY MEDICINE

## 2024-11-05 RX ORDER — ATENOLOL 25 MG/1
25 TABLET ORAL DAILY
Qty: 90 TABLET | Refills: 3 | Status: SHIPPED | OUTPATIENT
Start: 2024-11-05

## 2024-11-05 RX ORDER — ESCITALOPRAM OXALATE 20 MG/1
20 TABLET ORAL DAILY
Start: 2024-11-05

## 2024-11-05 RX ORDER — PHENOL/SODIUM PHENOLATE
1 AEROSOL, SPRAY (ML) MUCOUS MEMBRANE DAILY
Qty: 90 EACH | Refills: 3 | Status: SHIPPED | OUTPATIENT
Start: 2024-11-05 | End: 2025-11-05

## 2024-11-05 RX ORDER — LISINOPRIL 20 MG/1
20 TABLET ORAL DAILY
Qty: 90 TABLET | Refills: 3 | Status: SHIPPED | OUTPATIENT
Start: 2024-11-05 | End: 2025-10-31

## 2024-11-05 NOTE — PROGRESS NOTES
Subjective:         Patient ID: Misael Garcia is a 29 y.o. female.    Chief Complaint: Follow-up    Patient Active Problem List   Diagnosis    Corns and callus    Class 3 severe obesity due to excess calories with serious comorbidity and body mass index (BMI) of 45.0 to 49.9 in adult    Bipolar 1 disorder    Primary hypertension    Oral contraceptive pill surveillance    SID (obstructive sleep apnea)    Cervical spondylosis without myelopathy    Lumbosacral spondylosis without myelopathy    Posttraumatic stress disorder    Thoracic facet syndrome    Heart failure with mildly reduced ejection fraction (HFmrEF)    Other chest pain    Palpitations    Cyst of right ovary    Gastroesophageal reflux disease    Irritable bowel syndrome with both constipation and diarrhea    WICHO (generalized anxiety disorder)      HPI    Misael is a 29 y.o. female    History of Present Illness    CHIEF COMPLAINT:  Misael presents today for follow-up.    MEDICATIONS:  She discontinued Protonix and Lisinopril about a month ago, reporting stable blood pressure readings. Current medications include Atenolol 25 mg daily, Lexapro 20 mg daily in the morning (which she feels is helping), Trazodone for sleep (considering increasing from 1-2 pills), Motegrity for IBS (helping since hospital discharge), and OTC Omeprazole 20 mg delayed release for acid reflux.    MENTAL HEALTH:  She sees a psychiatrist monthly for medication management and recently started with a new counselor two weeks ago, expressing satisfaction with the opportunity to discuss her concerns.    SLEEP:  She reports difficulty using CPAP machine at night, expressing discomfort and inconsistency in usage. She indicates a desire to become more comfortable with the device, particularly in light of an upcoming sleep study. Her mother reports that she snores.    SEXUAL AND REPRODUCTIVE HEALTH:  She reports inconsistent use of birth control pills, resulting in spotting. She is  "considering switching to a Mirena IUD and has an appointment scheduled with her OBGYN for potential insertion. She has a new sexual partner and uses condoms. She discloses a history of bacterial vaginosis (BV) infection, which has cleared up. Her most recent STD screening on October 18th focused primarily on BV, for which she received fluconazole and BV treatment. She denies current vaginal discharge or irritation.    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 Her Medicaid coverage is ending this month, transitioning to [employer] through her employer starting in December.               Objective:     Vitals:    11/05/24 0959   BP: 112/72   BP Location: Left arm   Patient Position: Sitting   Pulse: 64   SpO2: 98%   Weight: (!) 137 kg (302 lb 0.5 oz)   Height: 5' 7" (1.702 m)         Physical Exam  Vitals and nursing note reviewed.   Constitutional:       General: She is not in acute distress.     Appearance: Normal appearance. She is obese. She is not ill-appearing, toxic-appearing or diaphoretic.   HENT:      Head: Normocephalic and atraumatic.   Eyes:      General: No scleral icterus.     Conjunctiva/sclera: Conjunctivae normal.   Cardiovascular:      Rate and Rhythm: Normal rate.   Pulmonary:      Effort: Pulmonary effort is normal. No respiratory distress.   Skin:     Coloration: Skin is not pale.   Neurological:      Mental Status: She is alert. Mental status is at baseline.   Psychiatric:         Attention and Perception: Attention and perception normal.         Mood and Affect: Mood and affect normal.         Speech: Speech normal.         " Behavior: Behavior normal.         Cognition and Memory: Cognition and memory normal.         Judgment: Judgment normal.       Assessment:       1. Primary hypertension    2. Palpitations    3. SID (obstructive sleep apnea)    4. Irritable bowel syndrome with both constipation and diarrhea    5. Gastroesophageal reflux disease without esophagitis    6. Class 3 severe obesity due to excess calories with serious comorbidity and body mass index (BMI) of 45.0 to 49.9 in adult    7. Influenza vaccination declined    8. Routine screening for STI (sexually transmitted infection)    9. Bipolar 1 disorder    10. WICHO (generalized anxiety disorder)    11. Posttraumatic stress disorder          Plan:   Recent relevant labs results reviewed with patient.         Assessment & Plan    Assessed blood pressure control; determined to continue lisinopril due to readings in 140s  Evaluated current medication regimen, including Lexapro for anxiety and trazodone for sleep  Considered IUD (Mirena) as contraceptive option, supporting patient's decision to pursue this method  Reviewed recent lab work and imaging; noted enlarged liver likely due to fatty liver, but no immediate concerns  Assessed smoking cessation status; patient reports quitting both tobacco and marijuana    HYPERTENSION:  - Explained potential for blood pressure medication to be discontinued if CPAP use improves blood pressure control.  - Misael to monitor blood pressure readings at home.  - Restarted Lisinopril 20 mg daily for blood pressure control.  - Continued Atenolol 25 mg daily for blood pressure and anxiety management.    MENSTRUAL IRREGULARITY:  - Discussed normalizing menstrual cycle with consistent birth control pill use, noting it may take a few packs to regulate.    SLEEP APNEA:  - Misael to resume use of CPAP machine for sleep apnea management.    MENTAL HEALTH:  - Misael to continue counseling sessions.  - Continued Lexapro 20 mg daily in the  morning.    INSOMNIA:  - Continued Trazodone for sleep; option to increase from 1-2 pills if needed.    GASTROESOPHAGEAL REFLUX DISEASE (GERD):  - Refilled Omeprazole 20 mg delayed release for acid reflux.    IRRITABLE BOWEL SYNDROME (IBS):  - Continued Motegrity for IBS as prescribed by cardiology.    FOLLOW UP:  - Follow up in 6 months for labs and comprehensive evaluation.  - Contact the office if any issues arise between appointments.         1. Primary hypertension  -     lisinopriL (PRINIVIL,ZESTRIL) 20 MG tablet; Take 1 tablet (20 mg total) by mouth once daily.  Dispense: 90 tablet; Refill: 3  -     atenoloL (TENORMIN) 25 MG tablet; Take 1 tablet (25 mg total) by mouth once daily.  Dispense: 90 tablet; Refill: 3  -     Renal Function Panel; Future; Expected date: 11/05/2024  -     Hemoglobin A1C; Future; Expected date: 11/05/2024    2. Palpitations  -     atenoloL (TENORMIN) 25 MG tablet; Take 1 tablet (25 mg total) by mouth once daily.  Dispense: 90 tablet; Refill: 3    3. SID (obstructive sleep apnea)  Comments:  Not using CPAP    4. Irritable bowel syndrome with both constipation and diarrhea  -     prucalopride 2 mg Tab; Take 1 tablet by mouth once daily.    5. Gastroesophageal reflux disease without esophagitis  -     omeprazole 20 mg TbEC; Take 1 capsule by mouth once daily.  Dispense: 90 each; Refill: 3    6. Class 3 severe obesity due to excess calories with serious comorbidity and body mass index (BMI) of 45.0 to 49.9 in adult  -     Renal Function Panel; Future; Expected date: 11/05/2024  -     Hemoglobin A1C; Future; Expected date: 11/05/2024    7. Influenza vaccination declined    8. Routine screening for STI (sexually transmitted infection)  -     Treponema Pallidium Antibodies IgG, IgM; Future; Expected date: 11/05/2024  -     Mycoplasma genitalium Molecular Detection, PCR Urine; Future; Expected date: 11/05/2024  -     HIV 1/2 Ag/Ab (4th Gen); Future; Expected date: 11/05/2024  -     C.  trachomatis/N. gonorrhoeae by AMP DNA; Future; Expected date: 11/05/2024    9. Bipolar 1 disorder  -     EScitalopram oxalate (LEXAPRO) 20 MG tablet; Take 1 tablet (20 mg total) by mouth once daily.    10. WICHO (generalized anxiety disorder)  -     EScitalopram oxalate (LEXAPRO) 20 MG tablet; Take 1 tablet (20 mg total) by mouth once daily.    11. Posttraumatic stress disorder  -     EScitalopram oxalate (LEXAPRO) 20 MG tablet; Take 1 tablet (20 mg total) by mouth once daily.      Patient's questions answered. Plan reviewed with patient at the end of visit. Relevant precautions to chief complaint and reasons to seek further medical care or to contact the office sooner reviewed with patient.     Follow up in about 6 months (around 5/5/2025) for Hypertension Follow-up, (prelabs).        Part of this note was dictated using voice recognition software. Please excuse any typographical errors.     This note was generated with the assistance of ambient listening technology. Verbal consent was obtained by the patient and accompanying visitor(s) for the recording of patient appointment to facilitate this note. I attest to having reviewed and edited the generated note for accuracy, though some syntax or spelling errors may persist. Please contact the author of this note for any clarification.    I spent a total of 30 minutes on the day of the visit.  This includes face to face time and non-face to face time preparing to see the patient (eg, review of tests), obtaining and/or reviewing separately obtained history, documenting clinical information in the electronic or other health record, independently interpreting results and communicating results to the patient/family/caregiver, or care coordinator.

## 2024-11-12 ENCOUNTER — PATIENT MESSAGE (OUTPATIENT)
Dept: FAMILY MEDICINE | Facility: CLINIC | Age: 29
End: 2024-11-12
Payer: MEDICAID

## 2024-11-25 ENCOUNTER — PATIENT MESSAGE (OUTPATIENT)
Dept: FAMILY MEDICINE | Facility: CLINIC | Age: 29
End: 2024-11-25

## 2024-12-03 ENCOUNTER — PATIENT MESSAGE (OUTPATIENT)
Dept: FAMILY MEDICINE | Facility: CLINIC | Age: 29
End: 2024-12-03
Payer: COMMERCIAL

## 2024-12-05 ENCOUNTER — PATIENT MESSAGE (OUTPATIENT)
Dept: FAMILY MEDICINE | Facility: CLINIC | Age: 29
End: 2024-12-05
Payer: COMMERCIAL

## 2024-12-05 DIAGNOSIS — K21.9 GASTROESOPHAGEAL REFLUX DISEASE WITHOUT ESOPHAGITIS: ICD-10-CM

## 2024-12-05 RX ORDER — PHENOL/SODIUM PHENOLATE
1 AEROSOL, SPRAY (ML) MUCOUS MEMBRANE DAILY
Qty: 90 EACH | Refills: 3 | Status: SHIPPED | OUTPATIENT
Start: 2024-12-05 | End: 2025-12-05

## 2024-12-05 NOTE — TELEPHONE ENCOUNTER
Refill Routing Note   Medication(s) are not appropriate for processing by [facility] for the following reason(s):      New or recently adjusted medication    [facility] action(s):  Defer Care Due:  None identified            Appointments  past 12m or future 3m with PCP    Date Provider   Last Visit   11/5/2024 Katelyn Chapman MD   Next Visit   12/20/2024 Katelyn Chapman MD   ED visits in past 90 days: 0        Note composed:11:17 AM 12/05/2024

## 2024-12-05 NOTE — TELEPHONE ENCOUNTER
No care due was identified.  Health [facility] Embedded Care Due Messages. Reference number: [id number].   12/05/2024 6:38:53 AM CST

## 2024-12-16 ENCOUNTER — OFFICE VISIT (OUTPATIENT)
Dept: FAMILY MEDICINE | Facility: CLINIC | Age: 29
End: 2024-12-16
Payer: COMMERCIAL

## 2024-12-16 DIAGNOSIS — J01.00 ACUTE MAXILLARY SINUSITIS, RECURRENCE NOT SPECIFIED: Primary | ICD-10-CM

## 2024-12-16 PROCEDURE — 99214 OFFICE O/P EST MOD 30 MIN: CPT | Mod: 95,,, | Performed by: NURSE PRACTITIONER

## 2024-12-16 RX ORDER — BENZONATATE 100 MG/1
100 CAPSULE ORAL 3 TIMES DAILY PRN
Qty: 30 CAPSULE | Refills: 0 | Status: SHIPPED | OUTPATIENT
Start: 2024-12-16 | End: 2024-12-26

## 2024-12-16 RX ORDER — FLUTICASONE PROPIONATE 50 MCG
1 SPRAY, SUSPENSION (ML) NASAL 2 TIMES DAILY
Qty: 16 G | Refills: 1 | Status: SHIPPED | OUTPATIENT
Start: 2024-12-16

## 2024-12-16 RX ORDER — LEVOCETIRIZINE DIHYDROCHLORIDE 5 MG/1
5 TABLET, FILM COATED ORAL NIGHTLY
Qty: 30 TABLET | Refills: 11 | Status: SHIPPED | OUTPATIENT
Start: 2024-12-16 | End: 2025-12-16

## 2024-12-16 NOTE — PROGRESS NOTES
The patient location is: [place]  The chief complaint leading to consultation is: sinus problem  Visit type: audiovisual  Face to Face time with patient: 30  20 minutes of total time spent on the encounter, which includes face to face time and non-face to face time preparing to see the patient (eg, review of tests), Obtaining and/or reviewing separately obtained history, Documenting clinical information in the electronic or other health record, Independently interpreting results (not separately reported) and communicating results to the patient/family/caregiver, or Care coordination (not separately reported).     Each patient to whom he or she provides medical services by telemedicine is:  (1) informed of the relationship between the physician and patient and the respective role of any other health care provider with respect to management of the patient; and (2) notified that he or she may decline to receive medical services by telemedicine and may withdraw from such care at any time.    Notes:     Subjective     Patient ID: Misael Garcia is a 29 y.o. female.    Chief Complaint: Sore Throat, Sinus Problem, Cough, Nasal Congestion, and Otalgia    History of Present Illness    CHIEF COMPLAINT:  Ms. Garcia presents today with sore throat, sinus problems, and coughing.    HISTORY OF PRESENT ILLNESS:  She presents with nasal drip, coughing, sore throat, and ear pain that started two days ago. She reports a dry cough and sinus pressure in nose and throat. She experiences difficulty breathing when lying flat but symptoms improve when sitting up. She had a brief episode of chest pain on Saturday that resolved quickly. She denies fever, chills, body aches, ongoing chest pain, shortness of breath, nausea, vomiting, or diarrhea. She recently returned from travel to [place]. Her mother was ill prior to her travel but did not seek medical attention and was not tested for influenza or COVID-19.    MEDICATIONS:  She has  been taking NSAIDs which have caused abdominal pain due to her history of gastritis and IBS. She has tried using children's Tylenol for symptom management.    MEDICAL HISTORY:  She has a history of gastritis and IBS. An incidental finding of angiomyolipoma on right kidney was noted on abdominal ultrasound performed at [facility] in July 2023.            Patient Active Problem List   Diagnosis    Corns and callus    Class 3 severe obesity due to excess calories with serious comorbidity and body mass index (BMI) of 45.0 to 49.9 in adult    Bipolar 1 disorder    Primary hypertension    Oral contraceptive pill surveillance    SID (obstructive sleep apnea)    Cervical spondylosis without myelopathy    Lumbosacral spondylosis without myelopathy    Posttraumatic stress disorder    Thoracic facet syndrome    Heart failure with mildly reduced ejection fraction (HFmrEF)    Other chest pain    Palpitations    Cyst of right ovary    Gastroesophageal reflux disease    Irritable bowel syndrome with both constipation and diarrhea    WICHO (generalized anxiety disorder)       Review of Systems   Constitutional:  Negative for chills and fever.   HENT:  Positive for ear pain, postnasal drip, rhinorrhea and sore throat.    Respiratory:  Positive for cough. Negative for shortness of breath and wheezing.    Cardiovascular:  Negative for chest pain.   Integumentary:  Negative for rash.   Allergic/Immunologic: Negative for environmental allergies.   Neurological:  Negative for headaches.     Otherwise negative       Objective     Physical Exam  Constitutional:       General: She is not in acute distress.     Appearance: Normal appearance. She is well-developed and well-groomed.   HENT:      Head: Normocephalic.   Pulmonary:      Effort: Pulmonary effort is normal. No respiratory distress.   Neurological:      Mental Status: She is alert.   Psychiatric:         Attention and Perception: Attention normal.         Mood and Affect: Mood  and affect normal.         Speech: Speech normal.         Behavior: Behavior normal. Behavior is cooperative.     Physical exam limited d/t virtual visit. Patient does not appear to be in any respiratory distress. Able to speak in complete sentences without becoming short of breath.       Assessment and Plan     1. Acute maxillary sinusitis, recurrence not specified  -     fluticasone propionate (FLONASE) 50 mcg/actuation nasal spray; 1 spray (50 mcg total) by Each Nostril route 2 (two) times a day.  Dispense: 16 g; Refill: 1  -     levocetirizine (XYZAL) 5 MG tablet; Take 1 tablet (5 mg total) by mouth every evening.  Dispense: 30 tablet; Refill: 11    Other orders  -     benzonatate (TESSALON) 100 MG capsule; Take 1 capsule (100 mg total) by mouth 3 (three) times daily as needed for Cough.  Dispense: 30 capsule; Refill: 0               Assessment & Plan    ACUTE SINUSITIS:  - Explained difference between acute sinusitis and bacterial sinus infection.  - Ms. Garcia to increase fluid intake, especially hot fluids like tea with lemon and honey.  - Recommend using a humidifier if available.  - Ms. Garcia can try steam inhalation from hot shower or carefully from boiling water in a pot.  - Started Flonase nasal spray: 1 spray in each nostril 2 times daily  - Started levocetirizine: 1 pill nightly.  - Recommend Tylenol (acetaminophen) instead of NSAIDs for pain relief.    COUGH:  - Started benzonatate (Tessalon perles): 1 capsule up to 3 times daily as needed for cough.    NASAL CONGESTION:  - Started Flonase nasal spray: 1 spray in each nostril 2 times daily  - Started levocetirizine: 1 pill at night.    RESPIRATORY INFECTION PREVENTION:  - Discussed potential contagiousness of respiratory symptoms, even before onset.  - Reviewed current prevalence of flu and COVID in the community (patient is unable to come in to the office today to get tested).  - Ms. Garcia to practice proper hygiene: hand washing, covering cough,  wiping surfaces.  - Recommend wearing a mask if around others while symptomatic.    RENAL ANGIOMYOLIPOMA:  - Educated on benign nature of renal angiomyolipomas    FOLLOW-UP:  - Contact the office if symptoms persist, worsen, or show no improvement in a few days for in-person evaluation and possible testing.  - Follow up with Dr. Chapman (PCP) to discuss renal angiomyolipoma and potential need for further imaging.   - Letter provided for work to return tomorrow, sent through the portal         Follow up if symptoms worsen or fail to improve.        I spent a total of 30 minutes on the day of the visit.  This includes face to face time and non-face to face time preparing to see the patient (eg, review of tests), obtaining and/or reviewing separately obtained history, documenting clinical information in the electronic or other health record, independently interpreting results and communicating results to the patient/family/caregiver, or care coordinator.         (Portions of this note may have been dictated using voice recognition software and may contain dictation related errors in spelling/grammar/syntax not found on text review)     This note was generated with the assistance of ambient listening technology. Verbal consent was obtained by the patient and accompanying visitor(s) for the recording of patient appointment to facilitate this note. I attest to having reviewed and edited the generated note for accuracy, though some syntax or spelling errors may persist. Please contact the author of this note for any clarification.       Answers submitted by the patient for this visit:  Cough Questionnaire (Submitted on 12/16/2024)  Chief Complaint: Cough  Chronicity: new  Onset: in the past 7 days  Progression since onset: unchanged  Frequency: constantly  Cough characteristics: non-productive  ear congestion: Yes  heartburn: No  hemoptysis: No  nasal congestion: Yes  sweats: No  weight loss: No  Aggravated by: cold  air  asthma: No  bronchiectasis: No  bronchitis: No  COPD: No  emphysema: No  pneumonia: No  Improvement on treatment: mild

## 2024-12-16 NOTE — LETTER
December 16, 2024      [facility]  [address]  [place] [address]  Phone: [phone]  Fax: [phone]       Patient: Misael Garcia   YOB: 1995  Date of Visit: 12/16/2024    To Whom It May Concern:    Dora Garcia  was seen by me at [facility] on 12/16/2024. The patient may return to work on 12/17/2024 with no restrictions. If you have any questions or concerns, or if I can be of further assistance, please do not hesitate to contact me.    Sincerely,         Eufemia Nick NP      Nancy Oliver  3/2/2016      Operative Progress Note:    Surgeon and Assistant: Dr. Carmona    Pre-Operative Diagnosis: rectal bleeding and gastroesophageal reflux disease    Post-Operative Diagnosis: rectal bleeding and gastroesophageal reflux disease with normal colonoscopy and small hiatal hernia    Procedure(s):  esophagogastroduodenoscopy and placement of pH probe and colonoscopy to cecum    Type of Anesthesia Administered: monitored anesthesia care*    Estimated Blood Loss: Minimal    Blood Products: None    Specimen Obtained/Removed:None    Complication(s):  None    Graft/Implant/Prosthetics/Implanted Device/Transplants: pH probe at 27 cm    Indication: patient is a 48-year-old white female.  She has undergone a previous pH probe placement but the test was inadequate.  He is now having some rectal bleeding.    Findings: duodenum normal, stomach with small hiatal hernia, no food in stomach which was seen with last EGD, and esophagus normal.  Gastroesophageal junction 33 cm.  Colon normal in its entirety.    Patient will be discharged home after recovery and will be seen back in the office.    Operative Report:  Patient was taken operating room and placed in a left lateral decubitus position.  IV sedation was given per anesthesia.  Gastroscope was introduced and passed into the duodenum.  The scope was slowly withdrawn.  I examined the duodenum, stomach and esophagus thoroughly.  No biopsies were taken as the patient had had a recent EGD with biopsies.  This is mostly being done to replace the pH probe.    Findings are listed as above.    Patient was taken to the operating room and placed in a left lateral decubitus position.  IV sedation was given.  Colonoscope was introduced and passed all the way to cecum.  The scope was slowly withdrawn.  Cecum, ascending colon, hepatic flexure, transverse colon, splenic flexure, descending colon, sigmoid colon, and rectum were all normal.  Digital rectal exam was  unremarkable.  The procedures and terminated.  Patient tolerated procedure very well and was returned recovery room in satisfactory condition    Patient will need a repeat colonoscopy in 10 years or less.    Patient be discharged home at recovery and will be seen back in the office in one week       Electronically Signed by: Jorge Carmona MD        Dictated Utilizing Dragon Dictation

## 2024-12-17 ENCOUNTER — PATIENT MESSAGE (OUTPATIENT)
Dept: FAMILY MEDICINE | Facility: CLINIC | Age: 29
End: 2024-12-17
Payer: COMMERCIAL

## 2024-12-18 ENCOUNTER — TELEPHONE (OUTPATIENT)
Dept: SLEEP MEDICINE | Facility: OTHER | Age: 29
End: 2024-12-18
Payer: COMMERCIAL

## 2024-12-19 ENCOUNTER — LAB VISIT (OUTPATIENT)
Dept: LAB | Facility: HOSPITAL | Age: 29
End: 2024-12-19
Attending: OBSTETRICS & GYNECOLOGY
Payer: COMMERCIAL

## 2024-12-19 ENCOUNTER — OFFICE VISIT (OUTPATIENT)
Dept: OBSTETRICS AND GYNECOLOGY | Facility: CLINIC | Age: 29
End: 2024-12-19
Payer: COMMERCIAL

## 2024-12-19 VITALS — BODY MASS INDEX: 48.34 KG/M2 | DIASTOLIC BLOOD PRESSURE: 81 MMHG | WEIGHT: 293 LBS | SYSTOLIC BLOOD PRESSURE: 116 MMHG

## 2024-12-19 DIAGNOSIS — Z72.89 OTHER PROBLEMS RELATED TO LIFESTYLE: ICD-10-CM

## 2024-12-19 DIAGNOSIS — Z11.3 ENCOUNTER FOR SCREENING FOR INFECTIONS WITH PREDOMINANTLY SEXUAL MODE OF TRANSMISSION: Primary | ICD-10-CM

## 2024-12-19 DIAGNOSIS — Z11.3 ENCOUNTER FOR SCREENING FOR INFECTIONS WITH PREDOMINANTLY SEXUAL MODE OF TRANSMISSION: ICD-10-CM

## 2024-12-19 LAB
HBV SURFACE AG SERPL QL IA: NORMAL
HCV AB SERPL QL IA: NORMAL
HIV 1+2 AB+HIV1 P24 AG SERPL QL IA: NORMAL
TREPONEMA PALLIDUM IGG+IGM AB [PRESENCE] IN SERUM OR PLASMA BY IMMUNOASSAY: NONREACTIVE

## 2024-12-19 PROCEDURE — 86803 HEPATITIS C AB TEST: CPT | Performed by: OBSTETRICS & GYNECOLOGY

## 2024-12-19 PROCEDURE — 86593 SYPHILIS TEST NON-TREP QUANT: CPT | Performed by: OBSTETRICS & GYNECOLOGY

## 2024-12-19 PROCEDURE — 99999 PR PBB SHADOW E&M-EST. PATIENT-LVL III: CPT | Mod: PBBFAC,,, | Performed by: OBSTETRICS & GYNECOLOGY

## 2024-12-19 PROCEDURE — 99214 OFFICE O/P EST MOD 30 MIN: CPT | Mod: S$GLB,,, | Performed by: OBSTETRICS & GYNECOLOGY

## 2024-12-19 PROCEDURE — 87389 HIV-1 AG W/HIV-1&-2 AB AG IA: CPT | Performed by: OBSTETRICS & GYNECOLOGY

## 2024-12-19 PROCEDURE — 36415 COLL VENOUS BLD VENIPUNCTURE: CPT | Performed by: OBSTETRICS & GYNECOLOGY

## 2024-12-19 PROCEDURE — 87491 CHLMYD TRACH DNA AMP PROBE: CPT | Performed by: OBSTETRICS & GYNECOLOGY

## 2024-12-19 PROCEDURE — 87340 HEPATITIS B SURFACE AG IA: CPT | Performed by: OBSTETRICS & GYNECOLOGY

## 2024-12-19 PROCEDURE — 0352U VAGINOSIS SCREEN BY DNA PROBE: CPT | Performed by: OBSTETRICS & GYNECOLOGY

## 2024-12-19 RX ORDER — IMIQUIMOD 12.5 MG/.25G
CREAM TOPICAL
Qty: 24 PACKET | Refills: 1 | Status: SHIPPED | OUTPATIENT
Start: 2024-12-20

## 2024-12-19 NOTE — PROGRESS NOTES
OBSTETRIC HISTORY:   OB History          0    Para   0    Term   0       0    AB   0    Living   0         SAB   0    IAB   0    Ectopic   0    Multiple   0    Live Births               Obstetric Comments   Menarche ~ 18/reg  H/o chlamydia   Denies abnl pap                COMPREHENSIVE GYN HISTORY:  PAP History: Reports abnormal Paps.  Infection History: CHLAMYDIA and WART. Denies PID.  Benign History: Denies uterine fibroids. Denies ovarian cysts. Denies endometriosis.   Cancer History: Denies cervical cancer. Denies uterine cancer or hyperplasia. Denies ovarian cancer. Denies vulvar cancer or pre-cancer. Denies vaginal cancer or pre-cancer. Denies breast cancer. Denies colon cancer.  Sexual Activity History:   reports that she is not currently sexually active and has had partner(s) who are male. She reports using the following method of birth control/protection: None.   Menstrual History: Every month , flows for 5 days. Normal flow.  Contraception History: Mirena IUD inserted 26    HPI:   29 y.o.  Patient's last menstrual period was 2024.   Patient is  here for STD testing and has a bump.. She denies bladder, breast and bowel complaints.    ROS:  GENERAL: No weight gain or weight loss.   CHEST: No shortness of breath.   ABDOMEN: No abdominal pain, constipation, diarrhea, nausea, vomiting or rectal bleeding.   URINARY: No frequency, dysuria, hematuria, or burning on urination.  REPRODUCTIVE: See HPI.   BREASTS: No breast pain, lumps, or nipple discharge.   PSYCHIATRIC: No depression or anxiety.        PE:   /81   Wt (!) 140 kg (308 lb 10.3 oz)   LMP 2024   BMI 48.34 kg/m²   APPEARANCE: Well nourished, well developed, in no acute distress.  ABDOMEN: Soft. No tenderness or masses. No hernias.  PELVIC:   VULVA: Between posterior fourchette and anus rough lesion that looks like genital wart.  Normal female genitalia.  URETHRAL MEATUS: Normal size and location, no lesions, no  prolapse.  URETHRA: No masses, tenderness, prolapse or scarring.  VAGINA: Moist and well rugated, no discharge, no significant cystocele or rectocele.  CERVIX: No lesions and discharge.IUD string visualized  UTERUS: Normal size, regular shape, mobile, non-tender, bladder base nontender.  ADNEXA: No masses or tenderness.    ASSESSMENT/PLAN:  Genital wart--tx options discussed will do [place]  STD testing-- Affirm, GC/CT, HIV, Syphilis, Hepatitis B and C   Use condoms  Given handout on genital warts  Received HPV vaccine  RTO prn

## 2024-12-22 ENCOUNTER — HOSPITAL ENCOUNTER (EMERGENCY)
Facility: HOSPITAL | Age: 29
Discharge: HOME | End: 2024-12-22
Attending: STUDENT IN AN ORGANIZED HEALTH CARE EDUCATION/TRAINING PROGRAM
Payer: COMMERCIAL

## 2024-12-22 VITALS
WEIGHT: 293 LBS | HEART RATE: 88 BPM | SYSTOLIC BLOOD PRESSURE: 135 MMHG | RESPIRATION RATE: 16 BRPM | OXYGEN SATURATION: 100 % | DIASTOLIC BLOOD PRESSURE: 95 MMHG | TEMPERATURE: 98 F | BODY MASS INDEX: 45.99 KG/M2 | HEIGHT: 67 IN

## 2024-12-22 DIAGNOSIS — J02.9 SORE THROAT: Primary | ICD-10-CM

## 2024-12-22 DIAGNOSIS — J35.1 TONSILLAR ENLARGEMENT: ICD-10-CM

## 2024-12-22 LAB
ANION GAP SERPL CALC-SCNC: 12 MMOL/L (ref 8–16)
B-HCG UR QL: NEGATIVE
BASOPHILS # BLD AUTO: 0.03 K/UL (ref 0–0.2)
BASOPHILS NFR BLD: 0.2 % (ref 0–1.9)
BUN SERPL-MCNC: 10 MG/DL (ref 6–20)
BUN SERPL-MCNC: 10 MG/DL (ref 6–30)
CALCIUM SERPL-MCNC: 9.1 MG/DL (ref 8.7–10.5)
CHLORIDE SERPL-SCNC: 104 MMOL/L (ref 95–110)
CHLORIDE SERPL-SCNC: 106 MMOL/L (ref 95–110)
CO2 SERPL-SCNC: 22 MMOL/L (ref 23–29)
CREAT SERPL-MCNC: 0.8 MG/DL (ref 0.5–1.4)
CREAT SERPL-MCNC: 0.8 MG/DL (ref 0.5–1.4)
CTP QC/QA: YES
DIFFERENTIAL METHOD BLD: ABNORMAL
EOSINOPHIL # BLD AUTO: 0.1 K/UL (ref 0–0.5)
EOSINOPHIL NFR BLD: 0.4 % (ref 0–8)
ERYTHROCYTE [DISTWIDTH] IN BLOOD BY AUTOMATED COUNT: 14.9 % (ref 11.5–14.5)
EST. GFR  (NO RACE VARIABLE): >60 ML/MIN/1.73 M^2
GLUCOSE SERPL-MCNC: 80 MG/DL (ref 70–110)
GLUCOSE SERPL-MCNC: 84 MG/DL (ref 70–110)
GROUP A STREP, MOLECULAR: NEGATIVE
HCT VFR BLD AUTO: 42.6 % (ref 37–48.5)
HCT VFR BLD CALC: 43 %PCV (ref 36–54)
HGB BLD-MCNC: 13.2 G/DL (ref 12–16)
IMM GRANULOCYTES # BLD AUTO: 0.05 K/UL (ref 0–0.04)
IMM GRANULOCYTES NFR BLD AUTO: 0.4 % (ref 0–0.5)
INFLUENZA A, MOLECULAR: NEGATIVE
INFLUENZA B, MOLECULAR: NEGATIVE
LYMPHOCYTES # BLD AUTO: 2.3 K/UL (ref 1–4.8)
LYMPHOCYTES NFR BLD: 16.8 % (ref 18–48)
MCH RBC QN AUTO: 25.6 PG (ref 27–31)
MCHC RBC AUTO-ENTMCNC: 31 G/DL (ref 32–36)
MCV RBC AUTO: 83 FL (ref 82–98)
MONOCYTES # BLD AUTO: 1.2 K/UL (ref 0.3–1)
MONOCYTES NFR BLD: 9 % (ref 4–15)
NEUTROPHILS # BLD AUTO: 9.8 K/UL (ref 1.8–7.7)
NEUTROPHILS NFR BLD: 73.2 % (ref 38–73)
NRBC BLD-RTO: 0 /100 WBC
PLATELET # BLD AUTO: 461 K/UL (ref 150–450)
PMV BLD AUTO: 9.4 FL (ref 9.2–12.9)
POC IONIZED CALCIUM: 1.12 MMOL/L (ref 1.06–1.42)
POC TCO2 (MEASURED): 24 MMOL/L (ref 23–29)
POTASSIUM BLD-SCNC: 3.5 MMOL/L (ref 3.5–5.1)
POTASSIUM SERPL-SCNC: 3.6 MMOL/L (ref 3.5–5.1)
RBC # BLD AUTO: 5.16 M/UL (ref 4–5.4)
SAMPLE: NORMAL
SARS-COV-2 RDRP RESP QL NAA+PROBE: NEGATIVE
SODIUM BLD-SCNC: 139 MMOL/L (ref 136–145)
SODIUM SERPL-SCNC: 140 MMOL/L (ref 136–145)
SPECIMEN SOURCE: NORMAL
WBC # BLD AUTO: 13.43 K/UL (ref 3.9–12.7)

## 2024-12-22 PROCEDURE — 81025 URINE PREGNANCY TEST: CPT | Performed by: STUDENT IN AN ORGANIZED HEALTH CARE EDUCATION/TRAINING PROGRAM

## 2024-12-22 PROCEDURE — 87502 INFLUENZA DNA AMP PROBE: CPT | Performed by: STUDENT IN AN ORGANIZED HEALTH CARE EDUCATION/TRAINING PROGRAM

## 2024-12-22 PROCEDURE — 85025 COMPLETE CBC W/AUTO DIFF WBC: CPT | Performed by: STUDENT IN AN ORGANIZED HEALTH CARE EDUCATION/TRAINING PROGRAM

## 2024-12-22 PROCEDURE — 99284 EMERGENCY DEPT VISIT MOD MDM: CPT | Mod: 25

## 2024-12-22 PROCEDURE — 25500020 PHARM REV CODE 255: Performed by: STUDENT IN AN ORGANIZED HEALTH CARE EDUCATION/TRAINING PROGRAM

## 2024-12-22 PROCEDURE — 87651 STREP A DNA AMP PROBE: CPT | Performed by: STUDENT IN AN ORGANIZED HEALTH CARE EDUCATION/TRAINING PROGRAM

## 2024-12-22 PROCEDURE — 87635 SARS-COV-2 COVID-19 AMP PRB: CPT | Performed by: STUDENT IN AN ORGANIZED HEALTH CARE EDUCATION/TRAINING PROGRAM

## 2024-12-22 PROCEDURE — 80048 BASIC METABOLIC PNL TOTAL CA: CPT | Performed by: STUDENT IN AN ORGANIZED HEALTH CARE EDUCATION/TRAINING PROGRAM

## 2024-12-22 PROCEDURE — 25000003 PHARM REV CODE 250: Performed by: STUDENT IN AN ORGANIZED HEALTH CARE EDUCATION/TRAINING PROGRAM

## 2024-12-22 RX ORDER — ACETAMINOPHEN 500 MG
1000 TABLET ORAL
Status: DISCONTINUED | OUTPATIENT
Start: 2024-12-22 | End: 2024-12-22 | Stop reason: HOSPADM

## 2024-12-22 RX ORDER — KETOROLAC TROMETHAMINE 30 MG/ML
10 INJECTION, SOLUTION INTRAMUSCULAR; INTRAVENOUS
Status: DISCONTINUED | OUTPATIENT
Start: 2024-12-22 | End: 2024-12-22

## 2024-12-22 RX ORDER — LIDOCAINE HYDROCHLORIDE 20 MG/ML
15 SOLUTION OROPHARYNGEAL
Status: COMPLETED | OUTPATIENT
Start: 2024-12-22 | End: 2024-12-22

## 2024-12-22 RX ORDER — IBUPROFEN 400 MG/1
400 TABLET ORAL
Status: COMPLETED | OUTPATIENT
Start: 2024-12-22 | End: 2024-12-22

## 2024-12-22 RX ADMIN — IOHEXOL 100 ML: 350 INJECTION, SOLUTION INTRAVENOUS at 08:12

## 2024-12-22 RX ADMIN — IBUPROFEN 400 MG: 400 TABLET ORAL at 07:12

## 2024-12-22 RX ADMIN — LIDOCAINE HYDROCHLORIDE 15 ML: 20 SOLUTION ORAL at 07:12

## 2024-12-22 NOTE — ED PROVIDER NOTES
"Encounter Date: 2024       History     Chief Complaint   Patient presents with    Sore Throat     "My right tonsil is inflamed" for 3 days     29-year-old female with PMH of diabetes and anxiety presents with sore throat.  Patient states she has had a progressive sore throat for 2 days and that she has severe pain when she swallows.  Denies difficulty or inability to swallow.  No choking on secretions.  Denies chest pain, shortness of breath, fever.  Reports congestion having a cold recently.  She is concerned she has an abscess.  She went to an urgent care a few days ago and was given antibiotics that start with a C and a steroid shot.  Her symptoms are not improving.  She does report that 3 days ago she had oral sex that was aggressive; has had similar episodes of throat pain previously with oral sex.    The history is provided by the patient and medical records.     Review of patient's allergies indicates:  No Known Allergies  Past Medical History:   Diagnosis Date    ADHD (attention deficit hyperactivity disorder)     Anxiety     Asthma     Bipolar affective     Eczema     History of chlamydia 2016    Insomnia     Morbid obesity with BMI of 40.0-44.9, adult     Sleep disorder     Type 2 diabetes mellitus without complications      Past Surgical History:   Procedure Laterality Date    NO PAST SURGERIES      as of 19     Family History   Problem Relation Name Age of Onset    No Known Problems Paternal Grandfather      Breast cancer Paternal Grandmother      Diabetes Maternal Grandmother      No Known Problems Maternal Grandfather      No Known Problems Father      No Known Problems Mother      No Known Problems Sister      Colon cancer Neg Hx      Ovarian cancer Neg Hx       Social History     Tobacco Use    Smoking status: Former     Types: Vaping with nicotine     Quit date:      Years since quittin.9     Passive exposure: Past    Smokeless tobacco: Never    Tobacco comments:     boyfriend " smoke cigarettes     Former weed consumption   Substance Use Topics    Alcohol use: No    Drug use: Not Currently     Types: Marijuana     Review of Systems    Physical Exam     Initial Vitals [12/22/24 0509]   BP Pulse Resp Temp SpO2   (!) 141/96 91 17 98.2 °F (36.8 °C) 98 %      MAP       --         Physical Exam    Constitutional: Vital signs are normal. She appears well-developed and well-nourished.   HENT:   Head: Normocephalic and atraumatic.   Right tonsil notably swollen compared to left. Questionable exudates   Eyes: Conjunctivae are normal.   Cardiovascular:  Normal rate and intact distal pulses.           Pulmonary/Chest: Breath sounds normal. No respiratory distress. She has no wheezes. She has no rhonchi. She has no rales.     Neurological: She is alert and oriented to person, place, and time. GCS eye subscore is 4. GCS verbal subscore is 5. GCS motor subscore is 6.   Psychiatric:   anxious         ED Course   Procedures  Labs Reviewed   CBC W/ AUTO DIFFERENTIAL - Abnormal       Result Value    WBC 13.43 (*)     RBC 5.16      Hemoglobin 13.2      Hematocrit 42.6      MCV 83      MCH 25.6 (*)     MCHC 31.0 (*)     RDW 14.9 (*)     Platelets 461 (*)     MPV 9.4      Immature Granulocytes 0.4      Gran # (ANC) 9.8 (*)     Immature Grans (Abs) 0.05 (*)     Lymph # 2.3      Mono # 1.2 (*)     Eos # 0.1      Baso # 0.03      nRBC 0      Gran % 73.2 (*)     Lymph % 16.8 (*)     Mono % 9.0      Eosinophil % 0.4      Basophil % 0.2      Differential Method Automated     BASIC METABOLIC PANEL - Abnormal    Sodium 140      Potassium 3.6      Chloride 106      CO2 22 (*)     Glucose 80      BUN 10      Creatinine 0.8      Calcium 9.1      Anion Gap 12      eGFR >60.0     GROUP A STREP, MOLECULAR    Group A Strep, Molecular Negative     INFLUENZA A & B BY MOLECULAR    Influenza A, Molecular Negative      Influenza B, Molecular Negative      Flu A & B Source Nasal swab     SARS-COV-2 RNA AMPLIFICATION, QUAL     SARS-CoV-2 RNA, Amplification, Qual Negative     POCT URINE PREGNANCY    POC Preg Test, Ur Negative       Acceptable Yes     ISTAT PROCEDURE    POC Glucose 84      POC BUN 10      POC Creatinine 0.8      POC Sodium 139      POC Potassium 3.5      POC Chloride 104      POC TCO2 (MEASURED) 24      POC Ionized Calcium 1.12      POC Hematocrit 43      Sample SANTOS     ISTAT CHEM8          Imaging Results              CT Soft Tissue Neck With Contrast (Final result)  Result time 12/22/24 09:15:37      Final result by Amaury Garza MD (12/22/24 09:15:37)                   Impression:      Right palatine tonsillar enlargement with edema.  No peritonsillar abscess.    Presumed reactive retropharyngeal and upper jugular chain lymph nodes.      Electronically signed by: Amaury Garza  Date:    12/22/2024  Time:    09:15               Narrative:    EXAMINATION:  CT SOFT TISSUE NECK WITH CONTRAST    CLINICAL HISTORY:  Tonsil/adenoid disorder;r/o right PTA;    TECHNIQUE:  Low dose axial images as well as sagittal and coronal reconstructions were performed from the skull base to the clavicles following the intravenous administration of 100 mL of Omnipaque 350.    COMPARISON:  None    FINDINGS:  There is enlargement and edema involving the right palatine tonsil.  No peritonsillar abscess.  No advanced inflammatory changes within the parapharyngeal or retropharyngeal spaces.    There is an enlarged presumed reactive right retropharyngeal lymph node with prominent upper jugular chain lymph nodes bilaterally.    The epiglottis is not particularly swollen.    The salivary glands are unremarkable.  The visualized paranasal sinuses demonstrate mild scattered mucosal thickening.  The mastoid air cells are clear.  No acute odontogenic periapical abscess identified.    The major vascular structures are patent.    No osseous destructive process is identified.                                       Medications    acetaminophen tablet 1,000 mg (1,000 mg Oral Not Given 12/22/24 0730)   LIDOcaine viscous HCl 2% oral solution 15 mL (15 mLs Oral Given 12/22/24 0720)   ibuprofen tablet 400 mg (400 mg Oral Given 12/22/24 0733)   iohexoL (OMNIPAQUE 350) injection 100 mL (100 mLs Intravenous Given 12/22/24 0853)     Medical Decision Making  Differential diagnoses considered includes strep pharyngitis, peritonsillar abscess, reactive tonsillar edema    Toradol and viscous lidocaine ordered.  Patient did not want Toradol because it messes up her stomach, so I order Tylenol.  Patient then declined the Tylenol and stated she needs ibuprofen. See ED course for additional attending MDM.     Amount and/or Complexity of Data Reviewed  External Data Reviewed: notes.     Details: Urgent care 12/20/24:  Acute tonsillitis, unspecified etiology  - cefdinir (OMNICEF) 300 MG capsule; Take 1 capsule by mouth every 12 (twelve) hours for 10 days  - ibuprofen (MOTRIN) 800 MG tablet; Take 1 tablet by mouth every 8 (eight) hours as needed for Pain (inflammation) for up to 10 days  - dexAMETHasone (DECADRON) injection 10 mg  - phenylephrine-pheniramine (ALAHIST D) 10-17.5 mg Tab; Take 1 tablet by mouth every 4 (four) hours as needed (runny nose, congestion, PND) Max 6tab q 24h     Labs: ordered. Decision-making details documented in ED Course.  Radiology: ordered. Decision-making details documented in ED Course.    Risk  OTC drugs.  Prescription drug management.               ED Course as of 12/22/24 0923   Sun Dec 22, 2024   0731 hCG Qualitative, Urine: Negative [BD]   0731 POC Creatinine: 0.8 [BD]   0811 Influenza A, Molecular: Negative [BD]   0811 Group A Strep, Molecular: Negative [BD]   0811 WBC(!): 13.43  Suspect 2/2 steroid given recently [BD]   0922 CT Soft Tissue Neck With Contrast  No abscess. Suspect edema is reactive. Pt discharged with OTC tylenol and ibuprofen encouraged. Patient will be discharged at this time. Patient has been given home  care instructions, follow up instructions, and strict return precautions. They agree with and are comfortable with the plan.  [BD]      ED Course User Index  [BD] Boris Rangel MD                             Clinical Impression:  Final diagnoses:  [J02.9] Sore throat (Primary)  [J35.1] Tonsillar enlargement - Right          ED Disposition Condition    Discharge Stable          ED Prescriptions    None       Follow-up Information       Follow up With Specialties Details Why Contact Info    Katelyn Chapman MD Family Medicine Schedule an appointment as soon as possible for a visit  To discuss your recent ER visit and any additional concerns that you may have [address] [facility]  [place] [id number] [phone]      [facility] Dept Emergency Medicine Go to  As needed, If symptoms worsen [address]  [facility] [address] [phone]             Boris Rangel MD  12/22/24 0969

## 2024-12-22 NOTE — ED NOTES
I-STAT Chem-8+ Results:   Value Reference Range   Sodium 139 136-145 mmol/L   Potassium  3.5 3.5-5.1 mmol/L   Chloride 104  mmol/L   Ionized Calcium 1.12 1.06-1.42 mmol/L   CO2 (measured) 24 23-29 mmol/L   Glucose 84  mg/dL   BUN 10 6-30 mg/dL   Creatinine 0.8 0.5-1.4 mg/dL   Hematocrit 43 36-54%

## 2024-12-22 NOTE — ED TRIAGE NOTES
"Misael Peterson Aileen Garcia, a 29 y.o. female presents to the ED w/ complaint of right tonsil pain x 3 days. Pt reports a sore throat, runny nose, and cough. Pt denies difficulty breathing, able to tolerate secretions, no voice changes. Pt denies chest pain and sob. Pt is AAOx4, GCS 15.     Triage note:  Chief Complaint   Patient presents with    Sore Throat     "My right tonsil is inflamed" for 3 days     Review of patient's allergies indicates:  No Known Allergies  Past Medical History:   Diagnosis Date    ADHD (attention deficit hyperactivity disorder)     Anxiety     Asthma     Bipolar affective     Eczema     History of chlamydia 12/2016    Insomnia     Morbid obesity with BMI of 40.0-44.9, adult     Sleep disorder     Type 2 diabetes mellitus without complications        "

## 2024-12-22 NOTE — DISCHARGE INSTRUCTIONS
Home Care:   - Tylenol 1000mg every 8 hours and Ibuprofen 400mg every 4-6 hours as needed for pain/fever/body aches  - Drink warm fluids and eat soft foods until your throat feels better    Follow-Up Plan:  - Follow-up with primary care doctor within 3 - 5 days to discuss your recent ER visit and any additional concerns that you may have.  - Additional testing and/or evaluation as directed by your primary doctor    Return to the Emergency Department for symptoms including but not limited to: persistence or worsening of symptoms, shortness of breath or chest pain, inability to drink without vomiting, passing out/fainting/ loss of consciousness, or if you have other concerns.

## 2024-12-23 ENCOUNTER — PATIENT MESSAGE (OUTPATIENT)
Dept: FAMILY MEDICINE | Facility: CLINIC | Age: 29
End: 2024-12-23
Payer: COMMERCIAL

## 2024-12-23 ENCOUNTER — OFFICE VISIT (OUTPATIENT)
Dept: OTOLARYNGOLOGY | Facility: CLINIC | Age: 29
End: 2024-12-23
Payer: COMMERCIAL

## 2024-12-23 VITALS
BODY MASS INDEX: 45.99 KG/M2 | DIASTOLIC BLOOD PRESSURE: 89 MMHG | WEIGHT: 293 LBS | HEIGHT: 67 IN | HEART RATE: 80 BPM | SYSTOLIC BLOOD PRESSURE: 137 MMHG

## 2024-12-23 DIAGNOSIS — J06.9 VIRAL URI WITH COUGH: ICD-10-CM

## 2024-12-23 DIAGNOSIS — J35.1 TONSILLAR HYPERTROPHY: Primary | ICD-10-CM

## 2024-12-23 PROCEDURE — 99999 PR PBB SHADOW E&M-EST. PATIENT-LVL IV: CPT | Mod: PBBFAC,,, | Performed by: PHYSICIAN ASSISTANT

## 2024-12-23 PROCEDURE — 99204 OFFICE O/P NEW MOD 45 MIN: CPT | Mod: S$GLB,,, | Performed by: PHYSICIAN ASSISTANT

## 2024-12-23 RX ORDER — PREDNISONE 20 MG/1
TABLET ORAL
Qty: 8 TABLET | Refills: 0 | Status: SHIPPED | OUTPATIENT
Start: 2024-12-23 | End: 2024-12-26 | Stop reason: SDUPTHER

## 2024-12-23 RX ORDER — AMOXICILLIN 875 MG/1
875 TABLET, FILM COATED ORAL 2 TIMES DAILY
Qty: 20 TABLET | Refills: 0 | Status: SHIPPED | OUTPATIENT
Start: 2024-12-23 | End: 2025-01-02

## 2024-12-23 RX ORDER — AMOXICILLIN 500 MG/1
500 CAPSULE ORAL EVERY 12 HOURS
Qty: 20 CAPSULE | Refills: 0 | Status: SHIPPED | OUTPATIENT
Start: 2024-12-23 | End: 2024-12-23

## 2024-12-23 RX ORDER — PREDNISONE 20 MG/1
TABLET ORAL
Qty: 8 TABLET | Refills: 0 | Status: SHIPPED | OUTPATIENT
Start: 2024-12-23 | End: 2024-12-23

## 2024-12-23 NOTE — PATIENT INSTRUCTIONS
Symptoms due to viral URI typically last 2-14 days, but some symptoms can linger for several weeks. (Most people recover in about 7-10 days)  Productive cough or discolored nasal discharge does not necessarily require antibiotic therapy  Increase your water intake to 64-80 oz daily to help thin mucus  Nasal Saline spray (or neti pot)  for nasal congestion  Tylenol or ibuprofen every 4-6 hours as needed for fever, headaches, sore throat, ear pain, bodyaches, and/or nasal/sinus inflammation  Warm salt water gargles with 1/2 cup water and 1 teaspoon salt every 4 hours  Take Robitussin cough syrup 1-2 teaspoonful twice daily for cough  Throat lozenges as needed for sore throat

## 2024-12-23 NOTE — PROGRESS NOTES
Subjective:     HPI: Misael Garcia is a 29 y.o. female who was self-referred for  sore throat .    Patient reports developing cough with associated sore throat, ear pain, headache, and coughing about 4 days ago.  She reports rough oral sex followed by significant throat pain and enlarged right tonsil.  She was evaluated urgent care 3 days ago and had negative testing for strep, flu, and COVID.  She is able to tolerate food and drink, but causes pain.     Past Medical/Past Surgical History  Past Medical History:   Diagnosis Date    ADHD (attention deficit hyperactivity disorder)     Anxiety     Asthma     Bipolar affective     Eczema     History of chlamydia 12/2016    Insomnia     Morbid obesity with BMI of 40.0-44.9, adult     Sleep disorder     Type 2 diabetes mellitus without complications      She has a past surgical history that includes No past surgeries.    Family History/Social History  Her family history includes Breast cancer in her paternal grandmother; Diabetes in her maternal grandmother; No Known Problems in her father, maternal grandfather, mother, paternal grandfather, and sister.  She reports that she quit smoking about 1 years ago. Her smoking use included vaping with nicotine. She has been exposed to tobacco smoke. She has never used smokeless tobacco. She reports that she does not currently use drugs after having used the following drugs: Marijuana. She reports that she does not drink alcohol.    Allergies/Immunizations  She has No Known Allergies.  Immunization History   Administered Date(s) Administered    COVID-19 Vaccine 07/13/2021, 08/10/2021    COVID-19, MRNA, LN-S, PF (MODERNA FULL 0.5 ML DOSE) 07/13/2021, 08/10/2021, 02/09/2023    COVID-19, mRNA, LNP-S, bivalent booster, PF (PFIZER OMICRON) 02/09/2023    DTaP 1997, 1997, 1997, 07/11/1998, 07/19/2003    HIB 1997, 1997, 1997, 07/11/1998    HPV Quadrivalent 07/31/2009, 08/07/2010, 06/01/2012     Hepatitis B 1997, 1997, 1997    IPV 07/19/2003    Influenza - Quadrivalent - PF *Preferred* (6 months and older) 11/18/2019, 04/20/2021, 10/12/2022, 10/04/2023    MMR 09/19/1998, 07/19/2003, 08/23/2022, 12/07/2022    Meningococcal Conjugate (MCV4P) 07/31/2009, 12/19/2016    OPV 1997, 1997, 1997    Pneumococcal Conjugate - 20 Valent 02/06/2023    Tdap 07/31/2009, 04/20/2021    Varicella 02/07/1998, 07/31/2009, 08/07/2010, 12/07/2022        Medications   atenoloL  benzonatate  drospirenone-ethinyl estradioL  EScitalopram oxalate  fluticasone propionate  imiquimod  levocetirizine  lisinopriL  omeprazole Tbec  prucalopride Tab  traZODone     Review of Systems     Constitutional: Positive for fatigue.      HENT: Positive for ear pain, postnasal drip, sinus infection and sinus pressure.      Eyes:  Negative for change in eyesight, eye drainage, eye itching and photophobia.     Respiratory:  Negative for cough, shortness of breath, sleep apnea, snoring and wheezing.      Cardiovascular:  Negative for chest pain, foot swelling, irregular heartbeat and swollen veins.     Gastrointestinal:  Negative for abdominal pain, acid reflux, constipation, diarrhea, heartburn and vomiting.     Genitourinary: Negative for difficulty urinating, sexual problems and frequent urination.     Musc: Negative for aching joints, aching muscles, back pain and neck pain.     Skin: Negative for rash.     Allergy: Negative for food allergies and seasonal allergies.     Endocrine: Negative for cold intolerance and heat intolerance.      Neurological: Negative for dizziness, headaches, light-headedness, seizures and tremors.      Hematologic: Negative for bruises/bleeds easily and swollen glands.      Psychiatric: Negative for decreased concentration, depression, nervous/anxious and sleep disturbance.            Objective:     LMP 12/14/2024      Physical Exam  Vitals reviewed.   Constitutional:       Appearance:  Normal appearance.   HENT:      Head: Normocephalic and atraumatic.      Right Ear: External ear normal.      Left Ear: External ear normal.      Nose: Septal deviation present. No mucosal edema or rhinorrhea.      Right Turbinates: Not enlarged.      Left Turbinates: Not enlarged.      Right Sinus: No maxillary sinus tenderness or frontal sinus tenderness.      Left Sinus: No maxillary sinus tenderness or frontal sinus tenderness.      Mouth/Throat:      Lips: Pink.      Mouth: Mucous membranes are moist.      Dentition: Normal dentition.      Tongue: No lesions. Tongue does not deviate from midline.      Palate: No mass and lesions.      Pharynx: Oropharynx is clear. Uvula midline.      Tonsils: No tonsillar exudate or tonsillar abscesses. 3+ on the right. 1+ on the left.      Comments: R tonsil with swollen, ecchymotic appearance  Indurated, nontender with mild pressure  White film overlying tonsil without exudate  Neck:      Thyroid: No thyroid tenderness.   Pulmonary:      Effort: Pulmonary effort is normal.   Musculoskeletal:      Cervical back: Full passive range of motion without pain.   Lymphadenopathy:      Cervical: Cervical adenopathy present.      Right cervical: No superficial cervical adenopathy.     Left cervical: No superficial cervical adenopathy.   Neurological:      Mental Status: She is alert.          Procedure    None    Data Reviewed  I personally reviewed the chart, including any outside records, and pertinent data below:    I reviewed the following notes Internal Medicine     WBC (K/uL)   Date Value   12/22/2024 13.43 (H)     Lymph # (K/uL)   Date Value   12/22/2024 2.3     Lymph % (%)   Date Value   12/22/2024 16.8 (L)     Eosinophil % (%)   Date Value   12/22/2024 0.4     Total IgE (IU/mL)   Date Value   03/02/2023 91     Eos # (K/uL)   Date Value   12/22/2024 0.1       Platelets (K/uL)   Date Value   12/22/2024 461 (H)     Glucose (mg/dL)   Date Value   12/22/2024 80     CT soft tissue  "yesterday performed:  "Right palatine tonsillar enlargement with edema.  No peritonsillar abscess.     Presumed reactive retropharyngeal and upper jugular chain lymph nodes."    Assessment & Plan:     1. Tonsillar hypertrophy  2. Viral URI with cough  -     right tonsil appears to have a hematoma.  It is indurated.  Her symptoms are exacerbated by acute URI.  - discussed with patient that it will take time for swelling to decrease but I have started her on a antibiotic and steroids  -     predniSONE (DELTASONE) 20 MG tablet; Take 2 tablets (40 mg total) by mouth once daily for 3 days, THEN 1 tablet (20 mg total) once daily for 2 days.  Dispense: 8 tablet; Refill: 0  -     amoxicillin (AMOXIL) 875 MG tablet; Take 1 tablet (875 mg total) by mouth 2 (two) times a day. for 10 days  Dispense: 20 tablet; Refill: 0    She will follow up as needed  I had a discussion with the patient regarding her condition and the further workup and management options.    All questions were answered, and the patient is in agreement with the above.     Disclaimer:  This note may have been prepared utilizing voice recognition software which may result in occasional typographical errors in the text such as sound alike words.   If further clarification is needed, please contact the ENT department of [facility].  "

## 2024-12-26 ENCOUNTER — OFFICE VISIT (OUTPATIENT)
Dept: FAMILY MEDICINE | Facility: CLINIC | Age: 29
End: 2024-12-26
Payer: COMMERCIAL

## 2024-12-26 VITALS
TEMPERATURE: 98 F | WEIGHT: 293 LBS | OXYGEN SATURATION: 96 % | HEIGHT: 67 IN | DIASTOLIC BLOOD PRESSURE: 80 MMHG | BODY MASS INDEX: 45.99 KG/M2 | HEART RATE: 87 BPM | SYSTOLIC BLOOD PRESSURE: 122 MMHG

## 2024-12-26 DIAGNOSIS — B27.80 OTHER INFECTIOUS MONONUCLEOSIS WITHOUT COMPLICATION: ICD-10-CM

## 2024-12-26 DIAGNOSIS — J35.1 TONSILLAR HYPERTROPHY: Primary | ICD-10-CM

## 2024-12-26 PROCEDURE — 99214 OFFICE O/P EST MOD 30 MIN: CPT | Mod: S$GLB,,,

## 2024-12-26 PROCEDURE — 99999 PR PBB SHADOW E&M-EST. PATIENT-LVL V: CPT | Mod: PBBFAC,,,

## 2024-12-26 RX ORDER — PREDNISONE 20 MG/1
TABLET ORAL
Qty: 8 TABLET | Refills: 0 | Status: SHIPPED | OUTPATIENT
Start: 2024-12-26 | End: 2024-12-31

## 2024-12-26 NOTE — PATIENT INSTRUCTIONS
Continue plan below as recently gave by ENT    Symptoms due to viral URI typically last 2-14 days, but some symptoms can linger for several weeks. (Most people recover in about 7-10 days)  Productive cough or discolored nasal discharge does not necessarily require antibiotic therapy  Increase your water intake to 64-80 oz daily to help thin mucus  Nasal Saline spray (or neti pot)  for nasal congestion  Tylenol or ibuprofen every 4-6 hours as needed for fever, headaches, sore throat, ear pain, bodyaches, and/or nasal/sinus inflammation  Warm salt water gargles with 1/2 cup water and 1 teaspoon salt every 4 hours  Take Robitussin cough syrup 1-2 teaspoonful twice daily for cough  Throat lozenges as needed for sore throat

## 2024-12-26 NOTE — PROGRESS NOTES
" [facility]    12/26/2024      Subjective:       Patient ID:  Misael is a 29 y.o. female .  has a past medical history of ADHD (attention deficit hyperactivity disorder), Anxiety, Asthma, Bipolar affective, Eczema, History of chlamydia, Insomnia, Morbid obesity with BMI of 40.0-44.9, adult, Sleep disorder, and Type 2 diabetes mellitus without complications.    History of Present Illness    CHIEF COMPLAINT:  Ms. Garcia presents today with throat pain and difficulty swallowing.    HISTORY OF PRESENT ILLNESS:  She presents with severe throat pain, describing sensation of "swallowing knives". She reports swollen tonsils and ear pain, making eating difficult. She also experiences congestion and productive cough with yellow and clear mucus. She denies fever, chills, or body aches.    RECENT MEDICAL CARE:  She was diagnosed with mononucleosis at urgent care on December 24 with a positive mono screen at a urgent care. She visited urgent care on December 22 where she was prescribed doxycycline and methylprednisolone. She then visited ENT on December 23 where she was prescribed amoxicillin and prednisone.    MEDICATIONS:  She discontinued both amoxicillin and doxycycline two days ago and is not taking prednisone as prescribed.      ROS:  General: -fever, -chills, -fatigue, -weight gain, -weight loss  Eyes: -vision changes, -redness, -discharge  ENT: +ear pain, +nasal congestion, +sore throat, +difficulty swallowing  Cardiovascular: -chest pain, -palpitations, -lower extremity edema  Respiratory: +cough, -shortness of breath  Gastrointestinal: -abdominal pain, -nausea, -vomiting, -diarrhea, -constipation, -blood in stool  Genitourinary: -dysuria, -hematuria, -frequency  Musculoskeletal: -joint pain, -muscle pain  Skin: -rash, -lesion  Neurological: -headache, -dizziness, -numbness, -tingling  Psychiatric: -anxiety, -depression, -sleep difficulty             Patient Active Problem List "   Diagnosis    Corns and callus    Class 3 severe obesity due to excess calories with serious comorbidity and body mass index (BMI) of 45.0 to 49.9 in adult    Bipolar 1 disorder    Primary hypertension    Oral contraceptive pill surveillance    SID (obstructive sleep apnea)    Cervical spondylosis without myelopathy    Lumbosacral spondylosis without myelopathy    Posttraumatic stress disorder    Thoracic facet syndrome    Heart failure with mildly reduced ejection fraction (HFmrEF)    Other chest pain    Palpitations    Cyst of right ovary    Gastroesophageal reflux disease    Irritable bowel syndrome with both constipation and diarrhea    WICHO (generalized anxiety disorder)         Last HgbA1C:    Lab Results   Component Value Date    HGBA1C 5.1 07/30/2024    HGBA1C 5.2 07/08/2024    HGBA1C 5.8 (H) 01/24/2024         Last Lipid Panel:    Lab Results   Component Value Date    HDL 59 07/30/2024    HDL 57 07/08/2024    HDL 55 01/04/2023       Lab Results   Component Value Date    LDLCALC 120.2 07/30/2024    LDLCALC 154 (H) 07/08/2024    LDLCALC 106.6 01/04/2023       Lab Results   Component Value Date    TRIG 64 07/30/2024    TRIG 102 07/08/2024    TRIG 62 01/04/2023       Lab Results   Component Value Date    CHOLHDL 30.7 07/30/2024    CHOLHDL 4.05 07/08/2024    CHOLHDL 31.6 01/04/2023         Review of patient's allergies indicates:  No Known Allergies     Medication List with Changes/Refills   Current Medications    AMOXICILLIN (AMOXIL) 875 MG TABLET    Take 1 tablet (875 mg total) by mouth 2 (two) times a day. for 10 days    ATENOLOL (TENORMIN) 25 MG TABLET    Take 1 tablet (25 mg total) by mouth once daily.    BENZONATATE (TESSALON) 100 MG CAPSULE    Take 1 capsule (100 mg total) by mouth 3 (three) times daily as needed for Cough.    DROSPIRENONE-ETHINYL ESTRADIOL (JUAN MANUEL) 3-0.02 MG PER TABLET    Take 1 tablet by mouth once daily.    ESCITALOPRAM OXALATE (LEXAPRO) 20 MG TABLET    Take 1 tablet (20 mg total) by  "mouth once daily.    FLUTICASONE PROPIONATE (FLONASE) 50 MCG/ACTUATION NASAL SPRAY    1 spray (50 mcg total) by Each Nostril route 2 (two) times a day.    IMIQUIMOD (ALDARA) 5 % CREAM    Apply topically 3 (three) times a week. Use for 16 weeks    LEVOCETIRIZINE (XYZAL) 5 MG TABLET    Take 1 tablet (5 mg total) by mouth every evening.    LISINOPRIL (PRINIVIL,ZESTRIL) 20 MG TABLET    Take 1 tablet (20 mg total) by mouth once daily.    OMEPRAZOLE 20 MG TBEC    Take 1 capsule by mouth once daily.    PRUCALOPRIDE 2 MG TAB    Take 1 tablet by mouth once daily.    TRAZODONE (DESYREL) 50 MG TABLET    Take  mg by mouth nightly as needed.   Changed and/or Refilled Medications    Modified Medication Previous Medication    PREDNISONE (DELTASONE) 20 MG TABLET predniSONE (DELTASONE) 20 MG tablet       Take 2 tablets (40 mg total) by mouth once daily for 3 days, THEN 1 tablet (20 mg total) once daily for 2 days.    Take 2 tablets (40 mg total) by mouth once daily for 3 days, THEN 1 tablet (20 mg total) once daily for 2 days.               Objective:      /80 (BP Location: Right arm, Patient Position: Sitting)   Pulse 87   Temp 98.1 °F (36.7 °C) (Oral)   Ht 5' 7" (1.702 m)   Wt (!) 138.5 kg (305 lb 5.4 oz)   LMP 12/14/2024   SpO2 96%   BMI 47.82 kg/m²   Estimated body mass index is 47.82 kg/m² as calculated from the following:    Height as of this encounter: 5' 7" (1.702 m).    Weight as of this encounter: 138.5 kg (305 lb 5.4 oz).    Physical Exam  Vitals reviewed.   Constitutional:       Appearance: Normal appearance.   HENT:      Head: Normocephalic and atraumatic.      Right Ear: Tympanic membrane normal.      Left Ear: Tympanic membrane normal.      Mouth/Throat:      Mouth: Mucous membranes are moist.      Pharynx: Oropharyngeal exudate and posterior oropharyngeal erythema present.      Comments:  R tonsil with swollen,White film overlying tonsil    Eyes:      Conjunctiva/sclera: Conjunctivae normal. "   Cardiovascular:      Rate and Rhythm: Normal rate and regular rhythm.      Heart sounds: Normal heart sounds.   Pulmonary:      Effort: Pulmonary effort is normal. No respiratory distress.      Breath sounds: Normal breath sounds.   Musculoskeletal:         General: Normal range of motion.      Cervical back: Normal range of motion.   Lymphadenopathy:      Cervical: Cervical adenopathy present.   Neurological:      Mental Status: She is alert and oriented to person, place, and time.   Psychiatric:         Mood and Affect: Mood normal.             Assessment and Plan:     Misael was seen today for sore throat.    Diagnoses and all orders for this visit:    Tonsillar hypertrophy  -     predniSONE (DELTASONE) 20 MG tablet; Take 2 tablets (40 mg total) by mouth once daily for 3 days, THEN 1 tablet (20 mg total) once daily for 2 days.  -     Ambulatory referral/consult to ENT; Future    Other infectious mononucleosis without complication          Assessment & Plan    - Patient likely has mononucleosis based on positive mono test from urgent care visit on 12/24  - Viral etiology suspected; supportive care approach recommended  - Restarted steroid taper for symptom management  - ENT evaluation findings from 12/23 consistent with current presentation  - Ear pain likely due to fluid buildup from infection   Explained importance of consistency in following treatment plan for accurate assessment of effectiveness.   Ms. Garcia to drink plenty of water.   Recommend warm salt gargles with a cup of water and teaspoon of salt every 4 hours.   Ms. Garcia to continue frequent teeth brushing.   Restarted prednisone taper that was prescribed by ent. .   Continued Tylenol as needed for pain relief.   Started Robitussin for cough.   Consider using cough drops to soothe throat.  Follow up with ENT in one week after consistent therapy management.     GENERAL FOLLOW-UP AND PRECAUTIONS:   Contact the office for any changes or worsening of  symptoms.   Go to ED immediately if experiencing difficulty breathing or throat closure.         The patient was informed of the following statements     Emergency Care:Seek immediate medical attention in the emergency room if you experience any new or worsening symptoms, or if your current condition significantly changes or becomes more severe.  Patient Acknowledgment: Patient verbalizes understanding of the plan and agrees to proceed with the recommended care.    Follow Up:       No follow-ups on file.    Other Orders Placed This Visit:  Orders Placed This Encounter   Procedures    Ambulatory referral/consult to ENT         This note was generated with the assistance of ambient listening technology. Verbal consent was obtained by the patient and accompanying visitor(s) for the recording of patient appointment to facilitate this note. I attest to having reviewed and edited the generated note for accuracy, though some syntax or spelling errors may persist. Please contact the author of this note for any clarification.      I spent a total of 35 minutes on the day of the visit.This includes face to face time and non-face to face time preparing to see the patient (eg, review of tests), obtaining and/or reviewing separately obtained history, documenting clinical information in the electronic or other health record, independently interpreting results and communicating results to the patient/family/caregiver, or care coordinator.      Bobo Mobley PA-C

## 2025-01-06 ENCOUNTER — PATIENT MESSAGE (OUTPATIENT)
Dept: OBSTETRICS AND GYNECOLOGY | Facility: CLINIC | Age: 30
End: 2025-01-06
Payer: COMMERCIAL

## 2025-01-10 DIAGNOSIS — K21.9 GASTROESOPHAGEAL REFLUX DISEASE WITHOUT ESOPHAGITIS: ICD-10-CM

## 2025-01-10 RX ORDER — PHENOL/SODIUM PHENOLATE
1 AEROSOL, SPRAY (ML) MUCOUS MEMBRANE DAILY
Qty: 90 EACH | Refills: 3 | Status: SHIPPED | OUTPATIENT
Start: 2025-01-10 | End: 2026-01-10

## 2025-01-10 NOTE — TELEPHONE ENCOUNTER
Refill Routing Note   Medication(s) are not appropriate for processing by [facility] for the following reason(s):        ED/Hospital Visit since last OV with provider  New or recently adjusted medication    [facility] action(s):  Defer               Appointments  past 12m or future 3m with PCP    Date Provider   Last Visit   11/5/2024 Katelyn Chapman MD   Next Visit   2/3/2025 Katelyn Chapman MD   ED visits in past 90 days: 1        Note composed:1:38 PM 01/10/2025

## 2025-01-10 NOTE — TELEPHONE ENCOUNTER
No care due was identified.  [facility] Embedded Care Due Messages. Reference number: [id number].   1/10/2025 7:07:03 AM CST

## 2025-01-15 ENCOUNTER — OFFICE VISIT (OUTPATIENT)
Dept: SLEEP MEDICINE | Facility: CLINIC | Age: 30
End: 2025-01-15
Payer: COMMERCIAL

## 2025-01-15 VITALS
DIASTOLIC BLOOD PRESSURE: 86 MMHG | HEIGHT: 67 IN | BODY MASS INDEX: 45.99 KG/M2 | HEART RATE: 75 BPM | SYSTOLIC BLOOD PRESSURE: 136 MMHG | WEIGHT: 293 LBS

## 2025-01-15 DIAGNOSIS — G47.09 OTHER INSOMNIA: ICD-10-CM

## 2025-01-15 DIAGNOSIS — G47.33 OSA (OBSTRUCTIVE SLEEP APNEA): Primary | ICD-10-CM

## 2025-01-15 DIAGNOSIS — Z78.9 INTOLERANCE OF CONTINUOUS POSITIVE AIRWAY PRESSURE (CPAP) VENTILATION: ICD-10-CM

## 2025-01-15 PROCEDURE — 3008F BODY MASS INDEX DOCD: CPT | Mod: CPTII,S$GLB,, | Performed by: INTERNAL MEDICINE

## 2025-01-15 PROCEDURE — 99214 OFFICE O/P EST MOD 30 MIN: CPT | Mod: S$GLB,,, | Performed by: INTERNAL MEDICINE

## 2025-01-15 PROCEDURE — 1159F MED LIST DOCD IN RCRD: CPT | Mod: CPTII,S$GLB,, | Performed by: INTERNAL MEDICINE

## 2025-01-15 PROCEDURE — 3075F SYST BP GE 130 - 139MM HG: CPT | Mod: CPTII,S$GLB,, | Performed by: INTERNAL MEDICINE

## 2025-01-15 PROCEDURE — 99999 PR PBB SHADOW E&M-EST. PATIENT-LVL III: CPT | Mod: PBBFAC,,, | Performed by: INTERNAL MEDICINE

## 2025-01-15 PROCEDURE — G2211 COMPLEX E/M VISIT ADD ON: HCPCS | Mod: S$GLB,,, | Performed by: INTERNAL MEDICINE

## 2025-01-15 PROCEDURE — 3079F DIAST BP 80-89 MM HG: CPT | Mod: CPTII,S$GLB,, | Performed by: INTERNAL MEDICINE

## 2025-01-15 NOTE — PROGRESS NOTES
ESTABLISHED PATIENT VISIT    Misael Garcia  is a pleasant 29 y.o. female  with PMH significant for bipolar type I, HTN, DM, BMI >50, ADHD who presented 2023 for trouble falling asleep, staying asleep and snoring.    Here today for:  follow-up     Since last visit:   See table below  .  Past Medical History:   Diagnosis Date    ADHD (attention deficit hyperactivity disorder)     Anxiety     Asthma     Bipolar affective     Eczema     History of chlamydia 12/2016    Insomnia     Morbid obesity with BMI of 40.0-44.9, adult     Sleep disorder     Type 2 diabetes mellitus without complications      Patient Active Problem List   Diagnosis    Corns and callus    Class 3 severe obesity due to excess calories with serious comorbidity and body mass index (BMI) of 45.0 to 49.9 in adult    Bipolar 1 disorder    Primary hypertension    Oral contraceptive pill surveillance    SID (obstructive sleep apnea)    Cervical spondylosis without myelopathy    Lumbosacral spondylosis without myelopathy    Posttraumatic stress disorder    Thoracic facet syndrome    Heart failure with mildly reduced ejection fraction (HFmrEF)    Other chest pain    Palpitations    Cyst of right ovary    Gastroesophageal reflux disease    Irritable bowel syndrome with both constipation and diarrhea    WICHO (generalized anxiety disorder)       Current Outpatient Medications:     atenoloL (TENORMIN) 25 MG tablet, Take 1 tablet (25 mg total) by mouth once daily., Disp: 90 tablet, Rfl: 3    drospirenone-ethinyl estradioL (JUAN MANUEL) 3-0.02 mg per tablet, Take 1 tablet by mouth once daily., Disp: 28 tablet, Rfl: 11    EScitalopram oxalate (LEXAPRO) 20 MG tablet, Take 1 tablet (20 mg total) by mouth once daily., Disp: , Rfl:     fluticasone propionate (FLONASE) 50 mcg/actuation nasal spray, 1 spray (50 mcg total) by Each Nostril route 2 (two) times a day., Disp: 16 g, Rfl: 1    imiquimod (ALDARA) 5 % cream, Apply topically 3 (three) times a week. Use for 16  "weeks, Disp: 24 packet, Rfl: 1    levocetirizine (XYZAL) 5 MG tablet, Take 1 tablet (5 mg total) by mouth every evening., Disp: 30 tablet, Rfl: 11    lisinopriL (PRINIVIL,ZESTRIL) 20 MG tablet, Take 1 tablet (20 mg total) by mouth once daily., Disp: 90 tablet, Rfl: 3    omeprazole 20 mg TbEC, Take 1 capsule by mouth once daily., Disp: 90 each, Rfl: 3    prucalopride 2 mg Tab, Take 1 tablet by mouth once daily., Disp: , Rfl:     traZODone (DESYREL) 50 MG tablet, Take  mg by mouth nightly as needed., Disp: , Rfl:        Vitals:    01/15/25 1611   BP: 136/86   BP Location: Right arm   Patient Position: Sitting   Pulse: 75   Weight: 135.6 kg (299 lb 0.9 oz)   Height: 5' 7" (1.702 m)        Physical Exam:    GEN:   Well-appearing  Psych:  Appropriate affect, demonstrates insight  SKIN:  No rash on the face or bridge of the nose      LABS:   Lab Results   Component Value Date    HGB 13.2 12/22/2024    CO2 22 (L) 12/22/2024       RECORDS REVIEWED PREVIOUSLY:      PSG 2/25/23: AHI 7.2 (likely mod with RERAs), no REM  3.25.24: wants a retrial, still has machine      9.26.23: 3/90 x 28 min, 5-12 (5.1/5.7/5.7)    ASSESSMENT    PROBLEM DESCRIPTION/ Sx on Presentation Interval HX STATUS PLAN   SID   + snoring, + one snoring arousal, + sudden arousals at times, no witnessed apneas        PAP history   Dx Study    Mask nasal   DME HME   My Air    CPAP age [age]   PAP altn    Benefits    PROBS Hard time falling asleep with CPAP -anxious            LOV: 4.8.2024    Was worried about using CPAP in case it would be dangerous if it came off during the night    She has been talking in her sleep recently   uncontrolled       PAP PLAN   E min  cwp    I max  cwp   PS/epr    RAMP    Other    Altn.    Press  chg      -will try again for CPAP titraiton due to inability to tolerate CPAP        CHECKOUT   Recall will arrange RTC depending on results of sleep testing      DME    Other         Insomnia       SLEEP SCHEDULE   Duration  "   Wind- down    Envmnt phone   CBTi    Meds prior    Meds now    Bed Time 8-11P   Lights out    Latency Not long   Arousals 4-5   Back to sleep Not long   Stim. ctrl    Wake time 7A-9A   Caffeine    Naps    Nocturia 2   Work         Trouble falling asleep    Waking frequently        Still some trouble falling asleep      Still waking frequently   persists     -try to read a book instead of phone in bed (try focus mode)   Nocturia   x 2-3 per sleep period, also daytime frequency Still 2-3 times per night persists N/A   Somniloquy Talking some in her sleep      new CPAP titration, will look for parasomnia activity   Other issues:

## 2025-01-25 ENCOUNTER — PATIENT MESSAGE (OUTPATIENT)
Dept: SLEEP MEDICINE | Facility: CLINIC | Age: 30
End: 2025-01-25
Payer: COMMERCIAL

## 2025-01-28 ENCOUNTER — OFFICE VISIT (OUTPATIENT)
Dept: OBSTETRICS AND GYNECOLOGY | Facility: CLINIC | Age: 30
End: 2025-01-28
Payer: COMMERCIAL

## 2025-01-28 VITALS
WEIGHT: 227.75 LBS | DIASTOLIC BLOOD PRESSURE: 84 MMHG | SYSTOLIC BLOOD PRESSURE: 132 MMHG | BODY MASS INDEX: 35.67 KG/M2

## 2025-01-28 DIAGNOSIS — Z11.3 SCREENING EXAMINATION FOR STD (SEXUALLY TRANSMITTED DISEASE): ICD-10-CM

## 2025-01-28 DIAGNOSIS — Z12.4 ROUTINE CERVICAL SMEAR: ICD-10-CM

## 2025-01-28 DIAGNOSIS — Z01.419 WELL WOMAN EXAM WITH ROUTINE GYNECOLOGICAL EXAM: Primary | ICD-10-CM

## 2025-01-28 PROCEDURE — 3008F BODY MASS INDEX DOCD: CPT | Mod: CPTII,S$GLB,, | Performed by: OBSTETRICS & GYNECOLOGY

## 2025-01-28 PROCEDURE — 56501 DESTROY VULVA LESIONS SIM: CPT | Mod: S$GLB,,, | Performed by: OBSTETRICS & GYNECOLOGY

## 2025-01-28 PROCEDURE — 3079F DIAST BP 80-89 MM HG: CPT | Mod: CPTII,S$GLB,, | Performed by: OBSTETRICS & GYNECOLOGY

## 2025-01-28 PROCEDURE — 99999 PR PBB SHADOW E&M-EST. PATIENT-LVL III: CPT | Mod: PBBFAC,,, | Performed by: OBSTETRICS & GYNECOLOGY

## 2025-01-28 PROCEDURE — 3075F SYST BP GE 130 - 139MM HG: CPT | Mod: CPTII,S$GLB,, | Performed by: OBSTETRICS & GYNECOLOGY

## 2025-01-28 PROCEDURE — 1160F RVW MEDS BY RX/DR IN RCRD: CPT | Mod: CPTII,S$GLB,, | Performed by: OBSTETRICS & GYNECOLOGY

## 2025-01-28 PROCEDURE — 87491 CHLMYD TRACH DNA AMP PROBE: CPT | Performed by: OBSTETRICS & GYNECOLOGY

## 2025-01-28 PROCEDURE — 1159F MED LIST DOCD IN RCRD: CPT | Mod: CPTII,S$GLB,, | Performed by: OBSTETRICS & GYNECOLOGY

## 2025-01-28 PROCEDURE — 99395 PREV VISIT EST AGE 18-39: CPT | Mod: 25,S$GLB,, | Performed by: OBSTETRICS & GYNECOLOGY

## 2025-01-28 NOTE — PROGRESS NOTES
OBSTETRIC HISTORY:   OB History          0    Para   0    Term   0       0    AB   0    Living   0         SAB   0    IAB   0    Ectopic   0    Multiple   0    Live Births               Obstetric Comments   Menarche ~ 18/reg  H/o chlamydia   Denies abnl pap              COMPREHENSIVE GYN HISTORY:  PAP History: Reports abnormal Paps.  Infection History: CHLAMYDIA and WART. Denies PID.  Benign History: Denies uterine fibroids. Denies ovarian cysts. Denies endometriosis.   Cancer History: Denies cervical cancer. Denies uterine cancer or hyperplasia. Denies ovarian cancer. Denies vulvar cancer or pre-cancer. Denies vaginal cancer or pre-cancer. Denies breast cancer. Denies colon cancer.  Sexual Activity History:   reports that she is not currently sexually active and has had partner(s) who are male. She reports using the following method of birth control/protection: None.   Menstrual History: Every month , flows for 5 days. Normal flow.  Contraception History: Mirena IUD inserted 26       HPI:   29 y.o.  No LMP recorded.   Patient is  here for her annual gynecologic exam.  She has genital wart she wants treated. She denies bladder, breast and bowel complaints.    ROS:  GENERAL: No weight gain or weight loss.   CHEST: No shortness of breath.   ABDOMEN: No abdominal pain, constipation, diarrhea, nausea, vomiting or rectal bleeding.   URINARY: No frequency, dysuria, hematuria, or burning on urination.  REPRODUCTIVE: See HPI.   BREASTS: No breast pain, lumps, or nipple discharge.   PSYCHIATRIC: No depression or anxiety.    PE:   /84   Wt 103.3 kg (227 lb 11.8 oz)   BMI 35.67 kg/m²   APPEARANCE: Well nourished, well developed, in no acute distress.  NECK: Neck symmetric without  thyromegaly.  NODES: No inguinal, cervical lymph node enlargement.  CHEST: Lungs clear to auscultation.  HEART: Regular rate and rhythm, no murmurs, rubs or gallops.  ABDOMEN: Soft. No tenderness or masses. No  hernias.  BREASTS: Symmetrical, no skin changes or visible lesions. No palpable masses, nipple discharge or adenopathy bilaterally.  PELVIC:   VULVA: Rough warty lesion still present today in between posterior fourchette and anus.  URETHRAL MEATUS: Normal size and location, no lesions, no prolapse.  URETHRA: No masses, tenderness, prolapse or scarring.  VAGINA: Moist and well rugated, no discharge, no significant cystocele or rectocele.  CERVIX: No lesions and discharge.IUD string visualized  UTERUS: Normal size, regular shape, mobile, non-tender, bladder base nontender.  ADNEXA: No masses or tenderness.    PROCEDURES:  Pap smear      Assessment:  Normal Gynecologic Exam  Genital wart --wants cryo therapy today    Recommendations routine screening and no risk factors:  Mammogram at age 40  Colonoscopy age 45  Bone density age 65  Return to clinic as needed for any problems.  Return to clinic in one year. It is recommended to have a physician breast exam and pelvic exam annually. This is different than doing a Pap smear.         As of April 1, 2021, the Cures Act has been passed nationally. This new law requires that all doctors progress notes, lab results, pathology reports and radiology reports be released IMMEDIATELY to the patient in the patient portal. That means that the results are released to you at the EXACT same time they are released to me. Therefore, with all of the patients that I have I am not able to reply to each patient exactly when the results come in. So there will be a delay from when you see the results to when I see them and have time to come up with a response to send you. Also I only see these results when I am on the computer at work. So if the results come in over the weekend or after 5 pm of a work day, I will not see them until the next business day. As you can tell, this is a challenge as a physician to give every patient the quick response they hope for and deserve. So please be patient!      Thanks for understanding,

## 2025-01-28 NOTE — PROCEDURES
Cryo PROCEDURE:    29 y.o.  female patient here for Cryo of warts.      PRE- Cryo PROCEDURE COUNSELING:  Risks of infection, bleeding, pain, injury to adjacent organs.  That this procedure does not guarantee to completely remove all abnormal cells and warty area. Alternatives to cryo procedure were discussed and the patient agrees to proceed.    PREMEDICATION: None.    PROCEDURE:  TIME OUT PERFORMED.  A small wand size was selected.  The entire warty area was frozen.  The patient tolerated the procedure well.    ASSESSMENT:  Genital warts    POST PROCEDURE COUNSELING:  Manage pain with NSAIDs or Tylenol.  Avoid anything in vagina (intercourse, douching, tampons).  Do not rub with tissue only pat dry.  Importance of follow-up stressed.  FOLLOW-UP 4 weeks to recheck.

## 2025-01-31 ENCOUNTER — PATIENT MESSAGE (OUTPATIENT)
Dept: OBSTETRICS AND GYNECOLOGY | Facility: CLINIC | Age: 30
End: 2025-01-31
Payer: COMMERCIAL

## 2025-01-31 LAB
C TRACH DNA SPEC QL NAA+PROBE: NOT DETECTED
N GONORRHOEA DNA SPEC QL NAA+PROBE: NOT DETECTED

## 2025-02-06 ENCOUNTER — PATIENT MESSAGE (OUTPATIENT)
Dept: OBSTETRICS AND GYNECOLOGY | Facility: CLINIC | Age: 30
End: 2025-02-06
Payer: COMMERCIAL

## 2025-02-11 ENCOUNTER — PATIENT MESSAGE (OUTPATIENT)
Dept: OBSTETRICS AND GYNECOLOGY | Facility: CLINIC | Age: 30
End: 2025-02-11
Payer: COMMERCIAL

## 2025-02-12 DIAGNOSIS — E11.9 TYPE 2 DIABETES MELLITUS WITHOUT COMPLICATION, UNSPECIFIED WHETHER LONG TERM INSULIN USE: ICD-10-CM

## 2025-02-13 ENCOUNTER — PATIENT MESSAGE (OUTPATIENT)
Dept: OBSTETRICS AND GYNECOLOGY | Facility: CLINIC | Age: 30
End: 2025-02-13
Payer: COMMERCIAL

## 2025-02-15 ENCOUNTER — LAB VISIT (OUTPATIENT)
Dept: LAB | Facility: HOSPITAL | Age: 30
End: 2025-02-15
Attending: FAMILY MEDICINE
Payer: COMMERCIAL

## 2025-02-15 DIAGNOSIS — I10 PRIMARY HYPERTENSION: ICD-10-CM

## 2025-02-15 DIAGNOSIS — E66.813 CLASS 3 SEVERE OBESITY DUE TO EXCESS CALORIES WITH SERIOUS COMORBIDITY AND BODY MASS INDEX (BMI) OF 45.0 TO 49.9 IN ADULT: ICD-10-CM

## 2025-02-15 DIAGNOSIS — Z11.3 ROUTINE SCREENING FOR STI (SEXUALLY TRANSMITTED INFECTION): ICD-10-CM

## 2025-02-15 DIAGNOSIS — E66.01 CLASS 3 SEVERE OBESITY DUE TO EXCESS CALORIES WITH SERIOUS COMORBIDITY AND BODY MASS INDEX (BMI) OF 45.0 TO 49.9 IN ADULT: ICD-10-CM

## 2025-02-15 LAB
ALBUMIN SERPL BCP-MCNC: 3.5 G/DL (ref 3.5–5.2)
ANION GAP SERPL CALC-SCNC: 11 MMOL/L (ref 8–16)
BUN SERPL-MCNC: 8 MG/DL (ref 6–20)
CALCIUM SERPL-MCNC: 9.4 MG/DL (ref 8.7–10.5)
CHLORIDE SERPL-SCNC: 105 MMOL/L (ref 95–110)
CO2 SERPL-SCNC: 22 MMOL/L (ref 23–29)
CREAT SERPL-MCNC: 0.7 MG/DL (ref 0.5–1.4)
EST. GFR  (NO RACE VARIABLE): >60 ML/MIN/1.73 M^2
ESTIMATED AVG GLUCOSE: 108 MG/DL (ref 68–131)
GLUCOSE SERPL-MCNC: 75 MG/DL (ref 70–110)
HBA1C MFR BLD: 5.4 % (ref 4–5.6)
HIV 1+2 AB+HIV1 P24 AG SERPL QL IA: NORMAL
PHOSPHATE SERPL-MCNC: 3.5 MG/DL (ref 2.7–4.5)
POTASSIUM SERPL-SCNC: 4.7 MMOL/L (ref 3.5–5.1)
SODIUM SERPL-SCNC: 138 MMOL/L (ref 136–145)
TREPONEMA PALLIDUM IGG+IGM AB [PRESENCE] IN SERUM OR PLASMA BY IMMUNOASSAY: NONREACTIVE

## 2025-02-15 PROCEDURE — 87563 M. GENITALIUM AMP PROBE: CPT | Performed by: FAMILY MEDICINE

## 2025-02-15 PROCEDURE — 36415 COLL VENOUS BLD VENIPUNCTURE: CPT | Mod: PO | Performed by: FAMILY MEDICINE

## 2025-02-15 PROCEDURE — 80069 RENAL FUNCTION PANEL: CPT | Performed by: FAMILY MEDICINE

## 2025-02-15 PROCEDURE — 83036 HEMOGLOBIN GLYCOSYLATED A1C: CPT | Performed by: FAMILY MEDICINE

## 2025-02-15 PROCEDURE — 86593 SYPHILIS TEST NON-TREP QUANT: CPT | Performed by: FAMILY MEDICINE

## 2025-02-15 PROCEDURE — 87389 HIV-1 AG W/HIV-1&-2 AB AG IA: CPT | Performed by: FAMILY MEDICINE

## 2025-02-17 ENCOUNTER — RESULTS FOLLOW-UP (OUTPATIENT)
Dept: FAMILY MEDICINE | Facility: CLINIC | Age: 30
End: 2025-02-17

## 2025-02-25 ENCOUNTER — PATIENT MESSAGE (OUTPATIENT)
Dept: OBSTETRICS AND GYNECOLOGY | Facility: CLINIC | Age: 30
End: 2025-02-25
Payer: COMMERCIAL

## 2025-02-25 ENCOUNTER — OFFICE VISIT (OUTPATIENT)
Dept: OBSTETRICS AND GYNECOLOGY | Facility: CLINIC | Age: 30
End: 2025-02-25
Payer: COMMERCIAL

## 2025-02-25 VITALS — WEIGHT: 293 LBS | SYSTOLIC BLOOD PRESSURE: 122 MMHG | DIASTOLIC BLOOD PRESSURE: 83 MMHG | BODY MASS INDEX: 48.51 KG/M2

## 2025-02-25 DIAGNOSIS — M95.5 PELVIC OBLIQUITY: ICD-10-CM

## 2025-02-25 DIAGNOSIS — R10.2 PELVIC PAIN IN FEMALE: Primary | ICD-10-CM

## 2025-02-25 DIAGNOSIS — K58.0 IRRITABLE BOWEL SYNDROME WITH DIARRHEA: ICD-10-CM

## 2025-02-25 PROCEDURE — 99212 OFFICE O/P EST SF 10 MIN: CPT | Mod: S$GLB,,, | Performed by: OBSTETRICS & GYNECOLOGY

## 2025-02-25 PROCEDURE — 3079F DIAST BP 80-89 MM HG: CPT | Mod: CPTII,S$GLB,, | Performed by: OBSTETRICS & GYNECOLOGY

## 2025-02-25 PROCEDURE — 3044F HG A1C LEVEL LT 7.0%: CPT | Mod: CPTII,S$GLB,, | Performed by: OBSTETRICS & GYNECOLOGY

## 2025-02-25 PROCEDURE — 1160F RVW MEDS BY RX/DR IN RCRD: CPT | Mod: CPTII,S$GLB,, | Performed by: OBSTETRICS & GYNECOLOGY

## 2025-02-25 PROCEDURE — 99999 PR PBB SHADOW E&M-EST. PATIENT-LVL III: CPT | Mod: PBBFAC,,, | Performed by: OBSTETRICS & GYNECOLOGY

## 2025-02-25 PROCEDURE — 3008F BODY MASS INDEX DOCD: CPT | Mod: CPTII,S$GLB,, | Performed by: OBSTETRICS & GYNECOLOGY

## 2025-02-25 PROCEDURE — 3074F SYST BP LT 130 MM HG: CPT | Mod: CPTII,S$GLB,, | Performed by: OBSTETRICS & GYNECOLOGY

## 2025-02-25 PROCEDURE — 1159F MED LIST DOCD IN RCRD: CPT | Mod: CPTII,S$GLB,, | Performed by: OBSTETRICS & GYNECOLOGY

## 2025-02-25 NOTE — PROGRESS NOTES
OBSTETRIC HISTORY:   OB History          0    Para   0    Term   0       0    AB   0    Living   0         SAB   0    IAB   0    Ectopic   0    Multiple   0    Live Births               Obstetric Comments   Menarche ~ 18/reg  H/o chlamydia   Denies abnl pap              COMPREHENSIVE GYN HISTORY:  PAP History: Reports abnormal Paps.  Infection History: CHLAMYDIA and WART. Denies PID.  Benign History: Denies uterine fibroids. Denies ovarian cysts. Denies endometriosis.   Cancer History: Denies cervical cancer. Denies uterine cancer or hyperplasia. Denies ovarian cancer. Denies vulvar cancer or pre-cancer. Denies vaginal cancer or pre-cancer. Denies breast cancer. Denies colon cancer.  Sexual Activity History:   reports that she is not currently sexually active and has had partner(s) who are male. She reports using the following method of birth control/protection: None.   Menstrual History: Every month , flows for 5 days. Normal flow.  Contraception History: Mirena IUD inserted 26         HPI:   30 y.o.  Patient's last menstrual period was 2025.   Patient is  here complaining of 3 weeks of lower abdominal/pelvic pain. Having diarrhea like IBS right now. Very anxious about being HPV positive. Discussed as long as getting Pap smears every year she should be fine. Needs to stop smoking if smoking          PE:   /83   Wt (!) 140.5 kg (309 lb 11.9 oz)   LMP 2025   BMI 48.51 kg/m²   APPEARANCE: Well nourished, well developed, in no acute distress.  ABDOMEN: Soft. No tenderness or masses. No hernias.Asymmetry ASIS  PELVIC:   VULVA: No lesions. Normal female genitalia.  URETHRAL MEATUS: Normal size and location, no lesions, no prolapse.  URETHRA: No masses, tenderness, prolapse or scarring.  VAGINA: Moist and well rugated, no discharge, no significant cystocele or rectocele.  CERVIX: No lesions and discharge.  UTERUS: Normal size, regular shape, mobile, non-tender, bladder base  nontender.  ADNEXA: No masses or tenderness.+pelvic floor tenderness    ASSESSMENT/PLAN:  Pelvic pain--if wants US call and will order but expensive  IBS--try gas x and Kaopectate (does not like Imodium)  Quit smoking weed and or tobacco  Pelvic obliquity--try to have better posture.  RTO prn

## 2025-02-28 ENCOUNTER — OFFICE VISIT (OUTPATIENT)
Dept: FAMILY MEDICINE | Facility: CLINIC | Age: 30
End: 2025-02-28
Payer: COMMERCIAL

## 2025-02-28 VITALS
HEART RATE: 65 BPM | OXYGEN SATURATION: 98 % | HEIGHT: 67 IN | SYSTOLIC BLOOD PRESSURE: 118 MMHG | BODY MASS INDEX: 45.99 KG/M2 | WEIGHT: 293 LBS | DIASTOLIC BLOOD PRESSURE: 80 MMHG

## 2025-02-28 DIAGNOSIS — Z97.5 IUD (INTRAUTERINE DEVICE) IN PLACE: ICD-10-CM

## 2025-02-28 DIAGNOSIS — F31.9 BIPOLAR 1 DISORDER: ICD-10-CM

## 2025-02-28 DIAGNOSIS — E66.01 CLASS 3 SEVERE OBESITY DUE TO EXCESS CALORIES WITH SERIOUS COMORBIDITY AND BODY MASS INDEX (BMI) OF 45.0 TO 49.9 IN ADULT: ICD-10-CM

## 2025-02-28 DIAGNOSIS — R87.810 ASCUS WITH POSITIVE HIGH RISK HPV CERVICAL: ICD-10-CM

## 2025-02-28 DIAGNOSIS — E66.813 CLASS 3 SEVERE OBESITY DUE TO EXCESS CALORIES WITH SERIOUS COMORBIDITY AND BODY MASS INDEX (BMI) OF 45.0 TO 49.9 IN ADULT: ICD-10-CM

## 2025-02-28 DIAGNOSIS — Z11.3 ROUTINE SCREENING FOR STI (SEXUALLY TRANSMITTED INFECTION): ICD-10-CM

## 2025-02-28 DIAGNOSIS — R87.610 ASCUS WITH POSITIVE HIGH RISK HPV CERVICAL: ICD-10-CM

## 2025-02-28 DIAGNOSIS — F41.1 GAD (GENERALIZED ANXIETY DISORDER): ICD-10-CM

## 2025-02-28 DIAGNOSIS — F43.10 POSTTRAUMATIC STRESS DISORDER: ICD-10-CM

## 2025-02-28 DIAGNOSIS — I10 PRIMARY HYPERTENSION: Primary | ICD-10-CM

## 2025-02-28 PROCEDURE — 99999 PR PBB SHADOW E&M-EST. PATIENT-LVL IV: CPT | Mod: PBBFAC,,, | Performed by: FAMILY MEDICINE

## 2025-02-28 RX ORDER — LURASIDONE HYDROCHLORIDE 20 MG/1
20 TABLET, FILM COATED ORAL
COMMUNITY
Start: 2025-02-24

## 2025-02-28 RX ORDER — TRAZODONE HYDROCHLORIDE 100 MG/1
100 TABLET ORAL NIGHTLY PRN
Qty: 90 TABLET | Refills: 3 | Status: SHIPPED | OUTPATIENT
Start: 2025-02-28

## 2025-02-28 NOTE — PROGRESS NOTES
Subjective:         Patient ID: Misael Garcia is a 30 y.o. female.    Chief Complaint: Hypertension    Patient Active Problem List   Diagnosis    Corns and callus    Class 3 severe obesity due to excess calories with serious comorbidity and body mass index (BMI) of 45.0 to 49.9 in adult    Bipolar 1 disorder    Primary hypertension    Oral contraceptive pill surveillance    SID (obstructive sleep apnea)    Cervical spondylosis without myelopathy    Lumbosacral spondylosis without myelopathy    Posttraumatic stress disorder    Thoracic facet syndrome    Heart failure with mildly reduced ejection fraction (HFmrEF)    Other chest pain    Palpitations    Cyst of right ovary    Gastroesophageal reflux disease    Irritable bowel syndrome with both constipation and diarrhea    WICHO (generalized anxiety disorder)    IUD (intrauterine device) in place    ASCUS with positive high risk HPV cervical      HPI    Misael is a 30 y.o. female    History of Present Illness    CHIEF COMPLAINT:  Misael presents today for hypertension follow-up.    BLOOD PRESSURE:  Home blood pressure reading yesterday was 123/80.    MENTAL HEALTH:  She continues regular counseling with most recent visit yesterday. 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    EXERCISE:  She exercises daily at the [employer] for periods of 2-3 weeks at a time, followed by periods of inactivity. She recently stopped her exercise routine but expresses intention to resume.    MEDICATIONS:  Current medications include Atenolol 25 mg, Lisinopril 20 mg, Lexapro 20 mg, Latuda 20 mg, Omeprazole 20 mg, and Trazodone 100 mg for sleep.    REPRODUCTIVE HEALTH:  She has Mirena IUD in place since December. Pap smear was abnormal with HPV high-risk positive and ASCUS (Atypical Squamous Cells of Undetermined Significance). A follow-up colposcopy is scheduled.    LABS:  A1c was 5.4  "without medication. STD testing, including HIV, syphilis, gonorrhea, and chlamydia, was normal.       Objective:     Vitals:    02/28/25 0726   BP: 118/80   BP Location: Left arm   Patient Position: Sitting   Pulse: 65   SpO2: 98%   Weight: (!) 141.6 kg (312 lb 2.7 oz)   Height: 5' 7" (1.702 m)         Physical Exam  Vitals and nursing note reviewed.   Constitutional:       General: She is not in acute distress.     Appearance: Normal appearance. She is not ill-appearing, toxic-appearing or diaphoretic.   HENT:      Head: Normocephalic and atraumatic.   Eyes:      General: No scleral icterus.     Conjunctiva/sclera: Conjunctivae normal.   Cardiovascular:      Rate and Rhythm: Normal rate.   Pulmonary:      Effort: Pulmonary effort is normal. No respiratory distress.   Skin:     Coloration: Skin is not pale.   Neurological:      Mental Status: She is alert. Mental status is at baseline.   Psychiatric:         Attention and Perception: Attention and perception normal.         Mood and Affect: Mood and affect normal.         Speech: Speech normal.         Behavior: Behavior normal.         Cognition and Memory: Cognition and memory normal.         Judgment: Judgment normal.       Assessment:       1. Primary hypertension    2. WICHO (generalized anxiety disorder)    3. Posttraumatic stress disorder    4. Bipolar 1 disorder    5. Class 3 severe obesity due to excess calories with serious comorbidity and body mass index (BMI) of 45.0 to 49.9 in adult    6. IUD (intrauterine device) in place    7. ASCUS with positive high risk HPV cervical    8. Routine screening for STI (sexually transmitted infection)          Plan:   Recent relevant labs results reviewed with patient.         Assessment & Plan    Assessed hypertension: BP well-controlled at 118/80, home readings also within target range  Evaluated anxiety: noted improvement, but patient experiencing stress due to impending job loss  Considered outpatient mental health " program as suggested by patient's counselor  Reviewed recent lab results: A1c 5.4 (normal), kidney function normal, STD panel negative  Noted abnormal Pap smear with ASCUS and HPV high-risk positive, colposcopy pending with Dr. Lloyd    HYPERTENSION:  - Evaluated the patient's blood pressure, which was 118/80 during the visit.  - Misael reported a recent home reading of 123/80.  - Assessed the patient's blood pressure as good, with no reported symptoms from medication.  - Discussed salt intake and exercise habits with the patient.  - Explained the rationale for periodic blood pressure monitoring at home.  - Instructed the patient to perform spot checks of blood pressure a few times per month, preferably in the morning before coffee.  - Continued Atenolol 25 mg and Lisinopril 20 mg for hypertension management.  - Scheduled a follow-up visit in 6 months.    DEPRESSION AND ANXIETY:  - Noted improvement in the patient's anxiety.  - Discussed the benefits of working and maintaining a daily routine for managing depression and anxiety.  - Continued Lexapro 20 mg for depression management.  - Outpatient intensive program with group sessions for mental health support.  - Noted that the patient reports seeing a counselor regularly.    BIPOLAR DISORDER:  - Continued Latuda 20 mg for bipolar disorder management.    SLEEP MANAGEMENT:  - Continued Trazodone 100 mg and provided refills for sleep management.    GASTROESOPHAGEAL REFLUX DISEASE (GERD):  - Continued Omeprazole 20 mg for heartburn management.    ABNORMAL PAP:  - Noted that the patient had a pap smear with Dr. Lloyd that showed ASCUS (Atypical Squamous Cells of Undetermined Significance).  - Emphasized the importance of follow-up colposcopy.  - Instructed the patient to follow up with Dr. Lloyd for colposcopy as scheduled.  - Noted that the patient tested positive for high-risk HPV.    BIRTH CONTROL:  - Noted that the patient has an IUD (Mirena) in place since  December.    EXERCISE MANAGEMENT:  - Discussed importance of maintaining exercise routine and strategies for consistency.  - Misael to continue daily exercise routine at the park and implement strategies to maintain consistent exercise habits.    DIABETES PREVENTION:  - Clarified A1c results and current lack of need for blood sugar monitoring.    LABS:  - Complete blood panel ordered for next visit.  - STD panel ordered for next visit.    FOLLOW UP:  - Follow up in 6 months for annual visit and lab work.         1. Primary hypertension  -     Hepatic Function Panel; Future; Expected date: 02/28/2025  -     Renal Function Panel; Future; Expected date: 02/28/2025  -     Lipid Panel; Future; Expected date: 02/28/2025  -     TSH; Future; Expected date: 02/28/2025  -     Hemoglobin A1C; Future; Expected date: 02/28/2025  -     CBC Auto Differential; Future; Expected date: 02/28/2025  - Chronic health condition is stable and controlled. Continue current medication regimen and relevant lifestyle modifications. Necessary medication refills addressed. Routine ongoing surveillance monitoring.     2. WICHO (generalized anxiety disorder)  3. Posttraumatic stress disorder  4. Bipolar 1 disorder  -     traZODone (DESYREL) 100 MG tablet; Take 1 tablet (100 mg total) by mouth nightly as needed for Insomnia.  Dispense: 90 tablet; Refill: 3  Continue current meds  Counseling  Outpatient intensive program if helpful    5. Class 3 severe obesity due to excess calories with serious comorbidity and body mass index (BMI) of 45.0 to 49.9 in adult  Ongoing small changes    6. IUD (intrauterine device) in place    7. ASCUS with positive high risk HPV cervical  Follow up with GYN    8. Routine screening for STI (sexually transmitted infection)  -     Treponema Pallidium Antibodies IgG, IgM; Future; Expected date: 02/28/2025  -     HIV 1/2 Ag/Ab (4th Gen); Future; Expected date: 02/28/2025  -     C. trachomatis/N. gonorrhoeae by AMP DNA; Future;  Expected date: 02/28/2025    Patient's questions answered. Plan reviewed with patient at the end of visit. Relevant precautions to chief complaint and reasons to seek further medical care or to contact the office sooner reviewed with patient.     Follow up in about 6 months (around 8/28/2025) for Annual Exam, Hypertension Follow-up, (prelabs).        Part of this note was dictated using voice recognition software. Please excuse any typographical errors.     This note was generated with the assistance of ambient listening technology. Verbal consent was obtained by the patient and accompanying visitor(s) for the recording of patient appointment to facilitate this note. I attest to having reviewed and edited the generated note for accuracy, though some syntax or spelling errors may persist. Please contact the author of this note for any clarification.

## 2025-03-10 ENCOUNTER — PATIENT MESSAGE (OUTPATIENT)
Dept: FAMILY MEDICINE | Facility: CLINIC | Age: 30
End: 2025-03-10
Payer: COMMERCIAL

## 2025-04-07 ENCOUNTER — PATIENT MESSAGE (OUTPATIENT)
Dept: OBSTETRICS AND GYNECOLOGY | Facility: CLINIC | Age: 30
End: 2025-04-07
Payer: MEDICAID

## 2025-04-09 ENCOUNTER — PATIENT MESSAGE (OUTPATIENT)
Dept: SURGERY | Facility: CLINIC | Age: 30
End: 2025-04-09
Payer: MEDICAID

## 2025-04-14 ENCOUNTER — TELEPHONE (OUTPATIENT)
Dept: SURGERY | Facility: CLINIC | Age: 30
End: 2025-04-14
Payer: MEDICAID

## 2025-04-14 ENCOUNTER — PATIENT MESSAGE (OUTPATIENT)
Dept: OBSTETRICS AND GYNECOLOGY | Facility: CLINIC | Age: 30
End: 2025-04-14
Payer: MEDICAID

## 2025-04-14 DIAGNOSIS — Z11.3 ENCOUNTER FOR SCREENING FOR INFECTIONS WITH PREDOMINANTLY SEXUAL MODE OF TRANSMISSION: ICD-10-CM

## 2025-04-14 DIAGNOSIS — Z72.89 OTHER PROBLEMS RELATED TO LIFESTYLE: ICD-10-CM

## 2025-04-14 DIAGNOSIS — Z11.3 SCREEN FOR STD (SEXUALLY TRANSMITTED DISEASE): Primary | ICD-10-CM

## 2025-04-14 NOTE — PROGRESS NOTES
"CRS Office Visit Follow-up    SUBJECTIVE:     History of Present Illness:  Patient is a 30 y.o. female who presents following biopsy of perianal ulcer (path: erythrasma) on 9/1/2023.   Pain with Bms x 3-4 weeks.  Correlates with squeezing and straining on toilet,    (6/21/2024)  Never used the topical Clinda (has it at her house).  Has been using steroid cream.  Has noticed itching and irritation near gluteal cleft.    (9/27/2023)  There are no new complaints today.    Review of Systems:  ROS    OBJECTIVE:     Vital Signs (Most Recent)  /72 (BP Location: Left arm, Patient Position: Sitting)   Pulse 78   Resp 18   Ht 5' 7" (1.702 m)   Wt (!) 144.4 kg (318 lb 5.5 oz)   BMI 49.86 kg/m²     Physical Exam:  General:    Black or   White female in no distress   Neuro: Alert and oriented x 4.  Moves all extremities.     HEENT: No icterus.  Trachea midline  Respiratory: Respirations are even and unlabored  Cardiac: Regular rate  Extremities: Warm dry and intact  Skin: No rashes  Anorectal: Anterior midline fissure    ** Examined w/ chaperone present **      ASSESSMENT/PLAN:     27yo F s/p biopsy of perianal ulcer on 9/1/2023, doing well    Diltiazem cream TID  RTC 6 weeks    Kayla Dutta MD  Staff Surgeon, Colon and Rectal Surgery  [facility]         "

## 2025-04-16 ENCOUNTER — PATIENT MESSAGE (OUTPATIENT)
Dept: ALLERGY | Facility: CLINIC | Age: 30
End: 2025-04-16
Payer: MEDICAID

## 2025-04-16 ENCOUNTER — PATIENT MESSAGE (OUTPATIENT)
Dept: SLEEP MEDICINE | Facility: CLINIC | Age: 30
End: 2025-04-16
Payer: MEDICAID

## 2025-04-16 ENCOUNTER — OFFICE VISIT (OUTPATIENT)
Dept: SURGERY | Facility: CLINIC | Age: 30
End: 2025-04-16
Attending: COLON & RECTAL SURGERY
Payer: MEDICAID

## 2025-04-16 VITALS
DIASTOLIC BLOOD PRESSURE: 72 MMHG | WEIGHT: 293 LBS | SYSTOLIC BLOOD PRESSURE: 122 MMHG | HEIGHT: 67 IN | BODY MASS INDEX: 45.99 KG/M2 | HEART RATE: 78 BPM | RESPIRATION RATE: 18 BRPM

## 2025-04-16 DIAGNOSIS — K60.2 ANAL FISSURE: Primary | ICD-10-CM

## 2025-04-16 PROCEDURE — 3078F DIAST BP <80 MM HG: CPT | Mod: CPTII,,, | Performed by: COLON & RECTAL SURGERY

## 2025-04-16 PROCEDURE — 1159F MED LIST DOCD IN RCRD: CPT | Mod: CPTII,,, | Performed by: COLON & RECTAL SURGERY

## 2025-04-16 PROCEDURE — 1160F RVW MEDS BY RX/DR IN RCRD: CPT | Mod: CPTII,,, | Performed by: COLON & RECTAL SURGERY

## 2025-04-16 PROCEDURE — 99213 OFFICE O/P EST LOW 20 MIN: CPT | Mod: PBBFAC | Performed by: COLON & RECTAL SURGERY

## 2025-04-16 PROCEDURE — 3074F SYST BP LT 130 MM HG: CPT | Mod: CPTII,,, | Performed by: COLON & RECTAL SURGERY

## 2025-04-16 PROCEDURE — 3044F HG A1C LEVEL LT 7.0%: CPT | Mod: CPTII,,, | Performed by: COLON & RECTAL SURGERY

## 2025-04-16 PROCEDURE — 4010F ACE/ARB THERAPY RXD/TAKEN: CPT | Mod: CPTII,,, | Performed by: COLON & RECTAL SURGERY

## 2025-04-16 PROCEDURE — 3008F BODY MASS INDEX DOCD: CPT | Mod: CPTII,,, | Performed by: COLON & RECTAL SURGERY

## 2025-04-16 PROCEDURE — 99999 PR PBB SHADOW E&M-EST. PATIENT-LVL III: CPT | Mod: PBBFAC,,, | Performed by: COLON & RECTAL SURGERY

## 2025-04-16 PROCEDURE — 99213 OFFICE O/P EST LOW 20 MIN: CPT | Mod: S$PBB,,, | Performed by: COLON & RECTAL SURGERY

## 2025-04-17 ENCOUNTER — PATIENT MESSAGE (OUTPATIENT)
Dept: HEMATOLOGY/ONCOLOGY | Facility: CLINIC | Age: 30
End: 2025-04-17
Payer: MEDICAID

## 2025-04-17 ENCOUNTER — PATIENT MESSAGE (OUTPATIENT)
Dept: SURGERY | Facility: CLINIC | Age: 30
End: 2025-04-17
Payer: MEDICAID

## 2025-04-17 ENCOUNTER — PATIENT MESSAGE (OUTPATIENT)
Dept: CARDIOLOGY | Facility: CLINIC | Age: 30
End: 2025-04-17
Payer: MEDICAID

## 2025-04-17 ENCOUNTER — PATIENT MESSAGE (OUTPATIENT)
Dept: PODIATRY | Facility: CLINIC | Age: 30
End: 2025-04-17
Payer: MEDICAID

## 2025-04-22 DIAGNOSIS — Z78.9 INTOLERANCE OF CONTINUOUS POSITIVE AIRWAY PRESSURE (CPAP) VENTILATION: ICD-10-CM

## 2025-04-22 DIAGNOSIS — G47.33 OSA (OBSTRUCTIVE SLEEP APNEA): Primary | ICD-10-CM

## 2025-04-22 DIAGNOSIS — Z99.89 INSUFFICIENT TREATMENT WITH NASAL CPAP: ICD-10-CM

## 2025-04-30 ENCOUNTER — TELEPHONE (OUTPATIENT)
Dept: SLEEP MEDICINE | Facility: OTHER | Age: 30
End: 2025-04-30
Payer: MEDICAID

## 2025-04-30 ENCOUNTER — PROCEDURE VISIT (OUTPATIENT)
Dept: OBSTETRICS AND GYNECOLOGY | Facility: CLINIC | Age: 30
End: 2025-04-30
Payer: MEDICAID

## 2025-04-30 VITALS — SYSTOLIC BLOOD PRESSURE: 120 MMHG | DIASTOLIC BLOOD PRESSURE: 76 MMHG | BODY MASS INDEX: 49.7 KG/M2 | WEIGHT: 293 LBS

## 2025-04-30 DIAGNOSIS — Z72.89 OTHER PROBLEMS RELATED TO LIFESTYLE: ICD-10-CM

## 2025-04-30 DIAGNOSIS — R87.610 ASCUS WITH POSITIVE HIGH RISK HPV CERVICAL: Primary | ICD-10-CM

## 2025-04-30 DIAGNOSIS — R87.810 ASCUS WITH POSITIVE HIGH RISK HPV CERVICAL: Primary | ICD-10-CM

## 2025-04-30 DIAGNOSIS — Z11.3 ENCOUNTER FOR SCREENING FOR INFECTIONS WITH PREDOMINANTLY SEXUAL MODE OF TRANSMISSION: ICD-10-CM

## 2025-04-30 PROCEDURE — 87491 CHLMYD TRACH DNA AMP PROBE: CPT | Performed by: OBSTETRICS & GYNECOLOGY

## 2025-04-30 PROCEDURE — 99499 UNLISTED E&M SERVICE: CPT | Mod: S$PBB,,, | Performed by: OBSTETRICS & GYNECOLOGY

## 2025-04-30 PROCEDURE — 57454 BX/CURETT OF CERVIX W/SCOPE: CPT | Mod: PBBFAC,PO | Performed by: OBSTETRICS & GYNECOLOGY

## 2025-04-30 PROCEDURE — 88305 TISSUE EXAM BY PATHOLOGIST: CPT | Mod: TC,91 | Performed by: OBSTETRICS & GYNECOLOGY

## 2025-04-30 NOTE — PROCEDURES
Colposcopy    Date/Time: 4/30/2025 1:00 PM    Performed by: Shelly Lloyd MD  Authorized by: Shelly Lloyd MD    Consent obatined:  Prior to procedure the appropriate consent was completed and verified  Timeout:Immediately prior to procedure a time out was called to verify the correct patient, procedure, equipment, support staff and site/side marked as required  Prep:Patient was prepped and draped in the usual sterile fashion  Assistants?: Yes    List of assistants:  PREMA Michel was present for the entire procedure.    Colposcopy Site:  Cervix  Acrowhite Lesion? Yes    Atypical Vessels: No    Transformation Zone Adequate?: Yes    Biopsy?: Yes         Location:  Cervix ((5 00))  ECC Performed?: Yes    LEEP Performed?: No     Patient tolerated the procedure well with no immediate complications.   Post-operative instructions were provided for the patient.   Patient was discharged and will follow up if any complications occur

## 2025-05-02 ENCOUNTER — PATIENT MESSAGE (OUTPATIENT)
Dept: OBSTETRICS AND GYNECOLOGY | Facility: CLINIC | Age: 30
End: 2025-05-02
Payer: MEDICAID

## 2025-05-02 LAB
ESTROGEN SERPL-MCNC: NORMAL PG/ML
INSULIN SERPL-ACNC: NORMAL U[IU]/ML
LAB AP CLINICAL INFORMATION: NORMAL
LAB AP DIAGNOSIS CATEGORY: NORMAL
LAB AP GROSS DESCRIPTION: NORMAL
LAB AP PERFORMING LOCATION(S): NORMAL
LAB AP REPORT FOOTNOTES: NORMAL

## 2025-05-03 LAB
C TRACH DNA SPEC QL NAA+PROBE: NOT DETECTED
CTGC SOURCE (OHS) ORD-325: NORMAL
N GONORRHOEA DNA UR QL NAA+PROBE: NOT DETECTED

## 2025-05-05 ENCOUNTER — RESULTS FOLLOW-UP (OUTPATIENT)
Dept: OBSTETRICS AND GYNECOLOGY | Facility: CLINIC | Age: 30
End: 2025-05-05

## 2025-05-07 ENCOUNTER — PATIENT MESSAGE (OUTPATIENT)
Dept: FAMILY MEDICINE | Facility: CLINIC | Age: 30
End: 2025-05-07
Payer: MEDICAID

## 2025-05-18 NOTE — TELEPHONE ENCOUNTER
Message from Hailey Morgan sent at 3/30/2023  8:39 AM CDT -----  Regarding: pt call back/med  Name of Who is Calling: MALIK BROWNING [[id number]]        What is the request in detail: Pt has been experiencing extreme itching in genital and is requesting a medicated cream.         Can the clinic reply by [facility]: no           What Number to Call Back if not [facility]: [phone]    Pharmacy: [facility] [address] [place]       mobility; (independent)- Assess patient's need for assistive devices and provide as appropriate- Encourage maximum independence but intervene and supervise when necessary- Involve family in performance of ADLs- Assess for home care needs following discharge - Consider OT consult to assist with ADL evaluation and planning for discharge- Provide patient education as appropriate  Outcome: Progressing  Goal: Maintains/Returns to pre admission functional level  Description: INTERVENTIONS:- Perform AM-PAC 6 Click Basic Mobility/ Daily Activity assessment daily.- Set and communicate daily mobility goal to care team and patient/family/caregiver. - Collaborate with rehabilitation services on mobility goals if consulted- Ambulate patient 4-6 times a day- Out of bed to chair 3 times a day - Out of bed for meals 3 times a day- Out of bed for toileting- Record patient progress and toleration of activity level   Outcome: Progressing     Problem: DISCHARGE PLANNING  Goal: Discharge to home or other facility with appropriate resources  Description: INTERVENTIONS:- Identify barriers to discharge w/patient and caregiver- Arrange for needed discharge resources and transportation as appropriate- Identify discharge learning needs (meds, wound care, etc.)- Refer to Case Management Department for coordinating discharge planning if the patient needs post-hospital services based on physician/advanced practitioner order or complex needs related to functional status, cognitive ability, or social support system  Outcome: Progressing     Problem: Knowledge Deficit  Goal: Patient/family/caregiver demonstrates understanding of disease process, treatment plan, medications, and discharge instructions  Description: Complete learning assessment and assess knowledge base.Interventions:- Provide teaching at level of understanding- Provide teaching via preferred learning methods  Outcome: Progressing     Problem: METABOLIC, FLUID AND ELECTROLYTES -  ADULT  Goal: Electrolytes maintained within normal limits  Description: INTERVENTIONS:  - Monitor labs and assess patient for signs and symptoms of electrolyte imbalances  - Administer electrolyte replacement as ordered  - Monitor response to electrolyte replacements, including repeat lab results as appropriate  - Instruct patient on fluid and nutrition as appropriate  Outcome: Progressing  Goal: Fluid balance maintained  Description: INTERVENTIONS:  - Monitor labs   - Monitor I/O and WT  - Instruct patient on fluid and nutrition as appropriate  - Assess for signs & symptoms of volume excess or deficit  Outcome: Progressing

## 2025-06-06 ENCOUNTER — PATIENT MESSAGE (OUTPATIENT)
Dept: FAMILY MEDICINE | Facility: CLINIC | Age: 30
End: 2025-06-06
Payer: MEDICAID

## 2025-06-10 ENCOUNTER — OFFICE VISIT (OUTPATIENT)
Dept: FAMILY MEDICINE | Facility: CLINIC | Age: 30
End: 2025-06-10
Payer: MEDICAID

## 2025-06-10 VITALS
WEIGHT: 293 LBS | SYSTOLIC BLOOD PRESSURE: 112 MMHG | DIASTOLIC BLOOD PRESSURE: 76 MMHG | HEART RATE: 74 BPM | HEIGHT: 67 IN | BODY MASS INDEX: 45.99 KG/M2 | OXYGEN SATURATION: 96 %

## 2025-06-10 DIAGNOSIS — E66.01 CLASS 3 SEVERE OBESITY DUE TO EXCESS CALORIES WITH SERIOUS COMORBIDITY AND BODY MASS INDEX (BMI) OF 50.0 TO 59.9 IN ADULT: ICD-10-CM

## 2025-06-10 DIAGNOSIS — I10 PRIMARY HYPERTENSION: Primary | ICD-10-CM

## 2025-06-10 DIAGNOSIS — E66.813 CLASS 3 SEVERE OBESITY DUE TO EXCESS CALORIES WITH SERIOUS COMORBIDITY AND BODY MASS INDEX (BMI) OF 50.0 TO 59.9 IN ADULT: ICD-10-CM

## 2025-06-10 DIAGNOSIS — F41.1 GAD (GENERALIZED ANXIETY DISORDER): ICD-10-CM

## 2025-06-10 PROBLEM — I50.22 HEART FAILURE WITH MILDLY REDUCED EJECTION FRACTION (HFMREF): Status: RESOLVED | Noted: 2024-02-01 | Resolved: 2025-06-10

## 2025-06-10 PROCEDURE — 99214 OFFICE O/P EST MOD 30 MIN: CPT | Mod: PBBFAC,PO | Performed by: FAMILY MEDICINE

## 2025-06-10 PROCEDURE — 3044F HG A1C LEVEL LT 7.0%: CPT | Mod: CPTII,,, | Performed by: FAMILY MEDICINE

## 2025-06-10 PROCEDURE — 99999 PR PBB SHADOW E&M-EST. PATIENT-LVL IV: CPT | Mod: PBBFAC,,, | Performed by: FAMILY MEDICINE

## 2025-06-10 PROCEDURE — 3078F DIAST BP <80 MM HG: CPT | Mod: CPTII,,, | Performed by: FAMILY MEDICINE

## 2025-06-10 PROCEDURE — 3074F SYST BP LT 130 MM HG: CPT | Mod: CPTII,,, | Performed by: FAMILY MEDICINE

## 2025-06-10 PROCEDURE — 3008F BODY MASS INDEX DOCD: CPT | Mod: CPTII,,, | Performed by: FAMILY MEDICINE

## 2025-06-10 PROCEDURE — 1160F RVW MEDS BY RX/DR IN RCRD: CPT | Mod: CPTII,,, | Performed by: FAMILY MEDICINE

## 2025-06-10 PROCEDURE — 1159F MED LIST DOCD IN RCRD: CPT | Mod: CPTII,,, | Performed by: FAMILY MEDICINE

## 2025-06-10 PROCEDURE — 4010F ACE/ARB THERAPY RXD/TAKEN: CPT | Mod: CPTII,,, | Performed by: FAMILY MEDICINE

## 2025-06-10 PROCEDURE — G2211 COMPLEX E/M VISIT ADD ON: HCPCS | Mod: ,,, | Performed by: FAMILY MEDICINE

## 2025-06-10 PROCEDURE — 99214 OFFICE O/P EST MOD 30 MIN: CPT | Mod: S$PBB,,, | Performed by: FAMILY MEDICINE

## 2025-06-10 RX ORDER — LISINOPRIL 20 MG/1
20 TABLET ORAL DAILY
Qty: 90 TABLET | Refills: 3 | Status: SHIPPED | OUTPATIENT
Start: 2025-06-10 | End: 2026-06-05

## 2025-06-10 NOTE — PROGRESS NOTES
"Subjective:         Patient ID: Misael Garcia is a 30 y.o. female.    Chief Complaint: weight loss meds    Patient Active Problem List   Diagnosis    Corns and callus    Class 3 severe obesity due to excess calories with serious comorbidity and body mass index (BMI) of 50.0 to 59.9 in adult    Bipolar 1 disorder    Primary hypertension    Oral contraceptive pill surveillance    SID (obstructive sleep apnea)    Cervical spondylosis without myelopathy    Lumbosacral spondylosis without myelopathy    Posttraumatic stress disorder    Thoracic facet syndrome    Heart failure with mildly reduced ejection fraction (HFmrEF)    Other chest pain    Palpitations    Cyst of right ovary    Gastroesophageal reflux disease    Irritable bowel syndrome with both constipation and diarrhea    WICHO (generalized anxiety disorder)    IUD (intrauterine device) in place    ASCUS with positive high risk HPV cervical      HPI    Misael is a 30 y.o. female    History of Present Illness    CHIEF COMPLAINT:  Misael presents today to discuss weight loss medications    WEIGHT MANAGEMENT:  She reports recent weight gain of 20 lbs despite maintaining regular exercise routine including gym attendance and park activities. Previous consultation with nutritionist was not found to be beneficial. She has completed initial consultation with [facility] bariatric clinic. Previous trial of Ozempic resulted in significant side effects including concerns about gastroparesis, though subsequent workup was normal. Trial of Adipex caused increased anxiety.    CURRENT MEDICATIONS:  She takes Latuda and Lexapro, both managed by psychiatrist Dr. Fowler. She also takes Lisinopril for blood pressure management and requests a new prescription due to losing her previous one.         Objective:     Vitals:    06/10/25 0953   BP: 112/76   BP Location: Left arm   Patient Position: Sitting   Pulse: 74   SpO2: 96%   Weight: (!) 148.4 kg (327 lb 2.6 oz)   Height: 5' 7" " (1.702 m)         Physical Exam  Vitals and nursing note reviewed.   Constitutional:       General: She is not in acute distress.     Appearance: Normal appearance. She is not ill-appearing, toxic-appearing or diaphoretic.   HENT:      Head: Normocephalic and atraumatic.   Eyes:      General: No scleral icterus.     Conjunctiva/sclera: Conjunctivae normal.   Cardiovascular:      Rate and Rhythm: Normal rate.      Heart sounds: Normal heart sounds. No murmur heard.  Pulmonary:      Effort: Pulmonary effort is normal. No respiratory distress.      Breath sounds: Normal breath sounds.   Abdominal:      General: Bowel sounds are normal.   Skin:     Coloration: Skin is not pale.   Neurological:      Mental Status: She is alert. Mental status is at baseline.   Psychiatric:         Attention and Perception: Attention and perception normal.         Mood and Affect: Mood and affect normal.         Speech: Speech normal.         Behavior: Behavior normal.         Cognition and Memory: Cognition and memory normal.         Judgment: Judgment normal.       Assessment:       1. Primary hypertension    2. Class 3 severe obesity due to excess calories with serious comorbidity and body mass index (BMI) of 50.0 to 59.9 in adult    3. WICHO (generalized anxiety disorder)          Plan:   Recent relevant labs results reviewed with patient.         Assessment & Plan    Reviewed history with Ozempic, noting significant side effects including concerns about gastroparesis and worsened anxiety.  Ruled out Adipex due to potential for increased anxiety.  Discussed potential contribution of psychiatric medications to weight gain, recommended consultation with psychiatrist Dr. Fowler.  BP remains well-controlled despite weight gain.  Assessed bariatric surgery as viable option given BMI of 51 and age, weighing risks and benefits. Medicaid funding - she is working with [facility].   Considered Wellbutrin or Qsymia, but hesitant due to anxiety  concerns.  Explored non-surgical weight loss options, including [employer] program and nutritionist referral.    HYPERTENSION:  - Continue Lisinopril for blood pressure management.  - Misael's blood pressure is currently well-controlled.    DEPRESSION AND ANXIETY:  - Misael is currently on Lexapro and Latuda, managed by psychiatrist Dr. Dante Fowler.  - Misael experienced increased anxiety with Ozempic side effects and Adipex.  - Discussed Wellbutrin and Qsymia as potential options for weight loss, but cautioned they may worsen anxiety.  - Advised patient to discuss with Dr. Fowler about current medications potentially contributing to weight gain.    WEIGHT MANAGEMENT:  - Misael has gained 20 lbs despite regular exercise and gym attendance.  - Recommend bariatric surgery due to high BMI and cardiac stress from weight.  - Explained various bariatric surgery options, potential risks/benefits emphasizing long-term health outcomes versus surgical risks, post-operative requirements including vitamin supplementation and dietary changes, and typical timeline including pre-surgical evaluations.  - Discussed expected weight loss outcomes and long-term maintenance strategies.  - Referred patient to [facility] bariatric clinic to resume appointment process since she's already plugged in and reports that her insurance is accepted, nutritionist, and [facility] program at [facility] for nutritional and exercise education.    GASTROPARESIS:  - Evaluated patient's concerns about gastroparesis with Ozempic, but noted studies were normal previously and with time off med, her symptoms resolved.     FOLLOW-UP:  - Follow up in a few months.         1. Primary hypertension  -     lisinopriL (PRINIVIL,ZESTRIL) 20 MG tablet; Take 1 tablet (20 mg total) by mouth once daily.  Dispense: 90 tablet; Refill: 3    2. Class 3 severe obesity due to excess calories with serious comorbidity and body mass index (BMI) of 50.0 to 59.9 in adult  -     Ambulatory  referral/consult to [facility] ([employer]); Future; Expected date: 06/17/2025  -     [facility]ARY ASSIGN QUESTIONNAIRE SERIES ([employer])  -     [facility] Entered [facility] ([employer])    3. WICHO (generalized anxiety disorder)  Follows with psychiatry. Discuss meds that may have less of weight gain profile with psychiatrist.     Patient's questions answered. Plan reviewed with patient at the end of visit. Relevant precautions to chief complaint and reasons to seek further medical care or to contact the office sooner reviewed with patient.     Follow up in about 2 months (around 8/10/2025) for Annual Exam, (prelabs), - already scheduled.        Part of this note was dictated using voice recognition software. Please excuse any typographical errors.     This note was generated with the assistance of ambient listening technology. Verbal consent was obtained by the patient and accompanying visitor(s) for the recording of patient appointment to facilitate this note. I attest to having reviewed and edited the generated note for accuracy, though some syntax or spelling errors may persist. Please contact the author of this note for any clarification.

## 2025-06-19 ENCOUNTER — TELEPHONE (OUTPATIENT)
Dept: HEMATOLOGY/ONCOLOGY | Facility: CLINIC | Age: 30
End: 2025-06-19
Payer: MEDICAID

## 2025-08-12 ENCOUNTER — PATIENT MESSAGE (OUTPATIENT)
Dept: HEMATOLOGY/ONCOLOGY | Facility: CLINIC | Age: 30
End: 2025-08-12
Payer: MEDICAID

## 2025-08-12 ENCOUNTER — PATIENT MESSAGE (OUTPATIENT)
Dept: ALLERGY | Facility: CLINIC | Age: 30
End: 2025-08-12
Payer: MEDICAID

## 2025-08-25 ENCOUNTER — LAB VISIT (OUTPATIENT)
Dept: LAB | Facility: HOSPITAL | Age: 30
End: 2025-08-25
Attending: FAMILY MEDICINE
Payer: MEDICAID

## 2025-08-25 ENCOUNTER — OFFICE VISIT (OUTPATIENT)
Dept: OBSTETRICS AND GYNECOLOGY | Facility: CLINIC | Age: 30
End: 2025-08-25
Payer: MEDICAID

## 2025-08-25 ENCOUNTER — PATIENT MESSAGE (OUTPATIENT)
Dept: OBSTETRICS AND GYNECOLOGY | Facility: CLINIC | Age: 30
End: 2025-08-25
Payer: MEDICAID

## 2025-08-25 VITALS
BODY MASS INDEX: 45.99 KG/M2 | DIASTOLIC BLOOD PRESSURE: 76 MMHG | WEIGHT: 293 LBS | SYSTOLIC BLOOD PRESSURE: 116 MMHG | HEIGHT: 67 IN

## 2025-08-25 DIAGNOSIS — Z11.3 SCREEN FOR STD (SEXUALLY TRANSMITTED DISEASE): ICD-10-CM

## 2025-08-25 DIAGNOSIS — Z72.89 OTHER PROBLEMS RELATED TO LIFESTYLE: Primary | ICD-10-CM

## 2025-08-25 PROCEDURE — 3078F DIAST BP <80 MM HG: CPT | Mod: CPTII,,, | Performed by: OBSTETRICS & GYNECOLOGY

## 2025-08-25 PROCEDURE — 3008F BODY MASS INDEX DOCD: CPT | Mod: CPTII,,, | Performed by: OBSTETRICS & GYNECOLOGY

## 2025-08-25 PROCEDURE — 1159F MED LIST DOCD IN RCRD: CPT | Mod: CPTII,,, | Performed by: OBSTETRICS & GYNECOLOGY

## 2025-08-25 PROCEDURE — 1160F RVW MEDS BY RX/DR IN RCRD: CPT | Mod: CPTII,,, | Performed by: OBSTETRICS & GYNECOLOGY

## 2025-08-25 PROCEDURE — 3044F HG A1C LEVEL LT 7.0%: CPT | Mod: CPTII,,, | Performed by: OBSTETRICS & GYNECOLOGY

## 2025-08-25 PROCEDURE — 99213 OFFICE O/P EST LOW 20 MIN: CPT | Mod: S$PBB,,, | Performed by: OBSTETRICS & GYNECOLOGY

## 2025-08-25 PROCEDURE — 4010F ACE/ARB THERAPY RXD/TAKEN: CPT | Mod: CPTII,,, | Performed by: OBSTETRICS & GYNECOLOGY

## 2025-08-25 PROCEDURE — 99213 OFFICE O/P EST LOW 20 MIN: CPT | Mod: PBBFAC | Performed by: OBSTETRICS & GYNECOLOGY

## 2025-08-25 PROCEDURE — 99999 PR PBB SHADOW E&M-EST. PATIENT-LVL III: CPT | Mod: PBBFAC,,, | Performed by: OBSTETRICS & GYNECOLOGY

## 2025-08-25 PROCEDURE — 3074F SYST BP LT 130 MM HG: CPT | Mod: CPTII,,, | Performed by: OBSTETRICS & GYNECOLOGY

## 2025-08-28 PROBLEM — N87.0 DYSPLASIA OF CERVIX, LOW GRADE (CIN 1): Status: ACTIVE | Noted: 2025-08-28

## 2025-08-28 PROBLEM — Z30.41 ORAL CONTRACEPTIVE PILL SURVEILLANCE: Status: RESOLVED | Noted: 2023-01-04 | Resolved: 2025-08-28

## 2025-08-28 PROBLEM — R07.89 OTHER CHEST PAIN: Status: RESOLVED | Noted: 2024-02-29 | Resolved: 2025-08-28

## 2025-08-28 PROBLEM — I50.22 HEART FAILURE WITH MILDLY REDUCED EJECTION FRACTION (HFMREF): Status: RESOLVED | Noted: 2024-02-01 | Resolved: 2025-08-28
